# Patient Record
Sex: FEMALE | Race: WHITE | NOT HISPANIC OR LATINO | Employment: OTHER | URBAN - NONMETROPOLITAN AREA
[De-identification: names, ages, dates, MRNs, and addresses within clinical notes are randomized per-mention and may not be internally consistent; named-entity substitution may affect disease eponyms.]

---

## 2017-01-09 DIAGNOSIS — G47.00 PERSISTENT INSOMNIA: Primary | ICD-10-CM

## 2017-01-09 RX ORDER — ZOLPIDEM TARTRATE 12.5 MG/1
TABLET, FILM COATED, EXTENDED RELEASE ORAL
Qty: 30 TABLET | Refills: 0 | Status: SHIPPED | OUTPATIENT
Start: 2017-01-09 | End: 2017-02-06

## 2017-01-09 NOTE — TELEPHONE ENCOUNTER
The patient called for a refill of Ambien. I do not see a record of a request.  Please see phone note below.

## 2017-01-09 NOTE — TELEPHONE ENCOUNTER
Reason for call:  Medication    1. Medication Name? zolpidem (AMBIEN CR) 12.5 MG CR tablet   2. Is this request for a refill? Yes  3. What Pharmacy do you use? [facility]  4. Have you contacted your pharmacy? Yes, twice the pharmacy sent a fax; one last week either Wednesday 01/11/2017 or Thursday 01/12/2017 and the other today 01/09/2017   5. If yes, when?  (Please note that the turn-around-time for prescriptions is 72 business hours; I am sending your request at this time. SEND TO  [facility]  )  Description: Pt called to see if the faxes were received by MARQUISE Lee and to have a nurse call once it was ready.  Pt states she is out of that medication as of yesterday.  Was an appointment offered for this a call? No   Preferred method for responding to this messageTelephone Call: [phone]  If we cannot reach you directly, may we leave a detailed response at the number you provided? Yes  Can this message wait until your PCP/Provider returns if not available today? Not applicable, MARQUISE Lee is in today.

## 2017-01-16 DIAGNOSIS — R52 GENERALIZED PAIN: Primary | ICD-10-CM

## 2017-01-16 RX ORDER — HYDROCODONE BITARTRATE AND ACETAMINOPHEN 7.5; 325 MG/1; MG/1
TABLET ORAL
Qty: 120 TABLET | Refills: 0 | Status: SHIPPED | OUTPATIENT
Start: 2017-01-16 | End: 2017-02-14

## 2017-01-17 NOTE — TELEPHONE ENCOUNTER
Pt notified that the written RX is ready at the [facility]  registration to be picked up.

## 2017-02-06 DIAGNOSIS — G47.00 PERSISTENT INSOMNIA: Primary | ICD-10-CM

## 2017-02-06 RX ORDER — ZOLPIDEM TARTRATE 12.5 MG/1
TABLET, FILM COATED, EXTENDED RELEASE ORAL
Qty: 30 TABLET | Refills: 0 | Status: SHIPPED | OUTPATIENT
Start: 2017-02-06 | End: 2017-03-02

## 2017-02-06 NOTE — TELEPHONE ENCOUNTER
ambien      Last Written Prescription Date: 1/9/17  Last Fill Quantity: 30,  # refills: 0   Last Office Visit with G, P or [facility] prescribing provider: 11/14/16

## 2017-02-14 DIAGNOSIS — R52 GENERALIZED PAIN: ICD-10-CM

## 2017-02-14 RX ORDER — HYDROCODONE BITARTRATE AND ACETAMINOPHEN 7.5; 325 MG/1; MG/1
TABLET ORAL
Qty: 120 TABLET | Refills: 0 | Status: SHIPPED | OUTPATIENT
Start: 2017-02-14 | End: 2017-03-14

## 2017-02-14 NOTE — TELEPHONE ENCOUNTER
Pt notified that the written RX is ready at the [facility]  registration to be picked up.

## 2017-03-02 DIAGNOSIS — G47.00 PERSISTENT INSOMNIA: ICD-10-CM

## 2017-03-02 RX ORDER — ZOLPIDEM TARTRATE 12.5 MG/1
TABLET, FILM COATED, EXTENDED RELEASE ORAL
Qty: 30 TABLET | Refills: 0 | Status: SHIPPED | OUTPATIENT
Start: 2017-03-02 | End: 2017-03-23

## 2017-03-02 NOTE — TELEPHONE ENCOUNTER
ambien CR      Last Written Prescription Date: 2/6/17  Last Fill Quantity: 30,  # refills: 0   Last Office Visit with G, P or [facility] prescribing provider: 11/14/16

## 2017-03-14 DIAGNOSIS — R52 GENERALIZED PAIN: ICD-10-CM

## 2017-03-14 RX ORDER — HYDROCODONE BITARTRATE AND ACETAMINOPHEN 7.5; 325 MG/1; MG/1
TABLET ORAL
Qty: 120 TABLET | Refills: 0 | Status: SHIPPED | OUTPATIENT
Start: 2017-03-14 | End: 2017-04-11

## 2017-03-15 NOTE — TELEPHONE ENCOUNTER
Pt notified that the written RX is ready at the [facility]  registration to be picked up.

## 2017-03-21 ENCOUNTER — OFFICE VISIT (OUTPATIENT)
Dept: INTERNAL MEDICINE | Facility: OTHER | Age: 67
End: 2017-03-21
Attending: NURSE PRACTITIONER
Payer: COMMERCIAL

## 2017-03-21 DIAGNOSIS — G89.29 CHRONIC NONINTRACTABLE HEADACHE, UNSPECIFIED HEADACHE TYPE: ICD-10-CM

## 2017-03-21 DIAGNOSIS — E78.2 MIXED HYPERLIPIDEMIA: ICD-10-CM

## 2017-03-21 DIAGNOSIS — M25.561 CHRONIC PAIN OF BOTH KNEES: ICD-10-CM

## 2017-03-21 DIAGNOSIS — G89.29 CHRONIC PAIN OF BOTH KNEES: ICD-10-CM

## 2017-03-21 DIAGNOSIS — M25.562 CHRONIC PAIN OF BOTH KNEES: ICD-10-CM

## 2017-03-21 DIAGNOSIS — E03.9 HYPOTHYROIDISM, UNSPECIFIED TYPE: Primary | ICD-10-CM

## 2017-03-21 DIAGNOSIS — F41.9 ANXIETY: ICD-10-CM

## 2017-03-21 DIAGNOSIS — G47.00 PERSISTENT INSOMNIA: ICD-10-CM

## 2017-03-21 DIAGNOSIS — R51.9 CHRONIC NONINTRACTABLE HEADACHE, UNSPECIFIED HEADACHE TYPE: ICD-10-CM

## 2017-03-21 PROCEDURE — 99212 OFFICE O/P EST SF 10 MIN: CPT | Mod: 25

## 2017-03-21 PROCEDURE — 94760 N-INVAS EAR/PLS OXIMETRY 1: CPT

## 2017-03-21 PROCEDURE — 20610 DRAIN/INJ JOINT/BURSA W/O US: CPT | Mod: 50 | Performed by: NURSE PRACTITIONER

## 2017-03-21 PROCEDURE — 99213 OFFICE O/P EST LOW 20 MIN: CPT | Mod: 25 | Performed by: NURSE PRACTITIONER

## 2017-03-21 RX ORDER — SIMVASTATIN 40 MG
TABLET ORAL
Qty: 90 TABLET | Refills: 0 | Status: SHIPPED | OUTPATIENT
Start: 2017-03-21 | End: 2019-05-30

## 2017-03-21 ASSESSMENT — ANXIETY QUESTIONNAIRES
5. BEING SO RESTLESS THAT IT IS HARD TO SIT STILL: NOT AT ALL
GAD7 TOTAL SCORE: 0
1. FEELING NERVOUS, ANXIOUS, OR ON EDGE: NOT AT ALL
6. BECOMING EASILY ANNOYED OR IRRITABLE: NOT AT ALL
7. FEELING AFRAID AS IF SOMETHING AWFUL MIGHT HAPPEN: NOT AT ALL
3. WORRYING TOO MUCH ABOUT DIFFERENT THINGS: NOT AT ALL
IF YOU CHECKED OFF ANY PROBLEMS ON THIS QUESTIONNAIRE, HOW DIFFICULT HAVE THESE PROBLEMS MADE IT FOR YOU TO DO YOUR WORK, TAKE CARE OF THINGS AT HOME, OR GET ALONG WITH OTHER PEOPLE: NOT DIFFICULT AT ALL
2. NOT BEING ABLE TO STOP OR CONTROL WORRYING: NOT AT ALL

## 2017-03-21 ASSESSMENT — PAIN SCALES - GENERAL: PAINLEVEL: MODERATE PAIN (5)

## 2017-03-21 ASSESSMENT — PATIENT HEALTH QUESTIONNAIRE - PHQ9: 5. POOR APPETITE OR OVEREATING: NOT AT ALL

## 2017-03-21 NOTE — PROGRESS NOTES
SUBJECTIVE:  Aure Lanier, a 67 year old female scheduled an appointment to discuss the following issues:  Knee pain: Has pain both knees can hardly walk WOuld like steroid injection. THese work well for her.  Does not want surgery yet.  .   Insomnia:  Insurance company refusing to pay for Ambien CR, states 10mg does not last and she awakens at 3 AM and cannot get back to sleep.    Hypothyroidism  On Levothyroxin.    Dysthymia: Patients insurance will not pay for 2 10's  Of Lexapro-will change to 20mg.   Hyperlipidemia: On Simvastatin.   Medical, social, surgical, and family histories reviewed.    Current Outpatient Prescriptions   Medication     simvastatin (ZOCOR) 40 MG tablet     HYDROcodone-acetaminophen (NORCO) 7.5-325 MG per tablet     zolpidem (AMBIEN CR) 12.5 MG CR tablet     cyclobenzaprine (FLEXERIL) 10 MG tablet     gabapentin (NEURONTIN) 300 MG capsule     nystatin (MYCOSTATIN) 221106 UNIT/GM POWD     bupivacaine (MARCAINE) 0.5 % injection     folic acid (FOLVITE) 1 MG tablet     escitalopram (LEXAPRO) 10 MG tablet     levothyroxine (SYNTHROID, LEVOTHROID) 125 MCG tablet     multivitamin (OCUVITE) TABS     Multiple Vitamins-Minerals (MULTIVITAMIN OR)     calcium (CALCIUM 600) 600 MG tablet     cyanocolbalamin (VITAMIN  B-12) 1000 MCG tablet     No current facility-administered medications for this visit.        ROS:  C: NEGATIVE for fever, chills, sore throat, earache  E: NEGATIVE for vision changes   R: NEGATIVE for  cough , SOB  CV: NEGATIVE for chest pain, palpitations Hx hyperlipidemia  GI: NEGATIVE for nausea, abdominal pain, heartburn, or constipation, diarrhea.   : NEGATIVE for frequency, dysuria, or hematuria  M:POSITIVE  for significant arthralgias or myalgia Knee pain. Hx arthroscopy on both  No complaints of fungal rash today.   N: NEGATIVE for weakness,  Paresthesias, dizziness, has chronic  Headache Has insomnia.  Hx hypothyroidism.      OBJECTIVE:  /80 (BP Location: Left arm,  "Patient Position: Chair, Cuff Size: Adult Large)  Pulse 99  Temp 96.6  F (35.9  C) (Tympanic)  Ht 5' 3\" (1.6 m)  Wt 205 lb (93 kg)  BMI 36.31 kg/m2  EXAM:  GENERAL APPEARANCE:  alert and no distress  EYES: EOMI,  PERRL  HENT:EARS: TM's pearly gray, good lite reflex, NOSE: Nares red, moist nonswollen, THROAT: Oropharynx pink  Tongue midline, no fasciculations.   NECK: Supple, no lymphadenopathy, no thyromegaly, no carotid bruits, No JVD  RESP: lungs clear to auscultation anteriorly and posteriorly - no rales, rhonchi or wheezes  CV: regular rates and rhythm, normal S1 S2, no S3 or S4 and no murmur, no click or rub -  MS: No edema  Permit signed.  Betadine to inner patella edge on both knees.  Injected with Bipuvicaine 0.25% 2.5cc and Kenalog 40mg  1/2 cc to both knees. Tolerated procedure well.   NEURO: no focal deficits.           :  ASSESSMENT / PLAN:  (F41.9) Anxiety  Comment:On Lexapro 20mg a day for anxiety and dysthymia.    Plan: Lexapro 20mg     (E78.2) Mixed hyperlipidemia  Comment: Will have patient come in for fasting labs.    Plan: simvastatin (ZOCOR) 40 MG tablet          (E03.9) Hypothyroidism, unspecified type    Comment: On Levothyroxine  125mcg.    Plan: Needs TSH     (R51) Chronic nonintractable headache, unspecified headache type  Comment: On Lortab 7.5/325 4 times a day for headache, neck pain.  On Gabapentin 300mg 3 times a day for this and post herpatic neuralgia.    Plan: Hydrocodone 7.5/325mg     (M25.561,  M25.562,  G89.29) Chronic pain of both knees  Comment: Steroid injection done-see above    Plan: See Above-watch for signs of infection.      (G47.00) Persistent insomnia  Comment: Will change Ambien to 10mg  Take one and may repeat if awakens.    Plan: AMbien 10mg         "

## 2017-03-21 NOTE — MR AVS SNAPSHOT
"              After Visit Summary   3/21/2017    Aure Lanier    MRN: [id number]           Patient Information     Date Of Birth          1950        Visit Information        Provider Department      3/21/2017 2:00 PM [facility]        Today's Diagnoses     Anxiety        Mixed hyperlipidemia          Care Instructions    1. Rest today with knees.          Follow-ups after your visit        Who to contact     If you have questions or need follow up information about today's clinic visit or your schedule please contact [facility] directly at [phone].  Normal or non-critical lab and imaging results will be communicated to you by [employer] in 2014[employer]hart, letter or phone within 4 business days after the clinic has received the results. If you do not hear from us within 7 days, please contact the clinic through [employer] in 2014[employer]hart or phone. If you have a critical or abnormal lab result, we will notify you by phone as soon as possible.  Submit refill requests through [employer] or call your pharmacy and they will forward the refill request to us. Please allow 3 business days for your refill to be completed.          Additional Information About Your Visit        MyChart Information     [employer] lets you send messages to your doctor, view your test results, renew your prescriptions, schedule appointments and more. To sign up, go to [email or web address] . Click on \"Log in\" on the left side of the screen, which will take you to the Welcome page. Then click on \"Sign up Now\" on the right side of the page.     You will be asked to enter the access code listed below, as well as some personal information. Please follow the directions to create your username and password.     Your access code is: [email or web address]  Expires: 2017  2:54 PM     Your access code will  in 90 days. If you need help or a new code, please call your [facility] or [phone].        Care EveryWhere ID     This is " "your Care EveryWhere ID. This could be used by other organizations to access your [place] medical records  [id number]        Your Vitals Were     Pulse Temperature Height BMI (Body Mass Index)          99 96.6  F (35.9  C) (Tympanic) 5' 3\" (1.6 m) 36.31 kg/m2         Blood Pressure from Last 3 Encounters:   03/21/17 140/80   11/14/16 112/64   08/04/16 100/70    Weight from Last 3 Encounters:   03/21/17 205 lb (93 kg)   11/14/16 200 lb (90.7 kg)   08/04/16 197 lb (89.4 kg)              Today, you had the following     No orders found for display         Today's Medication Changes          These changes are accurate as of: 3/21/17  2:54 PM.  If you have any questions, ask your nurse or doctor.               These medicines have changed or have updated prescriptions.        Dose/Directions    simvastatin 40 MG tablet   Commonly known as:  ZOCOR   This may have changed:  See the new instructions.   Used for:  Mixed hyperlipidemia   Changed by:  Jaden Lee NP        TAKE 1 TABLET BY MOUTH EVERY DAY IN THE EVENING . GENERIC FOR ZOCOR.   Quantity:  90 tablet   Refills:  0         Stop taking these medicines if you haven't already. Please contact your care team if you have questions.     omeprazole 40 MG capsule   Commonly known as:  priLOSEC   Stopped by:  Jaden Lee NP                Where to get your medicines      These medications were sent to [id number] - [place] - [address]  [address] [place] [facility] [address]     Phone:  [phone]     simvastatin 40 MG tablet                Primary Care Provider Office Phone # Fax #    Jaden Lee [phone]       [facility] [address] [address]        Thank you!     Thank you for choosing [facility]  for your care. Our goal is always to provide you with excellent care. Hearing back from our patients is one way we can continue to improve our services. Please take a few minutes to " complete the written survey that you may receive in the mail after your visit with us. Thank you!             Your Updated Medication List - Protect others around you: Learn how to safely use, store and throw away your medicines at www.disposemymeds.org.          This list is accurate as of: 3/21/17  2:54 PM.  Always use your most recent med list.                   Brand Name Dispense Instructions for use    bupivacaine 0.5 % injection    MARCAINE    2.5 mL    Used 0.25% Marcaine- 2 1/2 ml to each knee.       calcium carbonate 600 MG tablet   Generic drug:  calcium      With vitamin D; take 2 by oral route every day       cyanocobalamin 1000 MCG tablet    vitamin  B-12     Take 1 tablet by mouth daily.       cyclobenzaprine 10 MG tablet    FLEXERIL    90 tablet    TAKE ONE TABLET THREE TIMES A DAY BY MOUTH AS NEEDED - GENERICFOR FLEXERIL       escitalopram 10 MG tablet    LEXAPRO    60 tablet    TAKE 1 TABLET (10MG) BY MOUTH TWICE A DAY       folic acid 1 MG tablet    FOLVITE    90 tablet    TAKE ONE TABLET DAILY BY MOUTH       gabapentin 300 MG capsule    NEURONTIN    90 capsule    TAKE 1 CAPSULE (300MG) BY MOUTH THREE TIMES DAILY       HYDROcodone-acetaminophen 7.5-325 MG per tablet    NORCO    120 tablet    TAKE 1 TABLET BY MOUTH EVER Y 6 HOURS AS NEEDED FOR JOEY N - GENERIC FOR NORCO       levothyroxine 125 MCG tablet    SYNTHROID/LEVOTHROID    90 tablet    TAKE 1 TABLET DAILY BY MOUTH - GENERIC FOR SYNTHROID       * MULTIVITAMIN PO      Take 1 tablet by mouth daily with food.       * multivitamin Tabs tablet      Take 1 tablet by mouth daily       nystatin 234528 UNIT/GM Powd    MYCOSTATIN    60 g    APPLY TOPICALLY 2 TIMES DAILY AS NEEDED       simvastatin 40 MG tablet    ZOCOR    90 tablet    TAKE 1 TABLET BY MOUTH EVERY DAY IN THE EVENING . GENERIC FOR ZOCOR.       zolpidem 12.5 MG CR tablet    AMBIEN CR    30 tablet    TAKE 1 TABLET BY MOUTH AT B EDTIME AS NEEDED FOR SLEEP       * Notice:  This list has 2  medication(s) that are the same as other medications prescribed for you. Read the directions carefully, and ask your doctor or other care provider to review them with you.

## 2017-03-21 NOTE — NURSING NOTE
"Chief Complaint   Patient presents with     Knee Pain     bilateral knee injections       Initial /80 (BP Location: Left arm, Patient Position: Chair, Cuff Size: Adult Large)  Pulse 99  Temp 96.6  F (35.9  C) (Tympanic)  Ht 5' 3\" (1.6 m)  Wt 205 lb (93 kg)  BMI 36.31 kg/m2 Estimated body mass index is 36.31 kg/(m^2) as calculated from the following:    Height as of this encounter: 5' 3\" (1.6 m).    Weight as of this encounter: 205 lb (93 kg).  Medication Reconciliation: complete   Elly Arredondo      "

## 2017-03-22 ASSESSMENT — ANXIETY QUESTIONNAIRES: GAD7 TOTAL SCORE: 0

## 2017-03-23 VITALS
HEART RATE: 99 BPM | HEIGHT: 63 IN | TEMPERATURE: 96.6 F | WEIGHT: 205 LBS | BODY MASS INDEX: 36.32 KG/M2 | SYSTOLIC BLOOD PRESSURE: 128 MMHG | DIASTOLIC BLOOD PRESSURE: 80 MMHG

## 2017-03-23 DIAGNOSIS — M54.2 CERVICALGIA: ICD-10-CM

## 2017-03-23 DIAGNOSIS — B02.23 POSTHERPETIC POLYNEUROPATHY: ICD-10-CM

## 2017-03-23 RX ORDER — BUPIVACAINE HYDROCHLORIDE 5 MG/ML
INJECTION, SOLUTION PERINEURAL
Qty: 5 ML | Refills: 0 | OUTPATIENT
Start: 2017-03-23 | End: 2017-07-11

## 2017-03-23 RX ORDER — ZOLPIDEM TARTRATE 10 MG/1
10 TABLET ORAL
Qty: 60 TABLET | Refills: 0 | COMMUNITY
Start: 2017-03-23 | End: 2017-04-04

## 2017-03-23 RX ORDER — TRIAMCINOLONE ACETONIDE 40 MG/ML
INJECTION, SUSPENSION INTRA-ARTICULAR; INTRAMUSCULAR
Qty: 1 ML | Refills: 0 | OUTPATIENT
Start: 2017-03-23 | End: 2017-07-11

## 2017-03-27 RX ORDER — GABAPENTIN 300 MG/1
CAPSULE ORAL
Qty: 90 CAPSULE | Refills: 0 | Status: SHIPPED | OUTPATIENT
Start: 2017-03-27 | End: 2017-04-24

## 2017-03-27 RX ORDER — CYCLOBENZAPRINE HCL 10 MG
TABLET ORAL
Qty: 90 TABLET | Refills: 0 | Status: SHIPPED | OUTPATIENT
Start: 2017-03-27 | End: 2017-04-24

## 2017-04-03 DIAGNOSIS — G47.00 PERSISTENT INSOMNIA: ICD-10-CM

## 2017-04-03 RX ORDER — ZOLPIDEM TARTRATE 12.5 MG/1
TABLET, FILM COATED, EXTENDED RELEASE ORAL
Qty: 30 TABLET | Refills: 0 | Status: SHIPPED | OUTPATIENT
Start: 2017-04-03 | End: 2017-04-04 | Stop reason: ALTCHOICE

## 2017-04-03 NOTE — TELEPHONE ENCOUNTER
ambien Cr   Was discontinued- please advise just got ambien 10 mg 3/23/17 #60   Last Written Prescription Date: 3/5/17  Last Fill Quantity: 30,  # refills: 0   Last Office Visit with [facility], [facility] or [facility] prescribing provider: 3/21/17

## 2017-04-04 DIAGNOSIS — G47.00 PERSISTENT INSOMNIA: ICD-10-CM

## 2017-04-04 RX ORDER — ZOLPIDEM TARTRATE 10 MG/1
10 TABLET ORAL
Qty: 60 TABLET | Refills: 0 | Status: SHIPPED | OUTPATIENT
Start: 2017-04-04 | End: 2017-05-02

## 2017-04-04 NOTE — TELEPHONE ENCOUNTER
Reason for call:  Medication    1. Medication Name? Abien 10 mg  2. Is this request for a refill? Yes  3. What Pharmacy do you use[facility]  4. Have you contacted your pharmacy? No    5. If yes, when?  (Please note that the turn-around-time for prescriptions is 72 business hours; I am sending your request at this time. SEND TO  [facility]  )  Description: Pt wanted her Ambien CR changed to regular Ambien (10 mg 60 pills/30days)due to insurance.    [facility] [place]  Was an appointment offered for this a call? No   Preferred method for responding to this messageTelephone Call  If we cannot reach you directly, may we leave a detailed response at the number you provided? Yes  Can this message wait until your PCP/Provider returns if not available today? No

## 2017-04-04 NOTE — TELEPHONE ENCOUNTER
Please see phone call message below.  Patient would like her rx changed to Ambien 10 mg take one tablet every night.  There was a rx for the Ambien on 3/23/2017 but that never went through.  I verified this with the pharmacy.  Please resend the rx for Ambien.  Thank you.

## 2017-04-11 DIAGNOSIS — R52 GENERALIZED PAIN: ICD-10-CM

## 2017-04-11 RX ORDER — HYDROCODONE BITARTRATE AND ACETAMINOPHEN 7.5; 325 MG/1; MG/1
TABLET ORAL
Qty: 120 TABLET | Refills: 0 | Status: SHIPPED | OUTPATIENT
Start: 2017-04-11 | End: 2017-05-09

## 2017-04-11 NOTE — TELEPHONE ENCOUNTER
Pt notified that the written RX is ready at the [facility]  registration to be picked up.

## 2017-04-11 NOTE — TELEPHONE ENCOUNTER
norco      Last Written Prescription Date: 3/14/17  Last Fill Quantity: 120,  # refills: 0   Last Office Visit with G, P or [facility] prescribing provider: 3/21/17

## 2017-05-02 DIAGNOSIS — G47.00 PERSISTENT INSOMNIA: ICD-10-CM

## 2017-05-02 RX ORDER — ZOLPIDEM TARTRATE 10 MG/1
10 TABLET ORAL
Qty: 60 TABLET | Refills: 0 | Status: SHIPPED | OUTPATIENT
Start: 2017-05-02 | End: 2017-06-01

## 2017-05-02 NOTE — TELEPHONE ENCOUNTER
ambien      Last Written Prescription Date: 4/4/17  Last Fill Quantity: 60,  # refills: 0   Last Office Visit with G, P or [facility] prescribing provider: 3/21/17

## 2017-05-08 DIAGNOSIS — F41.9 ANXIETY: Primary | ICD-10-CM

## 2017-05-08 DIAGNOSIS — M54.2 CERVICALGIA: ICD-10-CM

## 2017-05-08 RX ORDER — CYCLOBENZAPRINE HCL 10 MG
TABLET ORAL
Qty: 90 TABLET | Refills: 0 | Status: SHIPPED | OUTPATIENT
Start: 2017-05-08 | End: 2017-06-06

## 2017-05-08 NOTE — TELEPHONE ENCOUNTER
Flexeril- pharmacy did not receive approval from 4/27      Last Written Prescription Date: 4/27/17  Last Fill Quantity: 90,  # refills: 0   Last Office Visit with G, P or [facility] prescribing provider: 3/21/17

## 2017-05-09 DIAGNOSIS — R52 GENERALIZED PAIN: ICD-10-CM

## 2017-05-09 PROBLEM — M62.830 BACK MUSCLE SPASM: Status: ACTIVE | Noted: 2017-05-09

## 2017-05-09 RX ORDER — HYDROCODONE BITARTRATE AND ACETAMINOPHEN 7.5; 325 MG/1; MG/1
TABLET ORAL
Qty: 120 TABLET | Refills: 0 | Status: SHIPPED | OUTPATIENT
Start: 2017-05-09 | End: 2017-06-07

## 2017-05-09 NOTE — TELEPHONE ENCOUNTER
norco      Last Written Prescription Date: 4/11/17  Last Fill Quantity: 120,  # refills: 0   Last Office Visit with G, P or [facility] prescribing provider: 3/21/17

## 2017-05-09 NOTE — TELEPHONE ENCOUNTER
Pt notified that the written RX is ready at the [facility]  registration to be picked up.

## 2017-05-10 NOTE — TELEPHONE ENCOUNTER
lexapro       Last Written Prescription Date: 6/13/16  Last Fill Quantity: 60; # refills: 9  Last Office Visit with [facility], [facility] or  [facility] prescribing provider:  3/21/17        Last PHQ-9 score on record=   PHQ-9 SCORE 11/14/2016   Total Score -   Total Score 3       Lab Results   Component Value Date    AST 16 06/22/2016     Lab Results   Component Value Date    ALT 27 06/22/2016

## 2017-05-11 DIAGNOSIS — E03.9 HYPOTHYROIDISM: ICD-10-CM

## 2017-05-11 RX ORDER — ESCITALOPRAM OXALATE 20 MG/1
TABLET ORAL
Qty: 30 TABLET | Refills: 5 | Status: SHIPPED | OUTPATIENT
Start: 2017-05-11 | End: 2017-10-30

## 2017-05-12 RX ORDER — LEVOTHYROXINE SODIUM 125 UG/1
TABLET ORAL
Qty: 90 TABLET | Refills: 1 | Status: SHIPPED | OUTPATIENT
Start: 2017-05-12 | End: 2018-04-10

## 2017-05-12 NOTE — TELEPHONE ENCOUNTER
Levothyroxine     Last Written Prescription Date: 1/05/2017  Last Quantity: 90, # refills: 0  Last Office Visit with G, P or [facility] prescribing provider: 3/21/2017        TSH   Date Value Ref [facility] Status   06/22/2016 1.49 0.40 - 4.00 mU/L Final

## 2017-05-25 DIAGNOSIS — B02.23 POSTHERPETIC POLYNEUROPATHY: ICD-10-CM

## 2017-05-25 RX ORDER — GABAPENTIN 300 MG/1
CAPSULE ORAL
Qty: 90 CAPSULE | Refills: 0 | Status: SHIPPED | OUTPATIENT
Start: 2017-05-25 | End: 2017-06-21

## 2017-05-25 NOTE — TELEPHONE ENCOUNTER
gabapentin      Last Written Prescription Date: 4/27/17  Last Fill Quantity: 90,  # refills: 0   Last Office Visit with G, P or [facility] prescribing provider: 3/21/17

## 2017-06-01 DIAGNOSIS — G47.00 PERSISTENT INSOMNIA: ICD-10-CM

## 2017-06-01 DIAGNOSIS — E03.9 HYPOTHYROIDISM, UNSPECIFIED TYPE: ICD-10-CM

## 2017-06-01 DIAGNOSIS — E78.2 MIXED HYPERLIPIDEMIA: ICD-10-CM

## 2017-06-01 LAB
ALBUMIN SERPL-MCNC: 4 G/DL (ref 3.4–5)
ALP SERPL-CCNC: 92 U/L (ref 40–150)
ALT SERPL W P-5'-P-CCNC: 29 U/L (ref 0–50)
ANION GAP SERPL CALCULATED.3IONS-SCNC: 6 MMOL/L (ref 3–14)
AST SERPL W P-5'-P-CCNC: 14 U/L (ref 0–45)
BASOPHILS # BLD AUTO: 0.1 10E9/L (ref 0–0.2)
BASOPHILS NFR BLD AUTO: 0.6 %
BILIRUB SERPL-MCNC: 0.3 MG/DL (ref 0.2–1.3)
BUN SERPL-MCNC: 9 MG/DL (ref 7–30)
CALCIUM SERPL-MCNC: 8.5 MG/DL (ref 8.5–10.1)
CHLORIDE SERPL-SCNC: 107 MMOL/L (ref 94–109)
CHOLEST SERPL-MCNC: 159 MG/DL
CO2 SERPL-SCNC: 28 MMOL/L (ref 20–32)
CREAT SERPL-MCNC: 0.59 MG/DL (ref 0.52–1.04)
DIFFERENTIAL METHOD BLD: ABNORMAL
EOSINOPHIL # BLD AUTO: 0.8 10E9/L (ref 0–0.7)
EOSINOPHIL NFR BLD AUTO: 7.7 %
ERYTHROCYTE [DISTWIDTH] IN BLOOD BY AUTOMATED COUNT: 13.4 % (ref 10–15)
GFR SERPL CREATININE-BSD FRML MDRD: ABNORMAL ML/MIN/1.7M2
GLUCOSE SERPL-MCNC: 100 MG/DL (ref 70–99)
HCT VFR BLD AUTO: 41.8 % (ref 35–47)
HDLC SERPL-MCNC: 56 MG/DL
HGB BLD-MCNC: 13.7 G/DL (ref 11.7–15.7)
IMM GRANULOCYTES # BLD: 0 10E9/L (ref 0–0.4)
IMM GRANULOCYTES NFR BLD: 0.3 %
LDLC SERPL CALC-MCNC: 58 MG/DL
LYMPHOCYTES # BLD AUTO: 4.2 10E9/L (ref 0.8–5.3)
LYMPHOCYTES NFR BLD AUTO: 42.2 %
MCH RBC QN AUTO: 29.5 PG (ref 26.5–33)
MCHC RBC AUTO-ENTMCNC: 32.8 G/DL (ref 31.5–36.5)
MCV RBC AUTO: 90 FL (ref 78–100)
MONOCYTES # BLD AUTO: 0.8 10E9/L (ref 0–1.3)
MONOCYTES NFR BLD AUTO: 7.8 %
NEUTROPHILS # BLD AUTO: 4.1 10E9/L (ref 1.6–8.3)
NEUTROPHILS NFR BLD AUTO: 41.4 %
NONHDLC SERPL-MCNC: 103 MG/DL
NRBC # BLD AUTO: 0 10*3/UL
NRBC BLD AUTO-RTO: 0 /100
PLATELET # BLD AUTO: 411 10E9/L (ref 150–450)
POTASSIUM SERPL-SCNC: 3.9 MMOL/L (ref 3.4–5.3)
PROT SERPL-MCNC: 7.4 G/DL (ref 6.8–8.8)
RBC # BLD AUTO: 4.64 10E12/L (ref 3.8–5.2)
SODIUM SERPL-SCNC: 141 MMOL/L (ref 133–144)
TRIGL SERPL-MCNC: 223 MG/DL
TSH SERPL DL<=0.005 MIU/L-ACNC: 1.74 MU/L (ref 0.4–4)
WBC # BLD AUTO: 10 10E9/L (ref 4–11)

## 2017-06-01 PROCEDURE — 84443 ASSAY THYROID STIM HORMONE: CPT | Mod: ZL | Performed by: NURSE PRACTITIONER

## 2017-06-01 PROCEDURE — 80053 COMPREHEN METABOLIC PANEL: CPT | Mod: ZL | Performed by: NURSE PRACTITIONER

## 2017-06-01 PROCEDURE — 36415 COLL VENOUS BLD VENIPUNCTURE: CPT | Mod: ZL | Performed by: NURSE PRACTITIONER

## 2017-06-01 PROCEDURE — 85025 COMPLETE CBC W/AUTO DIFF WBC: CPT | Mod: ZL | Performed by: NURSE PRACTITIONER

## 2017-06-01 PROCEDURE — 80061 LIPID PANEL: CPT | Mod: ZL | Performed by: NURSE PRACTITIONER

## 2017-06-01 RX ORDER — ZOLPIDEM TARTRATE 10 MG/1
10 TABLET ORAL
Qty: 60 TABLET | Refills: 0 | Status: SHIPPED | OUTPATIENT
Start: 2017-06-01 | End: 2017-06-29

## 2017-06-01 NOTE — TELEPHONE ENCOUNTER
ambien      Last Written Prescription Date: 5/2/17  Last Fill Quantity: 60,  # refills: 0   Last Office Visit with G, P or [facility] prescribing provider: 3/21/17

## 2017-06-07 DIAGNOSIS — R52 GENERALIZED PAIN: ICD-10-CM

## 2017-06-08 RX ORDER — HYDROCODONE BITARTRATE AND ACETAMINOPHEN 7.5; 325 MG/1; MG/1
TABLET ORAL
Qty: 120 TABLET | Refills: 0 | Status: SHIPPED | OUTPATIENT
Start: 2017-06-08 | End: 2017-06-28

## 2017-06-20 ENCOUNTER — TELEPHONE (OUTPATIENT)
Dept: INTERNAL MEDICINE | Facility: OTHER | Age: 67
End: 2017-06-20

## 2017-06-20 NOTE — TELEPHONE ENCOUNTER
8:57 AM    Reason for Call: OVERBOOK    Patient is having the following symptoms: possible pink eye for 2 days.    The patient is requesting an appointment for 6-20-17 with Jaden Lee.    Was an appointment offered for this call? Yes    Preferred method for responding to this message: Telephone Call [phone]    If we cannot reach you directly, may we leave a detailed response at the number you provided? Yes    Can this message wait until your PCP/provider returns, if unavailable today? YES    Sherri Vieira

## 2017-06-28 ENCOUNTER — TELEPHONE (OUTPATIENT)
Dept: FAMILY MEDICINE | Facility: OTHER | Age: 67
End: 2017-06-28

## 2017-06-28 DIAGNOSIS — R52 GENERALIZED PAIN: ICD-10-CM

## 2017-06-28 NOTE — TELEPHONE ENCOUNTER
Reason for call:  Medication    1. Medication Name? HYDROcodone-acetaminophen (NORCO) 7.5-325 MG per tablet  2. Is this request for a refill? Yes  3. What [facility] [place]  4. Have you contacted your pharmacy? No    5. If yes, when?  (Please note that the turn-around-time for prescriptions is 72 business hours; I am sending your request at this time. SEND TO  [facility]  )  Description: Pt called and states that she is going to run out of medication and she knows Jaden Lee is out of the office next week she will be out by than she was wondering if there was anyway she could get it filled before this.   Was an appointment offered for this a call? No   Preferred method for responding to this messageTelephone Call  If we cannot reach you directly, may we leave a detailed response at the number you provided? Yes  Can this message wait until your PCP/Provider returns if not available today? n/a

## 2017-06-28 NOTE — TELEPHONE ENCOUNTER
Refill on Norco, if she goes through the refill line she will run out.  Her last office call was in March 2017..  She is due for follow up.

## 2017-06-29 DIAGNOSIS — G47.00 PERSISTENT INSOMNIA: ICD-10-CM

## 2017-06-29 RX ORDER — ZOLPIDEM TARTRATE 10 MG/1
10 TABLET ORAL
Qty: 60 TABLET | Refills: 0 | Status: SHIPPED | OUTPATIENT
Start: 2017-06-29 | End: 2017-07-26

## 2017-06-29 RX ORDER — HYDROCODONE BITARTRATE AND ACETAMINOPHEN 7.5; 325 MG/1; MG/1
TABLET ORAL
Qty: 120 TABLET | Refills: 0 | Status: SHIPPED | OUTPATIENT
Start: 2017-06-29 | End: 2017-08-02

## 2017-06-29 NOTE — TELEPHONE ENCOUNTER
ambien      Last Written Prescription Date: 6/1/17  Last Fill Quantity: 60,  # refills: 0   Last Office Visit with [facility], [facility] or [facility] prescribing provider: 3/21/17                                         Next 5 appointments (look out 90 days)     Jul 11, 2017 10:00 AM CDT   (Arrive by 9:45 AM)   SHORT with Jaden Lee NP   [facility] ([facility] )    [address] [place] [address]   [phone]

## 2017-06-29 NOTE — TELEPHONE ENCOUNTER
Pt notified that the written RX is ready at the [facility]  registration to be picked up.

## 2017-07-08 ENCOUNTER — HEALTH MAINTENANCE LETTER (OUTPATIENT)
Age: 67
End: 2017-07-08

## 2017-07-11 ENCOUNTER — OFFICE VISIT (OUTPATIENT)
Dept: INTERNAL MEDICINE | Facility: OTHER | Age: 67
End: 2017-07-11
Attending: NURSE PRACTITIONER
Payer: COMMERCIAL

## 2017-07-11 VITALS
HEIGHT: 63 IN | HEART RATE: 98 BPM | DIASTOLIC BLOOD PRESSURE: 60 MMHG | OXYGEN SATURATION: 96 % | SYSTOLIC BLOOD PRESSURE: 100 MMHG | TEMPERATURE: 97.8 F | BODY MASS INDEX: 33.66 KG/M2 | RESPIRATION RATE: 18 BRPM | WEIGHT: 190 LBS

## 2017-07-11 DIAGNOSIS — G89.4 CHRONIC PAIN SYNDROME: Primary | ICD-10-CM

## 2017-07-11 DIAGNOSIS — G89.29 BILATERAL CHRONIC KNEE PAIN: ICD-10-CM

## 2017-07-11 DIAGNOSIS — M25.562 BILATERAL CHRONIC KNEE PAIN: ICD-10-CM

## 2017-07-11 DIAGNOSIS — F41.9 ANXIETY: ICD-10-CM

## 2017-07-11 DIAGNOSIS — M25.561 BILATERAL CHRONIC KNEE PAIN: ICD-10-CM

## 2017-07-11 DIAGNOSIS — M54.2 CERVICALGIA: ICD-10-CM

## 2017-07-11 PROCEDURE — 99000 SPECIMEN HANDLING OFFICE-LAB: CPT | Performed by: NURSE PRACTITIONER

## 2017-07-11 PROCEDURE — 99213 OFFICE O/P EST LOW 20 MIN: CPT | Mod: 25 | Performed by: NURSE PRACTITIONER

## 2017-07-11 PROCEDURE — 80307 DRUG TEST PRSMV CHEM ANLYZR: CPT | Mod: ZL | Performed by: NURSE PRACTITIONER

## 2017-07-11 PROCEDURE — 20610 DRAIN/INJ JOINT/BURSA W/O US: CPT | Mod: 50 | Performed by: NURSE PRACTITIONER

## 2017-07-11 PROCEDURE — 99212 OFFICE O/P EST SF 10 MIN: CPT

## 2017-07-11 PROCEDURE — 94760 N-INVAS EAR/PLS OXIMETRY 1: CPT

## 2017-07-11 ASSESSMENT — PAIN SCALES - GENERAL: PAINLEVEL: MODERATE PAIN (4)

## 2017-07-11 NOTE — LETTER
[facility]    07/11/17    Patient: Aure Lanier  YOB: 1950  Medical Record Number: [id number]                                                                  Controlled Substance Agreement  I understand that my care provider has prescribed controlled substances (narcotics, tranquilizers, and/or stimulants) to help manage my condition(s).  I am taking this medicine to help me function or work.  I know that this is strong medicine.  It could have serious side effects and even cause a dependency on the drug.  If I stop these medicines suddenly, I could have severe withdrawal symptoms.    The risks, benefits, and side effects of these medication(s) were explained to me.  I agree that:  1. I will take part in other treatments as advised by my provider.  This may be psychiatry or counseling, physical therapy, behavioral therapy, group treatment, or a referral to a pain clinic.  I will reduce or stop my medicine when my provider tells me to do so.   2. I will take my medicines as prescribed.  I will not change the dose or schedule unless my provider tells me to.  There will be no refills if I  run out early.   I may be contacted at any time without warning and asked to complete a drug test or pill count.   3. I will keep all my appointments at the clinic.  If I miss appointments or fail to follow instructions, my provider may stop my medicine.  4. I will not ask other providers to prescribe controlled substances. And I will not accept controlled substances from other people. If I need another prescribed controlled substance for a new reason, I will notify my provider within one business day.  5. If I enroll in the [place] Medical Marijuana program, I will tell my provider.  I will also sign an agreement to share my medical records with my provider.  6. I will use one pharmacy to fill all of my controlled substance prescriptions.  If my prescription is mailed to my pharmacy, it may take 5 to 7  days for my medicine to be ready.  7. I understand that my provider, clinic care team, and pharmacy can track controlled substance prescriptions from other providers through a central database (prescription monitoring program).  8. I will bring in my list of medications (or my medicine bottles) each time I come to the clinic.  REV-  04/2016                                                                                                                                            Page 1 of 2      [facility]    07/11/17    Patient: Aure Lanier  YOB: 1950  Medical Record Number: [id number]    9. Refills of controlled substances will be made only during office hours.  It is up to me to make sure that I do not run out of my medicines on weekends or holidays.    10. I am responsible for my prescriptions.  If the medicine is lost or stolen, it will not be replaced.   I also agree not to share these medicines with anyone.  11. I agree to not use ANY illegal or recreational drugs.  This includes marijuana, cocaine, bath salts or other drugs.  I agree not to use alcohol unless my provider says I may.  I agree to give urine samples whenever asked.  If I fail to give a urine sample, the provider may stop my medicine.     12. I will tell my nurse or provider right away if I become pregnant or have a new medical problem treated outside of [facility].  13. I understand that this medicine can affect my thinking and judgment.  It may be unsafe for me to drive, use machinery and do dangerous tasks.  I will not do any of these things until I know how the medicine affects me.  If my dose changes, I will wait to see how it affects me.  I will contact my provider if I have concerns about medicine side effects.  I understand that if I do not follow any of the conditions above, my prescriptions or treatment may be stopped.    I agree that my provider, clinic care team, and pharmacy may work with any city,  state or federal law enforcement agency that investigates the misuse, sale, or other diversion of my controlled medicine. I will allow my provider to discuss my care with or share a copy of this agreement with any other treating provider, pharmacy or emergency room where I receive care.  I agree to give up (waive) any right of privacy or confidentiality with respect to these authorizations.   I have read this agreement and have asked questions about anything I did not understand.   ___________________________________    ___________________________  Patient Signature                                                           Date and Time  ___________________________________     ____________________________  Witness                                                                            Date and Time  ___________________________________  Jaden Lee NP  REV-  04/2016                                                                                                                                                                 Page 2 of 2

## 2017-07-11 NOTE — PROGRESS NOTES
"SUBJECTIVE:  Aure Lanier, a 67 year old female scheduled an appointment to discuss the following issues:  Chronic pain syndrome Will have sign contract today, and get urine screen .  On Chronic pain meds for years for Headache and neck pain .   Knee pain :  Wants injections today.Has been putting off knee replacement.  States injections help.    Social life: Has moved into another house away from  though not . States feels better since taking this step.      Medical, social, surgical, and family histories reviewed.    Current Outpatient Prescriptions   Medication     cyclobenzaprine (FLEXERIL) 10 MG tablet     HYDROcodone-acetaminophen (NORCO) 7.5-325 MG per tablet     zolpidem (AMBIEN) 10 MG tablet     gabapentin (NEURONTIN) 300 MG capsule     levothyroxine (SYNTHROID/LEVOTHROID) 125 MCG tablet     escitalopram (LEXAPRO) 20 MG tablet     simvastatin (ZOCOR) 40 MG tablet     nystatin (MYCOSTATIN) 939975 UNIT/GM POWD     folic acid (FOLVITE) 1 MG tablet     multivitamin (OCUVITE) TABS     Multiple Vitamins-Minerals (MULTIVITAMIN OR)     calcium (CALCIUM 600) 600 MG tablet     cyanocolbalamin (VITAMIN  B-12) 1000 MCG tablet     [DISCONTINUED] escitalopram (LEXAPRO) 10 MG tablet     No current facility-administered medications for this visit.        ROS:  C: NEGATIVE for fever, chills, sore throat, earache  E: NEGATIVE for vision changes   R: NEGATIVE for  cough , SOB  CV: NEGATIVE for chest pain, palpitations   GI: NEGATIVE for nausea, abdominal pain,some   heartburn, took Prevacid better now.  , or constipation, diarrhea.   : NEGATIVE for frequency, dysuria, or hematuria  M: POSITIVE   for significant arthralgias or myalgia   neck pain  + knee pain   N: NEGATIVE for weakness,  Paresthesias, dizziness  or headache    OBJECTIVE:  /60 (BP Location: Left arm, Patient Position: Sitting, Cuff Size: Adult Large)  Pulse 98  Temp 97.8  F (36.6  C) (Tympanic)  Resp 18  Ht 5' 3\" (1.6 m)  Wt 190 " lb (86.2 kg)  SpO2 96%  BMI 33.66 kg/m2  EXAM:  GENERAL APPEARANCE:  alert and no distress  EYES: EOMI,  PERRL  NECK: Supple, no lymphadenopathy, no thyromegaly, no carotid bruits, No JVD  RESP: lungs clear to auscultation anteriorly and posteriorly - no rales, rhonchi or wheezes  CV: regular rates and rhythm, normal S1 S2, no S3 or S4 and no murmur, no click or rub -  ABDOMEN:  soft, nontender, no hepatomegaly, no splenomegaly,  or masses,  bowel sounds normal  MS: No edema Has fatty tissue on lower legs. Tender to palpation of knees. Permit signed.    Lateral sides of both knees prepped with betadine.  Injected with 2.5cc Marcaine 0.5% and 1/4 cc Kenalog.    NEURO: No focal deficits      ASSESSMENT / PLAN:  (G89.4) Chronic pain syndrome  (primary encounter diagnosis)  Comment:Discussed drug monitoring program at clinic.  Contract signed.Patient given copy.   Urine drug screen obtained.    Plan: Pain Drug Scr UR W Rptd Meds           (M25.561,  M25.562,  G89.29) Bilateral chronic knee pain  Comment:Bilateral Knee injections done.    Plan: See above.  Please note 5cc Marcaine 0.5% used, and Kenalog 20mg of a 40mg bottle.  .  Total      (M54.2) Cervicalgia  Comment: On Lortab 10/325mg QID pRN>    Plan:Fills scripts appropriately.      (F41.9) Anxiety  Comment: On  Lexapro 20mg a day.    Plan: Has left  to live in one of their other houses-apparently was his idea.

## 2017-07-11 NOTE — LETTER
[facility] [place]  [address]  [place] [address]  [phone]             July 11, 2017    Aure Lanier  [address]  \[facility]\""[place] [address]              Dear Aure,    The results of your recent tests were normal.  Enclosed is a copy of the results.  It was a pleasure to see you at your last appointment.  Results for orders placed or performed in visit on 06/01/17   CBC with platelets and differential   Result Value Ref Range    WBC 10.0 4.0 - 11.0 10e9/L    RBC Count 4.64 3.8 - 5.2 10e12/L    Hemoglobin 13.7 11.7 - 15.7 g/dL    Hematocrit 41.8 35.0 - 47.0 %    MCV 90 78 - 100 fl    MCH 29.5 26.5 - 33.0 pg    MCHC 32.8 31.5 - 36.5 g/dL    RDW 13.4 10.0 - 15.0 %    Platelet Count 411 150 - 450 10e9/L    Diff Method Automated Method     % Neutrophils 41.4 %    % Lymphocytes 42.2 %    % Monocytes 7.8 %    % Eosinophils 7.7 %    % Basophils 0.6 %    % Immature Granulocytes 0.3 %    Nucleated RBCs 0 0 /100    Absolute Neutrophil 4.1 1.6 - 8.3 10e9/L    Absolute Lymphocytes 4.2 0.8 - 5.3 10e9/L    Absolute Monocytes 0.8 0.0 - 1.3 10e9/L    Absolute Eosinophils 0.8 (H) 0.0 - 0.7 10e9/L    Absolute Basophils 0.1 0.0 - 0.2 10e9/L    Abs Immature Granulocytes 0.0 0 - 0.4 10e9/L    Absolute Nucleated RBC 0.0    Comprehensive metabolic panel (BMP + Alb, Alk Phos, ALT, AST, Total. Bili, TP)   Result Value Ref Range    Sodium 141 133 - 144 mmol/L    Potassium 3.9 3.4 - 5.3 mmol/L    Chloride 107 94 - 109 mmol/L    Carbon Dioxide 28 20 - 32 mmol/L    Anion Gap 6 3 - 14 mmol/L    Glucose 100 (H) 70 - 99 mg/dL    Urea Nitrogen 9 7 - 30 mg/dL    Creatinine 0.59 0.52 - 1.04 mg/dL    GFR Estimate >90  Non  GFR Calc   >60 mL/min/1.7m2    GFR Estimate If Black >90   GFR Calc   >60 mL/min/1.7m2    Calcium 8.5 8.5 - 10.1 mg/dL    Bilirubin Total 0.3 0.2 - 1.3 mg/dL    Albumin 4.0 3.4 - 5.0 g/dL    Protein Total 7.4 6.8 - 8.8 g/dL    Alkaline Phosphatase 92 40 - 150 U/L    ALT 29 0 - 50 U/L     AST 14 0 - 45 U/L   Lipid Profile (Chol, Trig, HDL, LDL calc)   Result Value Ref Range    Cholesterol 159 <200 mg/dL    Triglycerides 223 (H) <150 mg/dL    HDL Cholesterol 56 >49 mg/dL    LDL Cholesterol Calculated 58 <100 mg/dL    Non HDL Cholesterol 103 <130 mg/dL   TSH with free T4 reflex   Result Value Ref Range    TSH 1.74 0.40 - 4.00 mU/L       If you have any questions or concerns, please call myself or my nurse at [phone].      Sincerely,      Jaden PARRA

## 2017-07-11 NOTE — MR AVS SNAPSHOT
"              After Visit Summary   2017    Aure Lanier    MRN: [id number]           Patient Information     Date Of Birth          1950        Visit Information        Provider Department      2017 10:00 AM [facility]        Today's Diagnoses     Chronic pain syndrome    -  1    Bilateral chronic knee pain        Cervicalgia        Anxiety           Follow-ups after your visit        Who to contact     If you have questions or need follow up information about today's clinic visit or your schedule please contact [facility] directly at [phone].  Normal or non-critical lab and imaging results will be communicated to you by [employer], letter or phone within 4 business days after the clinic has received the results. If you do not hear from us within 7 days, please contact the clinic through [employer] or phone. If you have a critical or abnormal lab result, we will notify you by phone as soon as possible.  Submit refill requests through [employer] or call your pharmacy and they will forward the refill request to us. Please allow 3 business days for your refill to be completed.          Additional Information About Your Visit        MyChart Information     [employer] lets you send messages to your doctor, view your test results, renew your prescriptions, schedule appointments and more. To sign up, go to [email or web address] . Click on \"Log in\" on the left side of the screen, which will take you to the Welcome page. Then click on \"Sign up Now\" on the right side of the page.     You will be asked to enter the access code listed below, as well as some personal information. Please follow the directions to create your username and password.     Your access code is: [email or web address]  Expires: 10/11/2017  6:51 AM     Your access code will  in 90 days. If you need help or a new code, please call your [facility] or [phone].        Care EveryWhere ID     " "This is your Care EveryWhere ID. This could be used by other organizations to access your [facility] medical records  [id number]        Your Vitals Were     Pulse Temperature Respirations Height Pulse Oximetry BMI (Body Mass Index)    98 97.8  F (36.6  C) (Tympanic) 18 5' 3\" (1.6 m) 96% 33.66 kg/m2       Blood Pressure from Last 3 Encounters:   07/11/17 100/60   03/21/17 128/80   11/14/16 112/64    Weight from Last 3 Encounters:   07/11/17 190 lb (86.2 kg)   03/21/17 205 lb (93 kg)   11/14/16 200 lb (90.7 kg)              We Performed the Following     Large Joint/Bursa injection and/or drainage (Shoulder, Knee)     Pain Drug Scr UR W Rptd Meds          Today's Medication Changes          These changes are accurate as of: 7/11/17 11:59 PM.  If you have any questions, ask your nurse or doctor.               These medicines have changed or have updated prescriptions.        Dose/Directions    escitalopram 20 MG tablet   Commonly known as:  LEXAPRO   This may have changed:  Another medication with the same name was removed. Continue taking this medication, and follow the directions you see here.   Used for:  Anxiety   Changed by:  Jaden Lee NP        TAKE 1 TABLET BY MOUTH EVER Y DAY   Quantity:  30 tablet   Refills:  5       triamcinolone acetonide 40 MG/ML injection   Commonly known as:  KENALOG   This may have changed:    - how much to take  - how to take this  - when to take this  - additional instructions   Used for:  Chronic pain syndrome   Changed by:  Jaden Lee NP        Dose:  40 mg   1 mL (40 mg) by INTRA-ARTICULAR route once for 1 dose   Quantity:  1 mL   Refills:  0         Stop taking these medicines if you haven't already. Please contact your care team if you have questions.     bupivacaine 0.5 % injection   Commonly known as:  MARCAINE   Stopped by:  Jaden Lee NP                Where to get your medicines      Some of these will need a paper prescription and others can be bought over " the counter.  Ask your nurse if you have questions.     You don't need a prescription for these medications     triamcinolone acetonide 40 MG/ML injection                Primary Care Provider Office Phone # Fax #    Jaden JL Lee [phone]       [facility] [address]  [facility] [address]        Equal Access to Services     [facility]: Hadii aad ku hadasho Soomaali, waaxda luqadaha, qaybta kaalmada adeegyada, waxay idiin hayaan adeeg kharash ladaquan . So [facility] [phone].    ATENCIÓN: Si habla español, tiene a gross disposición servicios gratuitos de asistencia lingüística. Lei al [phone].    We comply with applicable federal civil rights laws and [place] laws. We do not discriminate on the basis of race, color, national origin, age, disability sex, sexual orientation or gender identity.            Thank you!     Thank you for choosing [facility]  for your care. Our goal is always to provide you with excellent care. Hearing back from our patients is one way we can continue to improve our services. Please take a few minutes to complete the written survey that you may receive in the mail after your visit with us. Thank you!             Your Updated Medication List - Protect others around you: Learn how to safely use, store and throw away your medicines at www.disposemymeds.org.          This list is accurate as of: 7/11/17 11:59 PM.  Always use your most recent med list.                   Brand Name Dispense Instructions for use Diagnosis    calcium carbonate 600 MG tablet   Generic drug:  calcium      With vitamin D; take 2 by oral route every day        cyanocobalamin 1000 MCG tablet    vitamin  B-12     Take 1 tablet by mouth daily.        cyclobenzaprine 10 MG tablet    FLEXERIL    90 tablet    TAKE 1 TABLET BY MOUTH THREE TIMES DAILY AS NEEDED    Cervicalgia       escitalopram 20 MG tablet    LEXAPRO    30 tablet    TAKE 1 TABLET BY MOUTH EVER Y DAY     Anxiety       folic acid 1 MG tablet    FOLVITE    90 tablet    TAKE ONE TABLET DAILY BY MOUTH    Health care maintenance       gabapentin 300 MG capsule    NEURONTIN    90 capsule    TAKE 1 CAPSULE BY MOUTH THREE TIMES DAILY    Postherpetic polyneuropathy       HYDROcodone-acetaminophen 7.5-325 MG per tablet    NORCO    120 tablet    TAKE 1 TABLET BY MOUTH EVER Y 6 HOURS AS NEEDED FOR JOEY N - GENERIC FOR NORCO    Generalized pain       levothyroxine 125 MCG tablet    SYNTHROID/LEVOTHROID    90 tablet    TAKE 1 TABLET DAILY BY MOUTH - GENERIC FOR SYNTHROID    Hypothyroidism       * MULTIVITAMIN PO      Take 1 tablet by mouth daily with food.        * multivitamin Tabs tablet      Take 1 tablet by mouth daily        nystatin 547245 UNIT/GM Powd    MYCOSTATIN    60 g    APPLY TOPICALLY 2 TIMES DAILY AS NEEDED    Rash       simvastatin 40 MG tablet    ZOCOR    90 tablet    TAKE 1 TABLET BY MOUTH EVERY DAY IN THE EVENING . GENERIC FOR ZOCOR.    Mixed hyperlipidemia       triamcinolone acetonide 40 MG/ML injection    KENALOG    1 mL    1 mL (40 mg) by INTRA-ARTICULAR route once for 1 dose    Chronic pain syndrome       zolpidem 10 MG tablet    AMBIEN    60 tablet    Take 1 tablet (10 mg) by mouth nightly as needed for sleep Take 1 tablet at night for sleep-may repeat X1.    Persistent insomnia       * Notice:  This list has 2 medication(s) that are the same as other medications prescribed for you. Read the directions carefully, and ask your doctor or other care provider to review them with you.

## 2017-07-12 ASSESSMENT — PATIENT HEALTH QUESTIONNAIRE - PHQ9: SUM OF ALL RESPONSES TO PHQ QUESTIONS 1-9: 0

## 2017-07-13 PROBLEM — G89.4 CHRONIC PAIN SYNDROME: Status: ACTIVE | Noted: 2017-07-13

## 2017-07-13 RX ORDER — TRIAMCINOLONE ACETONIDE 40 MG/ML
40 INJECTION, SUSPENSION INTRA-ARTICULAR; INTRAMUSCULAR ONCE
Qty: 1 ML | Refills: 0 | OUTPATIENT
Start: 2017-07-13 | End: 2017-07-13

## 2017-07-19 LAB — PAIN DRUG SCR UR W RPTD MEDS: NORMAL

## 2017-07-26 DIAGNOSIS — G47.00 PERSISTENT INSOMNIA: ICD-10-CM

## 2017-07-26 NOTE — TELEPHONE ENCOUNTER
ambien      Last Written Prescription Date: 6/29/17  Last Fill Quantity: 60,  # refills: 0   Last Office Visit with G, P or [facility] prescribing provider: 7/11/17

## 2017-07-27 RX ORDER — ZOLPIDEM TARTRATE 10 MG/1
10 TABLET ORAL
Qty: 60 TABLET | Refills: 0 | Status: SHIPPED | OUTPATIENT
Start: 2017-07-27 | End: 2017-08-28

## 2017-08-02 DIAGNOSIS — R52 GENERALIZED PAIN: ICD-10-CM

## 2017-08-02 RX ORDER — HYDROCODONE BITARTRATE AND ACETAMINOPHEN 7.5; 325 MG/1; MG/1
TABLET ORAL
Qty: 120 TABLET | Refills: 0 | Status: SHIPPED | OUTPATIENT
Start: 2017-08-02 | End: 2017-08-31

## 2017-08-02 NOTE — TELEPHONE ENCOUNTER
norco      Last Written Prescription Date: 6/29/17  Last Fill Quantity: 120,  # refills: 0   Last Office Visit with G, P or [facility] prescribing provider: 7/11/17

## 2017-08-05 DIAGNOSIS — M54.2 CERVICALGIA: ICD-10-CM

## 2017-08-09 RX ORDER — CYCLOBENZAPRINE HCL 10 MG
TABLET ORAL
Qty: 90 TABLET | Refills: 0 | Status: SHIPPED | OUTPATIENT
Start: 2017-08-09 | End: 2017-09-02

## 2017-08-09 NOTE — TELEPHONE ENCOUNTER
flexeril      Last Written Prescription Date: 7/10/17  Last Fill Quantity: 90,  # refills: 0   Last Office Visit with G, P or [facility] prescribing provider: 7/11/17

## 2017-08-10 ENCOUNTER — TRANSFERRED RECORDS (OUTPATIENT)
Dept: HEALTH INFORMATION MANAGEMENT | Facility: HOSPITAL | Age: 67
End: 2017-08-10

## 2017-08-21 DIAGNOSIS — B02.23 POSTHERPETIC POLYNEUROPATHY: ICD-10-CM

## 2017-08-21 NOTE — TELEPHONE ENCOUNTER
gabapentin (NEURONTIN) 300 MG capsule  Last Written Prescription Date: 7/24/17  Last Quantity: 90, # refills: 0  Last Office Visit with [facility] or [facility] prescribing provider: 7/11/17       Creatinine   Date Value Ref Range Status   06/01/2017 0.59 0.52 - 1.04 mg/dL Final     Lab Results   Component Value Date    AST 14 06/01/2017     Lab Results   Component Value Date    ALT 29 06/01/2017     BP Readings from Last 3 Encounters:   07/11/17 100/60   03/21/17 128/80   11/14/16 112/64

## 2017-08-22 ENCOUNTER — HOSPITAL ENCOUNTER (EMERGENCY)
Facility: HOSPITAL | Age: 67
Discharge: LEFT BEFORE DISCHARGE | End: 2017-08-22
Attending: FAMILY MEDICINE | Admitting: FAMILY MEDICINE
Payer: COMMERCIAL

## 2017-08-22 DIAGNOSIS — T18.108A ESOPHAGEAL FOREIGN BODY, INITIAL ENCOUNTER: ICD-10-CM

## 2017-08-22 PROCEDURE — 40000268 ZZH STATISTIC NO CHARGES

## 2017-08-22 RX ORDER — GABAPENTIN 300 MG/1
CAPSULE ORAL
Qty: 90 CAPSULE | Refills: 1 | Status: SHIPPED | OUTPATIENT
Start: 2017-08-22 | End: 2017-10-16

## 2017-08-22 NOTE — ED NOTES
"Pt states that food \"went down\" just as pt arrived in the ED. Once back in room pt states \"it went down, I don't think I need to be here\". Pt requests to leave. Pt advised to come back for any further difficulties.  "

## 2017-08-22 NOTE — ED PROVIDER NOTES
History   No chief complaint on file.    HPI  Aure Lanier is a 67 year old female who had the food dislodge when she got here and decided she didn't need to be seen.     I have reviewed the Medications, Allergies, Past Medical and Surgical History, and Social History in the [facility] system.    Past Medical History:   Diagnosis Date     Allergic arthritis involving hand 01/01/2011     Anemia, Iron Deficiency 01/01/2011     Anxiety 01/01/2011     Contact dermatitis and other eczema, unspecified c 01/01/2011     Depression 01/01/2011     Edema 01/01/2011     Gastrointestinal mucositis (ulcerative) 01/01/2011     GERD 01/01/2011     Headache(784.0) 01/01/2011     Hypothyroidism 01/01/2011     Insomnia, unspecified 01/01/2011     Migraine 01/01/2011     Nonallopathic lesion of cervical region, not elsewhere classified 10/16/2000     Osteoporosis 01/01/2011     Other and unspecified hyperlipidemia 01/01/2011     Other malaise and fatigue 08/27/2001     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       Past Surgical History:   Procedure Laterality Date     D & C       STOMACH SURGERY       stomach surgery peritinitis         Family History   Problem Relation Age of Onset     CEREBROVASCULAR DISEASE Mother      CVA     Hypertension Mother      Other - See Comments Father 40     mining accident; cause of death     Other - See Comments Brother      muscle dystrophy     CANCER Daughter 34     skin cancer     Melanoma Daughter        Social History   Substance Use Topics     Smoking status: Never Smoker     Smokeless tobacco: Never Used      Comment: no passive exposure     Alcohol use Yes      Comment: rarely, maybe yearly         Review of Systems    Physical Exam      Physical Exam    ED Course     ED Course     Procedures      patient left without being seen     Sofi Stoner MD  08/22/17 [id number]

## 2017-08-28 DIAGNOSIS — G47.00 PERSISTENT INSOMNIA: ICD-10-CM

## 2017-08-28 RX ORDER — ZOLPIDEM TARTRATE 10 MG/1
10 TABLET ORAL
Qty: 60 TABLET | Refills: 0 | Status: SHIPPED | OUTPATIENT
Start: 2017-08-28 | End: 2017-09-29

## 2017-08-28 NOTE — TELEPHONE ENCOUNTER
Controlled Substance Refill Request for Ambien  Problem List Complete:  Yes    Last Written Prescription Date:  7.27.17  Last Fill Quantity: 60,   # refills: 0    Last Office Visit with [facility] primary care provider: 7.11.17    Clinic visit frequency required: Q 3 months     Future Office visit:     Controlled substance agreement on file: Yes:  Date 7.11.17.     Processing:  Fax Rx to [email or web address]     checked in past 6 months?  Yes 7.10.17

## 2017-08-31 DIAGNOSIS — R52 GENERALIZED PAIN: ICD-10-CM

## 2017-08-31 RX ORDER — HYDROCODONE BITARTRATE AND ACETAMINOPHEN 7.5; 325 MG/1; MG/1
TABLET ORAL
Qty: 120 TABLET | Refills: 0 | Status: SHIPPED | OUTPATIENT
Start: 2017-08-31 | End: 2017-09-26

## 2017-08-31 NOTE — TELEPHONE ENCOUNTER
Controlled Substance Refill Request for Norco  Problem List Complete:  Yes    Last Written Prescription Date:  8.2.17  Last Fill Quantity: 120,   # refills: 0    Last Office Visit with [facility] primary care provider: 7.11.17    Clinic visit frequency required: Q 3 months     Future Office visit:     Controlled substance agreement on file: Yes:  Date 7.11.17.     Processing:  Patient will  in clinic     checked in past 6 months?  Yes 7.10.17

## 2017-09-01 NOTE — TELEPHONE ENCOUNTER
Left message that the written RX is ready at the [facility]  registration to be picked up.

## 2017-09-10 ENCOUNTER — HOSPITAL ENCOUNTER (EMERGENCY)
Facility: HOSPITAL | Age: 67
Discharge: HOME | End: 2017-09-10
Attending: INTERNAL MEDICINE | Admitting: INTERNAL MEDICINE
Payer: COMMERCIAL

## 2017-09-10 VITALS
TEMPERATURE: 97.8 F | HEART RATE: 85 BPM | RESPIRATION RATE: 20 BRPM | DIASTOLIC BLOOD PRESSURE: 81 MMHG | HEIGHT: 63 IN | SYSTOLIC BLOOD PRESSURE: 123 MMHG | OXYGEN SATURATION: 98 %

## 2017-09-10 DIAGNOSIS — T18.9XXA SWALLOWED FOREIGN BODY, INITIAL ENCOUNTER: ICD-10-CM

## 2017-09-10 PROCEDURE — 96374 THER/PROPH/DIAG INJ IV PUSH: CPT

## 2017-09-10 PROCEDURE — 99284 EMERGENCY DEPT VISIT MOD MDM: CPT | Performed by: INTERNAL MEDICINE

## 2017-09-10 PROCEDURE — 25000128 H RX IP 250 OP 636

## 2017-09-10 PROCEDURE — 96361 HYDRATE IV INFUSION ADD-ON: CPT

## 2017-09-10 PROCEDURE — 99284 EMERGENCY DEPT VISIT MOD MDM: CPT | Mod: 25

## 2017-09-10 PROCEDURE — 96375 TX/PRO/DX INJ NEW DRUG ADDON: CPT

## 2017-09-10 PROCEDURE — 25000128 H RX IP 250 OP 636: Performed by: INTERNAL MEDICINE

## 2017-09-10 RX ORDER — METOCLOPRAMIDE HYDROCHLORIDE 5 MG/ML
5 INJECTION INTRAMUSCULAR; INTRAVENOUS ONCE
Status: COMPLETED | OUTPATIENT
Start: 2017-09-10 | End: 2017-09-10

## 2017-09-10 RX ADMIN — GLUCAGON HYDROCHLORIDE 1 MG: KIT at 04:14

## 2017-09-10 RX ADMIN — SODIUM CHLORIDE 1000 ML: 9 INJECTION, SOLUTION INTRAVENOUS at 03:50

## 2017-09-10 RX ADMIN — METOCLOPRAMIDE 5 MG: 5 INJECTION, SOLUTION INTRAMUSCULAR; INTRAVENOUS at 03:55

## 2017-09-10 ASSESSMENT — ENCOUNTER SYMPTOMS
NECK PAIN: 0
NAUSEA: 1
TROUBLE SWALLOWING: 1
PALPITATIONS: 0
WOUND: 0
ABDOMINAL DISTENTION: 0
HEADACHES: 0
DIZZINESS: 0
COUGH: 1
CONFUSION: 0
ANAL BLEEDING: 0
DIAPHORESIS: 0
HEMATURIA: 0
FLANK PAIN: 0
DYSURIA: 0
NUMBNESS: 0
LIGHT-HEADEDNESS: 0
WHEEZING: 0
BACK PAIN: 0
BLOOD IN STOOL: 0
COLOR CHANGE: 0
ABDOMINAL PAIN: 0
STRIDOR: 0
VOMITING: 1
CHEST TIGHTNESS: 0
SHORTNESS OF BREATH: 0
FEVER: 0
ARTHRALGIAS: 0
MYALGIAS: 0
CHOKING: 0
CHILLS: 0
NECK STIFFNESS: 0
VOICE CHANGE: 0

## 2017-09-10 NOTE — DISCHARGE INSTRUCTIONS
Swallowed Foreign Body (Adult)  Indigestible objects (foreign bodies) are sometimes swallowed by adults. Whether or not the object moves all the way through the digestive tract depends on many factors. This includes the size and shape of the object, whether the object is sharp and pointy, and what the object is made of.  Based on your evaluation, no treatment is needed at this time. The swallowed object is expected to move through your digestive tract and pass out of the body in the stool with no problems. This may take about 24 to 48 hours. If imaging tests were done, you will be told when the results are ready and if they affect your treatment.  Home care    Follow any instructions you receive from your provider about eating and drinking. In certain cases, you may be told to only eat soft foods and drink liquids for the first 24 to 48 hours.    You will need to check your stool each time you have a bowel movement in the next 24 to 48 hours. This is so you can confirm that the object has passed. If the object does not pass during this time, it may mean that the object is lodged (stuck) somewhere along the digestive tract. In such cases, the object may need to be removed with a procedure.  Follow-up care  Follow up with your healthcare provider as advised. You will be told if further treatment is needed. In certain cases, you may need to return to have imaging tests done.  When to seek medical advice  Call your healthcare provider right away if any of these occur:    Belly pain, cramps, or swelling    Shortness of breath or coughing that won t stop    Trouble swallowing or pain with swallowing    Vomiting that won t stop    Blood in the stool (dark red or black color)    Fever of 100.4 F (38 C) or higher, or as directed by your provider  Call 911  Call 911 or return to the emergency department right away if any of these occur:    Trouble breathing, wheezing    Trouble speaking    Unusually fast heart rate    New  or worsening chest pain    Vomiting blood (red or black)    Swelling of the neck    Inability to pass stool  Date Last Reviewed: 6/22/2015 2000-2017 [employer]. [address] [place] [address], [place] [address]. All rights reserved. This information is not intended as a substitute for professional medical care. Always follow your healthcare professional's instructions.

## 2017-09-10 NOTE — ED NOTES
"No longer making \"noise\" when talking. Thought she was swallowing secretions. Dr. Gotti notified and in. Pt took small sip of water. Unable to swallow and \"noise returned. Will continue to monitor.   "

## 2017-09-10 NOTE — ED NOTES
"Pt sleeping quietly. No drooling noted. No \"noises\" as on admission to ED from the steak in her esophagus. Pt wakened. Able to swallow secretions. Still has sensation of something in her throat but feels she has improved. Dr. Gotti notified. Pt given sip of water. Tolerated. 2nd sip of water. Dr. hernandez in to talk with pt. Pt took several sips of water at the same time. Tolerated. States she still feels \"something\" in her throat. Will continue to observe and try applesauce about 5:50am. Pt resting till then.  "

## 2017-09-10 NOTE — ED NOTES
"Pt given several sips of water. Retained. No coughing. Vanilla pudding given. Taking small bites. Tolerating. States feels much better. Still has sensation of \"something\" being in her throat but is able to swallow secretions and pudding.  "

## 2017-09-10 NOTE — ED NOTES
Presents with hx of swallowing a piece of steak a couple hours ago. Stuck in throat. Has happened several times in the past but has been able to vomit it up.

## 2017-09-10 NOTE — ED AVS SNAPSHOT
[facility]    [address] [place] [address]    [place] [address]    Phone:  [phone]                                       Aure Lanier   MRN: [id number]    Department:  [facility]   Date of Visit:  9/10/2017           Patient Information     Date Of Birth          1950        Your diagnoses for this visit were:     Swallowed foreign body, initial encounter        You were seen by Elder Gotti MD.      Follow-up Information     Follow up with Jaden Lee NP.    Specialty:  Internal Medicine    Contact information:    [facility]  [address]  [place] [address]  [phone]          Discharge Instructions         Swallowed Foreign Body (Adult)  Indigestible objects (foreign bodies) are sometimes swallowed by adults. Whether or not the object moves all the way through the digestive tract depends on many factors. This includes the size and shape of the object, whether the object is sharp and pointy, and what the object is made of.  Based on your evaluation, no treatment is needed at this time. The swallowed object is expected to move through your digestive tract and pass out of the body in the stool with no problems. This may take about 24 to 48 hours. If imaging tests were done, you will be told when the results are ready and if they affect your treatment.  Home care    Follow any instructions you receive from your provider about eating and drinking. In certain cases, you may be told to only eat soft foods and drink liquids for the first 24 to 48 hours.    You will need to check your stool each time you have a bowel movement in the next 24 to 48 hours. This is so you can confirm that the object has passed. If the object does not pass during this time, it may mean that the object is lodged (stuck) somewhere along the digestive tract. In such cases, the object may need to be removed with a procedure.  Follow-up care  Follow up with your healthcare provider as  advised. You will be told if further treatment is needed. In certain cases, you may need to return to have imaging tests done.  When to seek medical advice  Call your healthcare provider right away if any of these occur:    Belly pain, cramps, or swelling    Shortness of breath or coughing that won t stop    Trouble swallowing or pain with swallowing    Vomiting that won t stop    Blood in the stool (dark red or black color)    Fever of 100.4 F (38 C) or higher, or as directed by your provider  Call 911  Call 911 or return to the emergency department right away if any of these occur:    Trouble breathing, wheezing    Trouble speaking    Unusually fast heart rate    New or worsening chest pain    Vomiting blood (red or black)    Swelling of the neck    Inability to pass stool  Date Last Reviewed: 6/22/2015 2000-2017 [employer]. [address] [place] [address]. All rights reserved. This information is not intended as a substitute for professional medical care. Always follow your healthcare professional's instructions.             Review of your medicines      Our records show that you are taking the medicines listed below. If these are incorrect, please call your family doctor or clinic.        Dose / Directions Last dose taken    calcium carbonate 600 MG tablet   Generic drug:  calcium        With vitamin D; take 2 by oral route every day   Refills:  0        cyanocobalamin 1000 MCG tablet   Commonly known as:  vitamin  B-12   Dose:  1 tablet        Take 1 tablet by mouth daily.   Refills:  0        cyclobenzaprine 10 MG tablet   Commonly known as:  FLEXERIL   Quantity:  90 tablet        TAKE 1 TABLET BY MOUTH THREE TIMES DAILY AS NEEDED   Refills:  0        escitalopram 20 MG tablet   Commonly known as:  LEXAPRO   Quantity:  30 tablet        TAKE 1 TABLET BY MOUTH EVER Y DAY   Refills:  5        folic acid 1 MG tablet   Commonly known as:  FOLVITE   Quantity:  90 tablet        TAKE ONE  TABLET DAILY BY MOUTH   Refills:  2        gabapentin 300 MG capsule   Commonly known as:  NEURONTIN   Quantity:  90 capsule        TAKE 1 CAPSULE BY MOUTH THREE TIMES DAILY   Refills:  1        HYDROcodone-acetaminophen 7.5-325 MG per tablet   Commonly known as:  NORCO   Quantity:  120 tablet        TAKE 1 TABLET BY MOUTH EVER Y 6 HOURS AS NEEDED FOR JOEY N - GENERIC FOR NORCO   Refills:  0        levothyroxine 125 MCG tablet   Commonly known as:  SYNTHROID/LEVOTHROID   Quantity:  90 tablet        TAKE 1 TABLET DAILY BY MOUTH - GENERIC FOR SYNTHROID   Refills:  1        * MULTIVITAMIN PO   Dose:  1 tablet        Take 1 tablet by mouth daily with food.   Refills:  0        * multivitamin Tabs tablet   Dose:  1 tablet        Take 1 tablet by mouth daily   Refills:  0        nystatin 970461 UNIT/GM Powd   Commonly known as:  MYCOSTATIN   Quantity:  60 g        APPLY TOPICALLY 2 TIMES DAILY AS NEEDED   Refills:  3        PREVACID PO        Take by mouth every morning (before breakfast)   Refills:  0        simvastatin 40 MG tablet   Commonly known as:  ZOCOR   Quantity:  90 tablet        TAKE 1 TABLET BY MOUTH EVERY DAY IN THE EVENING . GENERIC FOR ZOCOR.   Refills:  0        zolpidem 10 MG tablet   Commonly known as:  AMBIEN   Dose:  10 mg   Quantity:  60 tablet        Take 1 tablet (10 mg) by mouth nightly as needed for sleep Take 1 tablet at night for sleep-may repeat X1.   Refills:  0        * Notice:  This list has 2 medication(s) that are the same as other medications prescribed for you. Read the directions carefully, and ask your doctor or other care provider to review them with you.            Procedures and tests performed during your visit     Cardiac Continuous Monitoring      Orders Needing Specimen Collection     None      Pending Results     No orders found from 9/8/2017 to 9/11/2017.            Pending Culture Results     No orders found from 9/8/2017 to 9/11/2017.            Thank you for choosing  "[facility]       Thank you for choosing [facility] for your care. Our goal is always to provide you with excellent care. Hearing back from our patients is one way we can continue to improve our services. Please take a few minutes to complete the written survey that you may receive in the mail after you visit with us. Thank you!        [employer] Information     [employer] lets you send messages to your doctor, view your test results, renew your prescriptions, schedule appointments and more. To sign up, go to [email or web address] . Click on \"Log in\" on the left side of the screen, which will take you to the Welcome page. Then click on \"Sign up Now\" on the right side of the page.     You will be asked to enter the access code listed below, as well as some personal information. Please follow the directions to create your username and password.     Your access code is: [email or web address]  Expires: 10/11/2017  6:51 AM     Your access code will  in 90 days. If you need help or a new code, please call your [facility] clinic or [phone].        Care EveryWhere ID     This is your Care EveryWhere ID. This could be used by other organizations to access your [facility] medical records  [id number]        Equal Access to Services     ALYSSA ADAMES : Kerry roacho Sojun, waaxda luqadaha, qaybta kaalmada adebiayada, racquel craven. So [facility] [phone].    ATENCIÓN: Si habla español, tiene a gross disposición servicios gratuitos de asistencia lingüística. Llame al [phone].    We comply with applicable federal civil rights laws and [place] laws. We do not discriminate on the basis of race, color, national origin, age, disability sex, sexual orientation or gender identity.            After Visit Summary       This is your record. Keep this with you and show to your community pharmacist(s) and doctor(s) at your next visit.                  "

## 2017-09-10 NOTE — ED PROVIDER NOTES
"  History     Chief Complaint   Patient presents with     Swallowed Foreign Body     2 hours ago swallowed a piece of steak.      Patient is a 67 year old female presenting with foreign body.   Foreign Body   Location:  Swallowed  Suspected object:  Food  Pain quality:  Aching  Duration:  2 hours  Timing:  Constant  Chronicity:  New  Associated symptoms: cough, drooling, nausea, trouble swallowing and vomiting    Associated symptoms: no abdominal pain, no choking and no voice change    I have reviewed the Medications, Allergies, Past Medical and Surgical History, and Social History in the Epic system.      Review of Systems   Constitutional: Negative for chills, diaphoresis and fever.   HENT: Positive for drooling and trouble swallowing. Negative for voice change.    Eyes: Negative for visual disturbance.   Respiratory: Positive for cough. Negative for choking, chest tightness, shortness of breath, wheezing and stridor.    Cardiovascular: Negative for chest pain, palpitations and leg swelling.   Gastrointestinal: Positive for nausea and vomiting. Negative for abdominal distention, abdominal pain, anal bleeding and blood in stool.   Genitourinary: Negative for decreased urine volume, dysuria, flank pain and hematuria.   Musculoskeletal: Negative for arthralgias, back pain, gait problem, myalgias, neck pain and neck stiffness.   Skin: Negative for color change, pallor, rash and wound.   Neurological: Negative for dizziness, syncope, light-headedness, numbness and headaches.   Psychiatric/Behavioral: Negative for confusion and suicidal ideas.       Physical Exam   BP: 168/90  Pulse: 98  Heart Rate: 98  Temp: 98  F (36.7  C)  Resp: 18  Height: 160 cm (5' 3\")  SpO2: 99 %  Physical Exam   Constitutional: She is oriented to person, place, and time. She appears well-developed and well-nourished.   HENT:   Head: Normocephalic and atraumatic.   Mouth/Throat: Uvula is midline and mucous membranes are normal. No uvula swelling. " No oropharyngeal exudate, posterior oropharyngeal edema, posterior oropharyngeal erythema or tonsillar abscesses.   Eyes: Conjunctivae are normal. Pupils are equal, round, and reactive to light.   Neck: Normal range of motion. Neck supple. No JVD present. No tracheal deviation present. No thyromegaly present.   Cardiovascular: Normal rate, regular rhythm, normal heart sounds and intact distal pulses.  Exam reveals no gallop and no friction rub.    No murmur heard.  Pulmonary/Chest: Effort normal and breath sounds normal. No stridor. No respiratory distress. She has no wheezes. She has no rales. She exhibits no tenderness.   Abdominal: Soft. Bowel sounds are normal. She exhibits no distension and no mass. There is no tenderness. There is no rebound and no guarding.   Musculoskeletal: Normal range of motion. She exhibits no edema or tenderness.   Lymphadenopathy:     She has no cervical adenopathy.   Neurological: She is alert and oriented to person, place, and time.   Skin: Skin is warm and dry. No rash noted. No erythema. No pallor.   Psychiatric: Her behavior is normal.   Nursing note and vitals reviewed.      ED Course     ED Course     Procedures                 Labs Ordered and Resulted from Time of ED Arrival Up to the Time of Departure from the ED - No data to display    Assessments & Plan (with Medical Decision Making)   Elaina got stuck in her throat  Spitting up and drooling, no respiratory distress  1 mg glucagun given, pt observed for 1 hr  CT ordered but then canceled as pt reported she feels much better, first drank water without problem and then apple sauce , no spitting up, no nausea , no drooling, no coughing  She feel slight dysphagia that its position is lower than previously felt , I advised her to start eating breakfast in small bites and slowly, if symptoms persisted by today afternoon return to ER for intervention to remove the foreign body.  She voiced understanding and agreed  I have reviewed  the nursing notes.    I have reviewed the findings, diagnosis, plan and need for follow up with the patient.      New Prescriptions    No medications on file       Final diagnoses:   Swallowed foreign body, initial encounter       9/10/2017   [facility]     Elder Gotti MD  09/10/17 0631

## 2017-09-10 NOTE — ED AVS SNAPSHOT
[facility]    [address] [address]    Phone:  [phone]                                       Aure Lanier   MRN: [id number]    Department:  [facility]   Date of Visit:  9/10/2017           After Visit Summary Signature Page     I have received my discharge instructions, and my questions have been answered. I have discussed any challenges I see with this plan with the nurse or doctor.    ..........................................................................................................................................  Patient/Patient Representative Signature      ..........................................................................................................................................  Patient Representative Print Name and Relationship to Patient    ..................................................               ................................................  Date                                            Time    ..........................................................................................................................................  Reviewed by Signature/Title    ...................................................              ..............................................  Date                                                            Time

## 2017-09-11 ENCOUNTER — HOSPITAL ENCOUNTER (EMERGENCY)
Facility: HOSPITAL | Age: 67
Discharge: HOME | End: 2017-09-11
Attending: FAMILY MEDICINE | Admitting: SURGERY
Payer: COMMERCIAL

## 2017-09-11 ENCOUNTER — ANESTHESIA (OUTPATIENT)
Dept: SURGERY | Facility: HOSPITAL | Age: 67
End: 2017-09-11
Payer: COMMERCIAL

## 2017-09-11 ENCOUNTER — ANESTHESIA EVENT (OUTPATIENT)
Dept: SURGERY | Facility: HOSPITAL | Age: 67
End: 2017-09-11
Payer: COMMERCIAL

## 2017-09-11 ENCOUNTER — TELEPHONE (OUTPATIENT)
Dept: INTERNAL MEDICINE | Facility: OTHER | Age: 67
End: 2017-09-11

## 2017-09-11 VITALS
DIASTOLIC BLOOD PRESSURE: 71 MMHG | RESPIRATION RATE: 16 BRPM | OXYGEN SATURATION: 96 % | TEMPERATURE: 98.3 F | WEIGHT: 185 LBS | BODY MASS INDEX: 32.77 KG/M2 | HEART RATE: 103 BPM | SYSTOLIC BLOOD PRESSURE: 151 MMHG

## 2017-09-11 DIAGNOSIS — T18.128A FOOD IMPACTION OF ESOPHAGUS, INITIAL ENCOUNTER: Primary | ICD-10-CM
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
## 2017-09-11 DIAGNOSIS — T18.108D FOREIGN BODY IN ESOPHAGUS, SUBSEQUENT ENCOUNTER: ICD-10-CM

## 2017-09-11 PROCEDURE — 36000052 ZZH SURGERY LEVEL 2 EA 15 ADDTL MIN: Performed by: SURGERY

## 2017-09-11 PROCEDURE — 25000132 ZZH RX MED GY IP 250 OP 250 PS 637: Performed by: FAMILY MEDICINE

## 2017-09-11 PROCEDURE — 43247 EGD REMOVE FOREIGN BODY: CPT | Performed by: SURGERY

## 2017-09-11 PROCEDURE — 96375 TX/PRO/DX INJ NEW DRUG ADDON: CPT

## 2017-09-11 PROCEDURE — 25000125 ZZHC RX 250: Performed by: NURSE ANESTHETIST, CERTIFIED REGISTERED

## 2017-09-11 PROCEDURE — 25000564 ZZH DESFLURANE, EA 15 MIN: Performed by: NURSE ANESTHETIST, CERTIFIED REGISTERED

## 2017-09-11 PROCEDURE — 36000050 ZZH SURGERY LEVEL 2 1ST 30 MIN: Performed by: SURGERY

## 2017-09-11 PROCEDURE — 99284 EMERGENCY DEPT VISIT MOD MDM: CPT | Performed by: FAMILY MEDICINE

## 2017-09-11 PROCEDURE — 96376 TX/PRO/DX INJ SAME DRUG ADON: CPT

## 2017-09-11 PROCEDURE — 99285 EMERGENCY DEPT VISIT HI MDM: CPT | Mod: 25

## 2017-09-11 PROCEDURE — 37000008 ZZH ANESTHESIA TECHNICAL FEE, 1ST 30 MIN: Performed by: SURGERY

## 2017-09-11 PROCEDURE — 25000128 H RX IP 250 OP 636

## 2017-09-11 PROCEDURE — 25000128 H RX IP 250 OP 636: Performed by: NURSE ANESTHETIST, CERTIFIED REGISTERED

## 2017-09-11 PROCEDURE — 37000009 ZZH ANESTHESIA TECHNICAL FEE, EACH ADDTL 15 MIN: Performed by: SURGERY

## 2017-09-11 PROCEDURE — 71000014 ZZH RECOVERY PHASE 1 LEVEL 2 FIRST HR: Performed by: SURGERY

## 2017-09-11 PROCEDURE — 27210794 ZZH OR GENERAL SUPPLY STERILE: Performed by: SURGERY

## 2017-09-11 PROCEDURE — 25000128 H RX IP 250 OP 636: Performed by: FAMILY MEDICINE

## 2017-09-11 PROCEDURE — 43215 ESOPHAGOSCOPY FLEX REMOVE FB: CPT | Performed by: NURSE ANESTHETIST, CERTIFIED REGISTERED

## 2017-09-11 PROCEDURE — 96374 THER/PROPH/DIAG INJ IV PUSH: CPT

## 2017-09-11 PROCEDURE — 99140 ANES COMP EMERGENCY COND: CPT | Performed by: NURSE ANESTHETIST, CERTIFIED REGISTERED

## 2017-09-11 PROCEDURE — 25000125 ZZHC RX 250

## 2017-09-11 RX ORDER — LORAZEPAM 2 MG/ML
0.5 INJECTION INTRAMUSCULAR ONCE
Status: COMPLETED | OUTPATIENT
Start: 2017-09-11 | End: 2017-09-11

## 2017-09-11 RX ORDER — PROPOFOL 10 MG/ML
INJECTION, EMULSION INTRAVENOUS PRN
Status: DISCONTINUED | OUTPATIENT
Start: 2017-09-11 | End: 2017-09-11

## 2017-09-11 RX ORDER — LORAZEPAM 2 MG/ML
INJECTION INTRAMUSCULAR
Status: COMPLETED
Start: 2017-09-11 | End: 2017-09-11

## 2017-09-11 RX ORDER — NALOXONE HYDROCHLORIDE 0.4 MG/ML
.1-.4 INJECTION, SOLUTION INTRAMUSCULAR; INTRAVENOUS; SUBCUTANEOUS
Status: DISCONTINUED | OUTPATIENT
Start: 2017-09-11 | End: 2017-09-11 | Stop reason: HOSPADM

## 2017-09-11 RX ORDER — ONDANSETRON 4 MG/1
4 TABLET, ORALLY DISINTEGRATING ORAL EVERY 30 MIN PRN
Status: DISCONTINUED | OUTPATIENT
Start: 2017-09-11 | End: 2017-09-11 | Stop reason: HOSPADM

## 2017-09-11 RX ORDER — MEPERIDINE HYDROCHLORIDE 25 MG/ML
12.5 INJECTION INTRAMUSCULAR; INTRAVENOUS; SUBCUTANEOUS
Status: DISCONTINUED | OUTPATIENT
Start: 2017-09-11 | End: 2017-09-11 | Stop reason: HOSPADM

## 2017-09-11 RX ORDER — SCOLOPAMINE TRANSDERMAL SYSTEM 1 MG/1
PATCH, EXTENDED RELEASE TRANSDERMAL
Status: COMPLETED
Start: 2017-09-11 | End: 2017-09-11

## 2017-09-11 RX ORDER — LABETALOL HYDROCHLORIDE 5 MG/ML
10 INJECTION, SOLUTION INTRAVENOUS
Status: DISCONTINUED | OUTPATIENT
Start: 2017-09-11 | End: 2017-09-11 | Stop reason: HOSPADM

## 2017-09-11 RX ORDER — FENTANYL CITRATE 50 UG/ML
INJECTION, SOLUTION INTRAMUSCULAR; INTRAVENOUS PRN
Status: DISCONTINUED | OUTPATIENT
Start: 2017-09-11 | End: 2017-09-11

## 2017-09-11 RX ORDER — SCOLOPAMINE TRANSDERMAL SYSTEM 1 MG/1
1 PATCH, EXTENDED RELEASE TRANSDERMAL ONCE
Status: DISCONTINUED | OUTPATIENT
Start: 2017-09-11 | End: 2017-09-11 | Stop reason: HOSPADM

## 2017-09-11 RX ORDER — ONDANSETRON 2 MG/ML
INJECTION INTRAMUSCULAR; INTRAVENOUS PRN
Status: DISCONTINUED | OUTPATIENT
Start: 2017-09-11 | End: 2017-09-11

## 2017-09-11 RX ORDER — ONDANSETRON 2 MG/ML
4 INJECTION INTRAMUSCULAR; INTRAVENOUS EVERY 30 MIN PRN
Status: DISCONTINUED | OUTPATIENT
Start: 2017-09-11 | End: 2017-09-11 | Stop reason: HOSPADM

## 2017-09-11 RX ORDER — LIDOCAINE 40 MG/G
CREAM TOPICAL
Status: DISCONTINUED | OUTPATIENT
Start: 2017-09-11 | End: 2017-09-11 | Stop reason: HOSPADM

## 2017-09-11 RX ORDER — LIDOCAINE HYDROCHLORIDE 20 MG/ML
INJECTION, SOLUTION INFILTRATION; PERINEURAL PRN
Status: DISCONTINUED | OUTPATIENT
Start: 2017-09-11 | End: 2017-09-11

## 2017-09-11 RX ORDER — DEXAMETHASONE SODIUM PHOSPHATE 10 MG/ML
INJECTION, SOLUTION INTRAMUSCULAR; INTRAVENOUS PRN
Status: DISCONTINUED | OUTPATIENT
Start: 2017-09-11 | End: 2017-09-11

## 2017-09-11 RX ORDER — HYDRALAZINE HYDROCHLORIDE 20 MG/ML
2.5-5 INJECTION INTRAMUSCULAR; INTRAVENOUS EVERY 10 MIN PRN
Status: DISCONTINUED | OUTPATIENT
Start: 2017-09-11 | End: 2017-09-11 | Stop reason: HOSPADM

## 2017-09-11 RX ORDER — SODIUM CHLORIDE, SODIUM LACTATE, POTASSIUM CHLORIDE, CALCIUM CHLORIDE 600; 310; 30; 20 MG/100ML; MG/100ML; MG/100ML; MG/100ML
INJECTION, SOLUTION INTRAVENOUS CONTINUOUS
Status: DISCONTINUED | OUTPATIENT
Start: 2017-09-11 | End: 2017-09-11 | Stop reason: HOSPADM

## 2017-09-11 RX ORDER — ALBUTEROL SULFATE 0.83 MG/ML
2.5 SOLUTION RESPIRATORY (INHALATION) EVERY 4 HOURS PRN
Status: DISCONTINUED | OUTPATIENT
Start: 2017-09-11 | End: 2017-09-11 | Stop reason: HOSPADM

## 2017-09-11 RX ORDER — NITROGLYCERIN 0.4 MG/1
0.4 TABLET SUBLINGUAL ONCE
Status: COMPLETED | OUTPATIENT
Start: 2017-09-11 | End: 2017-09-11

## 2017-09-11 RX ORDER — SODIUM CHLORIDE, SODIUM LACTATE, POTASSIUM CHLORIDE, CALCIUM CHLORIDE 600; 310; 30; 20 MG/100ML; MG/100ML; MG/100ML; MG/100ML
INJECTION, SOLUTION INTRAVENOUS CONTINUOUS PRN
Status: DISCONTINUED | OUTPATIENT
Start: 2017-09-11 | End: 2017-09-11

## 2017-09-11 RX ADMIN — DEXAMETHASONE SODIUM PHOSPHATE 8 MG: 10 INJECTION, SOLUTION INTRAMUSCULAR; INTRAVENOUS at 19:54

## 2017-09-11 RX ADMIN — SODIUM CHLORIDE 1000 ML: 9 INJECTION, SOLUTION INTRAVENOUS at 16:07

## 2017-09-11 RX ADMIN — LIDOCAINE HYDROCHLORIDE 40 MG: 20 INJECTION, SOLUTION INFILTRATION; PERINEURAL at 19:54

## 2017-09-11 RX ADMIN — SCOPALAMINE 1 PATCH: 1 PATCH, EXTENDED RELEASE TRANSDERMAL at 17:08

## 2017-09-11 RX ADMIN — MIDAZOLAM HYDROCHLORIDE 1 MG: 1 INJECTION, SOLUTION INTRAMUSCULAR; INTRAVENOUS at 19:42

## 2017-09-11 RX ADMIN — LORAZEPAM 0.5 MG: 2 INJECTION INTRAMUSCULAR; INTRAVENOUS at 14:34

## 2017-09-11 RX ADMIN — Medication 100 MG: at 19:46

## 2017-09-11 RX ADMIN — FENTANYL CITRATE 50 MCG: 50 INJECTION, SOLUTION INTRAMUSCULAR; INTRAVENOUS at 19:51

## 2017-09-11 RX ADMIN — SODIUM CHLORIDE, POTASSIUM CHLORIDE, SODIUM LACTATE AND CALCIUM CHLORIDE: 600; 310; 30; 20 INJECTION, SOLUTION INTRAVENOUS at 19:41

## 2017-09-11 RX ADMIN — PROPOFOL 40 MG: 10 INJECTION, EMULSION INTRAVENOUS at 19:53

## 2017-09-11 RX ADMIN — FENTANYL CITRATE 50 MCG: 50 INJECTION, SOLUTION INTRAMUSCULAR; INTRAVENOUS at 19:42

## 2017-09-11 RX ADMIN — PROPOFOL 160 MG: 10 INJECTION, EMULSION INTRAVENOUS at 19:45

## 2017-09-11 RX ADMIN — ONDANSETRON 4 MG: 2 INJECTION INTRAMUSCULAR; INTRAVENOUS at 19:54

## 2017-09-11 RX ADMIN — GLUCAGON HYDROCHLORIDE 1 MG: KIT at 14:34

## 2017-09-11 RX ADMIN — NITROGLYCERIN 0.4 MG: 0.4 TABLET SUBLINGUAL at 14:33

## 2017-09-11 RX ADMIN — LORAZEPAM 0.5 MG: 2 INJECTION INTRAMUSCULAR at 17:43

## 2017-09-11 RX ADMIN — LORAZEPAM 0.5 MG: 2 INJECTION INTRAMUSCULAR; INTRAVENOUS at 17:43

## 2017-09-11 RX ADMIN — MIDAZOLAM HYDROCHLORIDE 1 MG: 1 INJECTION, SOLUTION INTRAMUSCULAR; INTRAVENOUS at 19:51

## 2017-09-11 RX ADMIN — LIDOCAINE HYDROCHLORIDE 40 MG: 20 INJECTION, SOLUTION INFILTRATION; PERINEURAL at 19:44

## 2017-09-11 ASSESSMENT — ENCOUNTER SYMPTOMS
ACTIVITY CHANGE: 1
NERVOUS/ANXIOUS: 1
MUSCULOSKELETAL NEGATIVE: 1
NEUROLOGICAL NEGATIVE: 1
ABDOMINAL PAIN: 0
DYSURIA: 0
TROUBLE SWALLOWING: 1
SHORTNESS OF BREATH: 0
FATIGUE: 0
DIAPHORESIS: 0
VOICE CHANGE: 1
FEVER: 0

## 2017-09-11 NOTE — PROGRESS NOTES
This is a 67 year old woman who is wanting to have the foreign body she has stuck removed before she leaves the hospital.  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  She is legally  which was changed in her information.  She was provided information for a health care directive.  My card was also provided.  Currently, the physician is working with trying to meet this patients goal of having the foreign body managed.

## 2017-09-11 NOTE — ED PROVIDER NOTES
"  History     Chief Complaint   Patient presents with     Swallowed Foreign Body     was seen Saturday night had piece of meat stuck and reported it never came out     HPI  Aure Lanier is a 67 year old female who states the piece of steak that was reportedly stuck in her esophagus \"never came out\".  She was seen on Friday, and at that time could not eat or drink, but she received glucagon and stated the feeling was improved and was able to drink water and eat applesauce without emesis or drooling.  She returns today with a foreign body sensation, but is able to take fluids in small amounts.      I have reviewed the Medications, Allergies, Past Medical and Surgical History, and Social History in the Epic system.    Allergies:   Allergies   Allergen Reactions     Erythromycin Base [Kdc:Yellow Dye+Erythromycin+Brilliant Blue Fcf]      Penicillins          No current facility-administered medications on file prior to encounter.   Current Outpatient Prescriptions on File Prior to Encounter:  Lansoprazole (PREVACID PO) Take by mouth every morning (before breakfast)   cyclobenzaprine (FLEXERIL) 10 MG tablet TAKE 1 TABLET BY MOUTH THREE TIMES DAILY AS NEEDED   HYDROcodone-acetaminophen (NORCO) 7.5-325 MG per tablet TAKE 1 TABLET BY MOUTH EVER Y 6 HOURS AS NEEDED FOR OJEY N - GENERIC FOR NORCO   zolpidem (AMBIEN) 10 MG tablet Take 1 tablet (10 mg) by mouth nightly as needed for sleep Take 1 tablet at night for sleep-may repeat X1.   gabapentin (NEURONTIN) 300 MG capsule TAKE 1 CAPSULE BY MOUTH THREE TIMES DAILY   levothyroxine (SYNTHROID/LEVOTHROID) 125 MCG tablet TAKE 1 TABLET DAILY BY MOUTH - GENERIC FOR SYNTHROID   escitalopram (LEXAPRO) 20 MG tablet TAKE 1 TABLET BY MOUTH EVER Y DAY   simvastatin (ZOCOR) 40 MG tablet TAKE 1 TABLET BY MOUTH EVERY DAY IN THE EVENING . GENERIC FOR ZOCOR. (Patient taking differently: 20 mg TAKE 1 TABLET BY MOUTH EVERY DAY IN THE EVENING . GENERIC FOR ZOCOR.)   nystatin (MYCOSTATIN) " "564181 UNIT/GM POWD APPLY TOPICALLY 2 TIMES DAILY AS NEEDED   folic acid (FOLVITE) 1 MG tablet TAKE ONE TABLET DAILY BY MOUTH   multivitamin (OCUVITE) TABS Take 1 tablet by mouth daily   Multiple Vitamins-Minerals (MULTIVITAMIN OR) Take 1 tablet by mouth daily with food.   calcium (CALCIUM 600) 600 MG tablet With vitamin D; take 2 by oral route every day   cyanocolbalamin (VITAMIN  B-12) 1000 MCG tablet Take 1 tablet by mouth daily.       Patient Active Problem List   Diagnosis     Allergic arthritis involving hand     Hypothyroidism     Insomnia     Headache     Postherpetic polyneuropathy     Depression     Anxiety state     Hyperlipidemia     Migraine     Osteoporosis     GERD     Low back pain     Advanced care planning/counseling discussion     Hyperlipidemia with target LDL less than 100     Bilateral chronic knee pain     ACP (advance care planning)     Back muscle spasm     Chronic pain syndrome       Past Surgical History:   Procedure Laterality Date     D & C       STOMACH SURGERY       stomach surgery peritinitis         Social History   Substance Use Topics     Smoking status: Never Smoker     Smokeless tobacco: Never Used      Comment: no passive exposure     Alcohol use Yes      Comment: rarely, maybe yearly       Most Recent Immunizations   Administered Date(s) Administered     TDAP Vaccine (Boostrix) 10/05/2015       BMI: Estimated body mass index is 32.77 kg/(m^2) as calculated from the following:    Height as of 9/10/17: 1.6 m (5' 3\").    Weight as of this encounter: 83.9 kg (185 lb).      Review of Systems   Constitutional: Positive for activity change. Negative for diaphoresis, fatigue and fever.   HENT: Positive for trouble swallowing and voice change. Negative for drooling.    Respiratory: Negative for shortness of breath.    Cardiovascular: Negative for chest pain.   Gastrointestinal: Negative for abdominal pain.   Genitourinary: Negative for dysuria.   Musculoskeletal: Negative.    Skin: " Negative.    Neurological: Negative.    Psychiatric/Behavioral: The patient is nervous/anxious.        Physical Exam   BP: 143/77  Heart Rate: 103  Resp: 16  SpO2: 96 %  Physical Exam   Constitutional: She is oriented to person, place, and time. She appears well-developed and well-nourished. She appears distressed.   HENT:   Head: Normocephalic and atraumatic.   Neck: Normal range of motion. Neck supple.   Cardiovascular: Normal rate, regular rhythm and normal heart sounds.    No murmur heard.  Pulmonary/Chest: Effort normal and breath sounds normal.   Abdominal: Soft. Bowel sounds are normal. She exhibits no distension. There is no tenderness.   Musculoskeletal: Normal range of motion.   Neurological: She is alert and oriented to person, place, and time.   Skin: Skin is warm and dry.   Psychiatric: She has a normal mood and affect.   Nursing note and vitals reviewed.      ED Course     ED Course     Procedures         Labs Ordered and Resulted from Time of ED Arrival Up to the Time of Departure from the ED - No data to display    Assessments & Plan (with Medical Decision Making)   Patient continues to complain of foreign body in upper esophagus, did not get any relief from glucagon and nitro.   Dr. Zhu notified, he will take her to surgery for removal of foreign body.  Patient given 1 liter of NS in ED.  Ativan repeated at 1745 due to patient anxiety.    I have reviewed the nursing notes.    I have reviewed the findings, diagnosis, plan and need for follow up with the patient.       New Prescriptions    No medications on file       Final diagnoses:   Foreign body in esophagus, subsequent encounter       9/11/2017   [facility]     Madelaine Freed MD  09/11/17 1610       Madelaine Freed MD  09/11/17 1741

## 2017-09-11 NOTE — IP AVS SNAPSHOT
MRN:[id number]                      After Visit Summary   9/11/2017    Aure Lanier    MRN: [id number]           Thank you!     Thank you for choosing [facility] for your care. Our goal is always to provide you with excellent care. Hearing back from our patients is one way we can continue to improve our services. Please take a few minutes to complete the written survey that you may receive in the mail after you visit with us. Thank you!        Patient Information     Date Of Birth          1950        About your hospital stay     You were admitted on:  N/A You last received care in the:  HI Preop/Phase II    You were discharged on:  September 11, 2017       Who to Call     For medical emergencies, please call 911.  For non-urgent questions about your medical care, please call your primary care provider or clinic, [phone]  For questions related to your surgery, please call your surgery clinic        Attending Provider     Provider Specialty    Madelaine Freed MD Emergency Medicine       Primary Care Provider Office Phone # Fax #    Jaden Lee [phone]      Your next 10 appointments already scheduled     Sep 12, 2017  4:00 PM CDT   (Arrive by 3:45 PM)   SHORT with Jaden Lee NP   [facility] [place] ([facility] [place] )    [address] [place] [address]  [place] [address]   [phone]              Additional Services     GASTROENTEROLOGY ADULT REF CONSULT ONLY       Preferred Location: any local GI specialist       Please be aware that coverage of these services is subject to the terms and limitations of your health insurance plan.  Call member services at your health plan with any benefit or coverage questions.  Any procedures must be performed at a [facility] facility OR coordinated by your clinic's referral office.    Please bring the following with you to your appointment:    (1) Any X-Rays, CTs or MRIs which have been performed.  Contact  the facility where they were done to arrange for  prior to your scheduled appointment.    (2) List of current medications   (3) This referral request   (4) Any documents/labs given to you for this referral                  Further instructions from your care team       Remove the scopolamine patch behind your right ear after 24 hours after application.   After removing the patch, wash your hands and the area behind your ear thoroughly with soap and water.   The patch will still contain some medicine after use.   To avoid accidental contact or ingestion by children or pets, fold the used patch in half with the sticky side together and throw away in the trash out of the reach of children and pets.   Soft foods for one week. Take Prevacid before first meal of the day.      Post-Anesthesia Patient Instructions    IMMEDIATELY FOLLOWING SURGERY:  Do not drive or operate machinery for the first twenty four hours after surgery.  Do not make any important decisions for twenty four hours after surgery or while taking narcotic pain medications or sedatives.  If you develop intractable nausea and vomiting or a severe headache please notify your doctor immediately.    FOLLOW-UP:  Please make an appointment with your surgeon as instructed. You do not need to follow up with anesthesia unless specifically instructed to do so.    WOUND CARE INSTRUCTIONS (if applicable):  Keep a dry clean dressing on the anesthesia/puncture wound site if there is drainage.  Once the wound has quit draining you may leave it open to air.  Generally you should leave the bandage intact for twenty four hours unless there is drainage.  If the epidural site drains for more than 36-48 hours please call the anesthesia department.    QUESTIONS?:  Please feel free to call your physician or the hospital  if you have any questions, and they will be happy to assist you.       UPPER ENDOSCOPY    PURPOSE:  An Upper Endoscopy is a procedure in which  the doctor passes a flexible, lighted tube called a gastroscope through your mouth into your stomach in order to:    See the lining of the esophagus, stomach, and duodenum (first part of the small intestine).    Look for bleeding, inflammation (swelling), abnormal tumors or tissue, or ulcers.    Take biopsy specimens.  (Biopsies do not hurt.)    TELL YOUR DOCTOR IF:     You have a heart condition, heart murmur, or have had heart valve surgery.    You have had angioplasty with stents put in within the last year.    You are taking blood thinners - Aspirin, Anti-inflammatory pain pills (like Motrin, ibuprofen, Naproxen, Feldene, Advil, etc.), Coumadin, Plavix.    You have diabetes - contact your regular doctor for management of your insulin.    YOU NEED TO:    Have someone drive you home.  You are not allowed to drive until the next day.  (If you take a taxi, the person staying with you after surgery must ride with you in the taxi.  The  is not responsible for you).    Have someone stay with you for four (4) hours after you leave the hospital.    Know the time to be at admitting:  A nurse will call you with the time the afternoon before your procedure.  If your procedure is on Monday, you will be called on Friday.  If you have not been contacted with the time, call the Admitting Department at [phone] or [phone], ext. [phone] after 5 pm (Admitting is open 24 hours daily).    Talk with the Surgery Patient Education Nurses.  Please call them @ [phone] or toll free [phone] after 8:00 a.m. Monday through Friday.    TO PREPARE FOR THE PROCEDURE:      ONE WEEK BEFORE:    Stop Aspirin, anti-inflammatory pain pills (Motrin, ibuprofen, Naproxen, Feldene, Advil, etc.).  It is OK to take Tylenol (acetaminophen).    Your doctor will tell you what to do if you are on Coumadin or Plavix.     NIGHT BEFORE:    Do not eat or drink anything after midnight.  Your stomach needs to be empty.     DAY OF YOUR  PROCEDURE:    Take your pills you were told to take.  Do not take diabetic pills.  If you are on Insulin, take the dose your doctor told you to take.    Wear loose, comfortable clothing and shoes.    Remove ALL jewelry including wedding rings.  Leave valuables at home.    Come to the hospital Admitting Department located at the [facility] on the lower level.    In Same Day surgery, you will be asked to change into an exam gown.    You will be asked your name, birth date, and what you are having done by every person who is involved with your care.    Your health history, medications, and allergies will be reviewed and verified.    An IV (intravenous line) will be started in your hand.  DURING THE UPPER ENDOSCOPY:    Your doctor and a registered nurse will be with you throughout the procedure which takes about 30 minutes.    You will either lie on your left side or on your back.    Medications will be given to you to help you relax and reduce the gag reflex when swallowing the scope.    Your doctor may need to insert air into your stomach to see better.  This may cause fullness or a cramping sensation.  The air will be removed at the end of the exam.    AFTER THE PROCEDURE    You will return to Same Day Surgery to rest for about an hour before you go home.    The doctor will talk with you and your family.    A family member/friend may visit with you.    You may burp up any air remaining in your stomach.    You may feel dizzy or light-headed from the medicine.    Your nurse will go over the discharge instructions with you and your caregiver and answer any of your questions.      You will be contacted the next day to check on how you are doing.    If biopsies were taken, you will be contacted with the results usually within 3 days.  BACK AT HOME    Rest for an hour or two after you get home.    When your throat is no longer numb and you have a gag reflex, take a few sips of cool water.  If you can swallow  comfortably, you may start eating again.    You may have a mild sore throat for the rest of the day.  WHAT TO WATCH FOR:  Problems rarely occur after the exam, but it is important to be aware of the early signs of a complication.  Call your doctor immediately if you have:    Difficulty swallowing or breathing    Unusual pain in your stomach or chest    Vomiting blood or dark material that looks like coffee grounds    Black or tarry stools    Temperature over 100.6 degrees    MORE QUESTIONS?  Please ask your doctor or nurse before the exam begins  or call your doctor at the clinic.    IF YOU MUST CANCEL YOUR PROCEDURE THE EVENING/NIGHT BEFORE, PLEASE CALL HOSPITAL ADMITTING -[phone] OR TOLL FREE [phone], EXT. [phone].    Phone Numbers:  Hospital - [phone] [phone]  [facility] - [phone]  Surgery Patient Education - [phone] or toll free [phone]    Post-Anesthesia Patient Instructions    IMMEDIATELY FOLLOWING SURGERY:  Do not drive or operate machinery for the first twenty four hours after surgery.  Do not make any important decisions for twenty four hours after surgery or while taking narcotic pain medications or sedatives.  If you develop intractable nausea and vomiting or a severe headache please notify your doctor immediately.    FOLLOW-UP:  Please make an appointment with your surgeon as instructed. You do not need to follow up with anesthesia unless specifically instructed to do so.    WOUND CARE INSTRUCTIONS (if applicable):  Keep a dry clean dressing on the anesthesia/puncture wound site if there is drainage.  Once the wound has quit draining you may leave it open to air.  Generally you should leave the bandage intact for twenty four hours unless there is drainage.  If the epidural site drains for more than 36-48 hours please call the anesthesia department.    QUESTIONS?:  Please feel free to call your physician or the hospital  if you have any questions, and  "they will be happy to assist you.       Pending Results     No orders found from 2017 to 2017.            Admission Information     Date & Time Department Dept. Phone    2017 HI Preop/Phase [phone]      Your Vitals Were     Blood Pressure Pulse Temperature Respirations Weight Pulse Oximetry    139/74 103 98.7  F (37.1  C) (Oral) 16 83.9 kg (185 lb) 99%    BMI (Body Mass Index)                   32.77 kg/m2           [employer] Information     [employer] lets you send messages to your doctor, view your test results, renew your prescriptions, schedule appointments and more. To sign up, go to [email or web address] . Click on \"Log in\" on the left side of the screen, which will take you to the Welcome page. Then click on \"Sign up Now\" on the right side of the page.     You will be asked to enter the access code listed below, as well as some personal information. Please follow the directions to create your username and password.     Your access code is: [email or web address]  Expires: 10/11/2017  6:51 AM     Your access code will  in 90 days. If you need help or a new code, please call your [facility] clinic or [phone].        Care EveryWhere ID     This is your Care EveryWhere ID. This could be used by other organizations to access your [facility] medical records  [id number]        Equal Access to Services     [facility] : Hadii jefry roacho Sojun, waaxda luqadaha, qaybta kaalmada romina, racquel mendez . So [facility] [phone].    ATENCIÓN: Si habla español, tiene a gross disposición servicios gratuitos de asistencia lingüística. Llame al [phone].    We comply with applicable federal civil rights laws and [place] laws. We do not discriminate on the basis of race, color, national origin, age, disability sex, sexual orientation or gender identity.               Review of your medicines      START taking        Dose / Directions    Menthol 3 MG lozenge   Commonly " known as:  CEPACOL   Used for:  Food impaction of esophagus, initial encounter        Dose:  1 lozenge   Place 1 lozenge inside cheek every 2 hours as needed for moderate pain or sore throat   Quantity:  70 tablet   Refills:  0         CONTINUE these medicines which may have CHANGED, or have new prescriptions. If we are uncertain of the size of tablets/capsules you have at home, strength may be listed as something that might have changed.        Dose / Directions    simvastatin 40 MG tablet   Commonly known as:  ZOCOR   This may have changed:    - how much to take  - additional instructions   Used for:  Mixed hyperlipidemia        TAKE 1 TABLET BY MOUTH EVERY DAY IN THE EVENING . GENERIC FOR ZOCOR.   Quantity:  90 tablet   Refills:  0         CONTINUE these medicines which have NOT CHANGED        Dose / Directions    calcium carbonate 600 MG tablet   Generic drug:  calcium        With vitamin D; take 2 by oral route every day   Refills:  0       cyanocobalamin 1000 MCG tablet   Commonly known as:  vitamin  B-12        Dose:  1 tablet   Take 1 tablet by mouth daily.   Refills:  0       cyclobenzaprine 10 MG tablet   Commonly known as:  FLEXERIL   Used for:  Cervicalgia        TAKE 1 TABLET BY MOUTH THREE TIMES DAILY AS NEEDED   Quantity:  90 tablet   Refills:  0       escitalopram 20 MG tablet   Commonly known as:  LEXAPRO   Used for:  Anxiety        TAKE 1 TABLET BY MOUTH EVER Y DAY   Quantity:  30 tablet   Refills:  5       folic acid 1 MG tablet   Commonly known as:  FOLVITE   Used for:  Health care maintenance        TAKE ONE TABLET DAILY BY MOUTH   Quantity:  90 tablet   Refills:  2       gabapentin 300 MG capsule   Commonly known as:  NEURONTIN   Used for:  Postherpetic polyneuropathy        TAKE 1 CAPSULE BY MOUTH THREE TIMES DAILY   Quantity:  90 capsule   Refills:  1       HYDROcodone-acetaminophen 7.5-325 MG per tablet   Commonly known as:  NORCO   Used for:  Generalized pain        TAKE 1 TABLET BY MOUTH  EVER Y 6 HOURS AS NEEDED FOR JOEY N - GENERIC FOR NORCO   Quantity:  120 tablet   Refills:  0       levothyroxine 125 MCG tablet   Commonly known as:  SYNTHROID/LEVOTHROID   Used for:  Hypothyroidism        TAKE 1 TABLET DAILY BY MOUTH - GENERIC FOR SYNTHROID   Quantity:  90 tablet   Refills:  1       * MULTIVITAMIN PO        Dose:  1 tablet   Take 1 tablet by mouth daily with food.   Refills:  0       * multivitamin Tabs tablet        Dose:  1 tablet   Take 1 tablet by mouth daily   Refills:  0       nystatin 560984 UNIT/GM Powd   Commonly known as:  MYCOSTATIN   Used for:  Rash        APPLY TOPICALLY 2 TIMES DAILY AS NEEDED   Quantity:  60 g   Refills:  3       PREVACID PO        Take by mouth every morning (before breakfast)   Refills:  0       zolpidem 10 MG tablet   Commonly known as:  AMBIEN   Used for:  Persistent insomnia        Dose:  10 mg   Take 1 tablet (10 mg) by mouth nightly as needed for sleep Take 1 tablet at night for sleep-may repeat X1.   Quantity:  60 tablet   Refills:  0       * Notice:  This list has 2 medication(s) that are the same as other medications prescribed for you. Read the directions carefully, and ask your doctor or other care provider to review them with you.         Where to get your medicines      These medications were sent to [facility] [address] [facility], [place] - [address] AT [facility] of [facility] [address] [place] [address]     Phone:  [phone]     Menthol 3 MG lozenge                Protect others around you: Learn how to safely use, store and throw away your medicines at www.disposemymeds.org.             Medication List: This is a list of all your medications and when to take them. Check marks below indicate your daily home schedule. Keep this list as a reference.      Medications           Morning Afternoon Evening Bedtime As Needed    calcium carbonate 600 MG tablet   With vitamin D; take 2 by oral route every day   Generic drug:  calcium                                 cyanocobalamin 1000 MCG tablet   Commonly known as:  vitamin  B-12   Take 1 tablet by mouth daily.                                cyclobenzaprine 10 MG tablet   Commonly known as:  FLEXERIL   TAKE 1 TABLET BY MOUTH THREE TIMES DAILY AS NEEDED                                escitalopram 20 MG tablet   Commonly known as:  LEXAPRO   TAKE 1 TABLET BY MOUTH EVER Y DAY                                folic acid 1 MG tablet   Commonly known as:  FOLVITE   TAKE ONE TABLET DAILY BY MOUTH                                gabapentin 300 MG capsule   Commonly known as:  NEURONTIN   TAKE 1 CAPSULE BY MOUTH THREE TIMES DAILY                                HYDROcodone-acetaminophen 7.5-325 MG per tablet   Commonly known as:  NORCO   TAKE 1 TABLET BY MOUTH EVER Y 6 HOURS AS NEEDED FOR JOEY N - GENERIC FOR NORCO                                levothyroxine 125 MCG tablet   Commonly known as:  SYNTHROID/LEVOTHROID   TAKE 1 TABLET DAILY BY MOUTH - GENERIC FOR SYNTHROID                                Menthol 3 MG lozenge   Commonly known as:  CEPACOL   Place 1 lozenge inside cheek every 2 hours as needed for moderate pain or sore throat                                * MULTIVITAMIN PO   Take 1 tablet by mouth daily with food.                                * multivitamin Tabs tablet   Take 1 tablet by mouth daily                                nystatin 803161 UNIT/GM Powd   Commonly known as:  MYCOSTATIN   APPLY TOPICALLY 2 TIMES DAILY AS NEEDED                                PREVACID PO   Take by mouth every morning (before breakfast)                                simvastatin 40 MG tablet   Commonly known as:  ZOCOR   TAKE 1 TABLET BY MOUTH EVERY DAY IN THE EVENING . GENERIC FOR ZOCOR.                                zolpidem 10 MG tablet   Commonly known as:  AMBIEN   Take 1 tablet (10 mg) by mouth nightly as needed for sleep Take 1 tablet at night for sleep-may repeat X1.                                * Notice:   This list has 2 medication(s) that are the same as other medications prescribed for you. Read the directions carefully, and ask your doctor or other care provider to review them with you.

## 2017-09-11 NOTE — TELEPHONE ENCOUNTER
Called the patient. She said that she needs to be seen today because she can not swallow at all.  Patient was directed to the ER and I did add her to our schedule for ER follow up tomorrow at 4 pm.

## 2017-09-11 NOTE — IP AVS SNAPSHOT
HI Preop/Phase II    [address] [address]    Phone:  [phone]                                       After Visit Summary   9/11/2017    Aure Lanier    MRN: [id number]           After Visit Summary Signature Page     I have received my discharge instructions, and my questions have been answered. I have discussed any challenges I see with this plan with the nurse or doctor.    ..........................................................................................................................................  Patient/Patient Representative Signature      ..........................................................................................................................................  Patient Representative Print Name and Relationship to Patient    ..................................................               ................................................  Date                                            Time    ..........................................................................................................................................  Reviewed by Signature/Title    ...................................................              ..............................................  Date                                                            Time

## 2017-09-11 NOTE — ED NOTES
"Patient given small glass of water at this time. Able to take small sips stating \"it feels a little bit looser.\" MD updated  "

## 2017-09-11 NOTE — CONSULTS
Surgery Consult Clinic Note      RE: Aure Lanier  : 1950  HARSH: 2017      Chief Complaint:  Food impaction    History of Present Illness:  Mrs. Lanier is a very pleasant 67 year old year old female who I am seeing at the request of Dr. Alanis of the emergency department for evaluation of food impaction and for consideration for EGD.  Presented 2 days prior with difficulty swallowing and sore throat after eating a piece of steak.  Was treated with glucagon and has improvement of her symptoms that allowed her to swallow a liquids, thus was discharged home.  Since that time she's has persistent globus sensation, difficulty swallowing, inability to eat.  She presents today with non progression of her improvement.  This has happened to her in the past, but she was always able to regurgitate the obstruction out.  Long standing history of GERD for which she takes Prevacid in the morning, doesn't eat breakfast.  Abdominal surgeries include what sounds like antrectomy for perforated peptic ulcer disease many decades ago.  She specifically denies fever, chills, nausea, vomiting, chest pain, shortness of breath or palpitations.      Medical history:  Past Medical History:   Diagnosis Date     Allergic arthritis involving hand 2011     Anemia, Iron Deficiency 2011     Anxiety 2011     Contact dermatitis and other eczema, unspecified c 2011     Depression 2011     Edema 2011     Gastrointestinal mucositis (ulcerative) 2011     GERD 2011     Headache(784.0) 2011     Hypothyroidism 2011     Insomnia, unspecified 2011     Migraine 2011     Nonallopathic lesion of cervical region, not elsewhere classified 10/16/2000     Osteoporosis 2011     Other and unspecified hyperlipidemia 2011     Other malaise and fatigue 2001     Postherpetic polyneuropathy 2011       Surgical history:  Past Surgical History:   Procedure  Laterality Date     D & C       STOMACH SURGERY       stomach surgery peritinitis         Family history:  Family History   Problem Relation Age of Onset     CEREBROVASCULAR DISEASE Mother      CVA     Hypertension Mother      Other - See Comments Father 40     mining accident; cause of death     Other - See Comments Brother      muscle dystrophy     CANCER Daughter 34     skin cancer     Melanoma Daughter        Medications:  Current Outpatient Prescriptions   Medication Sig Dispense Refill     Lansoprazole (PREVACID PO) Take by mouth every morning (before breakfast)       cyclobenzaprine (FLEXERIL) 10 MG tablet TAKE 1 TABLET BY MOUTH THREE TIMES DAILY AS NEEDED 90 tablet 0     HYDROcodone-acetaminophen (NORCO) 7.5-325 MG per tablet TAKE 1 TABLET BY MOUTH EVER Y 6 HOURS AS NEEDED FOR JOEY N - GENERIC FOR NORCO 120 tablet 0     zolpidem (AMBIEN) 10 MG tablet Take 1 tablet (10 mg) by mouth nightly as needed for sleep Take 1 tablet at night for sleep-may repeat X1. 60 tablet 0     gabapentin (NEURONTIN) 300 MG capsule TAKE 1 CAPSULE BY MOUTH THREE TIMES DAILY 90 capsule 1     levothyroxine (SYNTHROID/LEVOTHROID) 125 MCG tablet TAKE 1 TABLET DAILY BY MOUTH - GENERIC FOR SYNTHROID 90 tablet 1     escitalopram (LEXAPRO) 20 MG tablet TAKE 1 TABLET BY MOUTH EVER Y DAY 30 tablet 5     simvastatin (ZOCOR) 40 MG tablet TAKE 1 TABLET BY MOUTH EVERY DAY IN THE EVENING . GENERIC FOR ZOCOR. (Patient taking differently: 20 mg TAKE 1 TABLET BY MOUTH EVERY DAY IN THE EVENING . GENERIC FOR ZOCOR.) 90 tablet 0     nystatin (MYCOSTATIN) 261594 UNIT/GM POWD APPLY TOPICALLY 2 TIMES DAILY AS NEEDED 60 g 3     folic acid (FOLVITE) 1 MG tablet TAKE ONE TABLET DAILY BY MOUTH 90 tablet 2     multivitamin (OCUVITE) TABS Take 1 tablet by mouth daily       Multiple Vitamins-Minerals (MULTIVITAMIN OR) Take 1 tablet by mouth daily with food.       calcium (CALCIUM 600) 600 MG tablet With vitamin D; take 2 by oral route every day        cyanocolbalamin (VITAMIN  B-12) 1000 MCG tablet Take 1 tablet by mouth daily.       Allergies:  The patientis allergic to erythromycin base [kdc:yellow dye+erythromycin+brilliant blue fcf] and penicillins.  .  Social history:  Social History   Substance Use Topics     Smoking status: Never Smoker     Smokeless tobacco: Never Used      Comment: no passive exposure     Alcohol use Yes      Comment: rarely, maybe yearly     Marital status: legally .    Review of Systems:    Constitutional: Negative for fever, chills and weight loss.   HENT: Negative for ear pain, nosebleeds, congestion, sore throat, tinnitus and ear discharge.    Eyes: Negative for blurred vision, double vision, photophobia and pain.   Respiratory: Negative for cough, hemoptysis, shortness of breath, wheezing and stridor.    Cardiovascular: Negative for chest pain, palpitations and orthopnea.   Gastrointestinal: Negative for heartburn, nausea, vomiting, abdominal pain and blood in stool.   Genitourinary: Negative for urgency, frequency and hematuria.   Musculoskeletal: Negative for myalgias, back pain and joint pain.   Neurological: Negative for tingling, speech change and headaches.   Endo/Heme/Allergies: Does not bruise/bleed easily.   Psychiatric/Behavioral: Negative for depression, suicidal ideas and hallucinations. The patient is not nervous/anxious.    Physical Examination:  /59  Temp 98.1  F (36.7  C) (Oral)  Resp 18  SpO2 97%, Pulse 100  General: AAOx4, NAD, WN/WD, ambulating without assistance  HEENT:NCAT, EOMI, PERRL Sclerae anicteric; Trachea mideline, no JVD  Chest:   Clear to auscultation bilaterally.  Cardiac: S1S2 , regular rate and rhythm without additional sounds  Abdomen: Soft, ND/NT no rebound, no guarding   Extremities: Cursory exam unremarkable.  Skin: Warm, dry, < 2 sec cap refill  Neuro: CN 2-12 grossly intact, no focal deficit, GCS 15  Psych: happy, calm, asks appropriate questions    No results found for this  or any previous visit (from the past 24 hour(s)).    Assessment/Plan:  #1 Esophageal food impaction  #2 Dysphagia  #3 GERD  #4 dehydration  #5 tachycardia    Thank you for the consult.   The indications, risks, benefits and technical aspects of esophagogastroduodenoscopy were reviewed with her questions asked and answered. She is likely dehydrated causing the low tachycardia.  We'll resuscitate her first then bring her back for EGD disimpaction.  We discussed the proper way to take her PPI.        Dr Zhu  [facility] and [address], [address]  [place] [id number]    Referring Provider:  No referring provider defined for this encounter.     Primary Care Provider:  Jaden Lee

## 2017-09-11 NOTE — TELEPHONE ENCOUNTER
9:26 AM    Reason for Call: OVERBOOK    Patient is having the following symptoms: follow up ER // Something stuck in her esophagus  for 4 days.    The patient is requesting an appointment for (09/11/17) with Jaden Lee.    Was an appointment offered for this call? No  If yes : Appointment type              Date    Preferred method for responding to this message: Telephone Call  What is your phone number ?    If we cannot reach you directly, may we leave a detailed response at the number you provided? Yes    Can this message wait until your PCP/provider returns, if unavailable today? Not applicable,     Radha Mina

## 2017-09-11 NOTE — ED NOTES
"68 y/o female presents with c/o having a piece of steak stuck in her esophagus. States she was seen late Saturday night for this same symptom, given an IV and Glucagon \"which loosened my throat enough to swallow my saliva and a small bit of pudding.\" Patient states since Saturday she has not been able to eat due to the steak \"lodged in my throat.\"  Rates \"irritation\" 7/10. Reports \"shortness of breath because it's so tight.\"  "

## 2017-09-11 NOTE — ANESTHESIA PREPROCEDURE EVALUATION
Anesthesia Evaluation     . Pt has had prior anesthetic. Type: General    History of anesthetic complications   - PONV        ROS/MED HX    ENT/Pulmonary:  - neg pulmonary ROS     Neurologic:     (+)migraines, other neuro postherpetic polyneuropathy    Cardiovascular:     (+) Dyslipidemia, ----. : . . . :. .       METS/Exercise Tolerance:     Hematologic:  - neg hematologic  ROS       Musculoskeletal:   (+) arthritis, , , other musculoskeletal- chronic lower back pain, osteoporosis      GI/Hepatic:     (+) GERD Other GI/Hepatic dysphagia      Renal/Genitourinary:  - ROS Renal section negative       Endo:     (+) thyroid problem .      Psychiatric:     (+) psychiatric history anxiety, depression and other (comment) (insomnia)      Infectious Disease:  - neg infectious disease ROS       Malignancy:      - no malignancy   Other:    (+) No chance of pregnancy C-spine cleared: N/A, H/O Chronic Pain,no other significant disability                    Physical Exam  Normal systems: cardiovascular and pulmonary    Airway   Mallampati: II  TM distance: >3 FB  Neck ROM: full    Dental   (+) caps    Cardiovascular   Rhythm and rate: regular and normal      Pulmonary    breath sounds clear to auscultation                    Anesthesia Plan      History & Physical Review  History and physical reviewed and following examination; no interval change.    ASA Status:  3 emergent.        Plan for General, RSI and ETT with Intravenous and Propofol induction. Maintenance will be Balanced.    PONV prophylaxis:  Ondansetron (or other 5HT-3) and Dexamethasone or Solumedrol  Discussed risks and benefits with patient for general anesthesia including sore throat, nausea, vomiting, aspiration, dental damage, loss of airway, CV complications, stroke, MI, death. Pt wishes to proceed.       Postoperative Care  Postoperative pain management:  IV analgesics.      Consents  Anesthetic plan, risks, benefits and alternatives discussed with:   Patient..                          .

## 2017-09-12 NOTE — OP NOTE
DATE OF SERVICE:  09/11/2017       PREOPERATIVE DIAGNOSIS:  Esophageal food impaction.      POSTOPERATIVE DIAGNOSIS:  Esophageal food impaction.      PROCEDURE:  Esophagogastroduodenoscopy.      INDICATION:  Auer Lanier is a 67-year-old female with longstanding history of gastroesophageal reflux disease with 3 day history of inability to swallow or drink thin liquids, had a previous food impaction in the past requiring disimpaction through EGD, here for definitive care.      SURGEON:  Lino Zhu DO      DESCRIPTION OF PROCEDURE:  The patient was brought into the endoscopy suite and placed supine on the operating table.  After general endotracheal anesthesia was administered, the patient was repositioned in the left lateral decubitus position.  The endoscope was advanced through the oropharynx and past the cricopharyngeus and almost immediately the most proximal part of the esophagus was obstructed by a macerated foul-smelling beef-like material.  This was removed piecemeal using a combination of the 3-prong grasper and a cold biting forceps.  Once enough of this had been removed, the remainder of the protein product was able to be pushed down into the stomach.  The esophagus then was irrigated.  There was no injury to the esophageal wall.  The endoscope then was advanced down the esophagus, through the stomach, past the pylorus and into the bulb into the duodenum.  There was no obstruction in the duodenum or in the stomach.  The endoscope then was slowly withdrawn evaluating the entire length of the esophagus, which at the most proximal part appeared to be slightly irritated without evidence of perforation mass or stricture.  The endoscope then was completely withdrawn.  The patient tolerated the procedure well and was taken to postanesthesia care unit.         LINO ZHU DO             D: 09/11/2017 20:28   T: 09/11/2017 20:55   MT: CD      Name:     AURE LANIER   MRN:      [id number]         Account:        [id number]   :      1950           Procedure Date: 2017      Document: [id number]

## 2017-09-12 NOTE — ANESTHESIA CARE TRANSFER NOTE
Patient: Aure Lanier    Procedure(s):  Upper Endoscopy: Removal of Food Impaction - Wound Class: II-Clean Contaminated    Diagnosis: Esophageal Food Impaction  Diagnosis Additional Information: No value filed.    Anesthesia Type:   General, RSI, ETT     Note:  Airway :Nasal Cannula  Patient transferred to:PACU        Vitals: (Last set prior to Anesthesia Care Transfer)    CRNA VITALS  9/11/2017 1958 - 9/11/2017 2032 9/11/2017             Resp Rate (set): 8                Electronically Signed By: ASHLEIGH Gorman CRNA  September 11, 2017  8:32 PM

## 2017-09-12 NOTE — ED NOTES
Face to face report given with opportunity to observe patient.    Report given to VANGIE Burr   9/11/2017  7:13 PM

## 2017-09-12 NOTE — ANESTHESIA POSTPROCEDURE EVALUATION
Patient: Aure Lanier    Procedure(s):  Upper Endoscopy: Removal of Food Impaction - Wound Class: II-Clean Contaminated    Diagnosis:Esophageal Food Impaction  Diagnosis Additional Information: No value filed.    Anesthesia Type:  General, RSI, ETT    Note:  Anesthesia Post Evaluation    Patient location during evaluation: Bedside  Patient participation: Able to fully participate in evaluation  Level of consciousness: awake and alert  Pain management: adequate  Airway patency: patent  Cardiovascular status: acceptable  Respiratory status: acceptable  Hydration status: acceptable  PONV: none     Anesthetic complications: None          Last vitals:  Vitals:    09/11/17 1920 09/11/17 2030 09/11/17 2032   BP: 139/74     Pulse: 103     Resp: 16 16 16   Temp: 98.7  F (37.1  C)     SpO2: 99%           Electronically Signed By: ASHLEIGH Gorman CRNA  September 11, 2017  8:47 PM

## 2017-09-12 NOTE — BRIEF OP NOTE
[facility] - Brief Operative Note    Pre-operative diagnosis: Esophageal food impaction   Post-operative diagnosis same   Procedure: EGD   Surgeon: Lino Zhu DO   Anesthesia: General    Estimated blood loss: 0   Blood transfusion: No transfusion was given during surgery   Drains: 0   Specimens: None   Findings: macerated beef obstructing at 15cm   Complications: None   Condition: Stable   Comments: Details included in dictated operative note.

## 2017-09-12 NOTE — DISCHARGE INSTRUCTIONS
Remove the scopolamine patch behind your right ear after 24 hours after application.   After removing the patch, wash your hands and the area behind your ear thoroughly with soap and water.   The patch will still contain some medicine after use.   To avoid accidental contact or ingestion by children or pets, fold the used patch in half with the sticky side together and throw away in the trash out of the reach of children and pets.   Soft foods for one week. Take Prevacid before first meal of the day.      Post-Anesthesia Patient Instructions    IMMEDIATELY FOLLOWING SURGERY:  Do not drive or operate machinery for the first twenty four hours after surgery.  Do not make any important decisions for twenty four hours after surgery or while taking narcotic pain medications or sedatives.  If you develop intractable nausea and vomiting or a severe headache please notify your doctor immediately.    FOLLOW-UP:  Please make an appointment with your surgeon as instructed. You do not need to follow up with anesthesia unless specifically instructed to do so.    WOUND CARE INSTRUCTIONS (if applicable):  Keep a dry clean dressing on the anesthesia/puncture wound site if there is drainage.  Once the wound has quit draining you may leave it open to air.  Generally you should leave the bandage intact for twenty four hours unless there is drainage.  If the epidural site drains for more than 36-48 hours please call the anesthesia department.    QUESTIONS?:  Please feel free to call your physician or the hospital  if you have any questions, and they will be happy to assist you.       UPPER ENDOSCOPY    PURPOSE:  An Upper Endoscopy is a procedure in which the doctor passes a flexible, lighted tube called a gastroscope through your mouth into your stomach in order to:    See the lining of the esophagus, stomach, and duodenum (first part of the small intestine).    Look for bleeding, inflammation (swelling), abnormal tumors or  tissue, or ulcers.    Take biopsy specimens.  (Biopsies do not hurt.)    TELL YOUR DOCTOR IF:     You have a heart condition, heart murmur, or have had heart valve surgery.    You have had angioplasty with stents put in within the last year.    You are taking blood thinners - Aspirin, Anti-inflammatory pain pills (like Motrin, ibuprofen, Naproxen, Feldene, Advil, etc.), Coumadin, Plavix.    You have diabetes - contact your regular doctor for management of your insulin.    YOU NEED TO:    Have someone drive you home.  You are not allowed to drive until the next day.  (If you take a taxi, the person staying with you after surgery must ride with you in the taxi.  The  is not responsible for you).    Have someone stay with you for four (4) hours after you leave the hospital.    Know the time to be at admitting:  A nurse will call you with the time the afternoon before your procedure.  If your procedure is on Monday, you will be called on Friday.  If you have not been contacted with the time, call the Admitting Department at [phone] or [phone], ext. [phone] after 5 pm (Admitting is open 24 hours daily).    Talk with the Surgery Patient Education Nurses.  Please call them @ [phone] or toll free [phone] after 8:00 a.m. Monday through Friday.    TO PREPARE FOR THE PROCEDURE:      ONE WEEK BEFORE:    Stop Aspirin, anti-inflammatory pain pills (Motrin, ibuprofen, Naproxen, Feldene, Advil, etc.).  It is OK to take Tylenol (acetaminophen).    Your doctor will tell you what to do if you are on Coumadin or Plavix.     NIGHT BEFORE:    Do not eat or drink anything after midnight.  Your stomach needs to be empty.     DAY OF YOUR PROCEDURE:    Take your pills you were told to take.  Do not take diabetic pills.  If you are on Insulin, take the dose your doctor told you to take.    Wear loose, comfortable clothing and shoes.    Remove ALL jewelry including wedding rings.  Leave valuables at home.    Come  to the hospital Admitting Department located at the [place] entrance on the lower level.    In Same Day surgery, you will be asked to change into an exam gown.    You will be asked your name, birth date, and what you are having done by every person who is involved with your care.    Your health history, medications, and allergies will be reviewed and verified.    An IV (intravenous line) will be started in your hand.  DURING THE UPPER ENDOSCOPY:    Your doctor and a registered nurse will be with you throughout the procedure which takes about 30 minutes.    You will either lie on your left side or on your back.    Medications will be given to you to help you relax and reduce the gag reflex when swallowing the scope.    Your doctor may need to insert air into your stomach to see better.  This may cause fullness or a cramping sensation.  The air will be removed at the end of the exam.    AFTER THE PROCEDURE    You will return to Same Day Surgery to rest for about an hour before you go home.    The doctor will talk with you and your family.    A family member/friend may visit with you.    You may burp up any air remaining in your stomach.    You may feel dizzy or light-headed from the medicine.    Your nurse will go over the discharge instructions with you and your caregiver and answer any of your questions.      You will be contacted the next day to check on how you are doing.    If biopsies were taken, you will be contacted with the results usually within 3 days.  BACK AT HOME    Rest for an hour or two after you get home.    When your throat is no longer numb and you have a gag reflex, take a few sips of cool water.  If you can swallow comfortably, you may start eating again.    You may have a mild sore throat for the rest of the day.  WHAT TO WATCH FOR:  Problems rarely occur after the exam, but it is important to be aware of the early signs of a complication.  Call your doctor immediately if you have:    Difficulty  swallowing or breathing    Unusual pain in your stomach or chest    Vomiting blood or dark material that looks like coffee grounds    Black or tarry stools    Temperature over 100.6 degrees    MORE QUESTIONS?  Please ask your doctor or nurse before the exam begins  or call your doctor at the clinic.    IF YOU MUST CANCEL YOUR PROCEDURE THE EVENING/NIGHT BEFORE, PLEASE CALL HOSPITAL ADMITTING -[phone] OR TOLL FREE [phone], [phone]. [phone].    Phone Numbers:  Hospital - [phone] [phone]  [facility] - [phone]  Surgery Patient Education - [phone] or toll free [phone]    Post-Anesthesia Patient Instructions    IMMEDIATELY FOLLOWING SURGERY:  Do not drive or operate machinery for the first twenty four hours after surgery.  Do not make any important decisions for twenty four hours after surgery or while taking narcotic pain medications or sedatives.  If you develop intractable nausea and vomiting or a severe headache please notify your doctor immediately.    FOLLOW-UP:  Please make an appointment with your surgeon as instructed. You do not need to follow up with anesthesia unless specifically instructed to do so.    WOUND CARE INSTRUCTIONS (if applicable):  Keep a dry clean dressing on the anesthesia/puncture wound site if there is drainage.  Once the wound has quit draining you may leave it open to air.  Generally you should leave the bandage intact for twenty four hours unless there is drainage.  If the epidural site drains for more than 36-48 hours please call the anesthesia department.    QUESTIONS?:  Please feel free to call your physician or the hospital  if you have any questions, and they will be happy to assist you.

## 2017-09-12 NOTE — OR NURSING
Drank fluids no nausea or vomiting IV dced.Patient and responsible adult given discharge instructions with no questions regarding instructions. Светлана score 20 Pain level 0/10.  Discharged from unit via wh/ch. Patient discharged to home.

## 2017-09-26 DIAGNOSIS — R52 GENERALIZED PAIN: ICD-10-CM

## 2017-09-26 RX ORDER — HYDROCODONE BITARTRATE AND ACETAMINOPHEN 7.5; 325 MG/1; MG/1
TABLET ORAL
Qty: 120 TABLET | Refills: 0 | Status: SHIPPED | OUTPATIENT
Start: 2017-09-30 | End: 2017-10-24

## 2017-09-29 DIAGNOSIS — G47.00 PERSISTENT INSOMNIA: ICD-10-CM

## 2017-09-29 NOTE — TELEPHONE ENCOUNTER
Reason for call:  Medication    1. Medication Name? Ambien  2. Is this request for a refill? Yes  3. What Pharmacy do you use? [facility]  4. Have you contacted your pharmacy? Yes    5. If yes, when? 9-25-17  (Please note that the turn-around-time for prescriptions is 72 business hours; I am sending your request at this time. SEND TO  [facility]  )  Description: Patient is requesting a refill of medication  Was an appointment offered for this a call? No   Preferred method for responding to this messageTelephone Call  If we cannot reach you directly, may we leave a detailed response at the number you provided? Yes  Can this message wait until your PCP/Provider returns if not available today? Yes

## 2017-09-29 NOTE — TELEPHONE ENCOUNTER
Controlled Substance Refill Request for Ambien  Problem List Complete:  Yes    Last Written Prescription Date:  8.28.17  Last Fill Quantity: 60,   # refills: 0    Last Office Visit with [facility] primary care provider: 7.11.17    Clinic visit frequency required: Q 3 months     Future Office visit:     Controlled substance agreement on file: Yes:  Date 7.20.17.     Processing:  Fax Rx to [facility]     checked in past 6 months?  Yes 7.10.17

## 2017-10-02 ENCOUNTER — TELEPHONE (OUTPATIENT)
Dept: INTERNAL MEDICINE | Facility: OTHER | Age: 67
End: 2017-10-02

## 2017-10-02 RX ORDER — ZOLPIDEM TARTRATE 10 MG/1
10 TABLET ORAL
Qty: 60 TABLET | Refills: 0 | Status: SHIPPED | OUTPATIENT
Start: 2017-10-02 | End: 2017-10-30

## 2017-10-16 DIAGNOSIS — B02.23 POSTHERPETIC POLYNEUROPATHY: ICD-10-CM

## 2017-10-16 NOTE — TELEPHONE ENCOUNTER
Gabapentin      Last Written Prescription Date: 8/22/17  Last Quantity: 90, # refills: 1  Last Office Visit with [facility], [facility] or [facility] prescribing provider: 7/11/17       Creatinine   Date Value Ref [facility] Status   06/01/2017 0.59 0.52 - 1.04 mg/dL Final     Lab Results   Component Value Date    AST 14 06/01/2017     Lab Results   Component Value Date    ALT 29 06/01/2017     BP Readings from Last 3 Encounters:   09/11/17 151/71   09/10/17 123/81   07/11/17 100/60

## 2017-10-18 RX ORDER — GABAPENTIN 300 MG/1
CAPSULE ORAL
Qty: 90 CAPSULE | Refills: 1 | Status: SHIPPED | OUTPATIENT
Start: 2017-10-18 | End: 2017-12-11

## 2017-10-24 DIAGNOSIS — R52 GENERALIZED PAIN: ICD-10-CM

## 2017-10-24 NOTE — TELEPHONE ENCOUNTER
norco      Last Written Prescription Date: 9/30/17  Last Fill Quantity: 120,  # refills: 0   Last Office Visit with [facility], [facility] or [facility] prescribing provider: 7/11/17                                         Next 5 appointments (look out 90 days)     Nov 07, 2017  4:00 PM CST   (Arrive by 3:45 PM)   SHORT with Jaden Lee NP   [facility] [place] ([facility] [place] )    [address] [place] [address]   [phone]

## 2017-10-26 RX ORDER — HYDROCODONE BITARTRATE AND ACETAMINOPHEN 7.5; 325 MG/1; MG/1
TABLET ORAL
Qty: 120 TABLET | Refills: 0 | Status: SHIPPED | OUTPATIENT
Start: 2017-10-26 | End: 2017-11-24

## 2017-10-26 NOTE — TELEPHONE ENCOUNTER
Left message that the written RX is ready at the [facility]  registration to be picked up.

## 2017-10-30 DIAGNOSIS — G47.00 PERSISTENT INSOMNIA: ICD-10-CM

## 2017-10-30 RX ORDER — ZOLPIDEM TARTRATE 10 MG/1
10 TABLET ORAL
Qty: 60 TABLET | Refills: 0 | Status: SHIPPED | OUTPATIENT
Start: 2017-10-30 | End: 2017-12-01

## 2017-10-30 NOTE — TELEPHONE ENCOUNTER
Ambien      Last Written Prescription Date:  10/2/17  Last Fill Quantity: 60,   # refills: 0  Last office visit:  7/11/17.  (9/12/17 the patient was a no-show.)  Future Office visit:    Next 5 appointments (look out 90 days)     Nov 07, 2017  4:00 PM CST   (Arrive by 3:45 PM)   SHORT with Jaden Lee NP   [facility] [place] ([facility] [place] )    [address] [place] [address]   [phone]                   Routing refill request to provider for review/approval because:  Drug not on the [facility], [facility] or [facility] refill protocol or controlled substance

## 2017-11-07 ENCOUNTER — OFFICE VISIT (OUTPATIENT)
Dept: INTERNAL MEDICINE | Facility: OTHER | Age: 67
End: 2017-11-07
Attending: NURSE PRACTITIONER
Payer: COMMERCIAL

## 2017-11-07 VITALS
DIASTOLIC BLOOD PRESSURE: 70 MMHG | HEIGHT: 63 IN | WEIGHT: 193 LBS | SYSTOLIC BLOOD PRESSURE: 110 MMHG | TEMPERATURE: 96.2 F | RESPIRATION RATE: 20 BRPM | OXYGEN SATURATION: 98 % | HEART RATE: 94 BPM | BODY MASS INDEX: 34.2 KG/M2

## 2017-11-07 DIAGNOSIS — G89.29 BILATERAL CHRONIC KNEE PAIN: Primary | ICD-10-CM

## 2017-11-07 DIAGNOSIS — M25.561 BILATERAL CHRONIC KNEE PAIN: Primary | ICD-10-CM

## 2017-11-07 DIAGNOSIS — M25.562 BILATERAL CHRONIC KNEE PAIN: Primary | ICD-10-CM

## 2017-11-07 PROCEDURE — 20610 DRAIN/INJ JOINT/BURSA W/O US: CPT | Performed by: NURSE PRACTITIONER

## 2017-11-07 ASSESSMENT — PAIN SCALES - GENERAL: PAINLEVEL: EXTREME PAIN (8)

## 2017-11-07 NOTE — PROGRESS NOTES
"SUBJECTIVE:  Aure Lanier, a 67 year old female scheduled an appointment to discuss the following issues:  Knee pain: Has bilateral knee pain-Comes in for steroid injections.      Medical, social, surgical, and family histories reviewed.    Current Outpatient Prescriptions   Medication     cyclobenzaprine (FLEXERIL) 10 MG tablet     escitalopram (LEXAPRO) 20 MG tablet     zolpidem (AMBIEN) 10 MG tablet     HYDROcodone-acetaminophen (NORCO) 7.5-325 MG per tablet     gabapentin (NEURONTIN) 300 MG capsule     NYSTOP 637560 UNIT/GM POWD powder     Menthol (CEPACOL) 3 MG lozenge     Lansoprazole (PREVACID PO)     levothyroxine (SYNTHROID/LEVOTHROID) 125 MCG tablet     simvastatin (ZOCOR) 40 MG tablet     folic acid (FOLVITE) 1 MG tablet     multivitamin (OCUVITE) TABS     Multiple Vitamins-Minerals (MULTIVITAMIN OR)     calcium (CALCIUM 600) 600 MG tablet     cyanocolbalamin (VITAMIN  B-12) 1000 MCG tablet     No current facility-administered medications for this visit.        ROS:  C: NEGATIVE for fever, chills, sore throat, earacheHas had dental issues  And dry mouth.   E: NEGATIVE for vision changes   R: NEGATIVE for  cough , SOB  CV: NEGATIVE for chest pain, palpitations   GI: NEGATIVE for nausea, abdominal pain, heartburn, or constipation, diarrhea. No blood  In stool,  No problems with swallowing.    : NEGATIVE for frequency, dysuria, or hematuria  M: POSITIVE  for significant arthralgias or myalgia  N: POSITIVE  for weakness in legs.  , no   Paresthesias,  No  dizziness  or headache    OBJECTIVE:  /70 (BP Location: Left arm, Patient Position: Chair, Cuff Size: Adult Large)  Pulse 94  Temp 96.2  F (35.7  C) (Tympanic)  Resp 20  Ht 5' 3\" (1.6 m)  Wt 193 lb (87.5 kg)  SpO2 98%  BMI 34.19 kg/m2  EXAM:  GENERAL APPEARANCE:  alert and no distress  EYES: EOMI,  PERRL  NECK: Supple, no lymphadenopathy, no thyromegaly, no carotid bruits, No JVD  RESP: lungs clear to auscultation anteriorly and " posteriorly - no rales, rhonchi or wheezes  CV: regular rates and rhythm, normal S1 S2, no S3 or S4 and no murmur, no click or rub -  MS: No edema   Has difficulty getting up from chair and walking-re: pain   Permit signed. Inner patellas washed with betadine. Using sterile technique- both knees injected with Kenalog 10mg and 3 cc Marcaine 0.5% without problem     NEURO:  No focal deficits.         ASSESSMENT / PLAN:  (M25.561,  M25.562,  G89.29) Bilateral chronic knee pain  (primary encounter diagnosis)  Comment: Given steroid injection to both knees.    Plan: Watch for any signs of infection.

## 2017-11-07 NOTE — NURSING NOTE
"Chief Complaint   Patient presents with     Knee Pain     c/o bilateral knee pain, would like steroid injections       Initial /70 (BP Location: Left arm, Patient Position: Chair, Cuff Size: Adult Large)  Pulse 94  Temp 96.2  F (35.7  C) (Tympanic)  Resp 20  Ht 5' 3\" (1.6 m)  Wt 193 lb (87.5 kg)  SpO2 98%  BMI 34.19 kg/m2 Estimated body mass index is 34.19 kg/(m^2) as calculated from the following:    Height as of this encounter: 5' 3\" (1.6 m).    Weight as of this encounter: 193 lb (87.5 kg).  Medication Reconciliation: complete   Elly Arredondo    "

## 2017-11-07 NOTE — MR AVS SNAPSHOT
"              After Visit Summary   11/7/2017    Aure Lanier    MRN: [id number]           Patient Information     Date Of Birth          1950        Visit Information        Provider Department      11/7/2017 4:00 PM [facility]        Care Instructions    1. Watch for signs for infection .    2. Tumeric as per instructions on bottle.           Follow-ups after your visit        Who to contact     If you have questions or need follow up information about today's clinic visit or your schedule please contact [facility] directly at [phone].  Normal or non-critical lab and imaging results will be communicated to you by DataSifthart, letter or phone within 4 business days after the clinic has received the results. If you do not hear from us within 7 days, please contact the clinic through [employer]if[employer] or phone. If you have a critical or abnormal lab result, we will notify you by phone as soon as possible.  Submit refill requests through [employer] or call your pharmacy and they will forward the refill request to us. Please allow 3 business days for your refill to be completed.          Additional Information About Your Visit        MyChart Information     [employer] gives you secure access to your electronic health record. If you see a primary care provider, you can also send messages to your care team and make appointments. If you have questions, please call your primary care clinic.  If you do not have a primary care provider, please call [phone] and they will assist you.        Care EveryWhere ID     This is your Care EveryWhere ID. This could be used by other organizations to access your [place] medical records  [id number]        Your Vitals Were     Pulse Temperature Respirations Height Pulse Oximetry BMI (Body Mass Index)    94 96.2  F (35.7  C) (Tympanic) 20 5' 3\" (1.6 m) 98% 34.19 kg/m2       Blood Pressure from Last 3 Encounters:   11/07/17 110/70   09/11/17 " 151/71   09/10/17 123/81    Weight from Last 3 Encounters:   11/07/17 193 lb (87.5 kg)   09/11/17 185 lb (83.9 kg)   07/11/17 190 lb (86.2 kg)              Today, you had the following     No orders found for display         Today's Medication Changes          These changes are accurate as of: 11/7/17  5:06 PM.  If you have any questions, ask your nurse or doctor.               These medicines have changed or have updated prescriptions.        Dose/Directions    simvastatin 40 MG tablet   Commonly known as:  ZOCOR   This may have changed:    - how much to take  - additional instructions   Used for:  Mixed hyperlipidemia        TAKE 1 TABLET BY MOUTH EVERY DAY IN THE EVENING . GENERIC FOR ZOCOR.   Quantity:  90 tablet   Refills:  0                Primary Care Provider Office Phone # Fax #    Jaden Lee [phone]       [facility] [address] [facility] [address]        Equal Access to Services     ALYSSA ADAMES AH: Hadii jefry ku hadasho Soomaali, waaxda luqadaha, qaybta kaalmada adeegyada, waxay kayodein haykirbyn renetta mendez . So [facility] [phone].    ATENCIÓN: Si habla español, tiene a gross disposición servicios gratuitos de asistencia lingüística. Llame al [phone].    We comply with applicable federal civil rights laws and [place] laws. We do not discriminate on the basis of race, color, national origin, age, disability, sex, sexual orientation, or gender identity.            Thank you!     Thank you for choosing [facility]  for your care. Our goal is always to provide you with excellent care. Hearing back from our patients is one way we can continue to improve our services. Please take a few minutes to complete the written survey that you may receive in the mail after your visit with us. Thank you!             Your Updated Medication List - Protect others around you: Learn how to safely use, store and throw away your medicines at www.disposemymeds.org.           This list is accurate as of: 11/7/17  5:06 PM.  Always use your most recent med list.                   Brand Name Dispense Instructions for use Diagnosis    calcium carbonate 600 MG tablet   Generic drug:  calcium      With vitamin D; take 2 by oral route every day        cyanocobalamin 1000 MCG tablet    vitamin  B-12     Take 1 tablet by mouth daily.        cyclobenzaprine 10 MG tablet    FLEXERIL    90 tablet    TAKE 1 TABLET BY MOUTH THREE TIMES DAILY AS NEEDED    Cervicalgia       escitalopram 20 MG tablet    LEXAPRO    30 tablet    TAKE 1 TABLET BY MOUTH EVERY DAY    Anxiety       folic acid 1 MG tablet    FOLVITE    90 tablet    TAKE ONE TABLET DAILY BY MOUTH    Health care maintenance       gabapentin 300 MG capsule    NEURONTIN    90 capsule    TAKE 1 CAPSULE BY MOUTH 3 TIMES DAILY    Postherpetic polyneuropathy       HYDROcodone-acetaminophen 7.5-325 MG per tablet    NORCO    120 tablet    TAKE 1 TABLET BY MOUTH EVER Y 6 HOURS AS NEEDED FOR JOEY N - GENERIC FOR NORCO    Generalized pain       levothyroxine 125 MCG tablet    SYNTHROID/LEVOTHROID    90 tablet    TAKE 1 TABLET DAILY BY MOUTH - GENERIC FOR SYNTHROID    Hypothyroidism       Menthol 3 MG lozenge    CEPACOL    70 tablet    Place 1 lozenge inside cheek every 2 hours as needed for moderate pain or sore throat    Food impaction of esophagus, initial encounter       * MULTIVITAMIN PO      Take 1 tablet by mouth daily with food.        * multivitamin Tabs tablet      Take 1 tablet by mouth daily        NYSTOP 148676 UNIT/GM Powd   Generic drug:  nystatin     60 g    APPLY TOPICALLY 2 TIMES DAILY AS NEEDED    Rash       PREVACID PO      Take by mouth every morning (before breakfast)        simvastatin 40 MG tablet    ZOCOR    90 tablet    TAKE 1 TABLET BY MOUTH EVERY DAY IN THE EVENING . GENERIC FOR ZOCOR.    Mixed hyperlipidemia       zolpidem 10 MG tablet    AMBIEN    60 tablet    Take 1 tablet (10 mg) by mouth nightly as needed for sleep Take  1 tablet at night for sleep-may repeat X1.    Persistent insomnia       * Notice:  This list has 2 medication(s) that are the same as other medications prescribed for you. Read the directions carefully, and ask your doctor or other care provider to review them with you.

## 2017-11-09 RX ORDER — TRIAMCINOLONE ACETONIDE 40 MG/ML
INJECTION, SUSPENSION INTRA-ARTICULAR; INTRAMUSCULAR
Qty: 1 ML | Refills: 0 | OUTPATIENT
Start: 2017-11-09 | End: 2018-08-27

## 2017-11-24 DIAGNOSIS — R52 GENERALIZED PAIN: ICD-10-CM

## 2017-11-24 RX ORDER — HYDROCODONE BITARTRATE AND ACETAMINOPHEN 7.5; 325 MG/1; MG/1
TABLET ORAL
Qty: 120 TABLET | Refills: 0 | Status: SHIPPED | OUTPATIENT
Start: 2017-11-25 | End: 2017-12-19

## 2017-12-01 DIAGNOSIS — G47.00 PERSISTENT INSOMNIA: ICD-10-CM

## 2017-12-01 RX ORDER — ZOLPIDEM TARTRATE 10 MG/1
10 TABLET ORAL
Qty: 60 TABLET | Refills: 0 | Status: SHIPPED | OUTPATIENT
Start: 2017-12-01 | End: 2017-12-26

## 2017-12-01 NOTE — TELEPHONE ENCOUNTER
Patient's pharmacy called and said that they have been faxing refill requests for the patient's ambien since 11/27/2017. Pharmacy is wondering if you could process this for her today?

## 2017-12-01 NOTE — TELEPHONE ENCOUNTER
ambien      Last Written Prescription Date: 10/30/17  Last Fill Quantity: 60,  # refills: 0   Last Office Visit with [facility], [facility] or [facility] prescribing provider: 11/7./17                                         Next 5 appointments (look out 90 days)     Dec 04, 2017  3:00 PM CST   (Arrive by 2:45 PM)   SHORT with Jaden Lee NP   [facility] [place] ([facility] [place] )    [address] [place] [address]   [phone]

## 2017-12-04 ENCOUNTER — OFFICE VISIT (OUTPATIENT)
Dept: INTERNAL MEDICINE | Facility: OTHER | Age: 67
End: 2017-12-04
Attending: NURSE PRACTITIONER
Payer: COMMERCIAL

## 2017-12-04 VITALS
WEIGHT: 185 LBS | OXYGEN SATURATION: 98 % | HEART RATE: 97 BPM | DIASTOLIC BLOOD PRESSURE: 80 MMHG | RESPIRATION RATE: 18 BRPM | TEMPERATURE: 97 F | SYSTOLIC BLOOD PRESSURE: 120 MMHG | HEIGHT: 63 IN | BODY MASS INDEX: 32.78 KG/M2

## 2017-12-04 DIAGNOSIS — G89.29 CHRONIC PAIN OF LEFT KNEE: Primary | ICD-10-CM

## 2017-12-04 DIAGNOSIS — M25.562 CHRONIC PAIN OF LEFT KNEE: Primary | ICD-10-CM

## 2017-12-04 PROCEDURE — 99212 OFFICE O/P EST SF 10 MIN: CPT

## 2017-12-04 PROCEDURE — 94760 N-INVAS EAR/PLS OXIMETRY 1: CPT

## 2017-12-04 PROCEDURE — 99213 OFFICE O/P EST LOW 20 MIN: CPT | Performed by: NURSE PRACTITIONER

## 2017-12-04 RX ORDER — PREDNISONE 20 MG/1
20 TABLET ORAL DAILY
Qty: 7 TABLET | Refills: 0 | Status: SHIPPED | OUTPATIENT
Start: 2017-12-04 | End: 2017-12-11

## 2017-12-04 ASSESSMENT — PAIN SCALES - GENERAL: PAINLEVEL: EXTREME PAIN (8)

## 2017-12-04 NOTE — PROGRESS NOTES
SUBJECTIVE:  Auresinai Lanier, a 67 year old female scheduled an appointment to discuss the following issues:    Left knee pain: Had steroid injections to both knees on 11/7/17. States had relief of knee pain, then last week-? If twisted knee wrong and has had pain in Left knee ever since Can hardly walk on it, or sit at piano.  . Wants additional injection to that knee.Has narrowing of joint space during previous injection.   Had MRI in 2014 showing severe arthritis. Discussed with patient is probably nearing stage where needs to consider arthroscopy or knee replacement. Too soon to do another injection.  Wants to wait til after new year.  Already on Lortabs for cervical pain.  But states the knee pain is not controlled by the Lortabs       Medical, social, surgical, and family histories reviewed.    Current Outpatient Prescriptions   Medication     zolpidem (AMBIEN) 10 MG tablet     cyclobenzaprine (FLEXERIL) 10 MG tablet     HYDROcodone-acetaminophen (NORCO) 7.5-325 MG per tablet     triamcinolone acetonide (KENALOG) 40 MG/ML injection     escitalopram (LEXAPRO) 20 MG tablet     gabapentin (NEURONTIN) 300 MG capsule     NYSTOP 144654 UNIT/GM POWD powder     Menthol (CEPACOL) 3 MG lozenge     Lansoprazole (PREVACID PO)     levothyroxine (SYNTHROID/LEVOTHROID) 125 MCG tablet     simvastatin (ZOCOR) 40 MG tablet     folic acid (FOLVITE) 1 MG tablet     multivitamin (OCUVITE) TABS     Multiple Vitamins-Minerals (MULTIVITAMIN OR)     calcium (CALCIUM 600) 600 MG tablet     cyanocolbalamin (VITAMIN  B-12) 1000 MCG tablet     No current facility-administered medications for this visit.        ROS:  C: NEGATIVE for fever, chills, sore throat, earache  E: NEGATIVE for vision changes   R: NEGATIVE for  cough , SOB  CV: NEGATIVE for chest pain, palpitations   GI: NEGATIVE for nausea, abdominal pain, heartburn, or constipation, diarrhea.   : NEGATIVE for frequency, dysuria, or hematuria  M POSITIVE   for significant  "arthralgias or myalgia  Left knee pain Right knee pain was relieved with injection.   +Neck pain  N: NEGATIVE for weakness,  Paresthesias, dizziness  , + HA  headache    OBJECTIVE:  /80 (BP Location: Left arm, Patient Position: Sitting, Cuff Size: Adult Regular)  Pulse 97  Temp 97  F (36.1  C) (Tympanic)  Resp 18  Ht 5' 3\" (1.6 m)  Wt 185 lb (83.9 kg)  SpO2 98%  BMI 32.77 kg/m2  EXAM:  GENERAL APPEARANCE:  alert and no distress  EYES: EOMI,  PERRL  NECK: Supple, no lymphadenopathy, no thyromegaly, no carotid bruits, No JVD  RESP: lungs clear to auscultation anteriorly and posteriorly - no rales, rhonchi or wheezes  CV: regular rates and rhythm, normal S1 S2, no S3 or S4 and no murmur, no click or rub -  MS: No edema   -No swelling in either knee.   Left knee ,  Tenderness to medial patella. No redness or warmth.    NEURO:CMS intact to left  Leg.         ASSESSMENT / PLAN:  (M25.562,  G89.29) Chronic pain of left knee  (primary encounter diagnosis)  Comment: Needs MRI and to see ortho specialist. Wants to wait til after new year.  Will try Prednisone 20mg a day for 7 days. Patient cannot tolerate other antiinflammatoried because of severe GERD when takes them.  Can refer her to Ortho Specialist-unknown if they are willing to  Re inject at this time.  Use ice to knee.  Try Salon pas to knee.  Wear Ace bandage day and night for 7 days.    Plan: predniSONE (DELTASONE) 20 MG tablet          "

## 2017-12-04 NOTE — MR AVS SNAPSHOT
After Visit Summary   12/4/2017    Aure Lanier    MRN: [id number]           Patient Information     Date Of Birth          1950        Visit Information        Provider Department      12/4/2017 3:00 PM Jaden Lee NP [facility]        Today's Diagnoses     Chronic pain of left knee    -  1      Care Instructions    1. Keep ace bandage on day and night till  Pain gone  2.  Salon Pas -Lidocaine 1%-2%  To knee.    3.  Prednisone 20mg a day for 7 days   4. Ice is best.    5. Should get MRI            Follow-ups after your visit        Who to contact     If you have questions or need follow up information about today's clinic visit or your schedule please contact [facility] directly at [phone].  Normal or non-critical lab and imaging results will be communicated to you by MyChart, letter or phone within 4 business days after the clinic has received the results. If you do not hear from us within 7 days, please contact the clinic through Mon[employer] or phone. If you have a critical or abnormal lab result, we will notify you by phone as soon as possible.  Submit refill requests through [employer] or call your pharmacy and they will forward the refill request to us. Please allow 3 business days for your refill to be completed.          Additional Information About Your Visit        MyChart Information     [employer] gives you secure access to your electronic health record. If you see a primary care provider, you can also send messages to your care team and make appointments. If you have questions, please call your primary care clinic.  If you do not have a primary care provider, please call [phone] and they will assist you.        Care EveryWhere ID     This is your Care EveryWhere ID. This could be used by other organizations to access your [place] medical records  [id number]        Your Vitals Were     Pulse Temperature Respirations Height Pulse Oximetry BMI (Body  "Mass Index)    97 97  F (36.1  C) (Tympanic) 18 5' 3\" (1.6 m) 98% 32.77 kg/m2       Blood Pressure from Last 3 Encounters:   12/04/17 120/80   11/07/17 110/70   09/11/17 151/71    Weight from Last 3 Encounters:   12/04/17 185 lb (83.9 kg)   11/07/17 193 lb (87.5 kg)   09/11/17 185 lb (83.9 kg)              Today, you had the following     No orders found for display         Today's Medication Changes          These changes are accurate as of: 12/4/17  3:46 PM.  If you have any questions, ask your nurse or doctor.               Start taking these medicines.        Dose/Directions    predniSONE 20 MG tablet   Commonly known as:  DELTASONE   Used for:  Chronic pain of left knee   Started by:  Jaden Lee NP        Dose:  20 mg   Take 1 tablet (20 mg) by mouth daily   Quantity:  7 tablet   Refills:  0         These medicines have changed or have updated prescriptions.        Dose/Directions    simvastatin 40 MG tablet   Commonly known as:  ZOCOR   This may have changed:    - how much to take  - additional instructions   Used for:  Mixed hyperlipidemia        TAKE 1 TABLET BY MOUTH EVERY DAY IN THE EVENING . GENERIC FOR ZOCOR.   Quantity:  90 tablet   Refills:  0            Where to get your medicines      These medications were sent to [id number] - [place] - [address]  [address] [place] [address]     Phone:  [phone]     predniSONE 20 MG tablet                Primary Care Provider Office Phone # Fax #    Jaden Lee [phone]       [facility] [address]  [facility] [place] [address]        Equal Access to Services     [facility] AH: Hadii jefry ku hadasho Soomaali, waaxda luqadaha, qaybta kaalmada adeegyada, racquel craven. So [facility] [phone].    ATENCIÓN: Si habla español, tiene a gross disposición servicios gratuitos de asistencia lingüística. Lei al [phone].    We comply with applicable federal civil rights laws and " [place] laws. We do not discriminate on the basis of race, color, national origin, age, disability, sex, sexual orientation, or gender identity.            Thank you!     Thank you for choosing [facility]  for your care. Our goal is always to provide you with excellent care. Hearing back from our patients is one way we can continue to improve our services. Please take a few minutes to complete the written survey that you may receive in the mail after your visit with us. Thank you!             Your Updated Medication List - Protect others around you: Learn how to safely use, store and throw away your medicines at www.disposemymeds.org.          This list is accurate as of: 12/4/17  3:46 PM.  Always use your most recent med list.                   Brand Name Dispense Instructions for use Diagnosis    calcium carbonate 600 MG tablet   Generic drug:  calcium      With vitamin D; take 2 by oral route every day        cyanocobalamin 1000 MCG tablet    vitamin  B-12     Take 1 tablet by mouth daily.        cyclobenzaprine 10 MG tablet    FLEXERIL    90 tablet    TAKE 1 TABLET BY MOUTH THREE TIMES DAILY AS NEEDED    Cervicalgia       escitalopram 20 MG tablet    LEXAPRO    30 tablet    TAKE 1 TABLET BY MOUTH EVERY DAY    Anxiety       folic acid 1 MG tablet    FOLVITE    90 tablet    TAKE ONE TABLET DAILY BY MOUTH    Health care maintenance       gabapentin 300 MG capsule    NEURONTIN    90 capsule    TAKE 1 CAPSULE BY MOUTH 3 TIMES DAILY    Postherpetic polyneuropathy       HYDROcodone-acetaminophen 7.5-325 MG per tablet    NORCO    120 tablet    TAKE 1 TABLET BY MOUTH EVER Y 6 HOURS AS NEEDED FOR JOEY N - GENERIC FOR NORCO    Generalized pain       levothyroxine 125 MCG tablet    SYNTHROID/LEVOTHROID    90 tablet    TAKE 1 TABLET DAILY BY MOUTH - GENERIC FOR SYNTHROID    Hypothyroidism       Menthol 3 MG lozenge    CEPACOL    70 tablet    Place 1 lozenge inside cheek every 2 hours as needed for moderate  pain or sore throat    Food impaction of esophagus, initial encounter       * MULTIVITAMIN PO      Take 1 tablet by mouth daily with food.        * multivitamin Tabs tablet      Take 1 tablet by mouth daily        NYSTOP [id number] UNIT/GM Powd   Generic drug:  nystatin     60 g    APPLY TOPICALLY 2 TIMES DAILY AS NEEDED    Rash       predniSONE 20 MG tablet    DELTASONE    7 tablet    Take 1 tablet (20 mg) by mouth daily    Chronic pain of left knee       PREVACID PO      Take by mouth every morning (before breakfast)        simvastatin 40 MG tablet    ZOCOR    90 tablet    TAKE 1 TABLET BY MOUTH EVERY DAY IN THE EVENING . GENERIC FOR ZOCOR.    Mixed hyperlipidemia       triamcinolone acetonide 40 MG/ML injection    KENALOG    1 mL    Used 10mg for each knee    Bilateral chronic knee pain       zolpidem 10 MG tablet    AMBIEN    60 tablet    Take 1 tablet (10 mg) by mouth nightly as needed for sleep Take 1 tablet at night for sleep-may repeat X1.    Persistent insomnia       * Notice:  This list has 2 medication(s) that are the same as other medications prescribed for you. Read the directions carefully, and ask your doctor or other care provider to review them with you.

## 2017-12-04 NOTE — NURSING NOTE
"Chief Complaint   Patient presents with     Knee Pain     c/o left knee pain, she had a steroid injection on 11/7/2017. Her knee is swollen and painful       Initial /80 (BP Location: Left arm, Patient Position: Sitting, Cuff Size: Adult Regular)  Pulse 97  Temp 97  F (36.1  C) (Tympanic)  Resp 18  Ht 5' 3\" (1.6 m)  Wt 185 lb (83.9 kg)  SpO2 98%  BMI 32.77 kg/m2 Estimated body mass index is 32.77 kg/(m^2) as calculated from the following:    Height as of this encounter: 5' 3\" (1.6 m).    Weight as of this encounter: 185 lb (83.9 kg).  Medication Reconciliation: complete   Elly Arredondo    "

## 2017-12-04 NOTE — PATIENT INSTRUCTIONS
1. Keep ace bandage on day and night till  Pain gone  2.  Salon Pas -Lidocaine 1%-2%  To knee.    3.  Prednisone 20mg a day for 7 days   4. Ice is best.    5. Should get MRI

## 2017-12-11 DIAGNOSIS — M25.562 CHRONIC PAIN OF LEFT KNEE: ICD-10-CM

## 2017-12-11 DIAGNOSIS — G89.29 CHRONIC PAIN OF LEFT KNEE: ICD-10-CM

## 2017-12-11 RX ORDER — PREDNISONE 20 MG/1
20 TABLET ORAL DAILY
Qty: 7 TABLET | Refills: 0 | Status: SHIPPED | OUTPATIENT
Start: 2017-12-11 | End: 2017-12-12

## 2017-12-11 NOTE — TELEPHONE ENCOUNTER
Please see phone call below.  Last fill: 12/4/17 #7, 0 R.  Last office visit: 12/4/17  Medication pended.  Thank you.

## 2017-12-11 NOTE — TELEPHONE ENCOUNTER
Reason for call:  Medication    1. Medication Name? predniSONE (DELTASONE) 20 MG tablet  2. Is this request for a refill? Yes  3. What Pharmacy do you use? [facility]  4. Have you contacted your pharmacy? No    5. If yes, when?  (Please note that the turn-around-time for prescriptions is 72 business hours; I am sending your request at this time. SEND TO  [facility]  )  Description: Patient was prescribed medication and is inquiring if Jaden Lee would prescribe for another week? If you could call patient back and inform her of the decision.  Was an appointment offered for this a call? No   Preferred method for responding to this messageTelephone Call [phone]  If we cannot reach you directly, may we leave a detailed response at the number you provided? Yes  Can this message wait until your PCP/Provider returns if not available today? Yes

## 2017-12-12 DIAGNOSIS — G89.29 CHRONIC PAIN OF LEFT KNEE: ICD-10-CM

## 2017-12-12 DIAGNOSIS — M25.562 CHRONIC PAIN OF LEFT KNEE: ICD-10-CM

## 2017-12-12 RX ORDER — PREDNISONE 20 MG/1
20 TABLET ORAL DAILY
Qty: 7 TABLET | Refills: 0 | Status: SHIPPED | OUTPATIENT
Start: 2017-12-12 | End: 2017-12-18

## 2017-12-12 NOTE — TELEPHONE ENCOUNTER
Patient called asking for Jaden to extend her prednisone, she thinks she needs more time on it to help her feel better.  It has help but not as it should.  She thinks things will clear up on an extended rx

## 2017-12-18 ENCOUNTER — TELEPHONE (OUTPATIENT)
Dept: INTERNAL MEDICINE | Facility: OTHER | Age: 67
End: 2017-12-18

## 2017-12-18 DIAGNOSIS — M25.561 CHRONIC PAIN OF RIGHT KNEE: Primary | ICD-10-CM

## 2017-12-18 DIAGNOSIS — G89.29 CHRONIC PAIN OF RIGHT KNEE: Primary | ICD-10-CM

## 2017-12-18 RX ORDER — PREDNISONE 10 MG/1
10 TABLET ORAL DAILY
Qty: 15 TABLET | Refills: 0 | Status: SHIPPED | OUTPATIENT
Start: 2017-12-18 | End: 2018-04-09

## 2017-12-18 NOTE — TELEPHONE ENCOUNTER
11:43 AM    Reason for Call: Phone Call    Description: Pt called and was wondering if provider would be willing to extend her prednisone through the first of the year? Please call her back at [phone]    Was an appointment offered for this call? No  If yes : Appointment type              Date    Preferred method for responding to this message: Telephone Call  What is your phone number ?    If we cannot reach you directly, may we leave a detailed response at the number you provided? Yes    Can this message wait until your PCP/provider returns, if available today? Not applicable    Ariana Fraire

## 2017-12-19 DIAGNOSIS — R52 GENERALIZED PAIN: ICD-10-CM

## 2017-12-19 NOTE — TELEPHONE ENCOUNTER
norco      Last Written Prescription Date: 11/25/17  Last Fill Quantity: 120,  # refills: 0   Last Office Visit with G, [facility] or [facility] prescribing provider: 12/4/17

## 2017-12-20 ENCOUNTER — HOSPITAL ENCOUNTER (EMERGENCY)
Facility: HOSPITAL | Age: 67
Discharge: HOME | End: 2017-12-21
Attending: EMERGENCY MEDICINE | Admitting: EMERGENCY MEDICINE
Payer: COMMERCIAL
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
## 2017-12-20 PROCEDURE — 80320 DRUG SCREEN QUANTALCOHOLS: CPT | Performed by: EMERGENCY MEDICINE

## 2017-12-20 PROCEDURE — 99285 EMERGENCY DEPT VISIT HI MDM: CPT | Performed by: EMERGENCY MEDICINE

## 2017-12-20 PROCEDURE — 80048 BASIC METABOLIC PNL TOTAL CA: CPT | Performed by: EMERGENCY MEDICINE

## 2017-12-20 PROCEDURE — 36415 COLL VENOUS BLD VENIPUNCTURE: CPT | Performed by: EMERGENCY MEDICINE

## 2017-12-20 PROCEDURE — 99285 EMERGENCY DEPT VISIT HI MDM: CPT | Mod: 25

## 2017-12-20 PROCEDURE — 85025 COMPLETE CBC W/AUTO DIFF WBC: CPT | Performed by: EMERGENCY MEDICINE

## 2017-12-20 PROCEDURE — 96372 THER/PROPH/DIAG INJ SC/IM: CPT | Mod: 59

## 2017-12-20 PROCEDURE — 25000128 H RX IP 250 OP 636: Performed by: EMERGENCY MEDICINE

## 2017-12-20 RX ORDER — KETOROLAC TROMETHAMINE 30 MG/ML
30 INJECTION, SOLUTION INTRAMUSCULAR; INTRAVENOUS ONCE
Status: COMPLETED | OUTPATIENT
Start: 2017-12-20 | End: 2017-12-20

## 2017-12-20 RX ADMIN — KETOROLAC TROMETHAMINE 30 MG: 30 INJECTION, SOLUTION INTRAMUSCULAR at 23:43

## 2017-12-20 ASSESSMENT — ENCOUNTER SYMPTOMS: NECK PAIN: 1

## 2017-12-20 NOTE — ED AVS SNAPSHOT
[facility]    [address] [address]    Phone:  [phone]                                       Aure Lanier   MRN: [id number]    Department:  [facility]   Date of Visit:  12/20/2017           After Visit Summary Signature Page     I have received my discharge instructions, and my questions have been answered. I have discussed any challenges I see with this plan with the nurse or doctor.    ..........................................................................................................................................  Patient/Patient Representative Signature      ..........................................................................................................................................  Patient Representative Print Name and Relationship to Patient    ..................................................               ................................................  Date                                            Time    ..........................................................................................................................................  Reviewed by Signature/Title    ...................................................              ..............................................  Date                                                            Time
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
## 2017-12-21 ENCOUNTER — APPOINTMENT (OUTPATIENT)
Dept: CT IMAGING | Facility: HOSPITAL | Age: 67
End: 2017-12-21
Attending: EMERGENCY MEDICINE
Payer: COMMERCIAL

## 2017-12-21 ENCOUNTER — APPOINTMENT (OUTPATIENT)
Dept: GENERAL RADIOLOGY | Facility: HOSPITAL | Age: 67
End: 2017-12-21
Attending: EMERGENCY MEDICINE
Payer: COMMERCIAL

## 2017-12-21 VITALS
DIASTOLIC BLOOD PRESSURE: 79 MMHG | RESPIRATION RATE: 16 BRPM | SYSTOLIC BLOOD PRESSURE: 160 MMHG | HEART RATE: 93 BPM | TEMPERATURE: 97.6 F | OXYGEN SATURATION: 100 %

## 2017-12-21 LAB
ANION GAP SERPL CALCULATED.3IONS-SCNC: 10 MMOL/L (ref 3–14)
BASOPHILS # BLD AUTO: 0.1 10E9/L (ref 0–0.2)
BASOPHILS NFR BLD AUTO: 0.4 %
BUN SERPL-MCNC: 15 MG/DL (ref 7–30)
CALCIUM SERPL-MCNC: 9 MG/DL (ref 8.5–10.1)
CHLORIDE SERPL-SCNC: 103 MMOL/L (ref 94–109)
CO2 SERPL-SCNC: 23 MMOL/L (ref 20–32)
CREAT SERPL-MCNC: 0.65 MG/DL (ref 0.52–1.04)
DIFFERENTIAL METHOD BLD: ABNORMAL
EOSINOPHIL # BLD AUTO: 0.2 10E9/L (ref 0–0.7)
EOSINOPHIL NFR BLD AUTO: 1.1 %
ERYTHROCYTE [DISTWIDTH] IN BLOOD BY AUTOMATED COUNT: 13.8 % (ref 10–15)
ETHANOL SERPL-MCNC: <0.01 G/DL
GFR SERPL CREATININE-BSD FRML MDRD: >90 ML/MIN/1.7M2
GLUCOSE SERPL-MCNC: 103 MG/DL (ref 70–99)
HCT VFR BLD AUTO: 42.4 % (ref 35–47)
HGB BLD-MCNC: 14.4 G/DL (ref 11.7–15.7)
IMM GRANULOCYTES # BLD: 0.1 10E9/L (ref 0–0.4)
IMM GRANULOCYTES NFR BLD: 0.7 %
LYMPHOCYTES # BLD AUTO: 3.6 10E9/L (ref 0.8–5.3)
LYMPHOCYTES NFR BLD AUTO: 26.2 %
MCH RBC QN AUTO: 29.8 PG (ref 26.5–33)
MCHC RBC AUTO-ENTMCNC: 34 G/DL (ref 31.5–36.5)
MCV RBC AUTO: 88 FL (ref 78–100)
MONOCYTES # BLD AUTO: 1 10E9/L (ref 0–1.3)
MONOCYTES NFR BLD AUTO: 7.6 %
NEUTROPHILS # BLD AUTO: 8.8 10E9/L (ref 1.6–8.3)
NEUTROPHILS NFR BLD AUTO: 64 %
NRBC # BLD AUTO: 0 10*3/UL
NRBC BLD AUTO-RTO: 0 /100
PLATELET # BLD AUTO: 472 10E9/L (ref 150–450)
POTASSIUM SERPL-SCNC: 3.8 MMOL/L (ref 3.4–5.3)
RBC # BLD AUTO: 4.84 10E12/L (ref 3.8–5.2)
SODIUM SERPL-SCNC: 136 MMOL/L (ref 133–144)
WBC # BLD AUTO: 13.7 10E9/L (ref 4–11)

## 2017-12-21 PROCEDURE — 74177 CT ABD & PELVIS W/CONTRAST: CPT | Mod: TC

## 2017-12-21 PROCEDURE — 25000128 H RX IP 250 OP 636: Performed by: EMERGENCY MEDICINE

## 2017-12-21 PROCEDURE — 72125 CT NECK SPINE W/O DYE: CPT | Mod: TC

## 2017-12-21 PROCEDURE — 25000132 ZZH RX MED GY IP 250 OP 250 PS 637: Performed by: EMERGENCY MEDICINE

## 2017-12-21 PROCEDURE — 71020 XR CHEST 2 VW: CPT | Mod: TC

## 2017-12-21 PROCEDURE — 70450 CT HEAD/BRAIN W/O DYE: CPT | Mod: TC

## 2017-12-21 RX ORDER — HYDROCODONE BITARTRATE AND ACETAMINOPHEN 7.5; 325 MG/1; MG/1
TABLET ORAL
Qty: 120 TABLET | Refills: 0 | Status: SHIPPED | OUTPATIENT
Start: 2017-12-21 | End: 2018-01-25

## 2017-12-21 RX ORDER — IOPAMIDOL 612 MG/ML
100 INJECTION, SOLUTION INTRAVASCULAR ONCE
Status: COMPLETED | OUTPATIENT
Start: 2017-12-21 | End: 2017-12-21

## 2017-12-21 RX ORDER — IBUPROFEN 800 MG/1
800 TABLET, FILM COATED ORAL EVERY 8 HOURS PRN
Qty: 60 TABLET | Refills: 0 | Status: SHIPPED | OUTPATIENT
Start: 2017-12-21 | End: 2017-12-29

## 2017-12-21 RX ORDER — ACETAMINOPHEN AND CODEINE PHOSPHATE 300; 30 MG/1; MG/1
1-2 TABLET ORAL EVERY 6 HOURS PRN
Qty: 20 TABLET | Refills: 0 | Status: SHIPPED | OUTPATIENT
Start: 2017-12-21 | End: 2018-04-09

## 2017-12-21 RX ORDER — OXYCODONE AND ACETAMINOPHEN 5; 325 MG/1; MG/1
1 TABLET ORAL ONCE
Status: COMPLETED | OUTPATIENT
Start: 2017-12-21 | End: 2017-12-21

## 2017-12-21 RX ADMIN — IOPAMIDOL 100 ML: 612 INJECTION, SOLUTION INTRAVENOUS at 01:14

## 2017-12-21 RX ADMIN — OXYCODONE HYDROCHLORIDE AND ACETAMINOPHEN 1 TABLET: 5; 325 TABLET ORAL at 02:57
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
## 2017-12-26 DIAGNOSIS — G47.00 PERSISTENT INSOMNIA: ICD-10-CM

## 2017-12-26 NOTE — TELEPHONE ENCOUNTER
Controlled Substance Refill Request for Ambien  Problem List Complete:  Yes    Last Written Prescription Date:  12.1.17  Last Fill Quantity: 60,   # refills: 0    [place] [facility] [place]: 12.4.17    Clinic visit frequency required: Q 3 months     Future Office visit:     Controlled substance agreement on file: Yes:  Date 8.22.17.     Processing:  Fax Rx to [facility]     checked in past 6 months?  Yes 7.10.17

## 2017-12-28 RX ORDER — ZOLPIDEM TARTRATE 10 MG/1
10 TABLET ORAL
Qty: 60 TABLET | Refills: 0 | Status: SHIPPED | OUTPATIENT
Start: 2017-12-28 | End: 2018-01-25

## 2018-01-25 DIAGNOSIS — R52 GENERALIZED PAIN: ICD-10-CM

## 2018-01-25 DIAGNOSIS — G47.00 PERSISTENT INSOMNIA: ICD-10-CM

## 2018-01-25 RX ORDER — HYDROCODONE BITARTRATE AND ACETAMINOPHEN 7.5; 325 MG/1; MG/1
TABLET ORAL
Qty: 120 TABLET | Refills: 0 | Status: SHIPPED | OUTPATIENT
Start: 2018-01-25 | End: 2018-02-21

## 2018-01-25 RX ORDER — ZOLPIDEM TARTRATE 10 MG/1
10 TABLET ORAL
Qty: 60 TABLET | Refills: 0 | Status: SHIPPED | OUTPATIENT
Start: 2018-01-25 | End: 2018-02-22

## 2018-01-25 NOTE — TELEPHONE ENCOUNTER
Controlled Substance Refill Request for Norco  Problem List Complete:  Yes    Last Written Prescription Date:  12.21.17  Last Fill Quantity: 120,   # refills: 0    Last Office Visit with [facility] primary care provider: 12.4.17    Clinic visit frequency required: Q 3 months     Future Office visit:   Next 5 appointments (look out 90 days)     Jan 26, 2018  4:00 PM CST   (Arrive by 3:45 PM)   SHORT with Jaden Lee NP   [facility] [place] ([facility] )    [address] [place] [address] [place] [address]   [phone]                  Controlled substance agreement on file: Yes:  Date 8.22.17.     Processing:  Patient will  in clinic   checked in past 6 months?  Yes 7.10.17

## 2018-01-26 ENCOUNTER — OFFICE VISIT (OUTPATIENT)
Dept: INTERNAL MEDICINE | Facility: OTHER | Age: 68
End: 2018-01-26
Attending: NURSE PRACTITIONER
Payer: COMMERCIAL

## 2018-01-26 VITALS
WEIGHT: 191 LBS | SYSTOLIC BLOOD PRESSURE: 134 MMHG | BODY MASS INDEX: 33.84 KG/M2 | DIASTOLIC BLOOD PRESSURE: 88 MMHG | OXYGEN SATURATION: 97 % | HEIGHT: 63 IN | HEART RATE: 103 BPM | TEMPERATURE: 98.4 F | RESPIRATION RATE: 16 BRPM

## 2018-01-26 DIAGNOSIS — K21.9 GASTROESOPHAGEAL REFLUX DISEASE WITHOUT ESOPHAGITIS: ICD-10-CM

## 2018-01-26 DIAGNOSIS — M25.561 CHRONIC PAIN OF RIGHT KNEE: ICD-10-CM

## 2018-01-26 DIAGNOSIS — G89.29 CHRONIC PAIN OF LEFT KNEE: Primary | ICD-10-CM

## 2018-01-26 DIAGNOSIS — M25.562 CHRONIC PAIN OF LEFT KNEE: Primary | ICD-10-CM

## 2018-01-26 DIAGNOSIS — G89.29 CHRONIC PAIN OF RIGHT KNEE: ICD-10-CM

## 2018-01-26 PROCEDURE — G0463 HOSPITAL OUTPT CLINIC VISIT: HCPCS

## 2018-01-26 PROCEDURE — 99213 OFFICE O/P EST LOW 20 MIN: CPT | Mod: 25 | Performed by: NURSE PRACTITIONER

## 2018-01-26 PROCEDURE — G0463 HOSPITAL OUTPT CLINIC VISIT: HCPCS | Mod: 25

## 2018-01-26 PROCEDURE — 20610 DRAIN/INJ JOINT/BURSA W/O US: CPT | Performed by: NURSE PRACTITIONER

## 2018-01-26 RX ORDER — PREDNISONE 1 MG/1
2 TABLET ORAL DAILY
Qty: 60 TABLET | Refills: 0 | Status: SHIPPED | OUTPATIENT
Start: 2018-01-26 | End: 2018-02-22

## 2018-01-26 RX ORDER — OMEPRAZOLE 40 MG/1
CAPSULE, DELAYED RELEASE ORAL
Qty: 90 CAPSULE | Refills: 0 | Status: SHIPPED | OUTPATIENT
Start: 2018-01-26 | End: 2018-11-03

## 2018-01-26 ASSESSMENT — PAIN SCALES - GENERAL: PAINLEVEL: MODERATE PAIN (5)

## 2018-01-26 NOTE — PATIENT INSTRUCTIONS
1. Watch for signs of infection.   2. Take it easy for couple days.   3. Set up for MRI of your left knee.

## 2018-01-26 NOTE — NURSING NOTE
"Chief Complaint   Patient presents with     Musculoskeletal Problem     Steroid shots       Initial /88 (BP Location: Left arm, Patient Position: Sitting, Cuff Size: Adult Regular)  Pulse 103  Temp 98.4  F (36.9  C) (Tympanic)  Resp 16  Ht 5' 3\" (1.6 m)  Wt 191 lb (86.6 kg)  SpO2 97%  BMI 33.83 kg/m2 Estimated body mass index is 33.83 kg/(m^2) as calculated from the following:    Height as of this encounter: 5' 3\" (1.6 m).    Weight as of this encounter: 191 lb (86.6 kg).  Medication Reconciliation: complete   Leslye Turner      "

## 2018-01-26 NOTE — PROGRESS NOTES
SUBJECTIVE:  Aure Lanier, a 67 year old female scheduled an appointment to discuss the following issues:  Knee pain  :  Comes in for bilateral knee injections.  Has knee pain in both knees. Had put her on a prednisone course for pain because too early to do steroid injection.  Stated had very good relief.  Discussed if can go on daily prednisone. Is ready to get knees surgically fixed.        Medical, social, surgical, and family histories reviewed.    Current Outpatient Prescriptions   Medication     zolpidem (AMBIEN) 10 MG tablet     HYDROcodone-acetaminophen (NORCO) 7.5-325 MG per tablet     cyclobenzaprine (FLEXERIL) 10 MG tablet     omeprazole (PRILOSEC) 40 MG capsule     gabapentin (NEURONTIN) 300 MG capsule     NYSTOP [id number] UNIT/GM POWD powder     triamcinolone acetonide (KENALOG) 40 MG/ML injection     escitalopram (LEXAPRO) 20 MG tablet     Lansoprazole (PREVACID PO)     levothyroxine (SYNTHROID/LEVOTHROID) 125 MCG tablet     simvastatin (ZOCOR) 40 MG tablet     folic acid (FOLVITE) 1 MG tablet     multivitamin (OCUVITE) TABS     Multiple Vitamins-Minerals (MULTIVITAMIN OR)     calcium (CALCIUM 600) 600 MG tablet     cyanocolbalamin (VITAMIN  B-12) 1000 MCG tablet     acetaminophen-codeine (TYLENOL WITH CODEINE #3) 300-30 MG per tablet     predniSONE (DELTASONE) 10 MG tablet     No current facility-administered medications for this visit.        ROS:  CONSTITUTIONAL:  negative for  fevers, chills, malaise and weight loss  EYES:  negative for  blurred vision and eye discharge  EARS/NOSE/THROAT:  negative for  ear drainage, earaches, nasal congestion , sore throat , difficulty swallowing  SKIN:  negative for rash and skin lesion(s)  RESPIRATORY:  negative for cough, dyspnea and wheezing  CARDIOVASCULAR:  negative for  chest pain, palpitations, edema  GASTROINTESTINAL:  negative for nausea, vomiting, reflux,   abdominal pain   diarrhea, constipation   MUSCULOSKELETAL: POSITIVE  for  myalgias,  "arthralgias, neck pain,  back   pain, bilateral knee pain  Knees catch when does stairs.    URINARY:  negative for frequency, dysuria, nocturia and hesitancy  NEUROLOGICAL: POSITIVE for headaches, no dizziness no weakness in extremities.      OBJECTIVE:  /88 (BP Location: Left arm, Patient Position: Sitting, Cuff Size: Adult Regular)  Pulse 103  Temp 98.4  F (36.9  C) (Tympanic)  Resp 16  Ht 5' 3\" (1.6 m)  Wt 191 lb (86.6 kg)  SpO2 97%  BMI 33.83 kg/m2  EXAM:  GENERAL APPEARANCE:  alert and no distress  EYES: EOMI,  PERRL  MS: No edema Permit signed.   Inner patella washed X3 with betadine , Injected Bupivacaine 1% 3 1/2 cc and 1/4 cc Kenalog 0.5% to each knee.  No problems. Felt good relief post injection    NEURO:No focal deficits, CN 2-12 intact.           ASSESSMENT / PLAN:  (M25.562,  G89.29) Chronic pain of left knee  (primary encounter diagnosis)  Comment:Will get MRI of knee and set up with Dr. Hernandez for possible knee replacement   Will try Prednisone 2mg a day. Discussed side effects of prednisone.  Already on daily pain med for her neck.  Watch for signs of infection.    Plan: predniSONE (DELTASONE) 1 MG tablet, MR Knee         Left w/o Contrast           (K21.9) Gastroesophageal reflux disease without esophagitis  Comment:Renewed Omeprazole 20mg a day    Plan:Omeprazole 20mg        "

## 2018-01-26 NOTE — TELEPHONE ENCOUNTER
Pt notified that the written RX is ready at the [facility]  registration to be picked up.

## 2018-01-26 NOTE — MR AVS SNAPSHOT
After Visit Summary   1/26/2018    Aure Lanier    MRN: [id number]           Patient Information     Date Of Birth          1950        Visit Information        Provider Department      1/26/2018 4:00 PM [facility] NP [facility]        Today's Diagnoses     Chronic pain of left knee    -  1    Abdominal pain, epigastric          Care Instructions    1. Watch for signs of infection.   2. Take it easy for couple days.   3. Set up for MRI of your left knee.             Follow-ups after your visit        Future tests that were ordered for you today     Open Future Orders        Priority Expected Expires Ordered    MR Knee Left w/o Contrast Routine  1/26/2019 1/26/2018            Who to contact     If you have questions or need follow up information about today's clinic visit or your schedule please contact [facility] directly at [phone].  Normal or non-critical lab and imaging results will be communicated to you by ShowNearbyhart, letter or phone within 4 business days after the clinic has received the results. If you do not hear from us within 7 days, please contact the clinic through ShowNearbyhart or phone. If you have a critical or abnormal lab result, we will notify you by phone as soon as possible.  Submit refill requests through [employer] or call your pharmacy and they will forward the refill request to us. Please allow 3 business days for your refill to be completed.          Additional Information About Your Visit        MyChart Information     [employer] gives you secure access to your electronic health record. If you see a primary care provider, you can also send messages to your care team and make appointments. If you have questions, please call your primary care clinic.  If you do not have a primary care provider, please call [phone] and they will assist you.        Care EveryWhere ID     This is your Care EveryWhere ID. This could be used by other organizations  "to access your [facility] medical records  [id number]        Your Vitals Were     Pulse Temperature Respirations Height Pulse Oximetry BMI (Body Mass Index)    103 98.4  F (36.9  C) (Tympanic) 16 5' 3\" (1.6 m) 97% 33.83 kg/m2       Blood Pressure from Last 3 Encounters:   01/26/18 134/88   12/21/17 160/79   12/04/17 120/80    Weight from Last 3 Encounters:   01/26/18 191 lb (86.6 kg)   12/04/17 185 lb (83.9 kg)   11/07/17 193 lb (87.5 kg)                 Today's Medication Changes          These changes are accurate as of 1/26/18  4:50 PM.  If you have any questions, ask your nurse or doctor.               These medicines have changed or have updated prescriptions.        Dose/Directions    omeprazole 40 MG capsule   Commonly known as:  priLOSEC   This may have changed:  See the new instructions.   Used for:  Abdominal pain, epigastric   Changed by:  Jaden Lee NP        TAKE 1 CAPSULE DAILY BY MOUTH   Quantity:  90 capsule   Refills:  0       * predniSONE 10 MG tablet   Commonly known as:  DELTASONE   This may have changed:  Another medication with the same name was added. Make sure you understand how and when to take each.   Used for:  Chronic pain of right knee   Changed by:  Jaden Lee NP        Dose:  10 mg   Take 1 tablet (10 mg) by mouth daily   Quantity:  15 tablet   Refills:  0       * predniSONE 1 MG tablet   Commonly known as:  DELTASONE   This may have changed:  You were already taking a medication with the same name, and this prescription was added. Make sure you understand how and when to take each.   Used for:  Chronic pain of left knee   Changed by:  Jaden Lee NP        Dose:  2 mg   Take 2 tablets (2 mg) by mouth daily   Quantity:  60 tablet   Refills:  0       simvastatin 40 MG tablet   Commonly known as:  ZOCOR   This may have changed:    - how much to take  - additional instructions   Used for:  Mixed hyperlipidemia        TAKE 1 TABLET BY MOUTH EVERY DAY IN THE EVENING . GENERIC " FOR ZOCOR.   Quantity:  90 tablet   Refills:  0       * Notice:  This list has 2 medication(s) that are the same as other medications prescribed for you. Read the directions carefully, and ask your doctor or other care provider to review them with you.         Where to get your medicines      These medications were sent to [id number] - [place], [place] - [address]  [address], [place] [address]     Phone:  [phone]     omeprazole 40 MG capsule    predniSONE 1 MG tablet                Primary Care Provider Office Phone # Fax #    Jaden Lee [phone]       [facility] [place] [address]  [facility] [place] [address]        Equal Access to Services     ALYSSA ADAMES : Hadii aad ku hadasho Soomaali, waaxda luqadaha, qaybta kaalmada adeegyada, racquel headleyin hayjuventino mendez . So [facility] [phone].    ATENCIÓN: Si habla español, tiene a gross disposición servicios gratuitos de asistencia lingüística. Llame al [phone].    We comply with applicable federal civil rights laws and [place] laws. We do not discriminate on the basis of race, color, national origin, age, disability, sex, sexual orientation, or gender identity.            Thank you!     Thank you for choosing [facility]  for your care. Our goal is always to provide you with excellent care. Hearing back from our patients is one way we can continue to improve our services. Please take a few minutes to complete the written survey that you may receive in the mail after your visit with us. Thank you!             Your Updated Medication List - Protect others around you: Learn how to safely use, store and throw away your medicines at www.disposemymeds.org.          This list is accurate as of 1/26/18  4:50 PM.  Always use your most recent med list.                   Brand Name Dispense Instructions for use Diagnosis    acetaminophen-codeine 300-30 MG per tablet    TYLENOL WITH CODEINE #3    20  tablet    Take 1-2 tablets by mouth every 6 hours as needed for pain        calcium carbonate 600 MG tablet   Generic drug:  calcium      With vitamin D; take 2 by oral route every day        cyanocobalamin 1000 MCG tablet    vitamin  B-12     Take 1 tablet by mouth daily.        cyclobenzaprine 10 MG tablet    FLEXERIL    90 tablet    TAKE 1 TABLET BY MOUTH THREE TIMES DAILY AS NEEDED    Cervicalgia       escitalopram 20 MG tablet    LEXAPRO    30 tablet    TAKE 1 TABLET BY MOUTH EVERY DAY    Anxiety       folic acid 1 MG tablet    FOLVITE    90 tablet    TAKE ONE TABLET DAILY BY MOUTH    Health care maintenance       gabapentin 300 MG capsule    NEURONTIN    90 capsule    TAKE ONE CAPSULE 3 TIMES A DAY BY MOUTH    Postherpetic polyneuropathy       HYDROcodone-acetaminophen 7.5-325 MG per tablet    NORCO    120 tablet    TAKE 1 TABLET BY MOUTH EVER Y 6 HOURS AS NEEDED FOR JOEY N - GENERIC FOR NORCO    Generalized pain       levothyroxine 125 MCG tablet    SYNTHROID/LEVOTHROID    90 tablet    TAKE 1 TABLET DAILY BY MOUTH - GENERIC FOR SYNTHROID    Hypothyroidism       * MULTIVITAMIN PO      Take 1 tablet by mouth daily with food.        * multivitamin Tabs tablet      Take 1 tablet by mouth daily        NYSTOP 995050 UNIT/GM Powd   Generic drug:  nystatin     60 g    APPLY TOPICALLY 2 TIMES DAILY AS NEEDED    Rash       omeprazole 40 MG capsule    priLOSEC    90 capsule    TAKE 1 CAPSULE DAILY BY MOUTH    Abdominal pain, epigastric       * predniSONE 10 MG tablet    DELTASONE    15 tablet    Take 1 tablet (10 mg) by mouth daily    Chronic pain of right knee       * predniSONE 1 MG tablet    DELTASONE    60 tablet    Take 2 tablets (2 mg) by mouth daily    Chronic pain of left knee       PREVACID PO      Take by mouth every morning (before breakfast)        simvastatin 40 MG tablet    ZOCOR    90 tablet    TAKE 1 TABLET BY MOUTH EVERY DAY IN THE EVENING . GENERIC FOR ZOCOR.    Mixed hyperlipidemia       triamcinolone  acetonide 40 MG/ML injection    KENALOG    1 mL    Used 10mg for each knee    Bilateral chronic knee pain       zolpidem 10 MG tablet    AMBIEN    60 tablet    Take 1 tablet (10 mg) by mouth nightly as needed for sleep Take 1 tablet at night for sleep-may repeat X1.    Persistent insomnia       * Notice:  This list has 4 medication(s) that are the same as other medications prescribed for you. Read the directions carefully, and ask your doctor or other care provider to review them with you.

## 2018-01-29 RX ORDER — TRIAMCINOLONE ACETONIDE 40 MG/ML
INJECTION, SUSPENSION INTRA-ARTICULAR; INTRAMUSCULAR
Qty: 1 ML | Refills: 0 | OUTPATIENT
Start: 2018-01-29 | End: 2018-04-09

## 2018-01-29 RX ORDER — BUPIVACAINE HYDROCHLORIDE 5 MG/ML
7 INJECTION, SOLUTION PERINEURAL ONCE
Qty: 7 ML | Refills: 0 | OUTPATIENT
Start: 2018-01-29 | End: 2019-01-29

## 2018-02-02 ENCOUNTER — HOSPITAL ENCOUNTER (OUTPATIENT)
Dept: MRI IMAGING | Facility: HOSPITAL | Age: 68
Discharge: HOME | End: 2018-02-02
Attending: NURSE PRACTITIONER | Admitting: NURSE PRACTITIONER
Payer: COMMERCIAL

## 2018-02-02 DIAGNOSIS — G89.29 CHRONIC PAIN OF LEFT KNEE: ICD-10-CM

## 2018-02-02 DIAGNOSIS — M25.562 CHRONIC PAIN OF LEFT KNEE: ICD-10-CM

## 2018-02-02 PROCEDURE — 73721 MRI JNT OF LWR EXTRE W/O DYE: CPT | Mod: TC,LT

## 2018-02-15 ENCOUNTER — TELEPHONE (OUTPATIENT)
Dept: INTERNAL MEDICINE | Facility: OTHER | Age: 68
End: 2018-02-15

## 2018-02-15 DIAGNOSIS — S83.242A ACUTE MEDIAL MENISCUS TEAR OF LEFT KNEE, INITIAL ENCOUNTER: Primary | ICD-10-CM

## 2018-02-15 NOTE — TELEPHONE ENCOUNTER
Reason for call:  RESULTS    1. What is the test that was ordered? MRI of knee  2. Who ordered your test? Jaden Lee  3. When was the test performed?  2-2-18  Description: Patient is requesting the results of the MRI performed  Was an appointment offered for this a call? No  If Yes :  Appointment type                 Date    Preferred method for responding to this message: Telephone Call  What is your phone number ? [phone]    If we cannot reach you directly, may we leave a detailed response at the number you provided? Yes  Can this message wait until your PCP/Provider returns if not available today? YES

## 2018-02-15 NOTE — TELEPHONE ENCOUNTER
She would like a referral to see Dr. Hernandez with [facility]. I started referral. Please sign.

## 2018-02-21 DIAGNOSIS — R52 GENERALIZED PAIN: ICD-10-CM

## 2018-02-21 RX ORDER — HYDROCODONE BITARTRATE AND ACETAMINOPHEN 7.5; 325 MG/1; MG/1
TABLET ORAL
Qty: 120 TABLET | Refills: 0 | Status: SHIPPED | OUTPATIENT
Start: 2018-02-24 | End: 2018-03-20

## 2018-02-21 NOTE — TELEPHONE ENCOUNTER
bhumico      Last Written Prescription Date:  1/25/18  Last Fill Quantity: 120,   # refills: 0  Last Office Visit: 1/26/18  Future Office visit:       Routing refill request to provider for review/approval because:  Drug not on the [facility], P or [facility] refill protocol or controlled substance

## 2018-02-21 NOTE — TELEPHONE ENCOUNTER
Left message that the written RX is ready at the [facility]  registration to be picked up.

## 2018-02-22 DIAGNOSIS — G47.00 PERSISTENT INSOMNIA: ICD-10-CM

## 2018-02-22 NOTE — TELEPHONE ENCOUNTER
Controlled Substance Refill Request for Ambien  Problem List Complete:  Yes    Last Written Prescription Date:  1.25.18  Last Fill Quantity: 60,   # refills: 0    Last Office Visit with [facility] primary care provider: 1.26.18    Clinic visit frequency required: Q 3 months     Future Office visit:     Controlled substance agreement on file: Yes:  Date 7.20.17.     Processing:  Fax Rx to [facility]   checked in past 6 months?  Yes 7.10.17

## 2018-02-23 ENCOUNTER — TELEPHONE (OUTPATIENT)
Dept: INTERNAL MEDICINE | Facility: OTHER | Age: 68
End: 2018-02-23

## 2018-02-23 RX ORDER — ZOLPIDEM TARTRATE 10 MG/1
10 TABLET ORAL
Qty: 60 TABLET | Refills: 0 | Status: SHIPPED | OUTPATIENT
Start: 2018-02-23 | End: 2018-03-21

## 2018-02-23 NOTE — TELEPHONE ENCOUNTER
Called patient called the pharmacy.  She will need a pa and she can pay cash for some Ambien if she is out.   Called [facility] and she can get the Ambien for 11.85.  Requested a Prior Auth.

## 2018-02-23 NOTE — TELEPHONE ENCOUNTER
02/23/2018 - received PA request from Dipesh Islas for zolpidem.  Submitted thru [facility].  Waiting for response.  Evangelina Johnson, [facility] Specialist.

## 2018-02-23 NOTE — TELEPHONE ENCOUNTER
2:13 PM    Reason for Call: Phone Call    Description: Pt called and states that she needs to get a prior authorization on zolpidem (AMBIEN) [facility]    Was an appointment offered for this call? No  If yes : Appointment type              Date    Preferred method for responding to this message: Telephone Call  What is your phone number ?    If we cannot reach you directly, may we leave a detailed response at the number you provided? Yes    Can this message wait until your PCP/provider returns, if available today? Not applicable, PCP is in     Radha Roscoe

## 2018-02-26 NOTE — TELEPHONE ENCOUNTER
APPROVAL - 2/26/2018 - received APPROVAL from [facility] for zolpidem tartrate.  Approval dates:  12/30/2017 thru 12/31/2018.  Pharmacy advised.  Forms scanned to Epic.  Evangelina Johnson, [facility] Specialist.

## 2018-02-28 ENCOUNTER — TRANSFERRED RECORDS (OUTPATIENT)
Dept: HEALTH INFORMATION MANAGEMENT | Facility: CLINIC | Age: 68
End: 2018-02-28

## 2018-03-20 DIAGNOSIS — R52 GENERALIZED PAIN: ICD-10-CM

## 2018-03-20 DIAGNOSIS — M54.2 CERVICALGIA: ICD-10-CM

## 2018-03-20 RX ORDER — HYDROCODONE BITARTRATE AND ACETAMINOPHEN 7.5; 325 MG/1; MG/1
TABLET ORAL
Qty: 120 TABLET | Refills: 0 | Status: ON HOLD | OUTPATIENT
Start: 2018-03-26 | End: 2018-04-20

## 2018-03-20 NOTE — TELEPHONE ENCOUNTER
norco      Last Written Prescription Date:  2/24/18  Last Fill Quantity: 120,   # refills: 0  Last Office Visit: 1/26/18  Future Office visit:    Next 5 appointments (look out 90 days)     Apr 09, 2018 11:00 AM CDT   (Arrive by 10:45 AM)   Pre-Op physical with Jaden Lee NP   [facility] ([facility] )    [address] [place] [address] [place] [address]   [phone]                   Routing refill request to provider for review/approval because:  Drug not on the [facility], [facility] or [facility] refill protocol or controlled substance

## 2018-03-20 NOTE — TELEPHONE ENCOUNTER
Pt notified that the written RX is ready at the [facility]  registration to be picked up.

## 2018-03-21 DIAGNOSIS — G47.00 PERSISTENT INSOMNIA: ICD-10-CM

## 2018-03-21 RX ORDER — CYCLOBENZAPRINE HCL 10 MG
TABLET ORAL
Qty: 90 TABLET | OUTPATIENT
Start: 2018-03-21

## 2018-03-21 NOTE — TELEPHONE ENCOUNTER
ambien      Last Written Prescription Date:  2/23/18  Last Fill Quantity: 60,   # refills: 0  Last Office Visit: 1/26/18  Future Office visit:    Next 5 appointments (look out 90 days)     Apr 09, 2018 11:00 AM CDT   (Arrive by 10:45 AM)   Pre-Op physical with Jaden Lee NP   [facility] [place] [facility]    [address] [place] [address]   [phone]                   Routing refill request to provider for review/approval because:  Drug not on the [facility], [facility] or [facility] refill protocol or controlled substance

## 2018-03-22 RX ORDER — ZOLPIDEM TARTRATE 10 MG/1
10 TABLET ORAL
Qty: 60 TABLET | Refills: 0 | Status: SHIPPED | OUTPATIENT
Start: 2018-03-24 | End: 2018-04-24

## 2018-04-09 ENCOUNTER — OFFICE VISIT (OUTPATIENT)
Dept: INTERNAL MEDICINE | Facility: OTHER | Age: 68
End: 2018-04-09
Attending: NURSE PRACTITIONER
Payer: COMMERCIAL

## 2018-04-09 VITALS
SYSTOLIC BLOOD PRESSURE: 140 MMHG | BODY MASS INDEX: 34.73 KG/M2 | OXYGEN SATURATION: 98 % | HEIGHT: 63 IN | HEART RATE: 104 BPM | WEIGHT: 196 LBS | TEMPERATURE: 97.1 F | RESPIRATION RATE: 18 BRPM | DIASTOLIC BLOOD PRESSURE: 78 MMHG

## 2018-04-09 DIAGNOSIS — F34.1 DYSTHYMIA: ICD-10-CM

## 2018-04-09 DIAGNOSIS — B02.23 POSTHERPETIC POLYNEUROPATHY: ICD-10-CM

## 2018-04-09 DIAGNOSIS — K21.9 GASTROESOPHAGEAL REFLUX DISEASE WITHOUT ESOPHAGITIS: ICD-10-CM

## 2018-04-09 DIAGNOSIS — Z01.810 PRE-OPERATIVE CARDIOVASCULAR EXAMINATION: Primary | ICD-10-CM

## 2018-04-09 DIAGNOSIS — G47.00 INSOMNIA, UNSPECIFIED TYPE: ICD-10-CM

## 2018-04-09 DIAGNOSIS — F41.1 ANXIETY STATE: ICD-10-CM

## 2018-04-09 DIAGNOSIS — E03.9 HYPOTHYROIDISM, UNSPECIFIED TYPE: ICD-10-CM

## 2018-04-09 DIAGNOSIS — G89.4 CHRONIC PAIN SYNDROME: ICD-10-CM

## 2018-04-09 LAB
ALBUMIN SERPL-MCNC: 4.2 G/DL (ref 3.4–5)
ALP SERPL-CCNC: 89 U/L (ref 40–150)
ALT SERPL W P-5'-P-CCNC: 31 U/L (ref 0–50)
ANION GAP SERPL CALCULATED.3IONS-SCNC: 5 MMOL/L (ref 3–14)
AST SERPL W P-5'-P-CCNC: 19 U/L (ref 0–45)
BASOPHILS # BLD AUTO: 0.1 10E9/L (ref 0–0.2)
BASOPHILS NFR BLD AUTO: 0.5 %
BILIRUB SERPL-MCNC: 0.4 MG/DL (ref 0.2–1.3)
BUN SERPL-MCNC: 14 MG/DL (ref 7–30)
CALCIUM SERPL-MCNC: 9.3 MG/DL (ref 8.5–10.1)
CHLORIDE SERPL-SCNC: 104 MMOL/L (ref 94–109)
CO2 SERPL-SCNC: 28 MMOL/L (ref 20–32)
CREAT SERPL-MCNC: 0.62 MG/DL (ref 0.52–1.04)
DIFFERENTIAL METHOD BLD: ABNORMAL
EOSINOPHIL # BLD AUTO: 0.8 10E9/L (ref 0–0.7)
EOSINOPHIL NFR BLD AUTO: 7 %
ERYTHROCYTE [DISTWIDTH] IN BLOOD BY AUTOMATED COUNT: 13.4 % (ref 10–15)
GFR SERPL CREATININE-BSD FRML MDRD: >90 ML/MIN/1.7M2
GLUCOSE SERPL-MCNC: 97 MG/DL (ref 70–99)
HCT VFR BLD AUTO: 40.7 % (ref 35–47)
HGB BLD-MCNC: 13.3 G/DL (ref 11.7–15.7)
IMM GRANULOCYTES # BLD: 0 10E9/L (ref 0–0.4)
IMM GRANULOCYTES NFR BLD: 0.3 %
LYMPHOCYTES # BLD AUTO: 3.1 10E9/L (ref 0.8–5.3)
LYMPHOCYTES NFR BLD AUTO: 27.8 %
MCH RBC QN AUTO: 29.6 PG (ref 26.5–33)
MCHC RBC AUTO-ENTMCNC: 32.7 G/DL (ref 31.5–36.5)
MCV RBC AUTO: 91 FL (ref 78–100)
MONOCYTES # BLD AUTO: 1 10E9/L (ref 0–1.3)
MONOCYTES NFR BLD AUTO: 8.6 %
NEUTROPHILS # BLD AUTO: 6.3 10E9/L (ref 1.6–8.3)
NEUTROPHILS NFR BLD AUTO: 55.8 %
NRBC # BLD AUTO: 0 10*3/UL
NRBC BLD AUTO-RTO: 0 /100
PLATELET # BLD AUTO: 413 10E9/L (ref 150–450)
POTASSIUM SERPL-SCNC: 3.7 MMOL/L (ref 3.4–5.3)
PROT SERPL-MCNC: 7.7 G/DL (ref 6.8–8.8)
RBC # BLD AUTO: 4.49 10E12/L (ref 3.8–5.2)
SODIUM SERPL-SCNC: 137 MMOL/L (ref 133–144)
T4 FREE SERPL-MCNC: 0.82 NG/DL (ref 0.76–1.46)
TSH SERPL DL<=0.005 MIU/L-ACNC: 6.15 MU/L (ref 0.4–4)
WBC # BLD AUTO: 11.2 10E9/L (ref 4–11)

## 2018-04-09 PROCEDURE — 84439 ASSAY OF FREE THYROXINE: CPT | Mod: ZL | Performed by: NURSE PRACTITIONER

## 2018-04-09 PROCEDURE — 84443 ASSAY THYROID STIM HORMONE: CPT | Mod: ZL | Performed by: NURSE PRACTITIONER

## 2018-04-09 PROCEDURE — 93005 ELECTROCARDIOGRAM TRACING: CPT

## 2018-04-09 PROCEDURE — 99214 OFFICE O/P EST MOD 30 MIN: CPT | Mod: 25 | Performed by: NURSE PRACTITIONER

## 2018-04-09 PROCEDURE — 36415 COLL VENOUS BLD VENIPUNCTURE: CPT | Mod: ZL | Performed by: NURSE PRACTITIONER

## 2018-04-09 PROCEDURE — 85025 COMPLETE CBC W/AUTO DIFF WBC: CPT | Mod: ZL | Performed by: NURSE PRACTITIONER

## 2018-04-09 PROCEDURE — 80053 COMPREHEN METABOLIC PANEL: CPT | Mod: ZL | Performed by: NURSE PRACTITIONER

## 2018-04-09 PROCEDURE — G0463 HOSPITAL OUTPT CLINIC VISIT: HCPCS

## 2018-04-09 PROCEDURE — 94760 N-INVAS EAR/PLS OXIMETRY 1: CPT

## 2018-04-09 ASSESSMENT — PATIENT HEALTH QUESTIONNAIRE - PHQ9: 5. POOR APPETITE OR OVEREATING: NOT AT ALL

## 2018-04-09 ASSESSMENT — ANXIETY QUESTIONNAIRES
3. WORRYING TOO MUCH ABOUT DIFFERENT THINGS: NOT AT ALL
IF YOU CHECKED OFF ANY PROBLEMS ON THIS QUESTIONNAIRE, HOW DIFFICULT HAVE THESE PROBLEMS MADE IT FOR YOU TO DO YOUR WORK, TAKE CARE OF THINGS AT HOME, OR GET ALONG WITH OTHER PEOPLE: NOT DIFFICULT AT ALL
GAD7 TOTAL SCORE: 0
7. FEELING AFRAID AS IF SOMETHING AWFUL MIGHT HAPPEN: NOT AT ALL
5. BEING SO RESTLESS THAT IT IS HARD TO SIT STILL: NOT AT ALL
6. BECOMING EASILY ANNOYED OR IRRITABLE: NOT AT ALL
1. FEELING NERVOUS, ANXIOUS, OR ON EDGE: NOT AT ALL
2. NOT BEING ABLE TO STOP OR CONTROL WORRYING: NOT AT ALL

## 2018-04-09 ASSESSMENT — PAIN SCALES - GENERAL: PAINLEVEL: EXTREME PAIN (8)

## 2018-04-09 NOTE — NURSING NOTE
"Chief Complaint   Patient presents with     Pre-Op Exam     The patient is here for cardiac clearance for left knee replacment with Dr. Hernandez at [facility] on 04/17/2018.       Initial /78 (BP Location: Left arm, Patient Position: Chair, Cuff Size: Adult Large)  Pulse 104  Temp 97.1  F (36.2  C) (Tympanic)  Resp 18  Ht 5' 3\" (1.6 m)  Wt 196 lb (88.9 kg)  SpO2 98%  BMI 34.72 kg/m2 Estimated body mass index is 34.72 kg/(m^2) as calculated from the following:    Height as of this encounter: 5' 3\" (1.6 m).    Weight as of this encounter: 196 lb (88.9 kg).  Medication Reconciliation: complete   Elly Arredondo    "

## 2018-04-09 NOTE — MR AVS SNAPSHOT
After Visit Summary   4/9/2018    Aure Lanier    MRN: [id number]           Patient Information     Date Of Birth          1950        Visit Information        Provider Department      4/9/2018 4:00 PM Jaden Lee NP [facility]        Today's Diagnoses     Pre-operative cardiovascular examination    -  1      Care Instructions    1. Take Levothyroxine AM of surgery. Hold all other meds til after surgery.            Follow-ups after your visit        Your next 10 appointments already scheduled     Apr 17, 2018   Procedure with Ty Hernandez MD   [facility] ([facility] )    [address] [place] [id number] [phone]              Who to contact     If you have questions or need follow up information about today's clinic visit or your schedule please contact [facility] directly at [phone].  Normal or non-critical lab and imaging results will be communicated to you by MyChart, letter or phone within 4 business days after the clinic has received the results. If you do not hear from us within 7 days, please contact the clinic through IronPlanethart or phone. If you have a critical or abnormal lab result, we will notify you by phone as soon as possible.  Submit refill requests through [employer] or call your pharmacy and they will forward the refill request to us. Please allow 3 business days for your refill to be completed.          Additional Information About Your Visit        MyChart Information     [employer] gives you secure access to your electronic health record. If you see a primary care provider, you can also send messages to your care team and make appointments. If you have questions, please call your primary care clinic.  If you do not have a primary care provider, please call [phone] and they will assist you.        Care EveryWhere ID     This is your Care EveryWhere ID. This could be used by other organizations to access  "your [facility] medical records  [id number]        Your Vitals Were     Pulse Temperature Respirations Height Pulse Oximetry BMI (Body Mass Index)    104 97.1  F (36.2  C) (Tympanic) 18 5' 3\" (1.6 m) 98% 34.72 kg/m2       Blood Pressure from Last 3 Encounters:   04/09/18 140/78   01/26/18 134/88   12/21/17 160/79    Weight from Last 3 Encounters:   04/09/18 196 lb (88.9 kg)   01/26/18 191 lb (86.6 kg)   12/04/17 185 lb (83.9 kg)              We Performed the Following     CBC with platelets and differential     Comprehensive metabolic panel (BMP + Alb, Alk Phos, ALT, AST, Total. Bili, TP)     EKG 12-lead complete w/read - (Clinic Performed)     TSH with free T4 reflex          Today's Medication Changes          These changes are accurate as of 4/9/18  5:08 PM.  If you have any questions, ask your nurse or doctor.               These medicines have changed or have updated prescriptions.        Dose/Directions    simvastatin 40 MG tablet   Commonly known as:  ZOCOR   This may have changed:    - how much to take  - additional instructions   Used for:  Mixed hyperlipidemia        TAKE 1 TABLET BY MOUTH EVERY DAY IN THE EVENING . GENERIC FOR ZOCOR.   Quantity:  90 tablet   Refills:  0       triamcinolone acetonide 40 MG/ML injection   Commonly known as:  KENALOG   This may have changed:  Another medication with the same name was removed. Continue taking this medication, and follow the directions you see here.   Used for:  Bilateral chronic knee pain   Changed by:  Jaden Lee NP        Used 10mg for each knee   Quantity:  1 mL   Refills:  0         Stop taking these medicines if you haven't already. Please contact your care team if you have questions.     acetaminophen-codeine 300-30 MG per tablet   Commonly known as:  TYLENOL WITH CODEINE #3   Stopped by:  Jaden Lee NP           predniSONE 1 MG tablet   Commonly known as:  DELTASONE   Stopped by:  Jaden Lee NP           predniSONE 10 MG tablet "   Commonly known as:  DELTASONE   Stopped by:  Jaden Lee NP           PREVACID PO   Stopped by:  Jaden Lee NP                    Primary Care Provider Office Phone # Fax #    Jaden Lee [phone]       [facility] [place] [address]  [facility] [address]        Equal Access to Services     [facility]: Hadii aad ku hadasho Soomaali, waaxda luqadaha, qaybta kaalmada adeegyada, waxay idiin hayaan adeeg kharash la'aan . So [facility] [phone].    ATENCIÓN: Si habla español, tiene a gross disposición servicios gratuitos de asistencia lingüística. Llame al [phone].    We comply with applicable federal civil rights laws and [place] laws. We do not discriminate on the basis of race, color, national origin, age, disability, sex, sexual orientation, or gender identity.            Thank you!     Thank you for choosing [facility]  for your care. Our goal is always to provide you with excellent care. Hearing back from our patients is one way we can continue to improve our services. Please take a few minutes to complete the written survey that you may receive in the mail after your visit with us. Thank you!             Your Updated Medication List - Protect others around you: Learn how to safely use, store and throw away your medicines at www.disposemymeds.org.          This list is accurate as of 4/9/18  5:08 PM.  Always use your most recent med list.                   Brand Name Dispense Instructions for use Diagnosis    calcium carbonate 600 MG tablet   Generic drug:  calcium      With vitamin D; take 2 by oral route every day        cyanocobalamin 1000 MCG tablet    vitamin  B-12     Take 1 tablet by mouth daily.        cyclobenzaprine 10 MG tablet    FLEXERIL    90 tablet    TAKE 1 TABLET BY MOUTH THREE TIMES DAILY AS NEEDED    Cervicalgia       escitalopram 20 MG tablet    LEXAPRO    30 tablet    TAKE 1 TABLET BY MOUTH EVERY DAY    Anxiety       folic acid 1 MG  tablet    FOLVITE    90 tablet    TAKE ONE TABLET DAILY BY MOUTH    Health care maintenance       gabapentin 300 MG capsule    NEURONTIN    90 capsule    TAKE ONE CAPSULE 3 TIMES A DAY BY MOUTH    Postherpetic polyneuropathy       HYDROcodone-acetaminophen 7.5-325 MG per tablet    NORCO    120 tablet    TAKE 1 TABLET BY MOUTH EVER Y 6 HOURS AS NEEDED FOR JOEY N - GENERIC FOR NORCO    Generalized pain       levothyroxine 125 MCG tablet    SYNTHROID/LEVOTHROID    90 tablet    TAKE 1 TABLET DAILY BY MOUTH - GENERIC FOR SYNTHROID    Hypothyroidism       * MULTIVITAMIN PO      Take 1 tablet by mouth daily with food.        * multivitamin Tabs tablet      Take 1 tablet by mouth daily        NYSTOP 177886 UNIT/GM Powd   Generic drug:  nystatin     60 g    APPLY TOPICALLY 2 TIMES DAILY AS NEEDED    Rash       omeprazole 40 MG capsule    priLOSEC    90 capsule    TAKE 1 CAPSULE DAILY BY MOUTH        RANITIDINE HCL PO      Take 150 mg by mouth daily        simvastatin 40 MG tablet    ZOCOR    90 tablet    TAKE 1 TABLET BY MOUTH EVERY DAY IN THE EVENING . GENERIC FOR ZOCOR.    Mixed hyperlipidemia       triamcinolone acetonide 40 MG/ML injection    KENALOG    1 mL    Used 10mg for each knee    Bilateral chronic knee pain       zolpidem 10 MG tablet    AMBIEN    60 tablet    Take 1 tablet (10 mg) by mouth nightly as needed for sleep Take 1 tablet at night for sleep-may repeat X1.    Persistent insomnia       * Notice:  This list has 2 medication(s) that are the same as other medications prescribed for you. Read the directions carefully, and ask your doctor or other care provider to review them with you.

## 2018-04-09 NOTE — PATIENT INSTRUCTIONS
1. Take Levothyroxine AM of surgery. Hold all other meds til after surgery.    2. Called patient. Increase Levothyroxine to 150mcg a day. Recheck TSH in 1 month.

## 2018-04-10 PROCEDURE — 93005 ELECTROCARDIOGRAM TRACING: CPT

## 2018-04-10 PROCEDURE — 93010 ELECTROCARDIOGRAM REPORT: CPT | Performed by: INTERNAL MEDICINE

## 2018-04-10 RX ORDER — LEVOTHYROXINE SODIUM 150 UG/1
150 TABLET ORAL DAILY
Qty: 30 TABLET | Refills: 0 | Status: SHIPPED | OUTPATIENT
Start: 2018-04-10 | End: 2018-10-10

## 2018-04-10 ASSESSMENT — PATIENT HEALTH QUESTIONNAIRE - PHQ9: SUM OF ALL RESPONSES TO PHQ QUESTIONS 1-9: 0

## 2018-04-10 ASSESSMENT — ANXIETY QUESTIONNAIRES: GAD7 TOTAL SCORE: 0

## 2018-04-10 NOTE — PROGRESS NOTES
CC:  Pre-operative Evaluation for Right Total Knee Replacement  , with Dr. Hernandez, at [facility], on 4/17/18.    History of Present Illness:Chronic knee pain and debility, and bone on bone.      Past Medical History:   Diagnosis Date     Allergic arthritis involving hand 01/01/2011     Anemia, Iron Deficiency 01/01/2011     Anxiety 01/01/2011     Contact dermatitis and other eczema, unspecified c 01/01/2011     Depression 01/01/2011     Edema 01/01/2011     Gastrointestinal mucositis (ulcerative) 01/01/2011     GERD 01/01/2011     Headache(784.0) 01/01/2011     Hypothyroidism 01/01/2011     Insomnia, unspecified 01/01/2011     Migraine 01/01/2011     Nonallopathic lesion of cervical region, not elsewhere classified 10/16/2000     Osteoporosis 01/01/2011     Other and unspecified hyperlipidemia 01/01/2011     Other malaise and fatigue 08/27/2001     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       Past Surgical History:   Procedure Laterality Date     D & C       ESOPHAGOSCOPY, GASTROSCOPY, DUODENOSCOPY (EGD), COMBINED N/A 9/11/2017    Procedure: COMBINED ESOPHAGOSCOPY, GASTROSCOPY, DUODENOSCOPY (EGD);  Upper Endoscopy: Removal of Food Impaction;  Surgeon: Lino Zhu DO;  Location: [facility]     STOMACH SURGERY       stomach surgery peritinitis         Current Outpatient Prescriptions   Medication     RANITIDINE HCL PO     zolpidem (AMBIEN) 10 MG tablet     HYDROcodone-acetaminophen (NORCO) 7.5-325 MG per tablet     cyclobenzaprine (FLEXERIL) 10 MG tablet     predniSONE (DELTASONE) 1 MG tablet     gabapentin (NEURONTIN) 300 MG capsule     omeprazole (PRILOSEC) 40 MG capsule     NYSTOP 609840 UNIT/GM POWD powder     triamcinolone acetonide (KENALOG) 40 MG/ML injection     escitalopram (LEXAPRO) 20 MG tablet     levothyroxine (SYNTHROID/LEVOTHROID) 125 MCG tablet     simvastatin (ZOCOR) 40 MG tablet     folic acid (FOLVITE) 1 MG tablet     multivitamin (OCUVITE) TABS     Multiple Vitamins-Minerals (MULTIVITAMIN OR)      calcium (CALCIUM 600) 600 MG tablet     cyanocolbalamin (VITAMIN  B-12) 1000 MCG tablet     No current facility-administered medications for this visit.        Allergies   Allergen Reactions     Erythromycin Base [Kdc:Yellow Dye+Erythromycin+Brilliant Blue Fcf] Nausea and Vomiting     Penicillins Rash       Family History   Problem Relation Age of Onset     CEREBROVASCULAR DISEASE Mother      CVA     Hypertension Mother      Other - See Comments Father 40     mining accident; cause of death     Other - See Comments Brother      muscle dystrophy     CANCER Daughter 34     skin cancer     Melanoma Daughter        Social History     Social History     Marital status: Legally      Spouse name: N/A     Number of children: N/A     Years of education: N/A     Occupational History     [employer]      full time     Social History Main Topics     Smoking status: Never Smoker     Smokeless tobacco: Never Used      Comment: no passive exposure     Alcohol use Yes      Comment: rarely, maybe yearly     Drug use: No     Sexual activity: Not on file     Other Topics Concern     Blood Transfusions Yes     Caffeine Concern No     Social History Narrative       Review Of Systems  Skin: negative for,  Itching Has fungal rash under breasts that is intermittent.  .    Eyes: negative for, visual blurring, eye pain  Ears/Nose/Throat: EARS: no pain or discharge, Nose: occas  runny nose , occas  bloody nose when blows nose,  , no postnasal drip. Throat. No sore throat, difficulty chewing or swallowing. Has bad teeth from dry mouth.  Has upper molars all pulled.  Will be getting total extraction.    Respiratory: No shortness of breath, dyspnea on exertion, cough, or hemoptysis  Cardiovascular: negative for,  chest pain   Has  occas. Palpitations. Has Hyperlipidemia.     Gastrointestinal: negative for, nausea,Has occas   heartburn, + GERD, On Zantac and Prilosec. .  abdominal pain,some   constipation and  Some  diarrhea Hx gastric  "resection.    Genitourinary: negative for, dysuria, frequency and urgency  Musculoskeletal:POSITIVE chronic  neck pain, and knee   joint pain Has stopped prednisone given for knee pain.    Neurologic: POSITIVE  For Migraine.   headaches,   No dizziness Hx Post herpetic polyneuropathy.    Psychiatric: POSITIVE for, some   anxiety and depression On Lexapro.  + Situational! With pending  surgery.  Uses Ambien for insomnia.    Hematologic/Lymphatic/Immunologic: negative for, allergies, chills and fever  Endocrine: negative for, cold intolerance, heat intolerance and night sweats Has hypothyroidism.        Exam:  Vitals: /78 (BP Location: Left arm, Patient Position: Chair, Cuff Size: Adult Large)  Pulse 104  Temp 97.1  F (36.2  C) (Tympanic)  Resp 18  Ht 5' 3\" (1.6 m)  Wt 196 lb (88.9 kg)  SpO2 98%  BMI 34.72 kg/m2  BMI= Body mass index is 34.72 kg/(m^2).  Constitutional:  alert and no distress  Head: Normocephalic. No masses, lesions, tenderness or abnormalities. Eyes: PERRLA, EOMI,   CN 2-12 intact.   ENT: EARS: bilateral TM's pearly gray, good lite reflex. NOSE: Nares red, nonswollen, no exudate. Throat: Oropharynx pink, no exudates. Tongue midline. No fasciculations.  no neck nodes or sinus tenderness. Upper molars have been extracted Has very dry mouth.   NECK: supple, no lymphadenopathy, no thyromegaly, no carotid bruits. Trachea midline. No JVD  Cardiovascular: S1S2, no S3, S4  Regular Rhythm  .  Point of Maximum Impulse  normal. No lifts, heaves, or thrills.  1/6 systolic   murmurs, no clicks, gallops, or rub. No Jugular venous distention    Respiratory: Good diaphragmatic excursion. Lungs clear anteriorly and posteriorly.  Gastrointestinal: Abdomen soft, non-tender. BS normal. No masses, no organomegaly  : Deferred  Musculoskeletal: extremities- no gross deformities noted, gait haltingly and antalgic, and normal muscle tone  Skin: no suspicious lesions or rashes at this time under breasts.  "   Neurologic:  Reflexes decreased and symmetric. Sensation grossly WNL.  Psychiatric: mentation appears normal and affect normal/bright  Hematologic/Lymphatic/Immunologic: normal ant/post cervical, supraclavicular and inguinal nodes    Labs:  Results for orders placed or performed in visit on 04/09/18   CBC with platelets and differential   Result Value Ref Range    WBC 11.2 (H) 4.0 - 11.0 10e9/L    RBC Count 4.49 3.8 - 5.2 10e12/L    Hemoglobin 13.3 11.7 - 15.7 g/dL    Hematocrit 40.7 35.0 - 47.0 %    MCV 91 78 - 100 fl    MCH 29.6 26.5 - 33.0 pg    MCHC 32.7 31.5 - 36.5 g/dL    RDW 13.4 10.0 - 15.0 %    Platelet Count 413 150 - 450 10e9/L    Diff Method Automated Method     % Neutrophils 55.8 %    % Lymphocytes 27.8 %    % Monocytes 8.6 %    % Eosinophils 7.0 %    % Basophils 0.5 %    % Immature Granulocytes 0.3 %    Nucleated RBCs 0 0 /100    Absolute Neutrophil 6.3 1.6 - 8.3 10e9/L    Absolute Lymphocytes 3.1 0.8 - 5.3 10e9/L    Absolute Monocytes 1.0 0.0 - 1.3 10e9/L    Absolute Eosinophils 0.8 (H) 0.0 - 0.7 10e9/L    Absolute Basophils 0.1 0.0 - 0.2 10e9/L    Abs Immature Granulocytes 0.0 0 - 0.4 10e9/L    Absolute Nucleated RBC 0.0    Comprehensive metabolic panel (BMP + Alb, Alk Phos, ALT, AST, Total. Bili, TP)   Result Value Ref Range    Sodium 137 133 - 144 mmol/L    Potassium 3.7 3.4 - 5.3 mmol/L    Chloride 104 94 - 109 mmol/L    Carbon Dioxide 28 20 - 32 mmol/L    Anion Gap 5 3 - 14 mmol/L    Glucose 97 70 - 99 mg/dL    Urea Nitrogen 14 7 - 30 mg/dL    Creatinine 0.62 0.52 - 1.04 mg/dL    GFR Estimate >90 >60 mL/min/1.7m2    GFR Estimate If Black >90 >60 mL/min/1.7m2    Calcium 9.3 8.5 - 10.1 mg/dL    Bilirubin Total 0.4 0.2 - 1.3 mg/dL    Albumin 4.2 3.4 - 5.0 g/dL    Protein Total 7.7 6.8 - 8.8 g/dL    Alkaline Phosphatase 89 40 - 150 U/L    ALT 31 0 - 50 U/L    AST 19 0 - 45 U/L   TSH with free T4 reflex   Result Value Ref Range    TSH 6.15 (H) 0.40 - 4.00 mU/L   T4 free   Result Value Ref Range     T4 Free 0.82 0.76 - 1.46 ng/dL            EKG Interpretation:        Symptoms at time of EKG: None   Rhythm: Normal sinus   Rate: Normal  Comparison to prior: Unchanged from 2014 EKG.    Clinical Impression: no acute changes        Assessment:  Pre-operative evaluation    Plan:   Preop History and Physical for Right Total Knee Replacement ,  with Dr. Hernandez , at [facility], on 4/1. Will take Levothyroxine AM of surgery, and hold all other meds AM of surgery til after surgery.  Has had no problems with anesthesia in past. Is cleared cardiac wise, and respiratory wise for anesthesia.     ASSESSMENT / PLAN:  (Z01.810) Pre-operative cardiovascular examination  (primary encounter diagnosis)  Comment:   Hemoglobin   Date Value Ref Range Status   04/09/2018 13.3 11.7 - 15.7 g/dL Final   ]  Potassium   Date Value Ref Range Status   04/09/2018 3.7 3.4 - 5.3 mmol/L Final   ]  Noted eosinophils were slightly elevated.  Will recheck.    Plan: EKG 12-lead complete w/read - (Clinic         Performed), CBC with platelets and         differential, Comprehensive metabolic panel         (BMP + Alb, Alk Phos, ALT, AST, Total. Bili,         TP),           (E03.9) Hypothyroidism, unspecified type  Comment: On Levothyroxine  125mcg  Will increase to 150mcg, and recheck TSH in 1 month.    TSH   Date Value Ref Range Status   04/09/2018 6.15 (H) 0.40 - 4.00 mU/L Final   ]    Plan: TSH with free T4 reflex, T4 free    (K21.9) Gastroesophageal reflux disease without esophagitis  Comment On Zantac 150mg and Omeprazole 40mg    Plan: Hold AM of surgery.      (F41.1) Anxiety state  Comment On Lexapro 20mg    Plan:Hold AM of surgery.      (F34.1) Dysthymia  Comment: On Lexapro 20mg   Plan:Hold AM of surgery.      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    Plan: Hold AM of surgery.      (G89.4) Chronic pain syndrome  Comment: On Lortab 7.5mg 4 times a day for arthritic pain and neck pain, knee   pain , post  herpetic pain.    Plan: Discussed will not fill pain med while getting pain med from  Surgeon.      (G47.00) Insomnia, unspecified type  Comment:On Ambien 10mg    Plan: Jaxson Lee CNP

## 2018-04-12 DIAGNOSIS — Z01.810 PRE-OPERATIVE CARDIOVASCULAR EXAMINATION: ICD-10-CM

## 2018-04-12 DIAGNOSIS — F41.9 ANXIETY: ICD-10-CM

## 2018-04-12 DIAGNOSIS — R82.90 ABNORMAL URINE FINDINGS: Primary | ICD-10-CM

## 2018-04-12 LAB
ALBUMIN UR-MCNC: 30 MG/DL
APPEARANCE UR: CLEAR
BACTERIA #/AREA URNS HPF: ABNORMAL /HPF
BILIRUB UR QL STRIP: NEGATIVE
COLOR UR AUTO: YELLOW
GLUCOSE UR STRIP-MCNC: NEGATIVE MG/DL
HGB UR QL STRIP: NEGATIVE
KETONES UR STRIP-MCNC: 5 MG/DL
LEUKOCYTE ESTERASE UR QL STRIP: ABNORMAL
MUCOUS THREADS #/AREA URNS LPF: PRESENT /LPF
NITRATE UR QL: NEGATIVE
PH UR STRIP: 6 PH (ref 4.7–8)
RBC #/AREA URNS AUTO: 2 /HPF (ref 0–2)
SOURCE: ABNORMAL
SP GR UR STRIP: 1.03 (ref 1–1.03)
SQUAMOUS #/AREA URNS AUTO: 3 /HPF (ref 0–1)
UROBILINOGEN UR STRIP-MCNC: 4 MG/DL (ref 0–2)
WBC #/AREA URNS AUTO: 4 /HPF (ref 0–5)

## 2018-04-12 PROCEDURE — 81001 URINALYSIS AUTO W/SCOPE: CPT | Mod: ZL | Performed by: NURSE PRACTITIONER

## 2018-04-12 PROCEDURE — 87086 URINE CULTURE/COLONY COUNT: CPT | Mod: ZL | Performed by: NURSE PRACTITIONER

## 2018-04-12 RX ORDER — ESCITALOPRAM OXALATE 20 MG/1
TABLET ORAL
Qty: 30 TABLET | Refills: 3 | Status: SHIPPED | OUTPATIENT
Start: 2018-04-12 | End: 2018-08-20

## 2018-04-12 NOTE — TELEPHONE ENCOUNTER
escitalopram (LEXAPRO) 20 MG tablet      Last Written Prescription Date:  11/1/17  Last Fill Quantity: 30,   # refills: 5  Last Office Visit: 4/9/18  Future Office visit:

## 2018-04-12 NOTE — H&P (VIEW-ONLY)
CC:  Pre-operative Evaluation for Right Total Knee Replacement  , with Dr. Hernandez, at [facility], on 4/17/18.    History of Present Illness:Chronic knee pain and debility, and bone on bone.      Past Medical History:   Diagnosis Date     Allergic arthritis involving hand 01/01/2011     Anemia, Iron Deficiency 01/01/2011     Anxiety 01/01/2011     Contact dermatitis and other eczema, unspecified c 01/01/2011     Depression 01/01/2011     Edema 01/01/2011     Gastrointestinal mucositis (ulcerative) 01/01/2011     GERD 01/01/2011     Headache(784.0) 01/01/2011     Hypothyroidism 01/01/2011     Insomnia, unspecified 01/01/2011     Migraine 01/01/2011     Nonallopathic lesion of cervical region, not elsewhere classified 10/16/2000     Osteoporosis 01/01/2011     Other and unspecified hyperlipidemia 01/01/2011     Other malaise and fatigue 08/27/2001     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       Past Surgical History:   Procedure Laterality Date     D & C       ESOPHAGOSCOPY, GASTROSCOPY, DUODENOSCOPY (EGD), COMBINED N/A 9/11/2017    Procedure: COMBINED ESOPHAGOSCOPY, GASTROSCOPY, DUODENOSCOPY (EGD);  Upper Endoscopy: Removal of Food Impaction;  Surgeon: Lino Zhu DO;  Location: [facility]     STOMACH SURGERY       stomach surgery peritinitis         Current Outpatient Prescriptions   Medication     RANITIDINE HCL PO     zolpidem (AMBIEN) 10 MG tablet     HYDROcodone-acetaminophen (NORCO) 7.5-325 MG per tablet     cyclobenzaprine (FLEXERIL) 10 MG tablet     predniSONE (DELTASONE) 1 MG tablet     gabapentin (NEURONTIN) 300 MG capsule     omeprazole (PRILOSEC) 40 MG capsule     NYSTOP 518915 UNIT/GM POWD powder     triamcinolone acetonide (KENALOG) 40 MG/ML injection     escitalopram (LEXAPRO) 20 MG tablet     levothyroxine (SYNTHROID/LEVOTHROID) 125 MCG tablet     simvastatin (ZOCOR) 40 MG tablet     folic acid (FOLVITE) 1 MG tablet     multivitamin (OCUVITE) TABS     Multiple Vitamins-Minerals (MULTIVITAMIN OR)      calcium (CALCIUM 600) 600 MG tablet     cyanocolbalamin (VITAMIN  B-12) 1000 MCG tablet     No current facility-administered medications for this visit.        Allergies   Allergen Reactions     Erythromycin Base [Kdc:Yellow Dye+Erythromycin+Brilliant Blue Fcf] Nausea and Vomiting     Penicillins Rash       Family History   Problem Relation Age of Onset     CEREBROVASCULAR DISEASE Mother      CVA     Hypertension Mother      Other - See Comments Father 40     mining accident; cause of death     Other - See Comments Brother      muscle dystrophy     CANCER Daughter 34     skin cancer     Melanoma Daughter        Social History     Social History     Marital status: Legally      Spouse name: N/A     Number of children: N/A     Years of education: N/A     Occupational History     [employer]      full time     Social History Main Topics     Smoking status: Never Smoker     Smokeless tobacco: Never Used      Comment: no passive exposure     Alcohol use Yes      Comment: rarely, maybe yearly     Drug use: No     Sexual activity: Not on file     Other Topics Concern     Blood Transfusions Yes     Caffeine Concern No     Social History Narrative       Review Of Systems  Skin: negative for,  Itching Has fungal rash under breasts that is intermittent.  .    Eyes: negative for, visual blurring, eye pain  Ears/Nose/Throat: EARS: no pain or discharge, Nose: occas  runny nose , occas  bloody nose when blows nose,  , no postnasal drip. Throat. No sore throat, difficulty chewing or swallowing. Has bad teeth from dry mouth.  Has upper molars all pulled.  Will be getting total extraction.    Respiratory: No shortness of breath, dyspnea on exertion, cough, or hemoptysis  Cardiovascular: negative for,  chest pain   Has  occas. Palpitations. Has Hyperlipidemia.     Gastrointestinal: negative for, nausea,Has occas   heartburn, + GERD, On Zantac and Prilosec. .  abdominal pain,some   constipation and  Some  diarrhea Hx gastric  "resection.    Genitourinary: negative for, dysuria, frequency and urgency  Musculoskeletal:POSITIVE chronic  neck pain, and knee   joint pain Has stopped prednisone given for knee pain.    Neurologic: POSITIVE  For Migraine.   headaches,   No dizziness Hx Post herpetic polyneuropathy.    Psychiatric: POSITIVE for, some   anxiety and depression On Lexapro.  + Situational! With pending  surgery.  Uses Ambien for insomnia.    Hematologic/Lymphatic/Immunologic: negative for, allergies, chills and fever  Endocrine: negative for, cold intolerance, heat intolerance and night sweats Has hypothyroidism.        Exam:  Vitals: /78 (BP Location: Left arm, Patient Position: Chair, Cuff Size: Adult Large)  Pulse 104  Temp 97.1  F (36.2  C) (Tympanic)  Resp 18  Ht 5' 3\" (1.6 m)  Wt 196 lb (88.9 kg)  SpO2 98%  BMI 34.72 kg/m2  BMI= Body mass index is 34.72 kg/(m^2).  Constitutional:  alert and no distress  Head: Normocephalic. No masses, lesions, tenderness or abnormalities. Eyes: PERRLA, EOMI,   CN 2-12 intact.   ENT: EARS: bilateral TM's pearly gray, good lite reflex. NOSE: Nares red, nonswollen, no exudate. Throat: Oropharynx pink, no exudates. Tongue midline. No fasciculations.  no neck nodes or sinus tenderness. Upper molars have been extracted Has very dry mouth.   NECK: supple, no lymphadenopathy, no thyromegaly, no carotid bruits. Trachea midline. No JVD  Cardiovascular: S1S2, no S3, S4  Regular Rhythm  .  Point of Maximum Impulse  normal. No lifts, heaves, or thrills.  1/6 systolic   murmurs, no clicks, gallops, or rub. No Jugular venous distention    Respiratory: Good diaphragmatic excursion. Lungs clear anteriorly and posteriorly.  Gastrointestinal: Abdomen soft, non-tender. BS normal. No masses, no organomegaly  : Deferred  Musculoskeletal: extremities- no gross deformities noted, gait haltingly and antalgic, and normal muscle tone  Skin: no suspicious lesions or rashes at this time under breasts.  "   Neurologic:  Reflexes decreased and symmetric. Sensation grossly WNL.  Psychiatric: mentation appears normal and affect normal/bright  Hematologic/Lymphatic/Immunologic: normal ant/post cervical, supraclavicular and inguinal nodes    Labs:  Results for orders placed or performed in visit on 04/09/18   CBC with platelets and differential   Result Value Ref Range    WBC 11.2 (H) 4.0 - 11.0 10e9/L    RBC Count 4.49 3.8 - 5.2 10e12/L    Hemoglobin 13.3 11.7 - 15.7 g/dL    Hematocrit 40.7 35.0 - 47.0 %    MCV 91 78 - 100 fl    MCH 29.6 26.5 - 33.0 pg    MCHC 32.7 31.5 - 36.5 g/dL    RDW 13.4 10.0 - 15.0 %    Platelet Count 413 150 - 450 10e9/L    Diff Method Automated Method     % Neutrophils 55.8 %    % Lymphocytes 27.8 %    % Monocytes 8.6 %    % Eosinophils 7.0 %    % Basophils 0.5 %    % Immature Granulocytes 0.3 %    Nucleated RBCs 0 0 /100    Absolute Neutrophil 6.3 1.6 - 8.3 10e9/L    Absolute Lymphocytes 3.1 0.8 - 5.3 10e9/L    Absolute Monocytes 1.0 0.0 - 1.3 10e9/L    Absolute Eosinophils 0.8 (H) 0.0 - 0.7 10e9/L    Absolute Basophils 0.1 0.0 - 0.2 10e9/L    Abs Immature Granulocytes 0.0 0 - 0.4 10e9/L    Absolute Nucleated RBC 0.0    Comprehensive metabolic panel (BMP + Alb, Alk Phos, ALT, AST, Total. Bili, TP)   Result Value Ref Range    Sodium 137 133 - 144 mmol/L    Potassium 3.7 3.4 - 5.3 mmol/L    Chloride 104 94 - 109 mmol/L    Carbon Dioxide 28 20 - 32 mmol/L    Anion Gap 5 3 - 14 mmol/L    Glucose 97 70 - 99 mg/dL    Urea Nitrogen 14 7 - 30 mg/dL    Creatinine 0.62 0.52 - 1.04 mg/dL    GFR Estimate >90 >60 mL/min/1.7m2    GFR Estimate If Black >90 >60 mL/min/1.7m2    Calcium 9.3 8.5 - 10.1 mg/dL    Bilirubin Total 0.4 0.2 - 1.3 mg/dL    Albumin 4.2 3.4 - 5.0 g/dL    Protein Total 7.7 6.8 - 8.8 g/dL    Alkaline Phosphatase 89 40 - 150 U/L    ALT 31 0 - 50 U/L    AST 19 0 - 45 U/L   TSH with free T4 reflex   Result Value Ref Range    TSH 6.15 (H) 0.40 - 4.00 mU/L   T4 free   Result Value Ref Range     T4 Free 0.82 0.76 - 1.46 ng/dL            EKG Interpretation:        Symptoms at time of EKG: None   Rhythm: Normal sinus   Rate: Normal  Comparison to prior: Unchanged from 2014 EKG.    Clinical Impression: no acute changes        Assessment:  Pre-operative evaluation    Plan:   Preop History and Physical for Right Total Knee Replacement ,  with Dr. Hernandez , at [facility], on 4/1. Will take Levothyroxine AM of surgery, and hold all other meds AM of surgery til after surgery.  Has had no problems with anesthesia in past. Is cleared cardiac wise, and respiratory wise for anesthesia.     ASSESSMENT / PLAN:  (Z01.810) Pre-operative cardiovascular examination  (primary encounter diagnosis)  Comment:   Hemoglobin   Date Value Ref Range Status   04/09/2018 13.3 11.7 - 15.7 g/dL Final   ]  Potassium   Date Value Ref Range Status   04/09/2018 3.7 3.4 - 5.3 mmol/L Final   ]  Noted eosinophils were slightly elevated.  Will recheck.    Plan: EKG 12-lead complete w/read - (Clinic         Performed), CBC with platelets and         differential, Comprehensive metabolic panel         (BMP + Alb, Alk Phos, ALT, AST, Total. Bili,         TP),           (E03.9) Hypothyroidism, unspecified type  Comment: On Levothyroxine  125mcg  Will increase to 150mcg, and recheck TSH in 1 month.    TSH   Date Value Ref Range Status   04/09/2018 6.15 (H) 0.40 - 4.00 mU/L Final   ]    Plan: TSH with free T4 reflex, T4 free    (K21.9) Gastroesophageal reflux disease without esophagitis  Comment On Zantac 150mg and Omeprazole 40mg    Plan: Hold AM of surgery.      (F41.1) Anxiety state  Comment On Lexapro 20mg    Plan:Hold AM of surgery.      (F34.1) Dysthymia  Comment: On Lexapro 20mg   Plan:Hold AM of surgery.      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    Plan: Hold AM of surgery.      (G89.4) Chronic pain syndrome  Comment: On Lortab 7.5mg 4 times a day for arthritic pain and neck pain, knee   pain , post  herpetic pain.    Plan: Discussed will not fill pain med while getting pain med from  Surgeon.      (G47.00) Insomnia, unspecified type  Comment:On Ambien 10mg    Plan: Jaxson Lee CNP

## 2018-04-12 NOTE — OR NURSING
RE:  Aure Lanire  [birth year]    Left total knee arthroplasty by Dr. Hernandez on 4/17/18.    PCP:  HARSHAD Lee    Goal:  to go home after hospitalization.  Aure has 3 friends that can each stay 2 nights with her after she gets home from the hospital.  Aure called her insurance company and they will cover in-home physical therapy and home health care; said she saw phone # s in [place] area for Home Health, [facility].  Lives in one story home by self.  Has 3 small steps with a handrail on one side to go into the house.  The laundry is in the basement.  Has a bathtub with shower unit; a high rise toilet seat, no grab bars, walker and sock helper.  Aure says it is a small bathroom, with the high rise toilet seat with handrails it barely fits.  She will remove all the throw rugs off the floor.  No pets.      Says she had half of her stomach removed in past and cannot tolerate baby ASA, says she gets ulcer pains with it.    Aure attended the total joint class last month and found very informative, excellent.  Doing the exercises in the knee replacement book.  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  Says stairs up to choir loft are difficult, especially [place] [address].  She fell once a while ago; slipped going up one step, was sore for a day or two, but did not fall on her knees. History of hypothyroid, hyperlipidemia, chronic knee pain.  Called the clinic and she is going in for a urinalysis.    Reminded to call, don t fall.      Thank you!    Rhonda Bhakta RN

## 2018-04-13 DIAGNOSIS — M54.2 CERVICALGIA: ICD-10-CM

## 2018-04-13 DIAGNOSIS — R35.0 URINARY FREQUENCY: Primary | ICD-10-CM

## 2018-04-13 LAB
BACTERIA SPEC CULT: ABNORMAL
SPECIMEN SOURCE: ABNORMAL

## 2018-04-13 RX ORDER — CIPROFLOXACIN 500 MG/1
500 TABLET, FILM COATED ORAL 2 TIMES DAILY
Qty: 6 TABLET | Refills: 0 | Status: ON HOLD | OUTPATIENT
Start: 2018-04-13 | End: 2018-04-17

## 2018-04-13 RX ORDER — CYCLOBENZAPRINE HCL 10 MG
TABLET ORAL
Qty: 90 TABLET | Refills: 0 | Status: SHIPPED | OUTPATIENT
Start: 2018-04-13 | End: 2018-05-09

## 2018-04-13 NOTE — TELEPHONE ENCOUNTER
flexeril      Last Written Prescription Date:  3/16/18  Last Fill Quantity: 90,   # refills: 0  Last Office Visit: 4/9/18  Future Office visit:       Routing refill request to provider for review/approval because:  Drug not on the [facility], [facility] or [facility] refill protocol or controlled substance

## 2018-04-16 ENCOUNTER — ANESTHESIA EVENT (OUTPATIENT)
Dept: SURGERY | Facility: HOSPITAL | Age: 68
DRG: 470 | End: 2018-04-16
Payer: COMMERCIAL

## 2018-04-17 ENCOUNTER — ANESTHESIA (OUTPATIENT)
Dept: SURGERY | Facility: HOSPITAL | Age: 68
DRG: 470 | End: 2018-04-17
Payer: COMMERCIAL

## 2018-04-17 ENCOUNTER — APPOINTMENT (OUTPATIENT)
Dept: GENERAL RADIOLOGY | Facility: HOSPITAL | Age: 68
DRG: 470 | End: 2018-04-17
Attending: ORTHOPAEDIC SURGERY
Payer: COMMERCIAL

## 2018-04-17 ENCOUNTER — HOSPITAL ENCOUNTER (INPATIENT)
Facility: HOSPITAL | Age: 68
LOS: 3 days | Discharge: HOME | DRG: 470 | End: 2018-04-20
Attending: ORTHOPAEDIC SURGERY | Admitting: ORTHOPAEDIC SURGERY
Payer: COMMERCIAL

## 2018-04-17 DIAGNOSIS — R52 GENERALIZED PAIN: ICD-10-CM

## 2018-04-17 DIAGNOSIS — Z96.652 STATUS POST TOTAL LEFT KNEE REPLACEMENT: Primary | ICD-10-CM

## 2018-04-17 PROCEDURE — 25000125 ZZHC RX 250: Performed by: ORTHOPAEDIC SURGERY

## 2018-04-17 PROCEDURE — 73560 X-RAY EXAM OF KNEE 1 OR 2: CPT | Mod: TC,LT

## 2018-04-17 PROCEDURE — 25000566 ZZH SEVOFLURANE, EA 15 MIN: Performed by: ANESTHESIOLOGY

## 2018-04-17 PROCEDURE — 25000125 ZZHC RX 250: Performed by: ANESTHESIOLOGY

## 2018-04-17 PROCEDURE — 25000125 ZZHC RX 250: Performed by: NURSE ANESTHETIST, CERTIFIED REGISTERED

## 2018-04-17 PROCEDURE — 25000131 ZZH RX MED GY IP 250 OP 636 PS 637: Performed by: NURSE ANESTHETIST, CERTIFIED REGISTERED

## 2018-04-17 PROCEDURE — 99231 SBSQ HOSP IP/OBS SF/LOW 25: CPT | Performed by: INTERNAL MEDICINE

## 2018-04-17 PROCEDURE — 27210794 ZZH OR GENERAL SUPPLY STERILE: Performed by: ORTHOPAEDIC SURGERY

## 2018-04-17 PROCEDURE — 40000275 ZZH STATISTIC RCP TIME EA 10 MIN

## 2018-04-17 PROCEDURE — 27447 TOTAL KNEE ARTHROPLASTY: CPT | Performed by: ANESTHESIOLOGY

## 2018-04-17 PROCEDURE — 25000132 ZZH RX MED GY IP 250 OP 250 PS 637: Performed by: ORTHOPAEDIC SURGERY

## 2018-04-17 PROCEDURE — 36000093 ZZH SURGERY LEVEL 4 1ST 30 MIN: Performed by: ORTHOPAEDIC SURGERY

## 2018-04-17 PROCEDURE — 25000128 H RX IP 250 OP 636: Performed by: ANESTHESIOLOGY

## 2018-04-17 PROCEDURE — 25000128 H RX IP 250 OP 636: Performed by: ORTHOPAEDIC SURGERY

## 2018-04-17 PROCEDURE — 40000305 ZZH STATISTIC PRE PROC ASSESS I: Performed by: ORTHOPAEDIC SURGERY

## 2018-04-17 PROCEDURE — 37000008 ZZH ANESTHESIA TECHNICAL FEE, 1ST 30 MIN: Performed by: ORTHOPAEDIC SURGERY

## 2018-04-17 PROCEDURE — 71000014 ZZH RECOVERY PHASE 1 LEVEL 2 FIRST HR: Performed by: ORTHOPAEDIC SURGERY

## 2018-04-17 PROCEDURE — 40000786 ZZHCL STATISTIC ACTIVE MRSA SURVEILLANCE CULTURE: Performed by: ORTHOPAEDIC SURGERY

## 2018-04-17 PROCEDURE — 25000128 H RX IP 250 OP 636: Performed by: NURSE ANESTHETIST, CERTIFIED REGISTERED

## 2018-04-17 PROCEDURE — 27810169 ZZH OR IMPLANT GENERAL: Performed by: ORTHOPAEDIC SURGERY

## 2018-04-17 PROCEDURE — 37000009 ZZH ANESTHESIA TECHNICAL FEE, EACH ADDTL 15 MIN: Performed by: ORTHOPAEDIC SURGERY

## 2018-04-17 PROCEDURE — 88300 SURGICAL PATH GROSS: CPT | Mod: TC | Performed by: ORTHOPAEDIC SURGERY

## 2018-04-17 PROCEDURE — 25000128 H RX IP 250 OP 636: Performed by: INTERNAL MEDICINE

## 2018-04-17 PROCEDURE — 27110028 ZZH OR GENERAL SUPPLY NON-STERILE: Performed by: ORTHOPAEDIC SURGERY

## 2018-04-17 PROCEDURE — 3E0T3BZ INTRODUCTION OF ANESTHETIC AGENT INTO PERIPHERAL NERVES AND PLEXI, PERCUTANEOUS APPROACH: ICD-10-PCS | Performed by: ANESTHESIOLOGY

## 2018-04-17 PROCEDURE — 27447 TOTAL KNEE ARTHROPLASTY: CPT | Performed by: NURSE ANESTHETIST, CERTIFIED REGISTERED

## 2018-04-17 PROCEDURE — 12000000 ZZH R&B MED SURG/OB

## 2018-04-17 PROCEDURE — 0SRD0J9 REPLACEMENT OF LEFT KNEE JOINT WITH SYNTHETIC SUBSTITUTE, CEMENTED, OPEN APPROACH: ICD-10-PCS | Performed by: ORTHOPAEDIC SURGERY

## 2018-04-17 PROCEDURE — 36000063 ZZH SURGERY LEVEL 4 EA 15 ADDTL MIN: Performed by: ORTHOPAEDIC SURGERY

## 2018-04-17 PROCEDURE — C1776 JOINT DEVICE (IMPLANTABLE): HCPCS | Performed by: ORTHOPAEDIC SURGERY

## 2018-04-17 DEVICE — BONE CEMENT-SIMPLEX: Type: IMPLANTABLE DEVICE | Site: KNEE | Status: FUNCTIONAL

## 2018-04-17 DEVICE — FEMORAL COMPONENT-LEFT SZ 4 JOURNEY II OXIN: Type: IMPLANTABLE DEVICE | Site: KNEE | Status: FUNCTIONAL

## 2018-04-17 DEVICE — TIBIA BASE-LEFT SZ 3 JOURNEY: Type: IMPLANTABLE DEVICE | Site: KNEE | Status: FUNCTIONAL

## 2018-04-17 DEVICE — PATELLA-JOURNEY 32MM STD: Type: IMPLANTABLE DEVICE | Site: KNEE | Status: FUNCTIONAL

## 2018-04-17 DEVICE — IMPLANTABLE DEVICE: Type: IMPLANTABLE DEVICE | Site: KNEE | Status: FUNCTIONAL

## 2018-04-17 RX ORDER — LIDOCAINE 40 MG/G
CREAM TOPICAL
Status: DISCONTINUED | OUTPATIENT
Start: 2018-04-17 | End: 2018-04-20 | Stop reason: HOSPADM

## 2018-04-17 RX ORDER — HYDROXYZINE HYDROCHLORIDE 10 MG/1
10 TABLET, FILM COATED ORAL EVERY 6 HOURS PRN
Status: DISCONTINUED | OUTPATIENT
Start: 2018-04-17 | End: 2018-04-20 | Stop reason: HOSPADM

## 2018-04-17 RX ORDER — SCOLOPAMINE TRANSDERMAL SYSTEM 1 MG/1
1 PATCH, EXTENDED RELEASE TRANSDERMAL ONCE
Status: COMPLETED | OUTPATIENT
Start: 2018-04-17 | End: 2018-04-17

## 2018-04-17 RX ORDER — CLINDAMYCIN PHOSPHATE 900 MG/50ML
900 INJECTION, SOLUTION INTRAVENOUS EVERY 8 HOURS
Status: COMPLETED | OUTPATIENT
Start: 2018-04-17 | End: 2018-04-18

## 2018-04-17 RX ORDER — FENTANYL CITRATE 50 UG/ML
25-50 INJECTION, SOLUTION INTRAMUSCULAR; INTRAVENOUS
Status: DISCONTINUED | OUTPATIENT
Start: 2018-04-17 | End: 2018-04-17 | Stop reason: HOSPADM

## 2018-04-17 RX ORDER — HYDROMORPHONE HYDROCHLORIDE 1 MG/ML
.3-.5 INJECTION, SOLUTION INTRAMUSCULAR; INTRAVENOUS; SUBCUTANEOUS
Status: DISCONTINUED | OUTPATIENT
Start: 2018-04-17 | End: 2018-04-17

## 2018-04-17 RX ORDER — ACETAMINOPHEN 325 MG/1
975 TABLET ORAL EVERY 8 HOURS
Status: COMPLETED | OUTPATIENT
Start: 2018-04-17 | End: 2018-04-20

## 2018-04-17 RX ORDER — HYDRALAZINE HYDROCHLORIDE 20 MG/ML
INJECTION INTRAMUSCULAR; INTRAVENOUS PRN
Status: DISCONTINUED | OUTPATIENT
Start: 2018-04-17 | End: 2018-04-17

## 2018-04-17 RX ORDER — ASPIRIN 325 MG
325 TABLET ORAL EVERY 12 HOURS
Status: DISCONTINUED | OUTPATIENT
Start: 2018-04-18 | End: 2018-04-18

## 2018-04-17 RX ORDER — OXYCODONE HYDROCHLORIDE 5 MG/1
5 TABLET ORAL EVERY 4 HOURS PRN
Status: DISCONTINUED | OUTPATIENT
Start: 2018-04-17 | End: 2018-04-17

## 2018-04-17 RX ORDER — DEXAMETHASONE SODIUM PHOSPHATE 10 MG/ML
INJECTION, SOLUTION INTRAMUSCULAR; INTRAVENOUS PRN
Status: DISCONTINUED | OUTPATIENT
Start: 2018-04-17 | End: 2018-04-17

## 2018-04-17 RX ORDER — NALOXONE HYDROCHLORIDE 0.4 MG/ML
.1-.4 INJECTION, SOLUTION INTRAMUSCULAR; INTRAVENOUS; SUBCUTANEOUS
Status: ACTIVE | OUTPATIENT
Start: 2018-04-17 | End: 2018-04-18

## 2018-04-17 RX ORDER — SODIUM CHLORIDE, SODIUM LACTATE, POTASSIUM CHLORIDE, CALCIUM CHLORIDE 600; 310; 30; 20 MG/100ML; MG/100ML; MG/100ML; MG/100ML
INJECTION, SOLUTION INTRAVENOUS CONTINUOUS
Status: DISCONTINUED | OUTPATIENT
Start: 2018-04-17 | End: 2018-04-17 | Stop reason: HOSPADM

## 2018-04-17 RX ORDER — AMOXICILLIN 250 MG
2 CAPSULE ORAL 2 TIMES DAILY
Status: DISCONTINUED | OUTPATIENT
Start: 2018-04-17 | End: 2018-04-20 | Stop reason: HOSPADM

## 2018-04-17 RX ORDER — LEVOTHYROXINE SODIUM 150 UG/1
150 TABLET ORAL DAILY
Status: DISCONTINUED | OUTPATIENT
Start: 2018-04-18 | End: 2018-04-20 | Stop reason: HOSPADM

## 2018-04-17 RX ORDER — PROPOFOL 10 MG/ML
INJECTION, EMULSION INTRAVENOUS PRN
Status: DISCONTINUED | OUTPATIENT
Start: 2018-04-17 | End: 2018-04-17

## 2018-04-17 RX ORDER — ROPIVACAINE HYDROCHLORIDE 5 MG/ML
INJECTION, SOLUTION EPIDURAL; INFILTRATION; PERINEURAL PRN
Status: DISCONTINUED | OUTPATIENT
Start: 2018-04-17 | End: 2018-04-17

## 2018-04-17 RX ORDER — ALBUTEROL SULFATE 0.83 MG/ML
2.5 SOLUTION RESPIRATORY (INHALATION) EVERY 4 HOURS PRN
Status: DISCONTINUED | OUTPATIENT
Start: 2018-04-17 | End: 2018-04-17 | Stop reason: HOSPADM

## 2018-04-17 RX ORDER — DEXAMETHASONE SODIUM PHOSPHATE 4 MG/ML
4 INJECTION, SOLUTION INTRA-ARTICULAR; INTRALESIONAL; INTRAMUSCULAR; INTRAVENOUS; SOFT TISSUE
Status: DISCONTINUED | OUTPATIENT
Start: 2018-04-17 | End: 2018-04-17 | Stop reason: HOSPADM

## 2018-04-17 RX ORDER — METOPROLOL TARTRATE 1 MG/ML
INJECTION, SOLUTION INTRAVENOUS PRN
Status: DISCONTINUED | OUTPATIENT
Start: 2018-04-17 | End: 2018-04-17

## 2018-04-17 RX ORDER — NALOXONE HYDROCHLORIDE 0.4 MG/ML
.1-.4 INJECTION, SOLUTION INTRAMUSCULAR; INTRAVENOUS; SUBCUTANEOUS
Status: DISCONTINUED | OUTPATIENT
Start: 2018-04-17 | End: 2018-04-17

## 2018-04-17 RX ORDER — CYCLOBENZAPRINE HCL 5 MG
5 TABLET ORAL 3 TIMES DAILY PRN
Status: DISCONTINUED | OUTPATIENT
Start: 2018-04-17 | End: 2018-04-20 | Stop reason: HOSPADM

## 2018-04-17 RX ORDER — ZOLPIDEM TARTRATE 5 MG/1
10 TABLET ORAL
Status: DISCONTINUED | OUTPATIENT
Start: 2018-04-17 | End: 2018-04-17

## 2018-04-17 RX ORDER — KETOROLAC TROMETHAMINE 15 MG/ML
15 INJECTION, SOLUTION INTRAMUSCULAR; INTRAVENOUS EVERY 6 HOURS
Status: COMPLETED | OUTPATIENT
Start: 2018-04-17 | End: 2018-04-18

## 2018-04-17 RX ORDER — ESCITALOPRAM OXALATE 10 MG/1
20 TABLET ORAL DAILY
Status: DISCONTINUED | OUTPATIENT
Start: 2018-04-17 | End: 2018-04-20 | Stop reason: HOSPADM

## 2018-04-17 RX ORDER — CLINDAMYCIN PHOSPHATE 900 MG/50ML
900 INJECTION, SOLUTION INTRAVENOUS
Status: COMPLETED | OUTPATIENT
Start: 2018-04-17 | End: 2018-04-17

## 2018-04-17 RX ORDER — ONDANSETRON 2 MG/ML
4 INJECTION INTRAMUSCULAR; INTRAVENOUS EVERY 30 MIN PRN
Status: DISCONTINUED | OUTPATIENT
Start: 2018-04-17 | End: 2018-04-17 | Stop reason: HOSPADM

## 2018-04-17 RX ORDER — OXYCODONE HYDROCHLORIDE 5 MG/1
5-10 TABLET ORAL EVERY 4 HOURS PRN
Status: DISCONTINUED | OUTPATIENT
Start: 2018-04-17 | End: 2018-04-20 | Stop reason: HOSPADM

## 2018-04-17 RX ORDER — FENTANYL CITRATE 50 UG/ML
INJECTION, SOLUTION INTRAMUSCULAR; INTRAVENOUS PRN
Status: DISCONTINUED | OUTPATIENT
Start: 2018-04-17 | End: 2018-04-17

## 2018-04-17 RX ORDER — AMOXICILLIN 250 MG
1 CAPSULE ORAL 2 TIMES DAILY
Status: DISCONTINUED | OUTPATIENT
Start: 2018-04-17 | End: 2018-04-20 | Stop reason: HOSPADM

## 2018-04-17 RX ORDER — ONDANSETRON 4 MG/1
4 TABLET, ORALLY DISINTEGRATING ORAL EVERY 30 MIN PRN
Status: DISCONTINUED | OUTPATIENT
Start: 2018-04-17 | End: 2018-04-17 | Stop reason: HOSPADM

## 2018-04-17 RX ORDER — MEPERIDINE HYDROCHLORIDE 25 MG/ML
12.5 INJECTION INTRAMUSCULAR; INTRAVENOUS; SUBCUTANEOUS EVERY 5 MIN PRN
Status: DISCONTINUED | OUTPATIENT
Start: 2018-04-17 | End: 2018-04-17 | Stop reason: HOSPADM

## 2018-04-17 RX ORDER — LIDOCAINE HYDROCHLORIDE 20 MG/ML
INJECTION, SOLUTION INFILTRATION; PERINEURAL PRN
Status: DISCONTINUED | OUTPATIENT
Start: 2018-04-17 | End: 2018-04-17

## 2018-04-17 RX ORDER — GABAPENTIN 300 MG/1
300 CAPSULE ORAL 3 TIMES DAILY
Status: DISCONTINUED | OUTPATIENT
Start: 2018-04-17 | End: 2018-04-20 | Stop reason: HOSPADM

## 2018-04-17 RX ORDER — SODIUM CHLORIDE 9 MG/ML
INJECTION, SOLUTION INTRAVENOUS CONTINUOUS
Status: DISCONTINUED | OUTPATIENT
Start: 2018-04-17 | End: 2018-04-18

## 2018-04-17 RX ORDER — BUPIVACAINE HYDROCHLORIDE AND EPINEPHRINE 5; 5 MG/ML; UG/ML
INJECTION, SOLUTION PERINEURAL PRN
Status: DISCONTINUED | OUTPATIENT
Start: 2018-04-17 | End: 2018-04-17 | Stop reason: HOSPADM

## 2018-04-17 RX ORDER — HYDROMORPHONE HYDROCHLORIDE 1 MG/ML
.5-1 INJECTION, SOLUTION INTRAMUSCULAR; INTRAVENOUS; SUBCUTANEOUS
Status: DISCONTINUED | OUTPATIENT
Start: 2018-04-17 | End: 2018-04-20 | Stop reason: HOSPADM

## 2018-04-17 RX ORDER — PROCHLORPERAZINE MALEATE 5 MG
5 TABLET ORAL EVERY 6 HOURS PRN
Status: DISCONTINUED | OUTPATIENT
Start: 2018-04-17 | End: 2018-04-20 | Stop reason: HOSPADM

## 2018-04-17 RX ORDER — LANOLIN ALCOHOL/MO/W.PET/CERES
1000 CREAM (GRAM) TOPICAL DAILY
Status: DISCONTINUED | OUTPATIENT
Start: 2018-04-17 | End: 2018-04-20 | Stop reason: HOSPADM

## 2018-04-17 RX ORDER — ACETAMINOPHEN 325 MG/1
650 TABLET ORAL EVERY 4 HOURS PRN
Status: DISCONTINUED | OUTPATIENT
Start: 2018-04-20 | End: 2018-04-20 | Stop reason: HOSPADM

## 2018-04-17 RX ORDER — ONDANSETRON 2 MG/ML
4 INJECTION INTRAMUSCULAR; INTRAVENOUS EVERY 6 HOURS PRN
Status: DISCONTINUED | OUTPATIENT
Start: 2018-04-17 | End: 2018-04-20 | Stop reason: HOSPADM

## 2018-04-17 RX ORDER — LABETALOL HYDROCHLORIDE 5 MG/ML
10 INJECTION, SOLUTION INTRAVENOUS
Status: DISCONTINUED | OUTPATIENT
Start: 2018-04-17 | End: 2018-04-17 | Stop reason: HOSPADM

## 2018-04-17 RX ORDER — KETOROLAC TROMETHAMINE 30 MG/ML
15 INJECTION, SOLUTION INTRAMUSCULAR; INTRAVENOUS
Status: DISCONTINUED | OUTPATIENT
Start: 2018-04-17 | End: 2018-04-17 | Stop reason: HOSPADM

## 2018-04-17 RX ORDER — ONDANSETRON 4 MG/1
4 TABLET, ORALLY DISINTEGRATING ORAL EVERY 6 HOURS PRN
Status: DISCONTINUED | OUTPATIENT
Start: 2018-04-17 | End: 2018-04-20 | Stop reason: HOSPADM

## 2018-04-17 RX ORDER — SIMVASTATIN 20 MG
20 TABLET ORAL AT BEDTIME
Status: DISCONTINUED | OUTPATIENT
Start: 2018-04-17 | End: 2018-04-20 | Stop reason: HOSPADM

## 2018-04-17 RX ORDER — LABETALOL HYDROCHLORIDE 5 MG/ML
INJECTION, SOLUTION INTRAVENOUS PRN
Status: DISCONTINUED | OUTPATIENT
Start: 2018-04-17 | End: 2018-04-17

## 2018-04-17 RX ORDER — ONDANSETRON 2 MG/ML
INJECTION INTRAMUSCULAR; INTRAVENOUS PRN
Status: DISCONTINUED | OUTPATIENT
Start: 2018-04-17 | End: 2018-04-17

## 2018-04-17 RX ORDER — HYDRALAZINE HYDROCHLORIDE 20 MG/ML
2.5-5 INJECTION INTRAMUSCULAR; INTRAVENOUS EVERY 10 MIN PRN
Status: DISCONTINUED | OUTPATIENT
Start: 2018-04-17 | End: 2018-04-17 | Stop reason: HOSPADM

## 2018-04-17 RX ORDER — HYDROMORPHONE HYDROCHLORIDE 1 MG/ML
.3-.5 INJECTION, SOLUTION INTRAMUSCULAR; INTRAVENOUS; SUBCUTANEOUS EVERY 5 MIN PRN
Status: DISCONTINUED | OUTPATIENT
Start: 2018-04-17 | End: 2018-04-17 | Stop reason: HOSPADM

## 2018-04-17 RX ADMIN — SODIUM CHLORIDE, POTASSIUM CHLORIDE, SODIUM LACTATE AND CALCIUM CHLORIDE: 600; 310; 30; 20 INJECTION, SOLUTION INTRAVENOUS at 11:27

## 2018-04-17 RX ADMIN — HYDRALAZINE HYDROCHLORIDE 4 MG: 20 INJECTION INTRAMUSCULAR; INTRAVENOUS at 13:59

## 2018-04-17 RX ADMIN — HYDRALAZINE HYDROCHLORIDE 4 MG: 20 INJECTION INTRAMUSCULAR; INTRAVENOUS at 13:48

## 2018-04-17 RX ADMIN — OXYCODONE HYDROCHLORIDE 10 MG: 5 TABLET ORAL at 20:58

## 2018-04-17 RX ADMIN — ESCITALOPRAM OXALATE 20 MG: 10 TABLET ORAL at 18:42

## 2018-04-17 RX ADMIN — FENTANYL CITRATE 50 MCG: 50 INJECTION, SOLUTION INTRAMUSCULAR; INTRAVENOUS at 13:28

## 2018-04-17 RX ADMIN — GABAPENTIN 300 MG: 300 CAPSULE ORAL at 20:51

## 2018-04-17 RX ADMIN — Medication 2.5 MG: at 22:38

## 2018-04-17 RX ADMIN — ONDANSETRON 4 MG: 2 INJECTION INTRAMUSCULAR; INTRAVENOUS at 14:24

## 2018-04-17 RX ADMIN — HYDROMORPHONE HYDROCHLORIDE 0.5 MG: 1 INJECTION, SOLUTION INTRAMUSCULAR; INTRAVENOUS; SUBCUTANEOUS at 16:26

## 2018-04-17 RX ADMIN — MIDAZOLAM 2 MG: 1 INJECTION INTRAMUSCULAR; INTRAVENOUS at 13:00

## 2018-04-17 RX ADMIN — CLINDAMYCIN PHOSPHATE 900 MG: 18 INJECTION, SOLUTION INTRAVENOUS at 13:12

## 2018-04-17 RX ADMIN — FENTANYL CITRATE 25 MCG: 50 INJECTION, SOLUTION INTRAMUSCULAR; INTRAVENOUS at 14:07

## 2018-04-17 RX ADMIN — HYDROMORPHONE HYDROCHLORIDE 0.5 MG: 1 INJECTION, SOLUTION INTRAMUSCULAR; INTRAVENOUS; SUBCUTANEOUS at 20:57

## 2018-04-17 RX ADMIN — DEXAMETHASONE SODIUM PHOSPHATE 10 MG: 10 INJECTION, SOLUTION INTRAMUSCULAR; INTRAVENOUS at 11:58

## 2018-04-17 RX ADMIN — FENTANYL CITRATE 50 MCG: 50 INJECTION INTRAMUSCULAR; INTRAVENOUS at 15:13

## 2018-04-17 RX ADMIN — TRANEXAMIC ACID 1 G: 100 INJECTION, SOLUTION INTRAVENOUS at 14:13

## 2018-04-17 RX ADMIN — HYDROMORPHONE HYDROCHLORIDE 0.5 MG: 1 INJECTION, SOLUTION INTRAMUSCULAR; INTRAVENOUS; SUBCUTANEOUS at 16:56

## 2018-04-17 RX ADMIN — FENTANYL CITRATE 100 MCG: 50 INJECTION, SOLUTION INTRAMUSCULAR; INTRAVENOUS at 13:05

## 2018-04-17 RX ADMIN — FENTANYL CITRATE 25 MCG: 50 INJECTION, SOLUTION INTRAMUSCULAR; INTRAVENOUS at 14:49

## 2018-04-17 RX ADMIN — SODIUM CHLORIDE: 9 INJECTION, SOLUTION INTRAVENOUS at 16:16

## 2018-04-17 RX ADMIN — DEXAMETHASONE SODIUM PHOSPHATE 6 MG: 10 INJECTION, SOLUTION INTRAMUSCULAR; INTRAVENOUS at 13:18

## 2018-04-17 RX ADMIN — ROPIVACAINE HYDROCHLORIDE 30 ML: 5 INJECTION, SOLUTION EPIDURAL; INFILTRATION; PERINEURAL at 11:58

## 2018-04-17 RX ADMIN — OXYCODONE HYDROCHLORIDE 10 MG: 5 TABLET ORAL at 16:58

## 2018-04-17 RX ADMIN — SIMVASTATIN 20 MG: 20 TABLET, FILM COATED ORAL at 20:51

## 2018-04-17 RX ADMIN — PROPOFOL 150 MG: 10 INJECTION, EMULSION INTRAVENOUS at 13:07

## 2018-04-17 RX ADMIN — FENTANYL CITRATE 50 MCG: 50 INJECTION, SOLUTION INTRAMUSCULAR; INTRAVENOUS at 13:33

## 2018-04-17 RX ADMIN — METOPROLOL TARTRATE 1 MG: 5 INJECTION INTRAVENOUS at 13:38

## 2018-04-17 RX ADMIN — SENNOSIDES AND DOCUSATE SODIUM 1 TABLET: 8.6; 5 TABLET ORAL at 20:51

## 2018-04-17 RX ADMIN — TRANEXAMIC ACID 1 G: 100 INJECTION, SOLUTION INTRAVENOUS at 13:02

## 2018-04-17 RX ADMIN — SCOPALAMINE 1 PATCH: 1 PATCH, EXTENDED RELEASE TRANSDERMAL at 11:26

## 2018-04-17 RX ADMIN — LABETALOL HYDROCHLORIDE 10 MG: 5 INJECTION INTRAVENOUS at 14:04

## 2018-04-17 RX ADMIN — METOPROLOL TARTRATE 2 MG: 5 INJECTION INTRAVENOUS at 13:57

## 2018-04-17 RX ADMIN — RANITIDINE 150 MG: 150 TABLET ORAL at 18:42

## 2018-04-17 RX ADMIN — FENTANYL CITRATE 50 MCG: 50 INJECTION INTRAMUSCULAR; INTRAVENOUS at 15:10

## 2018-04-17 RX ADMIN — FENTANYL CITRATE 50 MCG: 50 INJECTION, SOLUTION INTRAMUSCULAR; INTRAVENOUS at 13:26

## 2018-04-17 RX ADMIN — HYDROMORPHONE HYDROCHLORIDE 0.5 MG: 1 INJECTION, SOLUTION INTRAMUSCULAR; INTRAVENOUS; SUBCUTANEOUS at 18:38

## 2018-04-17 RX ADMIN — FENTANYL CITRATE 25 MCG: 50 INJECTION, SOLUTION INTRAMUSCULAR; INTRAVENOUS at 13:51

## 2018-04-17 RX ADMIN — ACETAMINOPHEN 975 MG: 325 TABLET ORAL at 16:57

## 2018-04-17 RX ADMIN — METOPROLOL TARTRATE 1 MG: 5 INJECTION INTRAVENOUS at 13:35

## 2018-04-17 RX ADMIN — HYDROMORPHONE HYDROCHLORIDE 0.5 MG: 1 INJECTION, SOLUTION INTRAMUSCULAR; INTRAVENOUS; SUBCUTANEOUS at 20:01

## 2018-04-17 RX ADMIN — LIDOCAINE HYDROCHLORIDE 40 MG: 20 INJECTION, SOLUTION INFILTRATION; PERINEURAL at 13:06

## 2018-04-17 RX ADMIN — FENTANYL CITRATE 25 MCG: 50 INJECTION, SOLUTION INTRAMUSCULAR; INTRAVENOUS at 14:52

## 2018-04-17 RX ADMIN — KETOROLAC TROMETHAMINE 15 MG: 15 INJECTION, SOLUTION INTRAMUSCULAR; INTRAVENOUS at 22:36

## 2018-04-17 RX ADMIN — Medication 1000 MCG: at 22:38

## 2018-04-17 RX ADMIN — FENTANYL CITRATE 25 MCG: 50 INJECTION, SOLUTION INTRAMUSCULAR; INTRAVENOUS at 13:58

## 2018-04-17 RX ADMIN — FENTANYL CITRATE 25 MCG: 50 INJECTION, SOLUTION INTRAMUSCULAR; INTRAVENOUS at 14:55

## 2018-04-17 RX ADMIN — CLINDAMYCIN PHOSPHATE 900 MG: 18 INJECTION, SOLUTION INTRAVENOUS at 20:53

## 2018-04-17 RX ADMIN — KETOROLAC TROMETHAMINE 15 MG: 15 INJECTION, SOLUTION INTRAMUSCULAR; INTRAVENOUS at 16:28

## 2018-04-17 RX ADMIN — METOPROLOL TARTRATE 1 MG: 5 INJECTION INTRAVENOUS at 13:31

## 2018-04-17 NOTE — ANESTHESIA PREPROCEDURE EVALUATION
Anesthesia Evaluation     . Pt has had prior anesthetic.     No history of anesthetic complications          ROS/MED HX    ENT/Pulmonary:     (+)TRISTAN risk factors obese, allergic rhinitis, , . .    Neurologic:     (+)neuropathy - Postherpetic polyneuropathy, migraines,     Cardiovascular:  - neg cardiovascular ROS   (+) ----. : . . . :. . Previous cardiac testing date:results:date: results:ECG reviewed date:4/10/2018 results:NSR@84, NS STWA date: results:          METS/Exercise Tolerance:     Hematologic:     (+) Anemia, -      Musculoskeletal:   (+) arthritis, , , other musculoskeletal-  DJD LEFT KNEE, Cervicalgia, Chronic LBP      GI/Hepatic:     (+) GERD       Renal/Genitourinary:         Endo:     (+) thyroid problem hypothyroidism, Obesity, .      Psychiatric:     (+) psychiatric history anxiety and depression      Infectious Disease:  - neg infectious disease ROS       Malignancy:      - no malignancy   Other:    (+) H/O Chronic Pain,H/O chronic opiod use ,                    Physical Exam      Airway   Mallampati: III  TM distance: >3 FB  Neck ROM: full    Dental   (+) chipped and missing    Cardiovascular   Rhythm and rate: regular and normal      Pulmonary    breath sounds clear to auscultation                    Anesthesia Plan      History & Physical Review  History and physical reviewed and following examination; no interval change.    ASA Status:  3 .    NPO Status:  > 8 hours    Plan for Periph. Nerve Block for postop pain, General and LMA with Intravenous and Propofol induction. Maintenance will be Balanced.    PONV prophylaxis:  Ondansetron (or other 5HT-3) and Dexamethasone or Solumedrol       Postoperative Care  Postoperative pain management:  IV analgesics, Oral pain medications, Neuraxial analgesia and Peripheral nerve block (Single Shot).      Consents  Anesthetic plan, risks, benefits and alternatives discussed with:  Patient..                          .

## 2018-04-17 NOTE — PROGRESS NOTES
Incentive Spirometry education completed.  Pt goal 2000 mls.  Pt achieved 2000 mls.  Pt instructed to perform 10/hr while awake with at least one deep breath and cough per hour until able to perform baseline activity.  RT will follow and re-assess as need.      Tamika Riggins on 4/17/2018 at 4:22 PM

## 2018-04-17 NOTE — ANESTHESIA CARE TRANSFER NOTE
Patient: Aure Lanier    Procedure(s):  LEFT TOTAL KNEE ARTHROPLASTY S/N JOURNEY II - Wound Class: I-Clean    Diagnosis: DJD LEFT KNEE  Diagnosis Additional Information: No value filed.    Anesthesia Type:   Periph. Nerve Block for postop pain, General, LMA     Note:  Airway :Nasal Cannula  Patient transferred to:PACU  Handoff Report: Identifed the Patient, Identified the Reponsible Provider, Reviewed the pertinent medical history, Discussed the surgical course, Reviewed Intra-OP anesthesia mangement and issues during anesthesia, Set expectations for post-procedure period and Allowed opportunity for questions and acknowledgement of understanding      Vitals: (Last set prior to Anesthesia Care Transfer)    CRNA VITALS  4/17/2018 1428 - 4/17/2018 1506      4/17/2018             Resp Rate (set): 8                Electronically Signed By: ASHLEIGH Gorman CRNA  April 17, 2018  3:06 PM

## 2018-04-17 NOTE — OR NURSING
Pateint discharged to med/surg.  Светлана score 10/10. Pain level 0/10.  Discharged from unit via bed.  Hand off report given to aubrie roberson rn

## 2018-04-17 NOTE — ANESTHESIA PROCEDURE NOTES
Peripheral nerve/Neuraxial procedure note : Adductor canal  Pre-Procedure  Performed by   Referred by DR GUTIERREZ  Location: pre-op    Procedure Times:4/17/2018 11:54 AM and 4/17/2018 12:00 PM  Pre-Anesthestic Checklist: patient identified, IV checked, site marked, risks and benefits discussed, informed consent, monitors and equipment checked, pre-op evaluation, at physician/surgeon's request and post-op pain management    Timeout  Correct Patient: Yes   Correct Procedure: Yes   Correct Site: Yes   Correct Laterality: Yes   Correct Position: Yes   Site Marked: Yes   .   Procedure Documentation    Diagnosis:LEFT TOTAL KNEE.    Procedure:  left  Adductor canal.     Ultrasound used to identify targeted nerve, plexus, or vascular marker and placed a needle adjacent to it., Ultrasound was used to visualize the spread of the anesthetic in close proximity to the above stated nerve. A permanent image is entered into the patient's record.  Patient Prep;chlorhexidine gluconate and isopropyl alcohol.  .  Needle: insulated, short bevel Needle Gauge: 22.    Needle Length (Inches) 4  Insertion Method: Single Shot.       Assessment/Narrative  Paresthesias: No.  Injection made incrementally with aspirations every 5 mL..  The placement was negative for: blood aspirated, painful injection and site bleeding.  Bolus given via needle..   Secured via.   Complications: none.

## 2018-04-17 NOTE — IP AVS SNAPSHOT
[facility]    [address] [address]    Phone:  [phone]    Fax:  [phone]                                       After Visit Summary   4/17/2018    Aure Lanier    MRN: [id number]           After Visit Summary Signature Page     I have received my discharge instructions, and my questions have been answered. I have discussed any challenges I see with this plan with the nurse or doctor.    ..........................................................................................................................................  Patient/Patient Representative Signature      ..........................................................................................................................................  Patient Representative Print Name and Relationship to Patient    ..................................................               ................................................  Date                                            Time    ..........................................................................................................................................  Reviewed by Signature/Title    ...................................................              ..............................................  Date                                                            Time

## 2018-04-17 NOTE — BRIEF OP NOTE
[facility]  Orthopedics Brief Operative Note    Pre-operative diagnosis: DJD LEFT KNEE   Post-operative diagnosis Same as Above   Procedure: Procedure(s):  LEFT TOTAL KNEE ARTHROPLASTY S/N JOURNEY II - Wound Class: I-Clean   Surgeon: Ty Hernandez MD, MD   Assistants(s): Siva Panchal PAC   Anesthesia: General    Estimated blood loss: Less than 100 ml    Total IV fluids: (See anesthesia record)   Blood transfusion: No transfusion was given during surgery   Total urine output: (See anesthesia record)   Drains: None   Specimens: None   Implants: SN Femi 2   Findings: Tricomp OA; Stable TKA   Complications: None   Condition: Stable   Comments: See dictated operative report for full details      Dictation Number: [id number]

## 2018-04-17 NOTE — IP AVS SNAPSHOT
MRN:[id number]                      After Visit Summary   4/17/2018    Aure Lanier    MRN: [id number]           Thank you!     Thank you for choosing [facility] for your care. Our goal is always to provide you with excellent care. Hearing back from our patients is one way we can continue to improve our services. Please take a few minutes to complete the written survey that you may receive in the mail after you visit with us. Thank you!        Patient Information     Date Of Birth          1950        Designated Caregiver       Most Recent Value    Caregiver    Will someone help with your care after discharge? yes    Name of designated caregiver friends    Phone number of caregiver see index    Caregiver address see index      About your hospital stay     You were admitted on:  April 17, 2018 You last received care in the:  [facility]    You were discharged on:  April 20, 2018        Reason for your hospital stay       Aure Lanier is a 68 year old  woman who was admitted on 4/17/2018 for elective left total knee arthoplasty. Operative procedure unremarkable. Her history is significant for chronic pain related to multiple chronic joint pain including neck and knee, hypothyroidism, gastroesophageal reflux, dysthymia marked by anxiety and depression, insomnia for which she frequently uses Ambien, as well as a remote history of antrectomy possibly because of a perforating peptic ulcer. She recently underwent esophagogastroduodenoscopy for food bolus. Postoperatively she participated well with physical therapy although pain control was an issue.  By the day of discharge however pain markedly improved.  She is discharged to continue with outpatient physical therapy.                  Who to Call     For medical emergencies, please call [phone].  For non-urgent questions about your medical care, please call your primary care provider or clinic, [phone]  For questions related to your  surgery, please call your surgery clinic        Attending Provider     Provider Specialty    Ty Hernandez MD Orthopedics       Primary Care Provider Office Phone # Fax #    Jaden Lee [phone]      After Care Instructions     Activity       Your activity upon discharge: Per physical therapy recommendations; out of bed as much as possible and stay physically active.            Diet       Follow this diet upon discharge: Orders Placed This Encounter      Advance Diet as Tolerated: Regular Diet Adult      Diet            Wound care and dressings       Leave Aquacel dressing intact until f/u in clinic  Notify office if dressing is saturated                  Follow-up Appointments     Follow-up and recommended labs and tests        Follow up with Dr. Hernandez , at (location with clinic name or city) [facility], within 2  to evaluate after surgery. No follow up labs or test are needed.            Follow-up and recommended labs and tests        Follow up with primary care provider, Jaden Lee, in 2-3 weeks for hospital follow-upw                  Your next 10 appointments already scheduled     May 07, 2018  2:00 PM CDT   (Arrive by 1:45 PM)   SHORT with Jaden Lee NP   [facility]    [address] [facility] [address]   [phone]              Additional Services     Physical Therapy Referral       Per TKA protocol/Dr. Hernandez  [facility]                  Further instructions from your care team         You have a follow up appointment scheduled with Dr. Hernandez on May 2 at 1030.  If you are unable to make this appointment call [facility] at [phone].      Wound Care: Leave Aquacel dressing intact until follow up in clinic unless overly saturated.      DVT Prophylaxis:  Xarelto 10 mg daily for 2 weeks      Pain Control: Oxycodone 1-2 tabs PO every 4 hours prn pain     Activity: Weightbearing as tolerated to operative  "extremity; Range of motion as tolerated to operative extremity; Edema/Swelling control    Follow-Up: ~2 weeks in [facility] [place] ([phone])    Questions: Call [phone]             Pending Results     No orders found from 4/15/2018 to 4/18/2018.            Statement of Approval     Ordered          04/20/18 0959  I have reviewed and agree with all the recommendations and orders detailed in this document.  EFFECTIVE NOW     Approved and electronically signed by:  Jignesh Gardiner MD           04/18/18 9217  I have reviewed and agree with all the recommendations and orders detailed in this document.  EFFECTIVE NOW     Approved and electronically signed by:  Ty Hernandez MD             Admission Information     Date & Time Provider Department Dept. Phone    4/17/2018 Ty Hernandez MD [facility] [phone]      Your Vitals Were     Blood Pressure Pulse Temperature Respirations Height Weight    123/74 95 99  F (37.2  C) (Tympanic) 16 1.6 m (5' 3\") 88.9 kg (196 lb)    Pulse Oximetry BMI (Body Mass Index)                97% 34.72 kg/m2          [employer] Information     [employer] gives you secure access to your electronic health record. If you see a primary care provider, you can also send messages to your care team and make appointments. If you have questions, please call your primary care clinic.  If you do not have a primary care provider, please call [phone] and they will assist you.        Care EveryWhere ID     This is your Care EveryWhere ID. This could be used by other organizations to access your [place] medical records  [id number]        Equal Access to Services     [facility]: Hadii jefry roacho Sojun, waaxda luqadaha, qaybta kaalmada adebiayada, racquel craven. So [facility] [phone].    ATENCIÓN: Si habla español, tiene a gross disposición servicios gratuitos de asistencia lingüística. Llame al [phone].    We comply with " applicable federal civil rights laws and [place] laws. We do not discriminate on the basis of race, color, national origin, age, disability, sex, sexual orientation, or gender identity.               Review of your medicines      START taking        Dose / Directions    acetaminophen 325 MG tablet   Commonly known as:  TYLENOL        Dose:  650-975 mg   Take 2-3 tablets (650-975 mg) by mouth every 4 hours as needed for other (mild to moderate pain)   Quantity:  100 tablet   Refills:  0       ketorolac 10 MG tablet   Commonly known as:  TORADOL        Dose:  10 mg   Take 1 tablet (10 mg) by mouth every 6 hours as needed for moderate pain   Quantity:  20 tablet   Refills:  0       oxyCODONE IR 5 MG tablet   Commonly known as:  ROXICODONE        Dose:  5-10 mg   Take 1-2 tablets (5-10 mg) by mouth every 4 hours as needed for other (pain control or improvement in physical function. Hold dose for analgesic side effects.)   Quantity:  30 tablet   Refills:  0       rivaroxaban ANTICOAGULANT 10 MG Tabs tablet   Commonly known as:  XARELTO        Dose:  10 mg   Take 1 tablet (10 mg) by mouth daily (with dinner)   Quantity:  14 tablet   Refills:  0         CONTINUE these medicines which may have CHANGED, or have new prescriptions. If we are uncertain of the size of tablets/capsules you have at home, strength may be listed as something that might have changed.        Dose / Directions    HYDROcodone-acetaminophen 7.5-325 MG per tablet   Commonly known as:  NORCO   This may have changed:  additional instructions   Used for:  Generalized pain        Start taking on:  4/30/2018   Do not resume taking while you are taking oxycodone   Quantity:  120 tablet   Refills:  0       simvastatin 40 MG tablet   Commonly known as:  ZOCOR   This may have changed:    - how much to take  - additional instructions   Used for:  Mixed hyperlipidemia        TAKE 1 TABLET BY MOUTH EVERY DAY IN THE EVENING . GENERIC FOR ZOCOR.   Quantity:  90  tablet   Refills:  0         CONTINUE these medicines which have NOT CHANGED        Dose / Directions    calcium carbonate 600 MG tablet   Generic drug:  calcium        With vitamin D; take 2 by oral route every day   Refills:  0       cyanocobalamin 1000 MCG tablet   Commonly known as:  vitamin  B-12        Dose:  1 tablet   Take 1 tablet by mouth daily.   Refills:  0       cyclobenzaprine 10 MG tablet   Commonly known as:  FLEXERIL   Used for:  Cervicalgia        TAKE 1 TABLET BY MOUTH THREE TIMES DAILY AS NEEDED   Quantity:  90 tablet   Refills:  0       escitalopram 20 MG tablet   Commonly known as:  LEXAPRO   Used for:  Anxiety        TAKE 1 TABLET BY MOUTH EVERY DAY   Quantity:  30 tablet   Refills:  3       folic acid 1 MG tablet   Commonly known as:  FOLVITE   Used for:  Health care maintenance        TAKE ONE TABLET DAILY BY MOUTH   Quantity:  90 tablet   Refills:  2       gabapentin 300 MG capsule   Commonly known as:  NEURONTIN   Used for:  Postherpetic polyneuropathy        TAKE ONE CAPSULE 3 TIMES A DAY BY MOUTH   Quantity:  90 capsule   Refills:  3       levothyroxine 150 MCG tablet   Commonly known as:  SYNTHROID/LEVOTHROID   Used for:  Hypothyroidism, unspecified type        Dose:  150 mcg   Take 1 tablet (150 mcg) by mouth daily   Quantity:  30 tablet   Refills:  0       * MULTIVITAMIN PO        Dose:  1 tablet   Take 1 tablet by mouth daily with food.   Refills:  0       * multivitamin Tabs tablet        Dose:  1 tablet   Take 1 tablet by mouth daily   Refills:  0       NYSTOP 343699 UNIT/GM Powd   Used for:  Rash   Generic drug:  nystatin        APPLY TOPICALLY 2 TIMES DAILY AS NEEDED   Quantity:  60 g   Refills:  3       omeprazole 40 MG capsule   Commonly known as:  priLOSEC        TAKE 1 CAPSULE DAILY BY MOUTH   Quantity:  90 capsule   Refills:  0       RANITIDINE HCL PO        Dose:  150 mg   Take 150 mg by mouth daily   Refills:  0       triamcinolone acetonide 40 MG/ML injection   Commonly  known as:  KENALOG   Used for:  Bilateral chronic knee pain        Used 10mg for each knee   Quantity:  1 mL   Refills:  0       zolpidem 10 MG tablet   Commonly known as:  AMBIEN   Used for:  Persistent insomnia        Dose:  10 mg   Take 1 tablet (10 mg) by mouth nightly as needed for sleep Take 1 tablet at night for sleep-may repeat X1.   Quantity:  60 tablet   Refills:  0       * Notice:  This list has 2 medication(s) that are the same as other medications prescribed for you. Read the directions carefully, and ask your doctor or other care provider to review them with you.         Where to get your medicines      These medications were sent to [id number] - [place], [place] - [address]  [address] [place] [address]     Phone:  [phone]     ketorolac 10 MG tablet         Some of these will need a paper prescription and others can be bought over the counter. Ask your nurse if you have questions.     Bring a paper prescription for each of these medications     oxyCODONE IR 5 MG tablet    rivaroxaban ANTICOAGULANT 10 MG Tabs tablet       You don't need a prescription for these medications     acetaminophen 325 MG tablet                Protect others around you: Learn how to safely use, store and throw away your medicines at www.disposemymeds.org.        Information about OPIOIDS     PRESCRIPTION OPIOIDS: WHAT YOU NEED TO KNOW   You have a prescription for an opioid (narcotic) pain medicine. Opioids can cause addiction. If you have a history of chemical dependency of any type, you are at a higher risk of becoming addicted to opioids. Only take this medicine after all other options have been tried. Take it for as short a time and as few doses as possible.     Do not:    Drive. If you drive while taking these medicines, you could be arrested for driving under the influence (DUI).    Operate heavy machinery    Do any other dangerous activities while taking these medicines.     Drink any alcohol  while taking these medicines.      Take with any other medicines that contain acetaminophen. Read all labels carefully. Look for the word  acetaminophen  or  Tylenol.  Ask your pharmacist if you have questions or are unsure.    Store your pills in a secure place, locked if possible. We will not replace any lost or stolen medicine. If you don t finish your medicine, please throw away (dispose) as directed by your pharmacist. The [place] Pollution Control Agency has more information about safe disposal: https://www.pca.[facility].[place].us/living-green/managing-unwanted-medications    All opioids tend to cause constipation. Drink plenty of water and eat foods that have a lot of fiber, such as fruits, vegetables, prune juice, apple juice and high-fiber cereal. Take a laxative (Miralax, milk of magnesia, Colace, Senna) if you don t move your bowels at least every other day.              Medication List: This is a list of all your medications and when to take them. Check marks below indicate your daily home schedule. Keep this list as a reference.      Medications           Morning Afternoon Evening Bedtime As Needed    acetaminophen 325 MG tablet   Commonly known as:  TYLENOL   Take 2-3 tablets (650-975 mg) by mouth every 4 hours as needed for other (mild to moderate pain)   Last time this was given:  975 mg on 4/20/2018  8:47 AM                                calcium carbonate 600 MG tablet   With vitamin D; take 2 by oral route every day   Generic drug:  calcium                                cyanocobalamin 1000 MCG tablet   Commonly known as:  vitamin  B-12   Take 1 tablet by mouth daily.   Last time this was given:  1,000 mcg on 4/20/2018  8:47 AM                                cyclobenzaprine 10 MG tablet   Commonly known as:  FLEXERIL   TAKE 1 TABLET BY MOUTH THREE TIMES DAILY AS NEEDED   Last time this was given:  5 mg on 4/20/2018  8:47 AM                                escitalopram 20 MG tablet   Commonly known as:   LEXAPRO   TAKE 1 TABLET BY MOUTH EVERY DAY   Last time this was given:  20 mg on 4/20/2018  8:47 AM                                folic acid 1 MG tablet   Commonly known as:  FOLVITE   TAKE ONE TABLET DAILY BY MOUTH                                gabapentin 300 MG capsule   Commonly known as:  NEURONTIN   TAKE ONE CAPSULE 3 TIMES A DAY BY MOUTH   Last time this was given:  300 mg on 4/20/2018  8:47 AM                                HYDROcodone-acetaminophen 7.5-325 MG per tablet   Commonly known as:  NORCO   Do not resume taking while you are taking oxycodone   Start taking on:  4/30/2018                                ketorolac 10 MG tablet   Commonly known as:  TORADOL   Take 1 tablet (10 mg) by mouth every 6 hours as needed for moderate pain                                levothyroxine 150 MCG tablet   Commonly known as:  SYNTHROID/LEVOTHROID   Take 1 tablet (150 mcg) by mouth daily   Last time this was given:  150 mcg on 4/20/2018  6:03 AM                                * MULTIVITAMIN PO   Take 1 tablet by mouth daily with food.                                * multivitamin Tabs tablet   Take 1 tablet by mouth daily                                NYSTOP [id number] UNIT/GM Powd   APPLY TOPICALLY 2 TIMES DAILY AS NEEDED   Generic drug:  nystatin                                omeprazole 40 MG capsule   Commonly known as:  priLOSEC   TAKE 1 CAPSULE DAILY BY MOUTH   Last time this was given:  40 mg on 4/20/2018  6:03 AM                                oxyCODONE IR 5 MG tablet   Commonly known as:  ROXICODONE   Take 1-2 tablets (5-10 mg) by mouth every 4 hours as needed for other (pain control or improvement in physical function. Hold dose for analgesic side effects.)   Last time this was given:  10 mg on 4/20/2018  8:46 AM                                RANITIDINE HCL PO   Take 150 mg by mouth daily   Last time this was given:  150 mg on 4/20/2018  8:47 AM                                rivaroxaban ANTICOAGULANT 10 MG  Tabs tablet   Commonly known as:  XARELTO   Take 1 tablet (10 mg) by mouth daily (with dinner)   Last time this was given:  10 mg on 4/19/2018  4:23 PM                                simvastatin 40 MG tablet   Commonly known as:  ZOCOR   TAKE 1 TABLET BY MOUTH EVERY DAY IN THE EVENING . GENERIC FOR ZOCOR.   Last time this was given:  20 mg on 4/19/2018  8:28 PM                                triamcinolone acetonide 40 MG/ML injection   Commonly known as:  KENALOG   Used 10mg for each knee                                zolpidem 10 MG tablet   Commonly known as:  AMBIEN   Take 1 tablet (10 mg) by mouth nightly as needed for sleep Take 1 tablet at night for sleep-may repeat X1.   Last time this was given:  5 mg on 4/20/2018 12:27 AM                                * Notice:  This list has 2 medication(s) that are the same as other medications prescribed for you. Read the directions carefully, and ask your doctor or other care provider to review them with you.              More Information        How Your Knee Works  A healthy knee bends easily and rotates slightly. The joint absorbs stress and moves smoothly. This allows you to walk, squat, and turn without pain.    A healthy knee  The knee is a hinge joint, formed where the thighbone (femur) and the shinbone (tibia) meet. It is the largest joint in the body. The joint is covered with smooth tissue and powered by large muscles. When all the parts listed below are healthy, a knee should move easily:    Cartilage is a layer of smooth tissue. It covers the ends of the thighbone and shinbone. It also lines the back side of the kneecap. Healthy cartilage absorbs stress and allows the knee to bend easily.    Muscles power the knee and leg for movement.    Tendons attach the muscles to the bones.    Ligaments are bands of tissue that connect bones and brace the joint.    Bones that make up your knee joint include your thighbone (femur), shinbone (tibia), and kneecap  (patella).    Menisci are 2 wedge-shaped pieces of cartilage that absorb shock between the thighbone and shinbone.  Date Last Reviewed: 1/1/2018 2000-2017 [employer]. [address] [place] [address], [place] [address]. All rights reserved. This information is not intended as a substitute for professional medical care. Always follow your healthcare professional's instructions.                Total Knee Replacement  During total knee replacement surgery, your damaged knee joint is replaced with an artificial joint, called a prosthesis. This surgery almost always reduces joint pain and improves your quality of life.     The parts of the prosthesis are secured to the bones of the knee. Together they form the new joint.   Before your surgery  You will most likely arrive at the hospital on the morning of the surgery. Be sure to follow all of your doctor s instructions on preparing for surgery:    Follow any directions you are given for taking medicines or for not eating or drinking before surgery.    At the hospital, your temperature, pulse, breathing, and blood pressure will be checked.    An IV (intravenous) line will be started to provide fluids and medicines needed during surgery.  The surgical procedure  When the surgical team is ready, you ll be taken to the operating room. There you ll be given anesthesia to help you sleep through surgery, or to make you numb from the waist down. Then an incision is made on the front or side of your knee. Any damaged bone is cleaned away, and the new joint components are put into place. The incision is closed with surgical staples or stitches.  After your surgery  After surgery, you ll be sent to the recovery room. When you are fully awake, you ll be moved to your room. The nurses will give you medicines to ease your pain. You may have a catheter (small tube) in your bladder. A continuous passive motion machine may be used on your knee to keep it from getting stiff. A  sequential compression machine may be used to prevent blood clots by gently squeezing then releasing your leg. You may be given medicine to prevent blood clots. Soon, healthcare providers will help you get up and moving.  When to call your healthcare provider  Once at home, call your healthcare provider if you have any of the symptoms below:    An increase in knee pain    Pain or swelling in the calf or leg    Unusual redness, heat, or drainage at the incision site    Fever of 100.4 F (38 C) or higher, or as directed by your healthcare provider    Shaking chills    Trouble breathing or chest pain (call 911)   Date Last Reviewed: 9/20/2015 2000-2017 [employer]. [address] [place] [address], [place] [address]. All rights reserved. This information is not intended as a substitute for professional medical care. Always follow your healthcare professional's instructions.                Understanding Knee Replacement  The knee is a hingelike joint. It is formed where the thighbone (femur), shinbone (tibia), and kneecap (patella) meet. It is supported by muscles, tendons, and ligaments. It is also lined with cushioning cartilage. Over time, cartilage can wear away. As it does, the knee becomes stiff and painful. A knee prosthesis (artificial joint) can replace the painful joint and restore movement.    A healthy knee  A healthy knee joint bends easily. Cartilage is a smooth tissue. It covers the ends of the thighbone and shinbone and the underside of the kneecap. Healthy cartilage absorbs stress and allows the bones to glide freely over each other. Joint fluid lubricates the cartilage surfaces, making movement even easier.       A problem knee  A problem knee is often stiff and painful. Cartilage cracks or wears away due to usage, inflammation, or injury. Worn, roughened cartilage no longer lets the joint glide freely. So it feels stiff and painful. As more cartilage wears away, exposed bones rub together when  the knee bends, causing pain. With time, bone surfaces also become rough, making pain worse.       A knee prosthesis  A knee prosthesis lets your knee bend easily again. The roughened ends of the thighbone and shinbone and the underside of the kneecap are replaced with metal and strong plastic parts. With new smooth surfaces, the bones can once again glide freely without pain. A knee prosthesis does have limits. But it can let you walk and move with greater comfort.  Date Last Reviewed: 1/1/2018 2000-2017 [employer]. [address] [place] [address]. All rights reserved. This information is not intended as a substitute for professional medical care. Always follow your healthcare professional's instructions.                After Knee Replacement: Keeping Your Knee Healthy  You can keep your knee healthy by knowing the right moves and not doing the wrong ones. Some activities could harm your artificial knee and may be permanently restricted. Your healthcare provider and physical therapist will give you specific instructions and advice.     Take small steps to move your body.   Do s:    Place your knee comfortably as you go about daily activities.    Exercise and walk every day as directed by your healthcare provider.    Use an ice pack if your knee starts to swell or feel tender.       Don ts:    Don't twist your knee. Turn your whole body instead.    Don't jump or do any impact activity. It could loosen your new knee joint.    Don't force movements, such as bending your knee too far.    Don't use the stairs right away after surgery. Ask your healthcare provider when your knee will be strong and mobile enough to climb stairs.    Don't put a pillow behind your knee when you are resting. This may keep you from straightening it fully.     Follow-up care  Your orthopedic surgeon will schedule follow-up exams to make sure that your knee is healing the right way. Use this time to ask any questions  you have about your recovery or activities.  Date Last Reviewed: 1/1/2018 2000-2017 [employer]. [address] [facility], [place] [address]. All rights reserved. This information is not intended as a substitute for professional medical care. Always follow your healthcare professional's instructions.                Discharge Instructions for Total Knee Replacement  You have undergone knee replacement surgery. The knee joint forms where the thighbone, shinbone, and kneecap meet. The knee joint is supported by muscles and ligaments, and is lined with a cushioning called cartilage. Over time, cartilage wears away. This can make the knee feel stiff and painful. Your doctor replaced your painful joint with an artificial joint to relieve pain and restore movement. Here are some instructions to follow once at home.  Home care    When your doctor says it's OK to shower, carefully wash your incision with soap and water. Rinse the incision well. Then gently pat it dry. Don t rub the incision, or apply creams or lotions. Sit on a shower stool or chair when you shower to keep from falling.    Take pain medicine as directed by your doctor.  Sitting and sleeping    Sit in chairs with arms. The arms make it easier for you to stand up or sit down.    Don t sit for more than 30 to 45 minutes at one time.    Nap if you are tired, but don t stay in bed all day.    Sleep with a pillow under your ankle, not your knee. Be sure to change the position of your leg during the night.  Moving safely    The key to successful recovery is movement with walking and exercising your knee as directed by your doctor. You should be able to start moving your leg shortly after surgery as directed by your doctor.      Walk up and down stairs with support. Try one step at a time. Use the railing if possible.    Don t drive until your doctor says it s OK. Most people can start driving about 6 weeks after surgery. Don t drive while you are  taking opioid pain medicine.  Other precautions    Don't soak your knee in water until your doctor says it s OK. This means no hot tubs, bathtubs, or swimming pools.    Wear the support stockings you were given in the hospital, as instructed by your doctor. You may wear these stockings for 4 to 6 weeks after surgery. If needed, you can place a bandage over the incision to prevent irritation from clothing or support stockings.    Arrange your household to keep the items you need handy. Keep everything else out of the way. Remove items that may cause you to fall, such as throw rugs and electrical cords.    Use nonslip bath mats, grab bars, an elevated toilet seat, and a shower chair in your bathroom.    Until your balance, flexibility, and strength improve, use a cane, crutches, a walker, handrails, or someone to help you.    Keep your hands free by using a backpack, renetta pack, apron, or pockets to carry things.    Prevent infection. Ask your doctor for instructions if you haven t already received them. Any infection will need to be treated immediately. Call your doctor right away if you think you might have an infection.    Tell your dentist that you have an artificial joint and take antibiotics as prescribed before any dental work.    Tell all your healthcare providers about your artificial joint before any medical procedure.    Maintain a healthy weight. Get help to lose any extra pounds. Added body weight puts stress on the knee.    Take any medicine you may have been given after surgery. This may include blood-thinning medicine to prevent blood clots or antibiotics to prevent infection.  Follow-up care  Follow up with your healthcare provider, or as advised. You will need to have your staples removed 2 to 3 weeks after surgery.  Call 911  Call 911 right away if you have:    Chest pain    Shortness of breath    Any pain or tenderness in your calf  When to call your healthcare provider  Call your healthcare  provider right away if you have:    Fever of 100.4 F (38 C) or higher, or as directed by your healthcare provider    Shaking chills    Stiffness, or inability to move the knee    Increased swelling in your leg    Increased redness, tenderness, or swelling in or around the knee incision    Drainage from the knee incision    Increased knee pain   Date Last Reviewed: 7/1/2016 2000-2017 [employer]. [address] [place] [address]. All rights reserved. This information is not intended as a substitute for professional medical care. Always follow your healthcare professional's instructions.                Preventing Deep Vein Thrombosis  Healthcare providers use the term venous thromboembolism (VTE) to describe two conditions: deep vein thrombosis (DVT) and pulmonary embolism (PE). They use the term VTE because the two conditions are very closely related. And because their prevention and treatment are closely related.   DVT is a blood clot or thrombus in a deep vein. Most of these clots develop in the leg or thigh. But they may form in a vein in the arm, or other part of the body.   Part of the blood clot may separate from the vein. This is called an embolus. It may travel to the lungs and form a pulmonary embolus. This can cut off the flow of blood to a portion of or to the entire lung. A blood clot in the lungs is a medical emergency and may cause death.  Over time, blood clots can also damage veins. They must be treated right away to prevent problems.   Risk factors  Anyone can develop a blood clot. But the following things make a blood clot more likely to happen:    Being inactive for a long period, such as when you re in the hospital, or traveling by plane or car    Injury to a vein from an accident, a broken bone, or surgery    Having blood clots in the past or a family history of blood clots    Blood clotting disorder    Recent surgery    Cancer and certain cancer  treatments    Smoking  Other things can also put you at higher risk for a blood clot. They include:    Age over 60 years    Pregnancy    Taking birth control pills or hormone replacement    Having other vein problems, such as varicose veins     Being overweight    Having a pacemaker or a central venous catheter. They increase the chance of a blood clot forming in an arm.    Injection drug use. This also increases the chance of a blood clot forming in an arm.  How to prevent DVT  Preventing a blood clot means improving blood flow back to your heart. To help prevent a blood clot:    Talk with your healthcare provider about a program of regular exercise.    If your legs feel swollen or heavy, take a break and sit comfortably or lie down with your feet up.    Keep a healthy weight.    Quit smoking, if you smoke.    Don't sit, stand, or lie down for long periods without moving your legs and feet:  ? When traveling by car, stop often to get out and move around.  ? On long airplane, train, or bus rides, get up and move around when possible.  ? If you can t get up, wiggle your toes and tighten your calves to keep your blood moving, as pictured below.  If you need to have surgery, talk with your healthcare provider about a plan to prevent blood clots.   If you are in the hospital, your risk for blood clots increases. Your healthcare provider may prescribe blood-thinner medicine (anticoagulant) to help prevent blood clots. Or your healthcare provider may prescribe a sequential compression device (SCD) or intermittent pneumatic compression (IPC). The device has sleeves that fit around your legs. It puts gentle pressure on your legs to help with blood flow and prevent blood clots. Remove the sleeves so that you do not trip or fall when you are walking, like when you use the bathroom or shower. If you need help removing the sleeves, ask the nurse or aid. You may also want to try the following:    Call 911  If you have symptoms  of a blood clot in your lungs, call 911. The symptoms are:    Chest pain    Trouble breathing    Fast heartbeat    Coughing (may cough up blood)    Sweating    Fainting  When to call your healthcare provider  If you have symptoms of a blood clot, call your healthcare provider. The symptoms are:    Pain    Swelling    Redness or discoloration in a leg, arm, or other area   Date Last Reviewed: 5/1/2016 2000-2017 [employer]. [address] [place] [address]. All rights reserved. This information is not intended as a substitute for professional medical care. Always follow your healthcare professional's instructions.                Oxycodone tablets or capsules  Brand Names: Dazidox, Endocodone, Oxaydo, OxyIR, Percolone, Roxicodone, ROXYBOND  What is this medicine?  OXYCODONE (ox i KOE done) is a pain reliever. It is used to treat moderate to severe pain.  How should I use this medicine?  Take this medicine by mouth with a glass of water. Follow the directions on the prescription label. You can take it with or without food. If it upsets your stomach, take it with food. Take your medicine at regular intervals. Do not take it more often than directed. Do not stop taking except on your doctor's advice.  Some brands of this medicine, like Oxecta, have special instructions. Ask your doctor or pharmacist if these directions are for you: Do not cut, crush or chew this medicine. Swallow only one tablet at a time. Do not wet, soak, or lick the tablet before you take it.  A special MedGuide will be given to you by the pharmacist with each prescription and refill. Be sure to read this information carefully each time.  Talk to your pediatrician regarding the use of this medicine in children. Special care may be needed.  What side effects may I notice from receiving this medicine?  Side effects that you should report to your doctor or health care professional as soon as possible:    allergic reactions like  skin rash, itching or hives, swelling of the face, lips, or tongue    breathing problems    confusion    signs and symptoms of low blood pressure like dizziness; feeling faint or lightheaded, falls; unusually weak or tired    trouble passing urine or change in the amount of urine    trouble swallowing  Side effects that usually do not require medical attention (report to your doctor or health care professional if they continue or are bothersome):    constipation    dry mouth    nausea, vomiting    tiredness  What may interact with this medicine?  This medicine may interact with the following medications:    alcohol    antihistamines for allergy, cough and cold    antiviral medicines for HIV or AIDS    atropine    certain antibiotics like clarithromycin, erythromycin, linezolid, rifampin    certain medicines for anxiety or sleep    certain medicines for bladder problems like oxybutynin, tolterodine    certain medicines for depression like amitriptyline, fluoxetine, sertraline    certain medicines for fungal infections like ketoconazole, itraconazole, voriconazole    certain medicines for migraine headache like almotriptan, eletriptan, frovatriptan, naratriptan, rizatriptan, sumatriptan, zolmitriptan    certain medicines for nausea or vomiting like dolasetron, ondansetron, palonosetron    certain medicines for Parkinson's disease like benztropine, trihexyphenidyl    certain medicines for seizures like phenobarbital, phenytoin, primidone    certain medicines for stomach problems like dicyclomine, hyoscyamine    certain medicines for travel sickness like scopolamine    diuretics    general anesthetics like halothane, isoflurane, methoxyflurane, propofol    ipratropium    local anesthetics like lidocaine, pramoxine, tetracaine    MAOIs like Carbex, Eldepryl, Marplan, Nardil, and Parnate    medicines that relax muscles for surgery    methylene blue    nilotinib    other narcotic medicines for pain or  cough    phenothiazines like chlorpromazine, mesoridazine, prochlorperazine, thioridazine  What if I miss a dose?  If you miss a dose, take it as soon as you can. If it is almost time for your next dose, take only that dose. Do not take double or extra doses.  Where should I keep my medicine?  Keep out of the reach of children. This medicine can be abused. Keep your medicine in a safe place to protect it from theft. Do not share this medicine with anyone. Selling or giving away this medicine is dangerous and against the law.  Store at room temperature between 15 and 30 degrees C (59 and 86 degrees F). Protect from light. Keep container tightly closed.  This medicine may cause accidental overdose and death if it is taken by other adults, children, or pets. Flush any unused medicine down the toilet to reduce the chance of harm. Do not use the medicine after the expiration date.  What should I tell my health care provider before I take this medicine?  They need to know if you have any of these conditions:    Dutchess's disease    brain tumor    head injury    heart disease    history of drug or alcohol abuse problem    if you often drink alcohol    kidney disease    liver disease    lung or breathing disease, like asthma    mental illness    pancreatic disease    seizures    thyroid disease    an unusual or allergic reaction to oxycodone, codeine, hydrocodone, morphine, other medicines, foods, dyes, or preservatives    pregnant or trying to get pregnant    breast-feeding  What should I watch for while using this medicine?  Tell your doctor or health care professional if your pain does not go away, if it gets worse, or if you have new or a different type of pain. You may develop tolerance to the medicine. Tolerance means that you will need a higher dose of the medicine for pain relief. Tolerance is normal and is expected if you take this medicine for a long time.  Do not suddenly stop taking your medicine because you may  develop a severe reaction. Your body becomes used to the medicine. This does NOT mean you are addicted. Addiction is a behavior related to getting and using a drug for a non-medical reason. If you have pain, you have a medical reason to take pain medicine. Your doctor will tell you how much medicine to take. If your doctor wants you to stop the medicine, the dose will be slowly lowered over time to avoid any side effects.  There are different types of narcotic medicines (opiates). If you take more than one type at the same time or if you are taking another medicine that also causes drowsiness, you may have more side effects. Give your health care provider a list of all medicines you use. Your doctor will tell you how much medicine to take. Do not take more medicine than directed. Call emergency for help if you have problems breathing or unusual sleepiness.  You may get drowsy or dizzy. Do not drive, use machinery, or do anything that needs mental alertness until you know how the medicine affects you. Do not stand or sit up quickly, especially if you are an older patient. This reduces the risk of dizzy or fainting spells. Alcohol may interfere with the effect of this medicine. Avoid alcoholic drinks.  This medicine will cause constipation. Try to have a bowel movement at least every 2 to 3 days. If you do not have a bowel movement for 3 days, call your doctor or health care professional.  Your mouth may get dry. Chewing sugarless gum or sucking hard candy, and drinking plenty of water may help. Contact your doctor if the problem does not go away or is severe.  NOTE:This sheet is a summary. It may not cover all possible information. If you have questions about this medicine, talk to your doctor, pharmacist, or health care provider. Copyright  2018 [employer]                Rivaroxaban Oral tablet  What is this medicine?  RIVAROXABAN (ri va NOVA a ban) is an anticoagulant (blood thinner). It is used to treat blood clots  in the lungs or in the veins. It is also used after knee or hip surgeries to prevent blood clots. It is also used to lower the chance of stroke in people with a medical condition called atrial fibrillation.  This medicine may be used for other purposes; ask your health care provider or pharmacist if you have questions.  What should I tell my health care provider before I take this medicine?  They need to know if you have any of these conditions:    bleeding disorders    bleeding in the brain    blood in your stools (black or tarry stools) or if you have blood in your vomit    history of stomach bleeding    kidney disease    liver disease    low blood counts, like low white cell, platelet, or red cell counts    recent or planned spinal or epidural procedure    take medicines that treat or prevent blood clots    an unusual or allergic reaction to rivaroxaban, other medicines, foods, dyes, or preservatives    pregnant or trying to get pregnant    breast-feeding  How should I use this medicine?  Take this medicine by mouth with a glass of water. Follow the directions on the prescription label. Take your medicine at regular intervals. Do not take it more often than directed. Do not stop taking except on your doctor's advice. Stopping this medicine may increase your risk of a blot clot. Be sure to refill your prescription before you run out of medicine.  If you are taking this medicine after hip or knee replacement surgery, take it with or without food. If you are taking this medicine for atrial fibrillation, take it with your evening meal. If you are taking this medicine to treat blood clots, take it with food at the same time each day. If you are unable to swallow your tablet, you may crush the tablet and mix it in applesauce. Then, immediately eat the applesauce. You should eat more food right after you eat the applesauce containing the crushed tablet.  Talk to your pediatrician regarding the use of this medicine in  children. Special care may be needed.  Overdosage: If you think you have taken too much of this medicine contact a poison control center or emergency room at once.  NOTE: This medicine is only for you. Do not share this medicine with others.  What if I miss a dose?  If you take your medicine once a day and miss a dose, take the missed dose as soon as you remember. If you take your medicine twice a day and miss a dose, take the missed dose immediately. In this instance, 2 tablets may be taken at the same time. The next day you should take 1 tablet twice a day as directed.  What may interact with this medicine?    aspirin and aspirin-like medicines    certain antibiotics like erythromycin, azithromycin, and clarithromycin    certain medicines for fungal infections like ketoconazole and itraconazole    certain medicines for irregular heart beat like amiodarone, quinidine, dronedarone    certain medicines for seizures like carbamazepine, phenytoin    certain medicines that treat or prevent blood clots like warfarin, enoxaparin, and dalteparin    conivaptan    diltiazem    felodipine    indinavir    lopinavir; ritonavir    NSAIDS, medicines for pain and inflammation, like ibuprofen or naproxen    ranolazine    rifampin    ritonavir    [place]'s wort    verapamil  This list may not describe all possible interactions. Give your health care provider a list of all the medicines, herbs, non-prescription drugs, or dietary supplements you use. Also tell them if you smoke, drink alcohol, or use illegal drugs. Some items may interact with your medicine.  What should I watch for while using this medicine?  Visit your doctor or health care professional for regular checks on your progress. Your condition will be monitored carefully while you are receiving this medicine.  If you are going to have surgery, tell your doctor or health care professional that you are taking this medicine.  Avoid sports and activities that might cause  injury while you are using this medicine. Severe falls or injuries can cause unseen bleeding. Be careful when using sharp tools or knives. Consider using an electric razor. Take special care brushing or flossing your teeth. Report any injuries, bruising, or red spots on the skin to your doctor or health care professional.  What side effects may I notice from receiving this medicine?  Side effects that you should report to your doctor or health care professional as soon as possible:    allergic reactions like skin rash, itching or hives, swelling of the face, lips, or tongue    back pain    confusion, trouble speaking or understanding    redness, blistering, peeling or loosening of the skin, including inside the mouth    signs and symptoms of bleeding such as bloody or black, tarry stools; red or dark-brown urine; spitting up blood or brown material that looks like coffee grounds; red spots on the skin; unusual bruising or bleeding from the eye, gums, or nose    signs and symptoms of a blood clot such as breathing problems; changes in vision; chest pain; severe, sudden headache; pain, swelling, warmth in the leg; trouble speaking; sudden numbness or weakness of the face, arm, or leg    trouble walking, dizziness, loss of balance or coordination  Side effects that usually do not require medical attention (Report these to your doctor or health care professional if they continue or are bothersome.):    dizziness    muscle pain  This list may not describe all possible side effects. Call your doctor for medical advice about side effects. You may report side effects to FDA at 7-085-PNE-3166.  Where should I keep my medicine?  Keep out of the reach of children.  Store at room temperature between 15 and 30 degrees C (59 and 86 degrees F). Throw away any unused medicine after the expiration date.  NOTE: This sheet is a summary. It may not cover all possible information. If you have questions about this medicine, talk to your  doctor, pharmacist, or health care provider.  NOTE:This sheet is a summary. It may not cover all possible information. If you have questions about this medicine, talk to your doctor, pharmacist, or health care provider. Copyright  2014 [employer]                Ketorolac tablets  Brand Name: Toradol  What is this medicine?  KETOROLAC (viktor toe ROLE ak) is a non-steroidal anti-inflammatory drug (NSAID). It is used for a short while to treat moderate to severe pain, including pain after surgery. It should not be used for more than 5 days.  How should I use this medicine?  Take this medicine by mouth with a full glass of water. Follow the directions on the prescription label. Take your medicine at regular intervals. Do not take your medicine more often than directed. Do not take more than the recommended dose.  A special MedGuide will be given to you by the pharmacist with each prescription and refill. Be sure to read this information carefully each time.  Talk to your pediatrician regarding the use of this medicine in children. While this drug may be prescribed for children as young as 16 years of age for selected conditions, precautions do apply.  Patients over 65 years old may have a stronger reaction and need a smaller dose.  What side effects may I notice from receiving this medicine?  Side effects that you should report to your doctor or health care professional as soon as possible:    allergic reactions like skin rash, itching or hives, swelling of the face, lips, or tongue    breathing problems    high blood pressure    nausea, vomiting    redness, blistering, peeling or loosening of the skin, including inside the mouth    severe stomach pain    signs and symptoms of bleeding such as bloody or black, tarry stools; red or dark-brown urine; spitting up blood or brown material that looks like coffee grounds; red spots on the skin; unusual bruising or bleeding from the eye, gums, or nose    signs and symptoms of  a stroke like changes in vision; confusion; trouble speaking or understanding; severe headaches; sudden numbness or weakness of the face, arm or leg; trouble walking; dizziness; loss of balance or coordination    trouble passing urine or change in the amount of urine    unexplained weight gain or swelling    unusually weak or tired    yellowing of eyes or skin  Side effects that usually do not require medical attention (report to your doctor or health care professional if they continue or are bothersome):    diarrhea    dizziness    headache    heartburn  What may interact with this medicine?  Do not take this medicine with any of the following medications:    aspirin and aspirin-like medicines    cidofovir    methotrexate    NSAIDs, medicines for pain and inflammation, like ibuprofen or naproxen    pemetrexed    probenecid  This medicine may also interact with the following medications:    alcohol    alendronate    alprazolam    carbamazepine    cyclosporine    diuretics    flavocoxid    fluoxetine    ginkgo    lithium    medicines for high blood pressure like enalapril    medicines that affect platelets like pentoxifylline    medicines that treat or prevent blood clots like heparin, warfarin    muscle relaxants    phenytoin    steroid medicines like prednisone or cortisone    thiothixene  What if I miss a dose?  If you miss a dose, take it as soon as you can. If it is almost time for your next dose, take only that dose. Do not take double or extra doses.  Where should I keep my medicine?  Keep out of the reach of children.  Store at room temperature between 20 and 25 degrees C (68 and 77 degrees F). Throw away any unused medicine after the expiration date.  What should I tell my health care provider before I take this medicine?  They need to know if you have any of these conditions:    asthma    bleeding problems like hemophilia    cigarette smoker    drink more than 3 alcohol containing drinks a day    heart  disease or circulation problems such as heart failure or leg edema (fluid retention)    high blood pressure    kidney disease    liver disease    stomach bleeding or ulcers    an unusual or allergic reaction to ketorolac, aspirin, other NSAIDs, other medicines, foods, dyes, or preservatives    pregnant or trying to get pregnant    breast-feeding  What should I watch for while using this medicine?  Tell your doctor or health care professional if your pain does not get better. Talk to your doctor before taking another medicine for pain. Do not treat yourself.  This medicine does not prevent heart attack or stroke. In fact, this medicine may increase the chance of a heart attack or stroke. The chance may increase with longer use of this medicine and in people who have heart disease. If you take aspirin to prevent heart attack or stroke, talk with your doctor or health care professional.  Do not take medicines such as ibuprofen and naproxen with this medicine. Side effects such as stomach upset, nausea, or ulcers may be more likely to occur. Many medicines available without a prescription should not be taken with this medicine.  This medicine can cause ulcers and bleeding in the stomach and intestines at any time during treatment. Do not smoke cigarettes or drink alcohol. These increase irritation to your stomach and can make it more susceptible to damage from this medicine. Ulcers and bleeding can happen without warning symptoms and can cause death.  You may get drowsy or dizzy. Do not drive, use machinery, or do anything that needs mental alertness until you know how this medicine affects you. Do not stand or sit up quickly, especially if you are an older patient. This reduces the risk of dizzy or fainting spells.  This medicine can cause you to bleed more easily. Try to avoid damage to your teeth and gums when you brush or floss your teeth.  NOTE:This sheet is a summary. It may not cover all possible information. If  you have questions about this medicine, talk to your doctor, pharmacist, or health care provider. Copyright  2018 [employer]vier                Patient Education    Acetaminophen Chewable tablet    Acetaminophen Elixir    Acetaminophen Oral capsule    Acetaminophen Oral disintegrating tablet    Acetaminophen Oral drops, solution    Acetaminophen Oral drops, suspension    Acetaminophen Oral solution    Acetaminophen Oral suspension    Acetaminophen Oral tablet    Acetaminophen Oral tablet, biphasic release    Acetaminophen Oral tablet, extended-release    Acetaminophen Rectal suppository    Acetaminophen Solution for injection  Acetaminophen Oral tablet  What is this medicine?  ACETAMINOPHEN (a set a JOSE ELIAS trell fen) is a pain reliever. It is used to treat mild pain and fever.  This medicine may be used for other purposes; ask your health care provider or pharmacist if you have questions.  What should I tell my health care provider before I take this medicine?  They need to know if you have any of these conditions:    if you often drink alcohol    liver disease    an unusual or allergic reaction to acetaminophen, other medicines, foods, dyes, or preservatives    pregnant or trying to get pregnant    breast-feeding  How should I use this medicine?  Take this medicine by mouth with a glass of water. Follow the directions on the package or prescription label. Take your medicine at regular intervals. Do not take your medicine more often than directed.  Talk to your pediatrician regarding the use of this medicine in children. While this drug may be prescribed for children as young as 6 years of age for selected conditions, precautions do apply.  Overdosage: If you think you have taken too much of this medicine contact a poison control center or emergency room at once.  NOTE: This medicine is only for you. Do not share this medicine with others.  What if I miss a dose?  If you miss a dose, take it as soon as you can. If it is almost  time for your next dose, take only that dose. Do not take double or extra doses.  What may interact with this medicine?    alcohol    imatinib    isoniazid    other medicines with acetaminophen  This list may not describe all possible interactions. Give your health care provider a list of all the medicines, herbs, non-prescription drugs, or dietary supplements you use. Also tell them if you smoke, drink alcohol, or use illegal drugs. Some items may interact with your medicine.  What should I watch for while using this medicine?  Tell your doctor or health care professional if the pain lasts more than 10 days (5 days for children), if it gets worse, or if there is a new or different kind of pain. Also, check with your doctor if a fever lasts for more than 3 days.  Do not take other medicines that contain acetaminophen with this medicine. Always read labels carefully. If you have questions, ask your doctor or pharmacist.  If you take too much acetaminophen get medical help right away. Too much acetaminophen can be very dangerous and cause liver damage. Even if you do not have symptoms, it is important to get help right away.  What side effects may I notice from receiving this medicine?  Side effects that you should report to your doctor or health care professional as soon as possible:    allergic reactions like skin rash, itching or hives, swelling of the face, lips, or tongue    breathing problems    fever or sore throat    redness, blistering, peeling or loosening of the skin, including inside the mouth    trouble passing urine or change in the amount of urine    unusual bleeding or bruising    unusually weak or tired    yellowing of the eyes or skin  Side effects that usually do not require medical attention (report to your doctor or health care professional if they continue or are bothersome):    headache    nausea, stomach upset  This list may not describe all possible side effects. Call your doctor for medical  advice about side effects. You may report side effects to FDA at 4-145-JEU-7801.  Where should I keep my medicine?  Keep out of reach of children.  Store at room temperature between 20 and 25 degrees C (68 and 77 degrees F). Protect from moisture and heat. Throw away any unused medicine after the expiration date.  NOTE:This sheet is a summary. It may not cover all possible information. If you have questions about this medicine, talk to your doctor, pharmacist, or health care provider. Copyright  2016 [employer]

## 2018-04-18 ENCOUNTER — APPOINTMENT (OUTPATIENT)
Dept: PHYSICAL THERAPY | Facility: HOSPITAL | Age: 68
DRG: 470 | End: 2018-04-18
Attending: ORTHOPAEDIC SURGERY
Payer: COMMERCIAL

## 2018-04-18 ENCOUNTER — APPOINTMENT (OUTPATIENT)
Dept: OCCUPATIONAL THERAPY | Facility: HOSPITAL | Age: 68
DRG: 470 | End: 2018-04-18
Attending: ORTHOPAEDIC SURGERY
Payer: COMMERCIAL

## 2018-04-18 LAB
GLUCOSE SERPL-MCNC: 128 MG/DL (ref 70–99)
HGB BLD-MCNC: 10.2 G/DL (ref 11.7–15.7)

## 2018-04-18 PROCEDURE — 25000128 H RX IP 250 OP 636: Performed by: INTERNAL MEDICINE

## 2018-04-18 PROCEDURE — 97110 THERAPEUTIC EXERCISES: CPT | Mod: GP | Performed by: PHYSICAL THERAPIST

## 2018-04-18 PROCEDURE — 25000128 H RX IP 250 OP 636: Performed by: ORTHOPAEDIC SURGERY

## 2018-04-18 PROCEDURE — 40000193 ZZH STATISTIC PT WARD VISIT: Performed by: PHYSICAL THERAPIST

## 2018-04-18 PROCEDURE — 12000000 ZZH R&B MED SURG/OB

## 2018-04-18 PROCEDURE — 97161 PT EVAL LOW COMPLEX 20 MIN: CPT | Mod: GP | Performed by: PHYSICAL THERAPIST

## 2018-04-18 PROCEDURE — 40000193 ZZH STATISTIC PT WARD VISIT

## 2018-04-18 PROCEDURE — 40000133 ZZH STATISTIC OT WARD VISIT

## 2018-04-18 PROCEDURE — 25000125 ZZHC RX 250: Performed by: ORTHOPAEDIC SURGERY

## 2018-04-18 PROCEDURE — 82947 ASSAY GLUCOSE BLOOD QUANT: CPT | Performed by: ORTHOPAEDIC SURGERY

## 2018-04-18 PROCEDURE — 97166 OT EVAL MOD COMPLEX 45 MIN: CPT | Mod: GO

## 2018-04-18 PROCEDURE — 36415 COLL VENOUS BLD VENIPUNCTURE: CPT | Performed by: ORTHOPAEDIC SURGERY

## 2018-04-18 PROCEDURE — 97530 THERAPEUTIC ACTIVITIES: CPT | Mod: GP

## 2018-04-18 PROCEDURE — 97110 THERAPEUTIC EXERCISES: CPT | Mod: GP

## 2018-04-18 PROCEDURE — 40000275 ZZH STATISTIC RCP TIME EA 10 MIN

## 2018-04-18 PROCEDURE — 25000132 ZZH RX MED GY IP 250 OP 250 PS 637: Performed by: ORTHOPAEDIC SURGERY

## 2018-04-18 PROCEDURE — 85018 HEMOGLOBIN: CPT | Performed by: ORTHOPAEDIC SURGERY

## 2018-04-18 RX ORDER — OXYCODONE HYDROCHLORIDE 5 MG/1
5-10 TABLET ORAL EVERY 4 HOURS PRN
Qty: 90 TABLET | Refills: 0 | Status: SHIPPED | OUTPATIENT
Start: 2018-04-18 | End: 2018-04-20

## 2018-04-18 RX ADMIN — SENNOSIDES AND DOCUSATE SODIUM 1 TABLET: 8.6; 5 TABLET ORAL at 09:19

## 2018-04-18 RX ADMIN — OXYCODONE HYDROCHLORIDE 10 MG: 5 TABLET ORAL at 18:39

## 2018-04-18 RX ADMIN — HYDROMORPHONE HYDROCHLORIDE 0.5 MG: 1 INJECTION, SOLUTION INTRAMUSCULAR; INTRAVENOUS; SUBCUTANEOUS at 20:58

## 2018-04-18 RX ADMIN — HYDROMORPHONE HYDROCHLORIDE 0.5 MG: 1 INJECTION, SOLUTION INTRAMUSCULAR; INTRAVENOUS; SUBCUTANEOUS at 05:20

## 2018-04-18 RX ADMIN — OXYCODONE HYDROCHLORIDE 10 MG: 5 TABLET ORAL at 22:30

## 2018-04-18 RX ADMIN — SIMVASTATIN 20 MG: 20 TABLET, FILM COATED ORAL at 22:02

## 2018-04-18 RX ADMIN — HYDROMORPHONE HYDROCHLORIDE 0.5 MG: 1 INJECTION, SOLUTION INTRAMUSCULAR; INTRAVENOUS; SUBCUTANEOUS at 09:19

## 2018-04-18 RX ADMIN — RIVAROXABAN 10 MG: 10 TABLET, FILM COATED ORAL at 16:59

## 2018-04-18 RX ADMIN — SENNOSIDES AND DOCUSATE SODIUM 2 TABLET: 8.6; 5 TABLET ORAL at 22:02

## 2018-04-18 RX ADMIN — HYDROXYZINE HYDROCHLORIDE 10 MG: 10 TABLET ORAL at 22:02

## 2018-04-18 RX ADMIN — GABAPENTIN 300 MG: 300 CAPSULE ORAL at 14:18

## 2018-04-18 RX ADMIN — ACETAMINOPHEN 975 MG: 325 TABLET ORAL at 00:33

## 2018-04-18 RX ADMIN — OXYCODONE HYDROCHLORIDE 10 MG: 5 TABLET ORAL at 01:43

## 2018-04-18 RX ADMIN — OMEPRAZOLE 40 MG: 20 CAPSULE, DELAYED RELEASE ORAL at 06:09

## 2018-04-18 RX ADMIN — ESCITALOPRAM OXALATE 20 MG: 10 TABLET ORAL at 09:19

## 2018-04-18 RX ADMIN — GABAPENTIN 300 MG: 300 CAPSULE ORAL at 22:02

## 2018-04-18 RX ADMIN — ACETAMINOPHEN 975 MG: 325 TABLET ORAL at 16:58

## 2018-04-18 RX ADMIN — OXYCODONE HYDROCHLORIDE 10 MG: 5 TABLET ORAL at 10:13

## 2018-04-18 RX ADMIN — CYCLOBENZAPRINE HYDROCHLORIDE 5 MG: 5 TABLET, FILM COATED ORAL at 12:23

## 2018-04-18 RX ADMIN — HYDROMORPHONE HYDROCHLORIDE 0.5 MG: 1 INJECTION, SOLUTION INTRAMUSCULAR; INTRAVENOUS; SUBCUTANEOUS at 13:12

## 2018-04-18 RX ADMIN — HYDROMORPHONE HYDROCHLORIDE 0.5 MG: 1 INJECTION, SOLUTION INTRAMUSCULAR; INTRAVENOUS; SUBCUTANEOUS at 23:46

## 2018-04-18 RX ADMIN — ACETAMINOPHEN 975 MG: 325 TABLET ORAL at 08:05

## 2018-04-18 RX ADMIN — KETOROLAC TROMETHAMINE 15 MG: 15 INJECTION, SOLUTION INTRAMUSCULAR; INTRAVENOUS at 04:03

## 2018-04-18 RX ADMIN — HYDROXYZINE HYDROCHLORIDE 10 MG: 10 TABLET ORAL at 08:06

## 2018-04-18 RX ADMIN — OXYCODONE HYDROCHLORIDE 10 MG: 5 TABLET ORAL at 06:15

## 2018-04-18 RX ADMIN — CLINDAMYCIN PHOSPHATE 900 MG: 18 INJECTION, SOLUTION INTRAVENOUS at 05:10

## 2018-04-18 RX ADMIN — Medication 1000 MCG: at 09:19

## 2018-04-18 RX ADMIN — RANITIDINE 150 MG: 150 TABLET ORAL at 09:19

## 2018-04-18 RX ADMIN — LEVOTHYROXINE SODIUM 150 MCG: 150 TABLET ORAL at 06:08

## 2018-04-18 RX ADMIN — OXYCODONE HYDROCHLORIDE 10 MG: 5 TABLET ORAL at 14:18

## 2018-04-18 RX ADMIN — KETOROLAC TROMETHAMINE 15 MG: 15 INJECTION, SOLUTION INTRAMUSCULAR; INTRAVENOUS at 10:14

## 2018-04-18 RX ADMIN — Medication 1 TABLET: at 09:19

## 2018-04-18 RX ADMIN — HYDROMORPHONE HYDROCHLORIDE 0.5 MG: 1 INJECTION, SOLUTION INTRAMUSCULAR; INTRAVENOUS; SUBCUTANEOUS at 16:49

## 2018-04-18 RX ADMIN — GABAPENTIN 300 MG: 300 CAPSULE ORAL at 08:07

## 2018-04-18 ASSESSMENT — PAIN DESCRIPTION - DESCRIPTORS
DESCRIPTORS: CONSTANT

## 2018-04-18 ASSESSMENT — ACTIVITIES OF DAILY LIVING (ADL): PREVIOUS_RESPONSIBILITIES: MEAL PREP;HOUSEKEEPING;LAUNDRY;SHOPPING;MEDICATION MANAGEMENT;FINANCES;DRIVING;YARDWORK

## 2018-04-18 NOTE — PROGRESS NOTES
[facility]    Hospitalist Progress Note    Date of Service (when I saw the patient): 04/18/2018    Assessment & Plan   Aure Lanier is a 68 year old  woman who was admitted on 4/17/2018 for elective left total knee arthoplasty. Operative procedure unremarkable. Her history is significant for chronic pain related to multiple chronic joint pain including neck and knee, hypothyroidism, gastroesophageal reflux, dysthymia marked by anxiety and depression, insomnia for which she frequently uses Ambien, as well as a remote history of antrectomy possibly because of a perforating peptic ulcer was also resulted in peritonitis.  She recently underwent esophagogastroduodenoscopy for food bolus.       1.  Elective left total knee arthroplasty  For severe degenerative arthritis.  Operative course itself was unremarkable.  She is continued to have some difficulty with pain control.  Response to several spinal injections per anesthesia staff.  Overall pain control continues to show some interim improvement with a combination of oral and less frequent IV opiates as well as scheduled acetaminophen..  In terms of anticoagulation she has some concerns with the use of aspirin because of her antrectomy although in fact the risk of this following her partial gastrectomy likely to be less than previously in any event rivarobaxan begun as substitute.  Other issues per orthopedic team directed by Dr. Hernandez.  2.  Chronic pain  Likely this is impart complicating her postoperative pain management with plan as detailed above.  Her pain has included chronic neck pain for which she intermittently has used prednisone as well as somewhat quiescent history of migraines.   3.  Gastroesophageal reflux  Also has multiple other GI issues included as noted history of an antrectomy and recent food bolus.  Continue cyto-protection   With omeprazole and ranitidine as she is used as an outpatient  4.  Hypothyroidism  Recently increase in  thyroxine 150 mcg daily.  Continue this during predicted short hospital course.  Active Problems:    Status post total left knee replacement      # Pain Assessment:  Current Pain Score 4/18/2018   Patient currently in pain? yes   Pain score (0-10) 6   Pain location Knee   Pain descriptors Constant   - Aure is experiencing pain due to operative procedure. Pain management was discussed and the plan was created in a collaborative fashion.  Aure's response to the current recommendations: engaged  - Please see the plan for pain management as documented above         DVT Prophylaxis: Rivarobaxan   Code Status:  Full resuscitation  Disposition: Expected discharge in 2-3 days depending on her course    Jignesh VEGA Inderjit    Interval History   Awake alert and interactive.  Comfortable although notes pain and poor sleep.  No dyspnea.    -Data reviewed today: I reviewed all new labs and imaging results over the last 24 hours.    Peripheral IV 04/17/18 Left Hand (Active)   Site Assessment WDL except;Ecchymotic 4/18/2018  9:00 AM   Line Status Saline locked 4/18/2018  9:00 AM   Phlebitis Scale 0-->no symptoms 4/18/2018  9:00 AM   Infiltration Scale 0 4/18/2018  9:00 AM   Infiltration Site Treatment Method  None 4/18/2018  9:00 AM   Extravasation? No 4/17/2018  4:04 PM   Dressing Intervention New dressing  4/17/2018 10:40 AM   Number of days:1       Incision/Surgical Site 04/17/18 Left Knee (Active)   Incision Assessment UTV 4/18/2018  9:00 AM   Closure Approximated;Staples 4/17/2018  2:36 PM   Incision Drainage Amount UTV 4/18/2018  9:00 AM   Dressing Intervention Clean, dry, intact 4/18/2018  9:00 AM   Number of days:1     Line/device assessment(s) completed for medical necessity April 18    Physical Exam   Temp: 97.6  F (36.4  C) Temp src: Temporal BP: 113/57 Pulse: 86 Heart Rate: 81 Resp: 18 SpO2: 100 % O2 Device: None (Room air) Oxygen Delivery: 2 LPM  Vitals:    04/17/18 1035   Weight: 88.9 kg (196 lb)     Vital Signs  with Ranges  Temp:  [97.6  F (36.4  C)-99.6  F (37.6  C)] 97.6  F (36.4  C)  Pulse:  [86] 86  Heart Rate:  [81-95] 81  Resp:  [13-26] 18  BP: ()/() 113/57  SpO2:  [93 %-100 %] 100 %  I/O last 3 completed shifts:  In: 3814 [P.O.:1240; I.V.:2574]  Out: 900 [Urine:850; Blood:50]    Awake, alert, slightly uncomfortable woman lying in bed on medical wards.  Speech is clear.  Oriented ×3.  HEENT: Pupils equal, conjugate. No icterus or nystagmus. Oral mucosa moist. No facial asymmetry.   Neck: Supple, jugular veins not elevated. Trachea midline   Chest: No chest wall movement asymmetry. Aeration preserved to bases. Accessory muscles not in use. Expiratory time not increased. No tidal wheezes. No rhonchi. No discrete crackles.   Cardiac: PMI not displaced. S1, S2 unremarkable. No S3, S4. P2 not accentuated. No murmurs.  Abdomen: Soft. No palpation or percussion tenderness. No distention. Normoactive bowel sounds. Liver and spleen not increased in size. No bruits, masses, or pulsations.   Extremities: Left leg with compression dressing and knee immobilizer.  Ice to me directly.  Extremities warm.  Brisk capillary refill.  Easily palpable peripheral pulses.  No lower extremity edema. No eccymoses, clubbing.   Neurologic: Mental state above. Motor 5/5 and bilaterally equal. Tone preserved. No fasiculations or tremors. Sensation intact to light touch.     Medications     sodium chloride 75 mL/hr at 04/17/18 1616       acetaminophen  975 mg Oral Q8H     calcium-vitamin D  1 tablet Oral Daily     cyanocobalamin  1,000 mcg Oral Daily     escitalopram  20 mg Oral Daily     gabapentin  300 mg Oral TID     levothyroxine  150 mcg Oral Daily     omeprazole  40 mg Oral QAM     ranitidine  150 mg Oral Daily     rivaroxaban ANTICOAGULANT  10 mg Oral Daily with supper     scopolamine   Transdermal Q8H     scopolamine   Transdermal Once     senna-docusate  1 tablet Oral BID    Or     senna-docusate  2 tablet Oral BID      simvastatin  20 mg Oral At Bedtime     sodium chloride (PF)  3 mL Intracatheter Q8H           Data     Recent Labs  Lab 04/18/18  0509   HGB 10.2*   *            Recent Results (from the past 24 hour(s))   XR Knee Port Left 1/2 Views    Narrative    EXAM:XR KNEE PORT LT 1/2 VW     CLINICAL HISTORY: Patient Age  68 years Additional clinical info:  Post-Op Total Knee;      COMPARISON: None      TECHNIQUE: 2 views      Impression    IMPRESSION: Left TKA appears well seated. Soft tissue gas, soft tissue  swelling, and surgical staples in the left knee.    MACK PEREYRA MD

## 2018-04-18 NOTE — DISCHARGE INSTRUCTIONS
You have a follow up appointment scheduled with Dr. Hernandez on May 2 at 1030.  If you are unable to make this appointment call [facility] at [phone].      Wound Care: Leave Aquacel dressing intact until follow up in clinic unless overly saturated.      DVT Prophylaxis:  Xarelto 10 mg daily for 2 weeks      Pain Control: Oxycodone 1-2 tabs PO every 4 hours prn pain     Activity: Weightbearing as tolerated to operative extremity; Range of motion as tolerated to operative extremity; Edema/Swelling control    Follow-Up: ~2 weeks in [facility] ([phone])    Questions: Call [phone]

## 2018-04-18 NOTE — PLAN OF CARE
Problem: Patient Care Overview  Goal: Plan of Care/Patient Progress Review  Outcome: No Change  OT evaluation completed.  Discharge Planner OT   Patient plan for discharge: home  Current status: SBA  Barriers to return to prior living situation: pain  Recommendations for discharge: home with assist as needed for IADLs  Rationale for recommendations: pt is managing ADLs will follow up tomorrow.       Entered by: Tiffanie Gunter 04/18/2018 12:36 PM

## 2018-04-18 NOTE — PROGRESS NOTES
Met with Aure.  Assessment completed see flowsheet.      LOC: alert    Others present: Patient    Dx: LTGILBERT      Lives with: Lives With: alone    Living at: Living Arrangements: house    Equipment used: none used; has walker, raised toilet, sock aide, grabber    Support System: Description of Support System: Involved, Supportive        Primary PCP: Jaden Lee    POA/Guardian: No      Health Care Directive: NO      Pharmacy: [facility]    :  n/a    Homecare/[facility]:   n/a      Adequate Resources for needs (housing, utilities, food/med): YES    Meds and appointments management: YES    Work: NO    Transportation: YES; drives self typically; will have family and friends available to provide transportation during recovery       ADLs: independent    Falls: No    Able to Return to Prior Living Arrangements: YES- will have family and friends available to stay with her for at minimum the first week of recovery.      : NO      Goals: return home     Barriers: no barriers identified    Needs: Demonstrates Competency    KAREN: low     Discharge Plan: return home with either outpatient PT or home care.  Aure identifies that she would have someone available to provide transportation home and to and from appointments.  She denies questions or concerns about returning home at this time.  CTS will remain available for support and resources.

## 2018-04-18 NOTE — PROGRESS NOTES
04/18/18 1024   Quick Adds   Type of Visit Initial Occupational Therapy Evaluation   Living Environment   Lives With alone   Living Arrangements house   Home Accessibility stairs to enter home;stairs within home;tub/shower is not walk in   Number of Stairs to Enter Home 3   Number of Stairs Within Home 12   Stair Railings at Home none   Transportation Available car   Living Environment Comment Pt's laundry is in the basement, but reports sister will be able to do laundry   Self-Care   Dominant Hand right   Usual Activity Tolerance good   Current Activity Tolerance fair   Regular Exercise no   Equipment Currently Used at Home none   Activity/Exercise/Self-Care Comment Pt reported she has done some of the [employer] class exercises, but she has had pain   Functional Level Prior   Ambulation 0-->independent   Transferring 0-->independent   Toileting 0-->independent   Bathing 0-->independent   Dressing 0-->independent   Eating 0-->independent   Communication 0-->understands/communicates without difficulty   Swallowing 0-->swallows foods/liquids without difficulty   Cognition 0 - no cognition issues reported   Fall history within last six months yes   Number of times patient has fallen within last six months 1   Which of the above functional risks had a recent onset or change? none       Present no   General Information   Onset of Illness/Injury or Date of Surgery - Date 04/17/18   Referring Physician Ty Hernandez MD   Patient/Family Goals Statement get back home   Additional Occupational Profile Info/Pertinent History of Current Problem Pt is s/p L TKA   Precautions/Limitations fall precautions   Weight-Bearing Status - LUE full weight-bearing   Weight-Bearing Status - RUE full weight-bearing   Weight-Bearing Status - LLE weight-bearing as tolerated   Weight-Bearing Status - RLE full weight-bearing   Cognitive Status Examination   Orientation orientation to person, place and time   Level of Consciousness  alert   Able to Follow Commands WNL/WFL   Personal Safety (Cognitive) WNL/WFL   Memory intact   Attention No deficits were identified   Organization/Problem Solving No deficits were identified   Executive Function No deficits were identified   Visual Perception   Visual Perception Wears glasses   Sensory Examination   Sensory Quick Adds No deficits were identified   Pain Assessment   Patient Currently in Pain Yes, see Vital Sign flowsheet  (4/10)   Integumentary/Edema   Integumentary/Edema no deficits were identifed   Range of Motion (ROM)   ROM Quick Adds No deficits were identified   ROM Comment B UE WNL   Strength   Manual Muscle Testing Quick Adds No deficits were identified   Strength Comments B UE grossly 4/5   Muscle Tone Assessment   Muscle Tone Quick Adds No deficits were identified   Coordination   Upper Extremity Coordination No deficits were identified   Transfer Skill: Sit to Stand   Level of [place]: Sit/Stand stand-by assist   Physical Assist/Nonphysical Assist: Sit/Stand verbal cues;supervision   Assistive Device for Transfer: Sit/Stand standard walker   Transfer Skill: Toilet Transfer   Level of [place]: Toilet stand-by assist   Physical Assist/Nonphysical Assist: Toilet supervision;verbal cues   Weight-Bearing Restrictions: Toilet weight-bearing as tolerated   Assistive Device grab bars;standard walker   Upper Body Dressing   Level of [place]: Dress Upper Body independent   Lower Body Dressing   Level of [place]: Dress Lower Body stand-by assist   Grooming   Level of [place]: Grooming independent   Instrumental Activities of Daily Living (IADL)   Previous Responsibilities meal prep;housekeeping;laundry;shopping;medication management;finances;driving;yardwork   IADL Comments Pt does play organ for different churches   Activities of Daily Living Analysis   Impairments Contributing to Impaired Activities of Daily Living pain;post surgical precautions   General Therapy  "Interventions   Planned Therapy Interventions progressive activity/exercise;ADL retraining   Clinical Impression   Criteria for Skilled Therapeutic Interventions Met yes, treatment indicated   OT Diagnosis decreased ADL function   Influenced by the following impairments pain, post-surgical precautions   Assessment of Occupational Performance 1-3 Performance Deficits   Identified Performance Deficits pain, mobility   Clinical Decision Making (Complexity) Low complexity   Therapy Frequency 5 times/wk   Predicted Duration of Therapy Intervention (days/wks) 2 days   Anticipated Discharge Disposition Home with Assist   Risks and Benefits of Treatment have been explained. Yes   Patient, Family & other staff in agreement with plan of care Yes   Clinical Impression Comments Pt is managing ADLs with SBA. She may need assist at home for IADLs will follow up tomorrow.   [facility] [employer] TM \"6 Clicks\"   2016, Trustees of [facility], under license to [employer].  All rights reserved.   6 Clicks Short Forms Daily Activity Inpatient Short Form   [facility] [employer]  \"6 Clicks\" Daily Activity Inpatient Short Form   1. Putting on and taking off regular lower body clothing? 3 - A Little   2. Bathing (including washing, rinsing, drying)? 3 - A Little   3. Toileting, which includes using toilet, bedpan or urinal? 4 - None   4. Putting on and taking off regular upper body clothing? 4 - None   5. Taking care of personal grooming such as brushing teeth? 4 - None   6. Eating meals? 4 - None   Daily Activity Raw Score (Score out of 24.Lower scores equate to lower levels of function) 22   Total Evaluation Time   Total Evaluation Time (Minutes) 24     "

## 2018-04-18 NOTE — PLAN OF CARE
Problem: Patient Care Overview  Goal: Plan of Care/Patient Progress Review  Outcome: No Change  A&O. Anxious, needs reassurance. POD 1 L TKA by Dr. Hernandez. Highest temp 99.4. Sats % RA, on continuous pulse oximeter. SCD's on. CMS intact. Dressing remains in place to LLE, C/D/I. Cryocuff to left knee, ice changed out twice this shift and checked each time in room. Ax2 with gait belt and walker to commode, voided twice this shift. IVF running at 25 ml/hr TKO. Patient receiving scheduled Tylenol and Toradol for pain. Patient also received PRN Roxicodone 10 mg PO twice this shift and PRN Dilaudid 0.5 mg IV once this shift. Patient declined offer of PRN Flexeril. Patient slept very little during the night. Patient rated her pain 7/10 at beginning of shift, was able to decrease pain to 4-5/10 with PRN's. Patient drinking fluids and had 2 jello during the night and some crackers. No nausea. Using IS independently, up to 2000. Lungs clear, bowel sounds active. Patient was able to do straight leg raises during the night. IV Cleocin given. Alarms on, call light within reach, makes needs known.     Face to face report given with opportunity to observe patient.    Report given to Rita Li   4/18/2018  7:45 AM

## 2018-04-18 NOTE — PLAN OF CARE
[facility] Inpatient Admission Note:    Patient admitted to 3100/3100-1 at approximately 1605 via bed accompanied by nurse from surgery . Report received from Silvina in SBAR format at 1605 via face to face in room. Patient was already in bed to bed via self.. Patient is alert and oriented X 3, denies pain; rates at 0 on 0-10 scale.  Patient oriented to room, unit, hourly rounding, and plan of care. Explained admission packet and patient handbook with patient bill of rights brochure. Will continue to monitor and document as needed.     Inpatient Nursing criteria listed below was met:      Health care directives status obtained and documented: Yes      Care Everywhere authorization obtained No      MRSA swab completed for patient 65 years and older: Yes      Patient identifies a surrogate decision maker: No If yes, who:see  index Contact Information:see index      Core Measure diagnosis present:Yes. If yes, state diagnosisSCIP       Is initial lactic acid >2.0? NA.       Vaccination assessment and education completed: Yes   Influenza(seasonal)  NO   Vaccination(s) ordered: patient declines      Clergy visit ordered if patient requests: N/A      Skin issues/needs documented: N/A       Isolation Patient: no Education given, correct sign in place and documentation row added to PCS:  No      Fall Prevention Yes: Care plan updated, education given and documented, sticker and magnet in place: Yes      Care Plan initiated: Yes      Education Documented (including assessment): Yes      Patient has discharge needs : No If yes, please explain:

## 2018-04-18 NOTE — PROGRESS NOTES
Subjective: The patient is POD #1 s/p Left Total Knee Arthroplasty.  The patients pain is controlled fairly well.  They deny shortness of breath, chest pain, nausea or vomiting.  There have been no acute perioperative complications    Objective:   B/P: 118/59, Temp: 99.1, Pulse: 86, Resp: 16    Alert and Orientated x3; No acute distress; Appears comfortable    Knee Exam: dressing/wound is clean, dry, intact without significant drainage.  No erythema or signs of infection.     No evidence of DVT    Distal motor/sensory exam is intact in the superficial peroneal, deep peroneal and tibial nerve distributions.      Normal capillary refill with a palpable dorsalis pedis pulse.    Hemoglobin   Date Value Ref Range Status   04/18/2018 10.2 (L) 11.7 - 15.7 g/dL Final       Assessment/Plan:     1) POD # 1 S/P Left Total Knee Arthroplasty  -Acute blood loss anemia--stable  -PT/OT--ROM and Gait training  -DVT prophylaxis--Xarelto  -Dispo--To Home when cleared medically and by PT in about 1-2 days  -Hospitalist co-management--thanks

## 2018-04-18 NOTE — PLAN OF CARE
Problem: Knee Arthroplasty (Total, Partial) (Adult)  Goal: Signs and Symptoms of Listed Potential Problems Will be Absent, Minimized or Managed (Knee Arthroplasty)  Signs and symptoms of listed potential problems will be absent, minimized or managed by discharge/transition of care (reference Knee Arthroplasty (Total, Partial) (Adult) CPG).   Outcome: No Change  RFeturned to unit from PACU at 1600. Pain controlled with IV dilaudid, po Sandra, IV Toradol. CMS Q2H adequate. Up to BSC with assist 1-2. Unable to void. Straight cath @ 2030-400cc urine. IV to TKO-Taking fluids adequately, Has 1 more IV abtx due at 0500, then can go to SL. Cont pulse ox in place. Anxious. Needs much reassurance.     Face to face report given with opportunity to observe patient.    Report given to Vi Coats   4/17/2018  11:34 PM

## 2018-04-18 NOTE — PROGRESS NOTES
Pt was in bed but was wide awake and ready to participate in PT session this afternoon. Pt performed supine exercises 10 reps heel slides, quad sets,SLR, Ambulated 100 feet with FWW CGA1 no LOB.

## 2018-04-18 NOTE — PLAN OF CARE
"Problem: Patient Care Overview  Goal: Plan of Care/Patient Progress Review  Outcome: Improving   04/18/18 1153   OTHER   Plan Of Care Reviewed With patient   Plan of Care Review   Progress improving       Reason for hospital stay: Left total Knee 4/17    Most recent vitals: /57  Pulse 86  Temp 97.6  F (36.4  C) (Temporal)  Resp 18  Ht 1.6 m (5' 3\")  Wt 88.9 kg (196 lb)  SpO2 100%  BMI 34.72 kg/m2    Pain Management: patient reports pain.  Medicated prior to start of my shift with diludid at 0520 and oxy 10mg at 06:15.  Rated discomfort as 4/10 upon initial assessment.  Reported \"itching\" all over medicated with atarax at this time along with scheduled tylenol.  Medicated with Dilaudid as premedication for PT at 0930.  Patient mobility is actually very good, able to get self to edge of bed with minimal to no assistance.  Bearing weight on left knee and taking steps with minimal guarding or complaints of pain.  After PT patient reported discomfort was around 7/10- medicated with scheduled Toradol and prn oxy at this time. Patient reported good pain control at this time. Will continue to monitor and treat for pain.   Orientation:  Alert and oriented.   Respiratory:  Clear, denies dyspnea, on continuous pulse ox, sats 100% room air.  Taken off to allow for increased mobility/activity--no changes in pain medication regimen.   : Voiding. Up to the bathroom.     Skin Issues:  Patient has nystatin powder that she uses under breast folds no redness or open areas noted, states if she doesn't use she does develop problems.  Uses Zinc Ointment to groin- some dryness noted but no open areas or redness.      IVF:  Saline locked    ABX:  na    Mobility:  Assist of 1 with walker and transfer belt.     Comments:  Patient was concerned about taking ASA - Dr. Hernandez rounded and updated- new orders rec'd to dc the ASA and do Xarelto instead.      4/18/2018  11:54 AM  Rita Nicole    Face to face report given with " opportunity to observe patient.    Report given to Snag SIMMS RN.     Rita Nicole   4/18/2018  3:30 PM

## 2018-04-18 NOTE — OP NOTE
Procedure Date: 04/17/2018      PREOPERATIVE DIAGNOSIS:  End-stage osteoarthritis, left knee.      POSTOPERATIVE DIAGNOSIS:  End-stage osteoarthritis, left knee.      PROCEDURE:  Left total knee arthroplasty.      SURGEON:  Ty Hernandez MD      ASSISTANT:  Sang Panchal PA-C.  A surgical assistant was used to position and assist with exposing the joint and assisting in closure postoperatively.      FINDINGS:   1.  Severe tricompartmental osteoarthritis.   2.  Satisfactory total knee arthroplasty with stable range of motion from 0-125 flexion and central patellar tracking.   3.  Adequate hemostasis.      IMPLANTS:   1.  Holt and Nephew Journey II size 4 femoral component.   2.  Size 3 tibial baseplate.   3.  A 32 mm patella.   4.  A 9 mm polyethylene insert.      TOURNIQUET TIME:  14 minutes.      SPECIMENS:  None.      DRAINS:  None.      COMPLICATIONS:  None.      ESTIMATED BLOOD LOSS:  100 mL      INDICATIONS:  Aure Lanier is a 68-year-old female who has had longstanding left knee pain and osteoarthritis.  She has failed nonoperative management.  She elected to proceed with a left total knee arthroplasty.  I discussed the risks and benefits of the procedure with her.  She gave informed consent.      DESCRIPTION OF PROCEDURE:  The patient was seen in the preoperative area.  Her surgical site was marked.  Questions were answered.  She was taken to the operating room and placed under general anesthesia.  Prior to this, an adductor canal femoral nerve block was administered.  Her left lower extremity was prepped initially with Hibiclens wipe and then twice with ChloraPrep.  It was draped in sterile fashion.  A timeout was called, identifying the correct patient and correct surgical site.  Limb was initially exsanguinated and tourniquet inflated to 300 mmHg.  A midline incision was completed.  Medial skin flaps were developed.  Her blood pressure was elevated and we got a venous tourniquet.  I deflated the  tourniquet and left it down until the cementing portion of the procedure.  I then made a medial parapatellar arthrotomy.  I did a relatively significant medial release due to her varus deformity and tightness, and also resected some of the fat pad.  I everted the patella, measured to 26 mm in thickness.  I resected 9 mm of bone, leaving approximately 16-17 mm of bone remaining.  I then sized it to 32 mm, drilled the lug holes and placed the patellar protector plate.  The knee was flexed, intramedullary canal was reamed, and the 5 degree left distal femoral cutting guide was placed.  I made a standard distal femoral cut.  The knee was then flexed.  The posterior referencing distal femoral sizing guide was placed and I sized it to size 4.  I placed a size 4 cutting block and made my anterior, posterior and chamfer cuts.  All bone fragments were removed.  I then resected the medial and lateral meniscus and the ACL, PCL.  I subluxed the tibia anteriorly and placed the extramedullary tibial cutting block.  I made an additional 2 mm of bone resection secondary to tightness.  The flexion and extension gaps were then assessed.  I was still tight medially.  I did a further medial release of the superficial MCL.  I also released some of the posterior capsule.  I then placed my femoral trial, made the box cut for the bi-cruciate stabilizing component and placed the trial implants. The knee came out to full extension and flexed nicely to 120, with central patellar tracking.  I then exsanguinated the limb and inflated the tourniquet.  Bone ends were cleaned and final components were cemented in place.  Once the cement was hard, the tourniquet was deflated.  Hemostasis was achieved.  I settled on a 9 mm polyethylene insert.  The patella tracked midline with range of motion testing.  She had stable varus and valgus stress test.  I then closed the retinaculum with #1 Vicryl sutures oversewn with 0 Vicryl suture, 2-0 Vicryl and  staples in the skin.  An Aquacel dressing was applied.  She was awakened, extubated and transferred to PACU in stable condition.      POSTOPERATIVE PLAN:  She will be on routine total knee arthroplasty protocol, aspirin for DVT prophylaxis.  She will discharge to home with outpatient physical therapy.  Follow up in 2 weeks in the [facility] for wound check, staple removal and no x-rays needed unless she is having significant problems.         CINDY GUTIERREZ MD             D: 2018   T: 2018   MT: ESTUARDO      Name:     LEON VASQUEZ   MRN:      [id number]        Account:        [id number]   :      1950           Procedure Date: 2018      Document: [id number]

## 2018-04-18 NOTE — PLAN OF CARE
Problem: Patient Care Overview  Goal: Plan of Care/Patient Progress Review  Outcome: Therapy, progress toward functional goals as expected  Discharge Planner PT Going home with help. Going to have out pt PT  Patient plan for discharge: going home with out pt PT  Current status: FWW CGA1  Barriers to return to prior living situation: N/A  Recommendations for discharge: N/A  Rationale for recommendations: N/A       Entered by: Earnestine Bhatt 04/18/2018 2:30 PM

## 2018-04-18 NOTE — ANESTHESIA POSTPROCEDURE EVALUATION
Patient: Aure Lanier    Procedure(s):  LEFT TOTAL KNEE ARTHROPLASTY S/N JOURNEY II - Wound Class: I-Clean    Diagnosis:DJD LEFT KNEE  Diagnosis Additional Information: No value filed.    Anesthesia Type:  Periph. Nerve Block for postop pain, General, LMA    Note:  Anesthesia Post Evaluation    Patient location during evaluation: PACU, Floor and Bedside  Patient participation: Able to fully participate in evaluation  Level of consciousness: awake and alert  Pain management: adequate  Airway patency: patent  Cardiovascular status: acceptable  Respiratory status: acceptable  Hydration status: stable  PONV: none     Anesthetic complications: None          Last vitals:  Vitals:    04/18/18 0204 04/18/18 0230 04/18/18 0410   BP:   118/59   Pulse:      Resp:   16   Temp:   99.1  F (37.3  C)   SpO2: 94% 93% 99%         Electronically Signed By: Kendall Torres MD  April 18, 2018  4:34 AM

## 2018-04-18 NOTE — PROGRESS NOTES
04/18/18 0900   Quick Adds   Type of Visit Initial PT Evaluation   Living Environment   Lives With alone   Living Arrangements house   Home Accessibility stairs to enter home;stairs within home   Number of Stairs to Enter Home 3   Number of Stairs Within Home 12   Stair Railings at Home outside, present on left side   Transportation Available car   Living Environment Comment Pt's laundry located in the basement but states she will be able to get assistance with this    Self-Care   Usual Activity Tolerance good   Current Activity Tolerance fair   Regular Exercise no   Equipment Currently Used at Home none   Activity/Exercise/Self-Care Comment Pt has SEC, FWW, toilet riser at home   Functional Level Prior   Ambulation 0-->independent   Transferring 0-->independent   Toileting 0-->independent   Bathing 0-->independent   Dressing 0-->independent   Eating 0-->independent   Communication 0-->understands/communicates without difficulty   Swallowing 0-->swallows foods/liquids without difficulty   Cognition 0 - no cognition issues reported   Fall history within last six months yes   Number of times patient has fallen within last six months 1   Which of the above functional risks had a recent onset or change? ambulation;transferring;toileting;dressing;bathing   Prior Functional Level Comment Completely indepedent with mobility and ADLs   General Information   Onset of Illness/Injury or Date of Surgery - Date 04/17/18   Referring Physician Dr. Hernandez   Patient/Family Goals Statement was told by Dr. Hernandez she should discharge with outpatient PT at [facility]   Pertinent History of Current Problem (include personal factors and/or comorbidities that impact the POC) Pt s/p L TKA.  Pt has h/o chronic pain, GERD, and hypothyroidism   Weight-Bearing Status - LLE weight-bearing as tolerated   Weight-Bearing Status - RLE full weight-bearing   General Observations Pt just getting to chair with nursing upon PT arrival, was attempting  to reach towards chair without being in safe position to do so.   Cognitive Status Examination   Orientation orientation to person, place and time   Level of Consciousness alert   Follows Commands and Answers Questions 100% of the time   Personal Safety and Judgment intact   Memory intact   Pain Assessment   Patient Currently in Pain Yes, see Vital Sign flowsheet  (6/10 at rest)   Integumentary/Edema   Integumentary/Edema Comments L LE wrapped in ace bandage   Posture    Posture Forward head position;Protracted shoulders   Range of Motion (ROM)   ROM Comment R LE AROM WFL, L LE ROM grossly 4-85 actively   Strength   Strength Comments R LE strength WNL throughout, L LE strength grossly 4-/5 throughout, able to complete SLR   Bed Mobility   Bed Mobility Comments NT, up in chair upon PT arrival   Transfer Skills   Transfer Comments sit<>stand min A with cues for safe hand placement   Gait   Gait Comments Ambulated 190' with FWW CGA, step-through gait   Balance   Balance Comments fair with support from walker   Sensory Examination   Sensory Perception no deficits were identified   General Therapy Interventions   Planned Therapy Interventions balance training;bed mobility training;gait training;ROM;transfer training;strengthening;risk factor education;home program guidelines;progressive activity/exercise   Clinical Impression   Criteria for Skilled Therapeutic Intervention yes, treatment indicated   PT Diagnosis gait disturbance s/p L TKA   Influenced by the following impairments pain, decreased ROM, decreased strength, decreased balance   Functional limitations due to impairments decreased safety with transfers, gait, and stairs   Clinical Presentation Stable/Uncomplicated   Clinical Presentation Rationale stable post op course   Clinical Decision Making (Complexity) Low complexity   Therapy Frequency` 2 times/day   Predicted Duration of Therapy Intervention (days/wks) 5 days   Anticipated Discharge Disposition Home  "with Outpatient Therapy   Risk & Benefits of therapy have been explained Yes   Patient, Family & other staff in agreement with plan of care Yes   Clinical Impression Comments Pt doing well s/p TKA, was having pain control issues however tolerated PT fairly well.    [facility]-PAC TM \"6 Clicks\"   2016, Trustees of [facility], under license to [employer].  All rights reserved.   6 Clicks Short Forms Basic Mobility Inpatient Short Form   [facility] AM-PAC  \"6 Clicks\" V.2 Basic Mobility Inpatient Short Form   1. Turning from your back to your side while in a flat bed without using bedrails? 3 - A Little   2. Moving from lying on your back to sitting on the side of a flat bed without using bedrails? 3 - A Little   3. Moving to and from a bed to a chair (including a wheelchair)? 3 - A Little   4. Standing up from a chair using your arms (e.g., wheelchair, or bedside chair)? 3 - A Little   5. To walk in hospital room? 3 - A Little   6. Climbing 3-5 steps with a railing? 3 - A Little   Basic Mobility Raw Score (Score out of 24.Lower scores equate to lower levels of function) 18   Total Evaluation Time   Total Evaluation Time (Minutes) 17     "

## 2018-04-18 NOTE — PLAN OF CARE
Problem: Patient Care Overview  Goal: Plan of Care/Patient Progress Review  Outcome: No Change  Discharge Planner PT   Patient plan for discharge: home with outpatient PT per Dr. Hernandez  Current status: sit<>stand CGA, ambulated 190' with FWW CGA without immobilizer, able to complete SLR  Barriers to return to prior living situation: lives alone, stairs to enter home, fall risk  Recommendations for discharge: home with outpatient vs home PT  Rationale for recommendations: pt tolerated mobility fairly well today however does live alone and may benefit from home PT prior to outpatient but will see how she progresses over the next couple of days.       Entered by: Selena Koenig, PT 04/18/2018 1:15 PM

## 2018-04-19 ENCOUNTER — APPOINTMENT (OUTPATIENT)
Dept: OCCUPATIONAL THERAPY | Facility: HOSPITAL | Age: 68
DRG: 470 | End: 2018-04-19
Attending: ORTHOPAEDIC SURGERY
Payer: COMMERCIAL

## 2018-04-19 ENCOUNTER — APPOINTMENT (OUTPATIENT)
Dept: PHYSICAL THERAPY | Facility: HOSPITAL | Age: 68
DRG: 470 | End: 2018-04-19
Attending: ORTHOPAEDIC SURGERY
Payer: COMMERCIAL

## 2018-04-19 LAB
BACTERIA SPEC CULT: NORMAL
COPATH REPORT: NORMAL
GLUCOSE SERPL-MCNC: 108 MG/DL (ref 70–99)
HGB BLD-MCNC: 10 G/DL (ref 11.7–15.7)
LACTATE SERPL-SCNC: 1.3 MMOL/L (ref 0.4–2)
SPECIMEN SOURCE: NORMAL

## 2018-04-19 PROCEDURE — 40000275 ZZH STATISTIC RCP TIME EA 10 MIN

## 2018-04-19 PROCEDURE — 99231 SBSQ HOSP IP/OBS SF/LOW 25: CPT | Performed by: INTERNAL MEDICINE

## 2018-04-19 PROCEDURE — 85018 HEMOGLOBIN: CPT | Performed by: ORTHOPAEDIC SURGERY

## 2018-04-19 PROCEDURE — 25000128 H RX IP 250 OP 636: Performed by: INTERNAL MEDICINE

## 2018-04-19 PROCEDURE — 97116 GAIT TRAINING THERAPY: CPT | Mod: GP

## 2018-04-19 PROCEDURE — 12000000 ZZH R&B MED SURG/OB

## 2018-04-19 PROCEDURE — 40000193 ZZH STATISTIC PT WARD VISIT

## 2018-04-19 PROCEDURE — 97110 THERAPEUTIC EXERCISES: CPT | Mod: GO

## 2018-04-19 PROCEDURE — 40000133 ZZH STATISTIC OT WARD VISIT

## 2018-04-19 PROCEDURE — 97110 THERAPEUTIC EXERCISES: CPT | Mod: GP

## 2018-04-19 PROCEDURE — 25000132 ZZH RX MED GY IP 250 OP 250 PS 637: Performed by: ORTHOPAEDIC SURGERY

## 2018-04-19 PROCEDURE — 82947 ASSAY GLUCOSE BLOOD QUANT: CPT | Performed by: ORTHOPAEDIC SURGERY

## 2018-04-19 PROCEDURE — 83605 ASSAY OF LACTIC ACID: CPT | Performed by: ORTHOPAEDIC SURGERY

## 2018-04-19 PROCEDURE — 36415 COLL VENOUS BLD VENIPUNCTURE: CPT | Performed by: ORTHOPAEDIC SURGERY

## 2018-04-19 PROCEDURE — 25000132 ZZH RX MED GY IP 250 OP 250 PS 637: Performed by: INTERNAL MEDICINE

## 2018-04-19 RX ORDER — IBUPROFEN 200 MG
200-600 TABLET ORAL EVERY 6 HOURS PRN
Status: DISCONTINUED | OUTPATIENT
Start: 2018-04-19 | End: 2018-04-20 | Stop reason: HOSPADM

## 2018-04-19 RX ORDER — ZOLPIDEM TARTRATE 5 MG/1
5 TABLET ORAL
Status: DISCONTINUED | OUTPATIENT
Start: 2018-04-19 | End: 2018-04-20 | Stop reason: HOSPADM

## 2018-04-19 RX ORDER — KETOROLAC TROMETHAMINE 15 MG/ML
15 INJECTION, SOLUTION INTRAMUSCULAR; INTRAVENOUS EVERY 6 HOURS PRN
Status: DISCONTINUED | OUTPATIENT
Start: 2018-04-19 | End: 2018-04-20 | Stop reason: HOSPADM

## 2018-04-19 RX ADMIN — GABAPENTIN 300 MG: 300 CAPSULE ORAL at 20:25

## 2018-04-19 RX ADMIN — CYCLOBENZAPRINE HYDROCHLORIDE 5 MG: 5 TABLET, FILM COATED ORAL at 00:44

## 2018-04-19 RX ADMIN — SIMVASTATIN 20 MG: 20 TABLET, FILM COATED ORAL at 20:28

## 2018-04-19 RX ADMIN — HYDROMORPHONE HYDROCHLORIDE 0.5 MG: 1 INJECTION, SOLUTION INTRAMUSCULAR; INTRAVENOUS; SUBCUTANEOUS at 05:21

## 2018-04-19 RX ADMIN — Medication 1000 MCG: at 08:22

## 2018-04-19 RX ADMIN — CYCLOBENZAPRINE HYDROCHLORIDE 5 MG: 5 TABLET, FILM COATED ORAL at 08:34

## 2018-04-19 RX ADMIN — GABAPENTIN 300 MG: 300 CAPSULE ORAL at 14:55

## 2018-04-19 RX ADMIN — LEVOTHYROXINE SODIUM 150 MCG: 150 TABLET ORAL at 06:59

## 2018-04-19 RX ADMIN — KETOROLAC TROMETHAMINE 15 MG: 15 INJECTION, SOLUTION INTRAMUSCULAR; INTRAVENOUS at 12:50

## 2018-04-19 RX ADMIN — RIVAROXABAN 10 MG: 10 TABLET, FILM COATED ORAL at 16:23

## 2018-04-19 RX ADMIN — ESCITALOPRAM OXALATE 20 MG: 10 TABLET ORAL at 08:23

## 2018-04-19 RX ADMIN — CYCLOBENZAPRINE HYDROCHLORIDE 5 MG: 5 TABLET, FILM COATED ORAL at 20:59

## 2018-04-19 RX ADMIN — RANITIDINE 150 MG: 150 TABLET ORAL at 08:23

## 2018-04-19 RX ADMIN — OXYCODONE HYDROCHLORIDE 10 MG: 5 TABLET ORAL at 14:55

## 2018-04-19 RX ADMIN — OMEPRAZOLE 40 MG: 20 CAPSULE, DELAYED RELEASE ORAL at 06:58

## 2018-04-19 RX ADMIN — Medication 1 TABLET: at 08:23

## 2018-04-19 RX ADMIN — GABAPENTIN 300 MG: 300 CAPSULE ORAL at 08:23

## 2018-04-19 RX ADMIN — KETOROLAC TROMETHAMINE 15 MG: 15 INJECTION, SOLUTION INTRAMUSCULAR; INTRAVENOUS at 19:12

## 2018-04-19 RX ADMIN — SENNOSIDES AND DOCUSATE SODIUM 2 TABLET: 8.6; 5 TABLET ORAL at 20:26

## 2018-04-19 RX ADMIN — SENNOSIDES AND DOCUSATE SODIUM 1 TABLET: 8.6; 5 TABLET ORAL at 08:22

## 2018-04-19 RX ADMIN — HYDROMORPHONE HYDROCHLORIDE 0.5 MG: 1 INJECTION, SOLUTION INTRAMUSCULAR; INTRAVENOUS; SUBCUTANEOUS at 02:07

## 2018-04-19 RX ADMIN — OXYCODONE HYDROCHLORIDE 10 MG: 5 TABLET ORAL at 10:56

## 2018-04-19 RX ADMIN — OXYCODONE HYDROCHLORIDE 10 MG: 5 TABLET ORAL at 02:39

## 2018-04-19 RX ADMIN — ACETAMINOPHEN 975 MG: 325 TABLET ORAL at 16:22

## 2018-04-19 RX ADMIN — OXYCODONE HYDROCHLORIDE 10 MG: 5 TABLET ORAL at 06:59

## 2018-04-19 RX ADMIN — ACETAMINOPHEN 975 MG: 325 TABLET ORAL at 08:23

## 2018-04-19 RX ADMIN — OXYCODONE HYDROCHLORIDE 10 MG: 5 TABLET ORAL at 20:26

## 2018-04-19 RX ADMIN — ACETAMINOPHEN 975 MG: 325 TABLET ORAL at 00:46

## 2018-04-19 RX ADMIN — Medication 2.5 MG: at 02:33

## 2018-04-19 RX ADMIN — Medication 2.5 MG: at 00:44

## 2018-04-19 ASSESSMENT — PAIN DESCRIPTION - DESCRIPTORS
DESCRIPTORS: SHARP

## 2018-04-19 NOTE — PLAN OF CARE
Problem: Patient Care Overview  Goal: Plan of Care/Patient Progress Review  Outcome: Therapy, progress toward functional goals as expected  Discharge Planner PT Home with PT  Patient plan for discharge: Home  Current status: FWW CGA1   Barriers to return to prior living situation: N/A  Recommendations for discharge: N/A  Rationale for recommendations: N/A       Entered by: Earnestine Bhatt 04/19/2018 2:23 PM

## 2018-04-19 NOTE — PROGRESS NOTES
Met with Aure.  She expressed concern about adequate pain control.  Identifies that at this point her pain has not been well controlled.  This was brought to the attention of Evette VENCES.  Aure believes that she will be able to manage at home.  Advised that all options can remain open, the goal is to ensure her recovery is successful.  Appeared fearful of going against the recommendations of the orthopedist.

## 2018-04-19 NOTE — PLAN OF CARE
Problem: Patient Care Overview  Goal: Plan of Care/Patient Progress Review  Discharge Planner PT   Patient plan for discharge: Home with OP PT, DIscussed that she needs to stay on top of this knee ROM and walking and if she feels she cannot to please let us or SS know and rehab in a SNF can be discussed. SHe states she can handle it at home and OP.  Current status: SBA with mobility. IN a LOT OF PAIN TODAY.  Barriers to return to prior living situation: pain control  Recommendations for discharge: Dr. Hernandez and she have discussed OP and I brought up rehab SNF if she feels she cannot do which I would recommend if she cannot get her pain under control so she can actually exercise knee.  Rationale for recommendations: SHe will need to independently do her exercises alone as she lives alone with friends coming in to help       Entered by: Cierra Bashir 04/19/2018 11:14 AM

## 2018-04-19 NOTE — PLAN OF CARE
Patient in room awake, TV on, as soon as RN spoke to her she began to cry and stated she was having 10/10 pain. Patient very tearful, attempted to reposition leg, patient still able to do straight leg raises, checked CMS, intact. IV Dilaudid given per MAR. Patient also tearful and upset about her ambien only being 2.5mg, patient takes 10 mg at home. Patient very talkative, therapeutic communication utilized with interactions. Re-educated on pain regimen, what to expect, continuing to give pain medications around the clock, patient will call for additional pain medications before the medications have had a chance to kick in despite educating and re-educating.

## 2018-04-19 NOTE — PROGRESS NOTES
Pt was in bed but was willing to participate in exercises and walking. 100 feet with FWW CGA1 supine exercises 10 reps, SLR, quad sets, ankle pumps heel slides. Pt had a lot of pain some increased warmth to her left leg nursing was going to check her skin.

## 2018-04-19 NOTE — PLAN OF CARE
Problem: Knee Arthroplasty (Total, Partial) (Adult)  Goal: Signs and Symptoms of Listed Potential Problems Will be Absent, Minimized or Managed (Knee Arthroplasty)  Signs and symptoms of listed potential problems will be absent, minimized or managed by discharge/transition of care (reference Knee Arthroplasty (Total, Partial) (Adult) CPG).   Outcome: No Change   04/18/18 1700   Knee Arthroplasty (Total, Partial)   Problems Assessed (Knee Arthroplasty) all   Problems Present (Knee Arthroplasty) pain       Problem: Patient Care Overview  Goal: Plan of Care/Patient Progress Review  Outcome: No Change  Reason for hospital stay:  Left Total Knee Arthroplasty    Pain Management:  Patient complaints of left knee pain consistently rating 6-8 out of 10. Gave 10mg PO Oxy x2 and IV Dilaudid x3 this shift with pain decreasing to 5. Patient also with cryo cuff on at all times except while ambulating. Attempted repositioning with little pain relief.  Kept LLE elevated with pillow under calf as much as possible. Patient able to perform straight leg raises and ambulates with only standby assist, gait belt, and walker.      Orientation:  A+O x4 - anxious regarding pain control but pleasant and talkative.     Respiratory:  Lungs clear with sats remaining 100% on RA.    GI:  BS present x4. Good appetite - ate all of supper.     :  Voiding in bathroom without difficulty.     Skin Issues:  Dressing and ace wrap intact to LLE. No observable drainage or shadowing noted on ace wrap.     IVF:  Saline Lock    Mobility:  Ambulated in room to bathroom and in tolliver with standby assist, gait belt, and walker. Able to get in and out of bed independently.      Face to face report given with opportunity to observe patient.    Report given to Vi Wagner   4/19/2018  1:09 AM           04/19/18 0109   OTHER   Plan Of Care Reviewed With patient   Plan of Care Review   Progress no change

## 2018-04-19 NOTE — PROGRESS NOTES
[facility]    Hospitalist Progress Note    Date of Service (when I saw the patient): 04/19/2018    Assessment & Plan   Aure Lanier is a 68 year old  woman who was admitted on 4/17/2018 for elective left total knee arthoplasty. Operative procedure unremarkable. Her history is significant for chronic pain related to multiple chronic joint pain including neck and knee, hypothyroidism, gastroesophageal reflux, dysthymia marked by anxiety and depression, insomnia for which she frequently uses Ambien, as well as a remote history of antrectomy possibly because of a perforating peptic ulcer was also resulted in peritonitis.  She recently underwent esophagogastroduodenoscopy for food bolus.       1.  Elective left total knee arthroplasty  For severe degenerative arthritis.  Operative course itself was unremarkable.  She is continued to have some difficulty with pain control, somewhat out of proportion to her excellent ability to participate with activity and physical therapy.  No evidence of any hyperadrenergic activity to help correlate pain.  Added ketorolac this afternoon.  Reluctant to increase hydromorphone beyond the already substantial doses she is receiving.  Continue combination of oral and  IV opiates as well as scheduled acetaminophen.  Currently plan is for her to return home with outpatient PT.  Both I and physical therapists have discussed with her the absolute necessity of her continuing regular physical activity and vigorous physical therapy after returning home.  She acknowledges understanding of this.  We will continue discussion with her.  If it is not certain that home PT will result in continued level of activity and therapy, a short period of inpatient PT might be considered..  In terms of anticoagulation she has some concerns with the use of aspirin because of her antrectomy although in fact the risk of this following her partial gastrectomy likely to be less than previously in any  event rivarobaxan begun as substitute.  Other issues per orthopedic team directed by Dr. Hernandez.  2.  Chronic pain  Likely this is impart complicating her postoperative pain management with plan as detailed above.  Her pain has included chronic neck pain for which she intermittently has used prednisone as well as somewhat quiescent history of migraines.   3.  Gastroesophageal reflux  Also has multiple other GI issues included as noted history of an antrectomy and recent food bolus.  Continue cyto-protection   With omeprazole and ranitidine as she is used as an outpatient  4.  Hypothyroidism  Recently increase in thyroxine 150 mcg daily.  Continue this during predicted short hospital course.  Active Problems:    Status post total left knee replacement      # Pain Assessment:  Current Pain Score 4/19/2018   Patient currently in pain? yes   Pain score (0-10) 7   Pain location Knee   Pain descriptors Sharp   - Aure is experiencing pain due to operative procedure. Pain management was discussed and the plan was created in a collaborative fashion.  Aure's response to the current recommendations: engaged  - Please see the plan for pain management as documented above         DVT Prophylaxis: Rivarobaxan   Code Status:  Full resuscitation  Disposition: Expected discharge in 2-3 days depending on her course    Jignesh Jansen    Interval History   Easily awakened.  Pleasant and interactive.  Notes extremely poor sleep in continued difficulties with pain control.  Working well with physical therapy.    -Data reviewed today: I reviewed all new labs and imaging results over the last 24 hours.    Peripheral IV 04/18/18 Left Upper forearm (Active)   Site Assessment WDL 4/19/2018  8:37 AM   Line Status Saline locked 4/19/2018  8:37 AM   Phlebitis Scale 0-->no symptoms 4/19/2018  8:37 AM   Infiltration Scale 0 4/19/2018  8:37 AM   Number of days:1       Incision/Surgical Site 04/17/18 Left Knee (Active)   Incision Assessment  UTV 4/19/2018  8:30 AM   Iris-Incision Assessment Warm 4/19/2018  8:30 AM   Closure Staples 4/19/2018  8:30 AM   Incision Drainage Amount Scant 4/19/2018  8:30 AM   Dressing Intervention Dressing removed 4/19/2018  8:30 AM   Number of days:2     Line/device assessment(s) completed for medical necessity April 19    Physical Exam   Temp: 99.2  F (37.3  C) Temp src: Tympanic BP: 127/64 Pulse: 95 Heart Rate: 97 Resp: 18 SpO2: 98 % O2 Device: None (Room air)    Vitals:    04/17/18 1035   Weight: 88.9 kg (196 lb)     Vital Signs with Ranges  Temp:  [97.7  F (36.5  C)-99.2  F (37.3  C)] 99.2  F (37.3  C)  Pulse:  [95] 95  Heart Rate:  [85-97] 97  Resp:  [16-20] 18  BP: (127-155)/(49-70) 127/64  SpO2:  [96 %-100 %] 98 %  I/O last 3 completed shifts:  In: 1560 [P.O.:1560]  Out: 1000 [Urine:1000]    Awake, alert, slightly uncomfortable woman lying in bed on medical wards.  Speech is clear.  Oriented ×3.  HEENT: Pupils equal, conjugate. No icterus or nystagmus. Oral mucosa moist. No facial asymmetry.   Neck: Supple, jugular veins not elevated. Trachea midline   Chest: No chest wall movement asymmetry. Aeration preserved to bases. Accessory muscles not in use. Expiratory time not increased. No tidal wheezes. No rhonchi. No discrete crackles.   Cardiac: PMI not displaced. S1, S2 unremarkable. No S3, S4. P2 not accentuated. No murmurs.  Abdomen: Soft. No palpation or percussion tenderness. No distention. Normoactive bowel sounds. Liver and spleen not increased in size. No bruits, masses, or pulsations.   Extremities: Left leg with compression dressing and knee immobilizer.  Iced knee.  Extremities warm.  Brisk capillary refill.  Easily palpable peripheral pulses.  No lower extremity edema. No eccymoses, clubbing.   Neurologic: Mental state above. Motor 5/5 and bilaterally equal. Tone preserved. No fasiculations or tremors. Sensation intact to light touch.     Medications       acetaminophen  975 mg Oral Q8H     calcium-vitamin D   1 tablet Oral Daily     cyanocobalamin  1,000 mcg Oral Daily     escitalopram  20 mg Oral Daily     gabapentin  300 mg Oral TID     levothyroxine  150 mcg Oral Daily     omeprazole  40 mg Oral QAM     ranitidine  150 mg Oral Daily     rivaroxaban ANTICOAGULANT  10 mg Oral Daily with supper     scopolamine   Transdermal Once     senna-docusate  1 tablet Oral BID    Or     senna-docusate  2 tablet Oral BID     simvastatin  20 mg Oral At Bedtime     sodium chloride (PF)  3 mL Intracatheter Q8H           Data     Recent Labs  Lab 04/19/18  0512 04/18/18  0509   HGB 10.0* 10.2*   * 128*            No results found for this or any previous visit (from the past 24 hour(s)).

## 2018-04-19 NOTE — PROVIDER NOTIFICATION
Dr. Brooke updated about patient having difficulty sleeping. Patient takes Ambien 10 mg at home for sleep, patient receiving 2.5 mg while in hospital on top of receiving PRN Roxicodone and PRN Dilaudid. Patient requesting additional Ambien for sleep. One time order received to give an additional 2.5 mg Ambien PO at this time.

## 2018-04-19 NOTE — PLAN OF CARE
Problem: Patient Care Overview  Goal: Plan of Care/Patient Progress Review  Outcome: Therapy, progress toward functional goals is gradual  Discharge Planner OT   Patient plan for discharge: home  Current status: SBA  Barriers to return to prior living situation: pain  Recommendations for discharge: home with assist if pain is managed  Rationale for recommendations: pt's pain is limiting her engagement, however she is able to participate in activities when asked. Pt did perform better yesterday than today.       Entered by: Tiffanie Gunter 04/19/2018 3:15 PM

## 2018-04-19 NOTE — PROGRESS NOTES
[facility]    Spiritual Health Progress Note    Date of Service (when I saw the patient): 04/19/2018     Assessment & Plan   Aure Lanier is a 68 year old female who was admitted on 4/17/2018.  Introduced the patient as an initial visit to Spiritual Health Services and to see if I could connect her to her identified james community.  Aure was with her sister.  We had a nice visit.  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  She explained that she was struggling with pain in her legs and feet and hadn't had much sleep.  She desired prayer so we prayed together before I left.    Rev. Angel Byers  Volunteer

## 2018-04-19 NOTE — PLAN OF CARE
Problem: Patient Care Overview  Goal: Plan of Care/Patient Progress Review  Outcome: No Change  Patient had L TKA by Dr. Hernandez, today is POD 2. Patient A&O, anxious and tearful at times. Patient continues to request frequent pain medications, rating pain 7/10 at start of shift, then up to 10/10, able to bring pain down to 7/10 again upon last assessment. Patient received Scheduled Tylenol once, PRN Flexeril once, PRN Roxicodone 10 mg twice this shift, PRN Dilaudid 0.5 mg IV three times this shift. Cryocuff to left knee, found temp turned down, turned back up to correct setting, ice changed twice this shift. Dressing and ace wrap remain C/D/I to left leg, CMS intact. SCD's on. Patient up to commode with Ax1 with gait belt and walker. Patient c/o not being able to sleep and requested additional ambien, received a one time additional dose of 2.5 mg Ambien PO and was finally able to sleep until lab came in this morning. Lungs clear, bowel sounds active, passing flatus, no BM yet. Patient ate a jello during the night and drinking water. RN in room numerous times throughout the night to give meds/re-assess. Alarms on, call light within reach, makes needs known.     Face to face report given with opportunity to observe patient.    Report given to Evette Li   4/19/2018  7:25 AM

## 2018-04-19 NOTE — PLAN OF CARE
Problem: Knee Arthroplasty (Total, Partial) (Adult)  Goal: Signs and Symptoms of Listed Potential Problems Will be Absent, Minimized or Managed (Knee Arthroplasty)  Signs and symptoms of listed potential problems will be absent, minimized or managed by discharge/transition of care (reference Knee Arthroplasty (Total, Partial) (Adult) CPG).   Outcome: No Change   04/19/18 0830   Knee Arthroplasty (Total, Partial)   Problems Assessed (Knee Arthroplasty) all   Problems Present (Knee Arthroplasty) pain     Goal: Anesthesia/Sedation Recovery  Outcome: No Change   04/19/18 0830   OTHER   Anesthesia/Sedation Recovery recovered to baseline       Problem: Patient Care Overview  Goal: Plan of Care/Patient Progress Review  Outcome: No Change  Alert and oriented. VSS. Spo2 98% on room air. C/o 7-8/10 pain continuously throughout shift. States IV dilaudid did not help on night shift, no relief today from tylenol, oxycodone 10mg x 2 or flexeril this morning. PRN order for toradol with decrease in pain after 1 hour. PT this afternoon states pt's leg looks more red and feels hot. Upon assessment, leg warm and slightly warmer around aquacel, no increase in drainage or markings. Leg is stained from betadine. Temp is 99.2, heart rate only slightly elevated from this morning. CMS in left leg WDL, pulses strong- no numbness or tingling. IV- saline locked. Tolerating diet, transfers and ambulation well with walker and gaitbelt. Call light within reach and using appropriately.

## 2018-04-19 NOTE — PLAN OF CARE
Face to face report given with opportunity to observe patient.    Report given to VANGIE Domínguez   4/19/2018  3:13 PM

## 2018-04-20 ENCOUNTER — APPOINTMENT (OUTPATIENT)
Dept: PHYSICAL THERAPY | Facility: HOSPITAL | Age: 68
DRG: 470 | End: 2018-04-20
Attending: ORTHOPAEDIC SURGERY
Payer: COMMERCIAL

## 2018-04-20 VITALS
RESPIRATION RATE: 16 BRPM | HEART RATE: 95 BPM | BODY MASS INDEX: 34.73 KG/M2 | HEIGHT: 63 IN | WEIGHT: 196 LBS | TEMPERATURE: 99 F | DIASTOLIC BLOOD PRESSURE: 74 MMHG | OXYGEN SATURATION: 97 % | SYSTOLIC BLOOD PRESSURE: 123 MMHG

## 2018-04-20 PROCEDURE — 25000128 H RX IP 250 OP 636: Performed by: INTERNAL MEDICINE

## 2018-04-20 PROCEDURE — 99239 HOSP IP/OBS DSCHRG MGMT >30: CPT | Performed by: INTERNAL MEDICINE

## 2018-04-20 PROCEDURE — 40000193 ZZH STATISTIC PT WARD VISIT: Performed by: PHYSICAL THERAPIST

## 2018-04-20 PROCEDURE — 25000132 ZZH RX MED GY IP 250 OP 250 PS 637: Performed by: INTERNAL MEDICINE

## 2018-04-20 PROCEDURE — 97116 GAIT TRAINING THERAPY: CPT | Mod: GP | Performed by: PHYSICAL THERAPIST

## 2018-04-20 PROCEDURE — 97110 THERAPEUTIC EXERCISES: CPT | Mod: GP | Performed by: PHYSICAL THERAPIST

## 2018-04-20 PROCEDURE — 97530 THERAPEUTIC ACTIVITIES: CPT | Mod: GP | Performed by: PHYSICAL THERAPIST

## 2018-04-20 PROCEDURE — 25000132 ZZH RX MED GY IP 250 OP 250 PS 637: Performed by: ORTHOPAEDIC SURGERY

## 2018-04-20 RX ORDER — HYDROCODONE BITARTRATE AND ACETAMINOPHEN 7.5; 325 MG/1; MG/1
TABLET ORAL
Qty: 120 TABLET | Refills: 0 | COMMUNITY
Start: 2018-04-30 | End: 2018-06-01

## 2018-04-20 RX ORDER — OXYCODONE HYDROCHLORIDE 5 MG/1
5-10 TABLET ORAL EVERY 4 HOURS PRN
Qty: 30 TABLET | Refills: 0 | Status: SHIPPED | OUTPATIENT
Start: 2018-04-20 | End: 2018-05-07

## 2018-04-20 RX ORDER — KETOROLAC TROMETHAMINE 10 MG/1
10 TABLET, FILM COATED ORAL EVERY 6 HOURS PRN
Qty: 20 TABLET | Refills: 0 | Status: SHIPPED | OUTPATIENT
Start: 2018-04-20 | End: 2018-04-23

## 2018-04-20 RX ORDER — ACETAMINOPHEN 325 MG/1
650-975 TABLET ORAL EVERY 4 HOURS PRN
Qty: 100 TABLET | COMMUNITY
Start: 2018-04-20 | End: 2019-06-14

## 2018-04-20 RX ADMIN — OXYCODONE HYDROCHLORIDE 10 MG: 5 TABLET ORAL at 00:27

## 2018-04-20 RX ADMIN — OMEPRAZOLE 40 MG: 20 CAPSULE, DELAYED RELEASE ORAL at 06:03

## 2018-04-20 RX ADMIN — SENNOSIDES AND DOCUSATE SODIUM 2 TABLET: 8.6; 5 TABLET ORAL at 08:47

## 2018-04-20 RX ADMIN — ACETAMINOPHEN 975 MG: 325 TABLET ORAL at 08:47

## 2018-04-20 RX ADMIN — LEVOTHYROXINE SODIUM 150 MCG: 150 TABLET ORAL at 06:03

## 2018-04-20 RX ADMIN — OXYCODONE HYDROCHLORIDE 10 MG: 5 TABLET ORAL at 08:46

## 2018-04-20 RX ADMIN — OXYCODONE HYDROCHLORIDE 10 MG: 5 TABLET ORAL at 04:32

## 2018-04-20 RX ADMIN — KETOROLAC TROMETHAMINE 15 MG: 15 INJECTION, SOLUTION INTRAMUSCULAR; INTRAVENOUS at 01:37

## 2018-04-20 RX ADMIN — ZOLPIDEM TARTRATE 5 MG: 5 TABLET, FILM COATED ORAL at 00:27

## 2018-04-20 RX ADMIN — ESCITALOPRAM OXALATE 20 MG: 10 TABLET ORAL at 08:47

## 2018-04-20 RX ADMIN — ACETAMINOPHEN 975 MG: 325 TABLET ORAL at 00:27

## 2018-04-20 RX ADMIN — Medication 1 TABLET: at 08:47

## 2018-04-20 RX ADMIN — CYCLOBENZAPRINE HYDROCHLORIDE 5 MG: 5 TABLET, FILM COATED ORAL at 08:47

## 2018-04-20 RX ADMIN — Medication 1000 MCG: at 08:47

## 2018-04-20 RX ADMIN — RANITIDINE 150 MG: 150 TABLET ORAL at 08:47

## 2018-04-20 RX ADMIN — GABAPENTIN 300 MG: 300 CAPSULE ORAL at 08:47

## 2018-04-20 ASSESSMENT — PAIN DESCRIPTION - DESCRIPTORS
DESCRIPTORS: SHARP;CRAMPING;CONSTANT
DESCRIPTORS: CONSTANT;SHARP
DESCRIPTORS: CONSTANT;SHARP

## 2018-04-20 NOTE — PLAN OF CARE
Patient discharged at 12:00 PM via wheel chair accompanied by daughter and staff. Prescriptions sent to patients preferred pharmacy. All belongings sent with patient.     Discharge instructions reviewed with pt. Listed belongings gathered and returned to patient. yes    Patient discharged to home.   Report called to n/a    Core Measures and Vaccines  Core Measures applicable during stay: No. If yes, state diagnosis: n/a  Pneumonia and Influenza given prior to discharge, if indicated: N/A    Surgical Patient   Surgical Procedures during stay: yes  Did patient receive discharge instruction on wound care and recognition of infection symptoms? Yes    MISC  Follow up appointment made:  Yes  Home and hospital aquired medications returned to patient: N/A  Patient reports pain was well managed at discharge: Yes

## 2018-04-20 NOTE — PLAN OF CARE
"Problem: Patient Care Overview  Goal: Plan of Care/Patient Progress Review  Outcome: No Change  Patient slept well on shift. Required tactile stimulation to rouse for medications. Received PRN oxy x 2, PRN torodol x1 and scheduled tylenol for pain control. Cryocuff remains in place with an ice pack under patients operative knee per patient request. CMS remains intact. Pedal pulses palpable. Gait steady. Dressing intact with scant shadowing noted. Dressing unchanged on shift. Ambulating via stand by assist and walker. Vitals remain stable. /55  Pulse 95  Temp 99.5  F (37.5  C) (Temporal)  Resp 16  Ht 1.6 m (5' 3\")  Wt 88.9 kg (196 lb)  SpO2 96%  BMI 34.72 kg/m2. IV remains saline locked.       Face to face report given with opportunity to observe patient.    Report given to VANGIE Sanabria   4/20/2018  7:16 AM      "

## 2018-04-20 NOTE — PLAN OF CARE
"Problem: Knee Arthroplasty (Total, Partial) (Adult)  Goal: Signs and Symptoms of Listed Potential Problems Will be Absent, Minimized or Managed (Knee Arthroplasty)  Signs and symptoms of listed potential problems will be absent, minimized or managed by discharge/transition of care (reference Knee Arthroplasty (Total, Partial) (Adult) CPG).   Outcome: Improving   04/19/18 1620   Knee Arthroplasty (Total, Partial)   Problems Assessed (Knee Arthroplasty) all   Problems Present (Knee Arthroplasty) pain       Problem: Patient Care Overview  Goal: Plan of Care/Patient Progress Review  Outcome: Improving  Reason for hospital stay:  Left Total Knee Arthroplasty    Most recent vitals: /62  Pulse 95  Temp 100.8  F (38.2  C) (Temporal)  Resp 16  Ht 1.6 m (5' 3\")  Wt 88.9 kg (196 lb)  SpO2 98%  BMI 34.72 kg/m2     Temp was 101.1 at start of shift - received scheduled Tylenol at 1622 - updated Dr. Gardiner at 1700 - no orders received. Temp decreased to 100.8 at 1824.    Pain Management:  Complaints of LLE knee pain rating 4-6 out of 10. Gave scheduled Tylenol x1 at 1622, PRN IV Toradol at 1912, PRN 10mg Oxy at 2026, and PRN Flexeril at 2059. Patient rates pain 4/10 at this time, watching TV and appears comfortable. Also using cryo cuff entire shift except when ambulating. LLE elevated with pillow under calf most of shift.     Orientation:  A+O x4     Respiratory:  Lungs clear - sats 98% on RA. Encouraged use of IS, coughing and deep breathing.     GI:  BS x4 - passing flatus. Good appetite - ate all of supper. Denies any nausea. No BM yet since admit. Gave 2 senna-s per orders.     :  Denies any difficulty voiding    Skin Issues:  Aquacel dressing intact to left knee incision. Scant amount drainage on Aquacel. Left knee warm to touch and slight edema to left knee and LLE.  Continued use of cryo cuff around knee and ice pack below knee. CMS remains intact to LLE.     IVF:  Saline Lock. IV lock to left lower " forearm infiltrated. New IV placed in right lower forearm.    Mobility:  Up with standby assist with gait belt and walker. Ambulated in room to bathroom and in tolliver approx 170 feet.  Up in recliner for supper and after walk. Able to perform straight leg raises.    Comments: Sepsis BPA fired at 1630 - Lactic results 1.3    4/19/2018  10:15 PM  Sang Wagner         04/19/18 2214   OTHER   Plan Of Care Reviewed With patient   Plan of Care Review   Progress improving

## 2018-04-20 NOTE — PLAN OF CARE
Problem: Patient Care Overview  Goal: Plan of Care/Patient Progress Review  Outcome: Adequate for Discharge Date Met: 04/20/18  Occupational Therapy Discharge Summary    Reason for therapy discharge:    Discharged to home.    Progress towards therapy goal(s). See goals on Care Plan in [facility] electronic health record for goal details.  Goals met    Therapy recommendation(s):    Continue home exercise program. Met with pt briefly to identify any concerns, pt reported no concerns about return home today.

## 2018-04-20 NOTE — PLAN OF CARE
"Problem: Patient Care Overview  Goal: Plan of Care/Patient Progress Review  Outcome: Adequate for Discharge Date Met: 04/20/18  Reason for hospital stay:  L total knee replacement        Most recent vitals: /74  Pulse 95  Temp 99  F (37.2  C) (Tympanic)  Resp 16  Ht 1.6 m (5' 3\")  Wt 88.9 kg (196 lb)  SpO2 97%  BMI 34.72 kg/m2    Pain Management:  Tylenol, Roxicodone, et flexeril given with am medications per request for pain rated 5/10 with good relief    Orientation:  A/O    Cardiac:  HR tachy 104    Respiratory:  Lung sounds clear bilat    GI:  Bowel sounds audible et active x4    :  WDL    Skin Issues:  Aquacel bandage to L knee intact, scant amt of drainage noted to dressing    IVF:  Saline locked    ABX:  n/a    Mobility:  SBA 1 for transfers      Safety:  A/O steady on feet et calls for assist appropriately         Comments:        4/20/2018  1:33 PM  Elly Arriola          "

## 2018-04-20 NOTE — PLAN OF CARE
Problem: Patient Care Overview  Goal: Plan of Care/Patient Progress Review  Outcome: Adequate for Discharge Date Met: 04/20/18  Physical Therapy Discharge Summary    Reason for therapy discharge:    Discharged to home with outpatient therapy.    Progress towards therapy goal(s). See goals on Care Plan in [facility] electronic health record for goal details.  Goals partially met.  Barriers to achieving goals:   limited tolerance for therapy and discharge from facility.    Therapy recommendation(s):    Continued therapy is recommended.  Rationale/Recommendations:  continued PT for pain control, strength, ROM, and progression of functional mobility.  Home PT most appropriate based on current level of function.

## 2018-04-20 NOTE — PROGRESS NOTES
Checked in with Aure.  She denies questions or concerns about discharge plan of returning home with outpatient PT.  States that her sister, Kay will pick her up.  Roughly at noon.  Updated Elly VENCES.      Name: Aure Lanier    MRN#: [id number]    Reason for Hospitalization: Status post total left knee replacement [Z96.652]    KAREN: low    Discharge Date: 4/20/2018    Patient / Family response to discharge plan: agreeable    Follow-Up Appt: No future appointments.    Other Providers (Care Coordinator, County Services, PCA services etc): No    Discharge Disposition: home    Shantel Thomas

## 2018-04-20 NOTE — DISCHARGE SUMMARY
"[facility]    Discharge Summary  Hospitalist    Date of Admission:  4/17/2018  Date of Discharge:  4/20/2018  Discharging Provider: Jignesh Jansen  Date of Service (when I saw the patient): 04/20/18    Discharge Diagnoses   Elective left total knee arthroplasty for severe osteoarthritis  Acute postoperative pain, improved  Chronic pain  Established migraine headache  Established gastroesophageal reflux  Hypothyroidism with recent increase in thyroxine replacement  Established insomnia    History of Present Illness   Aure Lanier is a 68 year old  woman who was admitted on 4/17/2018 for elective left total knee arthoplasty. Operative procedure unremarkable. Her history is significant for chronic pain related to multiple chronic joint pain including neck and knee, hypothyroidism, gastroesophageal reflux, dysthymia marked by anxiety and depression, insomnia for which she frequently uses Ambien, as well as a remote history of antrectomy possibly because of a perforating peptic ulcer which apparently resulted in peritonitis.  She recently underwent esophagogastroduodenoscopy for food bolus.    Hospital Course   Aure Lanier was admitted on 4/17/2018.  The following problems were addressed during her hospitalization:     1.  Elective left total knee arthroplasty  Acute postoperative pain  For severe degenerative arthritis.  Operative course itself was unremarkable.    Pain control while improved by the time of discharge was a difficult issue through her hospitalization.  She showed marked, response to nonsteroidals.  At the time of discharge she believes her pain is under reasonable control.  Continuing as needed acetaminophen (scheduled dose earlier in her course) as well as ketorolac.  She noted concerns with GI upset after other nonsteroidals but found ketorolac to be quite effective for her.  In addition as needed oral oxycodone.  She required several \"one shot\" spinal injections for pain " "control both on leaving PACU and her first postoperative day.  In spite of her difficulties with pain she generally was able to cooperate well with physical therapy. In terms of anticoagulation she has some concerns with the use of aspirin because of her antrectomy although in fact the risk of this following her partial gastrectomy likely to be less than previously in any event rivarobaxan begun as substitute.   I discussed with her on multiple occasions and does have physical therapist the importance of continuing both planned physical therapy sessions as well as regular activity continuing range of motion exercises..  2.  Chronic pain  Likely this is in part complicating her postoperative pain management with plan as detailed above.  Her pain has included chronic neck pain for which she intermittently has used prednisone as well as somewhat quiescent history of migraines.  In addition to acute pain management as above she continues to use gabapentin which she relates needed because of post herpetic neuralgia.  Likely also effective for other neuropathic pain.  3.  Gastroesophageal reflux  Also has multiple other GI issues included as noted history of an antrectomy and recent food bolus.    Have continued her chronic cyto-protection with omeprazole and ranitidine as she is used as an outpatient  4.  Hypothyroidism  Recently increase in thyroxine 150 mcg daily.  Continue this during predicted short hospital course.  Active Problems:    Status post total left knee replacement    Jignesh Jansen    Significant Results and Procedures   Left total knee arthroplasty with Dr. Hernandez      Code Status   Full Code       Primary Care Physician   Jaden Lee    Vital signs:  Temp: 99  F (37.2  C) Temp src: Tympanic BP: 123/74   Heart Rate: 104 Resp: 16 SpO2: 97 % O2 Device: None (Room air) Oxygen Delivery: 2 LPM Height: 160 cm (5' 3\") Weight: 88.9 kg (196 lb)  Estimated body mass index is 34.72 kg/(m^2) as calculated from " "the following:    Height as of this encounter: 1.6 m (5' 3\").    Weight as of this encounter: 88.9 kg (196 lb).        Awake, alert, pleasant interactive woman lying in bed on medical warts.  Speech is clear.    HEENT: Pupils equal, conjugate. No icterus or nystagmus. Oral mucosa moist. No facial asymmetry.   Neck: Supple, jugular veins not elevated. Trachea midline   Chest: No chest wall movement asymmetry. Aeration preserved to bases. Accessory muscles not in use. Expiratory time not increased. No tidal wheezes. No rhonchi. No discrete crackles.   Cardiac: PMI not displaced. S1, S2 unremarkable. No S3, S4. P2 not accentuated. No murmurs  Abdomen: Soft. No palpation or percussion tenderness. No distention. Normoactive bowel sounds. Liver and spleen not increased in size. No bruits, masses, or pulsations.   Extremities: No lower extremity edema.  No significant drainage on left arthroplasty dressing.  Extremities warm distally.  Easily palpable peripheral pulses.  No eccymoses, clubbing.   Neurologic: Mental state above. Motor 5/5 and bilaterally equal. Tone preserved. No fasiculations or tremors. Sensation intact to light touch. DTR 2/4 and bilaterally equal.   # Discharge Pain Plan:   - During her hospitalization, Aure experienced pain due to operative procedure.  The pain plan for discharge was discussed with Aure and the plan was created in a collaborative fashion.    See details above      Discharge Disposition   Discharged to home with arrangements made for outpatient physical therapy  Condition at discharge: Stable    Consultations This Hospital Stay   OCCUPATIONAL THERAPY ADULT IP CONSULT  PHYSICAL THERAPY ADULT IP CONSULT  HOSPITALIST IP CONSULT    Time Spent on this Encounter   I, Jignesh Jansen, personally saw the patient today and spent greater than 30 minutes discharging this patient.    Discharge Orders     Physical Therapy Referral     Follow-up and recommended labs and tests    Follow up with " Dr. Hernandez , at (location with clinic name or city) [place], within 2  to evaluate after surgery. No follow up labs or test are needed.     Wound care and dressings   Leave Aquacel dressing intact until f/u in clinic  Notify office if dressing is saturated     Reason for your hospital stay   Aure Lanier is a 68 year old  woman who was admitted on 4/17/2018 for elective left total knee arthoplasty. Operative procedure unremarkable. Her history is significant for chronic pain related to multiple chronic joint pain including neck and knee, hypothyroidism, gastroesophageal reflux, dysthymia marked by anxiety and depression, insomnia for which she frequently uses Ambien, as well as a remote history of antrectomy possibly because of a perforating peptic ulcer. She recently underwent esophagogastroduodenoscopy for food bolus. Postoperatively she participated well with physical therapy although pain control was an issue.  By the day of discharge however pain markedly improved.  She is discharged to continue with outpatient physical therapy.     Activity   Your activity upon discharge: Per physical therapy recommendations; out of bed as much as possible and stay physically active.     Follow-up and recommended labs and tests    Follow up with primary care provider, Jaden Lee, in 2-3 weeks for hospital follow-upw     Full Code     Diet   Follow this diet upon discharge: Orders Placed This Encounter     Advance Diet as Tolerated: Regular Diet Adult     Diet       Discharge Medications   Current Discharge Medication List      START taking these medications    Details   acetaminophen (TYLENOL) 325 MG tablet Take 2-3 tablets (650-975 mg) by mouth every 4 hours as needed for other (mild to moderate pain)  Qty: 100 tablet    Associated Diagnoses: Status post total left knee replacement      ketorolac (TORADOL) 10 MG tablet Take 1 tablet (10 mg) by mouth every 6 hours as needed for moderate pain  Qty: 20 tablet, Refills:  0    Associated Diagnoses: Status post total left knee replacement      oxyCODONE IR (ROXICODONE) 5 MG tablet Take 1-2 tablets (5-10 mg) by mouth every 4 hours as needed for other (pain control or improvement in physical function. Hold dose for analgesic side effects.)  Qty: 30 tablet, Refills: 0    Associated Diagnoses: Status post total left knee replacement      rivaroxaban ANTICOAGULANT (XARELTO) 10 MG TABS tablet Take 1 tablet (10 mg) by mouth daily (with dinner)  Qty: 14 tablet, Refills: 0    Associated Diagnoses: Status post total left knee replacement         CONTINUE these medications which have CHANGED    Details   HYDROcodone-acetaminophen (NORCO) 7.5-325 MG per tablet Do not resume taking while you are taking oxycodone  Qty: 120 tablet, Refills: 0    Associated Diagnoses: Generalized pain         CONTINUE these medications which have NOT CHANGED    Details   calcium (CALCIUM 600) 600 MG tablet With vitamin D; take 2 by oral route every day      cyanocolbalamin (VITAMIN  B-12) 1000 MCG tablet Take 1 tablet by mouth daily.      cyclobenzaprine (FLEXERIL) 10 MG tablet TAKE 1 TABLET BY MOUTH THREE TIMES DAILY AS NEEDED  Qty: 90 tablet, Refills: 0    Associated Diagnoses: Cervicalgia      escitalopram (LEXAPRO) 20 MG tablet TAKE 1 TABLET BY MOUTH EVERY DAY  Qty: 30 tablet, Refills: 3    Associated Diagnoses: Anxiety      folic acid (FOLVITE) 1 MG tablet TAKE ONE TABLET DAILY BY MOUTH  Qty: 90 tablet, Refills: 2    Associated Diagnoses: Health care maintenance      gabapentin (NEURONTIN) 300 MG capsule TAKE ONE CAPSULE 3 TIMES A DAY BY MOUTH  Qty: 90 capsule, Refills: 3    Associated Diagnoses: Postherpetic polyneuropathy      levothyroxine (SYNTHROID/LEVOTHROID) 150 MCG tablet Take 1 tablet (150 mcg) by mouth daily  Qty: 30 tablet, Refills: 0    Associated Diagnoses: Hypothyroidism, unspecified type      Multiple Vitamins-Minerals (MULTIVITAMIN OR) Take 1 tablet by mouth daily with food.      multivitamin  (OCUVITE) TABS Take 1 tablet by mouth daily      NYSTOP 571594 UNIT/GM POWD powder APPLY TOPICALLY 2 TIMES DAILY AS NEEDED  Qty: 60 g, Refills: 3    Associated Diagnoses: Rash      omeprazole (PRILOSEC) 40 MG capsule TAKE 1 CAPSULE DAILY BY MOUTH  Qty: 90 capsule, Refills: 0      RANITIDINE HCL PO Take 150 mg by mouth daily      simvastatin (ZOCOR) 40 MG tablet TAKE 1 TABLET BY MOUTH EVERY DAY IN THE EVENING . GENERIC FOR ZOCOR.  Qty: 90 tablet, Refills: 0    Associated Diagnoses: Mixed hyperlipidemia      triamcinolone acetonide (KENALOG) 40 MG/ML injection Used 10mg for each knee  Qty: 1 mL, Refills: 0    Associated Diagnoses: Bilateral chronic knee pain      zolpidem (AMBIEN) 10 MG tablet Take 1 tablet (10 mg) by mouth nightly as needed for sleep Take 1 tablet at night for sleep-may repeat X1.  Qty: 60 tablet, Refills: 0    Comments: MAY FILL ON MARCH 24, 2018  Associated Diagnoses: Persistent insomnia           Allergies   Allergies   Allergen Reactions     Erythromycin Base [Kdc:Yellow Dye+Erythromycin+Brilliant Blue Fcf] Nausea and Vomiting     Penicillins Rash     Data   Most Recent 3 CBC's:  Recent Labs   Lab Test  04/19/18   0512 04/18/18   0509  04/09/18 1716 12/20/17   0015  06/01/17   0810   WBC   --    --   11.2*  13.7*  10.0   HGB  10.0*  10.2*  13.3  14.4  13.7   MCV   --    --   91  88  90   PLT   --    --   413  472*  411      Most Recent 3 BMP's:  Recent Labs   Lab Test  04/19/18   0512 04/18/18   0509  04/09/18 1716 12/20/17   0015  06/01/17   0810   NA   --    --   137  136  141   POTASSIUM   --    --   3.7  3.8  3.9   CHLORIDE   --    --   104  103  107   CO2   --    --   28  23  28   BUN   --    --   14  15  9   CR   --    --   0.62  0.65  0.59   ANIONGAP   --    --   5  10  6   SHAYY   --    --   9.3  9.0  8.5   GLC  108*  128*  97  103*  100*     Most Recent 2 LFT's:  Recent Labs   Lab Test  04/09/18 1716 06/01/17   0810   AST  19  14   ALT  31  29   ALKPHOS  89  92   BILITOTAL  0.4   0.3     Most Recent INR's and Anticoagulation Dosing History:  Anticoagulation Dose History     There is no flowsheet data to display.        Most Recent 3 Troponin's:No lab results found.  Most Recent Cholesterol Panel:  Recent Labs   Lab Test  06/01/17   0810   CHOL  159   LDL  58   HDL  56   TRIG  223*     Most Recent 6 Bacteria Isolates From Any Culture (See EPIC Reports for Culture Details):  Recent Labs   Lab Test  04/17/18   1840  04/12/18   1403  09/25/14   1026   CULT  No MRSA isolated  <10,000 colonies/mL  Mixed bacterial refugio  No further identification or sensitivity done  *  >100,000 colonies/mL Mixed bacterial refugio No further identification or   sensitivity done  *     Most Recent TSH, T4 and A1c Labs:  Recent Labs   Lab Test  04/09/18   1716   TSH  6.15*   T4  0.82     Results for orders placed or performed during the hospital encounter of 04/17/18   XR Knee Port Left 1/2 Views    Narrative    EXAM:XR KNEE PORT LT 1/2 VW     CLINICAL HISTORY: Patient Age  68 years Additional clinical info:  Post-Op Total Knee;      COMPARISON: None      TECHNIQUE: 2 views      Impression    IMPRESSION: Left TKA appears well seated. Soft tissue gas, soft tissue  swelling, and surgical staples in the left knee.    MACK PEREYRA MD

## 2018-04-24 ENCOUNTER — TELEPHONE (OUTPATIENT)
Dept: CASE MANAGEMENT | Facility: HOSPITAL | Age: 68
End: 2018-04-24

## 2018-04-24 DIAGNOSIS — G47.00 PERSISTENT INSOMNIA: ICD-10-CM

## 2018-04-24 DIAGNOSIS — Z96.652 STATUS POST TOTAL LEFT KNEE REPLACEMENT: ICD-10-CM

## 2018-04-24 RX ORDER — OXYCODONE HYDROCHLORIDE 5 MG/1
5-10 TABLET ORAL EVERY 4 HOURS PRN
Qty: 30 TABLET | Refills: 0 | OUTPATIENT
Start: 2018-04-24

## 2018-04-24 RX ORDER — ZOLPIDEM TARTRATE 10 MG/1
10 TABLET ORAL
Qty: 60 TABLET | Refills: 0 | Status: SHIPPED | OUTPATIENT
Start: 2018-04-24 | End: 2018-05-23

## 2018-04-24 NOTE — TELEPHONE ENCOUNTER
oxycodone      Last Written Prescription Date:  4/20/18  Last Fill Quantity: 30,   # refills: 0  Last Office Visit: 4/9/18  Future Office visit:    Next 5 appointments (look out 90 days)     May 07, 2018  2:00 PM CDT   (Arrive by 1:45 PM)   SHORT with Jaden Lee NP   [facility] ([facility]    [address]  [place] [address]   [phone]                   Routing refill request to provider for review/approval because:  Drug not on the [facility], [facility] or [facility] refill protocol or controlled substance

## 2018-04-24 NOTE — TELEPHONE ENCOUNTER
ambien      Last Written Prescription Date:  3/24/18  Last Fill Quantity: 60,   # refills: 0  Last Office Visit: 4/9/18  Future Office visit:    Next 5 appointments (look out 90 days)     May 07, 2018  2:00 PM CDT   (Arrive by 1:45 PM)   SHORT with Jaden Lee NP   [facility] ([facility]    [address]  [place] [address]   [phone]                   Routing refill request to provider for review/approval because:  Drug not on the [facility], [facility] or [facility] refill protocol or controlled substance

## 2018-04-25 ENCOUNTER — TELEPHONE (OUTPATIENT)
Dept: CASE MANAGEMENT | Facility: HOSPITAL | Age: 68
End: 2018-04-25

## 2018-04-26 NOTE — PLAN OF CARE
Chart accessed for phone number and address to mail electronic's cord left behind at discharge from hospital.  Karen Knight  April 26, 2018  10:19 AM

## 2018-04-30 NOTE — TELEPHONE ENCOUNTER
Patient notified hard copy RX is ready to be picked up at [facility] registration desk.   
Prescription picked up by patient  ID Verified    
RX dated September 30, 2017.    Cindy Birmingham, RN-BSN  Chronic Pain Care Coordinator  [phone] opt. #[phone]        
hydrocodone      Last Written Prescription Date: 8/31/17  Last Fill Quantity: 120,  # refills: 0   Last Office Visit with G, P or [facility] prescribing provider: 7/11/17                                               
Normal rate, regular rhythm.  Heart sounds S1, S2.  No murmurs, rubs or gallops.

## 2018-05-01 ENCOUNTER — TRANSFERRED RECORDS (OUTPATIENT)
Dept: HEALTH INFORMATION MANAGEMENT | Facility: CLINIC | Age: 68
End: 2018-05-01

## 2018-05-07 ENCOUNTER — OFFICE VISIT (OUTPATIENT)
Dept: INTERNAL MEDICINE | Facility: OTHER | Age: 68
End: 2018-05-07
Attending: NURSE PRACTITIONER
Payer: COMMERCIAL

## 2018-05-07 VITALS
OXYGEN SATURATION: 99 % | HEART RATE: 123 BPM | BODY MASS INDEX: 34.73 KG/M2 | SYSTOLIC BLOOD PRESSURE: 128 MMHG | TEMPERATURE: 97.5 F | WEIGHT: 196 LBS | RESPIRATION RATE: 20 BRPM | DIASTOLIC BLOOD PRESSURE: 70 MMHG | HEIGHT: 63 IN

## 2018-05-07 DIAGNOSIS — Z87.19 HISTORY OF GI BLEED: ICD-10-CM

## 2018-05-07 DIAGNOSIS — M25.561 ACUTE PAIN OF RIGHT KNEE: Primary | ICD-10-CM

## 2018-05-07 PROCEDURE — G0463 HOSPITAL OUTPT CLINIC VISIT: HCPCS

## 2018-05-07 PROCEDURE — 94760 N-INVAS EAR/PLS OXIMETRY 1: CPT

## 2018-05-07 PROCEDURE — G0463 HOSPITAL OUTPT CLINIC VISIT: HCPCS | Mod: 25

## 2018-05-07 PROCEDURE — 99214 OFFICE O/P EST MOD 30 MIN: CPT | Performed by: NURSE PRACTITIONER

## 2018-05-07 PROCEDURE — 96372 THER/PROPH/DIAG INJ SC/IM: CPT | Performed by: NURSE PRACTITIONER

## 2018-05-07 RX ORDER — KETOROLAC TROMETHAMINE 30 MG/ML
60 INJECTION, SOLUTION INTRAMUSCULAR; INTRAVENOUS ONCE
Qty: 2 ML | Refills: 0 | OUTPATIENT
Start: 2018-05-07 | End: 2019-01-29

## 2018-05-07 RX ORDER — KETOROLAC TROMETHAMINE 10 MG/1
10 TABLET, FILM COATED ORAL EVERY 6 HOURS PRN
Qty: 20 TABLET | Refills: 0 | Status: SHIPPED | OUTPATIENT
Start: 2018-05-07 | End: 2018-05-11

## 2018-05-07 RX ORDER — SUCRALFATE 1 G/1
1 TABLET ORAL 4 TIMES DAILY
Qty: 40 TABLET | Refills: 1 | Status: SHIPPED | OUTPATIENT
Start: 2018-05-07 | End: 2018-08-27

## 2018-05-07 RX ORDER — HYDROMORPHONE HYDROCHLORIDE 2 MG/1
2 TABLET ORAL EVERY 6 HOURS PRN
Qty: 20 TABLET | Refills: 0 | Status: SHIPPED | OUTPATIENT
Start: 2018-05-07 | End: 2018-05-11

## 2018-05-07 ASSESSMENT — PAIN SCALES - GENERAL: PAINLEVEL: SEVERE PAIN (6)

## 2018-05-07 NOTE — PROGRESS NOTES
SUBJECTIVE:   Aure Lanier is a 68 year old female who presents to clinic today for the following health issues:      Musculoskeletal problem/pain      Duration: post op total left knee replacement 4/17/2018 ( 3weeks ago) with Dr. Hernandez.      Description  Location: left knee    Intensity:  moderate, 6/10-10/10     Accompanying signs and symptoms: pain    History  Previous similar problem: yes but is post surgery.    Previous evaluation:  orthopedic evaluation    Precipitating or alleviating factors:  Trauma or overuse:  Patient had a total knee replacement.    Aggravating factors include: hurts all the time    Therapies tried and outcome: rest/inactivity, ice and   Oxycodone and the celebrex did not help.GIven celebrex for pain,   Celebrex gave her nausea.     Insomnia: Complains did not sleep in hospital. They were unable to help her with pain, and they would not give her Ambien 10mg-gave her 2.5mg.  And was up all night.      Problem list and histories reviewed & adjusted, as indicated.  Additional history: as documented    Patient Active Problem List   Diagnosis     Allergic arthritis involving hand     Hypothyroidism     Insomnia     Headache     Postherpetic polyneuropathy     Dysthymia     Anxiety state     Hyperlipidemia     Migraine     Osteoporosis     GERD     Low back pain     Advanced care planning/counseling discussion     Hyperlipidemia with target LDL less than 100     Bilateral chronic knee pain     ACP (advance care planning)     Back muscle spasm     Chronic pain syndrome     Status post total left knee replacement     Past Surgical History:   Procedure Laterality Date     ARTHROPLASTY KNEE Left 4/17/2018    Procedure: ARTHROPLASTY KNEE;  LEFT TOTAL KNEE ARTHROPLASTY S/N JOURNEY II;  Surgeon: Ty Hernandez MD;  Location: [facility] OR     D & C       ESOPHAGOSCOPY, GASTROSCOPY, DUODENOSCOPY (EGD), COMBINED N/A 9/11/2017    Procedure: COMBINED ESOPHAGOSCOPY, GASTROSCOPY, DUODENOSCOPY (EGD);  Upper  Endoscopy: Removal of Food Impaction;  Surgeon: Lino Zhu DO;  Location: [facility] OR     STOMACH SURGERY       stomach surgery peritinitis         Social History   Substance Use Topics     Smoking status: Never Smoker     Smokeless tobacco: Never Used      Comment: no passive exposure     Alcohol use Yes      Comment: rarely, maybe yearly     Family History   Problem Relation Age of Onset     CEREBROVASCULAR DISEASE Mother      CVA     Hypertension Mother      Other - See Comments Father 40     mining accident; cause of death     Other - See Comments Brother      muscle dystrophy     CANCER Daughter 34     skin cancer     Melanoma Daughter          Current Outpatient Prescriptions   Medication Sig Dispense Refill     acetaminophen (TYLENOL) 325 MG tablet Take 2-3 tablets (650-975 mg) by mouth every 4 hours as needed for other (mild to moderate pain) 100 tablet      calcium (CALCIUM 600) 600 MG tablet With vitamin D; take 2 by oral route every day       cyanocolbalamin (VITAMIN  B-12) 1000 MCG tablet Take 1 tablet by mouth daily.       cyclobenzaprine (FLEXERIL) 10 MG tablet TAKE 1 TABLET BY MOUTH THREE TIMES DAILY AS NEEDED 90 tablet 0     escitalopram (LEXAPRO) 20 MG tablet TAKE 1 TABLET BY MOUTH EVERY DAY 30 tablet 3     folic acid (FOLVITE) 1 MG tablet TAKE ONE TABLET DAILY BY MOUTH 90 tablet 2     gabapentin (NEURONTIN) 300 MG capsule TAKE ONE CAPSULE 3 TIMES A DAY BY MOUTH 90 capsule 3     levothyroxine (SYNTHROID/LEVOTHROID) 150 MCG tablet Take 1 tablet (150 mcg) by mouth daily 30 tablet 0     Multiple Vitamins-Minerals (MULTIVITAMIN OR) Take 1 tablet by mouth daily with food.       multivitamin (OCUVITE) TABS Take 1 tablet by mouth daily       NYSTOP [id number] UNIT/GM POWD powder APPLY TOPICALLY 2 TIMES DAILY AS NEEDED 60 g 3     omeprazole (PRILOSEC) 40 MG capsule TAKE 1 CAPSULE DAILY BY MOUTH 90 capsule 0     oxyCODONE IR (ROXICODONE) 5 MG tablet Take 1-2 tablets (5-10 mg) by mouth every 4 hours as needed  for other (pain control or improvement in physical function. Hold dose for analgesic side effects.) 30 tablet 0     RANITIDINE HCL PO Take 150 mg by mouth daily       rivaroxaban ANTICOAGULANT (XARELTO) 10 MG TABS tablet Take 1 tablet (10 mg) by mouth daily (with dinner) 14 tablet 0     simvastatin (ZOCOR) 40 MG tablet TAKE 1 TABLET BY MOUTH EVERY DAY IN THE EVENING . GENERIC FOR ZOCOR. (Patient taking differently: 20 mg TAKE 1 TABLET BY MOUTH EVERY DAY IN THE EVENING . GENERIC FOR ZOCOR.) 90 tablet 0     triamcinolone acetonide (KENALOG) 40 MG/ML injection Used 10mg for each knee 1 mL 0     zolpidem (AMBIEN) 10 MG tablet Take 1 tablet (10 mg) by mouth nightly as needed for sleep Take 1 tablet at night for sleep-may repeat X1. 60 tablet 0     HYDROcodone-acetaminophen (NORCO) 7.5-325 MG per tablet Do not resume taking while you are taking oxycodone 120 tablet 0     Allergies   Allergen Reactions     Erythromycin Base [Kdc:Yellow Dye+Erythromycin+Brilliant Blue Fcf] Nausea and Vomiting     Penicillins Rash       Reviewed and updated as needed this visit by clinical staff       Reviewed and updated as needed this visit by Provider         ROS:  Review of Systems   Constitutional: Positive for activity change, appetite change and fatigue. Negative for fever.        Patient crying.     HENT: Negative.    Respiratory: Negative for cough, shortness of breath and wheezing.    Cardiovascular: Negative for chest pain.   Gastrointestinal: Positive for constipation and nausea. Negative for abdominal pain, diarrhea and vomiting.   Genitourinary: Negative.    Musculoskeletal: Positive for arthralgias and gait problem.        Unrelenting pain in left knee.     Skin:        Incision to left knee post knee replacement.     Neurological: Positive for weakness and headaches. Negative for dizziness and numbness.   Hematological: Negative.    Psychiatric/Behavioral: Positive for dysphoric mood. Negative for suicidal ideas.  "    .ros  OBJECTIVE:     /70  Pulse 123  Temp 97.5  F (36.4  C)  Resp 20  Ht 5' 3\" (1.6 m)  Wt 196 lb (88.9 kg)  SpO2 99%  BMI 34.72 kg/m2  Body mass index is 34.72 kg/(m^2).   Physical Exam   Constitutional: She is oriented to person, place, and time. She appears distressed.   Looks hagardly   HENT:   Right Ear: External ear normal.   Left Ear: External ear normal.   Nose: Nose normal.   Mouth/Throat: Oropharynx is clear and moist.   Eyes: Conjunctivae and EOM are normal. Pupils are equal, round, and reactive to light.   Neck: Normal range of motion. Neck supple. No thyromegaly present.   Cardiovascular: Normal rate, regular rhythm, normal heart sounds and intact distal pulses.    No murmur heard.  Pulmonary/Chest: Effort normal and breath sounds normal.   Musculoskeletal: She exhibits tenderness. She exhibits no edema or deformity.        Left knee: She exhibits no swelling and no erythema. Tenderness found.        Legs:  Incision nonred, nonswollen.  Has steristrips.     Lymphadenopathy:     She has no cervical adenopathy.   Neurological: She is alert and oriented to person, place, and time.   Skin: Skin is warm and dry.   Incision intact.     Psychiatric:   Patient crying. States pain is unbearable.           ASSESSMENT/PLAN:     Problem List Items Addressed This Visit     None         ASSESSMENT / PLAN:  (Z55.839) Acute pain of right knee  (primary encounter diagnosis)  Comment:States Oxycontin did not work-but Ketorlac did.  Will change to Dilaudid for 5 days, see if can get pain under control so can do PT.  2mg up to 4 times a day. Given Ketorlac 10mg -take with food.  Up to 4 times a day for 5 days. . Given TOradol 60mg in clinic IM.  Return Friday.  Dysthymia related to pain-continue Lexapro 20mg a day for now.  With eye to can enhance bleeding also.    Plan: HYDROmorphone (DILAUDID) 2 MG tablet, ketorolac        (TORADOL) 10 MG tablet, ketorolac (TORADOL) 60         MG/2ML SOLN injection, " KETOROLAC (TORADOL) 15         MG, INJECTION INTRAMUSCULAR OR SUB-Q           (Z87.19) History of GI bleed  Comment: Does have hx of GI bleed -unable to take celebrex given by Dr. Hernandez as upset her stomach.  Will take Ketorlac with food. Given Carafate 1gm  4 times a day for 10 days.    Plan: sucralfate (CARAFATE) 1 GM tablet               Patient Instructions   1. Dilaudid 2mg  4 times a day-for knee  pain  Take before Carafate.    2. Toradol(Ketorlac)   10mg 4 times a day with food for 5 days   3. Carafate 1 gm  4 times a day to coat stomach  4. Continue lexapro 20mg          Jaden Lee NP  [facility]

## 2018-05-07 NOTE — PATIENT INSTRUCTIONS
1. Dilaudid 2mg  4 times a day-for knee  pain  Take before Carafate.    2. Toradol(Ketorlac)   10mg 4 times a day with food for 5 days   3. Carafate 1 gm  4 times a day to coat stomach  4. Continue lexapro 20mg

## 2018-05-07 NOTE — MR AVS SNAPSHOT
After Visit Summary   5/7/2018    Aure Lanier    MRN: [id number]           Patient Information     Date Of Birth          1950        Visit Information        Provider Department      5/7/2018 2:00 PM [facility] Lee [facility]        Today's Diagnoses     Acute pain of right knee    -  1    History of GI bleed          Care Instructions    1. Dilaudid 2mg  4 times a day-for knee  pain  Take before Carafate.    2. Toradol(Ketorlac)   10mg 4 times a day with food for 5 days   3. Carafate 1 gm  4 times a day to coat stomach  4. Continue lexapro 20mg              Follow-ups after your visit        Who to contact     If you have questions or need follow up information about today's clinic visit or your schedule please contact [facility] directly at [phone].  Normal or non-critical lab and imaging results will be communicated to you by Dercetohart, letter or phone within 4 business days after the clinic has received the results. If you do not hear from us within 7 days, please contact the clinic through Dercetohart or phone. If you have a critical or abnormal lab result, we will notify you by phone as soon as possible.  Submit refill requests through txtr or call your pharmacy and they will forward the refill request to us. Please allow 3 business days for your refill to be completed.          Additional Information About Your Visit        MyChart Information     txtr gives you secure access to your electronic health record. If you see a primary care provider, you can also send messages to your care team and make appointments. If you have questions, please call your primary care clinic.  If you do not have a primary care provider, please call [phone] and they will assist you.        Care EveryWhere ID     This is your Care EveryWhere ID. This could be used by other organizations to access your [place] medical records  [id number]        Your Vitals Were      "Pulse Temperature Respirations Height Pulse Oximetry BMI (Body Mass Index)    123 97.5  F (36.4  C) 20 5' 3\" (1.6 m) 99% 34.72 kg/m2       Blood Pressure from Last 3 Encounters:   05/07/18 154/70   04/20/18 123/74   04/09/18 140/78    Weight from Last 3 Encounters:   05/07/18 196 lb (88.9 kg)   04/17/18 196 lb (88.9 kg)   04/09/18 196 lb (88.9 kg)              Today, you had the following     No orders found for display         Today's Medication Changes          These changes are accurate as of 5/7/18  3:13 PM.  If you have any questions, ask your nurse or doctor.               Start taking these medicines.        Dose/Directions    HYDROmorphone 2 MG tablet   Commonly known as:  DILAUDID   Used for:  Acute pain of right knee   Started by:  Jaden Lee NP        Dose:  2 mg   Take 1 tablet (2 mg) by mouth every 6 hours as needed for severe pain   Quantity:  20 tablet   Refills:  0       sucralfate 1 GM tablet   Commonly known as:  CARAFATE   Used for:  History of GI bleed   Started by:  Jaden Lee NP        Dose:  1 g   Take 1 tablet (1 g) by mouth 4 times daily   Quantity:  40 tablet   Refills:  1         These medicines have changed or have updated prescriptions.        Dose/Directions    ketorolac 10 MG tablet   Commonly known as:  TORADOL   This may have changed:  See the new instructions.   Used for:  Acute pain of right knee   Changed by:  Jaden Lee NP        Dose:  10 mg   Take 1 tablet (10 mg) by mouth every 6 hours as needed for moderate pain   Quantity:  20 tablet   Refills:  0       simvastatin 40 MG tablet   Commonly known as:  ZOCOR   This may have changed:    - how much to take  - additional instructions   Used for:  Mixed hyperlipidemia        TAKE 1 TABLET BY MOUTH EVERY DAY IN THE EVENING . GENERIC FOR ZOCOR.   Quantity:  90 tablet   Refills:  0         Stop taking these medicines if you haven't already. Please contact your care team if you have questions.     oxyCODONE IR 5 MG " tablet   Commonly known as:  ROXICODONE   Stopped by:  Jaden Lee NP                Where to get your medicines      These medications were sent to [id number] - [place] - [address]  [address] [place] [address]     Phone:  [phone]     ketorolac 10 MG tablet    sucralfate 1 GM tablet         Some of these will need a paper prescription and others can be bought over the counter.  Ask your nurse if you have questions.     Bring a paper prescription for each of these medications     HYDROmorphone 2 MG tablet               Information about OPIOIDS     PRESCRIPTION OPIOIDS: WHAT YOU NEED TO KNOW   You have a prescription for an opioid (narcotic) pain medicine. Opioids can cause addiction. If you have a history of chemical dependency of any type, you are at a higher risk of becoming addicted to opioids. Only take this medicine after all other options have been tried. Take it for as short a time and as few doses as possible.     Do not:    Drive. If you drive while taking these medicines, you could be arrested for driving under the influence (DUI).    Operate heavy machinery    Do any other dangerous activities while taking these medicines.     Drink any alcohol while taking these medicines.      Take with any other medicines that contain acetaminophen. Read all labels carefully. Look for the word  acetaminophen  or  Tylenol.  Ask your pharmacist if you have questions or are unsure.    Store your pills in a secure place, locked if possible. We will not replace any lost or stolen medicine. If you don t finish your medicine, please throw away (dispose) as directed by your pharmacist. The [place] Pollution Control Agency has more information about safe disposal: https://www.pca.[facility].[place]/living-green/managing-unwanted-medications    All opioids tend to cause constipation. Drink plenty of water and eat foods that have a lot of fiber, such as fruits, vegetables, prune juice, apple  juice and high-fiber cereal. Take a laxative (Miralax, milk of magnesia, Colace, Senna) if you don t move your bowels at least every other day.          Primary Care Provider Office Phone # Fax #    Jaden Lee [phone] [address] [facility] [address]        Equal Access to Services     [facility]: Hadii aad ku hadasho Soomaali, waaxda luqadaha, qaybta kaalmada adeegyada, waxay idiin hayaan adeeg kharash ladaquan . So [facility] [phone].    ATENCIÓN: Si habla español, tiene a gross disposición servicios gratuitos de asistencia lingüística. Llame al [phone].    We comply with applicable federal civil rights laws and [place] laws. We do not discriminate on the basis of race, color, national origin, age, disability, sex, sexual orientation, or gender identity.            Thank you!     Thank you for choosing [facility]  for your care. Our goal is always to provide you with excellent care. Hearing back from our patients is one way we can continue to improve our services. Please take a few minutes to complete the written survey that you may receive in the mail after your visit with us. Thank you!             Your Updated Medication List - Protect others around you: Learn how to safely use, store and throw away your medicines at www.disposemymeds.org.          This list is accurate as of 5/7/18  3:13 PM.  Always use your most recent med list.                   Brand Name Dispense Instructions for use Diagnosis    acetaminophen 325 MG tablet    TYLENOL    100 tablet    Take 2-3 tablets (650-975 mg) by mouth every 4 hours as needed for other (mild to moderate pain)    Status post total left knee replacement       calcium carbonate 600 MG tablet   Generic drug:  calcium      With vitamin D; take 2 by oral route every day        cyanocobalamin 1000 MCG tablet    vitamin  B-12     Take 1 tablet by mouth daily.        cyclobenzaprine 10 MG tablet    FLEXERIL    90 tablet     TAKE 1 TABLET BY MOUTH THREE TIMES DAILY AS NEEDED    Cervicalgia       escitalopram 20 MG tablet    LEXAPRO    30 tablet    TAKE 1 TABLET BY MOUTH EVERY DAY    Anxiety       folic acid 1 MG tablet    FOLVITE    90 tablet    TAKE ONE TABLET DAILY BY MOUTH    Health care maintenance       gabapentin 300 MG capsule    NEURONTIN    90 capsule    TAKE ONE CAPSULE 3 TIMES A DAY BY MOUTH    Postherpetic polyneuropathy       HYDROcodone-acetaminophen 7.5-325 MG per tablet    NORCO    120 tablet    Do not resume taking while you are taking oxycodone    Generalized pain       HYDROmorphone 2 MG tablet    DILAUDID    20 tablet    Take 1 tablet (2 mg) by mouth every 6 hours as needed for severe pain    Acute pain of right knee       ketorolac 10 MG tablet    TORADOL    20 tablet    Take 1 tablet (10 mg) by mouth every 6 hours as needed for moderate pain    Acute pain of right knee       levothyroxine 150 MCG tablet    SYNTHROID/LEVOTHROID    30 tablet    Take 1 tablet (150 mcg) by mouth daily    Hypothyroidism, unspecified type       * MULTIVITAMIN PO      Take 1 tablet by mouth daily with food.        * multivitamin Tabs tablet      Take 1 tablet by mouth daily        NYSTOP 005357 UNIT/GM Powd   Generic drug:  nystatin     60 g    APPLY TOPICALLY 2 TIMES DAILY AS NEEDED    Rash       omeprazole 40 MG capsule    priLOSEC    90 capsule    TAKE 1 CAPSULE DAILY BY MOUTH        RANITIDINE HCL PO      Take 150 mg by mouth daily        rivaroxaban ANTICOAGULANT 10 MG Tabs tablet    XARELTO    14 tablet    Take 1 tablet (10 mg) by mouth daily (with dinner)    Status post total left knee replacement       simvastatin 40 MG tablet    ZOCOR    90 tablet    TAKE 1 TABLET BY MOUTH EVERY DAY IN THE EVENING . GENERIC FOR ZOCOR.    Mixed hyperlipidemia       sucralfate 1 GM tablet    CARAFATE    40 tablet    Take 1 tablet (1 g) by mouth 4 times daily    History of GI bleed       triamcinolone acetonide 40 MG/ML injection    KENALOG    1  mL    Used 10mg for each knee    Bilateral chronic knee pain       zolpidem 10 MG tablet    AMBIEN    60 tablet    Take 1 tablet (10 mg) by mouth nightly as needed for sleep Take 1 tablet at night for sleep-may repeat X1.    Persistent insomnia       * Notice:  This list has 2 medication(s) that are the same as other medications prescribed for you. Read the directions carefully, and ask your doctor or other care provider to review them with you.

## 2018-05-07 NOTE — LETTER
My Depression Action Plan  Name: Aure Lanier   Date of Birth 1950  Date: 5/7/2018    My doctor: Jaden Lee   My clinic: [facility] [place]  [place] [address]  [phone]          GREEN    ZONE   Good Control    What it looks like:     Things are going generally well. You have normal up s and down s. You may even feel depressed from time to time, but bad moods usually last less than a day.   What you need to do:  1. Continue to care for yourself (see self care plan)  2. Check your depression survival kit and update it as needed  3. Follow your physician s recommendations including any medication.  4. Do not stop taking medication unless you consult with your physician first.           YELLOW         ZONE Getting Worse    What it looks like:     Depression is starting to interfere with your life.     It may be hard to get out of bed; you may be starting to isolate yourself from others.    Symptoms of depression are starting to last most all day and this has happened for several days.     You may have suicidal thoughts but they are not constant.   What you need to do:     1. Call your care team, your response to treatment will improve if you keep your care team informed of your progress. Yellow periods are signs an adjustment may need to be made.     2. Continue your self-care, even if you have to fake it!    3. Talk to someone in your support network    4. Open up your depression survival kit           RED    ZONE Medical Alert - Get Help    What it looks like:     Depression is seriously interfering with your life.     You may experience these or other symptoms: You can t get out of bed most days, can t work or engage in other necessary activities, you have trouble taking care of basic hygiene, or basic responsibilities, thoughts of suicide or death that will not go away, self-injurious behavior.     What you need to do:  1. Call your care team and request a same-day  appointment. If they are not available (weekends or after hours) call your local crisis line, emergency room or 911.            Depression Self Care Plan / Survival Kit    Self-Care for Depression  Here s the deal. Your body and mind are really not as separate as most people think.  What you do and think affects how you feel and how you feel influences what you do and think. This means if you do things that people who feel good do, it will help you feel better.  Sometimes this is all it takes.  There is also a place for medication and therapy depending on how severe your depression is, so be sure to consult with your medical provider and/ or Behavioral Health Consultant if your symptoms are worsening or not improving.     In order to better manage my stress, I will:    Exercise  Get some form of exercise, every day. This will help reduce pain and release endorphins, the  feel good  chemicals in your brain. This is almost as good as taking antidepressants!  This is not the same as joining a gym and then never going! (they count on that by the way ) It can be as simple as just going for a walk or doing some gardening, anything that will get you moving.      Hygiene   Maintain good hygiene (Get out of bed in the morning, Make your bed, Brush your teeth, Take a shower, and Get dressed like you were going to work, even if you are unemployed).  If your clothes don't fit try to get ones that do.    Diet  I will strive to eat foods that are good for me, drink plenty of water, and avoid excessive sugar, caffeine, alcohol, and other mood-altering substances.  Some foods that are helpful in depression are: complex carbohydrates, B vitamins, flaxseed, fish or fish oil, fresh fruits and vegetables.    Psychotherapy  I agree to participate in Individual Therapy (if recommended).    Medication  If prescribed medications, I agree to take them.  Missing doses can result in serious side effects.  I understand that drinking alcohol,  or other illicit drug use, may cause potential side effects.  I will not stop my medication abruptly without first discussing it with my provider.    Staying Connected With Others  I will stay in touch with my friends, family members, and my primary care provider/team.    Use your imagination  Be creative.  We all have a creative side; it doesn t matter if it s oil painting, sand castles, or mud pies! This will also kick up the endorphins.    Witness Beauty  (AKA stop and smell the roses) Take a look outside, even in mid-winter. Notice colors, textures. Watch the squirrels and birds.     Service to others  Be of service to others.  There is always someone else in need.  By helping others we can  get out of ourselves  and remember the really important things.  This also provides opportunities for practicing all the other parts of the program.    Humor  Laugh and be silly!  Adjust your TV habits for less news and crime-drama and more comedy.    Control your stress  Try breathing deep, massage therapy, biofeedback, and meditation. Find time to relax each day.     My support system    Clinic Contact:  Phone number:    Contact 1:  Phone number:    Contact 2:  Phone number:    Confucianism/:  Phone number:    Therapist:  Phone number:    Local crisis center:    Phone number:    Other community support:  Phone number:

## 2018-05-07 NOTE — NURSING NOTE
"Chief Complaint   Patient presents with     Surgical Followup     follow up from total left knee replacement done on 4/17/2018       Initial /70  Pulse 123  Temp 97.5  F (36.4  C)  Resp 20  Ht 5' 3\" (1.6 m)  Wt 196 lb (88.9 kg)  SpO2 99%  BMI 34.72 kg/m2 Estimated body mass index is 34.72 kg/(m^2) as calculated from the following:    Height as of this encounter: 5' 3\" (1.6 m).    Weight as of this encounter: 196 lb (88.9 kg).  Medication Reconciliation: complete   Elly Arredondo LPN  Patient declines doing the PHq9 and AURELIA  "

## 2018-05-08 ASSESSMENT — ENCOUNTER SYMPTOMS
HEADACHES: 1
DIZZINESS: 0
ABDOMINAL PAIN: 0
COUGH: 0
CONSTIPATION: 1
FEVER: 0
DIARRHEA: 0
DYSPHORIC MOOD: 1
NAUSEA: 1
SHORTNESS OF BREATH: 0
WHEEZING: 0
APPETITE CHANGE: 1
ACTIVITY CHANGE: 1
ARTHRALGIAS: 1
NUMBNESS: 0
FATIGUE: 1
HEMATOLOGIC/LYMPHATIC NEGATIVE: 1
VOMITING: 0
WEAKNESS: 1

## 2018-05-09 NOTE — TELEPHONE ENCOUNTER
Called patient and she did not request this medication, the request  For this pain medicaiton is an error

## 2018-05-11 ENCOUNTER — OFFICE VISIT (OUTPATIENT)
Dept: INTERNAL MEDICINE | Facility: OTHER | Age: 68
End: 2018-05-11
Attending: NURSE PRACTITIONER
Payer: COMMERCIAL

## 2018-05-11 VITALS
DIASTOLIC BLOOD PRESSURE: 70 MMHG | OXYGEN SATURATION: 92 % | TEMPERATURE: 96.3 F | SYSTOLIC BLOOD PRESSURE: 130 MMHG | HEART RATE: 94 BPM | HEIGHT: 63 IN | BODY MASS INDEX: 34.73 KG/M2 | WEIGHT: 196 LBS | RESPIRATION RATE: 18 BRPM

## 2018-05-11 DIAGNOSIS — M25.561 ACUTE PAIN OF RIGHT KNEE: ICD-10-CM

## 2018-05-11 PROCEDURE — G0463 HOSPITAL OUTPT CLINIC VISIT: HCPCS

## 2018-05-11 PROCEDURE — 99213 OFFICE O/P EST LOW 20 MIN: CPT | Performed by: NURSE PRACTITIONER

## 2018-05-11 RX ORDER — KETOROLAC TROMETHAMINE 10 MG/1
10 TABLET, FILM COATED ORAL EVERY 6 HOURS PRN
Qty: 20 TABLET | Refills: 0 | Status: SHIPPED | OUTPATIENT
Start: 2018-05-11 | End: 2018-06-01

## 2018-05-11 RX ORDER — HYDROMORPHONE HYDROCHLORIDE 2 MG/1
2 TABLET ORAL EVERY 6 HOURS PRN
Qty: 20 TABLET | Refills: 0 | Status: SHIPPED | OUTPATIENT
Start: 2018-05-11 | End: 2018-06-01

## 2018-05-11 ASSESSMENT — PAIN SCALES - GENERAL: PAINLEVEL: MODERATE PAIN (5)

## 2018-05-11 NOTE — PROGRESS NOTES
SUBJECTIVE:  Aure Lanier, a 68 year old female scheduled an appointment to discuss the following issues:    Pain management: Has been on Dilaudid  2mg  4 times a day.   , and Toradol 10mg 4 times a day Has had better pain control , able  To go to PT.and do exercises.      Medical, social, surgical, and family histories reviewed.    Current Outpatient Prescriptions   Medication     acetaminophen (TYLENOL) 325 MG tablet     calcium (CALCIUM 600) 600 MG tablet     cyanocolbalamin (VITAMIN  B-12) 1000 MCG tablet     cyclobenzaprine (FLEXERIL) 10 MG tablet     escitalopram (LEXAPRO) 20 MG tablet     folic acid (FOLVITE) 1 MG tablet     gabapentin (NEURONTIN) 300 MG capsule     HYDROcodone-acetaminophen (NORCO) 7.5-325 MG per tablet     HYDROmorphone (DILAUDID) 2 MG tablet     ketorolac (TORADOL) 10 MG tablet     levothyroxine (SYNTHROID/LEVOTHROID) 150 MCG tablet     Multiple Vitamins-Minerals (MULTIVITAMIN OR)     multivitamin (OCUVITE) TABS     NYSTOP 095492 UNIT/GM POWD powder     omeprazole (PRILOSEC) 40 MG capsule     RANITIDINE HCL PO     simvastatin (ZOCOR) 40 MG tablet     sucralfate (CARAFATE) 1 GM tablet     triamcinolone acetonide (KENALOG) 40 MG/ML injection     zolpidem (AMBIEN) 10 MG tablet     No current facility-administered medications for this visit.        ROS:  CONSTITUTIONAL:  negative for  fevers, chills, malaise and weight loss  EYES:  negative for  blurred vision and eye discharge  EARS/NOSE/THROAT:  negative for  ear drainage, earaches, nasal congestion , sore throat , difficulty swallowing  SKIN:  negative for rash and skin lesion(s)  RESPIRATORY:  negative for cough, dyspnea and wheezing  CARDIOVASCULAR:  negative for  chest pain, palpitations, edema  GASTROINTESTINAL:  negative for nausea, vomiting, reflux,   abdominal pain   diarrhea, constipation   MUSCULOSKELETAL:  negative for  myalgias, arthralgias, neck pain,  back   pain, joint swelling and stiff joints  URINARY:  negative for  "frequency, dysuria, nocturia and hesitancy  NEUROLOGICAL:  negative for headaches, dizziness, memory problems, weakness and numbness    OBJECTIVE:  /70  Pulse 94  Temp 96.3  F (35.7  C) (Tympanic)  Resp 18  Ht 5' 3\" (1.6 m)  Wt 196 lb (88.9 kg)  SpO2 92%  BMI 34.72 kg/m2  EXAM:  GENERAL APPEARANCE:  alert and no distress  EYES: EOMI,  PERRL  HENT:EARS: TM's pearly gray, good lite reflex, NOSE: Nares red, moist nonswollen, THROAT: Oropharynx pink  Tongue midline, no fasciculations.   NECK: Supple, no lymphadenopathy, no thyromegaly, no carotid bruits, No JVD  RESP: lungs clear to auscultation anteriorly and posteriorly - no rales, rhonchi or wheezes  CV: regular rates and rhythm, normal S1 S2, no S3 or S4 and no murmur, no click or rub -  ABDOMEN:  soft, nontender, no hepatomegaly, no splenomegaly,  or masses,  bowel sounds normal  MS: No edema  NEURO:No focal deficits, CN 2-12 intact.           ASSESSMENT / PLAN:  (M25.561) Acute pain of right knee  Comment: will do 1 more week of Dilaudid 2mg 4 times a day, and Ketoralac 10mg, and then switch to Lortab 10/325mg for 2 weeks, then go to her lortab 7.5/325mg  4 times a day as needed.    Plan: HYDROmorphone (DILAUDID) 2 MG tablet, ketorolac   Encouraged to move .          (TORADOL) 10 MG tablet           "

## 2018-05-11 NOTE — NURSING NOTE
"Chief Complaint   Patient presents with     RECHECK     left knee       Initial /70  Pulse 94  Temp 96.3  F (35.7  C) (Tympanic)  Resp 18  Ht 5' 3\" (1.6 m)  Wt 196 lb (88.9 kg)  SpO2 92%  BMI 34.72 kg/m2 Estimated body mass index is 34.72 kg/(m^2) as calculated from the following:    Height as of this encounter: 5' 3\" (1.6 m).    Weight as of this encounter: 196 lb (88.9 kg).  Medication Reconciliation: complete    Elly Arredondo LPN  The patient did not do the PHQ( and  Leonel it was just done  "

## 2018-05-11 NOTE — MR AVS SNAPSHOT
After Visit Summary   5/11/2018    Aure Lanier    MRN: [id number]           Patient Information     Date Of Birth          1950        Visit Information        Provider Department      5/11/2018 4:00 PM Jaden Lee NP [facility]        Today's Diagnoses     Acute pain of right knee          Care Instructions    1.  Continue Dilaudid 2mg  4 times a day  For 5 more days  2. Ketoralac 10mg  4 times a day with food,    3. Call me next week and  Will go to 10/325mg Lortab   4 times day for 2 weeks, then will return to 7.5/325mg tabs.             Follow-ups after your visit        Who to contact     If you have questions or need follow up information about today's clinic visit or your schedule please contact [facility] directly at [phone].  Normal or non-critical lab and imaging results will be communicated to you by Stylehivehart, letter or phone within 4 business days after the clinic has received the results. If you do not hear from us within 7 days, please contact the clinic through Stylehivehart or phone. If you have a critical or abnormal lab result, we will notify you by phone as soon as possible.  Submit refill requests through Critical Media or call your pharmacy and they will forward the refill request to us. Please allow 3 business days for your refill to be completed.          Additional Information About Your Visit        MyChart Information     [employer] gives you secure access to your electronic health record. If you see a primary care provider, you can also send messages to your care team and make appointments. If you have questions, please call your primary care clinic.  If you do not have a primary care provider, please call [phone] and they will assist you.        Care EveryWhere ID     This is your Care EveryWhere ID. This could be used by other organizations to access your [facility] medical records  [id number]        Your Vitals Were     Pulse Temperature  "Respirations Height Pulse Oximetry BMI (Body Mass Index)    94 96.3  F (35.7  C) (Tympanic) 18 5' 3\" (1.6 m) 92% 34.72 kg/m2       Blood Pressure from Last 3 Encounters:   05/11/18 130/70   05/07/18 128/70   04/20/18 123/74    Weight from Last 3 Encounters:   05/11/18 196 lb (88.9 kg)   05/07/18 196 lb (88.9 kg)   04/17/18 196 lb (88.9 kg)              Today, you had the following     No orders found for display         Today's Medication Changes          These changes are accurate as of 5/11/18  4:45 PM.  If you have any questions, ask your nurse or doctor.               These medicines have changed or have updated prescriptions.        Dose/Directions    simvastatin 40 MG tablet   Commonly known as:  ZOCOR   This may have changed:    - how much to take  - additional instructions   Used for:  Mixed hyperlipidemia        TAKE 1 TABLET BY MOUTH EVERY DAY IN THE EVENING . GENERIC FOR ZOCOR.   Quantity:  90 tablet   Refills:  0            Where to get your medicines      These medications were sent to [id number] - [place], [place] - [address]  [address] [place] [address]     Phone:  [phone]     ketorolac 10 MG tablet         Some of these will need a paper prescription and others can be bought over the counter.  Ask your nurse if you have questions.     Bring a paper prescription for each of these medications     HYDROmorphone 2 MG tablet               Information about OPIOIDS     PRESCRIPTION OPIOIDS: WHAT YOU NEED TO KNOW   You have a prescription for an opioid (narcotic) pain medicine. Opioids can cause addiction. If you have a history of chemical dependency of any type, you are at a higher risk of becoming addicted to opioids. Only take this medicine after all other options have been tried. Take it for as short a time and as few doses as possible.     Do not:    Drive. If you drive while taking these medicines, you could be arrested for driving under the influence (DUI).    Operate " heavy machinery    Do any other dangerous activities while taking these medicines.     Drink any alcohol while taking these medicines.      Take with any other medicines that contain acetaminophen. Read all labels carefully. Look for the word  acetaminophen  or  Tylenol.  Ask your pharmacist if you have questions or are unsure.    Store your pills in a secure place, locked if possible. We will not replace any lost or stolen medicine. If you don t finish your medicine, please throw away (dispose) as directed by your pharmacist. The [place] Pollution Control Agency has more information about safe disposal: [email or web address]://[email or web address]-medications    All opioids tend to cause constipation. Drink plenty of water and eat foods that have a lot of fiber, such as fruits, vegetables, prune juice, apple juice and high-fiber cereal. Take a laxative (Miralax, milk of magnesia, Colace, Senna) if you don t move your bowels at least every other day.          Primary Care Provider Office Phone # Fax #    Jaden Lee [phone]       [address] [place] [address]        Equal Access to Services     [facility] : Hadii jefry brito hadasho Sojun, waaxda luqadaha, qaybta kaalmaroxanna vanegas, racquel craven. So [facility] [phone].    ATENCIÓN: Si habla español, tiene a gross disposición servicios gratuitos de asistencia lingüística. Lei al [phone].    We comply with applicable federal civil rights laws and [place] laws. We do not discriminate on the basis of race, color, national origin, age, disability, sex, sexual orientation, or gender identity.            Thank you!     Thank you for choosing [facility]  for your care. Our goal is always to provide you with excellent care. Hearing back from our patients is one way we can continue to improve our services. Please take a few minutes to complete the written survey that you may  receive in the mail after your visit with us. Thank you!             Your Updated Medication List - Protect others around you: Learn how to safely use, store and throw away your medicines at www.disposemymeds.org.          This list is accurate as of 5/11/18  4:45 PM.  Always use your most recent med list.                   Brand Name Dispense Instructions for use Diagnosis    acetaminophen 325 MG tablet    TYLENOL    100 tablet    Take 2-3 tablets (650-975 mg) by mouth every 4 hours as needed for other (mild to moderate pain)    Status post total left knee replacement       calcium carbonate 600 MG tablet   Generic drug:  calcium      With vitamin D; take 2 by oral route every day        cyanocobalamin 1000 MCG tablet    vitamin  B-12     Take 1 tablet by mouth daily.        cyclobenzaprine 10 MG tablet    FLEXERIL    90 tablet    TAKE ONE TABLET THREE TIMES A DAY BY MOUTH AS NEEDED - GENERIC FOR FLEXERIL    Cervicalgia       escitalopram 20 MG tablet    LEXAPRO    30 tablet    TAKE 1 TABLET BY MOUTH EVERY DAY    Anxiety       folic acid 1 MG tablet    FOLVITE    90 tablet    TAKE ONE TABLET DAILY BY MOUTH    Health care maintenance       gabapentin 300 MG capsule    NEURONTIN    90 capsule    TAKE ONE CAPSULE 3 TIMES A DAY BY MOUTH    Postherpetic polyneuropathy       HYDROcodone-acetaminophen 7.5-325 MG per tablet    NORCO    120 tablet    Do not resume taking while you are taking oxycodone    Generalized pain       HYDROmorphone 2 MG tablet    DILAUDID    20 tablet    Take 1 tablet (2 mg) by mouth every 6 hours as needed for severe pain    Acute pain of right knee       ketorolac 10 MG tablet    TORADOL    20 tablet    Take 1 tablet (10 mg) by mouth every 6 hours as needed for moderate pain    Acute pain of right knee       levothyroxine 150 MCG tablet    SYNTHROID/LEVOTHROID    30 tablet    Take 1 tablet (150 mcg) by mouth daily    Hypothyroidism, unspecified type       * MULTIVITAMIN PO      Take 1 tablet by  mouth daily with food.        * multivitamin Tabs tablet      Take 1 tablet by mouth daily        NYSTOP 496288 UNIT/GM Powd   Generic drug:  nystatin     60 g    APPLY TOPICALLY 2 TIMES DAILY AS NEEDED    Rash       omeprazole 40 MG capsule    priLOSEC    90 capsule    TAKE 1 CAPSULE DAILY BY MOUTH        RANITIDINE HCL PO      Take 150 mg by mouth daily        simvastatin 40 MG tablet    ZOCOR    90 tablet    TAKE 1 TABLET BY MOUTH EVERY DAY IN THE EVENING . GENERIC FOR ZOCOR.    Mixed hyperlipidemia       sucralfate 1 GM tablet    CARAFATE    40 tablet    Take 1 tablet (1 g) by mouth 4 times daily    History of GI bleed       triamcinolone acetonide 40 MG/ML injection    KENALOG    1 mL    Used 10mg for each knee    Bilateral chronic knee pain       zolpidem 10 MG tablet    AMBIEN    60 tablet    Take 1 tablet (10 mg) by mouth nightly as needed for sleep Take 1 tablet at night for sleep-may repeat X1.    Persistent insomnia       * Notice:  This list has 2 medication(s) that are the same as other medications prescribed for you. Read the directions carefully, and ask your doctor or other care provider to review them with you.

## 2018-05-11 NOTE — PATIENT INSTRUCTIONS
1.  Continue Dilaudid 2mg  4 times a day  For 5 more days  2. Ketoralac 10mg  4 times a day with food,    3. Call me next week and  Will go to 10/325mg Lortab   4 times day for 2 weeks, then will return to 7.5/325mg tabs.

## 2018-05-15 ENCOUNTER — TELEPHONE (OUTPATIENT)
Dept: INTERNAL MEDICINE | Facility: OTHER | Age: 68
End: 2018-05-15

## 2018-05-15 DIAGNOSIS — M25.561 ACUTE PAIN OF RIGHT KNEE: Primary | ICD-10-CM

## 2018-05-15 RX ORDER — HYDROCODONE BITARTRATE AND ACETAMINOPHEN 10; 325 MG/1; MG/1
1 TABLET ORAL 4 TIMES DAILY PRN
Qty: 56 TABLET | Refills: 0 | Status: SHIPPED | OUTPATIENT
Start: 2018-05-15 | End: 2018-06-01

## 2018-05-15 NOTE — TELEPHONE ENCOUNTER
11:29 AM    Reason for Call: Phone Call    Description: Pt called and states that she was supposed to call and let Jaden Lee know that she was supposed to get some lower tabs today [facility] because she is recovering from a knee replacement     Was an appointment offered for this call? No  If yes : Appointment type              Date    Preferred method for responding to this message: Telephone Call  What is your phone number ?[phone]    If we cannot reach you directly, may we leave a detailed response at the number you provided? Yes    Can this message wait until your PCP/provider returns, if available today? Not applicable, PCP is in     Radha Bascom

## 2018-05-16 NOTE — TELEPHONE ENCOUNTER
Pt notified that the written RX is ready at the [facility]  registration to be picked up.

## 2018-05-22 ENCOUNTER — TRANSFERRED RECORDS (OUTPATIENT)
Dept: HEALTH INFORMATION MANAGEMENT | Facility: CLINIC | Age: 68
End: 2018-05-22

## 2018-05-23 DIAGNOSIS — G47.00 PERSISTENT INSOMNIA: ICD-10-CM

## 2018-05-23 NOTE — TELEPHONE ENCOUNTER
ambien      Last Written Prescription Date:  4/24/18  Last Fill Quantity: 60,   # refills: 0  Last Office Visit: 5/11/18  Future Office visit:       Routing refill request to provider for review/approval because:  Drug not on the [facility], [facility] or [facility] refill protocol or controlled substance

## 2018-05-24 RX ORDER — ZOLPIDEM TARTRATE 10 MG/1
10 TABLET ORAL
Qty: 60 TABLET | Refills: 0 | Status: SHIPPED | OUTPATIENT
Start: 2018-05-24 | End: 2018-06-21

## 2018-06-01 ENCOUNTER — TELEPHONE (OUTPATIENT)
Dept: INTERNAL MEDICINE | Facility: OTHER | Age: 68
End: 2018-06-01

## 2018-06-01 DIAGNOSIS — R52 GENERALIZED PAIN: ICD-10-CM

## 2018-06-01 DIAGNOSIS — M25.561 ACUTE PAIN OF RIGHT KNEE: ICD-10-CM

## 2018-06-01 RX ORDER — HYDROCODONE BITARTRATE AND ACETAMINOPHEN 7.5; 325 MG/1; MG/1
1 TABLET ORAL EVERY 6 HOURS PRN
Qty: 120 TABLET | Refills: 0 | Status: SHIPPED | OUTPATIENT
Start: 2018-06-01 | End: 2018-06-27

## 2018-06-01 RX ORDER — HYDROCODONE BITARTRATE AND ACETAMINOPHEN 10; 325 MG/1; MG/1
1 TABLET ORAL 4 TIMES DAILY PRN
Qty: 56 TABLET | Refills: 0 | Status: CANCELLED | OUTPATIENT
Start: 2018-06-01

## 2018-06-01 NOTE — TELEPHONE ENCOUNTER
Reason for call:  Medication    1. Medication Name? HYDROcodone-acetaminophen (LORTAB)  MG per tablet  2. Is this request for a refill? Yes  3. What Pharmacy do you use? [facility]  4. Have you contacted your pharmacy? Yes    5. If yes, when?  (Please note that the turn-around-time for prescriptions is 72 business hours; I am sending your request at this time. SEND TO  [facility]  )  Description: Pt called and states that she contacted her pharmacy on Monday and got her other medications and not this one and she will be out of this and would like to try to get a refill before the end of the day.  Was an appointment offered for this a call? No   Preferred method for responding to this messageTelephone Call  If we cannot reach you directly, may we leave a detailed response at the number you provided? Yes  Can this message wait until your PCP/Provider returns if not available today? N/a

## 2018-06-01 NOTE — TELEPHONE ENCOUNTER
Controlled Substance Refill Request for Lortab  Problem List Complete:  Yes    Last Written Prescription Date:  5/15/18  Last Fill Quantity: 56,   # refills: 0    Last Office Visit with [facility] primary care provider: 5/11/18      Controlled substance agreement on file: Yes:  Date 7/20/17.     Processing:  Patient will  in clinic  PCP HARSHAD Lee  Thank you.

## 2018-06-01 NOTE — TELEPHONE ENCOUNTER
Please call patient in regards to note below.  Patient has been calling clinic seeking medication.  Thank you.

## 2018-06-04 NOTE — TELEPHONE ENCOUNTER
Asked Nurse if patient received this medication.  Waiting on response from Nurse when she speaks with PCP Jesus.  Thank you.

## 2018-06-21 DIAGNOSIS — G47.00 PERSISTENT INSOMNIA: ICD-10-CM

## 2018-06-21 RX ORDER — ZOLPIDEM TARTRATE 10 MG/1
10 TABLET ORAL
Qty: 60 TABLET | Refills: 0 | Status: SHIPPED | OUTPATIENT
Start: 2018-06-21 | End: 2018-07-18

## 2018-06-21 NOTE — TELEPHONE ENCOUNTER
Controlled Substance Refill Request for Ambien  Problem List Complete:  Yes    Last Written Prescription Date:  5.24.18  Last Fill Quantity: 60,   # refills: 0    Last Office Visit with [facility] primary care provider: 5.11.18    Clinic visit frequency required: Q 3 months     Future Office visit:     Controlled substance agreement on file: Yes:  Date 7.20.17.     Processing:  Fax Rx to   pharmacy   checked in past 6 months?  Yes 7.10.17

## 2018-06-27 DIAGNOSIS — R52 GENERALIZED PAIN: ICD-10-CM

## 2018-06-27 NOTE — TELEPHONE ENCOUNTER
Controlled Substance Refill Request for Norco  Problem List Complete:  Yes    Last Written Prescription Date:  6.1.18  Last Fill Quantity: 120,   # refills: 0    Last Office Visit with [facility] primary care provider: 5.11.18    Clinic visit frequency required: Q 3 months     Future Office visit:     Controlled substance agreement on file: Yes:  Date 7.20.17.     Processing:  Patient will  in clinic   checked in past 3 months?  Yes 7.10.17

## 2018-06-28 RX ORDER — HYDROCODONE BITARTRATE AND ACETAMINOPHEN 7.5; 325 MG/1; MG/1
1 TABLET ORAL EVERY 6 HOURS PRN
Qty: 120 TABLET | Refills: 0 | Status: SHIPPED | OUTPATIENT
Start: 2018-06-28 | End: 2018-07-24

## 2018-06-28 NOTE — TELEPHONE ENCOUNTER
Pt notified that the written RX is ready at the [facility]  registration to be picked up.

## 2018-07-03 ENCOUNTER — TRANSFERRED RECORDS (OUTPATIENT)
Dept: HEALTH INFORMATION MANAGEMENT | Facility: CLINIC | Age: 68
End: 2018-07-03

## 2018-07-15 ENCOUNTER — HEALTH MAINTENANCE LETTER (OUTPATIENT)
Age: 68
End: 2018-07-15

## 2018-07-18 DIAGNOSIS — G47.00 PERSISTENT INSOMNIA: ICD-10-CM

## 2018-07-18 NOTE — TELEPHONE ENCOUNTER
zolpidem (AMBIEN) 10 MG tablet   Last Written Prescription Date:  06/21/2018  Last Fill Quantity: 60,   # refills: 0  Last Office Visit: 05/11/2018  Future Office visit:       Routing refill request to provider for review/approval because:

## 2018-07-19 RX ORDER — ZOLPIDEM TARTRATE 10 MG/1
10 TABLET ORAL
Qty: 60 TABLET | Refills: 0 | Status: SHIPPED | OUTPATIENT
Start: 2018-07-19 | End: 2018-08-16

## 2018-07-19 NOTE — TELEPHONE ENCOUNTER
Please see note below for Ambien.  Pharmacy:  [facility] [place] [facility]  Contract: 7/20/17  PCP HARSHAD Lee.  Thank you.

## 2018-07-23 DIAGNOSIS — B02.23 POSTHERPETIC POLYNEUROPATHY: ICD-10-CM

## 2018-07-23 NOTE — TELEPHONE ENCOUNTER
GABAPENTIN 300MG CAPSULE    Last Written Prescription Date:  6/26/18  Last Fill Quantity: 90,   # refills: 0  Last Office Visit: 5/11/18  Future Office visit:

## 2018-07-24 DIAGNOSIS — R52 GENERALIZED PAIN: ICD-10-CM

## 2018-07-24 RX ORDER — HYDROCODONE BITARTRATE AND ACETAMINOPHEN 7.5; 325 MG/1; MG/1
1 TABLET ORAL EVERY 6 HOURS PRN
Qty: 120 TABLET | Refills: 0 | Status: SHIPPED | OUTPATIENT
Start: 2018-07-27 | End: 2018-08-21

## 2018-07-24 RX ORDER — GABAPENTIN 300 MG/1
CAPSULE ORAL
Qty: 90 CAPSULE | Refills: 0 | Status: SHIPPED | OUTPATIENT
Start: 2018-07-24 | End: 2018-08-20

## 2018-07-24 NOTE — TELEPHONE ENCOUNTER
Controlled Substance Refill Request for Norco  Problem List Complete:  Yes    Last Written Prescription Date:  06/28/18  Last Fill Quantity: 120,   # refills: 0    Last Office Visit with [facility] primary care provider: 05/11/18      Future Office visit:     Controlled substance agreement on file: Yes:  Date 07/25/17.     Processing:  Staff will hand deliver Rx to on-site pharmacy

## 2018-08-16 DIAGNOSIS — G47.00 PERSISTENT INSOMNIA: ICD-10-CM

## 2018-08-16 NOTE — TELEPHONE ENCOUNTER
Controlled Substance Refill Request for Ambien  Problem List Complete:  Yes    Last Written Prescription Date:  7.19.18  Last Fill Quantity: 60,   # refills: 0    Last Office Visit with [facility] primary care provider: 5.11.18    Clinic visit frequency required: Q 3 months     Future Office visit:     Controlled substance agreement on file: Yes:  Date 7.20.17.     Processing:  Fax Rx to   pharmacy   checked in past 3 months?  Yes 7.10.17

## 2018-08-17 RX ORDER — ZOLPIDEM TARTRATE 10 MG/1
10 TABLET ORAL
Qty: 60 TABLET | Refills: 0 | Status: SHIPPED | OUTPATIENT
Start: 2018-08-17 | End: 2018-09-13

## 2018-08-21 DIAGNOSIS — R52 GENERALIZED PAIN: ICD-10-CM

## 2018-08-21 NOTE — TELEPHONE ENCOUNTER
Controlled Substance Refill Request for Norco  Problem List Complete:  Yes    Last Written Prescription Date:  7/27/18  Last Fill Quantity: 120,   # refills: 0    Last Office Visit with [facility] primary care provider: 5/11/18  Chronic pain syndrome         Problem Detail      Noted:  7/13/2017      Priority:  Medium      Overview Addendum 8/22/2017  4:46 PM by Cindy Birmingham RN     Patient is followed by Jaden Lee NP for ongoing prescription of pain medication.  All refills should only be approved by this provider, or covering partner.     Medication(s): Norco 7.5/325mg.   Maximum quantity per month: #120  Clinic visit frequency required: Q 3 months      Controlled substance agreement:  Encounter-Level CSA - 07/11/2017:            Controlled Substance Agreement - Scan on 7/20/2017 12:19 PM : CONTROLLED SUBSTANCE AGREEMENT (below)            Pain Clinic evaluation in the past: No     DIRE Total Score(s):  No flowsheet data found.     Last [facility] website verification:  done on 7.10.17   [email or web address]       Future Office visit:   Next 5 appointments (look out 90 days)     Aug 27, 2018  4:00 PM CDT   (Arrive by 3:45 PM)   Pre-Op physical with Jaden Lee NP   [facility]    [address] [place] [address]   [phone]                   Processing:  Patient will  in clinic

## 2018-08-23 RX ORDER — HYDROCODONE BITARTRATE AND ACETAMINOPHEN 7.5; 325 MG/1; MG/1
1 TABLET ORAL EVERY 6 HOURS PRN
Qty: 120 TABLET | Refills: 0 | Status: ON HOLD | OUTPATIENT
Start: 2018-08-23 | End: 2018-09-06

## 2018-08-23 NOTE — TELEPHONE ENCOUNTER
Notified patient that rx is ready.  Hardcopy Rx for Norco 7.5/325mg walked up front of [place] clinic for patient .

## 2018-08-27 ENCOUNTER — OFFICE VISIT (OUTPATIENT)
Dept: INTERNAL MEDICINE | Facility: OTHER | Age: 68
End: 2018-08-27
Attending: NURSE PRACTITIONER
Payer: COMMERCIAL

## 2018-08-27 VITALS
SYSTOLIC BLOOD PRESSURE: 120 MMHG | WEIGHT: 188 LBS | RESPIRATION RATE: 20 BRPM | DIASTOLIC BLOOD PRESSURE: 60 MMHG | OXYGEN SATURATION: 96 % | TEMPERATURE: 98.3 F | HEIGHT: 63 IN | HEART RATE: 111 BPM | BODY MASS INDEX: 33.31 KG/M2

## 2018-08-27 DIAGNOSIS — G89.4 CHRONIC PAIN SYNDROME: ICD-10-CM

## 2018-08-27 DIAGNOSIS — E61.1 IRON DEFICIENCY: ICD-10-CM

## 2018-08-27 DIAGNOSIS — G47.00 PERSISTENT INSOMNIA: ICD-10-CM

## 2018-08-27 DIAGNOSIS — B02.23 POSTHERPETIC POLYNEUROPATHY: ICD-10-CM

## 2018-08-27 DIAGNOSIS — G89.29 CHRONIC NONINTRACTABLE HEADACHE, UNSPECIFIED HEADACHE TYPE: ICD-10-CM

## 2018-08-27 DIAGNOSIS — R51.9 CHRONIC NONINTRACTABLE HEADACHE, UNSPECIFIED HEADACHE TYPE: ICD-10-CM

## 2018-08-27 DIAGNOSIS — Z01.818 PREOP GENERAL PHYSICAL EXAM: Primary | ICD-10-CM

## 2018-08-27 DIAGNOSIS — E03.9 HYPOTHYROIDISM, UNSPECIFIED TYPE: ICD-10-CM

## 2018-08-27 LAB
ALBUMIN SERPL-MCNC: 3 G/DL (ref 3.4–5)
ALP SERPL-CCNC: 70 U/L (ref 40–150)
ALT SERPL W P-5'-P-CCNC: 15 U/L (ref 0–50)
ANION GAP SERPL CALCULATED.3IONS-SCNC: 5 MMOL/L (ref 3–14)
AST SERPL W P-5'-P-CCNC: 10 U/L (ref 0–45)
BASOPHILS # BLD AUTO: 0.1 10E9/L (ref 0–0.2)
BASOPHILS NFR BLD AUTO: 0.6 %
BILIRUB SERPL-MCNC: 0.2 MG/DL (ref 0.2–1.3)
BUN SERPL-MCNC: 21 MG/DL (ref 7–30)
CALCIUM SERPL-MCNC: 7.8 MG/DL (ref 8.5–10.1)
CHLORIDE SERPL-SCNC: 106 MMOL/L (ref 94–109)
CO2 SERPL-SCNC: 29 MMOL/L (ref 20–32)
CREAT SERPL-MCNC: 0.76 MG/DL (ref 0.52–1.04)
DIFFERENTIAL METHOD BLD: ABNORMAL
EOSINOPHIL # BLD AUTO: 3.8 10E9/L (ref 0–0.7)
EOSINOPHIL NFR BLD AUTO: 25.8 %
ERYTHROCYTE [DISTWIDTH] IN BLOOD BY AUTOMATED COUNT: 17.4 % (ref 10–15)
GFR SERPL CREATININE-BSD FRML MDRD: 76 ML/MIN/1.7M2
GLUCOSE SERPL-MCNC: 104 MG/DL (ref 70–99)
HCT VFR BLD AUTO: 35.8 % (ref 35–47)
HGB BLD-MCNC: 11.4 G/DL (ref 11.7–15.7)
IMM GRANULOCYTES # BLD: 0.1 10E9/L (ref 0–0.4)
IMM GRANULOCYTES NFR BLD: 0.4 %
IRON SATN MFR SERPL: 9 % (ref 15–46)
IRON SERPL-MCNC: 25 UG/DL (ref 35–180)
LYMPHOCYTES # BLD AUTO: 2.9 10E9/L (ref 0.8–5.3)
LYMPHOCYTES NFR BLD AUTO: 20.2 %
MCH RBC QN AUTO: 27 PG (ref 26.5–33)
MCHC RBC AUTO-ENTMCNC: 31.8 G/DL (ref 31.5–36.5)
MCV RBC AUTO: 85 FL (ref 78–100)
MONOCYTES # BLD AUTO: 1.2 10E9/L (ref 0–1.3)
MONOCYTES NFR BLD AUTO: 8.5 %
NEUTROPHILS # BLD AUTO: 6.5 10E9/L (ref 1.6–8.3)
NEUTROPHILS NFR BLD AUTO: 44.5 %
NRBC # BLD AUTO: 0 10*3/UL
NRBC BLD AUTO-RTO: 0 /100
PLATELET # BLD AUTO: 469 10E9/L (ref 150–450)
POTASSIUM SERPL-SCNC: 3.9 MMOL/L (ref 3.4–5.3)
PROT SERPL-MCNC: 6 G/DL (ref 6.8–8.8)
RBC # BLD AUTO: 4.23 10E12/L (ref 3.8–5.2)
SODIUM SERPL-SCNC: 140 MMOL/L (ref 133–144)
TIBC SERPL-MCNC: 271 UG/DL (ref 240–430)
WBC # BLD AUTO: 14.5 10E9/L (ref 4–11)

## 2018-08-27 PROCEDURE — 83550 IRON BINDING TEST: CPT | Mod: ZL | Performed by: NURSE PRACTITIONER

## 2018-08-27 PROCEDURE — 99214 OFFICE O/P EST MOD 30 MIN: CPT | Performed by: NURSE PRACTITIONER

## 2018-08-27 PROCEDURE — 80053 COMPREHEN METABOLIC PANEL: CPT | Mod: ZL | Performed by: NURSE PRACTITIONER

## 2018-08-27 PROCEDURE — 85025 COMPLETE CBC W/AUTO DIFF WBC: CPT | Mod: ZL | Performed by: NURSE PRACTITIONER

## 2018-08-27 PROCEDURE — G0463 HOSPITAL OUTPT CLINIC VISIT: HCPCS | Performed by: NURSE PRACTITIONER

## 2018-08-27 PROCEDURE — 36415 COLL VENOUS BLD VENIPUNCTURE: CPT | Mod: ZL | Performed by: NURSE PRACTITIONER

## 2018-08-27 PROCEDURE — 83540 ASSAY OF IRON: CPT | Mod: ZL | Performed by: NURSE PRACTITIONER

## 2018-08-27 RX ORDER — SUCRALFATE 1 G/1
1 TABLET ORAL 4 TIMES DAILY
Qty: 120 TABLET | Refills: 0 | Status: ON HOLD | OUTPATIENT
Start: 2018-08-27 | End: 2018-09-05

## 2018-08-27 RX ORDER — KETOROLAC TROMETHAMINE 10 MG/1
10 TABLET, FILM COATED ORAL EVERY 6 HOURS PRN
Qty: 20 TABLET | Refills: 0 | Status: ON HOLD | OUTPATIENT
Start: 2018-08-27 | End: 2018-09-05

## 2018-08-27 ASSESSMENT — PAIN SCALES - GENERAL: PAINLEVEL: MODERATE PAIN (4)

## 2018-08-27 ASSESSMENT — ANXIETY QUESTIONNAIRES
2. NOT BEING ABLE TO STOP OR CONTROL WORRYING: SEVERAL DAYS
GAD7 TOTAL SCORE: 2
IF YOU CHECKED OFF ANY PROBLEMS ON THIS QUESTIONNAIRE, HOW DIFFICULT HAVE THESE PROBLEMS MADE IT FOR YOU TO DO YOUR WORK, TAKE CARE OF THINGS AT HOME, OR GET ALONG WITH OTHER PEOPLE: NOT DIFFICULT AT ALL
5. BEING SO RESTLESS THAT IT IS HARD TO SIT STILL: NOT AT ALL
3. WORRYING TOO MUCH ABOUT DIFFERENT THINGS: NOT AT ALL
1. FEELING NERVOUS, ANXIOUS, OR ON EDGE: NOT AT ALL
6. BECOMING EASILY ANNOYED OR IRRITABLE: NOT AT ALL
7. FEELING AFRAID AS IF SOMETHING AWFUL MIGHT HAPPEN: SEVERAL DAYS

## 2018-08-27 ASSESSMENT — ENCOUNTER SYMPTOMS
FATIGUE: 0
SLEEP DISTURBANCE: 1
WEAKNESS: 0
TROUBLE SWALLOWING: 0
HEADACHES: 1
NUMBNESS: 0
MYALGIAS: 1
PALPITATIONS: 0
SINUS PAIN: 0
NAUSEA: 0
FEVER: 0
VOMITING: 0
DYSPHORIC MOOD: 1
EYES NEGATIVE: 1
BACK PAIN: 1
NECK PAIN: 1
DIFFICULTY URINATING: 0
SORE THROAT: 0
SINUS PRESSURE: 0
DIZZINESS: 0
COUGH: 0
ARTHRALGIAS: 1
CONSTIPATION: 0
RHINORRHEA: 0
ABDOMINAL PAIN: 0
SHORTNESS OF BREATH: 0
DIARRHEA: 0

## 2018-08-27 ASSESSMENT — PATIENT HEALTH QUESTIONNAIRE - PHQ9: 5. POOR APPETITE OR OVEREATING: NOT AT ALL

## 2018-08-27 NOTE — MR AVS SNAPSHOT
After Visit Summary   8/27/2018    Aure Lanier    MRN: [id number]           Patient Information     Date Of Birth          1950        Visit Information        Provider Department      8/27/2018 4:00 PM Jaden Lee NP [facility]        Today's Diagnoses     Preop general physical exam    -  1      Care Instructions    1. Take Levothyroxine AM of surgery    2. Carafate and Toradol(when surgeon says OK) after  Postop.    3. You had Dilaudid 2mg 4 times a day last time.    Before Your Surgery      Call your surgeon if there is any change in your health. This includes signs of a cold or flu (such as a sore throat, runny nose, cough, rash or fever).    Do not smoke, drink alcohol or take over the counter medicine (unless your surgeon or primary care doctor tells you to) for the 24 hours before and after surgery.    If you take prescribed drugs: Follow your doctor s orders about which medicines to take and which to stop until after surgery.    Eating and drinking prior to surgery: follow the instructions from your surgeon    Take a shower or bath the night before surgery. Use the soap your surgeon gave you to gently clean your skin. If you do not have soap from your surgeon, use your regular soap. Do not shave or scrub the surgery site.  Wear clean pajamas and have clean sheets on your bed.           Follow-ups after your visit        Your next 10 appointments already scheduled     Sep 04, 2018   Procedure with Ty Hernandez MD   [facility] ([address] [address] [phone]              Who to contact     If you have questions or need follow up information about today's clinic visit or your schedule please contact [facility] directly at [phone].  Normal or non-critical lab and imaging results will be communicated to you by MyChart, letter or phone within 4 business days after the clinic has received  "the results. If you do not hear from us within 7 days, please contact the clinic through [email or web address] or phone. If you have a critical or abnormal lab result, we will notify you by phone as soon as possible.  Submit refill requests through [email or web address] or call your pharmacy and they will forward the refill request to us. Please allow 3 business days for your refill to be completed.          Additional Information About Your Visit        [email or web address] Information     [email or web address] gives you secure access to your electronic health record. If you see a primary care provider, you can also send messages to your care team and make appointments. If you have questions, please call your primary care clinic.  If you do not have a primary care provider, please call [phone] and they will assist you.        Care EveryWhere ID     This is your Care EveryWhere ID. This could be used by other organizations to access your [facility] medical records  [id number]        Your Vitals Were     Pulse Temperature Respirations Height Pulse Oximetry Breastfeeding?    111 98.3  F (36.8  C) (Tympanic) 20 5' 3\" (1.6 m) 96% No    BMI (Body Mass Index)                   33.3 kg/m2            Blood Pressure from Last 3 Encounters:   08/27/18 120/60   05/11/18 130/70   05/07/18 128/70    Weight from Last 3 Encounters:   08/27/18 188 lb (85.3 kg)   05/11/18 196 lb (88.9 kg)   05/07/18 196 lb (88.9 kg)              Today, you had the following     No orders found for display         Today's Medication Changes          These changes are accurate as of 8/27/18  5:24 PM.  If you have any questions, ask your nurse or doctor.               Start taking these medicines.        Dose/Directions    ketorolac 10 MG tablet   Commonly known as:  TORADOL   Used for:  Preop general physical exam   Started by:  Jaden Lee NP        Dose:  10 mg   Take 1 tablet (10 mg) by mouth every 6 hours as needed for moderate pain   Quantity:  20 tablet   Refills:  0         These medicines " have changed or have updated prescriptions.        Dose/Directions    simvastatin 40 MG tablet   Commonly known as:  ZOCOR   This may have changed:    - how much to take  - additional instructions   Used for:  Mixed hyperlipidemia        TAKE 1 TABLET BY MOUTH EVERY DAY IN THE EVENING . GENERIC FOR ZOCOR.   Quantity:  90 tablet   Refills:  0         Stop taking these medicines if you haven't already. Please contact your care team if you have questions.     triamcinolone acetonide 40 MG/ML injection   Commonly known as:  KENALOG   Stopped by:  Jaden Lee NP                Where to get your medicines      These medications were sent to [id number] - [place], [place] - [address]  [address] [facility] [address]     Phone:  [phone]     ketorolac 10 MG tablet    sucralfate 1 GM tablet                Primary Care Provider Office Phone # Fax #    Jaden Lee [phone]       [address] [facility] [address]        Equal Access to Services     NEREIDA [facility] Center[facility]ASHA : Hadii aad ku hadasho Soomaali, waaxda luqadaha, qaybta kaalmada adeegyada, waxay idiin hayaan renetta mendez . So [facility] [phone].    ATENCIÓN: Si habla español, tiene a gross disposición servicios gratuitos de asistencia lingüística. Llame al [phone].    We comply with applicable federal civil rights laws and [place] laws. We do not discriminate on the basis of race, color, national origin, age, disability, sex, sexual orientation, or gender identity.            Thank you!     Thank you for choosing [facility]  for your care. Our goal is always to provide you with excellent care. Hearing back from our patients is one way we can continue to improve our services. Please take a few minutes to complete the written survey that you may receive in the mail after your visit with us. Thank you!             Your Updated Medication List - Protect others around you: Learn how to safely use, store  and throw away your medicines at www.disposemymeds.org.          This list is accurate as of 8/27/18  5:24 PM.  Always use your most recent med list.                   Brand Name Dispense Instructions for use Diagnosis    acetaminophen 325 MG tablet    TYLENOL    100 tablet    Take 2-3 tablets (650-975 mg) by mouth every 4 hours as needed for other (mild to moderate pain)    Status post total left knee replacement       calcium carbonate 600 MG tablet   Generic drug:  calcium      With vitamin D; take 2 by oral route every day        cyanocobalamin 1000 MCG tablet    vitamin  B-12     Take 1 tablet by mouth daily.        cyclobenzaprine 10 MG tablet    FLEXERIL    90 tablet    TAKE 1 TABLET BY MOUTH THREE TIMES DAILY AS NEEDED    Cervicalgia       escitalopram 20 MG tablet    LEXAPRO    30 tablet    TAKE 1 TABLET BY MOUTH EVERY DAY    Anxiety       folic acid 1 MG tablet    FOLVITE    90 tablet    TAKE ONE TABLET DAILY BY MOUTH    Health care maintenance       gabapentin 300 MG capsule    NEURONTIN    90 capsule    TAKE ONE CAPSULE 3 TIMES A DAY BY MOUTH    Postherpetic polyneuropathy       HYDROcodone-acetaminophen 7.5-325 MG per tablet    NORCO    120 tablet    Take 1 tablet by mouth every 6 hours as needed for severe pain    Generalized pain       ketorolac 10 MG tablet    TORADOL    20 tablet    Take 1 tablet (10 mg) by mouth every 6 hours as needed for moderate pain    Preop general physical exam       levothyroxine 150 MCG tablet    SYNTHROID/LEVOTHROID    30 tablet    Take 1 tablet (150 mcg) by mouth daily    Hypothyroidism, unspecified type       * MULTIVITAMIN PO      Take 1 tablet by mouth daily with food.        * multivitamin Tabs tablet      Take 1 tablet by mouth daily        NYSTOP 226928 UNIT/GM Powd   Generic drug:  nystatin     60 g    APPLY TOPICALLY 2 TIMES DAILY AS NEEDED    Rash       omeprazole 40 MG capsule    priLOSEC    90 capsule    TAKE 1 CAPSULE DAILY BY MOUTH        RANITIDINE HCL PO       Take 150 mg by mouth daily        simvastatin 40 MG tablet    ZOCOR    90 tablet    TAKE 1 TABLET BY MOUTH EVERY DAY IN THE EVENING . GENERIC FOR ZOCOR.    Mixed hyperlipidemia       sucralfate 1 GM tablet    CARAFATE    120 tablet    Take 1 tablet (1 g) by mouth 4 times daily    Preop general physical exam       zolpidem 10 MG tablet    AMBIEN    60 tablet    Take 1 tablet (10 mg) by mouth nightly as needed for sleep Take 1 tablet at night for sleep-may repeat X1.    Persistent insomnia       * Notice:  This list has 2 medication(s) that are the same as other medications prescribed for you. Read the directions carefully, and ask your doctor or other care provider to review them with you.

## 2018-08-27 NOTE — PROGRESS NOTES
[facility] [place]  [address]  [place] [address]  [phone]  Dept: [phone]    PRE-OP EVALUATION:    Today's date: 2018    Aure Lanier (: 1950) presents for pre-operative evaluation assessment as requested by Dr. GUTIERREZ.  She requires evaluation and anesthesia risk assessment prior to undergoing surgery/procedure for treatment of right knee pain .    Proposed Surgery/ Procedure: Total Right Knee replacement  Date of Surgery/ Procedure: 2018  Time of Surgery/ Procedure: to be determined  Hospital/Surgical Facility: [facility]  Primary Physician: Jaden Lee  Type of Anesthesia Anticipated: to be determined    Patient has a Health Care Directive or Living Will:  NO    1. NO - Do you have a history of heart attack, stroke, stent, bypass or surgery on an artery in the head, neck, heart or legs?  2. NO - Do you ever have any pain or discomfort in your chest?  3. NO - Do you have a history of  Heart Failure?  4. NO - Are you troubled by shortness of breath when: walking on the level, up a slight hill or at night?  5. NO - Do you currently have a cold, bronchitis or other respiratory infection?  6. NO - Do you have a cough, shortness of breath or wheezing?  7. YES - DO YOU SOMETIMES GET PAINS IN THE CALVES OF YOUR LEGS WHEN YOU WALK? Pain in calvesin leg with no surgery.    8. YES - DO YOU OR ANYONE IN YOUR FAMILY HAVE PREVIOUS HISTORY OF BLOOD CLOTS? Unsure maybe mother?   9. NO - Do you or does anyone in your family have a serious bleeding problem such as prolonged bleeding following surgeries or cuts?  10. NO - Have you ever had problems with anemia or been told to take iron pills?  11. NO - Have you had any abnormal blood loss such as black, tarry or bloody stools, or abnormal vaginal bleeding?  12. NO - Have you ever had a blood transfusion?  13. YES - HAVE YOU OR ANY OF YOUR RELATIVES EVER HAD PROBLEMS WITH ANESTHESIA? She personally has had trouble-has had  nausea, Needs pre-anti nausea treatment.    14. NO - Do you have sleep apnea, excessive snoring or daytime drowsiness?  15. NO - Do you have any prosthetic heart valves?  16. YES - DO YOU HAVE PROSTHETIC JOINTS? Left total knee replacement.  17. NO - Is there any chance that you may be pregnant?      HPI:     HPI related to upcoming procedure  :Bone on bone, and meniscal tears, ulcerated tissue to right knee.            MEDICAL HISTORY:     Patient Active Problem List    Diagnosis Date Noted     Status post total left knee replacement 04/17/2018     Priority: Medium     Chronic pain syndrome 07/13/2017     Priority: Medium     Patient is followed by Jadne Lee NP for ongoing prescription of pain medication.  All refills should only be approved by this provider, or covering partner.    Medication(s): Norco 7.5/325mg.   Maximum quantity per month: #120  Clinic visit frequency required: Q 3 months     Controlled substance agreement:  Encounter-Level CSA - 07/11/2017:          Controlled Substance Agreement - Scan on 7/20/2017 12:19 PM : CONTROLLED SUBSTANCE AGREEMENT (below)              Pain Clinic evaluation in the past: No    DIRE Total Score(s):  No flowsheet data found.    Last [facility] website verification:  Done on 8.22.18   [email or web address]         Back muscle spasm 05/09/2017     Priority: Medium     ACP (advance care planning) 08/04/2016     Priority: Medium     Advance Care Planning 8/4/2016: ACP Review of Chart / Resources Provided:  Reviewed chart for advance care plan.  Aure Lanier has been provided information and resources to begin or update their advance care plan.  Added by Elly Arredondo             Bilateral chronic knee pain 04/14/2016     Priority: Medium     Both knees.         Hyperlipidemia with target LDL less than 100 07/03/2014     Priority: Medium     Diagnosis updated by automated process. Provider to review and confirm.       Low back pain 07/09/2013     Priority:  Medium     Diagnosis updated by automated process. Provider to review and confirm.       Advanced care planning/counseling discussion 03/11/2013     Priority: Medium     Allergic arthritis involving hand 01/01/2011     Priority: Medium     Hypothyroidism 01/01/2011     Priority: Medium     Problem list name updated by automated process. Provider to review       Insomnia 01/01/2011     Priority: Medium     Problem list name updated by automated process. Provider to review       Headache 01/01/2011     Priority: Medium     Problem list name updated by automated process. 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 Provider to review       Hyperlipidemia 01/01/2011     Priority: Medium     Problem list name updated by automated process. Provider to review       Migraine 01/01/2011     Priority: Medium     Problem list name updated by automated process. Provider to review       Osteoporosis 01/01/2011     Priority: Medium     Problem list name updated by automated process. Provider to review       GERD 01/01/2011     Priority: Medium      Past Medical History:   Diagnosis Date     Allergic arthritis involving hand 01/01/2011     Anemia, Iron Deficiency 01/01/2011     Anxiety 01/01/2011     Contact dermatitis and other eczema, unspecified c 01/01/2011     Depression 01/01/2011     Edema 01/01/2011     Gastrointestinal mucositis (ulcerative) 01/01/2011     GERD 01/01/2011     Headache(784.0) 01/01/2011     Hypothyroidism 01/01/2011     Insomnia, unspecified 01/01/2011     Migraine 01/01/2011     Nonallopathic lesion of cervical region, not elsewhere classified 10/16/2000     Osteoporosis 01/01/2011     Other and unspecified hyperlipidemia 01/01/2011     Other malaise and fatigue 08/27/2001     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      Past Surgical History:   Procedure Laterality Date     ARTHROPLASTY KNEE Left 4/17/2018    Procedure: ARTHROPLASTY KNEE;  LEFT TOTAL KNEE ARTHROPLASTY S/N JOURNEY II;  Surgeon: Ty Hernandez MD;  Location: [facility] OR     D & C       ESOPHAGOSCOPY, GASTROSCOPY, DUODENOSCOPY (EGD), COMBINED N/A 9/11/2017    Procedure: COMBINED ESOPHAGOSCOPY, GASTROSCOPY, DUODENOSCOPY (EGD);  Upper Endoscopy: Removal of Food Impaction;  Surgeon: Lino Zhu DO;  Location: [facility] OR     STOMACH SURGERY       stomach surgery peritinitis       Current Outpatient Prescriptions   Medication Sig Dispense Refill     acetaminophen (TYLENOL) 325 MG tablet Take 2-3 tablets (650-975 mg) by mouth every 4 hours as needed for other (mild to moderate pain) 100 tablet      calcium (CALCIUM 600) 600 MG tablet With vitamin D; take 2 by oral route every day       cyanocolbalamin (VITAMIN  B-12) 1000 MCG tablet Take 1 tablet by mouth daily.       cyclobenzaprine (FLEXERIL) 10 MG tablet TAKE 1 TABLET BY MOUTH THREE TIMES DAILY AS NEEDED 90 tablet 1     escitalopram (LEXAPRO) 20 MG tablet TAKE 1 TABLET BY MOUTH EVERY DAY 30 tablet 5     folic acid (FOLVITE) 1 MG tablet TAKE ONE TABLET DAILY BY MOUTH 90 tablet 2     gabapentin (NEURONTIN) 300 MG capsule TAKE ONE CAPSULE 3 TIMES A DAY BY MOUTH 90 capsule 0     HYDROcodone-acetaminophen (NORCO) 7.5-325 MG per tablet Take 1 tablet by mouth every 6 hours as needed for severe pain 120 tablet 0     levothyroxine (SYNTHROID/LEVOTHROID) 150 MCG tablet Take 1 tablet (150 mcg) by mouth daily 30 tablet 0     Multiple Vitamins-Minerals (MULTIVITAMIN OR) Take 1 tablet by mouth daily with food.       multivitamin (OCUVITE) TABS Take 1 tablet by mouth daily       NYSTOP 466243 UNIT/GM POWD powder APPLY TOPICALLY 2 TIMES DAILY AS NEEDED 60 g 3     omeprazole (PRILOSEC) 40 MG capsule TAKE 1 CAPSULE DAILY BY MOUTH 90 capsule 0     RANITIDINE HCL PO Take 150 mg by mouth daily       simvastatin (ZOCOR) 40 MG tablet TAKE 1  TABLET BY MOUTH EVERY DAY IN THE EVENING . GENERIC FOR ZOCOR. (Patient taking differently: 20 mg TAKE 1 TABLET BY MOUTH EVERY DAY IN THE EVENING . GENERIC FOR ZOCOR.) 90 tablet 0     sucralfate (CARAFATE) 1 GM tablet Take 1 tablet (1 g) by mouth 4 times daily 40 tablet 1     zolpidem (AMBIEN) 10 MG tablet Take 1 tablet (10 mg) by mouth nightly as needed for sleep Take 1 tablet at night for sleep-may repeat X1. 60 tablet 0     OTC products: None, except as noted above    Allergies   Allergen Reactions     Erythromycin Base [Kdc:Yellow Dye+Erythromycin+Brilliant Blue Fcf] Nausea and Vomiting     Penicillins Rash      Latex Allergy: NO    Social History   Substance Use Topics     Smoking status: Never Smoker     Smokeless tobacco: Never Used      Comment: no passive exposure     Alcohol use Yes      Comment: rarely, maybe yearly     History   Drug Use No       REVIEW OF SYSTEMS:   Review of Systems   Constitutional: Negative for fatigue and fever.   HENT: Negative for congestion, dental problem, ear pain, postnasal drip, rhinorrhea, sinus pain, sinus pressure, sore throat and trouble swallowing.    Eyes: Negative.    Respiratory: Negative for cough and shortness of breath.    Cardiovascular: Positive for leg swelling. Negative for chest pain and palpitations.        Occas. Leg swelling.    Gastrointestinal: Negative for abdominal pain, constipation, diarrhea, nausea and vomiting.        Remote Hx GI bleed. Had take Carafate with Toradol last surgery for pain, when unable to get pain controlled.     Genitourinary: Negative for difficulty urinating.   Musculoskeletal: Positive for arthralgias, back pain, myalgias and neck pain.        Has chronic pain. On Lortab for years. Had difficulty with pain control last knee surgery, interfered with her rehab progress til able to control pain.      Skin: Negative.    Neurological: Positive for headaches. Negative for dizziness, weakness and numbness.        Has headaches from neck  ".     Psychiatric/Behavioral: Positive for dysphoric mood and sleep disturbance.        Has problems with sleeping.  Takes AMBIEN   For sleep Otherwise cannot sleep.        EXAM:   /60  Pulse 111  Temp 98.3  F (36.8  C) (Tympanic)  Resp 20  Ht 5' 3\" (1.6 m)  Wt 188 lb (85.3 kg)  SpO2 96%  Breastfeeding? No  BMI 33.3 kg/m2  Physical Exam   Constitutional: She is oriented to person, place, and time. She appears well-developed and well-nourished.   HENT:   Right Ear: External ear normal.   Left Ear: External ear normal.   Nose: Nose normal.   Mouth/Throat: Oropharynx is clear and moist.   Eyes: Conjunctivae and EOM are normal. Pupils are equal, round, and reactive to light.   Neck: Normal range of motion. Neck supple. No thyromegaly present.   Cardiovascular: Normal rate, regular rhythm, normal heart sounds and intact distal pulses.    No murmur heard.  Pulmonary/Chest: Effort normal and breath sounds normal.   Abdominal: Soft. Bowel sounds are normal. She exhibits no mass. There is no tenderness.   Musculoskeletal: Normal range of motion. She exhibits no edema.   Lymphadenopathy:     She has no cervical adenopathy.   Neurological: She is alert and oriented to person, place, and time.   Skin: Skin is warm and dry.   Psychiatric: She has a normal mood and affect.       DIAGNOSTICS:     Results for orders placed or performed in visit on 08/27/18   CBC with platelets and differential   Result Value Ref Range    WBC 14.5 (H) 4.0 - 11.0 10e9/L    RBC Count 4.23 3.8 - 5.2 10e12/L    Hemoglobin 11.4 (L) 11.7 - 15.7 g/dL    Hematocrit 35.8 35.0 - 47.0 %    MCV 85 78 - 100 fl    MCH 27.0 26.5 - 33.0 pg    MCHC 31.8 31.5 - 36.5 g/dL    RDW 17.4 (H) 10.0 - 15.0 %    Platelet Count 469 (H) 150 - 450 10e9/L    Diff Method Automated Method     % Neutrophils 44.5 %    % Lymphocytes 20.2 %    % Monocytes 8.5 %    % Eosinophils 25.8 %    % Basophils 0.6 %    % Immature Granulocytes 0.4 %    Nucleated RBCs 0 0 /100    " Absolute Neutrophil 6.5 1.6 - 8.3 10e9/L    Absolute Lymphocytes 2.9 0.8 - 5.3 10e9/L    Absolute Monocytes 1.2 0.0 - 1.3 10e9/L    Absolute Eosinophils 3.8 (H) 0.0 - 0.7 10e9/L    Absolute Basophils 0.1 0.0 - 0.2 10e9/L    Abs Immature Granulocytes 0.1 0 - 0.4 10e9/L    Absolute Nucleated RBC 0.0    Iron and iron binding capacity   Result Value Ref Range    Iron 25 (L) 35 - 180 ug/dL    Iron Binding Cap 271 240 - 430 ug/dL    Iron Saturation Index 9 (L) 15 - 46 %   Comprehensive metabolic panel (BMP + Alb, Alk Phos, ALT, AST, Total. Bili, TP)   Result Value Ref Range    Sodium 140 133 - 144 mmol/L    Potassium 3.9 3.4 - 5.3 mmol/L    Chloride 106 94 - 109 mmol/L    Carbon Dioxide 29 20 - 32 mmol/L    Anion Gap 5 3 - 14 mmol/L    Glucose 104 (H) 70 - 99 mg/dL    Urea Nitrogen 21 7 - 30 mg/dL    Creatinine 0.76 0.52 - 1.04 mg/dL    GFR Estimate 76 >60 mL/min/1.7m2    GFR Estimate If Black >90 >60 mL/min/1.7m2    Calcium 7.8 (L) 8.5 - 10.1 mg/dL    Bilirubin Total 0.2 0.2 - 1.3 mg/dL    Albumin 3.0 (L) 3.4 - 5.0 g/dL    Protein Total 6.0 (L) 6.8 - 8.8 g/dL    Alkaline Phosphatase 70 40 - 150 U/L    ALT 15 0 - 50 U/L    AST 10 0 - 45 U/L       Recent Labs   Lab Test  04/19/18   0512  04/18/18   0509  04/09/18   1716  12/20/17   0015   HGB  10.0*  10.2*  13.3  14.4   PLT   --    --   413  472*   NA   --    --   137  136   POTASSIUM   --    --   3.7  3.8   CR   --    --   0.62  0.65        IMPRESSION:     Surgery:  Total Right Knee Replacement with Dr. Hernandez,  At [facility] on 9/4/18.      The proposed surgical procedure is considered INTERMEDIATE risk.    REVISED CARDIAC RISK INDEX  The patient has the following serious cardiovascular risks for perioperative complications such as (MI, PE, VFib and 3  AV Block):  No serious cardiac risks  INTERPRETATION: 0 risks: Class I (very low risk - 0.4% complication rate)    The patient has the following additional risks for perioperative complications:    Long term Narcotic use.       Had Unusual pain response  last knee surgery, did better with pain relief and rehab  on Dilaudid 2mg  4 times a day, not the Oxycontin.        ICD-10-CM    1. Preop general physical exam Z01.818      ASSESSMENT / PLAN:  (Z01.818) Preop general physical exam  (primary encounter diagnosis)  Comment:   Will give Carafate,1gm QID  and Toradol 10mg TID for post op to take with other pain med. Had hard time with pain control last surgery and felt that interfered with her rehab. Assures me she has discussed with Dr. Hernandez.  EKG 4/10/18=NSR.  With no changes.    Hemoglobin   Date Value Ref Range Status   08/27/2018 11.4 (L) 11.7 - 15.7 g/dL Final   ]  Potassium   Date Value Ref Range Status   08/27/2018 3.9 3.4 - 5.3 mmol/L Final     UA is negative.    WBC   Date Value Ref Range Status   08/27/2018 14.5 (H) 4.0 - 11.0 10e9/L Final    WBC  Was elevated but abs. Neutrophils were normal. Eosinophils were elevated. This would not affect this surgery by need followup      Plan: sucralfate (CARAFATE) 1 GM tablet, ketorolac         (TORADOL) 10 MG tablet, CBC with platelets and         differential, Iron and iron binding capacity,         Comprehensive metabolic panel (BMP + Alb, Alk         Phos, ALT, AST, Total. Bili, TP)    (E61.1) Iron deficiency  Comment:  Iron=25. Will add ferrous sulfate 325mg 3 times a day for 2 months.  Discussed stool softener.    Plan:  Ferrous sulfate.            (E03.9) Hypothyroidism, unspecified type  Comment:On Levothyroxine  150mcg.   Noted patient did not come back for TSH recheck Levothyroxine had been increased with  4/9/18 finding to 150mcg.    TSH   Date Value Ref Range Status   04/09/2018 6.15 (H) 0.40 - 4.00 mU/L Final     Plan: Will recheck TSH with next lab draw.  Will take Levothyroxine AM of surgery.       (G89.4) Chronic pain syndrome  Comment: On Lortab 7.5mg  4 times a day for many years.  Had trouble with pain control on last surgery-may be because of long term narcotic use.         (R51) Chronic nonintractable headache, unspecified headache type  Comment: Secondary to neck pain  Uses lortab.        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    PlanHold AM of surgery.     (G47.00) Insomnia  Comment: On Ambien 10mg a night. Was unable to sleep in hospital last surgery, Benadryl did not work for her.  Plan: Ambien 10mg        RECOMMENDATIONS:     Take Levothyroxine  AM of surgery.    Given Carafate 1gm  4 times a day and Toradol 10mg to take with what the surgeon gives for pain med post op.  .  Patient knows to wait with Toradol til OK with Surgeon.    APPROVAL GIVEN to proceed with proposed procedure, without further diagnostic evaluation       Signed Electronically by: Jaden Lee NP    Copy of this evaluation report is provided to requesting physician.    [facility] Preop Guidelines    Revised Cardiac Risk Index

## 2018-08-27 NOTE — NURSING NOTE
"Chief Complaint   Patient presents with     Pre-Op Exam     right knee replacement on 9/4/2018/Dr. Hernandez, [place] [facility]        Initial /60  Pulse 111  Temp 98.3  F (36.8  C) (Tympanic)  Resp 20  Ht 5' 3\" (1.6 m)  Wt 188 lb (85.3 kg)  SpO2 96%  Breastfeeding? No  BMI 33.3 kg/m2 Estimated body mass index is 33.3 kg/(m^2) as calculated from the following:    Height as of this encounter: 5' 3\" (1.6 m).    Weight as of this encounter: 188 lb (85.3 kg).  Medication Reconciliation: complete    Elly Arredondo LPN  "

## 2018-08-27 NOTE — PATIENT INSTRUCTIONS
1. Take Levothyroxine AM of surgery    2. Carafate and Toradol(when surgeon says OK) after  Postop.    3. You had Dilaudid 2mg 4 times a day last time.    Before Your Surgery      Call your surgeon if there is any change in your health. This includes signs of a cold or flu (such as a sore throat, runny nose, cough, rash or fever).    Do not smoke, drink alcohol or take over the counter medicine (unless your surgeon or primary care doctor tells you to) for the 24 hours before and after surgery.    If you take prescribed drugs: Follow your doctor s orders about which medicines to take and which to stop until after surgery.    Eating and drinking prior to surgery: follow the instructions from your surgeon    Take a shower or bath the night before surgery. Use the soap your surgeon gave you to gently clean your skin. If you do not have soap from your surgeon, use your regular soap. Do not shave or scrub the surgery site.  Wear clean pajamas and have clean sheets on your bed.

## 2018-08-28 ASSESSMENT — PATIENT HEALTH QUESTIONNAIRE - PHQ9: SUM OF ALL RESPONSES TO PHQ QUESTIONS 1-9: 2

## 2018-08-28 ASSESSMENT — ANXIETY QUESTIONNAIRES: GAD7 TOTAL SCORE: 2

## 2018-08-28 NOTE — OR NURSING
RE:  Aure Lanier  [birth year]  Right total knee arthroplasty by Dr. Hernandez on 9/4/18.  PCP HARSHAD Lee     Attended total joint class on 3/14/18.  Had a left TKA on 4/17/18 by Dr. Hernandez.    Goal: to go home after hospitalization.  Attend Choice Therapy  Lives in one story house with 3 steps to enter home with handrail; laundry in the basement; have bathtub with shower; walker, I have left the high rise toilet seat in place since my left TKA was done, have my sister from [facility] coming to stay several days with me at home.  Also have several sisters close by that will provide support and help for me.    History of half stomach removed ; do not want ASA; hyperlipidemia, hypothyroid, anxiety, migraine, chronic pain, on Lortab for years.    States had difficulty with good pain control after last surgery; cried at rehab.  PCP HARSHAD Lee ordering carafate 1 gm 4 times daily and Toradol 10 mg to take with what the surgeon gives for pain med post op.  Patient knows to wait with toradol til OK with surgeon.  She said she has an Iceman cooler from last time and doesn t want to bring another one home.  Also will talk with anesthesia about a spinal, refused it last time.     Reviewed the following topics with her;  Call don t Fall , CAUTI education, Capnography monitoring and Signs & Symptoms of Infection to watch/report and prevent.  Aure verbalized good understanding of all topics discussed.

## 2018-08-28 NOTE — H&P (VIEW-ONLY)
[facility] [place]  [address]  [place] [address]  [phone]  Dept: [phone]    PRE-OP EVALUATION:    Today's date: 2018    Aure Lanier (: 1950) presents for pre-operative evaluation assessment as requested by Dr. GUTIERREZ.  She requires evaluation and anesthesia risk assessment prior to undergoing surgery/procedure for treatment of right knee pain .    Proposed Surgery/ Procedure: Total Right Knee replacement  Date of Surgery/ Procedure: 2018  Time of Surgery/ Procedure: to be determined  Hospital/Surgical Facility: [facility]  Primary Physician: Jaden Lee  Type of Anesthesia Anticipated: to be determined    Patient has a Health Care Directive or Living Will:  NO    1. NO - Do you have a history of heart attack, stroke, stent, bypass or surgery on an artery in the head, neck, heart or legs?  2. NO - Do you ever have any pain or discomfort in your chest?  3. NO - Do you have a history of  Heart Failure?  4. NO - Are you troubled by shortness of breath when: walking on the level, up a slight hill or at night?  5. NO - Do you currently have a cold, bronchitis or other respiratory infection?  6. NO - Do you have a cough, shortness of breath or wheezing?  7. YES - DO YOU SOMETIMES GET PAINS IN THE CALVES OF YOUR LEGS WHEN YOU WALK? Pain in calvesin leg with no surgery.    8. YES - DO YOU OR ANYONE IN YOUR FAMILY HAVE PREVIOUS HISTORY OF BLOOD CLOTS? Unsure maybe mother?   9. NO - Do you or does anyone in your family have a serious bleeding problem such as prolonged bleeding following surgeries or cuts?  10. NO - Have you ever had problems with anemia or been told to take iron pills?  11. NO - Have you had any abnormal blood loss such as black, tarry or bloody stools, or abnormal vaginal bleeding?  12. NO - Have you ever had a blood transfusion?  13. YES - HAVE YOU OR ANY OF YOUR RELATIVES EVER HAD PROBLEMS WITH ANESTHESIA? She personally has had trouble-has had  nausea, Needs pre-anti nausea treatment.    14. NO - Do you have sleep apnea, excessive snoring or daytime drowsiness?  15. NO - Do you have any prosthetic heart valves?  16. YES - DO YOU HAVE PROSTHETIC JOINTS? Left total knee replacement.  17. NO - Is there any chance that you may be pregnant?      HPI:     HPI related to upcoming procedure  :Bone on bone, and meniscal tears, ulcerated tissue to right knee.            MEDICAL HISTORY:     Patient Active Problem List    Diagnosis Date Noted     Status post total left knee replacement 04/17/2018     Priority: Medium     Chronic pain syndrome 07/13/2017     Priority: Medium     Patient is followed by Jaden Lee NP for ongoing prescription of pain medication.  All refills should only be approved by this provider, or covering partner.    Medication(s): Norco 7.5/325mg.   Maximum quantity per month: #120  Clinic visit frequency required: Q 3 months     Controlled substance agreement:  Encounter-Level CSA - 07/11/2017:          Controlled Substance Agreement - Scan on 7/20/2017 12:19 PM : CONTROLLED SUBSTANCE AGREEMENT (below)              Pain Clinic evaluation in the past: No    DIRE Total Score(s):  No flowsheet data found.    Last [facility] website verification:  Done on 8.22.18   [email or web address]         Back muscle spasm 05/09/2017     Priority: Medium     ACP (advance care planning) 08/04/2016     Priority: Medium     Advance Care Planning 8/4/2016: ACP Review of Chart / Resources Provided:  Reviewed chart for advance care plan.  Aure Lanier has been provided information and resources to begin or update their advance care plan.  Added by Elly Arredondo             Bilateral chronic knee pain 04/14/2016     Priority: Medium     Both knees.         Hyperlipidemia with target LDL less than 100 07/03/2014     Priority: Medium     Diagnosis updated by automated process. Provider to review and confirm.       Low back pain 07/09/2013     Priority:  Medium     Diagnosis updated by automated process. Provider to review and confirm.       Advanced care planning/counseling discussion 03/11/2013     Priority: Medium     Allergic arthritis involving hand 01/01/2011     Priority: Medium     Hypothyroidism 01/01/2011     Priority: Medium     Problem list name updated by automated process. Provider to review       Insomnia 01/01/2011     Priority: Medium     Problem list name updated by automated process. Provider to review       Headache 01/01/2011     Priority: Medium     Problem list name updated by automated process. 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 Provider to review       Hyperlipidemia 01/01/2011     Priority: Medium     Problem list name updated by automated process. Provider to review       Migraine 01/01/2011     Priority: Medium     Problem list name updated by automated process. Provider to review       Osteoporosis 01/01/2011     Priority: Medium     Problem list name updated by automated process. Provider to review       GERD 01/01/2011     Priority: Medium      Past Medical History:   Diagnosis Date     Allergic arthritis involving hand 01/01/2011     Anemia, Iron Deficiency 01/01/2011     Anxiety 01/01/2011     Contact dermatitis and other eczema, unspecified c 01/01/2011     Depression 01/01/2011     Edema 01/01/2011     Gastrointestinal mucositis (ulcerative) 01/01/2011     GERD 01/01/2011     Headache(784.0) 01/01/2011     Hypothyroidism 01/01/2011     Insomnia, unspecified 01/01/2011     Migraine 01/01/2011     Nonallopathic lesion of cervical region, not elsewhere classified 10/16/2000     Osteoporosis 01/01/2011     Other and unspecified hyperlipidemia 01/01/2011     Other malaise and fatigue 08/27/2001     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      Past Surgical History:   Procedure Laterality Date     ARTHROPLASTY KNEE Left 4/17/2018    Procedure: ARTHROPLASTY KNEE;  LEFT TOTAL KNEE ARTHROPLASTY S/N JOURNEY II;  Surgeon: Ty Hernandez MD;  Location: [facility] OR     D & C       ESOPHAGOSCOPY, GASTROSCOPY, DUODENOSCOPY (EGD), COMBINED N/A 9/11/2017    Procedure: COMBINED ESOPHAGOSCOPY, GASTROSCOPY, DUODENOSCOPY (EGD);  Upper Endoscopy: Removal of Food Impaction;  Surgeon: Lino Zhu DO;  Location: [facility] OR     STOMACH SURGERY       stomach surgery peritinitis       Current Outpatient Prescriptions   Medication Sig Dispense Refill     acetaminophen (TYLENOL) 325 MG tablet Take 2-3 tablets (650-975 mg) by mouth every 4 hours as needed for other (mild to moderate pain) 100 tablet      calcium (CALCIUM 600) 600 MG tablet With vitamin D; take 2 by oral route every day       cyanocolbalamin (VITAMIN  B-12) 1000 MCG tablet Take 1 tablet by mouth daily.       cyclobenzaprine (FLEXERIL) 10 MG tablet TAKE 1 TABLET BY MOUTH THREE TIMES DAILY AS NEEDED 90 tablet 1     escitalopram (LEXAPRO) 20 MG tablet TAKE 1 TABLET BY MOUTH EVERY DAY 30 tablet 5     folic acid (FOLVITE) 1 MG tablet TAKE ONE TABLET DAILY BY MOUTH 90 tablet 2     gabapentin (NEURONTIN) 300 MG capsule TAKE ONE CAPSULE 3 TIMES A DAY BY MOUTH 90 capsule 0     HYDROcodone-acetaminophen (NORCO) 7.5-325 MG per tablet Take 1 tablet by mouth every 6 hours as needed for severe pain 120 tablet 0     levothyroxine (SYNTHROID/LEVOTHROID) 150 MCG tablet Take 1 tablet (150 mcg) by mouth daily 30 tablet 0     Multiple Vitamins-Minerals (MULTIVITAMIN OR) Take 1 tablet by mouth daily with food.       multivitamin (OCUVITE) TABS Take 1 tablet by mouth daily       NYSTOP 186754 UNIT/GM POWD powder APPLY TOPICALLY 2 TIMES DAILY AS NEEDED 60 g 3     omeprazole (PRILOSEC) 40 MG capsule TAKE 1 CAPSULE DAILY BY MOUTH 90 capsule 0     RANITIDINE HCL PO Take 150 mg by mouth daily       simvastatin (ZOCOR) 40 MG tablet TAKE 1  TABLET BY MOUTH EVERY DAY IN THE EVENING . GENERIC FOR ZOCOR. (Patient taking differently: 20 mg TAKE 1 TABLET BY MOUTH EVERY DAY IN THE EVENING . GENERIC FOR ZOCOR.) 90 tablet 0     sucralfate (CARAFATE) 1 GM tablet Take 1 tablet (1 g) by mouth 4 times daily 40 tablet 1     zolpidem (AMBIEN) 10 MG tablet Take 1 tablet (10 mg) by mouth nightly as needed for sleep Take 1 tablet at night for sleep-may repeat X1. 60 tablet 0     OTC products: None, except as noted above    Allergies   Allergen Reactions     Erythromycin Base [Kdc:Yellow Dye+Erythromycin+Brilliant Blue Fcf] Nausea and Vomiting     Penicillins Rash      Latex Allergy: NO    Social History   Substance Use Topics     Smoking status: Never Smoker     Smokeless tobacco: Never Used      Comment: no passive exposure     Alcohol use Yes      Comment: rarely, maybe yearly     History   Drug Use No       REVIEW OF SYSTEMS:   Review of Systems   Constitutional: Negative for fatigue and fever.   HENT: Negative for congestion, dental problem, ear pain, postnasal drip, rhinorrhea, sinus pain, sinus pressure, sore throat and trouble swallowing.    Eyes: Negative.    Respiratory: Negative for cough and shortness of breath.    Cardiovascular: Positive for leg swelling. Negative for chest pain and palpitations.        Occas. Leg swelling.    Gastrointestinal: Negative for abdominal pain, constipation, diarrhea, nausea and vomiting.        Remote Hx GI bleed. Had take Carafate with Toradol last surgery for pain, when unable to get pain controlled.     Genitourinary: Negative for difficulty urinating.   Musculoskeletal: Positive for arthralgias, back pain, myalgias and neck pain.        Has chronic pain. On Lortab for years. Had difficulty with pain control last knee surgery, interfered with her rehab progress til able to control pain.      Skin: Negative.    Neurological: Positive for headaches. Negative for dizziness, weakness and numbness.        Has headaches from neck  ".     Psychiatric/Behavioral: Positive for dysphoric mood and sleep disturbance.        Has problems with sleeping.  Takes AMBIEN   For sleep Otherwise cannot sleep.        EXAM:   /60  Pulse 111  Temp 98.3  F (36.8  C) (Tympanic)  Resp 20  Ht 5' 3\" (1.6 m)  Wt 188 lb (85.3 kg)  SpO2 96%  Breastfeeding? No  BMI 33.3 kg/m2  Physical Exam   Constitutional: She is oriented to person, place, and time. She appears well-developed and well-nourished.   HENT:   Right Ear: External ear normal.   Left Ear: External ear normal.   Nose: Nose normal.   Mouth/Throat: Oropharynx is clear and moist.   Eyes: Conjunctivae and EOM are normal. Pupils are equal, round, and reactive to light.   Neck: Normal range of motion. Neck supple. No thyromegaly present.   Lymphadenopathy:     She has no cervical adenopathy.   Neurological: She is alert and oriented to person, place, and time.   Skin: Skin is warm and dry.   Psychiatric: She has a normal mood and affect.       DIAGNOSTICS:     Results for orders placed or performed in visit on 08/27/18   CBC with platelets and differential   Result Value Ref Range    WBC 14.5 (H) 4.0 - 11.0 10e9/L    RBC Count 4.23 3.8 - 5.2 10e12/L    Hemoglobin 11.4 (L) 11.7 - 15.7 g/dL    Hematocrit 35.8 35.0 - 47.0 %    MCV 85 78 - 100 fl    MCH 27.0 26.5 - 33.0 pg    MCHC 31.8 31.5 - 36.5 g/dL    RDW 17.4 (H) 10.0 - 15.0 %    Platelet Count 469 (H) 150 - 450 10e9/L    Diff Method Automated Method     % Neutrophils 44.5 %    % Lymphocytes 20.2 %    % Monocytes 8.5 %    % Eosinophils 25.8 %    % Basophils 0.6 %    % Immature Granulocytes 0.4 %    Nucleated RBCs 0 0 /100    Absolute Neutrophil 6.5 1.6 - 8.3 10e9/L    Absolute Lymphocytes 2.9 0.8 - 5.3 10e9/L    Absolute Monocytes 1.2 0.0 - 1.3 10e9/L    Absolute Eosinophils 3.8 (H) 0.0 - 0.7 10e9/L    Absolute Basophils 0.1 0.0 - 0.2 10e9/L    Abs Immature Granulocytes 0.1 0 - 0.4 10e9/L    Absolute Nucleated RBC 0.0    Iron and iron binding " capacity   Result Value Ref Range    Iron 25 (L) 35 - 180 ug/dL    Iron Binding Cap 271 240 - 430 ug/dL    Iron Saturation Index 9 (L) 15 - 46 %   Comprehensive metabolic panel (BMP + Alb, Alk Phos, ALT, AST, Total. Bili, TP)   Result Value Ref Range    Sodium 140 133 - 144 mmol/L    Potassium 3.9 3.4 - 5.3 mmol/L    Chloride 106 94 - 109 mmol/L    Carbon Dioxide 29 20 - 32 mmol/L    Anion Gap 5 3 - 14 mmol/L    Glucose 104 (H) 70 - 99 mg/dL    Urea Nitrogen 21 7 - 30 mg/dL    Creatinine 0.76 0.52 - 1.04 mg/dL    GFR Estimate 76 >60 mL/min/1.7m2    GFR Estimate If Black >90 >60 mL/min/1.7m2    Calcium 7.8 (L) 8.5 - 10.1 mg/dL    Bilirubin Total 0.2 0.2 - 1.3 mg/dL    Albumin 3.0 (L) 3.4 - 5.0 g/dL    Protein Total 6.0 (L) 6.8 - 8.8 g/dL    Alkaline Phosphatase 70 40 - 150 U/L    ALT 15 0 - 50 U/L    AST 10 0 - 45 U/L       Recent Labs   Lab Test  04/19/18   0512  04/18/18   0509  04/09/18   1716  12/20/17   0015   HGB  10.0*  10.2*  13.3  14.4   PLT   --    --   413  472*   NA   --    --   137  136   POTASSIUM   --    --   3.7  3.8   CR   --    --   0.62  0.65        IMPRESSION:     Surgery:  Total Right Knee Replacement with Dr. Hernandez,  At [facility] on 9/4/18.      The proposed surgical procedure is considered INTERMEDIATE risk.    REVISED CARDIAC RISK INDEX  The patient has the following serious cardiovascular risks for perioperative complications such as (MI, PE, VFib and 3  AV Block):  No serious cardiac risks  INTERPRETATION: 0 risks: Class I (very low risk - 0.4% complication rate)    The patient has the following additional risks for perioperative complications:    Long term Narcotic use.      Had Unusual pain response  last knee surgery, did better with pain relief and rehab  on Dilaudid 2mg  4 times a day, not the Oxycontin.        ICD-10-CM    1. Preop general physical exam Z01.818      ASSESSMENT / PLAN:  (Z01.818) Preop general physical exam  (primary encounter diagnosis)  Comment:   Will give Carafate,1gm  QID  and Toradol 10mg TID for post op to take with other pain med. Had hard time with pain control last surgery and felt that interfered with her rehab. Assures me she has discussed with Dr. Hernandez.  EKG 4/10/18=NSR.  With no changes.    Hemoglobin   Date Value Ref Range Status   08/27/2018 11.4 (L) 11.7 - 15.7 g/dL Final   ]  Potassium   Date Value Ref Range Status   08/27/2018 3.9 3.4 - 5.3 mmol/L Final       Plan: sucralfate (CARAFATE) 1 GM tablet, ketorolac         (TORADOL) 10 MG tablet, CBC with platelets and         differential, Iron and iron binding capacity,         Comprehensive metabolic panel (BMP + Alb, Alk         Phos, ALT, AST, Total. Bili, TP)    (E61.1) Iron deficiency  Comment:  Iron=25. Will add ferrous sulfate 325mg 3 times a day for 2 months.  Discussed stool softener.    Plan:  Ferrous sulfate.            (E03.9) Hypothyroidism, unspecified type  Comment:On Levothyroxine  150mcg.   Noted patient did not come back for TSH recheck Levothyroxine had been increased with  4/9/18 finding to 150mcg.    TSH   Date Value Ref Range Status   04/09/2018 6.15 (H) 0.40 - 4.00 mU/L Final     Plan: Will recheck TSH with next lab draw.  Will take Levothyroxine AM of surgery.       (G89.4) Chronic pain syndrome  Comment: On Lortab 7.5mg  4 times a day for many years.  Had trouble with pain control on last surgery-may be because of long term narcotic use.        (R51) Chronic nonintractable headache, unspecified headache type  Comment: Secondary to neck pain  Uses lortab.        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    PlanHold AM of surgery.     (G47.00) Insomnia  Comment: On Ambien 10mg a night. Was unable to sleep in hospital last surgery, Benadryl did not work for her.  Plan: Ambien 10mg        RECOMMENDATIONS:     Take Levothyroxine  AM of surgery.    Given Carafate 1gm  4 times a day and Toradol 10mg to take with what the surgeon gives for pain med post op.  .   Patient knows to wait with Toradol til OK with Surgeon.    APPROVAL GIVEN to proceed with proposed procedure, without further diagnostic evaluation       Signed Electronically by: Jaden Lee NP    Copy of this evaluation report is provided to requesting physician.    [facility] Preop Guidelines    Revised Cardiac Risk Index

## 2018-08-30 DIAGNOSIS — Z01.818 PREOP GENERAL PHYSICAL EXAM: ICD-10-CM

## 2018-08-30 LAB
ALBUMIN UR-MCNC: NEGATIVE MG/DL
APPEARANCE UR: CLEAR
BACTERIA #/AREA URNS HPF: ABNORMAL /HPF
BILIRUB UR QL STRIP: NEGATIVE
COLOR UR AUTO: ABNORMAL
GLUCOSE UR STRIP-MCNC: NEGATIVE MG/DL
HGB UR QL STRIP: NEGATIVE
KETONES UR STRIP-MCNC: NEGATIVE MG/DL
LEUKOCYTE ESTERASE UR QL STRIP: NEGATIVE
NITRATE UR QL: NEGATIVE
PH UR STRIP: 5.5 PH (ref 4.7–8)
RBC #/AREA URNS AUTO: <1 /HPF (ref 0–2)
SOURCE: ABNORMAL
SP GR UR STRIP: 1.01 (ref 1–1.03)
UROBILINOGEN UR STRIP-MCNC: NORMAL MG/DL (ref 0–2)
WBC #/AREA URNS AUTO: <1 /HPF (ref 0–5)

## 2018-08-30 PROCEDURE — 81001 URINALYSIS AUTO W/SCOPE: CPT | Mod: ZL | Performed by: NURSE PRACTITIONER

## 2018-08-30 NOTE — OR NURSING
Jaden Lee, Rhonda Gunter RN                     I ordered the UA and patient will come in. WBC was high but absolute nuetrophils were normal, Eosinophils Were high though. So may be something other than infection Jaden

## 2018-08-31 ENCOUNTER — ANESTHESIA EVENT (OUTPATIENT)
Dept: SURGERY | Facility: HOSPITAL | Age: 68
DRG: 470 | End: 2018-08-31
Payer: COMMERCIAL

## 2018-08-31 RX ORDER — FERROUS SULFATE 325(65) MG
325 TABLET ORAL
Qty: 90 TABLET | Refills: 2 | Status: SHIPPED | OUTPATIENT
Start: 2018-08-31 | End: 2018-11-03

## 2018-08-31 NOTE — ANESTHESIA PREPROCEDURE EVALUATION
Anesthesia Evaluation     . Pt has had prior anesthetic. Type: General, MAC and Regional    History of anesthetic complications   - PONV        ROS/MED HX    ENT/Pulmonary:     (+)TRISTAN risk factors (BMI: 35.08) obese, allergic rhinitis, , . .    Neurologic:     (+)neuropathy - postherpetic polyneuropathy, migraines,     Cardiovascular:     (+) Dyslipidemia, ----. : . . . :. . Previous cardiac testing date:results:date: results:ECG reviewed date:4/10/18 results:Nsr, nonspecific ST abnormality date: results:          METS/Exercise Tolerance:     Hematologic:     (+) Anemia, -      Musculoskeletal:   (+) , , other musculoskeletal- osteoporosis, Chronic LBP      GI/Hepatic:     (+) GERD       Renal/Genitourinary:  - ROS Renal section negative       Endo:     (+) thyroid problem hypothyroidism, Obesity, .      Psychiatric:     (+) psychiatric history anxiety and depression      Infectious Disease:  - neg infectious disease ROS       Malignancy:      - no malignancy   Other: Comment: Chronic pain agreement   (+) H/O Chronic Pain,H/O chronic opiod use ,                    Physical Exam      Airway   Mallampati: III  TM distance: >3 FB  Neck ROM: full    Dental   (+) chipped and missing    Cardiovascular   Rhythm and rate: regular and normal      Pulmonary    breath sounds clear to auscultation                    Anesthesia Plan      History & Physical Review  History and physical reviewed and following examination; no interval change.    ASA Status:  3 .    NPO Status:  > 8 hours    Plan for Spinal and Periph. Nerve Block for postop pain with Other induction. Maintenance will be TIVA.    PONV prophylaxis:  Ondansetron (or other 5HT-3) and Dexamethasone or Solumedrol  H/p 8/27/18 [facility]      Postoperative Care  Postoperative pain management:  Neuraxial analgesia, IV analgesics, Peripheral nerve block (Single Shot) and Oral pain medications.      Consents  Anesthetic plan, risks, benefits and alternatives discussed with:   Patient..                          .

## 2018-09-04 ENCOUNTER — APPOINTMENT (OUTPATIENT)
Dept: GENERAL RADIOLOGY | Facility: HOSPITAL | Age: 68
DRG: 470 | End: 2018-09-04
Attending: ORTHOPAEDIC SURGERY
Payer: COMMERCIAL

## 2018-09-04 ENCOUNTER — HOSPITAL ENCOUNTER (INPATIENT)
Facility: HOSPITAL | Age: 68
LOS: 2 days | Discharge: HOME | DRG: 470 | End: 2018-09-06
Attending: ORTHOPAEDIC SURGERY | Admitting: ORTHOPAEDIC SURGERY
Payer: COMMERCIAL

## 2018-09-04 ENCOUNTER — ANESTHESIA (OUTPATIENT)
Dept: SURGERY | Facility: HOSPITAL | Age: 68
DRG: 470 | End: 2018-09-04
Payer: COMMERCIAL

## 2018-09-04 DIAGNOSIS — Z96.651 S/P TOTAL KNEE ARTHROPLASTY, RIGHT: Primary | ICD-10-CM

## 2018-09-04 DIAGNOSIS — Z01.818 PREOP GENERAL PHYSICAL EXAM: ICD-10-CM

## 2018-09-04 PROCEDURE — 37000008 ZZH ANESTHESIA TECHNICAL FEE, 1ST 30 MIN: Performed by: ORTHOPAEDIC SURGERY

## 2018-09-04 PROCEDURE — C9290 INJ, BUPIVACAINE LIPOSOME: HCPCS | Performed by: ORTHOPAEDIC SURGERY

## 2018-09-04 PROCEDURE — 27447 TOTAL KNEE ARTHROPLASTY: CPT | Performed by: ANESTHESIOLOGY

## 2018-09-04 PROCEDURE — 73560 X-RAY EXAM OF KNEE 1 OR 2: CPT | Mod: TC,RT

## 2018-09-04 PROCEDURE — 27110028 ZZH OR GENERAL SUPPLY NON-STERILE: Performed by: ORTHOPAEDIC SURGERY

## 2018-09-04 PROCEDURE — 25000132 ZZH RX MED GY IP 250 OP 250 PS 637: Performed by: ANESTHESIOLOGY

## 2018-09-04 PROCEDURE — 40000275 ZZH STATISTIC RCP TIME EA 10 MIN

## 2018-09-04 PROCEDURE — 3E0T3BZ INTRODUCTION OF ANESTHETIC AGENT INTO PERIPHERAL NERVES AND PLEXI, PERCUTANEOUS APPROACH: ICD-10-PCS | Performed by: NURSE ANESTHETIST, CERTIFIED REGISTERED

## 2018-09-04 PROCEDURE — 27210794 ZZH OR GENERAL SUPPLY STERILE: Performed by: ORTHOPAEDIC SURGERY

## 2018-09-04 PROCEDURE — 25000125 ZZHC RX 250: Performed by: NURSE ANESTHETIST, CERTIFIED REGISTERED

## 2018-09-04 PROCEDURE — 36000063 ZZH SURGERY LEVEL 4 EA 15 ADDTL MIN: Performed by: ORTHOPAEDIC SURGERY

## 2018-09-04 PROCEDURE — 36000093 ZZH SURGERY LEVEL 4 1ST 30 MIN: Performed by: ORTHOPAEDIC SURGERY

## 2018-09-04 PROCEDURE — 40000305 ZZH STATISTIC PRE PROC ASSESS I: Performed by: ORTHOPAEDIC SURGERY

## 2018-09-04 PROCEDURE — C1776 JOINT DEVICE (IMPLANTABLE): HCPCS | Performed by: ORTHOPAEDIC SURGERY

## 2018-09-04 PROCEDURE — 25000132 ZZH RX MED GY IP 250 OP 250 PS 637: Performed by: ORTHOPAEDIC SURGERY

## 2018-09-04 PROCEDURE — 71000014 ZZH RECOVERY PHASE 1 LEVEL 2 FIRST HR: Performed by: ORTHOPAEDIC SURGERY

## 2018-09-04 PROCEDURE — 27810169 ZZH OR IMPLANT GENERAL: Performed by: ORTHOPAEDIC SURGERY

## 2018-09-04 PROCEDURE — 25000128 H RX IP 250 OP 636: Performed by: NURSE ANESTHETIST, CERTIFIED REGISTERED

## 2018-09-04 PROCEDURE — 25000128 H RX IP 250 OP 636: Performed by: ANESTHESIOLOGY

## 2018-09-04 PROCEDURE — 27447 TOTAL KNEE ARTHROPLASTY: CPT | Performed by: NURSE ANESTHETIST, CERTIFIED REGISTERED

## 2018-09-04 PROCEDURE — 37000011 ZZH ANESTHESIA WARD SERVICE: Performed by: NURSE ANESTHETIST, CERTIFIED REGISTERED

## 2018-09-04 PROCEDURE — 12000000 ZZH R&B MED SURG/OB

## 2018-09-04 PROCEDURE — 25000132 ZZH RX MED GY IP 250 OP 250 PS 637: Performed by: NURSE PRACTITIONER

## 2018-09-04 PROCEDURE — 99221 1ST HOSP IP/OBS SF/LOW 40: CPT | Performed by: NURSE PRACTITIONER

## 2018-09-04 PROCEDURE — 25000125 ZZHC RX 250: Performed by: ORTHOPAEDIC SURGERY

## 2018-09-04 PROCEDURE — 37000009 ZZH ANESTHESIA TECHNICAL FEE, EACH ADDTL 15 MIN: Performed by: ORTHOPAEDIC SURGERY

## 2018-09-04 PROCEDURE — 88300 SURGICAL PATH GROSS: CPT | Mod: TC | Performed by: ORTHOPAEDIC SURGERY

## 2018-09-04 PROCEDURE — 25000128 H RX IP 250 OP 636: Performed by: ORTHOPAEDIC SURGERY

## 2018-09-04 PROCEDURE — 0SRC0J9 REPLACEMENT OF RIGHT KNEE JOINT WITH SYNTHETIC SUBSTITUTE, CEMENTED, OPEN APPROACH: ICD-10-PCS | Performed by: ORTHOPAEDIC SURGERY

## 2018-09-04 DEVICE — IMPLANTABLE DEVICE: Type: IMPLANTABLE DEVICE | Site: KNEE | Status: FUNCTIONAL

## 2018-09-04 DEVICE — FEMORAL COMPONENT-RIGHT SZ 4 JOURNEY II OXIN: Type: IMPLANTABLE DEVICE | Site: KNEE | Status: FUNCTIONAL

## 2018-09-04 DEVICE — BONE CEMENT-SIMPLEX: Type: IMPLANTABLE DEVICE | Site: KNEE | Status: FUNCTIONAL

## 2018-09-04 DEVICE — TIBIA BASE-RIGHT SZ 3 JOURNEY: Type: IMPLANTABLE DEVICE | Site: KNEE | Status: FUNCTIONAL

## 2018-09-04 DEVICE — PATELLA-JOURNEY 32MM STD: Type: IMPLANTABLE DEVICE | Site: KNEE | Status: FUNCTIONAL

## 2018-09-04 RX ORDER — FENTANYL CITRATE 50 UG/ML
25-50 INJECTION, SOLUTION INTRAMUSCULAR; INTRAVENOUS
Status: DISCONTINUED | OUTPATIENT
Start: 2018-09-04 | End: 2018-09-04 | Stop reason: HOSPADM

## 2018-09-04 RX ORDER — SODIUM CHLORIDE, SODIUM LACTATE, POTASSIUM CHLORIDE, CALCIUM CHLORIDE 600; 310; 30; 20 MG/100ML; MG/100ML; MG/100ML; MG/100ML
INJECTION, SOLUTION INTRAVENOUS CONTINUOUS
Status: DISCONTINUED | OUTPATIENT
Start: 2018-09-04 | End: 2018-09-04 | Stop reason: HOSPADM

## 2018-09-04 RX ORDER — ZOLPIDEM TARTRATE 5 MG/1
10 TABLET ORAL
Status: DISCONTINUED | OUTPATIENT
Start: 2018-09-04 | End: 2018-09-04

## 2018-09-04 RX ORDER — ZOLPIDEM TARTRATE 5 MG/1
5 TABLET ORAL
Status: DISCONTINUED | OUTPATIENT
Start: 2018-09-04 | End: 2018-09-05

## 2018-09-04 RX ORDER — SODIUM CHLORIDE 9 MG/ML
INJECTION, SOLUTION INTRAVENOUS CONTINUOUS
Status: DISCONTINUED | OUTPATIENT
Start: 2018-09-04 | End: 2018-09-05

## 2018-09-04 RX ORDER — KETOROLAC TROMETHAMINE 30 MG/ML
INJECTION, SOLUTION INTRAMUSCULAR; INTRAVENOUS PRN
Status: DISCONTINUED | OUTPATIENT
Start: 2018-09-04 | End: 2018-09-04

## 2018-09-04 RX ORDER — NALOXONE HYDROCHLORIDE 0.4 MG/ML
.1-.4 INJECTION, SOLUTION INTRAMUSCULAR; INTRAVENOUS; SUBCUTANEOUS
Status: ACTIVE | OUTPATIENT
Start: 2018-09-04 | End: 2018-09-05

## 2018-09-04 RX ORDER — LABETALOL HYDROCHLORIDE 5 MG/ML
10 INJECTION, SOLUTION INTRAVENOUS
Status: DISCONTINUED | OUTPATIENT
Start: 2018-09-04 | End: 2018-09-04 | Stop reason: HOSPADM

## 2018-09-04 RX ORDER — MEPERIDINE HYDROCHLORIDE 50 MG/ML
12.5 INJECTION INTRAMUSCULAR; INTRAVENOUS; SUBCUTANEOUS EVERY 5 MIN PRN
Status: DISCONTINUED | OUTPATIENT
Start: 2018-09-04 | End: 2018-09-04 | Stop reason: HOSPADM

## 2018-09-04 RX ORDER — DEXAMETHASONE SODIUM PHOSPHATE 4 MG/ML
4 INJECTION, SOLUTION INTRA-ARTICULAR; INTRALESIONAL; INTRAMUSCULAR; INTRAVENOUS; SOFT TISSUE
Status: DISCONTINUED | OUTPATIENT
Start: 2018-09-04 | End: 2018-09-04 | Stop reason: HOSPADM

## 2018-09-04 RX ORDER — METOCLOPRAMIDE HYDROCHLORIDE 5 MG/ML
5 INJECTION INTRAMUSCULAR; INTRAVENOUS EVERY 6 HOURS PRN
Status: DISCONTINUED | OUTPATIENT
Start: 2018-09-04 | End: 2018-09-06 | Stop reason: HOSPADM

## 2018-09-04 RX ORDER — NALOXONE HYDROCHLORIDE 0.4 MG/ML
.1-.4 INJECTION, SOLUTION INTRAMUSCULAR; INTRAVENOUS; SUBCUTANEOUS
Status: DISCONTINUED | OUTPATIENT
Start: 2018-09-04 | End: 2018-09-04

## 2018-09-04 RX ORDER — ONDANSETRON 2 MG/ML
4 INJECTION INTRAMUSCULAR; INTRAVENOUS EVERY 30 MIN PRN
Status: DISCONTINUED | OUTPATIENT
Start: 2018-09-04 | End: 2018-09-04 | Stop reason: HOSPADM

## 2018-09-04 RX ORDER — HYDRALAZINE HYDROCHLORIDE 20 MG/ML
2.5-5 INJECTION INTRAMUSCULAR; INTRAVENOUS EVERY 10 MIN PRN
Status: DISCONTINUED | OUTPATIENT
Start: 2018-09-04 | End: 2018-09-04 | Stop reason: HOSPADM

## 2018-09-04 RX ORDER — SUFENTANIL CITRATE 50 UG/ML
INJECTION EPIDURAL; INTRAVENOUS PRN
Status: DISCONTINUED | OUTPATIENT
Start: 2018-09-04 | End: 2018-09-04

## 2018-09-04 RX ORDER — ONDANSETRON 4 MG/1
4 TABLET, ORALLY DISINTEGRATING ORAL EVERY 30 MIN PRN
Status: DISCONTINUED | OUTPATIENT
Start: 2018-09-04 | End: 2018-09-04 | Stop reason: HOSPADM

## 2018-09-04 RX ORDER — CLINDAMYCIN PHOSPHATE 900 MG/50ML
900 INJECTION, SOLUTION INTRAVENOUS
Status: COMPLETED | OUTPATIENT
Start: 2018-09-04 | End: 2018-09-04

## 2018-09-04 RX ORDER — LEVOTHYROXINE SODIUM 75 UG/1
150 TABLET ORAL DAILY
Status: DISCONTINUED | OUTPATIENT
Start: 2018-09-05 | End: 2018-09-06 | Stop reason: HOSPADM

## 2018-09-04 RX ORDER — LANOLIN ALCOHOL/MO/W.PET/CERES
1000 CREAM (GRAM) TOPICAL DAILY
Status: DISCONTINUED | OUTPATIENT
Start: 2018-09-05 | End: 2018-09-06 | Stop reason: HOSPADM

## 2018-09-04 RX ORDER — LIDOCAINE 40 MG/G
CREAM TOPICAL
Status: DISCONTINUED | OUTPATIENT
Start: 2018-09-04 | End: 2018-09-06 | Stop reason: HOSPADM

## 2018-09-04 RX ORDER — OXYCODONE HYDROCHLORIDE 5 MG/1
5 TABLET ORAL EVERY 4 HOURS PRN
Status: DISCONTINUED | OUTPATIENT
Start: 2018-09-04 | End: 2018-09-05

## 2018-09-04 RX ORDER — DEXAMETHASONE SODIUM PHOSPHATE 4 MG/ML
INJECTION, SOLUTION INTRA-ARTICULAR; INTRALESIONAL; INTRAMUSCULAR; INTRAVENOUS; SOFT TISSUE PRN
Status: DISCONTINUED | OUTPATIENT
Start: 2018-09-04 | End: 2018-09-04

## 2018-09-04 RX ORDER — OXYCODONE HYDROCHLORIDE 5 MG/1
5-10 TABLET ORAL EVERY 4 HOURS PRN
Status: CANCELLED | OUTPATIENT
Start: 2018-09-04

## 2018-09-04 RX ORDER — ACETAMINOPHEN 325 MG/1
975 TABLET ORAL EVERY 8 HOURS
Status: DISCONTINUED | OUTPATIENT
Start: 2018-09-04 | End: 2018-09-06 | Stop reason: HOSPADM

## 2018-09-04 RX ORDER — PROPOFOL 10 MG/ML
INJECTION, EMULSION INTRAVENOUS PRN
Status: DISCONTINUED | OUTPATIENT
Start: 2018-09-04 | End: 2018-09-04

## 2018-09-04 RX ORDER — AMOXICILLIN 250 MG
2 CAPSULE ORAL 2 TIMES DAILY
Status: DISCONTINUED | OUTPATIENT
Start: 2018-09-04 | End: 2018-09-06 | Stop reason: HOSPADM

## 2018-09-04 RX ORDER — SUCRALFATE 1 G/1
1 TABLET ORAL 4 TIMES DAILY
Status: DISCONTINUED | OUTPATIENT
Start: 2018-09-04 | End: 2018-09-06 | Stop reason: HOSPADM

## 2018-09-04 RX ORDER — HYDROXYZINE HYDROCHLORIDE 10 MG/1
10 TABLET, FILM COATED ORAL EVERY 6 HOURS PRN
Status: DISCONTINUED | OUTPATIENT
Start: 2018-09-04 | End: 2018-09-06 | Stop reason: HOSPADM

## 2018-09-04 RX ORDER — ONDANSETRON 4 MG/1
4 TABLET, ORALLY DISINTEGRATING ORAL EVERY 6 HOURS PRN
Status: DISCONTINUED | OUTPATIENT
Start: 2018-09-04 | End: 2018-09-06 | Stop reason: HOSPADM

## 2018-09-04 RX ORDER — GABAPENTIN 300 MG/1
300 CAPSULE ORAL 3 TIMES DAILY
Status: DISCONTINUED | OUTPATIENT
Start: 2018-09-04 | End: 2018-09-06 | Stop reason: HOSPADM

## 2018-09-04 RX ORDER — CYCLOBENZAPRINE HCL 10 MG
10 TABLET ORAL 3 TIMES DAILY PRN
Status: DISCONTINUED | OUTPATIENT
Start: 2018-09-04 | End: 2018-09-06 | Stop reason: HOSPADM

## 2018-09-04 RX ORDER — AMOXICILLIN 250 MG
1 CAPSULE ORAL 2 TIMES DAILY
Status: DISCONTINUED | OUTPATIENT
Start: 2018-09-04 | End: 2018-09-06 | Stop reason: HOSPADM

## 2018-09-04 RX ORDER — KETOROLAC TROMETHAMINE 30 MG/ML
15 INJECTION, SOLUTION INTRAMUSCULAR; INTRAVENOUS
Status: DISCONTINUED | OUTPATIENT
Start: 2018-09-04 | End: 2018-09-04 | Stop reason: HOSPADM

## 2018-09-04 RX ORDER — BUPIVACAINE HYDROCHLORIDE 7.5 MG/ML
INJECTION, SOLUTION INTRASPINAL PRN
Status: DISCONTINUED | OUTPATIENT
Start: 2018-09-04 | End: 2018-09-04

## 2018-09-04 RX ORDER — ACETAMINOPHEN 325 MG/1
650 TABLET ORAL EVERY 4 HOURS PRN
Status: DISCONTINUED | OUTPATIENT
Start: 2018-09-07 | End: 2018-09-06 | Stop reason: HOSPADM

## 2018-09-04 RX ORDER — ONDANSETRON 2 MG/ML
4 INJECTION INTRAMUSCULAR; INTRAVENOUS EVERY 6 HOURS PRN
Status: DISCONTINUED | OUTPATIENT
Start: 2018-09-04 | End: 2018-09-06 | Stop reason: HOSPADM

## 2018-09-04 RX ORDER — KETOROLAC TROMETHAMINE 15 MG/ML
15 INJECTION, SOLUTION INTRAMUSCULAR; INTRAVENOUS EVERY 6 HOURS PRN
Status: DISCONTINUED | OUTPATIENT
Start: 2018-09-04 | End: 2018-09-05

## 2018-09-04 RX ORDER — ROPIVACAINE HYDROCHLORIDE 5 MG/ML
INJECTION, SOLUTION EPIDURAL; INFILTRATION; PERINEURAL PRN
Status: DISCONTINUED | OUTPATIENT
Start: 2018-09-04 | End: 2018-09-04

## 2018-09-04 RX ORDER — HYDROMORPHONE HYDROCHLORIDE 1 MG/ML
.3-.5 INJECTION, SOLUTION INTRAMUSCULAR; INTRAVENOUS; SUBCUTANEOUS
Status: DISCONTINUED | OUTPATIENT
Start: 2018-09-04 | End: 2018-09-05

## 2018-09-04 RX ORDER — METOCLOPRAMIDE 5 MG/1
5 TABLET ORAL EVERY 6 HOURS PRN
Status: DISCONTINUED | OUTPATIENT
Start: 2018-09-04 | End: 2018-09-06 | Stop reason: HOSPADM

## 2018-09-04 RX ORDER — PROPOFOL 10 MG/ML
INJECTION, EMULSION INTRAVENOUS CONTINUOUS PRN
Status: DISCONTINUED | OUTPATIENT
Start: 2018-09-04 | End: 2018-09-04

## 2018-09-04 RX ORDER — CLINDAMYCIN PHOSPHATE 900 MG/50ML
900 INJECTION, SOLUTION INTRAVENOUS EVERY 8 HOURS
Status: COMPLETED | OUTPATIENT
Start: 2018-09-04 | End: 2018-09-04

## 2018-09-04 RX ORDER — BUPIVACAINE HYDROCHLORIDE AND EPINEPHRINE 2.5; 5 MG/ML; UG/ML
INJECTION, SOLUTION INFILTRATION; PERINEURAL PRN
Status: DISCONTINUED | OUTPATIENT
Start: 2018-09-04 | End: 2018-09-04 | Stop reason: HOSPADM

## 2018-09-04 RX ORDER — PROCHLORPERAZINE MALEATE 5 MG
5 TABLET ORAL EVERY 6 HOURS PRN
Status: DISCONTINUED | OUTPATIENT
Start: 2018-09-04 | End: 2018-09-06 | Stop reason: HOSPADM

## 2018-09-04 RX ORDER — FERROUS SULFATE 325(65) MG
325 TABLET ORAL
Status: DISCONTINUED | OUTPATIENT
Start: 2018-09-04 | End: 2018-09-06 | Stop reason: HOSPADM

## 2018-09-04 RX ORDER — ESCITALOPRAM OXALATE 10 MG/1
20 TABLET ORAL DAILY
Status: DISCONTINUED | OUTPATIENT
Start: 2018-09-05 | End: 2018-09-06 | Stop reason: HOSPADM

## 2018-09-04 RX ORDER — HYDROCODONE BITARTRATE AND ACETAMINOPHEN 7.5; 325 MG/1; MG/1
1-2 TABLET ORAL EVERY 6 HOURS PRN
Status: DISCONTINUED | OUTPATIENT
Start: 2018-09-04 | End: 2018-09-06 | Stop reason: HOSPADM

## 2018-09-04 RX ORDER — ALBUTEROL SULFATE 0.83 MG/ML
2.5 SOLUTION RESPIRATORY (INHALATION) EVERY 4 HOURS PRN
Status: DISCONTINUED | OUTPATIENT
Start: 2018-09-04 | End: 2018-09-04 | Stop reason: HOSPADM

## 2018-09-04 RX ORDER — SIMVASTATIN 40 MG
40 TABLET ORAL AT BEDTIME
Status: DISCONTINUED | OUTPATIENT
Start: 2018-09-04 | End: 2018-09-05 | Stop reason: DRUGHIGH

## 2018-09-04 RX ORDER — HYDROMORPHONE HYDROCHLORIDE 2 MG/1
4 TABLET ORAL
Status: DISCONTINUED | OUTPATIENT
Start: 2018-09-04 | End: 2018-09-06 | Stop reason: HOSPADM

## 2018-09-04 RX ADMIN — SENNOSIDES AND DOCUSATE SODIUM 1 TABLET: 8.6; 5 TABLET ORAL at 20:26

## 2018-09-04 RX ADMIN — PROPOFOL 40 MG: 10 INJECTION, EMULSION INTRAVENOUS at 11:35

## 2018-09-04 RX ADMIN — SUCRALFATE 1 G: 1 TABLET ORAL at 20:26

## 2018-09-04 RX ADMIN — KETOROLAC TROMETHAMINE 30 MG: 30 INJECTION, SOLUTION INTRAMUSCULAR at 12:38

## 2018-09-04 RX ADMIN — ACETAMINOPHEN 975 MG: 325 TABLET, FILM COATED ORAL at 15:37

## 2018-09-04 RX ADMIN — ROPIVACAINE HYDROCHLORIDE 20 ML: 5 INJECTION, SOLUTION EPIDURAL; INFILTRATION; PERINEURAL at 10:05

## 2018-09-04 RX ADMIN — KETOROLAC TROMETHAMINE 15 MG: 15 INJECTION, SOLUTION INTRAMUSCULAR; INTRAVENOUS at 17:50

## 2018-09-04 RX ADMIN — HYDROMORPHONE HYDROCHLORIDE 4 MG: 2 TABLET ORAL at 22:39

## 2018-09-04 RX ADMIN — TRANEXAMIC ACID 1 G: 100 INJECTION, SOLUTION INTRAVENOUS at 12:28

## 2018-09-04 RX ADMIN — HYDROMORPHONE HYDROCHLORIDE 4 MG: 2 TABLET ORAL at 19:31

## 2018-09-04 RX ADMIN — OXYCODONE HYDROCHLORIDE 5 MG: 5 TABLET ORAL at 15:37

## 2018-09-04 RX ADMIN — SODIUM CHLORIDE, POTASSIUM CHLORIDE, SODIUM LACTATE AND CALCIUM CHLORIDE: 600; 310; 30; 20 INJECTION, SOLUTION INTRAVENOUS at 11:45

## 2018-09-04 RX ADMIN — SODIUM CHLORIDE, POTASSIUM CHLORIDE, SODIUM LACTATE AND CALCIUM CHLORIDE: 600; 310; 30; 20 INJECTION, SOLUTION INTRAVENOUS at 09:30

## 2018-09-04 RX ADMIN — OXYCODONE HYDROCHLORIDE 5 MG: 5 TABLET ORAL at 20:26

## 2018-09-04 RX ADMIN — PROPOFOL 70 MCG/KG/MIN: 10 INJECTION, EMULSION INTRAVENOUS at 11:37

## 2018-09-04 RX ADMIN — PHENYLEPHRINE HYDROCHLORIDE 50 MCG: 10 INJECTION, SOLUTION INTRAMUSCULAR; INTRAVENOUS; SUBCUTANEOUS at 12:12

## 2018-09-04 RX ADMIN — FERROUS SULFATE TAB 325 MG (65 MG ELEMENTAL FE) 325 MG: 325 (65 FE) TAB at 16:33

## 2018-09-04 RX ADMIN — GABAPENTIN 300 MG: 300 CAPSULE ORAL at 15:37

## 2018-09-04 RX ADMIN — CLINDAMYCIN PHOSPHATE 900 MG: 18 INJECTION, SOLUTION INTRAVENOUS at 11:19

## 2018-09-04 RX ADMIN — CLINDAMYCIN IN 5 PERCENT DEXTROSE 900 MG: 18 INJECTION, SOLUTION INTRAVENOUS at 22:37

## 2018-09-04 RX ADMIN — DEXAMETHASONE SODIUM PHOSPHATE 10 MG: 4 INJECTION, SOLUTION INTRA-ARTICULAR; INTRALESIONAL; INTRAMUSCULAR; INTRAVENOUS; SOFT TISSUE at 10:05

## 2018-09-04 RX ADMIN — SUCRALFATE 1 G: 1 TABLET ORAL at 15:37

## 2018-09-04 RX ADMIN — TRANEXAMIC ACID 1 G: 100 INJECTION, SOLUTION INTRAVENOUS at 11:50

## 2018-09-04 RX ADMIN — PHENYLEPHRINE HYDROCHLORIDE 50 MCG: 10 INJECTION, SOLUTION INTRAMUSCULAR; INTRAVENOUS; SUBCUTANEOUS at 12:07

## 2018-09-04 RX ADMIN — ACETAMINOPHEN 975 MG: 325 TABLET, FILM COATED ORAL at 23:53

## 2018-09-04 RX ADMIN — MIDAZOLAM 2 MG: 1 INJECTION INTRAMUSCULAR; INTRAVENOUS at 11:19

## 2018-09-04 RX ADMIN — ZOLPIDEM TARTRATE 5 MG: 5 TABLET, FILM COATED ORAL at 23:53

## 2018-09-04 RX ADMIN — PHENYLEPHRINE HYDROCHLORIDE 50 MCG: 10 INJECTION, SOLUTION INTRAMUSCULAR; INTRAVENOUS; SUBCUTANEOUS at 12:32

## 2018-09-04 RX ADMIN — KETOROLAC TROMETHAMINE 15 MG: 15 INJECTION, SOLUTION INTRAMUSCULAR; INTRAVENOUS at 23:53

## 2018-09-04 RX ADMIN — PROPOFOL 20 MG: 10 INJECTION, EMULSION INTRAVENOUS at 11:37

## 2018-09-04 RX ADMIN — GABAPENTIN 300 MG: 300 CAPSULE ORAL at 20:26

## 2018-09-04 RX ADMIN — BUPIVACAINE HYDROCHLORIDE IN DEXTROSE 2 ML: 7.5 INJECTION, SOLUTION SUBARACHNOID at 11:33

## 2018-09-04 RX ADMIN — CLINDAMYCIN IN 5 PERCENT DEXTROSE 900 MG: 18 INJECTION, SOLUTION INTRAVENOUS at 15:36

## 2018-09-04 RX ADMIN — HYDROMORPHONE HYDROCHLORIDE 4 MG: 2 TABLET ORAL at 16:32

## 2018-09-04 RX ADMIN — SUFENTANIL CITRATE 5 MCG: 50 INJECTION EPIDURAL; INTRAVENOUS at 11:33

## 2018-09-04 RX ADMIN — SODIUM CHLORIDE, PRESERVATIVE FREE: 5 INJECTION INTRAVENOUS at 15:37

## 2018-09-04 ASSESSMENT — ACTIVITIES OF DAILY LIVING (ADL)
RETIRED_COMMUNICATION: 0-->UNDERSTANDS/COMMUNICATES WITHOUT DIFFICULTY
TRANSFERRING: 0-->INDEPENDENT
FALL_HISTORY_WITHIN_LAST_SIX_MONTHS: NO
BATHING: 0-->INDEPENDENT
DRESS: 0-->INDEPENDENT
ADLS_ACUITY_SCORE: 14
COGNITION: 0 - NO COGNITION ISSUES REPORTED
TOILETING: 0-->INDEPENDENT
AMBULATION: 0-->INDEPENDENT
ADLS_ACUITY_SCORE: 12
RETIRED_EATING: 0-->INDEPENDENT
SWALLOWING: 0-->SWALLOWS FOODS/LIQUIDS WITHOUT DIFFICULTY

## 2018-09-04 ASSESSMENT — PAIN DESCRIPTION - DESCRIPTORS
DESCRIPTORS: TIGHTNESS
DESCRIPTORS: ACHING
DESCRIPTORS: ACHING

## 2018-09-04 NOTE — OP NOTE
Procedure Date: 09/04/2018      PREOPERATIVE DIAGNOSIS:  End-stage osteoarthritis, right knee.      POSTOPERATIVE DIAGNOSIS:  End-stage osteoarthritis, right knee.      PROCEDURE:  Right total knee arthroplasty.      SURGEON:  Ty Hernandez MD      ASSISTANT:  Siva Panchal PA-C      ANESTHESIA:     1.  Spinal with sedation.   2.  Adductor canal femoral nerve block.      FINDINGS:   1.  Severe tricompartmental osteoarthritis.   2.  Satisfactory total knee arthroplasty with stable range of motion of 0-125 degrees knee flexion with central patellar tracking.   3.  Adequate hemostasis.      IMPLANTS:   1.  [employer] Journey II size 4 Bi-Cruciate stabilizing femoral component.   2.  Size 3 tibial baseplate.   3.  A 32 mm patella.      TOURNIQUET TIME:  35 minutes.      SPECIMENS:  None.      DRAINS:  None.      COMPLICATIONS:  None.      ESTIMATED BLOOD LOSS:  20 mL.      INDICATIONS:  The patient is a 68-year-old female who is approximately four months out from a left total knee arthroplasty.  She did well.  She wanted to proceed with a right total knee arthroplasty.  She understood the risks and benefits of the procedure and gave informed consent.  She failed nonoperative management.      DETAILS OF PROCEDURE:  The patient was seen in the preoperative area, surgical site was marked.  Questions were answered.  She was administered an adductor canal femoral nerve block, taken to the operating room and placed under spinal anesthetic.  Her right lower extremity was then prepped and draped in sterile fashion with ChloraPrep wipe and then ChloraPrep.  A timeout was called, identified correct patient and surgical site.  Limb was exsanguinated and tourniquet inflated to 300 mmHg.  I made a midline incision and medial parapatellar arthrotomy.  Did standard medial and lateral releases, I everted the patella, resected some of the fat pad and that did center bone resection.  I sized to 32 mm, placed and drilled the lug  holes, placed the patellar protector clamp.  The knee was then flexed.  Intramedullary canal was reamed.  I resected 2 additional mm of bone given her 5-degree flexion contracture and her problem with stiffness on her other side, gaining extension.  The 5-in-1 cutting block was then placed to a size 4.  I checked my anterior, posterior and chamfer cuts and made the cuts.  I then resected the ACL, PCL, medial and lateral meniscus.  I used the extramedullary tibial guide and made a 11 mm to bone resection off the less involved lateral compartment.        I then checked my flexion, extension gaps, they were fairly symmetric.  The box cut for the bi-cruciate stabilizing component was then made.  Sized the tibia to size 3, placed trial implants, set the tibial rotation off the femur and then the knee came out to full extension with a 9, flexed nicely to 130 with central patellar tracking.  The final components were cemented in place.  Tourniquet was deflated.  Hemostasis was achieved.  I decided on a 9 mm polyethylene insert.  Patellar tracked midline.  The retinaculum was closed with a #1 Vicryl suture, 2-0 Vicryl and staples.  An Aquacel dressing was applied.  She was awakened, transferred to PACU in stable condition.      POSTOPERATIVE PLAN:  The patient will be weightbearing as tolerated, routine total knee arthroplasty protocol, aspirin for DVT prophylaxis b.i.d.  She will follow up in 2 weeks for wound check, staple removal.  No x-rays needed unless she is having problems.  She wants to discharge home and do outpatient therapy.         CINDY GUTIERREZ MD             D: 2018   T: 2018   MT: VERO      Name:     LEON VASQUEZ   MRN:      [id number]        Account:        [id number]   :      1950           Procedure Date: 2018      Document: [id number]

## 2018-09-04 NOTE — OR NURSING
Patient transferred to room 3228 via bed, Handoff report given to Hilda VENCES at bedside. Patient comfortable, [id number]. Pain 0/10. On RA.

## 2018-09-04 NOTE — ANESTHESIA PROCEDURE NOTES
Peripheral nerve/Neuraxial procedure note : Adductor canal  Pre-Procedure    Location: pre-op    Procedure Times:9/4/2018 10:00 AM and 9/4/2018 10:10 AM  Pre-Anesthestic Checklist: patient identified, IV checked, site marked, risks and benefits discussed, informed consent, monitors and equipment checked, pre-op evaluation, at physician/surgeon's request and post-op pain management    Timeout  Correct Patient: Yes   Correct Procedure: Yes   Correct Site: Yes   Correct Laterality: Yes   Correct Position: Yes   Site Marked: Yes   .   Procedure Documentation    .    Procedure:  right  Adductor canal.     Ultrasound used to identify targeted nerve, plexus, or vascular marker and placed a needle adjacent to it., Ultrasound was used to visualize the spread of the anesthetic in close proximity to the above stated nerve. A permanent image is entered into the patient's record.  Patient Prep;chlorhexidine gluconate and isopropyl alcohol.  .  Needle: insulated, short bevel Needle Gauge: 20.    Needle Length (Inches) 4  Insertion Method: Single Shot.       Assessment/Narrative  Paresthesias: No.  Injection made incrementally with aspirations every 5 mL..  The placement was negative for: blood aspirated, painful injection and site bleeding.  Bolus given via needle. No blood aspirated via catheter.   Secured via.   Complications: none. Comments:  Assisted by Paz PACK

## 2018-09-04 NOTE — IP AVS SNAPSHOT
MRN:[id number]                      After Visit Summary   9/4/2018    Aure Lanier    MRN: [id number]           Thank you!     Thank you for choosing [facility] for your care. Our goal is always to provide you with excellent care. Hearing back from our patients is one way we can continue to improve our services. Please take a few minutes to complete the written survey that you may receive in the mail after you visit with us. Thank you!        Patient Information     Date Of Birth          1950        Designated Caregiver       Most Recent Value    Caregiver    Will someone help with your care after discharge? yes    Name of designated caregiver daughterCristiane    Phone number of caregiver [phone]      About your hospital stay     You were admitted on:  September 4, 2018 You last received care in the:  [facility]    You were discharged on:  September 6, 2018        Reason for your hospital stay       Knee replacement                  Who to Call     For medical emergencies, please call 911.  For non-urgent questions about your medical care, please call your primary care provider or clinic, [phone]  For questions related to your surgery, please call your surgery clinic        Attending Provider     Provider Specialty    Ty Hernandez MD Orthopedics       Primary Care Provider Office Phone # Fax #    Jaden Lee [phone]      After Care Instructions     Activity       Your activity upon discharge: activity as tolerated            Diet       As at home            Discharge Instructions       Wound Care: Leave Aquacel dressing intact until follow up in clinic unless overly saturated.      DVT Prophylaxis:  Aspirin 325 mg PO BID for 2 weeks      Pain Control: Oxycodone 1-2 tabs PO every 4 hours prn pain     Activity: Weightbearing as tolerated to operative extremity; Range of motion as tolerated to operative extremity; Edema/Swelling  "control    Follow-Up: ~2 weeks in [facility] ([phone])    Questions: Call [phone]            Wound care and dressings       Leave Aquacel dressing intact until follow up in clinic  Ok to shower over Aquacel dressing  Do not soak wound   If Aquacel becomes saturated contact the [facility] Office ([phone])                  Follow-up Appointments     Follow-up and recommended labs and tests       2 weeks [facility]                  Additional Services     Physical Therapy Referral       *This therapy referral will be filtered to a centralized scheduling office at [facility] and the patient will receive a call to schedule an appointment at a [place] location most convenient for them. *     [facility] provides Physical Therapy evaluation and treatment and many specialty services across the [place] system.  If requesting a specialty program, please choose from the list below.    If you have not heard from the scheduling office within 2 business days, please call [phone] for all locations, with the exception of [place], please call [phone] and [facility], please call [phone]  Treatment: Evaluation & Treatment  Special Instructions/Modalities: none  Special Programs: None    Please be aware that coverage of these services is subject to the terms and limitations of your health insurance plan.  Call member services at your health plan with any benefit or coverage questions.      **Note to Provider:  If you are referring outside of [place] for the therapy appointment, please list the name of the location in the \"special instructions\" above, print the referral and give to the patient to schedule the appointment.                  Further instructions from your care team       Wound Care: Leave Aquacel dressing intact until follow up in clinic unless overly saturated.      DVT Prophylaxis:  Aspirin 325 mg by mouth twice a day " "for 2 weeks.      Pain Control: Dilaudid mg by mouth every 3 hours as needed for pain.     Activity: Weightbearing as tolerated to operative extremity; Range of motion as tolerated to operative extremity; Edema/Swelling control    Follow-Up: ~2 weeks in [facility] ([phone])    Questions: Call [phone]     [facility] with [facility] on Monday September 10th @ 1:30 PM, they will schedule with you for further appointments that day.    You have a follow up appointment scheduled with OA on Wednesday, September 19th at 9am.      Pending Results     No orders found from 9/2/2018 to 9/5/2018.            Statement of Approval     Ordered          09/06/18 1257  I have reviewed and agree with all the recommendations and orders detailed in this document.  EFFECTIVE NOW     Approved and electronically signed by:  Tayler Abad MD           09/05/18 7464  I have reviewed and agree with all the recommendations and orders detailed in this document.  EFFECTIVE NOW     Approved and electronically signed by:  Ty Hernandez MD             Admission Information     Date & Time Provider Department Dept. Phone    9/4/2018 Ty Hernandez MD [facility] [phone]      Your Vitals Were     Blood Pressure Temperature Respirations Height Weight Pulse Oximetry    125/62 98.1  F (36.7  C) (Tympanic) 18 1.575 m (5' 2\") 86.8 kg (191 lb 6.4 oz) 94%    BMI (Body Mass Index)                   35.01 kg/m2           MyChart Information     [facility] gives you secure access to your electronic health record. If you see a primary care provider, you can also send messages to your care team and make appointments. If you have questions, please call your primary care clinic.  If you do not have a primary care provider, please call [phone] and they will assist you.        Care EveryWhere ID     This is your Care EveryWhere ID. This could be used by other organizations to access your [facility]" records  [id number]        Equal Access to Services     [facility] : Hadii jefry brito marleyjaved Sojun, waaxda luqadaha, qaybta kacheryleroxanna jacksondahianaroxanna, racquel gonzalesowenmonica craven. So [facility] [phone].    ATENCIÓN: Si habla español, tiene a gross disposición servicios gratuitos de asistencia lingüística. Llame al [phone].    We comply with applicable federal civil rights laws and [place] laws. We do not discriminate on the basis of race, color, national origin, age, disability, sex, sexual orientation, or gender identity.               Review of your medicines      START taking        Dose / Directions    aspirin 325 MG EC tablet   Indication:  VTE Prophylaxis   Used for:  S/P total knee arthroplasty, right        Dose:  325 mg   Take 1 tablet (325 mg) by mouth 2 times daily for 14 days   Quantity:  28 tablet   Refills:  0       docusate sodium 50 MG capsule   Commonly known as:  COLACE   Used for:  S/P total knee arthroplasty, right        Dose:  50 mg   Take 1 capsule (50 mg) by mouth 2 times daily as needed for constipation   Quantity:  60 capsule   Refills:  0       HYDROmorphone 4 MG tablet   Commonly known as:  DILAUDID   Used for:  S/P total knee arthroplasty, right        Dose:  4 mg   Take 1 tablet (4 mg) by mouth every 3 hours as needed for moderate to severe pain (Pain)   Quantity:  60 tablet   Refills:  0         CONTINUE these medicines which may have CHANGED, or have new prescriptions. If we are uncertain of the size of tablets/capsules you have at home, strength may be listed as something that might have changed.        Dose / Directions    escitalopram 20 MG tablet   Commonly known as:  LEXAPRO   This may have changed:  See the new instructions.   Used for:  Anxiety        TAKE 1 TABLET BY MOUTH EVERY DAY   Quantity:  30 tablet   Refills:  5       simvastatin 40 MG tablet   Commonly known as:  ZOCOR   This may have changed:    - how much to take  - additional instructions   Used for:   Mixed hyperlipidemia        TAKE 1 TABLET BY MOUTH EVERY DAY IN THE EVENING . GENERIC FOR ZOCOR.   Quantity:  90 tablet   Refills:  0         CONTINUE these medicines which have NOT CHANGED        Dose / Directions    acetaminophen 325 MG tablet   Commonly known as:  TYLENOL   Used for:  Status post total left knee replacement        Dose:  650-975 mg   Take 2-3 tablets (650-975 mg) by mouth every 4 hours as needed for other (mild to moderate pain)   Quantity:  100 tablet   Refills:  0       CALCIUM + D PO        Dose:  2 tablet   Take 2 tablets by mouth daily as needed   Refills:  0       cyanocobalamin 1000 MCG tablet   Commonly known as:  vitamin  B-12        Dose:  1 tablet   Take 1 tablet by mouth daily as needed   Refills:  0       cyclobenzaprine 10 MG tablet   Commonly known as:  FLEXERIL   Used for:  Cervicalgia        TAKE 1 TABLET BY MOUTH THREE TIMES DAILY AS NEEDED   Quantity:  90 tablet   Refills:  1       ferrous sulfate 325 (65 Fe) MG tablet   Commonly known as:  IRON   Used for:  Iron deficiency        Dose:  325 mg   Take 1 tablet (325 mg) by mouth 3 times daily (with meals)   Quantity:  90 tablet   Refills:  2       folic acid 1 MG tablet   Commonly known as:  FOLVITE   Used for:  Health care maintenance        TAKE ONE TABLET DAILY BY MOUTH   Quantity:  90 tablet   Refills:  2       gabapentin 300 MG capsule   Commonly known as:  NEURONTIN   Used for:  Postherpetic polyneuropathy        TAKE ONE CAPSULE 3 TIMES A DAY BY MOUTH   Quantity:  90 capsule   Refills:  0       ketorolac 10 MG tablet   Commonly known as:  TORADOL   Used for:  Preop general physical exam        Dose:  10 mg   Take 1 tablet (10 mg) by mouth every 6 hours as needed for moderate pain   Quantity:  40 tablet   Refills:  0       levothyroxine 150 MCG tablet   Commonly known as:  SYNTHROID/LEVOTHROID   Used for:  Hypothyroidism, unspecified type        Dose:  150 mcg   Take 1 tablet (150 mcg) by mouth daily   Quantity:  30 tablet    Refills:  0       * MULTIVITAMIN PO        Dose:  1 tablet   Take 1 tablet by mouth daily as needed   Refills:  0       * multivitamin Tabs tablet        Dose:  1 tablet   Take 1 tablet by mouth daily as needed   Refills:  0       NYSTOP 622081 UNIT/GM Powd   Used for:  Rash   Generic drug:  nystatin        APPLY TOPICALLY 2 TIMES DAILY AS NEEDED   Quantity:  60 g   Refills:  3       omeprazole 40 MG capsule   Commonly known as:  priLOSEC        TAKE 1 CAPSULE DAILY BY MOUTH   Quantity:  90 capsule   Refills:  0       RANITIDINE HCL PO        Dose:  150 mg   Take 150 mg by mouth daily as needed   Refills:  0       sucralfate 1 GM tablet   Commonly known as:  CARAFATE   Used for:  Preop general physical exam        Dose:  1 g   Take 1 tablet (1 g) by mouth 4 times daily   Quantity:  60 tablet   Refills:  0       zolpidem 10 MG tablet   Commonly known as:  AMBIEN   Used for:  Persistent insomnia        Dose:  10 mg   Take 1 tablet (10 mg) by mouth nightly as needed for sleep Take 1 tablet at night for sleep-may repeat X1.   Quantity:  60 tablet   Refills:  0       * Notice:  This list has 2 medication(s) that are the same as other medications prescribed for you. Read the directions carefully, and ask your doctor or other care provider to review them with you.      STOP taking     HYDROcodone-acetaminophen 7.5-325 MG per tablet   Commonly known as:  NORCO                Where to get your medicines      Some of these will need a paper prescription and others can be bought over the counter. Ask your nurse if you have questions.     Bring a paper prescription for each of these medications     aspirin 325 MG EC tablet    docusate sodium 50 MG capsule    HYDROmorphone 4 MG tablet    ketorolac 10 MG tablet    sucralfate 1 GM tablet                Protect others around you: Learn how to safely use, store and throw away your medicines at www.disposemymeds.org.        Information about your nerve block     Today you received  "a block to numb the nerves near your surgery site.    This is a block using local anesthetic or \"numbing\" medication injected around the nerves to anesthetize or \"numb\" the area supplied by those nerves. This block is injected into the muscle layer near your surgical site. The type of anesthesia (Exparel) your anesthesia team used to numb your abdomen may give you relief for up to 72 hours.     Diet: There are no diet restrictions, but you should drink plenty of fluids, unless you are on a fluid-restricted diet.     Activity: If your surgical site is an arm or leg you should be careful with your affected limb, since it is possible to injure your limb without being aware of it due to the numbing. Until full feeling returns, you should guard against bumping or hitting your limb, and avoid extreme hot or cold temperatures on the skin.    Pain Medication: As the block wears off, the feeling will return as a tingling or prickly sensation near your surgical site. You will experience more discomfort from your incisions as the feeling returns. You may want to take a pain pill (a narcotic or Tylenol if this was prescribed by your surgeon) when you start to experience mild pain, before the pain becomes more severe. If your pain medications do not control your pain, you should notify your surgeon. If you are taking narcotics for pain management, do not drink alcohol, drive a car, or perform hazardous activities.  If you have questions or concerns you may call your surgeon at the number provided with your discharge instructions.     Call your surgeon if you experience blurry vision, ringing in the ears or metallic taste in your mouth.         Information about OPIOIDS     PRESCRIPTION OPIOIDS: WHAT YOU NEED TO KNOW   We gave you an opioid (narcotic) pain medicine. It is important to manage your pain, but opioids are not always the best choice. You should first try all the other options your care team gave you. Take this " medicine for as short a time (and as few doses) as possible.    Some activities can increase your pain, such as bandage changes or therapy sessions. It may help to take your pain medicine 30 to 60 minutes before these activities. Reduce your stress by getting enough sleep, working on hobbies you enjoy and practicing relaxation or meditation. Talk to your care team about ways to manage your pain beyond prescription opioids.    These medicines have risks:    DO NOT drive when on new or higher doses of pain medicine. These medicines can affect your alertness and reaction times, and you could be arrested for driving under the influence (DUI). If you need to use opioids long-term, talk to your care team about driving.    DO NOT operate heavy machinery    DO NOT do any other dangerous activities while taking these medicines.    DO NOT drink any alcohol while taking these medicines.     If the opioid prescribed includes acetaminophen, DO NOT take with any other medicines that contain acetaminophen. Read all labels carefully. Look for the word  acetaminophen  or  Tylenol.  Ask your pharmacist if you have questions or are unsure.    You can get addicted to pain medicines, especially if you have a history of addiction (chemical, alcohol or substance dependence). Talk to your care team about ways to reduce this risk.    All opioids tend to cause constipation. Drink plenty of water and eat foods that have a lot of fiber, such as fruits, vegetables, prune juice, apple juice and high-fiber cereal. Take a laxative (Miralax, milk of magnesia, Colace, Senna) if you don t move your bowels at least every other day. Other side effects include upset stomach, sleepiness, dizziness, throwing up, tolerance (needing more of the medicine to have the same effect), physical dependence and slowed breathing.    Store your pills in a secure place, locked if possible. We will not replace any lost or stolen medicine. If you don t finish your  medicine, please throw away (dispose) as directed by your pharmacist. The [place] Pollution [employer] has more information about safe disposal: https://www.pca.state.[place]/living-green/managing-unwanted-medications             Medication List: This is a list of all your medications and when to take them. Check marks below indicate your daily home schedule. Keep this list as a reference.      Medications           Morning Afternoon Evening Bedtime As Needed    acetaminophen 325 MG tablet   Commonly known as:  TYLENOL   Take 2-3 tablets (650-975 mg) by mouth every 4 hours as needed for other (mild to moderate pain)   Last time this was given:  975 mg on 9/6/2018  8:26 AM                                aspirin 325 MG EC tablet   Take 1 tablet (325 mg) by mouth 2 times daily for 14 days   Last time this was given:  325 mg on 9/6/2018  8:26 AM                                CALCIUM + D PO   Take 2 tablets by mouth daily as needed                                cyanocobalamin 1000 MCG tablet   Commonly known as:  vitamin  B-12   Take 1 tablet by mouth daily as needed   Last time this was given:  1,000 mcg on 9/6/2018  8:26 AM                                cyclobenzaprine 10 MG tablet   Commonly known as:  FLEXERIL   TAKE 1 TABLET BY MOUTH THREE TIMES DAILY AS NEEDED                                docusate sodium 50 MG capsule   Commonly known as:  COLACE   Take 1 capsule (50 mg) by mouth 2 times daily as needed for constipation                                escitalopram 20 MG tablet   Commonly known as:  LEXAPRO   TAKE 1 TABLET BY MOUTH EVERY DAY   Last time this was given:  20 mg on 9/6/2018  8:25 AM                                ferrous sulfate 325 (65 Fe) MG tablet   Commonly known as:  IRON   Take 1 tablet (325 mg) by mouth 3 times daily (with meals)   Last time this was given:  325 mg on 9/6/2018 12:56 PM                                folic acid 1 MG tablet   Commonly known as:  FOLVITE   TAKE ONE TABLET  DAILY BY MOUTH                                gabapentin 300 MG capsule   Commonly known as:  NEURONTIN   TAKE ONE CAPSULE 3 TIMES A DAY BY MOUTH   Last time this was given:  300 mg on 9/6/2018 12:56 PM                                HYDROmorphone 4 MG tablet   Commonly known as:  DILAUDID   Take 1 tablet (4 mg) by mouth every 3 hours as needed for moderate to severe pain (Pain)   Last time this was given:  4 mg on 9/6/2018 12:55 PM                                ketorolac 10 MG tablet   Commonly known as:  TORADOL   Take 1 tablet (10 mg) by mouth every 6 hours as needed for moderate pain                                levothyroxine 150 MCG tablet   Commonly known as:  SYNTHROID/LEVOTHROID   Take 1 tablet (150 mcg) by mouth daily   Last time this was given:  150 mcg on 9/6/2018  6:09 AM                                * MULTIVITAMIN PO   Take 1 tablet by mouth daily as needed                                * multivitamin Tabs tablet   Take 1 tablet by mouth daily as needed                                NYSTOP 312533 UNIT/GM Powd   APPLY TOPICALLY 2 TIMES DAILY AS NEEDED   Generic drug:  nystatin                                omeprazole 40 MG capsule   Commonly known as:  priLOSEC   TAKE 1 CAPSULE DAILY BY MOUTH   Last time this was given:  40 mg on 9/6/2018  8:26 AM                                RANITIDINE HCL PO   Take 150 mg by mouth daily as needed   Last time this was given:  150 mg on 9/6/2018  8:26 AM                                simvastatin 40 MG tablet   Commonly known as:  ZOCOR   TAKE 1 TABLET BY MOUTH EVERY DAY IN THE EVENING . GENERIC FOR ZOCOR.   Last time this was given:  20 mg on 9/6/2018 12:01 AM                                sucralfate 1 GM tablet   Commonly known as:  CARAFATE   Take 1 tablet (1 g) by mouth 4 times daily   Last time this was given:  1 g on 9/6/2018 12:56 PM                                zolpidem 10 MG tablet   Commonly known as:  AMBIEN   Take 1 tablet (10 mg) by mouth  nightly as needed for sleep Take 1 tablet at night for sleep-may repeat X1.   Last time this was given:  10 mg on 9/6/2018 12:01 AM                                * Notice:  This list has 2 medication(s) that are the same as other medications prescribed for you. Read the directions carefully, and ask your doctor or other care provider to review them with you.              More Information        Total Knee Replacement  During total knee replacement surgery, your damaged knee joint is replaced with an artificial joint, called a prosthesis. This surgery almost always reduces joint pain and improves your quality of life.     The parts of the prosthesis are secured to the bones of the knee. Together they form the new joint.   Before your surgery  You will most likely arrive at the hospital on the morning of the surgery. Be sure to follow all of your doctor s instructions on preparing for surgery:    Follow any directions you are given for taking medicines or for not eating or drinking before surgery.    At the hospital, your temperature, pulse, breathing, and blood pressure will be checked.    An IV (intravenous) line will be started to provide fluids and medicines needed during surgery.  The surgical procedure  When the surgical team is ready, you ll be taken to the operating room. There you ll be given anesthesia to help you sleep through surgery, or to make you numb from the waist down. Then an incision is made on the front or side of your knee. Any damaged bone is cleaned away, and the new joint components are put into place. The incision is closed with surgical staples or stitches.  After your surgery  After surgery, you ll be sent to the recovery room. When you are fully awake, you ll be moved to your room. The nurses will give you medicines to ease your pain. You may have a catheter (small tube) in your bladder. A continuous passive motion machine may be used on your knee to keep it from getting stiff. A  sequential compression machine may be used to prevent blood clots by gently squeezing then releasing your leg. You may be given medicine to prevent blood clots. Soon, healthcare providers will help you get up and moving.  When to call your healthcare provider  Once at home, call your healthcare provider if you have any of the symptoms below:    An increase in knee pain    Pain or swelling in the calf or leg    Unusual redness, heat, or drainage at the incision site    Fever of 100.4 F (38 C) or higher, or as directed by your healthcare provider    Shaking chills    Trouble breathing or chest pain (call 911)   Date Last Reviewed: 9/20/2015 2000-2017 [employer]. [address] [place] [address], [place] [address]. All rights reserved. This information is not intended as a substitute for professional medical care. Always follow your healthcare professional's instructions.                Discharge Instructions for Total Knee Replacement  You have undergone knee replacement surgery. The knee joint forms where the thighbone, shinbone, and kneecap meet. The knee joint is supported by muscles and ligaments, and is lined with a cushioning called cartilage. Over time, cartilage wears away. This can make the knee feel stiff and painful. Your doctor replaced your painful joint with an artificial joint to relieve pain and restore movement. Here are some instructions to follow once at home.  Home care    When your doctor says it's OK to shower, carefully wash your incision with soap and water. Rinse the incision well. Then gently pat it dry. Don t rub the incision, or apply creams or lotions. Sit on a shower stool or chair when you shower to keep from falling.    Take pain medicine as directed by your doctor.  Sitting and sleeping    Sit in chairs with arms. The arms make it easier for you to stand up or sit down.    Don t sit for more than 30 to 45 minutes at one time.    Nap if you are tired, but don t stay in bed all  day.    Sleep with a pillow under your ankle, not your knee. Be sure to change the position of your leg during the night.  Moving safely    The key to successful recovery is movement with walking and exercising your knee as directed by your doctor. You should be able to start moving your leg shortly after surgery as directed by your doctor.      Walk up and down stairs with support. Try one step at a time. Use the railing if possible.    Don t drive until your doctor says it s OK. Most people can start driving about 6 weeks after surgery. Don t drive while you are taking opioid pain medicine.  Other precautions    Don't soak your knee in water until your doctor says it s OK. This means no hot tubs, bathtubs, or swimming pools.    Wear the support stockings you were given in the hospital, as instructed by your doctor. You may wear these stockings for 4 to 6 weeks after surgery. If needed, you can place a bandage over the incision to prevent irritation from clothing or support stockings.    Arrange your household to keep the items you need handy. Keep everything else out of the way. Remove items that may cause you to fall, such as throw rugs and electrical cords.    Use nonslip bath mats, grab bars, an elevated toilet seat, and a shower chair in your bathroom.    Until your balance, flexibility, and strength improve, use a cane, crutches, a walker, handrails, or someone to help you.    Keep your hands free by using a backpack, renetta pack, apron, or pockets to carry things.    Prevent infection. Ask your doctor for instructions if you haven t already received them. Any infection will need to be treated immediately. Call your doctor right away if you think you might have an infection.    Tell your dentist that you have an artificial joint and take antibiotics as prescribed before any dental work.    Tell all your healthcare providers about your artificial joint before any medical procedure.    Maintain a healthy weight.  Get help to lose any extra pounds. Added body weight puts stress on the knee.    Take any medicine you may have been given after surgery. This may include blood-thinning medicine to prevent blood clots or antibiotics to prevent infection.  Follow-up care  Follow up with your healthcare provider, or as advised. You will need to have your staples removed 2 to 3 weeks after surgery.  Call 911  Call 911 right away if you have:    Chest pain    Shortness of breath    Any pain or tenderness in your calf  When to call your healthcare provider  Call your healthcare provider right away if you have:    Fever of 100.4 F (38 C) or higher, or as directed by your healthcare provider    Shaking chills    Stiffness, or inability to move the knee    Increased swelling in your leg    Increased redness, tenderness, or swelling in or around the knee incision    Drainage from the knee incision    Increased knee pain   Date Last Reviewed: 7/1/2016 2000-2017 [employer]. [address] [place] [address]. All rights reserved. This information is not intended as a substitute for professional medical care. Always follow your healthcare professional's instructions.                After Knee Replacement: Managing Pain at Home    You may be prescribed pain medicine to use at home. With pain under control, you ll get back to an active life sooner. Use pain medicine only as directed. You may also be instructed to take over-the-counter pain medicines (these do not require a prescription) to use in addition to, or instead of, your prescription medicines. Take each dose on schedule, before pain becomes severe. Wait about 30 to 60 minutes after taking pain medicine before starting an activity, such as exercise. This will give it time to start working. Tell your doctor if the medicine doesn t control your pain enough or if you suddenly feel worse. Icing and elevating your leg can also help relieve pain. Ask your doctor about  possible side effects such as drowsiness, constipation or dependency.  Your surgeon may also recommend one or more of various non-medication treatments for your pain.  These may include relaxation techniques, massage therapy, acupuncture, transcutaneous electrical nerve stimulation (a TENS unit), continuous passive motion, and others.  Often a combination of medicine and non-medicine therapy works best.  Date Last Reviewed: 11/15/2015    0932-2142 [employer] [address] [place] [address], [place] [address]. All rights reserved. This information is not intended as a substitute for professional medical care. Always follow your healthcare professional's instructions.                Hydromorphone tablets  Brand Name: Dilaudid  What is this medicine?  HYDROMORPHONE (iman droe MOR fone) is a pain reliever. It is used to treat moderate to severe pain.  How should I use this medicine?  Take this medicine by mouth with a glass of water. Follow the directions on the prescription label. You can take this medicine with or without food. If it upsets your stomach, take it with food. Take your medicine at regular intervals. Do not take it more often than directed. Do not stop taking except on your doctor's advice.  A special MedGuide will be given to you by the pharmacist with each prescription and refill. Be sure to read this information carefully each time.  Talk to your pediatrician regarding the use of this medicine in children. Special care may be needed.  What side effects may I notice from receiving this medicine?  Side effects that you should report to your doctor or health care professional as soon as possible:    allergic reactions like skin rash, itching or hives, swelling of the face, lips, or tongue    breathing problems    confusion    seizures    signs and symptoms of low blood pressure like dizziness; feeling faint or lightheaded, falls; unusually weak or tired    trouble passing urine or change in the  amount of urine  Side effects that usually do not require medical attention (report to your doctor or health care professional if they continue or are bothersome):    constipation    dry mouth    nausea, vomiting    tiredness  What may interact with this medicine?  This medicine may interact with the following medications:    alcohol    antihistamines for allergy, cough and cold    certain medicines for anxiety or sleep    certain medicines for depression like amitriptyline, fluoxetine, sertraline    certain medicines for seizures like phenobarbital, primidone    general anesthetics like halothane, isoflurane, methoxyflurane, propofol    local anesthetics like lidocaine, pramoxine, tetracaine    MAOIs like Carbex, Eldepryl, Marplan, Nardil, and Parnate    medicines that relax muscles for surgery    other narcotic medicines for pain or cough    phenothiazines like chlorpromazine, mesoridazine, prochlorperazine, thioridazine  What if I miss a dose?  If you miss a dose, take it as soon as you can. If it is almost time for your next dose, take only that dose. Do not take double or extra doses.  Where should I keep my medicine?  Keep out of the reach of children. This medicine can be abused. Keep your medicine in a safe place to protect it from theft. Do not share this medicine with anyone. Selling or giving away this medicine is dangerous and against the law.  Store at room temperature between 15 and 30 degrees C (59 and 86 degrees F). Keep container tightly closed. Protect from light.  This medicine may cause accidental overdose and death if it is taken by other adults, children, or pets. Flush any unused medicine down the toilet to reduce the chance of harm. Do not use the medicine after the expiration date.  What should I tell my health care provider before I take this medicine?  They need to know if you have any of these conditions:    brain tumor    drug abuse or addiction    head injury    heart disease    if you  often drink alcohol    kidney disease    liver disease    lung or breathing disease, like asthma    problems urinating    seizures    stomach or intestine problems    an unusual or allergic reaction to hydromorphone, other medicines, foods, dyes, or preservatives    pregnant or trying to get pregnant    breast-feeding  What should I watch for while using this medicine?  Tell your doctor or health care professional if your pain does not go away, if it gets worse, or if you have new or a different type of pain. You may develop tolerance to the medicine. Tolerance means that you will need a higher dose of the medicine for pain relief. Tolerance is normal and is expected if you take this medicine for a long time.  Do not suddenly stop taking your medicine because you may develop a severe reaction. Your body becomes used to the medicine. This does NOT mean you are addicted. Addiction is a behavior related to getting and using a drug for a non-medical reason. If you have pain, you have a medical reason to take pain medicine. Your doctor will tell you how much medicine to take. If your doctor wants you to stop the medicine, the dose will be slowly lowered over time to avoid any side effects.  There are different types of narcotic medicines (opiates). If you take more than one type at the same time or if you are taking another medicine that also causes drowsiness, you may have more side effects. Give your health care provider a list of all medicines you use. Your doctor will tell you how much medicine to take. Do not take more medicine than directed. Call emergency for help if you have problems breathing or unusual sleepiness.  You may get drowsy or dizzy. Do not drive, use machinery, or do anything that needs mental alertness until you know how this medicine affects you. Do not stand or sit up quickly, especially if you are an older patient. This reduces the risk of dizzy or fainting spells. Alcohol may interfere with the  effect of this medicine. Avoid alcoholic drinks.  This medicine will cause constipation. Try to have a bowel movement at least every 2 to 3 days. If you do not have a bowel movement for 3 days, call your doctor or health care professional.  Your mouth may get dry. Chewing sugarless gum or sucking hard candy, and drinking plenty of water may help. Contact your doctor if the problem does not go away or is severe.  NOTE:This sheet is a summary. It may not cover all possible information. If you have questions about this medicine, talk to your doctor, pharmacist, or health care provider. Copyright  2018 [employer]                Patient Education    Docusate Calcium Oral capsule    Docusate Calcium Oral capsule, liquid filled    Docusate Sodium Oral capsule    Docusate Sodium Oral capsule, liquid filled    Docusate Sodium Oral solution    Docusate Sodium Oral suspension    Docusate Sodium Oral syrup    Docusate Sodium Oral tablet    Docusate Sodium Rectal enema, suspension  Docusate Sodium Oral capsule  What is this medicine?  DOCUSATE (doc CUE sayt) is stool softener. It helps prevent constipation and straining or discomfort associated with hard or dry stools.  This medicine may be used for other purposes; ask your health care provider or pharmacist if you have questions.  What should I tell my health care provider before I take this medicine?  They need to know if you have any of these conditions:    nausea or vomiting    severe constipation    stomach pain    sudden change in bowel habit lasting more than 2 weeks    an unusual or allergic reaction to docusate, other medicines, foods, dyes, or preservatives    pregnant or trying to get pregnant    breast-feeding  How should I use this medicine?  Take this medicine by mouth with a glass of water. Follow the directions on the label. Take your doses at regular intervals. Do not take your medicine more often than directed.  Talk to your pediatrician regarding the use of this  medicine in children. While this medicine may be prescribed for children as young as 2 years for selected conditions, precautions do apply.  Overdosage: If you think you have taken too much of this medicine contact a poison control center or emergency room at once.  NOTE: This medicine is only for you. Do not share this medicine with others.  What if I miss a dose?  If you miss a dose, take it as soon as you can. If it is almost time for your next dose, take only that dose. Do not take double or extra doses.  What may interact with this medicine?    mineral oil  This list may not describe all possible interactions. Give your health care provider a list of all the medicines, herbs, non-prescription drugs, or dietary supplements you use. Also tell them if you smoke, drink alcohol, or use illegal drugs. Some items may interact with your medicine.  What should I watch for while using this medicine?  Do not use for more than one week without advice from your doctor or health care professional. If your constipation returns, check with your doctor or health care professional.  Drink plenty of water while taking this medicine. Drinking water helps decrease constipation.  Stop using this medicine and contact your doctor or health care professional if you experience any rectal bleeding or do not have a bowel movement after use. These could be signs of a more serious condition.  What side effects may I notice from receiving this medicine?  Side effects that you should report to your doctor or health care professional as soon as possible:    allergic reactions like skin rash, itching or hives, swelling of the face, lips, or tongue  Side effects that usually do not require medical attention (report to your doctor or health care professional if they continue or are bothersome):    diarrhea    stomach cramps    throat irritation  This list may not describe all possible side effects. Call your doctor for medical advice about side  effects. You may report side effects to FDA at 1-709-FDA-8293.  Where should I keep my medicine?  Keep out of the reach of children.  Store at room temperature between 15 and 30 degrees C (59 and 86 degrees F). Throw away any unused medicine after the expiration date.  NOTE:This sheet is a summary. It may not cover all possible information. If you have questions about this medicine, talk to your doctor, pharmacist, or health care provider. Copyright  2016 [employer]                Patient Education    Aspirin Chewable tablet    Aspirin Chewing-gum, medicated    Aspirin Gastro-resistant tablet    Aspirin Oral tablet    Aspirin Oral tablet, extended-release    Aspirin Rectal suppository  Aspirin Oral tablet  What is this medicine?  ASPIRIN (AS pir in) is a pain reliever. It is used to treat mild pain and fever. This medicine is also used as directed by a doctor to prevent and to treat heart attacks, to prevent strokes, and to treat arthritis or inflammation.  This medicine may be used for other purposes; ask your health care provider or pharmacist if you have questions.  What should I tell my health care provider before I take this medicine?  They need to know if you have any of these conditions:    anemia    asthma    bleeding problems    child with chickenpox, the flu, or other viral infection    diabetes    gout    if you frequently drink alcohol containing drinks    kidney disease    liver disease    low level of vitamin K    lupus    smoke tobacco    stomach ulcers or other problems    an unusual or allergic reaction to aspirin, tartrazine dye, other medicines, dyes, or preservatives    pregnant or trying to get pregnant    breast-feeding  How should I use this medicine?  Take this medicine by mouth with a glass of water. Follow the directions on the package or prescription label. You can take this medicine with or without food. If it upsets your stomach, take it with food. Do not take your medicine more often  than directed.  Talk to your pediatrician regarding the use of this medicine in children. While this drug may be prescribed for children as young as 12 years of age for selected conditions, precautions do apply. Children and teenagers should not use this medicine to treat chicken pox or flu symptoms unless directed by a doctor.  Patients over 65 years old may have a stronger reaction and need a smaller dose.  Overdosage: If you think you have taken too much of this medicine contact a poison control center or emergency room at once.  NOTE: This medicine is only for you. Do not share this medicine with others.  What if I miss a dose?  If you are taking this medicine on a regular schedule and miss a dose, take it as soon as you can. If it is almost time for your next dose, take only that dose. Do not take double or extra doses.  What may interact with this medicine?  Do not take this medicine with any of the following medications:    cidofovir    ketorolac    probenecid  This medicine may also interact with the following medications:    alcohol    alendronate    bismuth subsalicylate    flavocoxid    herbal supplements like feverfew, garlic, fab, ginkgo biloba, horse chestnut    medicines for diabetes or glaucoma like acetazolamide, methazolamide    medicines for gout    medicines that treat or prevent blood clots like enoxaparin, heparin, ticlopidine, warfarin    other aspirin and aspirin-like medicines    NSAIDs, medicines for pain and inflammation, like ibuprofen or naproxen    pemetrexed    sulfinpyrazone    varicella live vaccine  This list may not describe all possible interactions. Give your health care provider a list of all the medicines, herbs, non-prescription drugs, or dietary supplements you use. Also tell them if you smoke, drink alcohol, or use illegal drugs. Some items may interact with your medicine.  What should I watch for while using this medicine?  If you are treating yourself for pain, tell  your doctor or health care professional if the pain lasts more than 10 days, if it gets worse, or if there is a new or different kind of pain. Tell your doctor if you see redness or swelling. Also, check with your doctor if you have a fever that lasts for more than 3 days. Only take this medicine to prevent heart attacks or blood clotting if prescribed by your doctor or health care professional.  Do not take aspirin or aspirin-like medicines with this medicine. Too much aspirin can be dangerous. Always read the labels carefully.  This medicine can irritate your stomach or cause bleeding problems. Do not smoke cigarettes or drink alcohol while taking this medicine. Do not lie down for 30 minutes after taking this medicine to prevent irritation to your throat.  If you are scheduled for any medical or dental procedure, tell your healthcare provider that you are taking this medicine. You may need to stop taking this medicine before the procedure.  This medicine may be used to treat migraines. If you take migraine medicines for 10 or more days a month, your migraines may get worse. Keep a diary of headache days and medicine use. Contact your healthcare professional if your migraine attacks occur more frequently.  What side effects may I notice from receiving this medicine?  Side effects that you should report to your doctor or health care professional as soon as possible:    allergic reactions like skin rash, itching or hives, swelling of the face, lips, or tongue    breathing problems    changes in hearing, ringing in the ears    confusion    general ill feeling or flu-like symptoms    pain on swallowing    redness, blistering, peeling or loosening of the skin, including inside the mouth or nose    signs and symptoms of bleeding such as bloody or black, tarry stools; red or dark-brown urine; spitting up blood or brown material that looks like coffee grounds; red spots on the skin; unusual bruising or bleeding from the  eye, gums, or nose    trouble passing urine or change in the amount of urine    unusually weak or tired    yellowing of the eyes or skin  Side effects that usually do not require medical attention (report to your doctor or health care professional if they continue or are bothersome):    diarrhea or constipation    headache    nausea, vomiting    stomach gas, heartburn  This list may not describe all possible side effects. Call your doctor for medical advice about side effects. You may report side effects to FDA at 4-668-NDP-1009.  Where should I keep my medicine?  Keep out of the reach of children.  Store at room temperature between 15 and 30 degrees C (59 and 86 degrees F). Protect from heat and moisture. Do not use this medicine if it has a strong vinegar smell. Throw away any unused medicine after the expiration date.  NOTE:This sheet is a summary. It may not cover all possible information. If you have questions about this medicine, talk to your doctor, pharmacist, or health care provider. Copyright  2016 [employer]                Patient Education    Escitalopram Oral solution    Escitalopram Oral tablet  Escitalopram Oral tablet  What is this medicine?  ESCITALOPRAM (es sye SIL oh pram) is used to treat depression and certain types of anxiety.  This medicine may be used for other purposes; ask your health care provider or pharmacist if you have questions.  What should I tell my health care provider before I take this medicine?  They need to know if you have any of these conditions:    bipolar disorder or a family history of bipolar disorder    diabetes    glaucoma    heart disease    kidney or liver disease    receiving electroconvulsive therapy    seizures (convulsions)    suicidal thoughts, plans, or attempt by you or a family member    an unusual or allergic reaction to escitalopram, the related drug citalopram, other medicines, foods, dyes, or preservatives    pregnant or trying to become  pregnant    breast-feeding  How should I use this medicine?  Take this medicine by mouth with a glass of water. Follow the directions on the prescription label. You can take it with or without food. If it upsets your stomach, take it with food. Take your medicine at regular intervals. Do not take it more often than directed. Do not stop taking this medicine suddenly except upon the advice of your doctor. Stopping this medicine too quickly may cause serious side effects or your condition may worsen.  A special MedGuide will be given to you by the pharmacist with each prescription and refill. Be sure to read this information carefully each time.  Talk to your pediatrician regarding the use of this medicine in children. Special care may be needed.  Overdosage: If you think you have taken too much of this medicine contact a poison control center or emergency room at once.  NOTE: This medicine is only for you. Do not share this medicine with others.  What if I miss a dose?  If you miss a dose, take it as soon as you can. If it is almost time for your next dose, take only that dose. Do not take double or extra doses.  What may interact with this medicine?  Do not take this medicine with any of the following medications:    certain medicines for fungal infections like fluconazole, itraconazole, ketoconazole, posaconazole, voriconazole    cisapride    citalopram    dofetilide    dronedarone    linezolid    MAOIs like Carbex, Eldepryl, Marplan, Nardil, and Parnate    methylene blue (injected into a vein)    pimozide    thioridazine    ziprasidone  This medicine may also interact with the following medications:    alcohol    aspirin and aspirin-like medicines    carbamazepine    certain medicines for depression, anxiety, or psychotic disturbances    certain medicines for migraine headache like almotriptan, eletriptan, frovatriptan, naratriptan, rizatriptan, sumatriptan, zolmitriptan    certain medicines for sleep    certain  medicines that treat or prevent blood clots like warfarin, enoxaparin, dalteparin    cimetidine    diuretics    fentanyl    furazolidone    isoniazid    lithium    metoprolol    NSAIDs, medicines for pain and inflammation, like ibuprofen or naproxen    other medicines that prolong the QT interval (cause an abnormal heart rhythm)    procarbazine    rasagiline    supplements like [place] wort, kava kava, valerian    tramadol    tryptophan  This list may not describe all possible interactions. Give your health care provider a list of all the medicines, herbs, non-prescription drugs, or dietary supplements you use. Also tell them if you smoke, drink alcohol, or use illegal drugs. Some items may interact with your medicine.  What should I watch for while using this medicine?  Tell your doctor if your symptoms do not get better or if they get worse. Visit your doctor or health care professional for regular checks on your progress. Because it may take several weeks to see the full effects of this medicine, it is important to continue your treatment as prescribed by your doctor.  Patients and their families should watch out for new or worsening thoughts of suicide or depression. Also watch out for sudden changes in feelings such as feeling anxious, agitated, panicky, irritable, hostile, aggressive, impulsive, severely restless, overly excited and hyperactive, or not being able to sleep. If this happens, especially at the beginning of treatment or after a change in dose, call your health care professional.  You may get drowsy or dizzy. Do not drive, use machinery, or do anything that needs mental alertness until you know how this medicine affects you. Do not stand or sit up quickly, especially if you are an older patient. This reduces the risk of dizzy or fainting spells. Alcohol may interfere with the effect of this medicine. Avoid alcoholic drinks.  Your mouth may get dry. Chewing sugarless gum or sucking hard candy,  and drinking plenty of water may help. Contact your doctor if the problem does not go away or is severe.  What side effects may I notice from receiving this medicine?  Side effects that you should report to your doctor or health care professional as soon as possible:    allergic reactions like skin rash, itching or hives, swelling of the face, lips, or tongue    confusion    feeling faint or lightheaded, falls    fast talking and excited feelings or actions that are out of control    hallucination, loss of contact with reality    seizures    suicidal thoughts or other mood changes    unusual bleeding or bruising  Side effects that usually do not require medical attention (report to your doctor or health care professional if they continue or are bothersome):    blurred vision    changes in appetite    change in sex drive or performance    headache    increased sweating    nausea  This list may not describe all possible side effects. Call your doctor for medical advice about side effects. You may report side effects to FDA at 5-772-FDA-6422.  Where should I keep my medicine?  Keep out of reach of children.  Store at room temperature between 15 and 30 degrees C (59 and 86 degrees F). Throw away any unused medicine after the expiration date.  NOTE:This sheet is a summary. It may not cover all possible information. If you have questions about this medicine, talk to your doctor, pharmacist, or health care provider. Copyright  2016 Gold Standard                Simvastatin tablets  Brand Name: Zocor  What is this medicine?  SIMVASTATIN ([facility] stat in) is known as a HMG-CoA reductase inhibitor or 'statin'. It lowers the level of cholesterol and triglycerides in the blood. This drug may also reduce the risk of heart attack, stroke, or other health problems in patients with risk factors for heart or blood vessel disease. Diet and lifestyle changes are often used with this drug.  How should I use this medicine?  Take this  medicine by mouth with a glass of water. Follow the directions on the prescription label. You can take this medicine with or without food. Take your doses at regular intervals. Do not take your medicine more often than directed.  Talk to your pediatrician regarding the use of this medicine in children. Special care may be needed.  What side effects may I notice from receiving this medicine?  Side effects that you should report to your doctor or health care professional as soon as possible:    allergic reactions like skin rash, itching or hives, swelling of the face, lips, or tongue    confusion    dark urine    fever    joint pain    loss of memory    muscle cramps, pain    redness, blistering, peeling or loosening of the skin, including inside the mouth    trouble passing urine or change in the amount of urine    unusually weak or tired    yellowing of the eyes or skin  Side effects that usually do not require medical attention (report to your doctor or health care professional if they continue or are bothersome):    constipation    heartburn    stomach gas, pain, upset  What may interact with this medicine?  Do not take this medicine with any of the following medications:    boceprevir    cyclosporine    danazol    gemfibrozil    medicines for fungal infections like itraconazole, ketoconazole, posaconazole, voriconazole    medicines for HIV infection    mifepristone, RU-486    nefazodone    red yeast rice    some antibiotics like clarithromycin, erythromycin, telithromycin    telaprevir  This medicine may also interact with the following medications:    alcohol    amiodarone    amlodipine    colchicine    digoxin    diltiazem    dronedarone    fluconazole    grapefruit juice    niacin    ranolazine    verapamil    warfarin  What if I miss a dose?  If you miss a dose, take it as soon as you can. If it is almost time for your next dose, take only that dose. Do not take double or extra doses.  Where should I keep my  medicine?  Keep out of the reach of children.  Store at room temperature below 30 degrees C (86 degrees F). Keep container tightly closed. Throw away any unused medicine after the expiration date.  What should I tell my health care provider before I take this medicine?  They need to know if you have any of these conditions:    frequently drink alcoholic beverages    kidney disease    liver disease    muscle aches or weakness    other medical condition    an unusual or allergic reaction to simvastatin, other medicines, foods, dyes, or preservatives    pregnant or trying to get pregnant    breast-feeding  What should I watch for while using this medicine?  Visit your doctor or health care professional for regular check-ups. You may need regular tests to make sure your liver is working properly.  Tell your doctor or health care professional right away if you get any unexplained muscle pain, tenderness, or weakness, especially if you also have a fever and tiredness. Your doctor or health care professional may tell you to stop taking this medicine if you develop muscle problems. If your muscle problems do not go away after stopping this medicine, contact your health care professional.  This medicine may affect blood sugar levels. If you have diabetes, check with your doctor or health care professional before you change your diet or the dose of your diabetic medicine.  This drug is only part of a total heart-health program. Your doctor or a dietician can suggest a low-cholesterol and low-fat diet to help. Avoid alcohol and smoking, and keep a proper exercise schedule.  Do not use this drug if you are pregnant or breast-feeding. Serious side effects to an unborn child or to an infant are possible. Talk to your doctor or pharmacist for more information.  If you are going to have surgery tell your health care professional that you are taking this drug.  Some drugs may increase the risk of side effects from simvastatin. If  you are given certain antibiotics or antifungals, your doctor or health care professional may stop simvastatin for a short time. Check with your doctor or pharmacist for advice.  NOTE:This sheet is a summary. It may not cover all possible information. If you have questions about this medicine, talk to your doctor, pharmacist, or health care provider. Copyright  2018 [employer]

## 2018-09-04 NOTE — CONSULTS
[facility]    Hospitalist Progress Note    Date of Service (when I saw the patient): 09/04/2018     Consulted by Dr. Hernandez for medical management s/p right TKA.     Assessment & Plan   Aure Lanier is a 68 year old female who was admitted on 9/4/2018.       Status post total right knee replacement: No operative or immediate post-operative complications.       Hypothyroidism: Continue home dose synthroid.      Insomnia: Will continue Ambien, but start with 5 mg and add 2nd 5 mg tablet if she cannot fall asleep as she will be taking narcotics. I did discuss this with her and she agreed. She does take 10 mg every night at home and tolerates well.       Dysthymia: Continue home dose Lexapro.      Hyperlipidemia: Continue zocor.       GERD: Continue daily PPI. She is also on ranitidine at home so will continue that as well, though likely not needing to be on both h2 blocker and PPI.      Chronic pain syndrome      # Pain Assessment:  Current Pain Score 9/4/2018   Patient currently in pain? denies   Pain score (0-10) -   Pain location -   Pain descriptors -   - Aure is experiencing pain due to surgery. Pain management was discussed and the plan was created in a collaborative fashion.  Aure's response to the current recommendations: engaged  - Opioid regimen: Dilaudid, norco  - Response to opioid medications: Reduction of symptoms   - Bowel regimen: docusate  - Pharmacologic adjuvants: NSAIDs and Acetaminophen  - Non-pharmacologic adjuvants: Ice and Physical Therapy    DVT Prophylaxis: Defer to primary service- ASA BID  Code Status: Full Code    Disposition: Expected discharge in 2-3 days once cleared by orthopedics.    Radha Wright, CNP    Subjective:  Denies nausea, chest pain, shortness of breath. Very concerned about being able to sleep tonight. Pain currently well controlled.       -Data reviewed today: I reviewed all new labs and imaging results over the last 24 hours.     Physical Exam    Temp: 98.8  F (37.1  C) Temp src: Temporal BP: 120/54   Heart Rate: 95 Resp: 13 SpO2: 96 % O2 Device: None (Room air) Oxygen Delivery: 2 LPM  Vitals:    09/04/18 0900   Weight: 86.8 kg (191 lb 6.4 oz)     Vital Signs with Ranges  Temp:  [97.2  F (36.2  C)-98.8  F (37.1  C)] 98.8  F (37.1  C)  Heart Rate:  [] 95  Resp:  [11-29] 13  BP: ()/(54-78) 120/54  SpO2:  [92 %-100 %] 96 %  I/O last 3 completed shifts:  In: 1400 [I.V.:1400]  Out: 50 [Blood:50]    Peripheral IV 04/19/18 Right Lower forearm (Active)   Number of days:138       Peripheral IV 09/04/18 Left Hand (Active)   Site Assessment WDL 9/4/2018  2:05 PM   Line Status Infusing 9/4/2018  2:05 PM   Phlebitis Scale 0-->no symptoms 9/4/2018  2:05 PM   Infiltration Scale 0 9/4/2018  2:05 PM   Dressing Intervention New dressing  9/4/2018  9:36 AM   Number of days:0       Incision/Surgical Site 04/17/18 Left Knee (Active)   Number of days:140       Incision/Surgical Site 09/04/18 Right Knee (Active)   Incision Assessment UTV 9/4/2018  2:00 PM   Closure Staples 9/4/2018 11:56 AM   Dressing Intervention Clean, dry, intact 9/4/2018  2:00 PM   Number of days:0     Line/device assessment(s) completed for medical necessity    Constitutional: Awake,alert, no acute distress.   Respiratory: Clear bilaterally, no wheezes,rhonchi or crackles.  Cardiovascular: HRR, no murmur, rubs,thrills.   GI: Soft,nontender, bowel sounds very active  Skin/Integumen: No rashes, bruising or open areas.  Other:  Right lower leg Ace wrapped, CMS intact to toes.     Medications     No Medication Sleep Aids for this Patient       sodium chloride         acetaminophen  975 mg Oral Q8H     [START ON 9/5/2018] aspirin  325 mg Oral BID     calcium  1 tablet Oral Daily     clindamycin  900 mg Intravenous Q8H     [START ON 9/5/2018] cyanocobalamin  1,000 mcg Oral Daily     [START ON 9/5/2018] escitalopram  20 mg Oral Daily     ferrous sulfate  325 mg Oral TID w/meals     gabapentin  300 mg  Oral TID     [START ON 9/5/2018] levothyroxine  150 mcg Oral Daily     [START ON 9/5/2018] omeprazole  40 mg Oral QAM AC     [START ON 9/5/2018] ranitidine  150 mg Oral Daily     senna-docusate  1 tablet Oral BID    Or     senna-docusate  2 tablet Oral BID     simvastatin  40 mg Oral At Bedtime     sodium chloride (PF)  3 mL Intracatheter Q8H     sucralfate  1 g Oral 4x Daily       Data   No lab results found in last 7 days.    Recent Results (from the past 24 hour(s))   XR Knee Port Right 1/2 Views    Narrative    PROCEDURE: XR KNEE PORT RT 1/2 VW 9/4/2018 1:43 PM    HISTORY: Post-Op Total Knee;     COMPARISONS: None.    TECHNIQUE: 2 views.    FINDINGS: There is a right knee arthroplasty with components in  satisfactory position. Skin staples are seen anteriorly.         Impression    IMPRESSION: TKA without acute abnormality.    VENANCIO YAÑEZ MD

## 2018-09-04 NOTE — ANESTHESIA PROCEDURE NOTES
Peripheral nerve/Neuraxial procedure note : intrathecal  Pre-Procedure    Location: OR    Procedure Times:9/4/2018 11:27 AM and 9/4/2018 11:35 AM  Pre-Anesthestic Checklist: patient identified, IV checked, site marked, risks and benefits discussed, informed consent, monitors and equipment checked, pre-op evaluation, at physician/surgeon's request and post-op pain management    Timeout  Correct Patient: Yes   Correct Procedure: Yes   Correct Site: Yes   Correct Laterality: Yes   Correct Position: Yes   Site Marked: Yes   .   Procedure Documentation    .    Procedure:    Intrathecal.  Insertion Site:L4-5  (midline approach)      Patient Prep;mask, sterile gloves, chlorhexidine gluconate and isopropyl alcohol, patient draped.  .  Needle: Ivone tip Spinal Needle (gauge): 22  Spinal/LP Needle Length (inches): 3.5 # of attempts: 1 and  # of redirects:  2 No introducer used .       Assessment/Narrative  Paresthesias: Resolved.  .  .  clear CSF fluid removed . Time Injected: 11:33

## 2018-09-04 NOTE — IP AVS SNAPSHOT
[facility]    [address] [place] [address]    Phone:  [phone]    Fax:  [phone]                                       After Visit Summary   9/4/2018    Aure Lanier    MRN: [id number]           After Visit Summary Signature Page     I have received my discharge instructions, and my questions have been answered. I have discussed any challenges I see with this plan with the nurse or doctor.    ..........................................................................................................................................  Patient/Patient Representative Signature      ..........................................................................................................................................  Patient Representative Print Name and Relationship to Patient    ..................................................               ................................................  Date                                            Time    ..........................................................................................................................................  Reviewed by Signature/Title    ...................................................              ..............................................  Date                                                            Time          22EPIC Rev 08/18

## 2018-09-04 NOTE — ANESTHESIA CARE TRANSFER NOTE
Patient: Aure Lanier    Procedure(s):  RIGHT TOTAL KNEE ARTHROPLASTY  - Wound Class: I-Clean    Diagnosis: DJD RIGHT KNEE  Diagnosis Additional Information: No value filed.    Anesthesia Type:   Spinal, Periph. Nerve Block for postop pain     Note:  Airway :Nasal Cannula  Patient transferred to:PACU  Handoff Report: Identifed the Patient, Identified the Reponsible Provider, Reviewed the pertinent medical history, Discussed the surgical course, Reviewed Intra-OP anesthesia mangement and issues during anesthesia, Set expectations for post-procedure period and Allowed opportunity for questions and acknowledgement of understanding      Vitals: (Last set prior to Anesthesia Care Transfer)    CRNA VITALS  9/4/2018 1236 - 9/4/2018 1317      9/4/2018             Resp Rate (set): 8                Electronically Signed By: ASHLEIGH Maurice CRNA  September 4, 2018  1:17 PM

## 2018-09-04 NOTE — BRIEF OP NOTE
[facility]  Orthopedics Brief Operative Note    Pre-operative diagnosis: DJD RIGHT KNEE   Post-operative diagnosis Same as Above   Procedure: Procedure(s):  RIGHT TOTAL KNEE ARTHROPLASTY S/N JOURNEY II - Wound Class: I-Clean   Surgeon: Ty Hernandez MD, MD   Assistants(s): Siva Panchal PAC   Anesthesia: Spinal    Estimated blood loss: Less than 50 ml    Total IV fluids: (See anesthesia record)  50ml   Blood transfusion: No transfusion was given during surgery   Total urine output: (See anesthesia record)   Drains: None   Specimens: None   Implants: SN J2   Findings: Tricomp OA; Stable TKA   Complications: None   Condition: Stable   Comments: See dictated operative report for full details      Dictation Number: [id number]

## 2018-09-05 ENCOUNTER — APPOINTMENT (OUTPATIENT)
Dept: PHYSICAL THERAPY | Facility: HOSPITAL | Age: 68
DRG: 470 | End: 2018-09-05
Attending: ORTHOPAEDIC SURGERY
Payer: COMMERCIAL

## 2018-09-05 ENCOUNTER — APPOINTMENT (OUTPATIENT)
Dept: OCCUPATIONAL THERAPY | Facility: HOSPITAL | Age: 68
DRG: 470 | End: 2018-09-05
Attending: ORTHOPAEDIC SURGERY
Payer: COMMERCIAL

## 2018-09-05 LAB
COPATH REPORT: NORMAL
HGB BLD-MCNC: 9.3 G/DL (ref 11.7–15.7)

## 2018-09-05 PROCEDURE — 97110 THERAPEUTIC EXERCISES: CPT | Mod: GP

## 2018-09-05 PROCEDURE — 12000000 ZZH R&B MED SURG/OB

## 2018-09-05 PROCEDURE — 25000132 ZZH RX MED GY IP 250 OP 250 PS 637: Performed by: ORTHOPAEDIC SURGERY

## 2018-09-05 PROCEDURE — 97535 SELF CARE MNGMENT TRAINING: CPT | Mod: GO

## 2018-09-05 PROCEDURE — 97165 OT EVAL LOW COMPLEX 30 MIN: CPT | Mod: GO

## 2018-09-05 PROCEDURE — 25000132 ZZH RX MED GY IP 250 OP 250 PS 637: Performed by: NURSE PRACTITIONER

## 2018-09-05 PROCEDURE — 25000132 ZZH RX MED GY IP 250 OP 250 PS 637: Performed by: ANESTHESIOLOGY

## 2018-09-05 PROCEDURE — 40000786 ZZHCL STATISTIC ACTIVE MRSA SURVEILLANCE CULTURE: Performed by: ORTHOPAEDIC SURGERY

## 2018-09-05 PROCEDURE — 97161 PT EVAL LOW COMPLEX 20 MIN: CPT | Mod: GP

## 2018-09-05 PROCEDURE — 40000275 ZZH STATISTIC RCP TIME EA 10 MIN

## 2018-09-05 PROCEDURE — 25000128 H RX IP 250 OP 636: Performed by: ORTHOPAEDIC SURGERY

## 2018-09-05 PROCEDURE — 99231 SBSQ HOSP IP/OBS SF/LOW 25: CPT | Performed by: NURSE PRACTITIONER

## 2018-09-05 PROCEDURE — 25000128 H RX IP 250 OP 636: Performed by: NURSE PRACTITIONER

## 2018-09-05 PROCEDURE — 40000193 ZZH STATISTIC PT WARD VISIT

## 2018-09-05 PROCEDURE — 40000133 ZZH STATISTIC OT WARD VISIT

## 2018-09-05 PROCEDURE — 85018 HEMOGLOBIN: CPT | Performed by: ORTHOPAEDIC SURGERY

## 2018-09-05 PROCEDURE — 97530 THERAPEUTIC ACTIVITIES: CPT | Mod: GP

## 2018-09-05 PROCEDURE — 36415 COLL VENOUS BLD VENIPUNCTURE: CPT | Performed by: ORTHOPAEDIC SURGERY

## 2018-09-05 RX ORDER — KETOROLAC TROMETHAMINE 10 MG/1
10 TABLET, FILM COATED ORAL EVERY 6 HOURS PRN
Qty: 40 TABLET | Refills: 0 | Status: SHIPPED | OUTPATIENT
Start: 2018-09-05 | End: 2018-09-21

## 2018-09-05 RX ORDER — ZOLPIDEM TARTRATE 5 MG/1
10 TABLET ORAL
Status: DISCONTINUED | OUTPATIENT
Start: 2018-09-05 | End: 2018-09-06 | Stop reason: HOSPADM

## 2018-09-05 RX ORDER — HYDROMORPHONE HYDROCHLORIDE 1 MG/ML
.3-.5 INJECTION, SOLUTION INTRAMUSCULAR; INTRAVENOUS; SUBCUTANEOUS
Status: DISCONTINUED | OUTPATIENT
Start: 2018-09-05 | End: 2018-09-06 | Stop reason: HOSPADM

## 2018-09-05 RX ORDER — SIMVASTATIN 20 MG
20 TABLET ORAL AT BEDTIME
Status: DISCONTINUED | OUTPATIENT
Start: 2018-09-05 | End: 2018-09-06 | Stop reason: HOSPADM

## 2018-09-05 RX ORDER — ASPIRIN 325 MG
325 TABLET, DELAYED RELEASE (ENTERIC COATED) ORAL
Qty: 28 TABLET | Refills: 0 | Status: SHIPPED | OUTPATIENT
Start: 2018-09-05 | End: 2019-01-29

## 2018-09-05 RX ORDER — HYDROMORPHONE HYDROCHLORIDE 4 MG/1
4 TABLET ORAL
Qty: 60 TABLET | Refills: 0 | Status: SHIPPED | OUTPATIENT
Start: 2018-09-05 | End: 2018-09-21

## 2018-09-05 RX ORDER — KETOROLAC TROMETHAMINE 15 MG/ML
15 INJECTION, SOLUTION INTRAMUSCULAR; INTRAVENOUS EVERY 6 HOURS PRN
Status: DISCONTINUED | OUTPATIENT
Start: 2018-09-05 | End: 2018-09-06 | Stop reason: HOSPADM

## 2018-09-05 RX ORDER — SUCRALFATE 1 G/1
1 TABLET ORAL 4 TIMES DAILY
Qty: 60 TABLET | Refills: 0 | Status: SHIPPED | OUTPATIENT
Start: 2018-09-05 | End: 2018-09-25

## 2018-09-05 RX ADMIN — OMEPRAZOLE 40 MG: 20 CAPSULE, DELAYED RELEASE ORAL at 07:03

## 2018-09-05 RX ADMIN — OXYCODONE HYDROCHLORIDE 5 MG: 5 TABLET ORAL at 00:29

## 2018-09-05 RX ADMIN — ACETAMINOPHEN 975 MG: 325 TABLET, FILM COATED ORAL at 15:49

## 2018-09-05 RX ADMIN — KETOROLAC TROMETHAMINE 15 MG: 15 INJECTION, SOLUTION INTRAMUSCULAR; INTRAVENOUS at 05:45

## 2018-09-05 RX ADMIN — Medication 0.5 MG: at 03:51

## 2018-09-05 RX ADMIN — ASPIRIN 325 MG: 325 TABLET, DELAYED RELEASE ORAL at 08:19

## 2018-09-05 RX ADMIN — HYDROMORPHONE HYDROCHLORIDE 4 MG: 2 TABLET ORAL at 04:49

## 2018-09-05 RX ADMIN — HYDROMORPHONE HYDROCHLORIDE 4 MG: 2 TABLET ORAL at 15:49

## 2018-09-05 RX ADMIN — GABAPENTIN 300 MG: 300 CAPSULE ORAL at 08:19

## 2018-09-05 RX ADMIN — LEVOTHYROXINE SODIUM 150 MCG: 75 TABLET ORAL at 05:44

## 2018-09-05 RX ADMIN — SENNOSIDES AND DOCUSATE SODIUM 1 TABLET: 8.6; 5 TABLET ORAL at 21:55

## 2018-09-05 RX ADMIN — GABAPENTIN 300 MG: 300 CAPSULE ORAL at 12:35

## 2018-09-05 RX ADMIN — KETOROLAC TROMETHAMINE 15 MG: 15 INJECTION, SOLUTION INTRAMUSCULAR; INTRAVENOUS at 19:24

## 2018-09-05 RX ADMIN — HYDROMORPHONE HYDROCHLORIDE 4 MG: 2 TABLET ORAL at 12:29

## 2018-09-05 RX ADMIN — FERROUS SULFATE TAB 325 MG (65 MG ELEMENTAL FE) 325 MG: 325 (65 FE) TAB at 12:34

## 2018-09-05 RX ADMIN — GABAPENTIN 300 MG: 300 CAPSULE ORAL at 21:55

## 2018-09-05 RX ADMIN — ESCITALOPRAM OXALATE 20 MG: 10 TABLET ORAL at 08:19

## 2018-09-05 RX ADMIN — ASPIRIN 325 MG: 325 TABLET, DELAYED RELEASE ORAL at 15:49

## 2018-09-05 RX ADMIN — Medication 0.5 MG: at 14:45

## 2018-09-05 RX ADMIN — SUCRALFATE 1 G: 1 TABLET ORAL at 12:34

## 2018-09-05 RX ADMIN — SUCRALFATE 1 G: 1 TABLET ORAL at 08:19

## 2018-09-05 RX ADMIN — SODIUM CHLORIDE, PRESERVATIVE FREE: 5 INJECTION INTRAVENOUS at 08:21

## 2018-09-05 RX ADMIN — KETOROLAC TROMETHAMINE 15 MG: 15 INJECTION, SOLUTION INTRAMUSCULAR; INTRAVENOUS at 12:42

## 2018-09-05 RX ADMIN — HYDROMORPHONE HYDROCHLORIDE 4 MG: 2 TABLET ORAL at 01:47

## 2018-09-05 RX ADMIN — ONDANSETRON 4 MG: 2 INJECTION INTRAMUSCULAR; INTRAVENOUS at 09:13

## 2018-09-05 RX ADMIN — Medication 1000 MCG: at 08:19

## 2018-09-05 RX ADMIN — HYDROMORPHONE HYDROCHLORIDE 4 MG: 2 TABLET ORAL at 18:45

## 2018-09-05 RX ADMIN — ACETAMINOPHEN 975 MG: 325 TABLET, FILM COATED ORAL at 07:03

## 2018-09-05 RX ADMIN — SENNOSIDES AND DOCUSATE SODIUM 1 TABLET: 8.6; 5 TABLET ORAL at 08:19

## 2018-09-05 RX ADMIN — RANITIDINE 150 MG: 150 TABLET ORAL at 08:19

## 2018-09-05 RX ADMIN — FERROUS SULFATE TAB 325 MG (65 MG ELEMENTAL FE) 325 MG: 325 (65 FE) TAB at 18:00

## 2018-09-05 RX ADMIN — SUCRALFATE 1 G: 1 TABLET ORAL at 21:55

## 2018-09-05 RX ADMIN — HYDROMORPHONE HYDROCHLORIDE 4 MG: 2 TABLET ORAL at 08:19

## 2018-09-05 RX ADMIN — HYDROMORPHONE HYDROCHLORIDE 4 MG: 2 TABLET ORAL at 21:55

## 2018-09-05 RX ADMIN — ZOLPIDEM TARTRATE 5 MG: 5 TABLET, FILM COATED ORAL at 00:28

## 2018-09-05 RX ADMIN — FERROUS SULFATE TAB 325 MG (65 MG ELEMENTAL FE) 325 MG: 325 (65 FE) TAB at 08:19

## 2018-09-05 RX ADMIN — SUCRALFATE 1 G: 1 TABLET ORAL at 18:00

## 2018-09-05 ASSESSMENT — PAIN DESCRIPTION - DESCRIPTORS
DESCRIPTORS: ACHING

## 2018-09-05 ASSESSMENT — ACTIVITIES OF DAILY LIVING (ADL)
ADLS_ACUITY_SCORE: 16
ADLS_ACUITY_SCORE: 16
ADLS_ACUITY_SCORE: 15
ADLS_ACUITY_SCORE: 14
PREVIOUS_RESPONSIBILITIES: MEAL PREP;HOUSEKEEPING;LAUNDRY;SHOPPING;MEDICATION MANAGEMENT;FINANCES;DRIVING
ADLS_ACUITY_SCORE: 16
ADLS_ACUITY_SCORE: 16

## 2018-09-05 NOTE — PLAN OF CARE
Problem: Patient Care Overview  Goal: Plan of Care/Patient Progress Review  Outcome: No Change  OT evaluation completed  Discharge Planner OT   Patient plan for discharge: home  Current status:  SBA  Barriers to return to prior living situation: pain  Recommendations for discharge: home with assist with IADLs   Rationale for recommendations: Pt is able to manage ADLs  But may need assist for IADLs       Entered by: Tiffanie Gunter 09/05/2018 1:41 PM

## 2018-09-05 NOTE — PROGRESS NOTES
Face to face report given with opportunity to observe patient.    Report given to Kylah Arriola   9/5/2018  3:32 PM

## 2018-09-05 NOTE — PLAN OF CARE
Problem: Patient Care Overview  Goal: Plan of Care/Patient Progress Review  Outcome: No Change  [facility] Admission Note:    Patient admitted to 3228/3228-1 on day shift. Unknown how patient arrived to room, how she was transferred and who day shift got report from. Patient is alert and oriented X 3, reports pain upon beginning of shift assessment and requesting pain medication per day shift nurse.; rates at 4 on 0-10 scale.  Patient oriented to room, unit, hourly rounding, and plan of care. Explained admission packet and patient handbook with patient bill of rights brochure. Will continue to monitor and document as needed.     Inpatient Nursing criteria listed below was met:    Health care directives status obtained and documented: patient has no advanced directives but would like a healthcare directive due to having been  from her  for over a year and not being .    Care Everywhere authorization obtained No    MRSA swab completed for patient 65 years and older: deferring to night shift.    Patient identifies a surrogate decision maker: Yes If yes, who: her daughter, Cristiane. Contact Information: see communication board or epic for contact number.    Core Measure diagnosis present:Yes. If yes, state diagnosis: SCIP knee     If initial lactic acid >2.0, repeat lactic acid drawn within one hour of arrival to unit: NA.    Vaccination assessment and education completed: N/A, not asked by epic at this time.     Clergy visit ordered if patient requests: Yes    Skin issues/needs documented: Yes    Isolation Patient: no    Fall Prevention Yes: Care plan updated, education given and documented, sticker and magnet in place: Yes    Care Plan initiated: Yes    Education Documented (including assessment): Yes    Patient has discharge needs : Yes, patient wants healthcare directive done so that her daughter, Cristiane, can be her decision maker.    Patient verbalized that she had a terrible  experience with her first knee surgery in April, she reported that her pain was not well managed. This is experience is causing her to have quite a bit of anxiety with this knee surgery. Patient has needed much reassurance, counseling and instruction with advocating for herself throughout the entire shift. She has verbalized that the pain she is experiencing now is not what it was like with her first knee surgery, she's rating it 4-5/10, when she is up she rates it 7/10. Patient has needed frequent reminding of that which helps reassure her. I've had thorough conversations with her about the plan for her pain management, she verbalized that Toradol and Dilaudid worked best for her last time after I'd already started her pain medication. She c/o right knee pain, she was initially started with scheduled Tylenol and Roxicodone, due to the fact she is getting Tylenol 975 mg PO every 8 hours. Almost an hour later she did not feel relief so Dilaudid PO was given. Patient continued to be anxious about the pain she'd had the last time, with no antianxiety medication ordered, Toradol IV was given because patient did report it is what helped her last time. She also has prescription for Toradol PO from her primary care provider for after she is discharged from here. Roxicodone PO was given again and then Dilaudid PO at the end of the shift because patient continues to request something for pain. Patient has been awake and alert the entire shift, she has not napped or dozed off once. She has been frequently encouraged to try to relax and try to nap when she feels tired. Patient has requested that we continue to give her Toradol IV and Dilaudid PO through the night, wake her up every 3-4 hours for these meds. VSS with the exception of her HR continuing to be tachycardic. Right knee dressing had to be changed at the beginning of the shift because patient was incontinent of urine getting up to the bedside commode due to the  spinal. Aquacell dressing intact, scant shadowing noted at the bottom of Aquacell. Rewrapped with new dressing. CMS + RLE. Patient denies numbness or tingling. She has declined to elevate RLE on pillow due to increased pressure with not having pillow under knee, she has been comfortable with foot of bed elevated. Patient has been getting up to bedside commode with assist of 2 with walker and gait belt. She is doing well but needs frequent reminding that her right knee is the operative knee, not her left. Oral intake adequate, she had Vincent[place] toast for supper. Urine output adequate, she has not been incontinent since the beginning of the shift but does have brief on just in case. No BM. PT did not see her this shift. Alarms in place. Call light within reach. IVF decreased to 15 ml/hour due to the amount of urine output she is having. Patient did request IVF be turned down if possible as well, she was encouraged to increase oral fluid intake.     Face to face report given with opportunity to observe patient.    Report given to Earnestine Quigley   9/5/2018  12:03 AM

## 2018-09-05 NOTE — PROGRESS NOTES
09/05/18 1100   Quick Adds   Type of Visit Initial Occupational Therapy Evaluation   Living Environment   Lives With alone   Living Arrangements house   Home Accessibility stairs to enter home;stairs within home;tub/shower is not walk in   Number of Stairs to Enter Home 3   Number of Stairs Within Home 12   Stair Railings at Home none   Transportation Available car   Living Environment Comment Pt has laundry in basement   Self-Care   Dominant Hand right   Usual Activity Tolerance good   Current Activity Tolerance fair   Regular Exercise no   Functional Level Prior   Ambulation 0-->independent   Transferring 0-->independent   Toileting 0-->independent   Bathing 0-->independent   Dressing 0-->independent   Eating 0-->independent   Communication 0-->understands/communicates without difficulty   Swallowing 0-->swallows foods/liquids without difficulty   Cognition 0 - no cognition issues reported   Fall history within last six months no       Present no   General Information   Onset of Illness/Injury or Date of Surgery - Date 09/04/18   Referring Physician Ty Hernandez MD   Patient/Family Goals Statement get back home   Additional Occupational Profile Info/Pertinent History of Current Problem Pt is s/p R TKA   Precautions/Limitations fall precautions   Weight-Bearing Status - LUE full weight-bearing   Weight-Bearing Status - RUE full weight-bearing   Weight-Bearing Status - LLE full weight-bearing   Weight-Bearing Status - RLE weight-bearing as tolerated   Cognitive Status Examination   Orientation orientation to person, place and time   Level of Consciousness alert   Able to Follow Commands WNL/WFL   Personal Safety (Cognitive) WNL/WFL   Memory intact   Attention No deficits were identified   Organization/Problem Solving No deficits were identified   Executive Function No deficits were identified   Visual Perception   Visual Perception Wears glasses   Sensory Examination   Sensory Quick Adds No  deficits were identified   Pain Assessment   Patient Currently in Pain Yes, see Vital Sign flowsheet  (4/10)   Range of Motion (ROM)   ROM Comment B UE WNL   Strength   Strength Comments B UE grossly 4/5   Muscle Tone Assessment   Muscle Tone Quick Adds No deficits were identified   Coordination   Upper Extremity Coordination No deficits were identified   Mobility   Bed Mobility Bed mobility skill: Sit to supine   Bed Mobility Skill: Sit to Supine   Level of [place]: Sit/Supine stand-by assist   Physical Assist/Nonphysical Assist: Sit/Supine supervision   Toilet Transfer   Toilet Transfer Toilet Transfer Skill   Transfer Skill: Toilet Transfer   Level of [place]: Toilet stand-by assist   Physical Assist/Nonphysical Assist: Toilet supervision   Weight-Bearing Restrictions: Toilet weight-bearing as tolerated   Assistive Device standard walker;grab bars   Upper Body Dressing   Level of [place]: Dress Upper Body independent   Lower Body Dressing   Level of [place]: Dress Lower Body stand-by assist   Toileting   Level of [place]: Toilet stand-by assist   Grooming   Level of [place]: Grooming stand-by assist   Instrumental Activities of Daily Living (IADL)   Previous Responsibilities meal prep;housekeeping;laundry;shopping;medication management;finances;driving   Activities of Daily Living Analysis   Impairments Contributing to Impaired Activities of Daily Living pain   Clinical Impression   Criteria for Skilled Therapeutic Interventions Met evaluation only   Assessment of Occupational Performance 1-3 Performance Deficits   Identified Performance Deficits pain, mobility   Clinical Decision Making (Complexity) Low complexity   Anticipated Discharge Disposition Home with Assist   Risks and Benefits of Treatment have been explained. Yes   Patient, Family & other staff in agreement with plan of care Yes   Clinical Impression Comments Pt is able to manage ADLs with set-up. Pt may need assist at  "home with IADLs   [facility] TM \"6 Clicks\"   2016, Trustees of [facility], under license to [employer].  All rights reserved.   6 Clicks Short Forms Basic Mobility Inpatient Short Form;Daily Activity Inpatient Short Form   [facility]  \"6 Clicks\" V.2 Basic Mobility Inpatient Short Form   1. Turning from your back to your side while in a flat bed without using bedrails? 4 - None   2. Moving from lying on your back to sitting on the side of a flat bed without using bedrails? 4 - None   3. Moving to and from a bed to a chair (including a wheelchair)? 3 - A Little   4. Standing up from a chair using your arms (e.g., wheelchair, or bedside chair)? 3 - A Little   5. To walk in hospital room? 3 - A Little   6. Climbing 3-5 steps with a railing? 2 - A Lot   Basic Mobility Raw Score (Score out of 24.Lower scores equate to lower levels of function) 19   [facility]  \"6 Clicks\" Daily Activity Inpatient Short Form   1. Putting on and taking off regular lower body clothing? 3 - A Little   2. Bathing (including washing, rinsing, drying)? 3 - A Little   3. Toileting, which includes using toilet, bedpan or urinal? 4 - None   4. Putting on and taking off regular upper body clothing? 4 - None   5. Taking care of personal grooming such as brushing teeth? 4 - None   6. Eating meals? 4 - None   Daily Activity Raw Score (Score out of 24.Lower scores equate to lower levels of function) 22   Total Evaluation Time   Total Evaluation Time (Minutes) 23     "

## 2018-09-05 NOTE — PROGRESS NOTES
Subjective: The patient is POD #1 s/p R Total Knee Arthroplasty.  The patients pain is controlled fairly well.  They deny shortness of breath, chest pain, nausea or vomiting.  There have been no acute perioperative complications    Objective:   B/P: 120/62, Temp: 98.3, Pulse: Data Unavailable, Resp: 16    Alert and Orientated x3; No acute distress; Appears comfortable    Knee Exam: dressing/wound is clean, dry, intact without significant drainage.  No erythema or signs of infection.     No evidence of DVT    Distal motor/sensory exam is intact in the superficial peroneal, deep peroneal and tibial nerve distributions.      Normal capillary refill with a palpable dorsalis pedis pulse.    Hemoglobin   Date Value Ref Range Status   09/05/2018 9.3 (L) 11.7 - 15.7 g/dL Final       Assessment/Plan:     1) POD # 1 S/P R Total Knee Arthroplasty  -Acute blood loss anemia--stable/no symptoms  -PT/OT--ROM and Gait training  -DVT prophylaxis--ASA  -Dispo--To Home when cleared medically and by PT today/tomorrow  -Hospitalist co-management--Thanks

## 2018-09-05 NOTE — PROGRESS NOTES
Inpatient Physical Therapy Evaluation    Name: Aure Lanier MRN# [id number]   Age: 68 year old YOB: 1950     Date of Consultation: 2018  Consultation is requested by:  Dr. Hernandez  Specific Consultation Request:  S/p R TKA  Primary care provider: Jaden Lee    General Information:   Onset Date: 18    History of Current Problem/Admission: Status post total right knee replacement [Z96.651]    Prior Level of Function:   Ambulation: 0 - Independent   Transferrin - Independent   Toiletin - Independent    Bathin - Independent   Dressin - Independent   Eatin - Not assessed  Communication: 0 - Understands/communicates without difficulty  Swallowin - swallows foods/liquids without difficulty  Cognition: 0 - no cognitive issues reported    Fall history within the last 6 months: No    Current Living Situation: Patient has 3 stairs to enter the home. Patient plans to have her sister stay with her for a while. She has a walker. Plans to do outpatient PT     Current Equipment Used at Home: None but has walker    Patient & Family Goals: Return home    Past Medical History:   Past Medical History:   Diagnosis Date     Allergic arthritis involving hand 2011     Anemia, Iron Deficiency 2011     Anxiety 2011     Contact dermatitis and other eczema, unspecified c 2011     Depression 2011     Edema 2011     Gastrointestinal mucositis (ulcerative) 2011     GERD 2011     Headache(784.0) 2011     Hypothyroidism 2011     Insomnia, unspecified 2011     Migraine 2011     Nonallopathic lesion of cervical region, not elsewhere classified 10/16/2000     Osteoporosis 2011     Other and unspecified hyperlipidemia 2011     Other malaise and fatigue 2001     Postherpetic polyneuropathy 2011       Past Surgical History:  Past Surgical History:   Procedure Laterality Date     ARTHROPLASTY KNEE  Left 4/17/2018    Procedure: ARTHROPLASTY KNEE;  LEFT TOTAL KNEE ARTHROPLASTY S/N JOURNEY II;  Surgeon: Ty Hernandez MD;  Location: [facility]     ARTHROPLASTY KNEE Right 9/4/2018    Procedure: ARTHROPLASTY KNEE;  RIGHT TOTAL KNEE ARTHROPLASTY ;  Surgeon: Ty Hernandez MD;  Location: [facility] OR     D & C       ESOPHAGOSCOPY, GASTROSCOPY, DUODENOSCOPY (EGD), COMBINED N/A 9/11/2017    Procedure: COMBINED ESOPHAGOSCOPY, GASTROSCOPY, DUODENOSCOPY (EGD);  Upper Endoscopy: Removal of Food Impaction;  Surgeon: Lino Zhu DO;  Location: [facility] OR     STOMACH SURGERY       stomach surgery peritinitis         Medications:   Current Facility-Administered Medications   Medication     [START ON 9/7/2018] acetaminophen (TYLENOL) tablet 650 mg     acetaminophen (TYLENOL) tablet 975 mg     aspirin EC tablet 325 mg     benzocaine-menthol (CEPACOL) 15-3.6 MG lozenge 1-2 lozenge     calcium carbonate 600 mg-vitamin D 400 units (CALTRATE) per tablet 1 tablet     cyanocobalamin (vitamin  B-12) tablet 1,000 mcg     cyclobenzaprine (FLEXERIL) tablet 10 mg     docusate sodium (COLACE) capsule 50 mg     escitalopram (LEXAPRO) tablet 20 mg     ferrous sulfate (IRON) tablet 325 mg     gabapentin (NEURONTIN) capsule 300 mg     HYDROcodone-acetaminophen (NORCO) 7.5-325 MG per tablet 1-2 tablet     HYDROmorphone (DILAUDID) tablet 4 mg     hydrOXYzine (ATARAX) tablet 10 mg     ketorolac (TORADOL) injection 15 mg     levothyroxine (SYNTHROID/LEVOTHROID) tablet 150 mcg     lidocaine (LMX4) kit     lidocaine 1 % 1 mL     metoclopramide (REGLAN) tablet 5 mg    Or     metoclopramide (REGLAN) injection 5 mg     naloxone (NARCAN) injection 0.1-0.4 mg     omeprazole (priLOSEC) CR capsule 40 mg     ondansetron (ZOFRAN-ODT) ODT tab 4 mg    Or     ondansetron (ZOFRAN) injection 4 mg     prochlorperazine (COMPAZINE) injection 5 mg    Or     prochlorperazine (COMPAZINE) tablet 5 mg     ranitidine (ZANTAC) tablet 150 mg     senna-docusate (SENOKOT-S;PERICOLACE)  8.6-50 MG per tablet 1 tablet    Or     senna-docusate (SENOKOT-S;PERICOLACE) 8.6-50 MG per tablet 2 tablet     simvastatin (ZOCOR) tablet 20 mg     sodium chloride (PF) 0.9% PF flush 3 mL     sodium chloride (PF) 0.9% PF flush 3 mL     sucralfate (CARAFATE) tablet 1 g     zolpidem (AMBIEN) tablet 10 mg       Weight Bearing Status: WBAT     Cognitive Status Examination:  Orientation: awake and alert  Level of Consciousness: alert  Follows Commands and Answers Questions: 100% of the time  Personal Safety and Judgement: Intact   Memory: Immediate recall intact  Comments:     Pain:   Currently in pain? Yes  Pain Location? right knee  Pain Rating: c/o mild pain, pain well controlled    Physical Therapy Evaluation:   Integumentary/Edema: Surgical knee wrapped  ROM: R knee AROM 5-80 degrees  Strength: Fair quad strength; SLR independent  Bed Mobility: Independent  Transfers: Mod I c walker  Gait: Ambulated 150ft c FWW and CGA limited by fatigue, pain. Step-through gait  Stairs: Not able to tolerate today  Balance: Normal  Sensory: No issues  Coordination: Normal     Physical Therapy Interventions: Bed Mobility, Gait Training , ROM, Strengthening, Stretching , Transfer Training and Home Programming Guidelines    Clinical Impressions:  Criteria for Skilled Therapeutic Intervention Met: Yes, treatment indicated  PT Diagnosis: Weakness, pain, impaired gait s/p R TKA  Influenced by the following impairments: Weakness, pain, impaired gait  Functional limitations due to impairment: Decreased tolerance for functional mobility  Clinical presentation: Stable/Uncomplicated  Clinical presentation rationale: Clinical judgement  Clinical Decision making (complexity): Low Complexity  Frequency: 2 times/day   Predicted Duration of Therapy Intervention (days/wks): 5 days    Anticipated Discharge Disposition: Home with outpatient PT  Anticipated Equipment Needs at Discharge: None  Risks and Benefits of therapy have been explained:  Yes  Patient, Family & other staff in agreement with plan of care: Yes    Total Eval Time: 15

## 2018-09-05 NOTE — PROGRESS NOTES
Pt was in bed but was willing to get out of bed with SBA1 from supine to sit, sit stand CGA1 FWW CGA1 200 feet  No LOB 6/10 right knee with movement. Pt performed supine TKA 10 reps.

## 2018-09-05 NOTE — PROGRESS NOTES
[facility]    Hospitalist Progress Note    Date of Service (when I saw the patient): 09/05/2018     Consulted by Dr. Hernandez for medical management s/p right TKA.     Assessment & Plan   Aure Lanier is a 68 year old female who was admitted on 9/4/2018.       Status post total right knee replacement: No operative or immediate post-operative complications. Participating with therapy, however unable to do stairs yet, no ready for discharge.       Acute blood loss anemia, expected post-operative: Hemoglobin from 11.4 to 9.3-likely operative blood loss plus dilution. NO signs of acute post-operative hemorrhage, she is asymptomatic.      Hypothyroidism: Continue home dose synthroid.      Insomnia: Will continue Ambien at bedtime as at home, she tolerated well last night without respiratory compromise, somnolence.      Dysthymia: Continue home dose Lexapro.      Hyperlipidemia: Continue zocor.       GERD: Continue daily PPI. She is also on ranitidine at home so will continue that as well, though likely not needing to be on both h2 blocker and PPI she is resistant to any changes of her regular medications at this time.      Chronic pain syndrome      # Pain Assessment:  Current Pain Score 9/5/2018   Patient currently in pain? -   Pain score (0-10) 7   Pain location -   Pain descriptors -   - Aure is experiencing pain due to surgery. Pain management was discussed and the plan was created in a collaborative fashion.  Aure's response to the current recommendations: engaged  - Opioid regimen: Dilaudid, norco  - Response to opioid medications: Reduction of symptoms   - Bowel regimen: docusate  - Pharmacologic adjuvants: NSAIDs and Acetaminophen  - Non-pharmacologic adjuvants: Ice and Physical Therapy    DVT Prophylaxis: Defer to primary service- ASA BID  Code Status: Prior    Disposition: Expected discharge in 1-2 days once cleared by therapy.    Radha Wright, CNP    Subjective:  Denies nausea, chest  pain, shortness of breath. Very concerned about being able to sleep tonight. Pain currently well controlled.       -Data reviewed today: I reviewed all new labs and imaging results over the last 24 hours.     Physical Exam   Temp: 98.3  F (36.8  C) Temp src: Tympanic BP: 120/62   Heart Rate: 102 Resp: 16 SpO2: 100 % O2 Device: None (Room air) Oxygen Delivery: 2 LPM  Vitals:    09/04/18 0900   Weight: 86.8 kg (191 lb 6.4 oz)     Vital Signs with Ranges  Temp:  [97.2  F (36.2  C)-99.6  F (37.6  C)] 98.3  F (36.8  C)  Heart Rate:  [] 102  Resp:  [11-29] 16  BP: ()/(54-92) 120/62  SpO2:  [92 %-100 %] 100 %  I/O last 3 completed shifts:  In: 2331 [P.O.:480; I.V.:1851]  Out: 2225 [Urine:2175; Blood:50]    Peripheral IV 04/19/18 Right Lower forearm (Active)   Number of days:139       Peripheral IV 09/04/18 Left Hand (Active)   Site Assessment WDL 9/5/2018  8:15 AM   Line Status Infusing 9/5/2018  8:15 AM   Phlebitis Scale 0-->no symptoms 9/5/2018  8:15 AM   Infiltration Scale 0 9/5/2018  8:15 AM   Dressing Intervention New dressing  9/4/2018  9:36 AM   Number of days:1       Incision/Surgical Site 04/17/18 Left Knee (Active)   Number of days:141       Incision/Surgical Site 09/04/18 Right Knee (Active)   Incision Assessment UTV 9/5/2018 12:38 AM   Closure Staples 9/4/2018 11:56 AM   Incision Drainage Amount Scant 9/4/2018  4:00 PM   Drainage Description Sanguinous 9/4/2018  4:00 PM   Dressing Intervention Clean, dry, intact 9/5/2018 12:38 AM   Number of days:1     Line/device assessment(s) completed for medical necessity    Constitutional: Awake,alert, no acute distress.   Respiratory: Clear bilaterally, no wheezes,rhonchi or crackles.  Cardiovascular: HRR, no murmur, rubs,thrills.   GI: Soft,nontender, bowel sounds very active  Skin/Integumen: No rashes, bruising or open areas.  Other:  Right lower leg Ace wrapped, CMS intact to toes.     Medications       acetaminophen  975 mg Oral Q8H     aspirin  325 mg  Oral BID     calcium carbonate 600 mg-vitamin D 400 units  1 tablet Oral Daily     cyanocobalamin  1,000 mcg Oral Daily     escitalopram  20 mg Oral Daily     ferrous sulfate  325 mg Oral TID w/meals     gabapentin  300 mg Oral TID     levothyroxine  150 mcg Oral Daily     omeprazole  40 mg Oral QAM AC     ranitidine  150 mg Oral Daily     senna-docusate  1 tablet Oral BID    Or     senna-docusate  2 tablet Oral BID     simvastatin  20 mg Oral At Bedtime     sodium chloride (PF)  3 mL Intracatheter Q8H     sucralfate  1 g Oral 4x Daily       Data     Recent Labs  Lab 09/05/18  0532   HGB 9.3*       Recent Results (from the past 24 hour(s))   XR Knee Port Right 1/2 Views    Narrative    PROCEDURE: XR KNEE PORT RT 1/2 VW 9/4/2018 1:43 PM    HISTORY: Post-Op Total Knee;     COMPARISONS: None.    TECHNIQUE: 2 views.    FINDINGS: There is a right knee arthroplasty with components in  satisfactory position. Skin staples are seen anteriorly.         Impression    IMPRESSION: TKA without acute abnormality.    VENANCIO YAÑEZ MD

## 2018-09-05 NOTE — PROGRESS NOTES
Assessment completed see flow sheet.      LOC: alert    Others present: Patient    Dx: ABBY    Lives with: Lives With: alone    Living at: Living Arrangements: house    Equipment used:  Walker, cane, raised toilet seat, grab bars    Support System: Description of Support System: Supportive, Involved    Primary Care Provider: Jaden Lee    POA/Guardian: No     Health Care Directive: NO, information provided    Pharmacy: [facility]    :  no    Homecare/[facility] Services:   no    Adequate Resources for needs (housing, utilities, food/med): YES    Meds and appointments management: YES    Work: YES, plays organ at three different [employer]    Transportation: YES     ADLs: independent    Falls: No    Able to Return to Prior Living Arrangements: YES    [place]: NO    Goals: pain control, PT and HC Directive    Barriers: none    Needs: Demonstrates Competency    KAREN: none    Discharge Plan: Home with outpatient PT through [facility]    Aure sees Jaden Lee, her dentist is Dr Gracia, she goes to [facility] for eye care. Her  and sister will be helping her when she gets home. She has trouble sleeping at night and takes Ambien, states it helps. Her support system is her children, the spouse that she is  from and her sisters. She enjoys family time, driving her grandkids around and just being in her own home. She wants outpatient PT at [facility]. This writer contacted [facility] to inquire if an appointment had been made and one had not been scheduled. Fermín Yost did not have any appointments available so scheduled with Charisse for first appointment on Monday September 10 @ 1330 with a plan that she could schedule with Fermín when available. CM will remain available.

## 2018-09-05 NOTE — PLAN OF CARE
"Problem: Knee Arthroplasty (Total, Partial) (Adult)  Goal: Signs and Symptoms of Listed Potential Problems Will be Absent, Minimized or Managed (Knee Arthroplasty)  Signs and symptoms of listed potential problems will be absent, minimized or managed by discharge/transition of care (reference Knee Arthroplasty (Total, Partial) (Adult) CPG).   Patient extremely excitable and anxious on shift. Continuously expresses worry that pain will not be controlled. States she has some tolerance to the prescribed narcotics and doesn't want to fall behind as she did with her last knee replacement. Receiving PRN dilaudid, oxy, torodol, and scheduled tylenol. One IV dose of dilaudid given on shift for breakthrough pain. Vitals remain stable. /69  Temp 99.6  F (37.6  C) (Tympanic)  Resp 16  Ht 1.575 m (5' 2\")  Wt 86.8 kg (191 lb 6.4 oz)  SpO2 98%  BMI 35.01 kg/m2. Dressing remains clean dry and intact to the right extremity. [id number] in place with ice refreshed on shift. CMS intact. Denies any numbness or tingling. Pulses strong. Minimal swelling noted to operative extremity. 1+ edema noted to left foot. Patient states this is baseline for her. Requires an assist of 2 for transfers. Extremely anxious and hesitant with any movements. IV locked on shift.       Face to face report given with opportunity to observe patient.    Report given to VANGIE Sanabria   9/5/2018  7:14 AM      "

## 2018-09-05 NOTE — ANESTHESIA POSTPROCEDURE EVALUATION
Patient: Aure Lanier    Procedure(s):  RIGHT TOTAL KNEE ARTHROPLASTY  - Wound Class: I-Clean    Diagnosis:DJD RIGHT KNEE  Diagnosis Additional Information: No value filed.    Anesthesia Type:  Spinal, Periph. Nerve Block for postop pain    Note:  Anesthesia Post Evaluation    Patient location during evaluation: Floor  Patient participation: Able to fully participate in evaluation  Level of consciousness: awake and alert  Pain management: adequate  Airway patency: patent  Cardiovascular status: acceptable  Respiratory status: acceptable  Hydration status: acceptable  PONV: none     Anesthetic complications: None          Last vitals:  Vitals:    09/04/18 2023 09/04/18 2300 09/05/18 0816   BP: 151/87 150/69 120/62   Resp: 18 16 16   Temp: 98.9  F (37.2  C) 99.6  F (37.6  C) 98.3  F (36.8  C)   SpO2: 99% 98% 100%         Electronically Signed By: ASHLEIGH Mcneil CRNA  September 5, 2018  2:51 PM

## 2018-09-05 NOTE — PLAN OF CARE
Problem: Patient Care Overview  Goal: Plan of Care/Patient Progress Review  Discharge Planner PT   Patient plan for discharge: Home with outpatient PT  Current status: Pt transferred c CGA, ambulated 150ft c FWW and CGA limited by pain and fatigue. Not able to tolerate stairs today. Completed TKA exercises  Barriers to return to prior living situation: Needs to do stairs, still needs CGA for safety  Recommendations for discharge: Home c outpatient PT  Rationale for recommendations: See eval        Entered by: Linn Corcoran 09/05/2018 12:35 PM

## 2018-09-05 NOTE — PLAN OF CARE
Problem: Patient Care Overview  Goal: Plan of Care/Patient Progress Review  Outcome: Therapy, progress toward functional goals as expected  Discharge Planner PT Home with sister outpt PT  Patient plan for discharge: Home with out pt PT  Current status:  feet with CGA1  Barriers to return to prior living situation: N/A  Recommendations for discharge: N/A  Rationale for recommendations: N/A       Entered by: Earnestine Bhatt 09/05/2018 2:19 PM

## 2018-09-05 NOTE — PLAN OF CARE
"Problem: Patient Care Overview  Goal: Plan of Care/Patient Progress Review  Outcome: Therapy, progress toward functional goals as expected  Reason for hospital stay:  R TKA        Most recent vitals: /62  Temp 98.3  F (36.8  C) (Tympanic)  Resp 16  Ht 1.575 m (5' 2\")  Wt 86.8 kg (191 lb 6.4 oz)  SpO2 100%  BMI 35.01 kg/m2    Pain Management:  Dilaudid 4mg x2, Toradol 15mg x1, scheduled Tylenol, et ICE    Orientation:  A/O    Cardiac:  HR Tachycardic 102    Respiratory:  Lungs clear bilat sats 100% on r/a    GI:  Bowel sounds audible et active x4, received Zofran for c/o nausea with good relief    :  WDL    Skin Issues:  Aquacel dressing intact to RLE, small amount of drainage, marked with sharpie     IVF:  Saline locked    ABX:  n/a    Mobility:  A1 with gait belt et walker    Safety:  Alarms audible et active        Comments:        9/5/2018  12:48 PM  Elly Arriola          "

## 2018-09-06 ENCOUNTER — APPOINTMENT (OUTPATIENT)
Dept: PHYSICAL THERAPY | Facility: HOSPITAL | Age: 68
DRG: 470 | End: 2018-09-06
Attending: ORTHOPAEDIC SURGERY
Payer: COMMERCIAL

## 2018-09-06 VITALS
TEMPERATURE: 97.8 F | RESPIRATION RATE: 18 BRPM | HEIGHT: 62 IN | SYSTOLIC BLOOD PRESSURE: 147 MMHG | DIASTOLIC BLOOD PRESSURE: 65 MMHG | BODY MASS INDEX: 35.22 KG/M2 | WEIGHT: 191.4 LBS | OXYGEN SATURATION: 95 %

## 2018-09-06 LAB
BACTERIA SPEC CULT: NORMAL
HGB BLD-MCNC: 9.3 G/DL (ref 11.7–15.7)
SPECIMEN SOURCE: NORMAL

## 2018-09-06 PROCEDURE — 97110 THERAPEUTIC EXERCISES: CPT | Mod: GP | Performed by: PHYSICAL THERAPIST

## 2018-09-06 PROCEDURE — 36415 COLL VENOUS BLD VENIPUNCTURE: CPT | Performed by: ORTHOPAEDIC SURGERY

## 2018-09-06 PROCEDURE — 25000132 ZZH RX MED GY IP 250 OP 250 PS 637: Performed by: NURSE PRACTITIONER

## 2018-09-06 PROCEDURE — 99238 HOSP IP/OBS DSCHRG MGMT 30/<: CPT | Performed by: INTERNAL MEDICINE

## 2018-09-06 PROCEDURE — 40000193 ZZH STATISTIC PT WARD VISIT: Performed by: PHYSICAL THERAPIST

## 2018-09-06 PROCEDURE — 97116 GAIT TRAINING THERAPY: CPT | Mod: GP | Performed by: PHYSICAL THERAPIST

## 2018-09-06 PROCEDURE — 25000132 ZZH RX MED GY IP 250 OP 250 PS 637: Performed by: ORTHOPAEDIC SURGERY

## 2018-09-06 PROCEDURE — 97530 THERAPEUTIC ACTIVITIES: CPT | Mod: GP | Performed by: PHYSICAL THERAPIST

## 2018-09-06 PROCEDURE — 85018 HEMOGLOBIN: CPT | Performed by: ORTHOPAEDIC SURGERY

## 2018-09-06 PROCEDURE — 25000128 H RX IP 250 OP 636: Performed by: NURSE PRACTITIONER

## 2018-09-06 RX ADMIN — HYDROMORPHONE HYDROCHLORIDE 4 MG: 2 TABLET ORAL at 08:55

## 2018-09-06 RX ADMIN — HYDROMORPHONE HYDROCHLORIDE 4 MG: 2 TABLET ORAL at 06:09

## 2018-09-06 RX ADMIN — ACETAMINOPHEN 975 MG: 325 TABLET, FILM COATED ORAL at 00:01

## 2018-09-06 RX ADMIN — HYDROMORPHONE HYDROCHLORIDE 4 MG: 2 TABLET ORAL at 16:31

## 2018-09-06 RX ADMIN — Medication 1000 MCG: at 08:26

## 2018-09-06 RX ADMIN — OMEPRAZOLE 40 MG: 20 CAPSULE, DELAYED RELEASE ORAL at 08:26

## 2018-09-06 RX ADMIN — SUCRALFATE 1 G: 1 TABLET ORAL at 08:26

## 2018-09-06 RX ADMIN — ZOLPIDEM TARTRATE 10 MG: 5 TABLET, FILM COATED ORAL at 00:01

## 2018-09-06 RX ADMIN — ESCITALOPRAM OXALATE 20 MG: 10 TABLET ORAL at 08:25

## 2018-09-06 RX ADMIN — HYDROMORPHONE HYDROCHLORIDE 4 MG: 2 TABLET ORAL at 12:55

## 2018-09-06 RX ADMIN — SUCRALFATE 1 G: 1 TABLET ORAL at 12:56

## 2018-09-06 RX ADMIN — GABAPENTIN 300 MG: 300 CAPSULE ORAL at 12:56

## 2018-09-06 RX ADMIN — FERROUS SULFATE TAB 325 MG (65 MG ELEMENTAL FE) 325 MG: 325 (65 FE) TAB at 12:56

## 2018-09-06 RX ADMIN — RANITIDINE 150 MG: 150 TABLET ORAL at 08:26

## 2018-09-06 RX ADMIN — ACETAMINOPHEN 975 MG: 325 TABLET, FILM COATED ORAL at 08:26

## 2018-09-06 RX ADMIN — ASPIRIN 325 MG: 325 TABLET, DELAYED RELEASE ORAL at 08:26

## 2018-09-06 RX ADMIN — SENNOSIDES AND DOCUSATE SODIUM 1 TABLET: 8.6; 5 TABLET ORAL at 08:26

## 2018-09-06 RX ADMIN — LEVOTHYROXINE SODIUM 150 MCG: 75 TABLET ORAL at 06:09

## 2018-09-06 RX ADMIN — HYDROMORPHONE HYDROCHLORIDE 4 MG: 2 TABLET ORAL at 00:56

## 2018-09-06 RX ADMIN — Medication 1 TABLET: at 08:26

## 2018-09-06 RX ADMIN — KETOROLAC TROMETHAMINE 15 MG: 15 INJECTION, SOLUTION INTRAMUSCULAR; INTRAVENOUS at 01:22

## 2018-09-06 RX ADMIN — FERROUS SULFATE TAB 325 MG (65 MG ELEMENTAL FE) 325 MG: 325 (65 FE) TAB at 08:26

## 2018-09-06 RX ADMIN — GABAPENTIN 300 MG: 300 CAPSULE ORAL at 08:26

## 2018-09-06 RX ADMIN — KETOROLAC TROMETHAMINE 15 MG: 15 INJECTION, SOLUTION INTRAMUSCULAR; INTRAVENOUS at 08:24

## 2018-09-06 RX ADMIN — SIMVASTATIN 20 MG: 20 TABLET, FILM COATED ORAL at 00:01

## 2018-09-06 ASSESSMENT — ACTIVITIES OF DAILY LIVING (ADL)
ADLS_ACUITY_SCORE: 16
ADLS_ACUITY_SCORE: 16
ADLS_ACUITY_SCORE: 15
ADLS_ACUITY_SCORE: 16
ADLS_ACUITY_SCORE: 16

## 2018-09-06 ASSESSMENT — PAIN DESCRIPTION - DESCRIPTORS
DESCRIPTORS: ACHING

## 2018-09-06 NOTE — PROGRESS NOTES
Met with Aure, she was accepting of a different PT at [facility] since Fermín Yost is booked out. Appointment with Linette is scheduled for Monday 10 Sept. @ 130 PM.

## 2018-09-06 NOTE — DISCHARGE INSTRUCTIONS
Wound Care: Leave Aquacel dressing intact until follow up in clinic unless overly saturated.      DVT Prophylaxis:  Aspirin 325 mg by mouth twice a day for 2 weeks.      Pain Control: Dilaudid mg by mouth every 3 hours as needed for pain.     Activity: Weightbearing as tolerated to operative extremity; Range of motion as tolerated to operative extremity; Edema/Swelling control    Follow-Up: ~2 weeks in [facility] ([phone])    Questions: Call [phone]     [facility] with Linette on Monday September 10th @ 1:30 PM, they will schedule with you for further appointments that day.    You have a follow up appointment scheduled with [facility] on Wednesday, September 19th at 9am.

## 2018-09-06 NOTE — PLAN OF CARE
Patient discharged at 1650 via wheel chair accompanied by friend and staff. Prescriptions sent with patient to fill . All belongings sent with patient.     Discharge instructions reviewed with patient. Listed belongings gathered and returned to patient.     Patient discharged to home.     Core Measures and Vaccines  Core Measures applicable during stay: Yes. If yes, state diagnosis: SCIP knee  Pneumonia and Influenza given prior to discharge, if indicated: N/A    Surgical Patient   Surgical Procedures during stay: right total knee arthroplasty.  Did patient receive discharge instruction on wound care and recognition of infection symptoms? Yes    MISC  Follow up appointment made:  Yes  Home and hospital aquired medications returned to patient: N/A  Patient reports pain was well managed at discharge: patient requested pain medication before getting dressed to leave, she initially wanted Toradol but she was told that she would be able to take Dilaudid sooner than Toradol when she got home.

## 2018-09-06 NOTE — PLAN OF CARE
Problem: Patient Care Overview  Goal: Plan of Care/Patient Progress Review  Outcome: Adequate for Discharge Date Met: 09/06/18  Physical Therapy Discharge Summary    Reason for therapy discharge:    Discharged to home with outpatient therapy.    Progress towards therapy goal(s). See goals on [facility] Plan in [facility] record for goal details.  Goals met    Therapy recommendation(s):    Continued therapy is recommended.  Rationale/Recommendations:  for continued strength, ROM, and functional mobility progression.

## 2018-09-06 NOTE — PROGRESS NOTES
Name: Aure Lanier    MRN#: [id number]    Reason for Hospitalization: Status post total right knee replacement [Z96.651]    Discharge Date: 9/6/2018    Patient / Family response to discharge plan: in agreement    Follow-Up Appt: No future appointments.    Other Providers (Care Coordinator, [facility], PCA services etc): Yes: with [facility] appointment scheduled for Monday Sept. 10 @ 130.    Discharge Disposition: home    Evangelina Pineda

## 2018-09-06 NOTE — PLAN OF CARE
"Problem: Patient Care Overview  Goal: Plan of Care/Patient Progress Review  Outcome: Adequate for Discharge Date Met: 09/06/18  Reason for hospital stay:  L TKA        Most recent vitals: /62  Temp 98.1  F (36.7  C) (Tympanic)  Resp 18  Ht 1.575 m (5' 2\")  Wt 86.8 kg (191 lb 6.4 oz)  SpO2 94%  BMI 35.01 kg/m2    Pain Management:  Dilaudid x2, Toradol, Tylenol, et ICE    Orientation:  A/O    Cardiac:  WDL    Respiratory:  Lung sounds clear bilat sats 95% on r/a    GI:  Bowel sounds audible et active x4, passing flatus    :  WDL    Skin Issues: Aquacel dressing to knee intact, some shadowing noted, was marked    IVF:  Saline locked    ABX:  n/a    Mobility:  SBA 1 with gait belt et walker    Safety:  A/O calls for assist appropriately        Comments:        9/6/2018  2:46 PM  Elly Arriola        Face to face report given with opportunity to observe patient.    Report given to Kylah Arriola   9/6/2018  3:10 PM      "

## 2018-09-06 NOTE — PLAN OF CARE
"Problem: Knee Arthroplasty (Total, Partial) (Adult)  Goal: Signs and Symptoms of Listed Potential Problems Will be Absent, Minimized or Managed (Knee Arthroplasty)  Signs and symptoms of listed potential problems will be absent, minimized or managed by discharge/transition of care (reference Knee Arthroplasty (Total, Partial) (Adult) CPG).   Outcome: No Change  Patient increasingly anxious during initial assessment. Fixated on pain medications despite patient stating that \"I feel great the pain is more than tolerable.\" Patient given PRN Dilaudid x1 as well as scheduled Toradol and tylenol. Patient was due for dilaudid at 0400 but patient was repeatedly observed sleeping comfortable and was not awoken. The time is now 0520 and patient continues to sleep. Pain medication available at patients request. Vitals stable. /60  Temp 98  F (36.7  C) (Temporal)  Resp 18  Ht 1.575 m (5' 2\")  Wt 86.8 kg (191 lb 6.4 oz)  SpO2 100%  BMI 35.01 kg/m2. CMS remains appropriate to right operative leg. Minimal swelling noted. Ace wrap remains in place. Stand by assist on shift with the walker. Patient ambulates well without difficulty. Cryocuff in place. IV remains saline locked.       Face to face report given with opportunity to observe patient.    Report given to VANGIE Sanabria   9/6/2018  7:13 AM      "

## 2018-09-06 NOTE — DISCHARGE SUMMARY
[facility]    Discharge Summary  Hospitalist    Date of Admission:  9/4/2018  Date of Discharge:  9/6/2018  4:50 PM  Discharging Provider: Tayler Abad  Date of Service (when I saw the patient): 9/7/18    Discharge Diagnoses   Status post total right knee arthroplasty  Acute blood loss anemia  Chronic medical problems  Hypothyroidism  Insomnia  Dysthymia  Hyperlipidemia  Gastroesophageal reflux disease  Chronic pain syndrome    History of Present Illness   Aure Lanier is an 68 year old female who was admitted on 9/4/18 for a right total knee arthroplasty.  Hospital Course   Aure Lanier was admitted on 9/4/2018.  The following problems were addressed during her hospitalization:    Status post total right knee replacement:   No operative or immediate post-operative complications. Participating with therapy. Initially was having some issues and difficulties with stairs, but upon working further with PT, she is now felt to be ready for discharge to her home.  She has a sister who plans to be there to assist with her needs.  When she had a previous left total knee arthroplasty, she did have a lot of issues with postoperative pain.  As a result, she was started on higher dose opioid analgesic similar to what worked for her finally in the past.  She has been doing well with taking oral Dilaudid.  She also takes Toradol at home.  Her pain management was discussed and she feels that she can manage adequately at home with oral Dilaudid that she has been receiving.  She will be on twice daily aspirin for DVT prophylaxis per recommendations from orthopedics.     Acute blood loss anemia, expected post-operative:   Hemoglobin from 11.4 to 9.3-likely operative blood loss plus dilution. NO signs of acute post-operative hemorrhage, she has been asymptomatic.     Hypothyroidism:   Continued home dose synthroid.     Insomnia:   Continued Ambien at bedtime as at home, she tolerated well       Dysthymia:  Continued home dose Lexapro.     Hyperlipidemia:   Continued zocor.      GERD:   Continued daily PPI. She is also on ranitidine at home so will continue that as well, though likely not needing to be on both h2 blocker and PPI she was resistant to any changes of her regular medications at this time.     Chronic pain syndrome       Pending Results   Unresulted Labs Ordered in the Past 30 Days of this Admission     No orders found from 7/6/2018 to 9/5/2018.          Code Status   Full Code       Primary Care Physician   Jaden Lee    Physical Exam   Temp: 98.1  F (36.7  C) Temp src: Tympanic BP: 125/62   Heart Rate: 86 Resp: 18 SpO2: 94 % O2 Device: None (Room air)    Vitals:    09/04/18 0900   Weight: 86.8 kg (191 lb 6.4 oz)     Vital Signs with Ranges  Temp:  [98  F (36.7  C)-98.9  F (37.2  C)] 98.1  F (36.7  C)  Heart Rate:  [86-95] 86  Resp:  [16-18] 18  BP: (116-131)/(60-78) 125/62  SpO2:  [94 %-100 %] 94 %  I/O last 3 completed shifts:  In: 1320 [P.O.:1320]  Out: -     Constitutional: Alert and oriented X 3. No distress   Respiratory: CTA bilaterally. No wheezes or ronchi.    Cardiovascular: RRR. No murmurs, rubs, gallops. Normal S1/S2   GI: Soft, NTND, no organomegaly. Bowel sounds present   Integument: Warm, dry   Extremities:  No edema, right knee with dressing clean, dry and intact     # Discharge Pain Plan:   - During her hospitalization, Aure experienced pain due to right TKA.  The pain plan for discharge was discussed with Aure and the plan was created in a collaborative fashion.    - Opioids prescribed on discharge: oral dilaudid  - Duration of opioids after discharge: Per CDC opioid prescribing guidelines, a 3 day prescription of opioids was provided.  - Bowel regimen: docusate      Discharge Disposition   Discharged to home  Condition at discharge: Stable    Consultations This Hospital Stay   HOSPITALIST IP CONSULT  OCCUPATIONAL THERAPY ADULT IP CONSULT  PHYSICAL THERAPY ADULT IP  CONSULT  SPIRITUAL HEALTH SERVICES IP CONSULT    Time Spent on this Encounter   I, Tayler Abad, personally saw the patient today and spent greater than 30 minutes discharging this patient.    Discharge Orders     Physical Therapy Referral     Reason for your hospital stay   Knee replacement     Follow-up and recommended labs and tests   2 weeks [facility]     Wound care and dressings   Leave Aquacel dressing intact until follow up in clinic  Ok to shower over Aquacel dressing  Do not soak wound   If Aquacel becomes saturated contact the [facility] Office ([phone])     Discharge Instructions   Wound Care: Leave Aquacel dressing intact until follow up in clinic unless overly saturated.      DVT Prophylaxis:  Aspirin 325 mg PO BID for 2 weeks      Pain Control: Oxycodone 1-2 tabs PO every 4 hours prn pain     Activity: Weightbearing as tolerated to operative extremity; Range of motion as tolerated to operative extremity; Edema/Swelling control    Follow-Up: ~2 weeks in [facility] ([phone])    Questions: Call [phone]     Activity   Your activity upon discharge: activity as tolerated     Full Code     Full Code     Diet   As at home       Discharge Medications   Current Discharge Medication List      START taking these medications    Details   aspirin 325 MG EC tablet Take 1 tablet (325 mg) by mouth 2 times daily for 14 days  Qty: 28 tablet, Refills: 0    Associated Diagnoses: S/P total knee arthroplasty, right      docusate sodium (COLACE) 50 MG capsule Take 1 capsule (50 mg) by mouth 2 times daily as needed for constipation  Qty: 60 capsule, Refills: 0    Associated Diagnoses: S/P total knee arthroplasty, right      HYDROmorphone (DILAUDID) 4 MG tablet Take 1 tablet (4 mg) by mouth every 3 hours as needed for moderate to severe pain (Pain)  Qty: 60 tablet, Refills: 0    Associated Diagnoses: S/P total knee arthroplasty, right         CONTINUE these medications which have  CHANGED    Details   ketorolac (TORADOL) 10 MG tablet Take 1 tablet (10 mg) by mouth every 6 hours as needed for moderate pain  Qty: 40 tablet, Refills: 0    Associated Diagnoses: Preop general physical exam      sucralfate (CARAFATE) 1 GM tablet Take 1 tablet (1 g) by mouth 4 times daily  Qty: 60 tablet, Refills: 0    Associated Diagnoses: Preop general physical exam         CONTINUE these medications which have NOT CHANGED    Details   acetaminophen (TYLENOL) 325 MG tablet Take 2-3 tablets (650-975 mg) by mouth every 4 hours as needed for other (mild to moderate pain)  Qty: 100 tablet    Associated Diagnoses: Status post total left knee replacement      Calcium Citrate-Vitamin D (CALCIUM + D PO) Take 2 tablets by mouth daily as needed      cyanocolbalamin (VITAMIN  B-12) 1000 MCG tablet Take 1 tablet by mouth daily as needed       cyclobenzaprine (FLEXERIL) 10 MG tablet TAKE 1 TABLET BY MOUTH THREE TIMES DAILY AS NEEDED  Qty: 90 tablet, Refills: 1    Comments: FILL AUGUST 7TH  Associated Diagnoses: Cervicalgia      escitalopram (LEXAPRO) 20 MG tablet TAKE 1 TABLET BY MOUTH EVERY DAY  Qty: 30 tablet, Refills: 5    Associated Diagnoses: Anxiety      ferrous sulfate (IRON) 325 (65 Fe) MG tablet Take 1 tablet (325 mg) by mouth 3 times daily (with meals)  Qty: 90 tablet, Refills: 2    Associated Diagnoses: Iron deficiency      folic acid (FOLVITE) 1 MG tablet TAKE ONE TABLET DAILY BY MOUTH  Qty: 90 tablet, Refills: 2    Associated Diagnoses: Health care maintenance      gabapentin (NEURONTIN) 300 MG capsule TAKE ONE CAPSULE 3 TIMES A DAY BY MOUTH  Qty: 90 capsule, Refills: 0    Associated Diagnoses: Postherpetic polyneuropathy      levothyroxine (SYNTHROID/LEVOTHROID) 150 MCG tablet Take 1 tablet (150 mcg) by mouth daily  Qty: 30 tablet, Refills: 0    Associated Diagnoses: Hypothyroidism, unspecified type      Multiple Vitamins-Minerals (MULTIVITAMIN OR) Take 1 tablet by mouth daily as needed       multivitamin  (OCUVITE) TABS Take 1 tablet by mouth daily as needed       NYSTOP 302485 UNIT/GM POWD powder APPLY TOPICALLY 2 TIMES DAILY AS NEEDED  Qty: 60 g, Refills: 3    Associated Diagnoses: Rash      omeprazole (PRILOSEC) 40 MG capsule TAKE 1 CAPSULE DAILY BY MOUTH  Qty: 90 capsule, Refills: 0      RANITIDINE HCL PO Take 150 mg by mouth daily as needed       simvastatin (ZOCOR) 40 MG tablet TAKE 1 TABLET BY MOUTH EVERY DAY IN THE EVENING . GENERIC FOR ZOCOR.  Qty: 90 tablet, Refills: 0    Associated Diagnoses: Mixed hyperlipidemia      zolpidem (AMBIEN) 10 MG tablet Take 1 tablet (10 mg) by mouth nightly as needed for sleep Take 1 tablet at night for sleep-may repeat X1.  Qty: 60 tablet, Refills: 0    Associated Diagnoses: Persistent insomnia         STOP taking these medications       calcium (CALCIUM 600) 600 MG tablet Comments:   Reason for Stopping:         HYDROcodone-acetaminophen (NORCO) 7.5-325 MG per tablet Comments:   Reason for Stopping:             Allergies   Allergies   Allergen Reactions     Erythromycin Base [Kdc:Yellow Dye+Erythromycin+Brilliant Blue Fcf] Nausea and Vomiting     Penicillins Rash     Data   Most Recent 3 CBC's:  Recent Labs   Lab Test  09/06/18   0556  09/05/18   0532  08/27/18   1731   04/09/18 1716 12/20/17   0015   WBC   --    --   14.5*   --   11.2*  13.7*   HGB  9.3*  9.3*  11.4*   < >  13.3  14.4   MCV   --    --   85   --   91  88   PLT   --    --   469*   --   413  472*    < > = values in this interval not displayed.      Most Recent 3 BMP's:  Recent Labs   Lab Test  08/27/18   1731  04/19/18   0512  04/18/18   0509  04/09/18   1716  12/20/17   0015   NA  140   --    --   137  136   POTASSIUM  3.9   --    --   3.7  3.8   CHLORIDE  106   --    --   104  103   CO2  29   --    --   28  23   BUN  21   --    --   14  15   CR  0.76   --    --   0.62  0.65   ANIONGAP  5   --    --   5  10   SHAYY  7.8*   --    --   9.3  9.0   GLC  104*  108*  128*  97  103*     Most Recent 2 LFT's:  Recent  Labs   Lab Test  08/27/18   1731  04/09/18   1716   AST  10  19   ALT  15  31   ALKPHOS  70  89   BILITOTAL  0.2  0.4     Most Recent INR's and Anticoagulation Dosing History:  Anticoagulation Dose History     There is no flowsheet data to display.        Most Recent 3 Troponin's:No lab results found.  Most Recent Cholesterol Panel:  Recent Labs   Lab Test  06/01/17   0810   CHOL  159   LDL  58   HDL  56   TRIG  223*     Most Recent 6 Bacteria Isolates From Any Culture (See EPIC Reports for Culture Details):  Recent Labs   Lab Test  09/05/18   0451  04/17/18   1840  04/12/18   1403  09/25/14   1026   CULT  No MRSA isolated  No MRSA isolated  <10,000 colonies/mL  Mixed bacterial refugio  No further identification or sensitivity done  *  >100,000 colonies/mL Mixed bacterial refugio No further identification or   sensitivity done  *     Most Recent TSH, T4 and A1c Labs:  Recent Labs   Lab Test  04/09/18   1716   TSH  6.15*   T4  0.82     Results for orders placed or performed during the hospital encounter of 09/04/18   XR Knee Port Right 1/2 Views    Narrative    PROCEDURE: XR KNEE PORT RT 1/2 VW 9/4/2018 1:43 PM    HISTORY: Post-Op Total Knee;     COMPARISONS: None.    TECHNIQUE: 2 views.    FINDINGS: There is a right knee arthroplasty with components in  satisfactory position. Skin staples are seen anteriorly.         Impression    IMPRESSION: TKA without acute abnormality.    VENANCIO YAÑEZ MD

## 2018-09-10 ENCOUNTER — TELEPHONE (OUTPATIENT)
Dept: CASE MANAGEMENT | Facility: HOSPITAL | Age: 68
End: 2018-09-10

## 2018-09-10 NOTE — TELEPHONE ENCOUNTER
Aure Lanier, was discharged to home on 9/6/18   from [facility]. I spoke today with her regarding her discharge.   She indicates she has receive(d) sufficient information upon discharge. Medications were reviewed in full on discharge, including: Medications to be started; medications to be stopped; medications to be continued from preadmission and any side effects. Prescriptions were received at discharge and were able to be filled. Medications are being taken as prescribed.   She indicates she has  an appointment scheduled for Sept 10, Sept 19, Sept 21  and has  transportation available. She was  able to confirm her  appointment times  and have them  written down.  She did not have any questions regarding discharge instructions or condition.  Per their request, the following employee (s) can be recognized for their outstanding services delivered:  Care was excellent.  Suggestions to improve service: Nothing indicated.  She was informed she  may receive a survey in the mail from the hospital. Asked if she  would kindly complete the survey in order for [facility] to know if services fully met patient needs.

## 2018-09-13 DIAGNOSIS — G47.00 PERSISTENT INSOMNIA: ICD-10-CM

## 2018-09-13 NOTE — TELEPHONE ENCOUNTER
ambien      Last Written Prescription Date:  8/17/18  Last Fill Quantity: 60,   # refills: 0  Last Office Visit: 8/27/18  Future Office visit:    Next 5 appointments (look out 90 days)     Sep 21, 2018  4:00 PM CDT   (Arrive by 3:45 PM)   Office Visit with Jaden Lee NP   [facility] [place] ([facility] [place] )    [address] [place] [address] [place]  [place] [address]   [phone]                   Routing refill request to provider for review/approval because:  Drug not on the [facility], [facility] or [facility] refill protocol or controlled substance

## 2018-09-14 RX ORDER — ZOLPIDEM TARTRATE 10 MG/1
10 TABLET ORAL
Qty: 60 TABLET | Refills: 0 | Status: SHIPPED | OUTPATIENT
Start: 2018-09-15 | End: 2018-10-12

## 2018-09-19 ENCOUNTER — TRANSFERRED RECORDS (OUTPATIENT)
Dept: HEALTH INFORMATION MANAGEMENT | Facility: CLINIC | Age: 68
End: 2018-09-19

## 2018-09-19 ENCOUNTER — HOSPITAL ENCOUNTER (EMERGENCY)
Facility: HOSPITAL | Age: 68
Discharge: HOME | End: 2018-09-19
Attending: EMERGENCY MEDICINE | Admitting: EMERGENCY MEDICINE
Payer: COMMERCIAL

## 2018-09-19 ENCOUNTER — TELEPHONE (OUTPATIENT)
Dept: INTERNAL MEDICINE | Facility: OTHER | Age: 68
End: 2018-09-19

## 2018-09-19 VITALS
SYSTOLIC BLOOD PRESSURE: 173 MMHG | RESPIRATION RATE: 18 BRPM | TEMPERATURE: 98.6 F | OXYGEN SATURATION: 97 % | DIASTOLIC BLOOD PRESSURE: 97 MMHG

## 2018-09-19 DIAGNOSIS — R10.13 DRUG-INDUCED DYSPEPSIA: ICD-10-CM

## 2018-09-19 DIAGNOSIS — T50.905A DRUG-INDUCED DYSPEPSIA: ICD-10-CM

## 2018-09-19 DIAGNOSIS — E86.0 DEHYDRATION: ICD-10-CM

## 2018-09-19 LAB
ABO + RH BLD: NORMAL
ABO + RH BLD: NORMAL
ALBUMIN SERPL-MCNC: 2 G/DL (ref 3.4–5)
ALBUMIN UR-MCNC: 10 MG/DL
ALP SERPL-CCNC: 103 U/L (ref 40–150)
ALT SERPL W P-5'-P-CCNC: 11 U/L (ref 0–50)
AMYLASE SERPL-CCNC: 16 U/L (ref 30–110)
ANION GAP SERPL CALCULATED.3IONS-SCNC: 11 MMOL/L (ref 3–14)
APPEARANCE UR: CLEAR
APTT PPP: 31 SEC (ref 24–37)
AST SERPL W P-5'-P-CCNC: 14 U/L (ref 0–45)
BACTERIA #/AREA URNS HPF: ABNORMAL /HPF
BASOPHILS # BLD AUTO: 0.1 10E9/L (ref 0–0.2)
BASOPHILS NFR BLD AUTO: 0.4 %
BILIRUB SERPL-MCNC: 0.3 MG/DL (ref 0.2–1.3)
BILIRUB UR QL STRIP: NEGATIVE
BLD GP AB SCN SERPL QL: NORMAL
BLOOD BANK CMNT PATIENT-IMP: NORMAL
BUN SERPL-MCNC: 9 MG/DL (ref 7–30)
CALCIUM SERPL-MCNC: 8.6 MG/DL (ref 8.5–10.1)
CHLORIDE SERPL-SCNC: 103 MMOL/L (ref 94–109)
CO2 SERPL-SCNC: 24 MMOL/L (ref 20–32)
COLOR UR AUTO: YELLOW
CREAT SERPL-MCNC: 0.54 MG/DL (ref 0.52–1.04)
DIFFERENTIAL METHOD BLD: ABNORMAL
EOSINOPHIL # BLD AUTO: 1.2 10E9/L (ref 0–0.7)
EOSINOPHIL NFR BLD AUTO: 6.9 %
ERYTHROCYTE [DISTWIDTH] IN BLOOD BY AUTOMATED COUNT: 16.5 % (ref 10–15)
GFR SERPL CREATININE-BSD FRML MDRD: >90 ML/MIN/1.7M2
GLUCOSE SERPL-MCNC: 82 MG/DL (ref 70–99)
GLUCOSE UR STRIP-MCNC: NEGATIVE MG/DL
HCT VFR BLD AUTO: 27.9 % (ref 35–47)
HEMOCCULT STL QL IA: POSITIVE
HGB BLD-MCNC: 9.1 G/DL (ref 11.7–15.7)
HGB UR QL STRIP: NEGATIVE
IMM GRANULOCYTES # BLD: 0.2 10E9/L (ref 0–0.4)
IMM GRANULOCYTES NFR BLD: 0.9 %
INR PPP: 1.25 (ref 0.8–1.2)
KETONES UR STRIP-MCNC: 80 MG/DL
LACTATE SERPL-SCNC: 1 MMOL/L (ref 0.4–2)
LEUKOCYTE ESTERASE UR QL STRIP: ABNORMAL
LIPASE SERPL-CCNC: 47 U/L (ref 73–393)
LYMPHOCYTES # BLD AUTO: 1.4 10E9/L (ref 0.8–5.3)
LYMPHOCYTES NFR BLD AUTO: 8.2 %
MAGNESIUM SERPL-MCNC: 2 MG/DL (ref 1.6–2.3)
MCH RBC QN AUTO: 27.1 PG (ref 26.5–33)
MCHC RBC AUTO-ENTMCNC: 32.6 G/DL (ref 31.5–36.5)
MCV RBC AUTO: 83 FL (ref 78–100)
MONOCYTES # BLD AUTO: 1.7 10E9/L (ref 0–1.3)
MONOCYTES NFR BLD AUTO: 9.5 %
MUCOUS THREADS #/AREA URNS LPF: PRESENT /LPF
NEUTROPHILS # BLD AUTO: 13 10E9/L (ref 1.6–8.3)
NEUTROPHILS NFR BLD AUTO: 74.1 %
NITRATE UR QL: NEGATIVE
NRBC # BLD AUTO: 0 10*3/UL
NRBC BLD AUTO-RTO: 0 /100
PH UR STRIP: 6 PH (ref 4.7–8)
PLATELET # BLD AUTO: 751 10E9/L (ref 150–450)
POTASSIUM SERPL-SCNC: 3.4 MMOL/L (ref 3.4–5.3)
PROT SERPL-MCNC: 6.4 G/DL (ref 6.8–8.8)
RBC # BLD AUTO: 3.36 10E12/L (ref 3.8–5.2)
RBC #/AREA URNS AUTO: 2 /HPF (ref 0–2)
SODIUM SERPL-SCNC: 138 MMOL/L (ref 133–144)
SOURCE: ABNORMAL
SP GR UR STRIP: 1.01 (ref 1–1.03)
SPECIMEN EXP DATE BLD: NORMAL
SQUAMOUS #/AREA URNS AUTO: 1 /HPF (ref 0–1)
TROPONIN I SERPL-MCNC: <0.015 UG/L (ref 0–0.04)
UROBILINOGEN UR STRIP-MCNC: NORMAL MG/DL (ref 0–2)
WBC # BLD AUTO: 17.5 10E9/L (ref 4–11)
WBC #/AREA URNS AUTO: 3 /HPF (ref 0–5)

## 2018-09-19 PROCEDURE — 86900 BLOOD TYPING SEROLOGIC ABO: CPT | Performed by: EMERGENCY MEDICINE

## 2018-09-19 PROCEDURE — 96361 HYDRATE IV INFUSION ADD-ON: CPT

## 2018-09-19 PROCEDURE — 85610 PROTHROMBIN TIME: CPT | Performed by: EMERGENCY MEDICINE

## 2018-09-19 PROCEDURE — 36415 COLL VENOUS BLD VENIPUNCTURE: CPT | Performed by: EMERGENCY MEDICINE

## 2018-09-19 PROCEDURE — 82274 ASSAY TEST FOR BLOOD FECAL: CPT | Performed by: EMERGENCY MEDICINE

## 2018-09-19 PROCEDURE — 86901 BLOOD TYPING SEROLOGIC RH(D): CPT | Performed by: EMERGENCY MEDICINE

## 2018-09-19 PROCEDURE — 25000128 H RX IP 250 OP 636: Performed by: EMERGENCY MEDICINE

## 2018-09-19 PROCEDURE — 96368 THER/DIAG CONCURRENT INF: CPT

## 2018-09-19 PROCEDURE — 99284 EMERGENCY DEPT VISIT MOD MDM: CPT | Mod: 25

## 2018-09-19 PROCEDURE — 83735 ASSAY OF MAGNESIUM: CPT | Performed by: EMERGENCY MEDICINE

## 2018-09-19 PROCEDURE — 85730 THROMBOPLASTIN TIME PARTIAL: CPT | Performed by: EMERGENCY MEDICINE

## 2018-09-19 PROCEDURE — 96374 THER/PROPH/DIAG INJ IV PUSH: CPT

## 2018-09-19 PROCEDURE — 80053 COMPREHEN METABOLIC PANEL: CPT | Performed by: EMERGENCY MEDICINE

## 2018-09-19 PROCEDURE — 85025 COMPLETE CBC W/AUTO DIFF WBC: CPT | Performed by: EMERGENCY MEDICINE

## 2018-09-19 PROCEDURE — C9113 INJ PANTOPRAZOLE SODIUM, VIA: HCPCS | Performed by: EMERGENCY MEDICINE

## 2018-09-19 PROCEDURE — 83690 ASSAY OF LIPASE: CPT | Performed by: EMERGENCY MEDICINE

## 2018-09-19 PROCEDURE — 82150 ASSAY OF AMYLASE: CPT | Performed by: EMERGENCY MEDICINE

## 2018-09-19 PROCEDURE — 83605 ASSAY OF LACTIC ACID: CPT | Performed by: EMERGENCY MEDICINE

## 2018-09-19 PROCEDURE — 81001 URINALYSIS AUTO W/SCOPE: CPT | Performed by: EMERGENCY MEDICINE

## 2018-09-19 PROCEDURE — 86850 RBC ANTIBODY SCREEN: CPT | Performed by: EMERGENCY MEDICINE

## 2018-09-19 PROCEDURE — 84484 ASSAY OF TROPONIN QUANT: CPT | Performed by: EMERGENCY MEDICINE

## 2018-09-19 PROCEDURE — 96375 TX/PRO/DX INJ NEW DRUG ADDON: CPT

## 2018-09-19 PROCEDURE — 25000125 ZZHC RX 250: Performed by: EMERGENCY MEDICINE

## 2018-09-19 PROCEDURE — 25000132 ZZH RX MED GY IP 250 OP 250 PS 637: Performed by: EMERGENCY MEDICINE

## 2018-09-19 PROCEDURE — 99284 EMERGENCY DEPT VISIT MOD MDM: CPT | Performed by: EMERGENCY MEDICINE

## 2018-09-19 RX ORDER — KETOROLAC TROMETHAMINE 15 MG/ML
15 INJECTION, SOLUTION INTRAMUSCULAR; INTRAVENOUS ONCE
Status: COMPLETED | OUTPATIENT
Start: 2018-09-19 | End: 2018-09-19

## 2018-09-19 RX ORDER — SODIUM CHLORIDE 9 MG/ML
1000 INJECTION, SOLUTION INTRAVENOUS CONTINUOUS
Status: DISCONTINUED | OUTPATIENT
Start: 2018-09-19 | End: 2018-09-19 | Stop reason: HOSPADM

## 2018-09-19 RX ORDER — PHENOBARBITAL, HYOSCYAMINE SULFATE, ATROPINE SULFATE, SCOPOLAMINE HYDROBROMIDE .0194; .1037; 16.2; .0065 MG/5ML; MG/5ML; MG/5ML; MG/5ML
10 ELIXIR ORAL EVERY 6 HOURS
Status: DISCONTINUED | OUTPATIENT
Start: 2018-09-19 | End: 2018-09-19 | Stop reason: HOSPADM

## 2018-09-19 RX ORDER — DIPHENHYDRAMINE HYDROCHLORIDE 50 MG/ML
25 INJECTION INTRAMUSCULAR; INTRAVENOUS ONCE
Status: COMPLETED | OUTPATIENT
Start: 2018-09-19 | End: 2018-09-19

## 2018-09-19 RX ORDER — LIDOCAINE 40 MG/G
CREAM TOPICAL
Status: DISCONTINUED | OUTPATIENT
Start: 2018-09-19 | End: 2018-09-19 | Stop reason: HOSPADM

## 2018-09-19 RX ORDER — HYDROMORPHONE HYDROCHLORIDE 1 MG/ML
0.5 INJECTION, SOLUTION INTRAMUSCULAR; INTRAVENOUS; SUBCUTANEOUS
Status: DISCONTINUED | OUTPATIENT
Start: 2018-09-19 | End: 2018-09-19 | Stop reason: HOSPADM

## 2018-09-19 RX ORDER — METOCLOPRAMIDE HYDROCHLORIDE 5 MG/ML
5 INJECTION INTRAMUSCULAR; INTRAVENOUS ONCE
Status: COMPLETED | OUTPATIENT
Start: 2018-09-19 | End: 2018-09-19

## 2018-09-19 RX ORDER — SUCRALFATE 1 G/1
1 TABLET ORAL ONCE
Status: COMPLETED | OUTPATIENT
Start: 2018-09-19 | End: 2018-09-19

## 2018-09-19 RX ADMIN — FAMOTIDINE 20 MG: 10 INJECTION, SOLUTION INTRAVENOUS at 11:00

## 2018-09-19 RX ADMIN — PANTOPRAZOLE SODIUM 40 MG: 40 INJECTION, POWDER, FOR SOLUTION INTRAVENOUS at 10:58

## 2018-09-19 RX ADMIN — PHENOBARBITAL, HYOSCYAMINE SULFATE, ATROPINE SULFATE, SCOPOLAMINE HYDROBROMIDE 32.4 MG: 16.2; .1037; .0194; .0065 ELIXIR ORAL at 12:58

## 2018-09-19 RX ADMIN — SODIUM CHLORIDE 1000 ML: 9 INJECTION, SOLUTION INTRAVENOUS at 12:11

## 2018-09-19 RX ADMIN — KETOROLAC TROMETHAMINE 15 MG: 15 INJECTION, SOLUTION INTRAMUSCULAR; INTRAVENOUS at 10:59

## 2018-09-19 RX ADMIN — LIDOCAINE HYDROCHLORIDE 30 ML: 20 SOLUTION ORAL; TOPICAL at 12:58

## 2018-09-19 RX ADMIN — SODIUM CHLORIDE 1000 ML: 9 INJECTION, SOLUTION INTRAVENOUS at 10:57

## 2018-09-19 RX ADMIN — DIPHENHYDRAMINE HYDROCHLORIDE 25 MG: 50 INJECTION, SOLUTION INTRAMUSCULAR; INTRAVENOUS at 11:03

## 2018-09-19 RX ADMIN — METOCLOPRAMIDE 5 MG: 5 INJECTION, SOLUTION INTRAMUSCULAR; INTRAVENOUS at 11:02

## 2018-09-19 RX ADMIN — SUCRALFATE 1 G: 1 TABLET ORAL at 12:11

## 2018-09-19 ASSESSMENT — ENCOUNTER SYMPTOMS
WOUND: 1
LIGHT-HEADEDNESS: 1
FATIGUE: 1
ABDOMINAL PAIN: 1
ACTIVITY CHANGE: 1
WEAKNESS: 1
VOMITING: 1
APPETITE CHANGE: 1
NAUSEA: 1

## 2018-09-19 NOTE — ED PROVIDER NOTES
History     Chief Complaint   Patient presents with     Generalized Weakness     HPI  Aure Lanier is a 68 year old female who is 2 weeks s/p right TKA by Dr. Hernandez who saw her for suture removal in the OA clinic and then referred her to the ED for eval because she was weak, liteheaded, having epigastric pain, nausea, vomiting, and no BMs for 3 days.  Has been taking EC ASA for clot prophy, Fe SO4 for chronic anemia, Toradol and dilaudid.     Problem List:    Patient Active Problem List    Diagnosis Date Noted     S/P total knee arthroplasty, right 09/04/2018     Priority: Medium     Status post total right knee replacement 09/04/2018     Priority: Medium     Status post total left knee replacement 04/17/2018     Priority: Medium     Chronic pain syndrome 07/13/2017     Priority: Medium     Patient is followed by Jaden Lee NP for ongoing prescription of pain medication.  All refills should only be approved by this provider, or covering partner.    Medication(s): Norco 7.5/325mg.   Maximum quantity per month: #120  Clinic visit frequency required: Q 3 months     Controlled substance agreement:  Encounter-Level CSA - 07/11/2017:          Controlled Substance Agreement - Scan on 7/20/2017 12:19 PM : CONTROLLED SUBSTANCE AGREEMENT (below)              Pain Clinic evaluation in the past: No    DIRE Total Score(s):  No flowsheet data found.    Last [facility] website verification:  Done on 8.22.18   [email or web address]         Back muscle spasm 05/09/2017     Priority: Medium     Bilateral chronic knee pain 04/14/2016     Priority: Medium     Both knees.         Hyperlipidemia with target LDL less than 100 07/03/2014     Priority: Medium     Diagnosis updated by automated process. Provider to review and confirm.       Low back pain 07/09/2013     Priority: Medium     Diagnosis updated by automated process. Provider to review and confirm.       Allergic arthritis involving hand 01/01/2011     Priority:  Medium     Hypothyroidism 01/01/2011     Priority: Medium     Problem list name updated by automated process. Provider to review       Insomnia 01/01/2011     Priority: Medium     Problem list name updated by automated process. Provider to review       Headache 01/01/2011     Priority: Medium     Problem list name updated by automated process. 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 Provider to review       Hyperlipidemia 01/01/2011     Priority: Medium     Problem list name updated by automated process. Provider to review       Migraine 01/01/2011     Priority: Medium     Problem list name updated by automated process. Provider to review       Osteoporosis 01/01/2011     Priority: Medium     Problem list name updated by automated process. Provider to review       GERD 01/01/2011     Priority: Medium        Past Medical History:    Past Medical History:   Diagnosis Date     Allergic arthritis involving hand 01/01/2011     Anemia, Iron Deficiency 01/01/2011     Anxiety 01/01/2011     Contact dermatitis and other eczema, unspecified c 01/01/2011     Depression 01/01/2011     Edema 01/01/2011     Gastrointestinal mucositis (ulcerative) 01/01/2011     GERD 01/01/2011     Headache(784.0) 01/01/2011     Hypothyroidism 01/01/2011     Insomnia, unspecified 01/01/2011     Migraine 01/01/2011     Nonallopathic lesion of cervical region, not elsewhere classified 10/16/2000     Osteoporosis 01/01/2011     Other and unspecified hyperlipidemia 01/01/2011     Other malaise and fatigue 08/27/2001     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       Past Surgical History:    Past Surgical History:   Procedure Laterality Date     ARTHROPLASTY KNEE Left 4/17/2018    Procedure: ARTHROPLASTY KNEE;  LEFT TOTAL KNEE ARTHROPLASTY S/N JOURNEY II;   Surgeon: Ty Hernandez MD;  Location: [facility] OR     ARTHROPLASTY KNEE Right 9/4/2018    Procedure: ARTHROPLASTY KNEE;  RIGHT TOTAL KNEE ARTHROPLASTY ;  Surgeon: Ty Hernandez MD;  Location: [facility] OR     D & C       ESOPHAGOSCOPY, GASTROSCOPY, DUODENOSCOPY (EGD), COMBINED N/A 9/11/2017    Procedure: COMBINED ESOPHAGOSCOPY, GASTROSCOPY, DUODENOSCOPY (EGD);  Upper Endoscopy: Removal of Food Impaction;  Surgeon: Lino Zhu DO;  Location: [facility] OR     STOMACH SURGERY       stomach surgery peritinitis         Family History:    Family History   Problem Relation Age of Onset     Cerebrovascular Disease Mother      CVA     Hypertension Mother      Other - See Comments Father 40     mining accident; cause of death     Other - See Comments Brother      muscle dystrophy     Cancer Daughter 34     skin cancer     Melanoma Daughter        Social History:  Marital Status:  Legally  [3]  Social History   Substance Use Topics     Smoking status: Never Smoker     Smokeless tobacco: Never Used      Comment: no passive exposure     Alcohol use Yes      Comment: rarely, maybe yearly        Medications:      acetaminophen (TYLENOL) 325 MG tablet   aspirin 325 MG EC tablet   Calcium Citrate-Vitamin D (CALCIUM + D PO)   cyanocolbalamin (VITAMIN  B-12) 1000 MCG tablet   cyclobenzaprine (FLEXERIL) 10 MG tablet   docusate sodium (COLACE) 50 MG capsule   escitalopram (LEXAPRO) 20 MG tablet   ferrous sulfate (IRON) 325 (65 Fe) MG tablet   folic acid (FOLVITE) 1 MG tablet   gabapentin (NEURONTIN) 300 MG capsule   HYDROmorphone (DILAUDID) 4 MG tablet   ketorolac (TORADOL) 10 MG tablet   levothyroxine (SYNTHROID/LEVOTHROID) 150 MCG tablet   Multiple Vitamins-Minerals (MULTIVITAMIN OR)   multivitamin (OCUVITE) TABS   NYSTOP 933748 UNIT/GM POWD powder   omeprazole (PRILOSEC) 40 MG capsule   RANITIDINE HCL PO   simvastatin (ZOCOR) 40 MG tablet   sucralfate (CARAFATE) 1 GM tablet   zolpidem (AMBIEN) 10 MG tablet         Review of Systems    Constitutional: Positive for activity change, appetite change and fatigue.   Gastrointestinal: Positive for abdominal pain, nausea and vomiting.   Genitourinary: Positive for decreased urine volume.   Skin: Positive for pallor and wound.   Neurological: Positive for weakness and light-headedness.   All other systems reviewed and are negative.    Physical Exam   BP: 129/75  Heart Rate: 117  Temp: 98.6  F (37  C)  Resp: 20  SpO2: 99 %  Lying Orthostatic BP: 140/76  Sitting Orthostatic BP: 141/84  Standing Orthostatic BP: 128/84    Physical Exam   Constitutional: She appears well-developed and well-nourished. She appears distressed.   Pale uncomfortable dehydrated appearing woman barely able to stick out her tongue.    HENT:   Head: Normocephalic and atraumatic.   Dry mucous membranes   Eyes: Conjunctivae and EOM are normal. Pupils are equal, round, and reactive to light.   Neck: Normal range of motion. Neck supple.   Cardiovascular:   tachycardia   Pulmonary/Chest: Effort normal and breath sounds normal. No respiratory distress. She has no wheezes. She has no rales.   Abdominal: Soft. Bowel sounds are normal. She exhibits no distension. There is tenderness. There is no rebound.   Epigastric tenderness to deep palpation without guarding or rebound   Musculoskeletal: Normal range of motion.   Recent right knee vertical scar   Neurological: She is alert.   Skin: Skin is warm. There is pallor.     ED Course     ED Course     Procedures         Critical Care time:  none               Results for orders placed or performed during the hospital encounter of 09/19/18 (from the past 24 hour(s))   CBC with platelets differential   Result Value Ref Range    WBC 17.5 (H) 4.0 - 11.0 10e9/L    RBC Count 3.36 (L) 3.8 - 5.2 10e12/L    Hemoglobin 9.1 (L) 11.7 - 15.7 g/dL    Hematocrit 27.9 (L) 35.0 - 47.0 %    MCV 83 78 - 100 fl    MCH 27.1 26.5 - 33.0 pg    MCHC 32.6 31.5 - 36.5 g/dL    RDW 16.5 (H) 10.0 - 15.0 %    Platelet Count  751 (H) 150 - 450 10e9/L    Diff Method Automated Method     % Neutrophils 74.1 %    % Lymphocytes 8.2 %    % Monocytes 9.5 %    % Eosinophils 6.9 %    % Basophils 0.4 %    % Immature Granulocytes 0.9 %    Nucleated RBCs 0 0 /100    Absolute Neutrophil 13.0 (H) 1.6 - 8.3 10e9/L    Absolute Lymphocytes 1.4 0.8 - 5.3 10e9/L    Absolute Monocytes 1.7 (H) 0.0 - 1.3 10e9/L    Absolute Eosinophils 1.2 (H) 0.0 - 0.7 10e9/L    Absolute Basophils 0.1 0.0 - 0.2 10e9/L    Abs Immature Granulocytes 0.2 0 - 0.4 10e9/L    Absolute Nucleated RBC 0.0    ABO/Rh type and screen   Result Value Ref Range    ABO A     RH(D) Pos     Antibody Screen Neg     Test Valid Only At [facility]        Specimen Expires 09/22/2018    INR   Result Value Ref Range    INR 1.25 (H) 0.80 - 1.20   Partial thromboplastin time   Result Value Ref Range    PTT 31 24.00 - 37.00 sec   Comprehensive metabolic panel   Result Value Ref Range    Sodium 138 133 - 144 mmol/L    Potassium 3.4 3.4 - 5.3 mmol/L    Chloride 103 94 - 109 mmol/L    Carbon Dioxide 24 20 - 32 mmol/L    Anion Gap 11 3 - 14 mmol/L    Glucose 82 70 - 99 mg/dL    Urea Nitrogen 9 7 - 30 mg/dL    Creatinine 0.54 0.52 - 1.04 mg/dL    GFR Estimate >90 >60 mL/min/1.7m2    GFR Estimate If Black >90 >60 mL/min/1.7m2    Calcium 8.6 8.5 - 10.1 mg/dL    Bilirubin Total 0.3 0.2 - 1.3 mg/dL    Albumin 2.0 (L) 3.4 - 5.0 g/dL    Protein Total 6.4 (L) 6.8 - 8.8 g/dL    Alkaline Phosphatase 103 40 - 150 U/L    ALT 11 0 - 50 U/L    AST 14 0 - 45 U/L   Magnesium   Result Value Ref Range    Magnesium 2.0 1.6 - 2.3 mg/dL   Lipase   Result Value Ref Range    Lipase 47 (L) 73 - 393 U/L   Amylase   Result Value Ref Range    Amylase 16 (L) 30 - 110 U/L   Lactic acid   Result Value Ref Range    Lactic Acid 1.0 0.4 - 2.0 mmol/L   Troponin I   Result Value Ref Range    Troponin I ES <0.015 0.000 - 0.045 ug/L   Occult blood fecal HGB immuno   Result Value Ref Range    Occult Blood HGB FIT Positive (A)  NEG^Negative   UA with Microscopic reflex to Culture   Result Value Ref Range    Color Urine Yellow     Appearance Urine Clear     Glucose Urine Negative NEG^Negative mg/dL    Bilirubin Urine Negative NEG^Negative    Ketones Urine 80 (A) NEG^Negative mg/dL    Specific Gravity Urine 1.009 1.003 - 1.035    Blood Urine Negative NEG^Negative    pH Urine 6.0 4.7 - 8.0 pH    Protein Albumin Urine 10 (A) NEG^Negative mg/dL    Urobilinogen mg/dL Normal 0.0 - 2.0 mg/dL    Nitrite Urine Negative NEG^Negative    Leukocyte Esterase Urine Small (A) NEG^Negative    Source Midstream Urine     WBC Urine 3 0 - 5 /HPF    RBC Urine 2 0 - 2 /HPF    Bacteria Urine Few (A) NEG^Negative /HPF    Squamous Epithelial /HPF Urine 1 0 - 1 /HPF    Mucous Urine Present (A) NEG^Negative /LPF       Medications   lidocaine 1 % 1 mL (not administered)   lidocaine (LMX4) kit (not administered)   sodium chloride (PF) 0.9% PF flush 3 mL (not administered)   sodium chloride (PF) 0.9% PF flush 3 mL (3 mLs Intracatheter Not Given 9/19/18 1104)   0.9% sodium chloride BOLUS (0 mLs Intravenous Stopped 9/19/18 1211)     Followed by   sodium chloride 0.9% infusion (not administered)   HYDROmorphone (PF) (DILAUDID) injection 0.5 mg (not administered)   atropine-PHENobarbital-scopolamine-hyoscyamine (DONNATAL) 16.2 MG/5ML solution 32.4 mg (32.4 mg Oral Given 9/19/18 1258)   ketorolac (TORADOL) injection 15 mg (15 mg Intravenous Given 9/19/18 1059)   metoclopramide (REGLAN) injection 5 mg (5 mg Intravenous Given 9/19/18 1102)   diphenhydrAMINE (BENADRYL) injection 25 mg (25 mg Intravenous Given 9/19/18 1103)   famotidine (PEPCID) injection 20 mg (20 mg Intravenous Given 9/19/18 1100)   pantoprazole (PROTONIX) 40 mg IV push injection (40 mg Intravenous Given 9/19/18 1058)   0.9% sodium chloride BOLUS (0 mLs Intravenous Stopped 9/19/18 1318)   sucralfate (CARAFATE) tablet 1 g (1 g Oral Given 9/19/18 1211)   lidocaine (viscous) (XYLOCAINE) 2 % 15 mL, alum & mag  hydroxide-simethicone (MYLANTA ES/MAALOX  ES) 15 mL GI Cocktail (30 mLs Oral Given 9/19/18 1258)     Assessments & Plan (with Medical Decision Making)   Aure was brought in by her sister with obvious dehydration and epigastric distress either from toxic brew of anitinflammatories, opiates, iron, and others.  IV placed for ultimately almost 2L of NS before she finally voided.  pepcid and protonix given for acid protection.  Reglan/benadryl for nausea/vomiting. carafate and gi cocktail for dyspepsia seemed to be effective at least in the short term.  Hgb holding at 9.1 but with the hemoccult (+) this may yet need to be addressed with Tx especially with INR at 1.25.  She became talkative and wanted to get home for some activity.  Advised followup with Jesus in 5-7 days or return to the ED for a replenishment if necessary.   I have reviewed the nursing notes.    I have reviewed the findings, diagnosis, plan and need for follow up with the patient.    New Prescriptions    No medications on file       Final diagnoses:   Dehydration   Drug-induced dyspepsia       9/19/2018   [facility] EM[facility]     Agus Valles MD  09/19/18 [id number]

## 2018-09-19 NOTE — TELEPHONE ENCOUNTER
Ivone from [facility] calling wondering if pt can be seen tomorrow (09/20/2018) instead of the scheduled appt Friday (09/21/2018).  Adv Jaden Lee out today.  Please call Ivone back today.  Thank you.

## 2018-09-19 NOTE — ED AVS SNAPSHOT
[facility]    [address] [address]    Phone:  [phone]                                       Aure Lanier   MRN: [id number]    Department:  [facility]   Date of Visit:  9/19/2018           Patient Information     Date Of Birth          1950        Your diagnoses for this visit were:     Dehydration     Drug-induced dyspepsia        You were seen by Agus Valles MD.      Follow-up Information     Follow up with Jaden Lee NP.    Specialty:  Internal Medicine    Why:  As needed    Contact information:    [address] [facility] [address]  [phone]          Discharge Instructions         Dehydration    The human body is comprised largely of water. If you lose more fluids than you take in, you can become dehydrated. This means there are not enough fluids in your body for it to function right. Mild dehydration can cause weakness, confusion, or muscle cramps. In extreme cases, it can lead to brain damage and even death. That's why prompt treatment is crucial.  Risk factors  Anyone can become dehydrated. But infants, children, and older adults are at greatest risk. You are most likely to lose fluids with severe vomiting, diarrhea, or a fever. Exercising or working hard--especially in hot weather--can also cause excess fluid loss.  What to do  Drinking liquids is the best way to prevent dehydration. Water is best, but juice or frozen pops can also help. For adults, don't use liquids that contain caffeine or alcohol to rehydrate. Your doctor may suggest electrolyte solutions for sick infants and young children.  When to go to the emergency room (ER)  Go to an ER right away for these symptoms:  Adults    Very dark urine and little urine output    Dizziness, weakness, confusion, fainting  Children    Sunken eyes    Little or no urine output (for infants, no wet diaper in 8 hours)    Very dark urine    Skin that doesn't bounce back quickly when  pinched    Crying without tears    Lethargy, decreased activity, or increased sleepiness  What to expect in the emergency room  Your blood pressure, temperature, and heart rate will be checked. You may have blood or urine tests. The main treatment for dehydration is fluids. You may be given these to drink. Or, you may receive them through a vein in your arm. You also may be treated for diarrhea, vomiting, or a high fever.   Date Last Reviewed: 7/1/2017 2000-2017 [employer]. [address] [place] [address]. All rights reserved. This information is not intended as a substitute for professional medical care. Always follow your healthcare professional's instructions.          Dehydration (Adult)  Dehydration occurs when your body loses too much fluid. This may be the result of prolonged vomiting or diarrhea, excessive sweating, or a high fever. It may also happen if you don t drink enough fluid when you re sick or out in the heat. Misuse of diuretics (water pills) can also be a cause.  Symptoms include thirst and decreased urine output. You may also feel dizzy, weak, fatigued, or very drowsy. The diet described below is usually enough to treat dehydration. In some cases, you may need medicine.  Home care    Drink at least 12 8-ounce glasses of fluid every day to resolve the dehydration. Fluid may include water; orange juice; lemonade; apple, grape, or cranberry juice; clear fruit drinks; electrolyte replacement and sports drinks; and teas and coffee without caffeine. Don't drink alcohol. If you have been diagnosed with a kidney disease, ask your doctor how much and what types of fluids you should drink to prevent dehydration. If you have kidney disease, fluid can build up in the body. This can be dangerous to your health.    If you have a fever, muscle aches, or a headache as a result of a cold or flu, you may take acetaminophen or ibuprofen, unless another medicine was prescribed. If you  have chronic liver or kidney disease, or have ever had a stomach ulcer or gastrointestinal bleeding, talk with your healthcare provider before using these medicines. Don't take aspirin if you are younger than 18 and have a fever. Aspirin raises the chance for severe liver injury.  Follow-up care  Follow up with your healthcare provider, or as advised.  When to seek medical advice  Call your healthcare provider right away if any of these occur:    Continued vomiting    Frequent diarrhea (more than 5 times a day); blood (red or black color) or mucus in diarrhea    Blood in vomit or stool    Swollen abdomen or increasing abdominal pain    Weakness, dizziness, or fainting    Unusual drowsiness or confusion    Reduced urine output or extreme thirst    Fever of 100.4 F (38 C) or higher  Date Last Reviewed: 5/1/2017 2000-2017 [employer]. [address] [place] [address]. All rights reserved. This information is not intended as a substitute for professional medical care. Always follow your healthcare professional's instructions.      Aure,  You responded nicely to fluids and some of the meds we gave you to settle your stomach.   Make sure you take some form of 2 aspirins each day, stopping the toradol and iron and doing your best with the other meds.  If this does not work and your start to dry out again, then get back in on Friday for another rehydration session.  Good luck with all of this.  Before long you will be roller skating down [address].  Good luck!    Your next 10 appointments already scheduled     Sep 21, 2018  4:00 PM CDT   (Arrive by 3:45 PM)   Office Visit with Jaden Lee NP   [facility] ([facility] )    [address] [place] [address]   [phone]           Bring a current list of meds and any records pertaining to this visit.  For Physicals, please bring immunization records and any forms needing to be filled out.  Please  arrive 15 minutes early to complete paperwork and register.                 Review of your medicines      Our records show that you are taking the medicines listed below. If these are incorrect, please call your family doctor or clinic.        Dose / Directions Last dose taken    acetaminophen 325 MG tablet   Commonly known as:  TYLENOL   Dose:  650-975 mg   Quantity:  100 tablet        Take 2-3 tablets (650-975 mg) by mouth every 4 hours as needed for other (mild to moderate pain)   Refills:  0        aspirin 325 MG EC tablet   Dose:  325 mg   Indication:  VTE Prophylaxis   Quantity:  28 tablet        Take 1 tablet (325 mg) by mouth 2 times daily for 14 days   Refills:  0        CALCIUM + D PO   Dose:  2 tablet        Take 2 tablets by mouth daily as needed   Refills:  0        cyanocobalamin 1000 MCG tablet   Commonly known as:  vitamin  B-12   Dose:  1 tablet        Take 1 tablet by mouth daily as needed   Refills:  0        cyclobenzaprine 10 MG tablet   Commonly known as:  FLEXERIL   Quantity:  90 tablet        TAKE 1 TABLET BY MOUTH THREE TIMES DAILY AS NEEDED   Refills:  1        docusate sodium 50 MG capsule   Commonly known as:  COLACE   Dose:  50 mg   Quantity:  60 capsule        Take 1 capsule (50 mg) by mouth 2 times daily as needed for constipation   Refills:  0        escitalopram 20 MG tablet   Commonly known as:  LEXAPRO   Quantity:  30 tablet        TAKE 1 TABLET BY MOUTH EVERY DAY   Refills:  5        ferrous sulfate 325 (65 Fe) MG tablet   Commonly known as:  IRON   Dose:  325 mg   Quantity:  90 tablet        Take 1 tablet (325 mg) by mouth 3 times daily (with meals)   Refills:  2        folic acid 1 MG tablet   Commonly known as:  FOLVITE   Quantity:  90 tablet        TAKE ONE TABLET DAILY BY MOUTH   Refills:  2        gabapentin 300 MG capsule   Commonly known as:  NEURONTIN   Quantity:  90 capsule        TAKE ONE CAPSULE 3 TIMES A DAY BY MOUTH   Refills:  0        HYDROmorphone 4 MG tablet    Commonly known as:  DILAUDID   Dose:  4 mg   Quantity:  60 tablet        Take 1 tablet (4 mg) by mouth every 3 hours as needed for moderate to severe pain (Pain)   Refills:  0        ketorolac 10 MG tablet   Commonly known as:  TORADOL   Dose:  10 mg   Quantity:  40 tablet        Take 1 tablet (10 mg) by mouth every 6 hours as needed for moderate pain   Refills:  0        levothyroxine 150 MCG tablet   Commonly known as:  SYNTHROID/LEVOTHROID   Dose:  150 mcg   Quantity:  30 tablet        Take 1 tablet (150 mcg) by mouth daily   Refills:  0        * MULTIVITAMIN PO   Dose:  1 tablet        Take 1 tablet by mouth daily as needed   Refills:  0        * multivitamin Tabs tablet   Dose:  1 tablet        Take 1 tablet by mouth daily as needed   Refills:  0        NYSTOP 213826 UNIT/GM Powd   Quantity:  60 g   Generic drug:  nystatin        APPLY TOPICALLY 2 TIMES DAILY AS NEEDED   Refills:  3        omeprazole 40 MG capsule   Commonly known as:  priLOSEC   Quantity:  90 capsule        TAKE 1 CAPSULE DAILY BY MOUTH   Refills:  0        RANITIDINE HCL PO   Dose:  150 mg        Take 150 mg by mouth daily as needed   Refills:  0        simvastatin 40 MG tablet   Commonly known as:  ZOCOR   Quantity:  90 tablet        TAKE 1 TABLET BY MOUTH EVERY DAY IN THE EVENING . GENERIC FOR ZOCOR.   Refills:  0        sucralfate 1 GM tablet   Commonly known as:  CARAFATE   Dose:  1 g   Quantity:  60 tablet        Take 1 tablet (1 g) by mouth 4 times daily   Refills:  0        zolpidem 10 MG tablet   Commonly known as:  AMBIEN   Dose:  10 mg   Quantity:  60 tablet        Take 1 tablet (10 mg) by mouth nightly as needed for sleep Take 1 tablet at night for sleep-may repeat X1.   Refills:  0        * Notice:  This list has 2 medication(s) that are the same as other medications prescribed for you. Read the directions carefully, and ask your doctor or other care provider to review them with you.            Procedures and tests performed  during your visit     ABO/Rh type and screen    Amylase    CBC with platelets differential    Comprehensive metabolic panel    INR    Lactic acid    Lipase    Magnesium    Occult blood fecal HGB immuno    Orthostatic blood pressure and pulse    Partial thromboplastin time    Peripheral IV catheter    Troponin I    UA with Microscopic reflex to Culture      Orders Needing Specimen Collection     None      Pending Results     No orders found from 9/17/2018 to 9/20/2018.            Pending Culture Results     No orders found from 9/17/2018 to 9/20/2018.            Thank you for choosing [facility]       Thank you for choosing [facility] for your care. Our goal is always to provide you with excellent care. Hearing back from our patients is one way we can continue to improve our services. Please take a few minutes to complete the written survey that you may receive in the mail after you visit with us. Thank you!        [email or web address] Information     "[employer] gives you secure access to your electronic health record. If you see a primary care provider, you can also send messages to your care team and make appointments. If you have questions, please call your primary care clinic.  If you do not have a primary care provider, please call [phone] and they will assist you.        Care EveryWhere ID     This is your Care EveryWhere ID. This could be used by other organizations to access your [facility] medical records  [id number]        Equal Access to Services     ALYSSA ADAMES : Kerry Anderson, halley culver, justina kagenaro vanegas, racquel craven. So [facility] [phone].    ATENCIÓN: Si habla español, tiene a gross disposición servicios gratuitos de asistencia lingüística. Lei al [phone].    We comply with applicable federal civil rights laws and [place] laws. We do not discriminate on the basis of race, color, national origin, age, disability, sex, sexual orientation, or gender  identity.            After Visit Summary       This is your record. Keep this with you and show to your community pharmacist(s) and doctor(s) at your next visit.

## 2018-09-19 NOTE — ED AVS SNAPSHOT
[facility]    [address] [address]    Phone:  [phone]                                       Aure Lanier   MRN: [id number]    Department[facility]  [facility]   Date of Visit:  9/19/2018           After Visit Summary Signature Page     I have received my discharge instructions, and my questions have been answered. I have discussed any challenges I see with this plan with the nurse or doctor.    ..........................................................................................................................................  Patient/Patient Representative Signature      ..........................................................................................................................................  Patient Representative Print Name and Relationship to Patient    ..................................................               ................................................  Date                                   Time    ..........................................................................................................................................  Reviewed by Signature/Title    ...................................................              ..............................................  Date                                               Time          22EPIC Rev 08/18

## 2018-09-19 NOTE — DISCHARGE INSTRUCTIONS
Dehydration    The human body is comprised largely of water. If you lose more fluids than you take in, you can become dehydrated. This means there are not enough fluids in your body for it to function right. Mild dehydration can cause weakness, confusion, or muscle cramps. In extreme cases, it can lead to brain damage and even death. That's why prompt treatment is crucial.  Risk factors  Anyone can become dehydrated. But infants, children, and older adults are at greatest risk. You are most likely to lose fluids with severe vomiting, diarrhea, or a fever. Exercising or working hard--especially in hot weather--can also cause excess fluid loss.  What to do  Drinking liquids is the best way to prevent dehydration. Water is best, but juice or frozen pops can also help. For adults, don't use liquids that contain caffeine or alcohol to rehydrate. Your doctor may suggest electrolyte solutions for sick infants and young children.  When to go to the emergency room (ER)  Go to an ER right away for these symptoms:  Adults    Very dark urine and little urine output    Dizziness, weakness, confusion, fainting  Children    Sunken eyes    Little or no urine output (for infants, no wet diaper in 8 hours)    Very dark urine    Skin that doesn't bounce back quickly when pinched    Crying without tears    Lethargy, decreased activity, or increased sleepiness  What to expect in the emergency room  Your blood pressure, temperature, and heart rate will be checked. You may have blood or urine tests. The main treatment for dehydration is fluids. You may be given these to drink. Or, you may receive them through a vein in your arm. You also may be treated for diarrhea, vomiting, or a high fever.   Date Last Reviewed: 7/1/2017 2000-2017 [employer]. [address] [place] [address]. All rights reserved. This information is not intended as a substitute for professional medical care. Always follow your healthcare  professional's instructions.          Dehydration (Adult)  Dehydration occurs when your body loses too much fluid. This may be the result of prolonged vomiting or diarrhea, excessive sweating, or a high fever. It may also happen if you don t drink enough fluid when you re sick or out in the heat. Misuse of diuretics (water pills) can also be a cause.  Symptoms include thirst and decreased urine output. You may also feel dizzy, weak, fatigued, or very drowsy. The diet described below is usually enough to treat dehydration. In some cases, you may need medicine.  Home care    Drink at least 12 8-ounce glasses of fluid every day to resolve the dehydration. Fluid may include water; orange juice; lemonade; apple, grape, or cranberry juice; clear fruit drinks; electrolyte replacement and sports drinks; and teas and coffee without caffeine. Don't drink alcohol. If you have been diagnosed with a kidney disease, ask your doctor how much and what types of fluids you should drink to prevent dehydration. If you have kidney disease, fluid can build up in the body. This can be dangerous to your health.    If you have a fever, muscle aches, or a headache as a result of a cold or flu, you may take acetaminophen or ibuprofen, unless another medicine was prescribed. If you have chronic liver or kidney disease, or have ever had a stomach ulcer or gastrointestinal bleeding, talk with your healthcare provider before using these medicines. Don't take aspirin if you are younger than 18 and have a fever. Aspirin raises the chance for severe liver injury.  Follow-up care  Follow up with your healthcare provider, or as advised.  When to seek medical advice  Call your healthcare provider right away if any of these occur:    Continued vomiting    Frequent diarrhea (more than 5 times a day); blood (red or black color) or mucus in diarrhea    Blood in vomit or stool    Swollen abdomen or increasing abdominal pain    Weakness, dizziness, or  fainting    Unusual drowsiness or confusion    Reduced urine output or extreme thirst    Fever of 100.4 F (38 C) or higher  Date Last Reviewed: 5/1/2017 2000-2017 [employer]. [address] [place] [address], [place] [address]. All rights reserved. This information is not intended as a substitute for professional medical care. Always follow your healthcare professional's instructions.      Aure,  You responded nicely to fluids and some of the meds we gave you to settle your stomach.   Make sure you take some form of 2 aspirins each day, stopping the toradol and iron and doing your best with the other meds.  If this does not work and your start to dry out again, then get back in on Friday for another rehydration session.  Good luck with all of this.  Before long you will be roller skating down [address].  Good luck!

## 2018-09-19 NOTE — ED NOTES
Discharge instructions completed with patient and sister with no questions or concerns. Vital signs stable. Patient to follow up with PCP.

## 2018-09-19 NOTE — ED TRIAGE NOTES
"Patient presents with concerns about an upset stomach x 1 1/2 days.  States she is unable to keep anything down.  Patient has a history of \"low iron\" and feels like it make be low again.  Also has not taken anything for pain x 2 days because she can't keep it down.  States she was using dilaudid and toradol for pain control.  Patient is pale and very \"tired\" in triage.  "

## 2018-09-21 ENCOUNTER — OFFICE VISIT (OUTPATIENT)
Dept: INTERNAL MEDICINE | Facility: OTHER | Age: 68
End: 2018-09-21
Attending: NURSE PRACTITIONER
Payer: COMMERCIAL

## 2018-09-21 VITALS
RESPIRATION RATE: 20 BRPM | HEART RATE: 102 BPM | BODY MASS INDEX: 35.15 KG/M2 | DIASTOLIC BLOOD PRESSURE: 72 MMHG | HEIGHT: 62 IN | OXYGEN SATURATION: 96 % | WEIGHT: 191 LBS | TEMPERATURE: 99.7 F | SYSTOLIC BLOOD PRESSURE: 140 MMHG

## 2018-09-21 DIAGNOSIS — B02.23 POSTHERPETIC POLYNEUROPATHY: ICD-10-CM

## 2018-09-21 DIAGNOSIS — R11.0 NAUSEA: Primary | ICD-10-CM

## 2018-09-21 DIAGNOSIS — D72.829 LEUKOCYTOSIS, UNSPECIFIED TYPE: ICD-10-CM

## 2018-09-21 DIAGNOSIS — G89.18 POSTOPERATIVE PAIN: ICD-10-CM

## 2018-09-21 PROCEDURE — G0463 HOSPITAL OUTPT CLINIC VISIT: HCPCS | Mod: 25

## 2018-09-21 PROCEDURE — G0463 HOSPITAL OUTPT CLINIC VISIT: HCPCS

## 2018-09-21 PROCEDURE — 96372 THER/PROPH/DIAG INJ SC/IM: CPT | Performed by: NURSE PRACTITIONER

## 2018-09-21 PROCEDURE — 99214 OFFICE O/P EST MOD 30 MIN: CPT | Performed by: NURSE PRACTITIONER

## 2018-09-21 RX ORDER — GABAPENTIN 300 MG/1
CAPSULE ORAL
Qty: 90 CAPSULE | Refills: 0 | Status: SHIPPED | OUTPATIENT
Start: 2018-09-21 | End: 2018-10-18

## 2018-09-21 RX ORDER — ONDANSETRON 4 MG/1
4 TABLET, FILM COATED ORAL EVERY 8 HOURS PRN
Qty: 18 TABLET | Refills: 1 | Status: SHIPPED | OUTPATIENT
Start: 2018-09-21 | End: 2018-10-12

## 2018-09-21 RX ORDER — HYDROCODONE BITARTRATE AND ACETAMINOPHEN 7.5; 325 MG/1; MG/1
TABLET ORAL
COMMUNITY
Start: 2018-08-23 | End: 2018-09-25

## 2018-09-21 ASSESSMENT — ANXIETY QUESTIONNAIRES
2. NOT BEING ABLE TO STOP OR CONTROL WORRYING: NOT AT ALL
1. FEELING NERVOUS, ANXIOUS, OR ON EDGE: SEVERAL DAYS
6. BECOMING EASILY ANNOYED OR IRRITABLE: NOT AT ALL
3. WORRYING TOO MUCH ABOUT DIFFERENT THINGS: NOT AT ALL
5. BEING SO RESTLESS THAT IT IS HARD TO SIT STILL: NOT AT ALL
7. FEELING AFRAID AS IF SOMETHING AWFUL MIGHT HAPPEN: NOT AT ALL
IF YOU CHECKED OFF ANY PROBLEMS ON THIS QUESTIONNAIRE, HOW DIFFICULT HAVE THESE PROBLEMS MADE IT FOR YOU TO DO YOUR WORK, TAKE CARE OF THINGS AT HOME, OR GET ALONG WITH OTHER PEOPLE: NOT DIFFICULT AT ALL
GAD7 TOTAL SCORE: 1

## 2018-09-21 ASSESSMENT — PAIN SCALES - GENERAL: PAINLEVEL: MODERATE PAIN (4)

## 2018-09-21 ASSESSMENT — PATIENT HEALTH QUESTIONNAIRE - PHQ9: 5. POOR APPETITE OR OVEREATING: NOT AT ALL

## 2018-09-21 NOTE — PROGRESS NOTES
SUBJECTIVE:   Aure Lanier is a 68 year old female who presents to clinic today for the following health issues:      ER visit:  Was seen in ER for abdominal  Pain , Unfortunately was seen by an  uninformed physician who knows  nothing about the patient. Stating she was on a toxic mixture of meds causing her abdominal   Pain.  He also called her a pickle face.   Any intelligent investigation into the  Patients chart shows why she was on the meds.  She was NOT sent home on anti nausea. meds  That being said,  Patient states was taking the Toradol  and her pain meds  without food.    Became nauseated and vomited 10 times. .   Has not been taking her Carafate.   Was NOT instructed to take this in the ER.        Hospital Follow-up Visit:    Hospital/Nursing Home/IP Rehab Facility: [facility]  Date of Admission: 9/4/2018  Date of Discharge: 9/6/2018  Reason(s) for Admission: total right knee replacement            Problems taking medications regularly:  None       Medication changes since discharge: None       Problems adhering to non-medication therapy:  None    Summary of hospitalization:  [facility] discharge summary reviewed  Diagnostic Tests/Treatments reviewed.  Follow up needed:   Other Healthcare Providers Involved in Patient s Care:         Physical Therapy  Update since discharge: improved. However but she is having too much nausea    Post Discharge Medication Reconciliation: discharge medications reconciled, continue medications without change.  Plan of care communicated with patient     Coding guidelines for this visit:  Type of Medical   Decision Making Face-to-Face Visit       within 7 Days of discharge Face-to-Face Visit        within 14 days of discharge   Moderate Complexity [id number]   High Complexity 08902 [id number]            ED/UC Followup:    Facility: [facility]  Date of visit: 09/19/2018  Reason for visit: dehydration  Current Status: c/o feeling very nauseated                Problem list and histories reviewed & adjusted, as indicated.  Additional history: as documented    Patient Active Problem List   Diagnosis     Allergic arthritis involving hand     Hypothyroidism     Insomnia     Headache     Postherpetic polyneuropathy     Dysthymia     Anxiety state     Hyperlipidemia     Migraine     Osteoporosis     GERD     Low back pain     Hyperlipidemia with target LDL less than 100     Bilateral chronic knee pain     Back muscle spasm     Chronic pain syndrome     Status post total left knee replacement     S/P total knee arthroplasty, right     Status post total right knee replacement     Past Surgical History:   Procedure Laterality Date     ARTHROPLASTY KNEE Left 4/17/2018    Procedure: ARTHROPLASTY KNEE;  LEFT TOTAL KNEE ARTHROPLASTY S/N JOURNEY II;  Surgeon: Ty Hernandez MD;  Location: [facility] OR     ARTHROPLASTY KNEE Right 9/4/2018    Procedure: ARTHROPLASTY KNEE;  RIGHT TOTAL KNEE ARTHROPLASTY ;  Surgeon: Ty Hernandez MD;  Location: [facility] OR     D & C       ESOPHAGOSCOPY, GASTROSCOPY, DUODENOSCOPY (EGD), COMBINED N/A 9/11/2017    Procedure: COMBINED ESOPHAGOSCOPY, GASTROSCOPY, DUODENOSCOPY (EGD);  Upper Endoscopy: Removal of Food Impaction;  Surgeon: Lino Zhu DO;  Location: [facility] OR     STOMACH SURGERY       stomach surgery peritinitis         Social History   Substance Use Topics     Smoking status: Never Smoker     Smokeless tobacco: Never Used      Comment: no passive exposure     Alcohol use Yes      Comment: rarely, maybe yearly     Family History   Problem Relation Age of Onset     Cerebrovascular Disease Mother      CVA     Hypertension Mother      Other - See Comments Father 40     mining accident; cause of death     Other - See Comments Brother      muscle dystrophy     Cancer Daughter 34     skin cancer     Melanoma Daughter          Current Outpatient Prescriptions   Medication Sig Dispense Refill     acetaminophen (TYLENOL) 325 MG tablet Take 2-3 tablets  (650-975 mg) by mouth every 4 hours as needed for other (mild to moderate pain) 100 tablet      Calcium Citrate-Vitamin D (CALCIUM + D PO) Take 2 tablets by mouth daily as needed       cyanocolbalamin (VITAMIN  B-12) 1000 MCG tablet Take 1 tablet by mouth daily as needed        cyclobenzaprine (FLEXERIL) 10 MG tablet TAKE 1 TABLET BY MOUTH THREE TIMES DAILY AS NEEDED 90 tablet 1     docusate sodium (COLACE) 50 MG capsule Take 1 capsule (50 mg) by mouth 2 times daily as needed for constipation 60 capsule 0     escitalopram (LEXAPRO) 20 MG tablet TAKE 1 TABLET BY MOUTH EVERY DAY (Patient taking differently: TAKE 1 TABLET BY MOUTH EVERY DAY AT BEDTIME) 30 tablet 5     ferrous sulfate (IRON) 325 (65 Fe) MG tablet Take 1 tablet (325 mg) by mouth 3 times daily (with meals) 90 tablet 2     folic acid (FOLVITE) 1 MG tablet TAKE ONE TABLET DAILY BY MOUTH 90 tablet 2     gabapentin (NEURONTIN) 300 MG capsule TAKE ONE CAPSULE 3 TIMES A DAY BY MOUTH 90 capsule 0     HYDROcodone-acetaminophen (NORCO) 7.5-325 MG per tablet        levothyroxine (SYNTHROID/LEVOTHROID) 150 MCG tablet Take 1 tablet (150 mcg) by mouth daily 30 tablet 0     Multiple Vitamins-Minerals (MULTIVITAMIN OR) Take 1 tablet by mouth daily as needed        multivitamin (OCUVITE) TABS Take 1 tablet by mouth daily as needed        NYSTOP 160659 UNIT/GM POWD powder APPLY TOPICALLY 2 TIMES DAILY AS NEEDED 60 g 3     omeprazole (PRILOSEC) 40 MG capsule TAKE 1 CAPSULE DAILY BY MOUTH 90 capsule 0     ondansetron (ZOFRAN) 4 MG tablet Take 1 tablet (4 mg) by mouth every 8 hours as needed for nausea 18 tablet 1     RANITIDINE HCL PO Take 150 mg by mouth daily as needed        simvastatin (ZOCOR) 40 MG tablet TAKE 1 TABLET BY MOUTH EVERY DAY IN THE EVENING . GENERIC FOR ZOCOR. (Patient taking differently: 40 mg TAKE 1/2 TABLET BY MOUTH EVERY DAY IN THE EVENING . GENERIC FOR ZOCOR.) 90 tablet 0     sucralfate (CARAFATE) 1 GM tablet Take 1 tablet (1 g) by mouth 4 times daily  "60 tablet 0     zolpidem (AMBIEN) 10 MG tablet Take 1 tablet (10 mg) by mouth nightly as needed for sleep Take 1 tablet at night for sleep-may repeat X1. 60 tablet 0     [DISCONTINUED] gabapentin (NEURONTIN) 300 MG capsule TAKE ONE CAPSULE 3 TIMES A DAY BY MOUTH 90 capsule 0     Allergies   Allergen Reactions     Erythromycin Base [Kdc:Yellow Dye+Erythromycin+Brilliant Blue Fcf] Nausea and Vomiting     Penicillins Rash       Reviewed and updated as needed this visit by clinical staff       Reviewed and updated as needed this visit by Provider       Review of Systems   Constitutional: Positive for fatigue. Negative for fever.   HENT: Negative.    Respiratory: Negative for cough and shortness of breath.    Cardiovascular: Negative for chest pain, palpitations and leg swelling.   Gastrointestinal: Positive for nausea and vomiting. Negative for blood in stool, constipation and diarrhea.   Musculoskeletal: Positive for arthralgias, myalgias and neck pain.        Right surgical knee pain .  Is counting pain meds in the clinic visit.     Skin:        Patient is pale.     Neurological: Positive for weakness. Negative for dizziness, tremors and headaches.        Patient has long hx of neck and headache pain  Has been on pain meds for years.     Psychiatric/Behavioral: Positive for dysphoric mood and sleep disturbance. The patient is nervous/anxious.      /72  Pulse 102  Temp 99.7  F (37.6  C) (Tympanic)  Resp 20  Ht 5' 2\" (1.575 m)  Wt 191 lb (86.6 kg)  SpO2 96%  BMI 34.93 kg/m2  Physical Exam   Constitutional: She is oriented to person, place, and time.   Looks very tired.    HENT:   Patients mouth is very dry.     Eyes: Conjunctivae and EOM are normal. Pupils are equal, round, and reactive to light.   Neck: Normal range of motion. Neck supple. No thyromegaly present.   Cardiovascular: Normal rate, regular rhythm, normal heart sounds and intact distal pulses.    No murmur heard.  Pulmonary/Chest: Effort normal " and breath sounds normal.   Abdominal: Soft. Bowel sounds are normal. There is no tenderness.   Musculoskeletal:   Right total knee.  Steristrips intact.     Lymphadenopathy:     She has no cervical adenopathy.   Neurological: She is oriented to person, place, and time.   Awake     Skin: Skin is warm and dry.   ER labs.    Results for orders placed or performed during the hospital encounter of 09/19/18   CBC with platelets differential   Result Value Ref Range    WBC 17.5 (H) 4.0 - 11.0 10e9/L    RBC Count 3.36 (L) 3.8 - 5.2 10e12/L    Hemoglobin 9.1 (L) 11.7 - 15.7 g/dL    Hematocrit 27.9 (L) 35.0 - 47.0 %    MCV 83 78 - 100 fl    MCH 27.1 26.5 - 33.0 pg    MCHC 32.6 31.5 - 36.5 g/dL    RDW 16.5 (H) 10.0 - 15.0 %    Platelet Count 751 (H) 150 - 450 10e9/L    Diff Method Automated Method     % Neutrophils 74.1 %    % Lymphocytes 8.2 %    % Monocytes 9.5 %    % Eosinophils 6.9 %    % Basophils 0.4 %    % Immature Granulocytes 0.9 %    Nucleated RBCs 0 0 /100    Absolute Neutrophil 13.0 (H) 1.6 - 8.3 10e9/L    Absolute Lymphocytes 1.4 0.8 - 5.3 10e9/L    Absolute Monocytes 1.7 (H) 0.0 - 1.3 10e9/L    Absolute Eosinophils 1.2 (H) 0.0 - 0.7 10e9/L    Absolute Basophils 0.1 0.0 - 0.2 10e9/L    Abs Immature Granulocytes 0.2 0 - 0.4 10e9/L    Absolute Nucleated RBC 0.0    INR   Result Value Ref Range    INR 1.25 (H) 0.80 - 1.20   Partial thromboplastin time   Result Value Ref Range    PTT 31 24.00 - 37.00 sec   Comprehensive metabolic panel   Result Value Ref Range    Sodium 138 133 - 144 mmol/L    Potassium 3.4 3.4 - 5.3 mmol/L    Chloride 103 94 - 109 mmol/L    Carbon Dioxide 24 20 - 32 mmol/L    Anion Gap 11 3 - 14 mmol/L    Glucose 82 70 - 99 mg/dL    Urea Nitrogen 9 7 - 30 mg/dL    Creatinine 0.54 0.52 - 1.04 mg/dL    GFR Estimate >90 >60 mL/min/1.7m2    GFR Estimate If Black >90 >60 mL/min/1.7m2    Calcium 8.6 8.5 - 10.1 mg/dL    Bilirubin Total 0.3 0.2 - 1.3 mg/dL    Albumin 2.0 (L) 3.4 - 5.0 g/dL    Protein Total  6.4 (L) 6.8 - 8.8 g/dL    Alkaline Phosphatase 103 40 - 150 U/L    ALT 11 0 - 50 U/L    AST 14 0 - 45 U/L   Magnesium   Result Value Ref Range    Magnesium 2.0 1.6 - 2.3 mg/dL   Lipase   Result Value Ref Range    Lipase 47 (L) 73 - 393 U/L   Amylase   Result Value Ref Range    Amylase 16 (L) 30 - 110 U/L   Lactic acid   Result Value Ref Range    Lactic Acid 1.0 0.4 - 2.0 mmol/L   Troponin I   Result Value Ref Range    Troponin I ES <0.015 0.000 - 0.045 ug/L   UA with Microscopic reflex to Culture   Result Value Ref Range    Color Urine Yellow     Appearance Urine Clear     Glucose Urine Negative NEG^Negative mg/dL    Bilirubin Urine Negative NEG^Negative    Ketones Urine 80 (A) NEG^Negative mg/dL    Specific Gravity Urine 1.009 1.003 - 1.035    Blood Urine Negative NEG^Negative    pH Urine 6.0 4.7 - 8.0 pH    Protein Albumin Urine 10 (A) NEG^Negative mg/dL    Urobilinogen mg/dL Normal 0.0 - 2.0 mg/dL    Nitrite Urine Negative NEG^Negative    Leukocyte Esterase Urine Small (A) NEG^Negative    Source Midstream Urine     WBC Urine 3 0 - 5 /HPF    RBC Urine 2 0 - 2 /HPF    Bacteria Urine Few (A) NEG^Negative /HPF    Squamous Epithelial /HPF Urine 1 0 - 1 /HPF    Mucous Urine Present (A) NEG^Negative /LPF   Occult blood fecal HGB immuno   Result Value Ref Range    Occult Blood HGB FIT Positive (A) NEG^Negative   ABO/Rh type and screen   Result Value Ref Range    ABO A     RH(D) Pos     Antibody Screen Neg     Test Valid Only At [facility]        Specimen Expires 09/22/2018          ASSESSMENT / PLAN:  (R11.0) Nausea  (primary encounter diagnosis)  Comment: WBC=17,000  With elevated abs.  Neutrophils.  no signs of infection except the nausea. BUT Will try Rocephin 1gm -Given IM in clinic. (Not Zofran)  Since WBC was 17,000.   Will give  Zofran 4mg 2 times a day til I see her next Tuesday at 11:30 Needs to eat 3 times a day and drink 6-8 glasses of water.      Plan: ondansetron (ZOFRAN) 4 MG  tablet      (B02.23) Postherpetic polyneuropathy  Comment:Ordered Gabapentin 300mg    Plan: gabapentin (NEURONTIN) 300 MG capsule           (G89.18) Postoperative pain  Comment: Patient stated she would not have gotten through the last 2 weeks without the Diluadid, Toradol combo.  Discussed when she takes these meds she needs to eat and drink, and call if she is nauseated.   She has 4 days of Lortab 5/325mg left-to take 7.5mg  4 times a day.  Will return 1130 on Tuesday, bring left over Dilaudid to have discarded. Then will give her her regular scripts.    Plan: Had long discussion of how she is counting her pain meds in the office , yet did not call when she was vomiting 10 times at home.  Discussion how her pain meds are too central to her life and we need to look at decreasing her pain med use.

## 2018-09-21 NOTE — NURSING NOTE
"Chief Complaint   Patient presents with     Surgical Followup     total right knee 9/4/2018     ER F/U     dehydration, hgb, positive ifob       Initial /72  Pulse 102  Temp 99.7  F (37.6  C) (Tympanic)  Resp 20  Ht 5' 2\" (1.575 m)  Wt 191 lb (86.6 kg)  SpO2 96%  BMI 34.93 kg/m2 Estimated body mass index is 34.93 kg/(m^2) as calculated from the following:    Height as of this encounter: 5' 2\" (1.575 m).    Weight as of this encounter: 191 lb (86.6 kg).  Medication Reconciliation: complete    Elly Arredondo LPN   The following medication was given:     MEDICATION: Rocephin 1gram and Lidocaine 2.1ml  ROUTE: IM  SITE: LUQ - Gluteus  DOSE: 1 gram  LOT #: [id number]  :  [employer]  EXPIRATION DATE:  11/30/2020  NDC#: 1041-8101-64  Elly Arredondo  ( the patient left 10 minutes after injection) no reaction was noted.   Elly Arredondo       "

## 2018-09-21 NOTE — MR AVS SNAPSHOT
After Visit Summary   9/21/2018    Aure Lanier    MRN: [id number]           Patient Information     Date Of Birth          1950        Visit Information        Provider Department      9/21/2018 4:00 PM Jaden Lee NP [facility]        Today's Diagnoses     Nausea    -  1    Postherpetic polyneuropathy          Care Instructions    1. Bring your extra dilaudid to throw on Tuesday.    2. Take 1/1/2 of Lortab 5/325mg  4 times a day    3. Take Carafate 4 times a day  4. Take Prilosec  20mg 2 times a day   5. Take   Zofran  4mg    2 times a day   6. Need to eat 3 times a day, and drink 6-8 glasses of fluids a day.                 Follow-ups after your visit        Who to contact     If you have questions or need follow up information about today's clinic visit or your schedule please contact [facility] directly at [phone].  Normal or non-critical lab and imaging results will be communicated to you by [employer], letter or phone within 4 business days after the clinic has received the results. If you do not hear from us within 7 days, please contact the clinic through [employer] or phone. If you have a critical or abnormal lab result, we will notify you by phone as soon as possible.  Submit refill requests through [employer] or call your pharmacy and they will forward the refill request to us. Please allow 3 business days for your refill to be completed.          Additional Information About Your Visit        [employer] Information     [employer] gives you secure access to your electronic health record. If you see a primary care provider, you can also send messages to your care team and make appointments. If you have questions, please call your primary care clinic.  If you do not have a primary care provider, please call [phone] and they will assist you.        Care EveryWhere ID     This is your Care EveryWhere ID. This could be used by other  "organizations to access your [facility] medical records  [id number]        Your Vitals Were     Pulse Temperature Respirations Height Pulse Oximetry BMI (Body Mass Index)    102 99.7  F (37.6  C) (Tympanic) 20 5' 2\" (1.575 m) 96% 34.93 kg/m2       Blood Pressure from Last 3 Encounters:   09/21/18 140/72   09/19/18 173/97   09/06/18 147/65    Weight from Last 3 Encounters:   09/21/18 191 lb (86.6 kg)   09/04/18 191 lb 6.4 oz (86.8 kg)   08/27/18 188 lb (85.3 kg)              Today, you had the following     No orders found for display         Today's Medication Changes          These changes are accurate as of 9/21/18  5:18 PM.  If you have any questions, ask your nurse or doctor.               Start taking these medicines.        Dose/Directions    ondansetron 4 MG tablet   Commonly known as:  ZOFRAN   Used for:  Nausea   Started by:  Jaden Lee NP        Dose:  4 mg   Take 1 tablet (4 mg) by mouth every 8 hours as needed for nausea   Quantity:  18 tablet   Refills:  1         These medicines have changed or have updated prescriptions.        Dose/Directions    escitalopram 20 MG tablet   Commonly known as:  LEXAPRO   This may have changed:  See the new instructions.   Used for:  Anxiety        TAKE 1 TABLET BY MOUTH EVERY DAY   Quantity:  30 tablet   Refills:  5       simvastatin 40 MG tablet   Commonly known as:  ZOCOR   This may have changed:    - how much to take  - additional instructions   Used for:  Mixed hyperlipidemia        TAKE 1 TABLET BY MOUTH EVERY DAY IN THE EVENING . GENERIC FOR ZOCOR.   Quantity:  90 tablet   Refills:  0         Stop taking these medicines if you haven't already. Please contact your care team if you have questions.     HYDROmorphone 4 MG tablet   Commonly known as:  DILAUDID   Stopped by:  Jaden Lee NP           ketorolac 10 MG tablet   Commonly known as:  TORADOL   Stopped by:  Jaden Lee NP                Where to get your medicines      These medications were sent " to [id number] - [place] - [address]  [address] [place] [address]     Phone:  [phone]     gabapentin 300 MG capsule    ondansetron 4 MG tablet               Information about OPIOIDS     PRESCRIPTION OPIOIDS: WHAT YOU NEED TO KNOW   We gave you an opioid (narcotic) pain medicine. It is important to manage your pain, but opioids are not always the best choice. You should first try all the other options your care team gave you. Take this medicine for as short a time (and as few doses) as possible.    Some activities can increase your pain, such as bandage changes or therapy sessions. It may help to take your pain medicine 30 to 60 minutes before these activities. Reduce your stress by getting enough sleep, working on hobbies you enjoy and practicing relaxation or meditation. Talk to your care team about ways to manage your pain beyond prescription opioids.    These medicines have risks:    DO NOT drive when on new or higher doses of pain medicine. These medicines can affect your alertness and reaction times, and you could be arrested for driving under the influence (DUI). If you need to use opioids long-term, talk to your care team about driving.    DO NOT operate heavy machinery    DO NOT do any other dangerous activities while taking these medicines.    DO NOT drink any alcohol while taking these medicines.     If the opioid prescribed includes acetaminophen, DO NOT take with any other medicines that contain acetaminophen. Read all labels carefully. Look for the word  acetaminophen  or  Tylenol.  Ask your pharmacist if you have questions or are unsure.    You can get addicted to pain medicines, especially if you have a history of addiction (chemical, alcohol or substance dependence). Talk to your care team about ways to reduce this risk.    All opioids tend to cause constipation. Drink plenty of water and eat foods that have a lot of fiber, such as fruits, vegetables, prune  juice, apple juice and high-fiber cereal. Take a laxative (Miralax, milk of magnesia, Colace, Senna) if you don t move your bowels at least every other day. Other side effects include upset stomach, sleepiness, dizziness, throwing up, tolerance (needing more of the medicine to have the same effect), physical dependence and slowed breathing.    Store your pills in a secure place, locked if possible. We will not replace any lost or stolen medicine. If you don t finish your medicine, please throw away (dispose) as directed by your pharmacist. The [place] [employer]ution [employer] has more information about safe disposal: [email or web address]         Primary Care Provider Office Phone # Fax #    Jaden Lee [phone]       [facility] [address] [facility] [address]        Equal Access to Services     ALYSSA ADAMES : Hadii jefry roacho Sojun, waaxda luqadaha, qaybta kaalmada renettayaroxanna, racquel mendez . So [facility] [phone].    ATENCIÓN: Si habla español, tiene a gross disposición servicios gratuitos de asistencia lingüística. Lei al [phone].    We comply with applicable federal civil rights laws and [place] laws. We do not discriminate on the basis of race, color, national origin, age, disability, sex, sexual orientation, or gender identity.            Thank you!     Thank you for choosing [facility]  for your care. Our goal is always to provide you with excellent care. Hearing back from our patients is one way we can continue to improve our services. Please take a few minutes to complete the written survey that you may receive in the mail after your visit with us. Thank you!             Your Updated Medication List - Protect others around you: Learn how to safely use, store and throw away your medicines at www.disposemymeds.org.          This list is accurate as of 9/21/18  5:18 PM.  Always  use your most recent med list.                   Brand Name Dispense Instructions for use Diagnosis    acetaminophen 325 MG tablet    TYLENOL    100 tablet    Take 2-3 tablets (650-975 mg) by mouth every 4 hours as needed for other (mild to moderate pain)    Status post total left knee replacement       CALCIUM + D PO      Take 2 tablets by mouth daily as needed        cyanocobalamin 1000 MCG tablet    vitamin  B-12     Take 1 tablet by mouth daily as needed        cyclobenzaprine 10 MG tablet    FLEXERIL    90 tablet    TAKE 1 TABLET BY MOUTH THREE TIMES DAILY AS NEEDED    Cervicalgia       docusate sodium 50 MG capsule    COLACE    60 capsule    Take 1 capsule (50 mg) by mouth 2 times daily as needed for constipation    S/P total knee arthroplasty, right       escitalopram 20 MG tablet    LEXAPRO    30 tablet    TAKE 1 TABLET BY MOUTH EVERY DAY    Anxiety       ferrous sulfate 325 (65 Fe) MG tablet    IRON    90 tablet    Take 1 tablet (325 mg) by mouth 3 times daily (with meals)    Iron deficiency       folic acid 1 MG tablet    FOLVITE    90 tablet    TAKE ONE TABLET DAILY BY MOUTH    Health care maintenance       gabapentin 300 MG capsule    NEURONTIN    90 capsule    TAKE ONE CAPSULE 3 TIMES A DAY BY MOUTH    Postherpetic polyneuropathy       HYDROcodone-acetaminophen 7.5-325 MG per tablet    NORCO          levothyroxine 150 MCG tablet    SYNTHROID/LEVOTHROID    30 tablet    Take 1 tablet (150 mcg) by mouth daily    Hypothyroidism, unspecified type       * MULTIVITAMIN PO      Take 1 tablet by mouth daily as needed        * multivitamin Tabs tablet      Take 1 tablet by mouth daily as needed        NYSTOP [id number] UNIT/GM Powd   Generic drug:  nystatin     60 g    APPLY TOPICALLY 2 TIMES DAILY AS NEEDED    Rash       omeprazole 40 MG capsule    priLOSEC    90 capsule    TAKE 1 CAPSULE DAILY BY MOUTH        ondansetron 4 MG tablet    ZOFRAN    18 tablet    Take 1 tablet (4 mg) by mouth every 8 hours as needed for  nausea    Nausea       RANITIDINE HCL PO      Take 150 mg by mouth daily as needed        simvastatin 40 MG tablet    ZOCOR    90 tablet    TAKE 1 TABLET BY MOUTH EVERY DAY IN THE EVENING . GENERIC FOR ZOCOR.    Mixed hyperlipidemia       sucralfate 1 GM tablet    CARAFATE    60 tablet    Take 1 tablet (1 g) by mouth 4 times daily    Preop general physical exam       zolpidem 10 MG tablet    AMBIEN    60 tablet    Take 1 tablet (10 mg) by mouth nightly as needed for sleep Take 1 tablet at night for sleep-may repeat X1.    Persistent insomnia       * Notice:  This list has 2 medication(s) that are the same as other medications prescribed for you. Read the directions carefully, and ask your doctor or other care provider to review them with you.

## 2018-09-21 NOTE — PATIENT INSTRUCTIONS
1. Bring your extra dilaudid to throw on Tuesday.    2. Take 1/1/2 of Lortab 5/325mg  4 times a day    3. Take Carafate 4 times a day  4. Take Prilosec  20mg 2 times a day   5. Take   Zofran  4mg    2 times a day   6. Need to eat 3 times a day, and drink 6-8 glasses of fluids a day.

## 2018-09-22 ASSESSMENT — PATIENT HEALTH QUESTIONNAIRE - PHQ9: SUM OF ALL RESPONSES TO PHQ QUESTIONS 1-9: 3

## 2018-09-22 ASSESSMENT — ANXIETY QUESTIONNAIRES: GAD7 TOTAL SCORE: 1

## 2018-09-24 RX ORDER — CEFTRIAXONE 1 G/1
1000 INJECTION, POWDER, FOR SOLUTION INTRAMUSCULAR; INTRAVENOUS ONCE
Qty: 10 ML | Refills: 0 | OUTPATIENT
Start: 2018-09-24 | End: 2019-01-29

## 2018-09-24 ASSESSMENT — ENCOUNTER SYMPTOMS
MYALGIAS: 1
PALPITATIONS: 0
FEVER: 0
BLOOD IN STOOL: 0
COUGH: 0
HEADACHES: 0
SLEEP DISTURBANCE: 1
VOMITING: 1
DIZZINESS: 0
NAUSEA: 1
CONSTIPATION: 0
ARTHRALGIAS: 1
FATIGUE: 1
SHORTNESS OF BREATH: 0
WEAKNESS: 1
NERVOUS/ANXIOUS: 1
DYSPHORIC MOOD: 1
TREMORS: 0
NECK PAIN: 1
DIARRHEA: 0

## 2018-09-25 ENCOUNTER — OFFICE VISIT (OUTPATIENT)
Dept: INTERNAL MEDICINE | Facility: OTHER | Age: 68
End: 2018-09-25
Attending: NURSE PRACTITIONER
Payer: COMMERCIAL

## 2018-09-25 VITALS
HEIGHT: 62 IN | TEMPERATURE: 98.2 F | WEIGHT: 191 LBS | BODY MASS INDEX: 35.15 KG/M2 | HEART RATE: 98 BPM | SYSTOLIC BLOOD PRESSURE: 120 MMHG | RESPIRATION RATE: 20 BRPM | DIASTOLIC BLOOD PRESSURE: 60 MMHG

## 2018-09-25 DIAGNOSIS — R11.0 NAUSEA: ICD-10-CM

## 2018-09-25 DIAGNOSIS — M25.561 ACUTE PAIN OF RIGHT KNEE: ICD-10-CM

## 2018-09-25 DIAGNOSIS — D72.829 LEUKOCYTOSIS, UNSPECIFIED TYPE: ICD-10-CM

## 2018-09-25 DIAGNOSIS — K29.70 GASTRITIS WITHOUT BLEEDING, UNSPECIFIED CHRONICITY, UNSPECIFIED GASTRITIS TYPE: Primary | ICD-10-CM

## 2018-09-25 PROCEDURE — G0463 HOSPITAL OUTPT CLINIC VISIT: HCPCS

## 2018-09-25 PROCEDURE — 99214 OFFICE O/P EST MOD 30 MIN: CPT | Performed by: NURSE PRACTITIONER

## 2018-09-25 RX ORDER — HYDROMORPHONE HYDROCHLORIDE 4 MG/1
TABLET ORAL
COMMUNITY
Start: 2018-09-06 | End: 2018-09-25

## 2018-09-25 RX ORDER — HYDROCODONE BITARTRATE AND ACETAMINOPHEN 5; 325 MG/1; MG/1
TABLET ORAL
COMMUNITY
Start: 2018-09-19 | End: 2018-09-25

## 2018-09-25 RX ORDER — HYDROCODONE BITARTRATE AND ACETAMINOPHEN 7.5; 325 MG/1; MG/1
1 TABLET ORAL EVERY 6 HOURS PRN
Qty: 120 TABLET | Refills: 0 | Status: SHIPPED | OUTPATIENT
Start: 2018-09-25 | End: 2018-10-22

## 2018-09-25 ASSESSMENT — ENCOUNTER SYMPTOMS
FATIGUE: 1
VOMITING: 0
COUGH: 0
HEADACHES: 1
BLOOD IN STOOL: 0
DIZZINESS: 0
WEAKNESS: 1
NAUSEA: 1
FEVER: 0
NUMBNESS: 0
PALPITATIONS: 0
DIARRHEA: 0
MYALGIAS: 1
SHORTNESS OF BREATH: 0
ARTHRALGIAS: 1
CONSTIPATION: 0

## 2018-09-25 ASSESSMENT — PAIN SCALES - GENERAL: PAINLEVEL: MODERATE PAIN (4)

## 2018-09-25 NOTE — PATIENT INSTRUCTIONS
1. Stop Carafate.  2. Zantac  150mg 2 times a day  3. Continue  Prilosec 2 times a day.    4. Eat and drink fluids.    5.  Take Zofran 4mg 3 times a day    6. Wear ETHEL hose during day-off at night.

## 2018-09-25 NOTE — PROGRESS NOTES
SUBJECTIVE:   Aure Lanier is a 68 year old female who presents to clinic today for the following health issues:      Nausea:  Has been taking Zofran 4mg   2 times a day, and the carafate-but claims the carafate makes her stomach hurt when she takes it.  She states she has been forcing herself to eat. And drink fluids.  She got Rocephin 1gm Im on Friday, since WBC was 23310 in the ER, and absolute neutrophils were elevated.      Bilateral edema, dehydration, and pain      Duration: post op 3 weeks ( surgery was 9/4/2018)    Description (location/character/radiation): bialteral edema mostly started after surgery and the nausea is a little better  Taking Lortab 7.5mg  4 times a day this weekend. States is doing well in Physical therapy.      Intensity:  mild    Accompanying signs and symptoms: nausea and edema    History (similar episodes/previous evaluation): None    Precipitating or alleviating factors: zofran    Therapies tried and outcome: zofan did help a little        Problem list and histories reviewed & adjusted, as indicated.  Additional history: as documented    Patient Active Problem List   Diagnosis     Allergic arthritis involving hand     Hypothyroidism     Insomnia     Headache     Postherpetic polyneuropathy     Dysthymia     Anxiety state     Hyperlipidemia     Migraine     Osteoporosis     GERD     Low back pain     Hyperlipidemia with target LDL less than 100     Bilateral chronic knee pain     Back muscle spasm     Chronic pain syndrome     Status post total left knee replacement     S/P total knee arthroplasty, right     Status post total right knee replacement     Past Surgical History:   Procedure Laterality Date     ARTHROPLASTY KNEE Left 4/17/2018    Procedure: ARTHROPLASTY KNEE;  LEFT TOTAL KNEE ARTHROPLASTY S/N JOURNEY II;  Surgeon: Ty Hernandez MD;  Location: [facility]     ARTHROPLASTY KNEE Right 9/4/2018    Procedure: ARTHROPLASTY KNEE;  RIGHT TOTAL KNEE ARTHROPLASTY ;  Surgeon: Mary  MD Ty;  Location: [facility]     D & C       ESOPHAGOSCOPY, GASTROSCOPY, DUODENOSCOPY (EGD), COMBINED N/A 9/11/2017    Procedure: COMBINED ESOPHAGOSCOPY, GASTROSCOPY, DUODENOSCOPY (EGD);  Upper Endoscopy: Removal of Food Impaction;  Surgeon: Lino Zhu DO;  Location: [facility]     STOMACH SURGERY       stomach surgery peritinitis         Social History   Substance Use Topics     Smoking status: Never Smoker     Smokeless tobacco: Never Used      Comment: no passive exposure     Alcohol use Yes      Comment: rarely, maybe yearly     Family History   Problem Relation Age of Onset     Cerebrovascular Disease Mother      CVA     Hypertension Mother      Other - See Comments Father 40     mining accident; cause of death     Other - See Comments Brother      muscle dystrophy     Cancer Daughter 34     skin cancer     Melanoma Daughter          Current Outpatient Prescriptions   Medication Sig Dispense Refill     acetaminophen (TYLENOL) 325 MG tablet Take 2-3 tablets (650-975 mg) by mouth every 4 hours as needed for other (mild to moderate pain) 100 tablet      Calcium Citrate-Vitamin D (CALCIUM + D PO) Take 2 tablets by mouth daily as needed       cyanocolbalamin (VITAMIN  B-12) 1000 MCG tablet Take 1 tablet by mouth daily as needed        docusate sodium (COLACE) 50 MG capsule Take 1 capsule (50 mg) by mouth 2 times daily as needed for constipation 60 capsule 0     escitalopram (LEXAPRO) 20 MG tablet TAKE 1 TABLET BY MOUTH EVERY DAY (Patient taking differently: TAKE 1 TABLET BY MOUTH EVERY DAY AT BEDTIME) 30 tablet 5     ferrous sulfate (IRON) 325 (65 Fe) MG tablet Take 1 tablet (325 mg) by mouth 3 times daily (with meals) 90 tablet 2     folic acid (FOLVITE) 1 MG tablet TAKE ONE TABLET DAILY BY MOUTH 90 tablet 2     gabapentin (NEURONTIN) 300 MG capsule TAKE ONE CAPSULE 3 TIMES A DAY BY MOUTH 90 capsule 0     HYDROcodone-acetaminophen (NORCO) 5-325 MG per tablet        HYDROcodone-acetaminophen (NORCO) 7.5-325 MG  per tablet        HYDROmorphone (DILAUDID) 4 MG tablet        levothyroxine (SYNTHROID/LEVOTHROID) 150 MCG tablet Take 1 tablet (150 mcg) by mouth daily 30 tablet 0     Multiple Vitamins-Minerals (MULTIVITAMIN OR) Take 1 tablet by mouth daily as needed        multivitamin (OCUVITE) TABS Take 1 tablet by mouth daily as needed        NYSTOP 823432 UNIT/GM POWD powder APPLY TOPICALLY 2 TIMES DAILY AS NEEDED 60 g 3     omeprazole (PRILOSEC) 40 MG capsule TAKE 1 CAPSULE DAILY BY MOUTH 90 capsule 0     ondansetron (ZOFRAN) 4 MG tablet Take 1 tablet (4 mg) by mouth every 8 hours as needed for nausea 18 tablet 1     RANITIDINE HCL PO Take 150 mg by mouth daily as needed        simvastatin (ZOCOR) 40 MG tablet TAKE 1 TABLET BY MOUTH EVERY DAY IN THE EVENING . GENERIC FOR ZOCOR. (Patient taking differently: 40 mg TAKE 1/2 TABLET BY MOUTH EVERY DAY IN THE EVENING . GENERIC FOR ZOCOR.) 90 tablet 0     sucralfate (CARAFATE) 1 GM tablet Take 1 tablet (1 g) by mouth 4 times daily 60 tablet 0     zolpidem (AMBIEN) 10 MG tablet Take 1 tablet (10 mg) by mouth nightly as needed for sleep Take 1 tablet at night for sleep-may repeat X1. 60 tablet 0     cyclobenzaprine (FLEXERIL) 10 MG tablet TAKE 1 TABLET BY MOUTH THREE TIMES DAILY AS NEEDED 90 tablet 0     gabapentin (NEURONTIN) 300 MG capsule TAKE 1 CAPSULE BY MOUTH THREE TIMES DAILY 90 capsule 0     sucralfate (CARAFATE) 1 GM tablet TAKE 1 TABLET (1 G) BY MOUTH 4 TIMES DAILY 120 tablet 0     Allergies   Allergen Reactions     Erythromycin Base [Kdc:Yellow Dye+Erythromycin+Brilliant Blue Fcf] Nausea and Vomiting     Penicillins Rash       Reviewed and updated as needed this visit by clinical staff  Tobacco  Allergies  Meds  Med Hx  Surg Hx  Fam Hx  Soc Hx      Reviewed and updated as needed this visit by Provider      Review of Systems   Constitutional: Positive for fatigue. Negative for fever.        Feels much better today.     HENT: Negative.    Respiratory: Negative for  "cough and shortness of breath.    Cardiovascular: Positive for leg swelling. Negative for chest pain and palpitations.        Has bilateral swelling lower legs.     Gastrointestinal: Positive for nausea. Negative for blood in stool, constipation, diarrhea and vomiting.        Pain in epigastric area  is improved but still some nausea.     Musculoskeletal: Positive for arthralgias and myalgias.   Neurological: Positive for weakness and headaches. Negative for dizziness and numbness.   Vitals: /60  Pulse 128  Temp 98.2  F (36.8  C) (Tympanic)  Resp 20  Ht 5' 2\" (1.575 m)  Wt 191 lb (86.6 kg)  BMI 34.93 kg/m2  BMI= Body mass index is 34.93 kg/(m^2).  Recheck pulse=98.   Physical Exam   Constitutional: She is oriented to person, place, and time. No distress.   Patient looks alert, color much better.     HENT:   Mouth is dry.     Eyes: Conjunctivae and EOM are normal. Pupils are equal, round, and reactive to light.   Neck: Normal range of motion. Neck supple. No thyromegaly present.   Cardiovascular: Normal rate, regular rhythm and normal heart sounds.    No murmur heard.  Pulmonary/Chest: Effort normal and breath sounds normal.   Musculoskeletal: She exhibits edema.   +2 edema to feet and lower legs!   Lymphadenopathy:     She has no cervical adenopathy.   Neurological: She is alert and oriented to person, place, and time.   Skin: Skin is warm and dry.   Psychiatric: She has a normal mood and affect.   ASSESSMENT / PLAN:  (K29.70) Gastritis without bleeding, unspecified chronicity, unspecified gastritis type  (primary encounter diagnosis)  Comment: Will continue Prilosec 40mg  2 times a day and add Zantac 150mg 2 times a day. Stop Carafate.    Plan: ranitidine (ZANTAC) 150 MG tablet          (M25.561) Acute pain of right knee  Comment: Leftover  Dilaudid given to [facility]  pharmacy to destroy + Toradol. Patient states again that she would never  Eat and drink enough fluids.    Plan: HYDROcodone-acetaminophen " (NORCO) 7.5-325 MG         per tablet         (R11.0) Nausea  Comment: Take Zofran 2mg 3 times  A day.    Plan: Return in 2 weeks.

## 2018-09-25 NOTE — NURSING NOTE
"Chief Complaint   Patient presents with     Dehydration     follow up from last week     Pain     f/u on post op pain, rates pain 4/10 with out pain med.  Patient states that she did not take any pain meds this am     Edema     bilateral edema that has become worse       Initial /60  Pulse 128  Temp 98.2  F (36.8  C) (Tympanic)  Resp 20  Ht 5' 2\" (1.575 m)  Wt 191 lb (86.6 kg)  BMI 34.93 kg/m2 Estimated body mass index is 34.93 kg/(m^2) as calculated from the following:    Height as of this encounter: 5' 2\" (1.575 m).    Weight as of this encounter: 191 lb (86.6 kg).  Medication Reconciliation: complete    Elly Arredondo LPN  "

## 2018-09-25 NOTE — MR AVS SNAPSHOT
After Visit Summary   9/25/2018    Aure Lanier    MRN: [id number]           Patient Information     Date Of Birth          1950        Visit Information        Provider Department      9/25/2018 11:00 AM Jaden eLe NP [facility]        Today's Diagnoses     Gastritis without bleeding, unspecified chronicity, unspecified gastritis type    -  1    Acute pain of right knee        Nausea          Care Instructions    1. Stop Carafate.  2. Zantac  150mg 2 times a day  3. Continue  Prilosec 2 times a day.    4. Eat and drink fluids.    5.  Take Zofran 4mg 3 times a day    6. Wear ETHEL hose during day-off at night.            Follow-ups after your visit        Who to contact     If you have questions or need follow up information about today's clinic visit or your schedule please contact [facility] directly at [phone].  Normal or non-critical lab and imaging results will be communicated to you by MyChart, letter or phone within 4 business days after the clinic has received the results. If you do not hear from us within 7 days, please contact the clinic through [email or web address] or phone. If you have a critical or abnormal lab result, we will notify you by phone as soon as possible.  Submit refill requests through [employer] or call your pharmacy and they will forward the refill request to us. Please allow 3 business days for your refill to be completed.          Additional Information About Your Visit        MyChart Information     [employer] gives you secure access to your electronic health record. If you see a primary care provider, you can also send messages to your care team and make appointments. If you have questions, please call your primary care clinic.  If you do not have a primary care provider, please call [phone] and they will assist you.        Care EveryWhere ID     This is your Care EveryWhere ID. This could be used by other organizations to  "access your [facility] medical records  [id number]        Your Vitals Were     Pulse Temperature Respirations Height BMI (Body Mass Index)       128 98.2  F (36.8  C) (Tympanic) 20 5' 2\" (1.575 m) 34.93 kg/m2        Blood Pressure from Last 3 Encounters:   09/25/18 120/60   09/21/18 140/72   09/19/18 173/97    Weight from Last 3 Encounters:   09/25/18 191 lb (86.6 kg)   09/21/18 191 lb (86.6 kg)   09/04/18 191 lb 6.4 oz (86.8 kg)              Today, you had the following     No orders found for display         Today's Medication Changes          These changes are accurate as of 9/25/18 12:25 PM.  If you have any questions, ask your nurse or doctor.               These medicines have changed or have updated prescriptions.        Dose/Directions    escitalopram 20 MG tablet   Commonly known as:  LEXAPRO   This may have changed:  See the new instructions.   Used for:  Anxiety        TAKE 1 TABLET BY MOUTH EVERY DAY   Quantity:  30 tablet   Refills:  5       gabapentin 300 MG capsule   Commonly known as:  NEURONTIN   This may have changed:  Another medication with the same name was removed. Continue taking this medication, and follow the directions you see here.   Used for:  Postherpetic polyneuropathy   Changed by:  Jaden Lee NP        TAKE ONE CAPSULE 3 TIMES A DAY BY MOUTH   Quantity:  90 capsule   Refills:  0       HYDROcodone-acetaminophen 7.5-325 MG per tablet   Commonly known as:  NORCO   This may have changed:    - how much to take  - how to take this  - when to take this  - reasons to take this  - Another medication with the same name was removed. Continue taking this medication, and follow the directions you see here.   Used for:  Acute pain of right knee   Changed by:  Jaden Lee NP        Dose:  1 tablet   Take 1 tablet by mouth every 6 hours as needed for severe pain   Quantity:  120 tablet   Refills:  0       * RANITIDINE HCL PO   This may have changed:  Another medication with the same name was " added. Make sure you understand how and when to take each.   Changed by:  Jaden Lee NP        Dose:  150 mg   Take 150 mg by mouth daily as needed   Refills:  0       * ranitidine 150 MG tablet   Commonly known as:  ZANTAC   This may have changed:  You were already taking a medication with the same name, and this prescription was added. Make sure you understand how and when to take each.   Used for:  Gastritis without bleeding, unspecified chronicity, unspecified gastritis type   Changed by:  Jaden Lee NP        Dose:  150 mg   Take 1 tablet (150 mg) by mouth 2 times daily   Quantity:  60 tablet   Refills:  1       simvastatin 40 MG tablet   Commonly known as:  ZOCOR   This may have changed:    - how much to take  - additional instructions   Used for:  Mixed hyperlipidemia        TAKE 1 TABLET BY MOUTH EVERY DAY IN THE EVENING . GENERIC FOR ZOCOR.   Quantity:  90 tablet   Refills:  0       * Notice:  This list has 2 medication(s) that are the same as other medications prescribed for you. Read the directions carefully, and ask your doctor or other care provider to review them with you.      Stop taking these medicines if you haven't already. Please contact your care team if you have questions.     HYDROmorphone 4 MG tablet   Commonly known as:  DILAUDID   Stopped by:  Jaden Lee NP           sucralfate 1 GM tablet   Commonly known as:  CARAFATE   Stopped by:  Jaden Lee NP                Where to get your medicines      These medications were sent to [id number] - [place] - [address]  [address] [place] [address]     Phone:  [phone]     ranitidine 150 MG tablet         Some of these will need a paper prescription and others can be bought over the counter.  Ask your nurse if you have questions.     Bring a paper prescription for each of these medications     HYDROcodone-acetaminophen 7.5-325 MG per tablet               Information about OPIOIDS     PRESCRIPTION  OPIOIDS: WHAT YOU NEED TO KNOW   We gave you an opioid (narcotic) pain medicine. It is important to manage your pain, but opioids are not always the best choice. You should first try all the other options your care team gave you. Take this medicine for as short a time (and as few doses) as possible.    Some activities can increase your pain, such as bandage changes or therapy sessions. It may help to take your pain medicine 30 to 60 minutes before these activities. Reduce your stress by getting enough sleep, working on hobbies you enjoy and practicing relaxation or meditation. Talk to your care team about ways to manage your pain beyond prescription opioids.    These medicines have risks:    DO NOT drive when on new or higher doses of pain medicine. These medicines can affect your alertness and reaction times, and you could be arrested for driving under the influence (DUI). If you need to use opioids long-term, talk to your care team about driving.    DO NOT operate heavy machinery    DO NOT do any other dangerous activities while taking these medicines.    DO NOT drink any alcohol while taking these medicines.     If the opioid prescribed includes acetaminophen, DO NOT take with any other medicines that contain acetaminophen. Read all labels carefully. Look for the word  acetaminophen  or  Tylenol.  Ask your pharmacist if you have questions or are unsure.    You can get addicted to pain medicines, especially if you have a history of addiction (chemical, alcohol or substance dependence). Talk to your care team about ways to reduce this risk.    All opioids tend to cause constipation. Drink plenty of water and eat foods that have a lot of fiber, such as fruits, vegetables, prune juice, apple juice and high-fiber cereal. Take a laxative (Miralax, milk of magnesia, Colace, Senna) if you don t move your bowels at least every other day. Other side effects include upset stomach, sleepiness, dizziness, throwing up,  tolerance (needing more of the medicine to have the same effect), physical dependence and slowed breathing.    Store your pills in a secure place, locked if possible. We will not replace any lost or stolen medicine. If you don t finish your medicine, please throw away (dispose) as directed by your pharmacist. The [place] [employer] has more information about safe disposal: [email or web address]-medications         Primary Care Provider Office Phone # Fax #    Jaden Lee [phone] [address] [address]        Equal Access to Services     NEREIDA ADAMES : Hadii aad ku hadasho Sojun, waaxda luqadaha, qaybta kaalmada adebiayada, racquel mendez . So [facility] [phone].    ATENCIÓN: Si habla español, tiene a gross disposición servicios gratuitos de asistencia lingüística. Glennyame al [phone].    We comply with applicable federal civil rights laws and [place] laws. We do not discriminate on the basis of race, color, national origin, age, disability, sex, sexual orientation, or gender identity.            Thank you!     Thank you for choosing [facility]  for your care. Our goal is always to provide you with excellent care. Hearing back from our patients is one way we can continue to improve our services. Please take a few minutes to complete the written survey that you may receive in the mail after your visit with us. Thank you!             Your Updated Medication List - Protect others around you: Learn how to safely use, store and throw away your medicines at www.disposemymeds.org.          This list is accurate as of 9/25/18 12:25 PM.  Always use your most recent med list.                   Brand Name Dispense Instructions for use Diagnosis    acetaminophen 325 MG tablet    TYLENOL    100 tablet    Take 2-3 tablets (650-975 mg) by mouth every 4 hours as needed for other (mild  to moderate pain)    Status post total left knee replacement       CALCIUM + D PO      Take 2 tablets by mouth daily as needed        cyanocobalamin 1000 MCG tablet    vitamin  B-12     Take 1 tablet by mouth daily as needed        cyclobenzaprine 10 MG tablet    FLEXERIL    90 tablet    TAKE 1 TABLET BY MOUTH THREE TIMES DAILY AS NEEDED    Cervicalgia       docusate sodium 50 MG capsule    COLACE    60 capsule    Take 1 capsule (50 mg) by mouth 2 times daily as needed for constipation    S/P total knee arthroplasty, right       escitalopram 20 MG tablet    LEXAPRO    30 tablet    TAKE 1 TABLET BY MOUTH EVERY DAY    Anxiety       ferrous sulfate 325 (65 Fe) MG tablet    IRON    90 tablet    Take 1 tablet (325 mg) by mouth 3 times daily (with meals)    Iron deficiency       folic acid 1 MG tablet    FOLVITE    90 tablet    TAKE ONE TABLET DAILY BY MOUTH    Health care maintenance       gabapentin 300 MG capsule    NEURONTIN    90 capsule    TAKE ONE CAPSULE 3 TIMES A DAY BY MOUTH    Postherpetic polyneuropathy       HYDROcodone-acetaminophen 7.5-325 MG per tablet    NORCO    120 tablet    Take 1 tablet by mouth every 6 hours as needed for severe pain    Acute pain of right knee       levothyroxine 150 MCG tablet    SYNTHROID/LEVOTHROID    30 tablet    Take 1 tablet (150 mcg) by mouth daily    Hypothyroidism, unspecified type       * MULTIVITAMIN PO      Take 1 tablet by mouth daily as needed        * multivitamin Tabs tablet      Take 1 tablet by mouth daily as needed        NYSTOP [id number] UNIT/GM Powd   Generic drug:  nystatin     60 g    APPLY TOPICALLY 2 TIMES DAILY AS NEEDED    Rash       omeprazole 40 MG capsule    priLOSEC    90 capsule    TAKE 1 CAPSULE DAILY BY MOUTH        ondansetron 4 MG tablet    ZOFRAN    18 tablet    Take 1 tablet (4 mg) by mouth every 8 hours as needed for nausea    Nausea       * RANITIDINE HCL PO      Take 150 mg by mouth daily as needed        * ranitidine 150 MG tablet    ZANTAC     60 tablet    Take 1 tablet (150 mg) by mouth 2 times daily    Gastritis without bleeding, unspecified chronicity, unspecified gastritis type       simvastatin 40 MG tablet    ZOCOR    90 tablet    TAKE 1 TABLET BY MOUTH EVERY DAY IN THE EVENING . GENERIC FOR ZOCOR.    Mixed hyperlipidemia       zolpidem 10 MG tablet    AMBIEN    60 tablet    Take 1 tablet (10 mg) by mouth nightly as needed for sleep Take 1 tablet at night for sleep-may repeat X1.    Persistent insomnia       * Notice:  This list has 4 medication(s) that are the same as other medications prescribed for you. Read the directions carefully, and ask your doctor or other care provider to review them with you.

## 2018-09-27 RX ORDER — CEFTRIAXONE 1 G/1
1000 INJECTION, POWDER, FOR SOLUTION INTRAMUSCULAR; INTRAVENOUS ONCE
Qty: 10 ML | Refills: 0 | OUTPATIENT
Start: 2018-09-27 | End: 2018-09-27

## 2018-10-12 ENCOUNTER — OFFICE VISIT (OUTPATIENT)
Dept: INTERNAL MEDICINE | Facility: OTHER | Age: 68
End: 2018-10-12
Attending: NURSE PRACTITIONER
Payer: COMMERCIAL

## 2018-10-12 VITALS
HEIGHT: 62 IN | WEIGHT: 182 LBS | RESPIRATION RATE: 20 BRPM | TEMPERATURE: 96.5 F | BODY MASS INDEX: 33.49 KG/M2 | HEART RATE: 104 BPM | SYSTOLIC BLOOD PRESSURE: 128 MMHG | OXYGEN SATURATION: 98 % | DIASTOLIC BLOOD PRESSURE: 70 MMHG

## 2018-10-12 DIAGNOSIS — G89.18 POSTOPERATIVE PAIN: ICD-10-CM

## 2018-10-12 DIAGNOSIS — D64.9 LOW HEMOGLOBIN: ICD-10-CM

## 2018-10-12 DIAGNOSIS — E61.1 LOW SERUM IRON: Primary | ICD-10-CM

## 2018-10-12 DIAGNOSIS — K21.00 GASTROESOPHAGEAL REFLUX DISEASE WITH ESOPHAGITIS: ICD-10-CM

## 2018-10-12 DIAGNOSIS — G47.00 PERSISTENT INSOMNIA: ICD-10-CM

## 2018-10-12 DIAGNOSIS — R11.0 NAUSEA: ICD-10-CM

## 2018-10-12 LAB
BASOPHILS # BLD AUTO: 0.1 10E9/L (ref 0–0.2)
BASOPHILS NFR BLD AUTO: 0.5 %
DIFFERENTIAL METHOD BLD: ABNORMAL
EOSINOPHIL # BLD AUTO: 1.8 10E9/L (ref 0–0.7)
EOSINOPHIL NFR BLD AUTO: 13.3 %
ERYTHROCYTE [DISTWIDTH] IN BLOOD BY AUTOMATED COUNT: 16.5 % (ref 10–15)
HCT VFR BLD AUTO: 29.8 % (ref 35–47)
HGB BLD-MCNC: 9.1 G/DL (ref 11.7–15.7)
IMM GRANULOCYTES # BLD: 0.1 10E9/L (ref 0–0.4)
IMM GRANULOCYTES NFR BLD: 0.7 %
IRON SATN MFR SERPL: 6 % (ref 15–46)
IRON SERPL-MCNC: 17 UG/DL (ref 35–180)
LYMPHOCYTES # BLD AUTO: 3.6 10E9/L (ref 0.8–5.3)
LYMPHOCYTES NFR BLD AUTO: 26.6 %
MCH RBC QN AUTO: 24.8 PG (ref 26.5–33)
MCHC RBC AUTO-ENTMCNC: 30.5 G/DL (ref 31.5–36.5)
MCV RBC AUTO: 81 FL (ref 78–100)
MONOCYTES # BLD AUTO: 1.3 10E9/L (ref 0–1.3)
MONOCYTES NFR BLD AUTO: 9.8 %
NEUTROPHILS # BLD AUTO: 6.6 10E9/L (ref 1.6–8.3)
NEUTROPHILS NFR BLD AUTO: 49.1 %
NRBC # BLD AUTO: 0 10*3/UL
NRBC BLD AUTO-RTO: 0 /100
PLATELET # BLD AUTO: 510 10E9/L (ref 150–450)
RBC # BLD AUTO: 3.67 10E12/L (ref 3.8–5.2)
TIBC SERPL-MCNC: 278 UG/DL (ref 240–430)
WBC # BLD AUTO: 13.5 10E9/L (ref 4–11)

## 2018-10-12 PROCEDURE — G0463 HOSPITAL OUTPT CLINIC VISIT: HCPCS

## 2018-10-12 PROCEDURE — 99214 OFFICE O/P EST MOD 30 MIN: CPT | Performed by: NURSE PRACTITIONER

## 2018-10-12 PROCEDURE — 83540 ASSAY OF IRON: CPT | Mod: ZL | Performed by: NURSE PRACTITIONER

## 2018-10-12 PROCEDURE — 83550 IRON BINDING TEST: CPT | Mod: ZL | Performed by: NURSE PRACTITIONER

## 2018-10-12 PROCEDURE — 85025 COMPLETE CBC W/AUTO DIFF WBC: CPT | Mod: ZL | Performed by: NURSE PRACTITIONER

## 2018-10-12 PROCEDURE — 36415 COLL VENOUS BLD VENIPUNCTURE: CPT | Mod: ZL | Performed by: NURSE PRACTITIONER

## 2018-10-12 RX ORDER — PANTOPRAZOLE SODIUM 40 MG/1
40 TABLET, DELAYED RELEASE ORAL DAILY
Qty: 30 TABLET | Refills: 1 | Status: SHIPPED | OUTPATIENT
Start: 2018-10-12 | End: 2018-12-09

## 2018-10-12 RX ORDER — ZOLPIDEM TARTRATE 10 MG/1
10 TABLET ORAL
Qty: 60 TABLET | Refills: 0 | Status: SHIPPED | OUTPATIENT
Start: 2018-10-15 | End: 2018-11-12

## 2018-10-12 RX ORDER — ONDANSETRON 4 MG/1
4 TABLET, FILM COATED ORAL 3 TIMES DAILY
Qty: 48 TABLET | Refills: 0 | Status: SHIPPED | OUTPATIENT
Start: 2018-10-12 | End: 2018-10-26

## 2018-10-12 ASSESSMENT — ENCOUNTER SYMPTOMS
JOINT SWELLING: 1
SHORTNESS OF BREATH: 0
DYSPHORIC MOOD: 1
FEVER: 0
VOMITING: 1
ARTHRALGIAS: 1
HEADACHES: 1
BACK PAIN: 1
PALPITATIONS: 0
NAUSEA: 1
DIARRHEA: 0
DIZZINESS: 0
NECK PAIN: 1
COUGH: 0
FATIGUE: 0
CONSTIPATION: 1
MYALGIAS: 1
BLOOD IN STOOL: 0

## 2018-10-12 ASSESSMENT — PAIN SCALES - GENERAL: PAINLEVEL: MODERATE PAIN (5)

## 2018-10-12 NOTE — NURSING NOTE
"Chief Complaint   Patient presents with     Gastrointestinal Problem     follow up       Initial /70  Pulse 104  Temp 96.5  F (35.8  C) (Tympanic)  Resp 20  Ht 5' 2\" (1.575 m)  Wt 182 lb (82.6 kg)  SpO2 98%  BMI 33.29 kg/m2 Estimated body mass index is 33.29 kg/(m^2) as calculated from the following:    Height as of this encounter: 5' 2\" (1.575 m).    Weight as of this encounter: 182 lb (82.6 kg).  Medication Reconciliation: complete    Elly Arredondo LPN  "

## 2018-10-12 NOTE — PROGRESS NOTES
SUBJECTIVE:   Aure Lanier is a 68 year old female who presents to clinic today for the following health issues:      Follow up on stomach issues/Heart burn-feels has   ulcer  Has been taking  Zantac and  Prilosec. Has not been taking   carafate down.   Trying to eat.  Taking Zofran  Once a day. Still vomiting-has delicate vomit reflex and some foods and pills set her off.         Right Knee: Fell on Saturday.  Onto Right surgical knee.   Now swollen and painful.      Problem list and histories reviewed & adjusted, as indicated.  Additional history: as documented    Patient Active Problem List   Diagnosis     Allergic arthritis involving hand     Hypothyroidism     Insomnia     Headache     Postherpetic polyneuropathy     Dysthymia     Anxiety state     Hyperlipidemia     Migraine     Osteoporosis     GERD     Low back pain     Hyperlipidemia with target LDL less than 100     Bilateral chronic knee pain     Back muscle spasm     Chronic pain syndrome     Status post total left knee replacement     S/P total knee arthroplasty, right     Status post total right knee replacement     Family history of other musculoskeletal diseases(V17.89)     Past Surgical History:   Procedure Laterality Date     ARTHROPLASTY KNEE Left 4/17/2018    Procedure: ARTHROPLASTY KNEE;  LEFT TOTAL KNEE ARTHROPLASTY S/N JOURNEY II;  Surgeon: Ty Hernandez MD;  Location: [facility]     ARTHROPLASTY KNEE Right 9/4/2018    Procedure: ARTHROPLASTY KNEE;  RIGHT TOTAL KNEE ARTHROPLASTY ;  Surgeon: Ty Hernandez MD;  Location: [facility]     D & C       ESOPHAGOSCOPY, GASTROSCOPY, DUODENOSCOPY (EGD), COMBINED N/A 9/11/2017    Procedure: COMBINED ESOPHAGOSCOPY, GASTROSCOPY, DUODENOSCOPY (EGD);  Upper Endoscopy: Removal of Food Impaction;  Surgeon: Lino Zhu DO;  Location: [facility]     STOMACH SURGERY       stomach surgery peritinitis         Social History   Substance Use Topics     Smoking status: Never Smoker     Smokeless tobacco: Never Used       Comment: no passive exposure     Alcohol use Yes      Comment: rarely, maybe yearly     Family History   Problem Relation Age of Onset     Cerebrovascular Disease Mother      CVA     Hypertension Mother      Other - See Comments Father 40     mining accident; cause of death     Other - See Comments Brother      muscle dystrophy     Cancer Daughter 34     skin cancer     Melanoma Daughter          Current Outpatient Prescriptions   Medication Sig Dispense Refill     acetaminophen (TYLENOL) 325 MG tablet Take 2-3 tablets (650-975 mg) by mouth every 4 hours as needed for other (mild to moderate pain) 100 tablet      Calcium Citrate-Vitamin D (CALCIUM + D PO) Take 2 tablets by mouth daily as needed       cyanocolbalamin (VITAMIN  B-12) 1000 MCG tablet Take 1 tablet by mouth daily as needed        cyclobenzaprine (FLEXERIL) 10 MG tablet TAKE 1 TABLET BY MOUTH THREE TIMES DAILY AS NEEDED 90 tablet 0     docusate sodium (COLACE) 50 MG capsule Take 1 capsule (50 mg) by mouth 2 times daily as needed for constipation 60 capsule 0     escitalopram (LEXAPRO) 20 MG tablet TAKE 1 TABLET BY MOUTH EVERY DAY (Patient taking differently: TAKE 1 TABLET BY MOUTH EVERY DAY AT BEDTIME) 30 tablet 5     ferrous sulfate (IRON) 325 (65 Fe) MG tablet Take 1 tablet (325 mg) by mouth 3 times daily (with meals) 90 tablet 2     folic acid (FOLVITE) 1 MG tablet TAKE ONE TABLET DAILY BY MOUTH 90 tablet 1     gabapentin (NEURONTIN) 300 MG capsule TAKE ONE CAPSULE 3 TIMES A DAY BY MOUTH 90 capsule 0     HYDROcodone-acetaminophen (NORCO) 7.5-325 MG per tablet Take 1 tablet by mouth every 6 hours as needed for severe pain 120 tablet 0     levothyroxine (SYNTHROID/LEVOTHROID) 150 MCG tablet TAKE 1 TABLET (150 MCG) BY MOUTH DAILY 90 tablet 1     Multiple Vitamins-Minerals (MULTIVITAMIN OR) Take 1 tablet by mouth daily as needed        multivitamin (OCUVITE) TABS Take 1 tablet by mouth daily as needed        NYSTOP 148957 UNIT/GM POWD powder APPLY  "TOPICALLY 2 TIMES DAILY AS NEEDED 60 g 3     omeprazole (PRILOSEC) 40 MG capsule TAKE 1 CAPSULE DAILY BY MOUTH 90 capsule 0     ondansetron (ZOFRAN) 4 MG tablet Take 1 tablet (4 mg) by mouth every 8 hours as needed for nausea 18 tablet 1     ranitidine (ZANTAC) 150 MG tablet Take 1 tablet (150 mg) by mouth 2 times daily 60 tablet 1     RANITIDINE HCL PO Take 150 mg by mouth daily as needed        simvastatin (ZOCOR) 40 MG tablet TAKE 1 TABLET BY MOUTH EVERY DAY IN THE EVENING . GENERIC FOR ZOCOR. (Patient taking differently: 40 mg TAKE 1/2 TABLET BY MOUTH EVERY DAY IN THE EVENING . GENERIC FOR ZOCOR.) 90 tablet 0     [START ON 10/15/2018] zolpidem (AMBIEN) 10 MG tablet Take 1 tablet (10 mg) by mouth nightly as needed for sleep Take 1 tablet at night for sleep-may repeat X1. 60 tablet 0     Allergies   Allergen Reactions     Erythromycin Base [Kdc:Yellow Dye+Erythromycin+Brilliant Blue Fcf] Nausea and Vomiting     Penicillins Rash       Reviewed and updated as needed this visit by clinical staff  Tobacco  Allergies  Meds  Med Hx  Surg Hx  Fam Hx  Soc Hx      Reviewed and updated as needed this visit by Provider       Review of Systems   Constitutional: Negative for fatigue and fever.   Respiratory: Negative for cough and shortness of breath.    Cardiovascular: Negative for chest pain, palpitations and leg swelling.   Gastrointestinal: Positive for constipation, nausea and vomiting. Negative for blood in stool and diarrhea.        + epigastric pain  Concern has ulcers.     Musculoskeletal: Positive for arthralgias, back pain, joint swelling, myalgias and neck pain.        Fell hitting recent right surgical replacement  knee. Pain and swelling.     Skin: Negative for rash.   Neurological: Positive for headaches. Negative for dizziness.   Psychiatric/Behavioral: Positive for dysphoric mood.   /70  Pulse 104  Temp 96.5  F (35.8  C) (Tympanic)  Resp 20  Ht 5' 2\" (1.575 m)  Wt 182 lb (82.6 kg)  SpO2 98%  " BMI 33.29 kg/m2  Physical Exam   Constitutional: She is oriented to person, place, and time. She appears well-developed and well-nourished.   Eyes: Conjunctivae and EOM are normal. Pupils are equal, round, and reactive to light.   Neck: Normal range of motion. Neck supple. No JVD present. No thyromegaly present.   Cardiovascular: Normal rate, regular rhythm, normal heart sounds and intact distal pulses.    No murmur heard.  Pulmonary/Chest: Effort normal and breath sounds normal.   Musculoskeletal:   Right knee very swollen. Painful to touch.  Ace bandage applied.  Surgical site well healed.     Lymphadenopathy:     She has no cervical adenopathy.   Neurological: She is alert and oriented to person, place, and time.   Skin: Skin is warm and dry.   Psychiatric: She has a normal mood and affect.     Results for orders placed or performed in visit on 10/12/18   Iron and iron binding capacity   Result Value Ref Range    Iron 17 (L) 35 - 180 ug/dL    Iron Binding Cap 278 240 - 430 ug/dL    Iron Saturation Index 6 (L) 15 - 46 %   CBC with platelets and differential   Result Value Ref Range    WBC 13.5 (H) 4.0 - 11.0 10e9/L    RBC Count 3.67 (L) 3.8 - 5.2 10e12/L    Hemoglobin 9.1 (L) 11.7 - 15.7 g/dL    Hematocrit 29.8 (L) 35.0 - 47.0 %    MCV 81 78 - 100 fl    MCH 24.8 (L) 26.5 - 33.0 pg    MCHC 30.5 (L) 31.5 - 36.5 g/dL    RDW 16.5 (H) 10.0 - 15.0 %    Platelet Count 510 (H) 150 - 450 10e9/L    Diff Method Automated Method     % Neutrophils 49.1 %    % Lymphocytes 26.6 %    % Monocytes 9.8 %    % Eosinophils 13.3 %    % Basophils 0.5 %    % Immature Granulocytes 0.7 %    Nucleated RBCs 0 0 /100    Absolute Neutrophil 6.6 1.6 - 8.3 10e9/L    Absolute Lymphocytes 3.6 0.8 - 5.3 10e9/L    Absolute Monocytes 1.3 0.0 - 1.3 10e9/L    Absolute Eosinophils 1.8 (H) 0.0 - 0.7 10e9/L    Absolute Basophils 0.1 0.0 - 0.2 10e9/L    Abs Immature Granulocytes 0.1 0 - 0.4 10e9/L    Absolute Nucleated RBC 0.0        ASSESSMENT /  "PLAN:  (E61.1) Low serum iron  (primary encounter diagnosis)  Comment:has not been able to take iron =17 now. Will set up for iron injections.    Plan: Iron and iron binding capacity, CBC with         platelets and differential           (K21.0) Gastroesophageal reflux disease with esophagitis  Comment:States could not tolerate Pepto Bismol, or probiotics. Does not like yogurt.  Will change Prilosec to protonix 40mg BID,  Continue Zantac, explained not many drugs to treat ulcer. Needs to get carafate down 4 times a day to help coat  Stomach. For some reason can take pain meds OK.    Plan: pantoprazole (PROTONIX) 40 MG EC tablet,         ranitidine (ZANTAC) 150 MG tablet      (D64.9) Low hemoglobin  Comment: Hgb staying 9.1   Hemoglobin   Date Value Ref Range Status   10/12/2018 9.1 (L) 11.7 - 15.7 g/dL Final   ]  WBC   Date Value Ref Range Status   10/12/2018 13.5 (H) 4.0 - 11.0 10e9/L Final         Plan: CBC with platelets and differential        (R11.0) Nausea  Comment: Will have take Zofran 4mg  3 times a day.  Automatically.    Plan: ondansetron (ZOFRAN) 4 MG tablet    (G89.18)  Postop pain.   Comment: Right knee wrapped with 6\"   ace bandage.  Will be seeing Dr. Hernandez on Wednesday for followup.      "

## 2018-10-12 NOTE — PATIENT INSTRUCTIONS
1. Zofran 4mg  3 times a day  2.  Protonix 40mg   2 times a  day  Instead of Prilosec   3. Continue zantac  2 times a day   Over the counter one.  Ranitidine.    4.  Carafate 1gm  4 times a day.    5.   Must eat.

## 2018-10-12 NOTE — TELEPHONE ENCOUNTER
ambien      Last Written Prescription Date:  9/15/18  Last Fill Quantity: 60,   # refills: 0  Last Office Visit: today  Future Office visit:    Next 5 appointments (look out 90 days)     Oct 12, 2018  3:00 PM CDT   (Arrive by 2:45 PM)   [facility]    [address] [place] [place] [address]   [phone]                   Routing refill request to provider for review/approval because:  Drug not on the [facility], [facility] or [facility] refill protocol or controlled substance

## 2018-10-12 NOTE — MR AVS SNAPSHOT
After Visit Summary   10/12/2018    Aure Lanier    MRN: [id number]           Patient Information     Date Of Birth          1950        Visit Information        Provider Department      10/12/2018 3:00 PM Jaden Lee NP [facility]        Today's Diagnoses     Low serum iron    -  1    Gastroesophageal reflux disease with esophagitis        Low hemoglobin        Nausea          Care Instructions    1. Zofran 4mg  3 times a day  2.  Protonix 40mg   2 times a  day  Instead of Prilosec   3. Continue zantac  2 times a day   Over the counter one.  Ranitidine.    4.  Carafate 1gm  4 times a day.    5.   Must eat.            Follow-ups after your visit        Who to contact     If you have questions or need follow up information about today's clinic visit or your schedule please contact [facility] [place] directly at [phone].  Normal or non-critical lab and imaging results will be communicated to you by MyChart, letter or phone within 4 business days after the clinic has received the results. If you do not hear from us within 7 days, please contact the clinic through [employer] or phone. If you have a critical or abnormal lab result, we will notify you by phone as soon as possible.  Submit refill requests through [employer] or call your pharmacy and they will forward the refill request to us. Please allow 3 business days for your refill to be completed.          Additional Information About Your Visit        MyChart Information     [employer] gives you secure access to your electronic health record. If you see a primary care provider, you can also send messages to your care team and make appointments. If you have questions, please call your primary care clinic.  If you do not have a primary care provider, please call [phone] and they will assist you.        Care EveryWhere ID     This is your Care EveryWhere ID. This could be used by other organizations to  "access your [place] medical records  [id number]        Your Vitals Were     Pulse Temperature Respirations Height Pulse Oximetry BMI (Body Mass Index)    104 96.5  F (35.8  C) (Tympanic) 20 5' 2\" (1.575 m) 98% 33.29 kg/m2       Blood Pressure from Last 3 Encounters:   10/12/18 128/70   09/25/18 120/60   09/21/18 140/72    Weight from Last 3 Encounters:   10/12/18 182 lb (82.6 kg)   09/25/18 191 lb (86.6 kg)   09/21/18 191 lb (86.6 kg)              We Performed the Following     CBC with platelets and differential     Iron and iron binding capacity          Today's Medication Changes          These changes are accurate as of 10/12/18  3:54 PM.  If you have any questions, ask your nurse or doctor.               Start taking these medicines.        Dose/Directions    pantoprazole 40 MG EC tablet   Commonly known as:  PROTONIX   Used for:  Gastroesophageal reflux disease with esophagitis   Started by:  Jaden Lee NP        Dose:  40 mg   Take 1 tablet (40 mg) by mouth daily Take 30-60 minutes before a meal.   Quantity:  30 tablet   Refills:  1         These medicines have changed or have updated prescriptions.        Dose/Directions    escitalopram 20 MG tablet   Commonly known as:  LEXAPRO   This may have changed:  See the new instructions.   Used for:  Anxiety        TAKE 1 TABLET BY MOUTH EVERY DAY   Quantity:  30 tablet   Refills:  5       ondansetron 4 MG tablet   Commonly known as:  ZOFRAN   This may have changed:    - when to take this  - reasons to take this   Used for:  Nausea   Changed by:  Jaden Lee NP        Dose:  4 mg   Take 1 tablet (4 mg) by mouth 3 times daily   Quantity:  48 tablet   Refills:  0       simvastatin 40 MG tablet   Commonly known as:  ZOCOR   This may have changed:    - how much to take  - additional instructions   Used for:  Mixed hyperlipidemia        TAKE 1 TABLET BY MOUTH EVERY DAY IN THE EVENING . GENERIC FOR ZOCOR.   Quantity:  90 tablet   Refills:  0       zolpidem 10 " MG tablet   Commonly known as:  AMBIEN   This may have changed:  These instructions start on 10/15/2018. If you are unsure what to do until then, ask your doctor or other care provider.   Used for:  Persistent insomnia   Changed by:  Jaden Lee NP        Dose:  10 mg   Start taking on:  10/15/2018   Take 1 tablet (10 mg) by mouth nightly as needed for sleep Take 1 tablet at night for sleep-may repeat X1.   Quantity:  60 tablet   Refills:  0            Where to get your medicines      These medications were sent to [id number] - [place], [place] - [address]  [address] [address]     Phone:  [phone]     ondansetron 4 MG tablet    pantoprazole 40 MG EC tablet         Some of these will need a paper prescription and others can be bought over the counter.  Ask your nurse if you have questions.     Bring a paper prescription for each of these medications     zolpidem 10 MG tablet                Primary Care Provider Office Phone # Fax #    Jaden Lee [phone] [address] [address]        Equal Access to Services     [facility] : Hadii jefry ku hadasho Soomaali, waaxda luqadaha, qaybta kaalmada adeegyada, racquel craven. So [facility] [phone].    ATENCIÓN: Si habla español, tiene a gross disposición servicios gratuitos de asistencia lingüística. [facility] [phone].    We comply with applicable federal civil rights laws and [place] laws. We do not discriminate on the basis of race, color, national origin, age, disability, sex, sexual orientation, or gender identity.            Thank you!     Thank you for choosing [facility]  for your care. Our goal is always to provide you with excellent care. Hearing back from our patients is one way we can continue to improve our services. Please take a few minutes to complete the written survey that you may receive in the mail after your visit with  us. Thank you!             Your Updated Medication List - Protect others around you: Learn how to safely use, store and throw away your medicines at www.disposemymeds.org.          This list is accurate as of 10/12/18  3:54 PM.  Always use your most recent med list.                   Brand Name Dispense Instructions for use Diagnosis    acetaminophen 325 MG tablet    TYLENOL    100 tablet    Take 2-3 tablets (650-975 mg) by mouth every 4 hours as needed for other (mild to moderate pain)    Status post total left knee replacement       CALCIUM + D PO      Take 2 tablets by mouth daily as needed        cyanocobalamin 1000 MCG tablet    vitamin  B-12     Take 1 tablet by mouth daily as needed        cyclobenzaprine 10 MG tablet    FLEXERIL    90 tablet    TAKE 1 TABLET BY MOUTH THREE TIMES DAILY AS NEEDED    Cervicalgia       docusate sodium 50 MG capsule    COLACE    60 capsule    Take 1 capsule (50 mg) by mouth 2 times daily as needed for constipation    S/P total knee arthroplasty, right       escitalopram 20 MG tablet    LEXAPRO    30 tablet    TAKE 1 TABLET BY MOUTH EVERY DAY    Anxiety       ferrous sulfate 325 (65 Fe) MG tablet    IRON    90 tablet    Take 1 tablet (325 mg) by mouth 3 times daily (with meals)    Iron deficiency       folic acid 1 MG tablet    FOLVITE    90 tablet    TAKE ONE TABLET DAILY BY MOUTH    Health care maintenance       gabapentin 300 MG capsule    NEURONTIN    90 capsule    TAKE ONE CAPSULE 3 TIMES A DAY BY MOUTH    Postherpetic polyneuropathy       HYDROcodone-acetaminophen 7.5-325 MG per tablet    NORCO    120 tablet    Take 1 tablet by mouth every 6 hours as needed for severe pain    Acute pain of right knee       levothyroxine 150 MCG tablet    SYNTHROID/LEVOTHROID    90 tablet    TAKE 1 TABLET (150 MCG) BY MOUTH DAILY    Hypothyroidism, unspecified type       * MULTIVITAMIN PO      Take 1 tablet by mouth daily as needed        * multivitamin Tabs tablet      Take 1 tablet by mouth  daily as needed        NYSTOP [id number] UNIT/GM Powd   Generic drug:  nystatin     60 g    APPLY TOPICALLY 2 TIMES DAILY AS NEEDED    Rash       omeprazole 40 MG capsule    priLOSEC    90 capsule    TAKE 1 CAPSULE DAILY BY MOUTH        ondansetron 4 MG tablet    ZOFRAN    48 tablet    Take 1 tablet (4 mg) by mouth 3 times daily    Nausea       pantoprazole 40 MG EC tablet    PROTONIX    30 tablet    Take 1 tablet (40 mg) by mouth daily Take 30-60 minutes before a meal.    Gastroesophageal reflux disease with esophagitis       * RANITIDINE HCL PO      Take 150 mg by mouth daily as needed        * ranitidine 150 MG tablet    ZANTAC    60 tablet    Take 1 tablet (150 mg) by mouth 2 times daily    Gastritis without bleeding, unspecified chronicity, unspecified gastritis type       simvastatin 40 MG tablet    ZOCOR    90 tablet    TAKE 1 TABLET BY MOUTH EVERY DAY IN THE EVENING . GENERIC FOR ZOCOR.    Mixed hyperlipidemia       zolpidem 10 MG tablet   Start taking on:  10/15/2018    AMBIEN    60 tablet    Take 1 tablet (10 mg) by mouth nightly as needed for sleep Take 1 tablet at night for sleep-may repeat X1.    Persistent insomnia       * Notice:  This list has 4 medication(s) that are the same as other medications prescribed for you. Read the directions carefully, and ask your doctor or other care provider to review them with you.

## 2018-10-15 ENCOUNTER — TELEPHONE (OUTPATIENT)
Dept: INTERNAL MEDICINE | Facility: OTHER | Age: 68
End: 2018-10-15

## 2018-10-15 NOTE — TELEPHONE ENCOUNTER
3:18 PM    Reason for Call: OVERBOOK    Patient is having the following symptoms: 2 week follow up for ulcers for 2 weeks.    The patient is requesting an appointment for 2 weeks with provider.    Was an appointment offered for this call? No  If yes : Appointment type              Date    Preferred method for responding to this message: Telephone Call  What is your phone number ?[phone]    If we cannot reach you directly, may we leave a detailed response at the number you provided? Yes    Can this message wait until your PCP/provider returns, if unavailable today? Not applicable, provider is in    Margie Browning

## 2018-10-18 DIAGNOSIS — B02.23 POSTHERPETIC POLYNEUROPATHY: ICD-10-CM

## 2018-10-18 RX ORDER — GABAPENTIN 300 MG/1
CAPSULE ORAL
Qty: 90 CAPSULE | Refills: 0 | Status: SHIPPED | OUTPATIENT
Start: 2018-10-18 | End: 2018-11-11

## 2018-10-18 NOTE — TELEPHONE ENCOUNTER
Neurontin  Last visit: 10.12.18  Last refill: 9.21.18 #90    Next 5 appointments (look out 90 days)     Nov 08, 2018 10:00 AM CST   (Arrive by 9:45 AM)   SHORT with Jaden Lee NP   [facility]    [address] [place] [address]   [phone]

## 2018-10-22 ENCOUNTER — TELEPHONE (OUTPATIENT)
Dept: INTERNAL MEDICINE | Facility: OTHER | Age: 68
End: 2018-10-22

## 2018-10-22 DIAGNOSIS — M54.2 CERVICALGIA: ICD-10-CM

## 2018-10-22 DIAGNOSIS — M25.561 ACUTE PAIN OF RIGHT KNEE: ICD-10-CM

## 2018-10-22 RX ORDER — CYCLOBENZAPRINE HCL 10 MG
TABLET ORAL
Qty: 90 TABLET | Refills: 0 | Status: SHIPPED | OUTPATIENT
Start: 2018-10-22 | End: 2018-11-11

## 2018-10-22 NOTE — TELEPHONE ENCOUNTER
11:10 AM    Reason for Call: Phone Call    Description: Aure needs a nurse to call her about your lab results and other questions about IV iron.     Was an appointment offered for this call?  No    Preferred method for responding to this message: [phone]    If we cannot reach you directly, may we leave a detailed response at the number you provided?   Yes    Can this message wait until your PCP/provider returns, if available today?    ELIS Koenig

## 2018-10-22 NOTE — TELEPHONE ENCOUNTER
Flexeril  Last Written Prescription Date:  9/25/18  Last Fill Qty:  90, # Refills:  0  Last Office Visit:  10/12/18    PCP is Jaden Lee.  Medication is pended.  Thank you.

## 2018-10-22 NOTE — TELEPHONE ENCOUNTER
Left message notifying pt that Jaden is out all week and to call back with questions. Deedee England LPN

## 2018-10-22 NOTE — TELEPHONE ENCOUNTER
Controlled Substance Refill Request for HYDROcodone-acetaminophen (NORCO) 7.5-325 MG per tablet  Problem List Complete:  Yes    Last Written Prescription Date:  9/25/18  Last Fill Quantity: 120,   # refills: 0    Last Office Visit with [facility] primary care provider: 10/12/18    Future Office visit:   Next 5 appointments (look out 90 days)     Nov 08, 2018 10:00 AM CST   (Arrive by 9:45 AM)   SHORT with Jaden Lee NP   [facility] ([facility]    [address] [place] [address]   [phone]                  Controlled substance agreement on file: Yes:  Date 7/20/17.     Processing:  Patient will  in clinic

## 2018-10-22 NOTE — TELEPHONE ENCOUNTER
Pt called back and states she is feeling weak and no energy, she said she vagally remembers Jaden saying something about a positive swab with blood and possible infusions. Pt as notified she had a positive ifob test and will most likely have to have a colonoscopy. Pt is also inquiring about if she needs to have some type of blood or iron transfusion. Please advise as Jaden is out all week. Deedee England LPN

## 2018-10-22 NOTE — TELEPHONE ENCOUNTER
Pt scheduled to see Dr. Milton on wed to look at hgb and TSH. Will follow up with Jaden from there.Deedee England LPN

## 2018-10-23 RX ORDER — HYDROCODONE BITARTRATE AND ACETAMINOPHEN 7.5; 325 MG/1; MG/1
1 TABLET ORAL EVERY 6 HOURS PRN
Qty: 120 TABLET | Refills: 0 | Status: SHIPPED | OUTPATIENT
Start: 2018-10-24 | End: 2018-12-03

## 2018-10-23 NOTE — TELEPHONE ENCOUNTER
Notified patient via phone of rx ready for .  This writer walked hard copy of NORCO up front of [facility] for patient .

## 2018-10-24 ENCOUNTER — OFFICE VISIT (OUTPATIENT)
Dept: INTERNAL MEDICINE | Facility: OTHER | Age: 68
End: 2018-10-24
Attending: INTERNAL MEDICINE
Payer: COMMERCIAL

## 2018-10-24 VITALS
RESPIRATION RATE: 20 BRPM | BODY MASS INDEX: 32.46 KG/M2 | DIASTOLIC BLOOD PRESSURE: 74 MMHG | HEIGHT: 62 IN | HEART RATE: 108 BPM | TEMPERATURE: 98.6 F | WEIGHT: 176.4 LBS | SYSTOLIC BLOOD PRESSURE: 128 MMHG | OXYGEN SATURATION: 99 %

## 2018-10-24 DIAGNOSIS — G89.4 CHRONIC PAIN SYNDROME: ICD-10-CM

## 2018-10-24 DIAGNOSIS — E03.8 OTHER SPECIFIED HYPOTHYROIDISM: ICD-10-CM

## 2018-10-24 DIAGNOSIS — D50.8 OTHER IRON DEFICIENCY ANEMIA: Primary | ICD-10-CM

## 2018-10-24 DIAGNOSIS — R53.83 OTHER FATIGUE: ICD-10-CM

## 2018-10-24 DIAGNOSIS — E03.9 HYPOTHYROIDISM, UNSPECIFIED TYPE: ICD-10-CM

## 2018-10-24 LAB
BASOPHILS # BLD AUTO: 0 10E9/L (ref 0–0.2)
BASOPHILS NFR BLD AUTO: 0.2 %
DIFFERENTIAL METHOD BLD: ABNORMAL
EOSINOPHIL # BLD AUTO: 0.9 10E9/L (ref 0–0.7)
EOSINOPHIL NFR BLD AUTO: 7.6 %
ERYTHROCYTE [DISTWIDTH] IN BLOOD BY AUTOMATED COUNT: 16 % (ref 10–15)
HCT VFR BLD AUTO: 36.6 % (ref 35–47)
HGB BLD-MCNC: 11.2 G/DL (ref 11.7–15.7)
LYMPHOCYTES # BLD AUTO: 3.9 10E9/L (ref 0.8–5.3)
LYMPHOCYTES NFR BLD AUTO: 32.6 %
MCH RBC QN AUTO: 24 PG (ref 26.5–33)
MCHC RBC AUTO-ENTMCNC: 30.6 G/DL (ref 31.5–36.5)
MCV RBC AUTO: 78 FL (ref 78–100)
MONOCYTES # BLD AUTO: 1.2 10E9/L (ref 0–1.3)
MONOCYTES NFR BLD AUTO: 9.8 %
NEUTROPHILS # BLD AUTO: 6 10E9/L (ref 1.6–8.3)
NEUTROPHILS NFR BLD AUTO: 49.8 %
PLATELET # BLD AUTO: 544 10E9/L (ref 150–450)
RBC # BLD AUTO: 4.67 10E12/L (ref 3.8–5.2)
WBC # BLD AUTO: 12 10E9/L (ref 4–11)

## 2018-10-24 PROCEDURE — 36415 COLL VENOUS BLD VENIPUNCTURE: CPT | Mod: ZL | Performed by: INTERNAL MEDICINE

## 2018-10-24 PROCEDURE — G0463 HOSPITAL OUTPT CLINIC VISIT: HCPCS

## 2018-10-24 PROCEDURE — 85025 COMPLETE CBC W/AUTO DIFF WBC: CPT | Mod: ZL | Performed by: INTERNAL MEDICINE

## 2018-10-24 PROCEDURE — 84439 ASSAY OF FREE THYROXINE: CPT | Mod: ZL | Performed by: INTERNAL MEDICINE

## 2018-10-24 PROCEDURE — 84443 ASSAY THYROID STIM HORMONE: CPT | Mod: ZL | Performed by: NURSE PRACTITIONER

## 2018-10-24 PROCEDURE — 99214 OFFICE O/P EST MOD 30 MIN: CPT | Performed by: INTERNAL MEDICINE

## 2018-10-24 ASSESSMENT — PAIN SCALES - GENERAL: PAINLEVEL: MODERATE PAIN (5)

## 2018-10-24 NOTE — MR AVS SNAPSHOT
After Visit Summary   10/24/2018    Aure Lanier    MRN: [id number]           Patient Information     Date Of Birth          1950        Visit Information        Provider Department      10/24/2018 4:00 PM Balta Milton DO [facility]        Today's Diagnoses     Other iron deficiency anemia    -  1    Other fatigue           Follow-ups after your visit        Additional Services     GENERAL SURG ADULT REFERRAL       Your provider has referred you to: Colonoscopy +/- EGD     Please be aware that coverage of these services is subject to the terms and limitations of your health insurance plan.  Call member services at your health plan with any benefit or coverage questions.      Please bring the following with you to your appointment:    (1) Any X-Rays, CTs or MRIs which have been performed.  Contact the facility where they were done to arrange for  prior to your scheduled appointment.   (2) List of current medications   (3) This referral request   (4) Any documents/labs given to you for this referral                  Your next 10 appointments already scheduled     Nov 08, 2018 10:00 AM CST   (Arrive by 9:45 AM)   SHORT with Jaden Lee NP   [facility] ([facility]    [address]  [facility] [address]   [phone]              Who to contact     If you have questions or need follow up information about today's clinic visit or your schedule please contact [facility] directly at [phone].  Normal or non-critical lab and imaging results will be communicated to you by MyChart, letter or phone within 4 business days after the clinic has received the results. If you do not hear from us within 7 days, please contact the clinic through MyChart or phone. If you have a critical or abnormal lab result, we will notify you by phone as soon as possible.  Submit refill requests through [employer]  "or call your pharmacy and they will forward the refill request to us. Please allow 3 business days for your refill to be completed.          Additional Information About Your Visit        [employer] Information     [employer] gives you secure access to your electronic health record. If you see a primary care provider, you can also send messages to your care team and make appointments. If you have questions, please call your primary care clinic.  If you do not have a primary care provider, please call [phone] and they will assist you.        Care EveryWhere ID     This is your Care EveryWhere ID. This could be used by other organizations to access your [facility] medical records  [id number]        Your Vitals Were     Pulse Temperature Respirations Height Pulse Oximetry BMI (Body Mass Index)    108 98.6  F (37  C) (Tympanic) 20 5' 2\" (1.575 m) 99% 32.26 kg/m2       Blood Pressure from Last 3 Encounters:   10/24/18 128/74   10/12/18 128/70   09/25/18 120/60    Weight from Last 3 Encounters:   10/24/18 176 lb 6.4 oz (80 kg)   10/12/18 182 lb (82.6 kg)   09/25/18 191 lb (86.6 kg)              We Performed the Following     CBC with platelets and differential     GENERAL SURG ADULT REFERRAL          Today's Medication Changes          These changes are accurate as of 10/24/18  4:32 PM.  If you have any questions, ask your nurse or doctor.               These medicines have changed or have updated prescriptions.        Dose/Directions    escitalopram 20 MG tablet   Commonly known as:  LEXAPRO   This may have changed:  See the new instructions.   Used for:  Anxiety        TAKE 1 TABLET BY MOUTH EVERY DAY   Quantity:  30 tablet   Refills:  5       simvastatin 40 MG tablet   Commonly known as:  ZOCOR   This may have changed:    - how much to take  - additional instructions   Used for:  Mixed hyperlipidemia        TAKE 1 TABLET BY MOUTH EVERY DAY IN THE EVENING . GENERIC FOR ZOCOR.   Quantity:  90 tablet   Refills:  0          "       Primary Care Provider Office Phone # Fax #    Jaden JL Lee [phone]       [facility] [address]        Equal Access to Services     ALYSSA ADAMES : Hadelisha jefry brito marleyo Sojun, waaxda luqadaha, qaybta kaalmada romina, racquel leigh laKarenjuventino craven. So [facility] [phone].    ATENCIÓN: Si habla español, tiene a gross disposición servicios gratuitos de asistencia lingüística. Llame al [phone].    We comply with applicable federal civil rights laws and [place] laws. We do not discriminate on the basis of race, color, national origin, age, disability, sex, sexual orientation, or gender identity.            Thank you!     Thank you for choosing [facility]  for your care. Our goal is always to provide you with excellent care. Hearing back from our patients is one way we can continue to improve our services. Please take a few minutes to complete the written survey that you may receive in the mail after your visit with us. Thank you!             Your Updated Medication List - Protect others around you: Learn how to safely use, store and throw away your medicines at www.disposemymeds.org.          This list is accurate as of 10/24/18  4:32 PM.  Always use your most recent med list.                   Brand Name Dispense Instructions for use Diagnosis    acetaminophen 325 MG tablet    TYLENOL    100 tablet    Take 2-3 tablets (650-975 mg) by mouth every 4 hours as needed for other (mild to moderate pain)    Status post total left knee replacement       CALCIUM + D PO      Take 2 tablets by mouth daily as needed        cyanocobalamin 1000 MCG tablet    vitamin  B-12     Take 1 tablet by mouth daily as needed        cyclobenzaprine 10 MG tablet    FLEXERIL    90 tablet    TAKE 1 TABLET BY MOUTH THREE TIMES DAILY AS NEEDED    Cervicalgia       docusate sodium 50 MG capsule    COLACE    60 capsule    Take 1 capsule (50 mg) by mouth 2 times daily  as needed for constipation    S/P total knee arthroplasty, right       escitalopram 20 MG tablet    LEXAPRO    30 tablet    TAKE 1 TABLET BY MOUTH EVERY DAY    Anxiety       ferrous sulfate 325 (65 Fe) MG tablet    IRON    90 tablet    Take 1 tablet (325 mg) by mouth 3 times daily (with meals)    Iron deficiency       folic acid 1 MG tablet    FOLVITE    90 tablet    TAKE ONE TABLET DAILY BY MOUTH    Health care maintenance       gabapentin 300 MG capsule    NEURONTIN    90 capsule    TAKE 1 CAPSULE BY MOUTH 3 TIMES DAILY    Postherpetic polyneuropathy       HYDROcodone-acetaminophen 7.5-325 MG per tablet    NORCO    120 tablet    Take 1 tablet by mouth every 6 hours as needed for severe pain    Acute pain of right knee       levothyroxine 150 MCG tablet    SYNTHROID/LEVOTHROID    90 tablet    TAKE 1 TABLET (150 MCG) BY MOUTH DAILY    Hypothyroidism, unspecified type       * MULTIVITAMIN PO      Take 1 tablet by mouth daily as needed        * multivitamin Tabs tablet      Take 1 tablet by mouth daily as needed        NYSTOP 412649 UNIT/GM Powd   Generic drug:  nystatin     60 g    APPLY TOPICALLY 2 TIMES DAILY AS NEEDED    Rash       omeprazole 40 MG capsule    priLOSEC    90 capsule    TAKE 1 CAPSULE DAILY BY MOUTH        ondansetron 4 MG tablet    ZOFRAN    48 tablet    Take 1 tablet (4 mg) by mouth 3 times daily    Nausea       pantoprazole 40 MG EC tablet    PROTONIX    30 tablet    Take 1 tablet (40 mg) by mouth daily Take 30-60 minutes before a meal.    Gastroesophageal reflux disease with esophagitis       ranitidine 150 MG tablet    ZANTAC    60 tablet    Take 1 tablet (150 mg) by mouth 2 times daily    Gastritis without bleeding, unspecified chronicity, unspecified gastritis type       simvastatin 40 MG tablet    ZOCOR    90 tablet    TAKE 1 TABLET BY MOUTH EVERY DAY IN THE EVENING . GENERIC FOR ZOCOR.    Mixed hyperlipidemia       zolpidem 10 MG tablet    AMBIEN    60 tablet    Take 1 tablet (10 mg) by  mouth nightly as needed for sleep Take 1 tablet at night for sleep-may repeat X1.    Persistent insomnia       * Notice:  This list has 2 medication(s) that are the same as other medications prescribed for you. Read the directions carefully, and ask your doctor or other care provider to review them with you.

## 2018-10-24 NOTE — NURSING NOTE
"Chief Complaint   Patient presents with     RECHECK       Initial /74 (BP Location: Left arm, Patient Position: Sitting, Cuff Size: Adult Regular)  Pulse 108  Temp 98.6  F (37  C) (Tympanic)  Resp 20  Ht 5' 2\" (1.575 m)  Wt 176 lb 6.4 oz (80 kg)  SpO2 99%  BMI 32.26 kg/m2 Estimated body mass index is 32.26 kg/(m^2) as calculated from the following:    Height as of this encounter: 5' 2\" (1.575 m).    Weight as of this encounter: 176 lb 6.4 oz (80 kg).  Medication Reconciliation: complete    Amanda Lr MA  "

## 2018-10-24 NOTE — PROGRESS NOTES
SUBJECTIVE:   Aure Lanier is a 68 year old female who presents to clinic today for the following health issues:      recheck      Duration: 10/12/18    Description (location/character/radiation): pt states feeling weak and tired, last hgb was low    Intensity:  n/a    Accompanying signs and symptoms: also had positive Ifob on 9/19/18 with no follow up     History (similar episodes/previous evaluation): None    Precipitating or alleviating factors: None    Therapies tried and outcome: None     Aure presents today due to fatigue.  She has had a drop in her Hgb c/w iron deficiency and most recent Hgb was 9.1.   She has started oral iron therapy. Unfortunately she does not tolerate taking iron.  She has numerous complaints today with the primary being fatigue.  She has not completed a colonoscopy ever and may also benefit from an EGD due to ongoing nausea and dyspepsia.  Hgb today improved to 11.2.      Problem list and histories reviewed & adjusted, as indicated.  Additional history: as documented    Patient Active Problem List   Diagnosis     Allergic arthritis involving hand     Hypothyroidism     Insomnia     Headache     Postherpetic polyneuropathy     Dysthymia     Anxiety state     Hyperlipidemia     Migraine     Osteoporosis     GERD     Low back pain     Hyperlipidemia with target LDL less than 100     Bilateral chronic knee pain     Back muscle spasm     Chronic pain syndrome     Status post total left knee replacement     S/P total knee arthroplasty, right     Status post total right knee replacement     Family history of other musculoskeletal diseases(V17.89)     Past Surgical History:   Procedure Laterality Date     ARTHROPLASTY KNEE Left 4/17/2018    Procedure: ARTHROPLASTY KNEE;  LEFT TOTAL KNEE ARTHROPLASTY S/N JOURNEY II;  Surgeon: Ty Hernandez MD;  Location: [facility]     ARTHROPLASTY KNEE Right 9/4/2018    Procedure: ARTHROPLASTY KNEE;  RIGHT TOTAL KNEE ARTHROPLASTY ;  Surgeon: Ty Hernandez,  MD;  Location: [facility] OR     D & C       ESOPHAGOSCOPY, GASTROSCOPY, DUODENOSCOPY (EGD), COMBINED N/A 9/11/2017    Procedure: COMBINED ESOPHAGOSCOPY, GASTROSCOPY, DUODENOSCOPY (EGD);  Upper Endoscopy: Removal of Food Impaction;  Surgeon: Lino Zhu DO;  Location: [facility] OR     STOMACH SURGERY       stomach surgery peritinitis         Social History   Substance Use Topics     Smoking status: Never Smoker     Smokeless tobacco: Never Used      Comment: no passive exposure     Alcohol use Yes      Comment: rarely, maybe yearly     Family History   Problem Relation Age of Onset     Cerebrovascular Disease Mother      CVA     Hypertension Mother      Other - See Comments Father 40     mining accident; cause of death     Other - See Comments Brother      muscle dystrophy     Cancer Daughter 34     skin cancer     Melanoma Daughter          Current Outpatient Prescriptions   Medication Sig Dispense Refill     acetaminophen (TYLENOL) 325 MG tablet Take 2-3 tablets (650-975 mg) by mouth every 4 hours as needed for other (mild to moderate pain) 100 tablet      Calcium Citrate-Vitamin D (CALCIUM + D PO) Take 2 tablets by mouth daily as needed       cyanocolbalamin (VITAMIN  B-12) 1000 MCG tablet Take 1 tablet by mouth daily as needed        cyclobenzaprine (FLEXERIL) 10 MG tablet TAKE 1 TABLET BY MOUTH THREE TIMES DAILY AS NEEDED 90 tablet 0     docusate sodium (COLACE) 50 MG capsule Take 1 capsule (50 mg) by mouth 2 times daily as needed for constipation 60 capsule 0     escitalopram (LEXAPRO) 20 MG tablet TAKE 1 TABLET BY MOUTH EVERY DAY (Patient taking differently: TAKE 1 TABLET BY MOUTH EVERY DAY AT BEDTIME) 30 tablet 5     ferrous sulfate (IRON) 325 (65 Fe) MG tablet Take 1 tablet (325 mg) by mouth 3 times daily (with meals) 90 tablet 2     folic acid (FOLVITE) 1 MG tablet TAKE ONE TABLET DAILY BY MOUTH 90 tablet 1     gabapentin (NEURONTIN) 300 MG capsule TAKE 1 CAPSULE BY MOUTH 3 TIMES DAILY 90 capsule 0      HYDROcodone-acetaminophen (NORCO) 7.5-325 MG per tablet Take 1 tablet by mouth every 6 hours as needed for severe pain 120 tablet 0     levothyroxine (SYNTHROID/LEVOTHROID) 150 MCG tablet TAKE 1 TABLET (150 MCG) BY MOUTH DAILY 90 tablet 1     Multiple Vitamins-Minerals (MULTIVITAMIN OR) Take 1 tablet by mouth daily as needed        multivitamin (OCUVITE) TABS Take 1 tablet by mouth daily as needed        NYSTOP 649873 UNIT/GM POWD powder APPLY TOPICALLY 2 TIMES DAILY AS NEEDED 60 g 3     omeprazole (PRILOSEC) 40 MG capsule TAKE 1 CAPSULE DAILY BY MOUTH 90 capsule 0     ondansetron (ZOFRAN) 4 MG tablet Take 1 tablet (4 mg) by mouth 3 times daily 48 tablet 0     pantoprazole (PROTONIX) 40 MG EC tablet Take 1 tablet (40 mg) by mouth daily Take 30-60 minutes before a meal. 30 tablet 1     ranitidine (ZANTAC) 150 MG tablet Take 1 tablet (150 mg) by mouth 2 times daily 60 tablet 1     simvastatin (ZOCOR) 40 MG tablet TAKE 1 TABLET BY MOUTH EVERY DAY IN THE EVENING . GENERIC FOR ZOCOR. (Patient taking differently: 40 mg TAKE 1/2 TABLET BY MOUTH EVERY DAY IN THE EVENING . GENERIC FOR ZOCOR.) 90 tablet 0     zolpidem (AMBIEN) 10 MG tablet Take 1 tablet (10 mg) by mouth nightly as needed for sleep Take 1 tablet at night for sleep-may repeat X1. 60 tablet 0     Allergies   Allergen Reactions     Erythromycin Base [Kdc:Yellow Dye+Erythromycin+Brilliant Blue Fcf] Nausea and Vomiting     Penicillins Rash     BP Readings from Last 3 Encounters:   10/24/18 128/74   10/12/18 128/70   09/25/18 120/60    Wt Readings from Last 3 Encounters:   10/24/18 176 lb 6.4 oz (80 kg)   10/12/18 182 lb (82.6 kg)   09/25/18 191 lb (86.6 kg)                    Reviewed and updated as needed this visit by clinical staff       Reviewed and updated as needed this visit by Provider         ROS:  Constitutional, HEENT, cardiovascular, pulmonary, gi and gu systems are negative, except as otherwise noted.    OBJECTIVE:     /74 (BP Location: Left arm,  "Patient Position: Sitting, Cuff Size: Adult Regular)  Pulse 108  Temp 98.6  F (37  C) (Tympanic)  Resp 20  Ht 5' 2\" (1.575 m)  Wt 176 lb 6.4 oz (80 kg)  SpO2 99%  BMI 32.26 kg/m2  Body mass index is 32.26 kg/(m^2).   GENERAL: Alert and no distress  EYES: Eyes grossly normal to inspection, PERRL and conjunctivae and sclerae normal  NECK: no adenopathy, no asymmetry, masses, or scars and thyroid normal to palpation  RESP: lungs clear to auscultation - no rales, rhonchi or wheezes  CV: regular rate and rhythm, normal S1 S2, no S3 or S4, no murmur, click or rub, no peripheral edema and peripheral pulses strong  ABDOMEN: soft, nontender, no hepatosplenomegaly, no masses and bowel sounds normal  MS: no gross musculoskeletal defects noted, no edema  SKIN: no suspicious lesions or rashes  NEURO: Normal strength and tone, mentation intact and speech normal  PSYCH: tangential and anxious    Diagnostic Test Results:  Results for orders placed or performed in visit on 10/24/18 (from the past 24 hour(s))   CBC with platelets and differential   Result Value Ref Range    WBC 12.0 (H) 4.0 - 11.0 10e9/L    RBC Count 4.67 3.8 - 5.2 10e12/L    Hemoglobin 11.2 (L) 11.7 - 15.7 g/dL    Hematocrit 36.6 35.0 - 47.0 %    MCV 78 78 - 100 fl    MCH 24.0 (L) 26.5 - 33.0 pg    MCHC 30.6 (L) 31.5 - 36.5 g/dL    RDW 16.0 (H) 10.0 - 15.0 %    Platelet Count 544 (H) 150 - 450 10e9/L    % Neutrophils 49.8 %    % Lymphocytes 32.6 %    % Monocytes 9.8 %    % Eosinophils 7.6 %    % Basophils 0.2 %    Absolute Neutrophil 6.0 1.6 - 8.3 10e9/L    Absolute Lymphocytes 3.9 0.8 - 5.3 10e9/L    Absolute Monocytes 1.2 0.0 - 1.3 10e9/L    Absolute Eosinophils 0.9 (H) 0.0 - 0.7 10e9/L    Absolute Basophils 0.0 0.0 - 0.2 10e9/L    Diff Method Automated Method      Results for orders placed or performed in visit on 10/24/18   CBC with platelets and differential   Result Value Ref Range    WBC 12.0 (H) 4.0 - 11.0 10e9/L    RBC Count 4.67 3.8 - 5.2 10e12/L    " Hemoglobin 11.2 (L) 11.7 - 15.7 g/dL    Hematocrit 36.6 35.0 - 47.0 %    MCV 78 78 - 100 fl    MCH 24.0 (L) 26.5 - 33.0 pg    MCHC 30.6 (L) 31.5 - 36.5 g/dL    RDW 16.0 (H) 10.0 - 15.0 %    Platelet Count 544 (H) 150 - 450 10e9/L    % Neutrophils 49.8 %    % Lymphocytes 32.6 %    % Monocytes 9.8 %    % Eosinophils 7.6 %    % Basophils 0.2 %    Absolute Neutrophil 6.0 1.6 - 8.3 10e9/L    Absolute Lymphocytes 3.9 0.8 - 5.3 10e9/L    Absolute Monocytes 1.2 0.0 - 1.3 10e9/L    Absolute Eosinophils 0.9 (H) 0.0 - 0.7 10e9/L    Absolute Basophils 0.0 0.0 - 0.2 10e9/L    Diff Method Automated Method        ASSESSMENT/PLAN:     Problem List Items Addressed This Visit     Hypothyroidism TSH and T4 pending    Chronic pain syndrome      Other Visit Diagnoses     Other iron deficiency anemia    -  Primary    Relevant Orders    CBC with platelets and differential (Completed)    GENERAL SURG ADULT REFERRAL    Other fatigue                 Balta Milton,   [facility] [place] [facility]

## 2018-10-25 LAB
T4 FREE SERPL-MCNC: 0.86 NG/DL (ref 0.76–1.46)
TSH SERPL DL<=0.005 MIU/L-ACNC: 7.38 MU/L (ref 0.4–4)

## 2018-10-31 ENCOUNTER — OFFICE VISIT (OUTPATIENT)
Dept: SURGERY | Facility: OTHER | Age: 68
End: 2018-10-31
Attending: INTERNAL MEDICINE
Payer: COMMERCIAL

## 2018-10-31 VITALS
TEMPERATURE: 98.2 F | SYSTOLIC BLOOD PRESSURE: 134 MMHG | WEIGHT: 174.4 LBS | BODY MASS INDEX: 32.09 KG/M2 | HEIGHT: 62 IN | DIASTOLIC BLOOD PRESSURE: 76 MMHG | HEART RATE: 116 BPM | OXYGEN SATURATION: 99 %

## 2018-10-31 DIAGNOSIS — K29.00 ACUTE GASTRITIS, PRESENCE OF BLEEDING UNSPECIFIED, UNSPECIFIED GASTRITIS TYPE: ICD-10-CM

## 2018-10-31 DIAGNOSIS — Z12.11 ENCOUNTER FOR COLORECTAL CANCER SCREENING: ICD-10-CM

## 2018-10-31 DIAGNOSIS — D50.8 OTHER IRON DEFICIENCY ANEMIA: ICD-10-CM

## 2018-10-31 DIAGNOSIS — Z12.12 ENCOUNTER FOR COLORECTAL CANCER SCREENING: ICD-10-CM

## 2018-10-31 DIAGNOSIS — R10.13 EPIGASTRIC PAIN: Primary | ICD-10-CM

## 2018-10-31 PROCEDURE — 99214 OFFICE O/P EST MOD 30 MIN: CPT | Performed by: SURGERY

## 2018-10-31 PROCEDURE — G0463 HOSPITAL OUTPT CLINIC VISIT: HCPCS

## 2018-10-31 RX ORDER — SUCRALFATE 1 G/1
1 TABLET ORAL 4 TIMES DAILY
Status: ON HOLD | COMMUNITY
End: 2018-11-05

## 2018-10-31 ASSESSMENT — PAIN SCALES - GENERAL: PAINLEVEL: SEVERE PAIN (6)

## 2018-10-31 NOTE — MR AVS SNAPSHOT
After Visit Summary   10/31/2018    Aure Lanier    MRN: [id number]           Patient Information     Date Of Birth          1950        Visit Information        Provider Department      10/31/2018 2:30 PM Dieudonne Jackson MD [facility]        Today's Diagnoses     Other iron deficiency anemia          Care Instructions    GUIDE TO YOUR UPPER ENDOSCOPY/COLONOSCOPY WITH SUPREP  At [facility], we want to make sure that your colonoscopy is as pleasant as possible. This guide is designed to answer any questions you might have and to walk you through the preparations you will need to make before your procedure.  Should you have additional questions, please feel free to contact us. Contact numbers are listed below. Thank you for choosing [facility].    Clinic Health Unit Coordinator: [phone]  Clinic Nurse: [phone] (or [phone]; [phone])  Surgery Education Nurse: [phone] or toll free [phone]    Date of Procedure: 11/5/18 with Dr. Jackson  Admit time: Surgery Department will call you the day before your procedure by 5pm with your admit time. If your surgery is on Monday, please expect a call on Friday.  If we were unable to reach you before 5PM, you may call admitting at [phone] or toll free at [phone] ext [phone]    Please call the Registered Nurse in Surgery Education at [phone] or toll free at [phone] one to two weeks before your procedure and have an allergy and medication list ready     Call the surgery nurse or your primary care provider if you should become ill within 1 week of your procedure and we will reschedule it when you are healthy. This includes signs or symptoms of a cold or the flu. This can include fever, chills, sore throat, cough, chest congestions, productive cough, runny nose.     All nursing questions or concerns can be directed to the clinic or surgery education nurse at any of  "the numbers listed above. If you have a scheduling or appointment question, please call the Health Unit Coordinator between 8am and 4pm Monday through Friday. After hours or on weekends, please call [phone] to postpone.     COLONOSCOPY PREP    7 DAYS BEFORE THE EXAM:  Do not take Aspirin (325mg)or other NSAIDS (Ibuprofen, Motrin, Aleve, Celebrex, Naproxen, etc) 7 days before your surgery. Tylenol is fine. Stop taking fiber supplements, vitamins, iron or vitamins that contain iron, and herbals.  Do not eat any corn, nuts or seeds.     Arrange transportation with a family member or friend to drive you home and have an adult available to stay with you for the next 4 hours when you arrive home for your safety. If you need to take a taxi or the bus, you MUST have a responsible adult to ride with you OR YOUR PROCEDURE WILL BE CANCELLED. It is recommended that you DO NOT DRIVE for the next 24 hours after receiving anesthesia.      prescriptions at your pharmacy as soon as possible. If it has been more than one week since your appointment was scheduled, please call your pharmacy to verify it is still ready for .     2 DAYS BEFORE THE EXAM:   Begin a low fiber diet. No raw fruits or vegatables or whole grains.  Please see the list of foods you can have and foods to avoid on page 3 of the separate \"Split-Dose SuPrep\" colonoscopy instruction packet.   Drink at least 4-6 large glasses of sports drink each day (not red or purple).    1 DAY BEFORE THE EXAM:  DO NOT EAT ANY SOLID FOOD OR MILK PRODUCTS AFTER 12:00 AM (MIDNIGHT).  Drink only clear liquids for breakfast, lunch and dinner. (No red or purple colors)  Please see the list of liquids you can have and what to avoid on page 2 of the separate \"Split-Dose SuPrep\" colonoscopy instruction packet.   Follow the instructions of your colon preparation.  No red or purple colors, milk products, or alcohol.     AT 6:00 PM THE EVENING PRIOR TO PROCEDURE:  Pour one 16 " "ounce bottle of SUPREP liquid into the mixing container.  Add cool drinking water to the 16 ounce line on the container and mix.  Note: Be sure to dilute SUPREP before you drink it.  Drink ALL the liquid in the container.  You must drink 2 or more 16 ounce containers of water over the next hour.  Stay near a toilet while using this medication.     DAY OF COLONOSCOPY PROCEDURE:  6 HOURS PRIOR TO THE EXAM (set an alarm):  Pour the 2nd 16 ounce bottle of SUPREP liquid into the mixing container.  Add cool drinking water to the 16 ounce line on the container and mix.  Note: Be sure to dilute SUPREP before you drink it.  Drink ALL the liquid in the container.  You must drink 2 or more 16 ounce containers of water over the next hour.  You should be done with with prep 4 hours before the exam.    You may continue clear liquids until 3 hours prior to exam.   If you must take medication, take it with a sip of water.    Wear comfortable clothes. No jewelry, body piercings, make-up, nail polish, hair spray, lotions, perfumes or colognes. Shower before you arrive.  [place] in Admitting through the [facility].  You must have a  with you and and adult available to stay with your for 4 hours at home. The medicine used in this test will make you sleepy. If you do not have someone, please reschedule or your test will be cancelled.  It is recommended that you do not drive for 24 hours after your test. Do not operate power equipment, drink alcoholic beverages, make important decisions or sign legal documents.     COLONOSCOPY FREQUENTLY ASKED QUESTIONS  What is a colonoscopy?    A colonoscopy is a test to look at the lining of your large intestine. The purpose of the exam is to check for abnormalities including growths called \"polyps\" that can lead to serious disease. A flexibles scope is inserted into your rectum by the doctor to examine your large intestine.    What are polyps?  Polyps are abnormal growths on the lining of " the colon. Most polyps are not cancerous, but some polyps have the potential to turn into cancer with time. Polyps can also bleed. For these reasons, most polyps are removed during a colonoscopy and sent to the laboratory for microscopic examination.    What preparation is needed?  The colon must be completely clean for the procedure to be performed. You may be given one or two different prep solutions to cleanse your bowel. You will also need to follow a clear liquid diet the day before your procedure.    What happens after the procedure?  After your procedure is complete, you will be taken back to your day surgery room where you will be monitored for approximately 1 hour. You can expect to feel drowsy for several hours afterward. You may experience some cramping or bloating due to the air introduced into your colon during the exam. You will not be able to drive or operate machinery the rest of the day. You will be given written discharge instructions and appropriate learning material before you go home. You must have an adult to stay at home with you for the next 4 hours after you leave the hospital for your safety.    When will I find out the results of my test?  Your surgeon will talk to you and your designated  before you leave and usually the preliminary results can be given to you at that time. If a biopsy was taken during your procedure, it will be sent to the laboratory for examination. Results usually take one week. You will be contacted by phone or by letter with results.      TIPS FOR COLON CLEANSING BEFORE YOUR COLONOSCOPY  To get accurate results from your exam, your colon must be completely clean and empty. Please follow your doctor's instructions. If you do not, you may need to repeat both the exam and colon-cleansing process.    The medicine you take may cause bloating, nausea and other discomfort. Follow these tips to make the process as easy as possible:     You may use alcohol-free baby  wipes to ease anal irritation. You may also use Vaseline to help protect the skin. Other options include Tucks wipes, hemorrhoid treatments and hydrocortisone cream.    To chill the solution, put it in your refrigerator or set it in a bowl of ice. Do not add ice in your drinking glass. You may remove the colon preparation from the refrigerator 15-30 minutes before drinking.    Stay near a toilet! You will have diarrhea (loose watery stools) and may also have chills. Dress for comfort. Expect to feel discomfort until the stool clears from your colon. This usually takes about 2 to 4 hours.    If you followed your doctor's orders and your stool is a clear or yellow liquid, you are ready for the exam. If you are not sure if your colon is clean, please call your clinic and ask to speak to a nurse.       If SuPrep is not covered by insurance and you would like to opt for Golytely as an alternate prep, please call the nurse to request a new prescription to your pharmacy. Sometimes the pharmacy will contact our office in advance if the prescription is not covered. The dietary instructions are the same for both bowel preps. Take Dulcolax 5mg at bedtime 2 nights before procedure and at 3pm day before procedure. Drink 1/2 of the the golytely at 6pm day before procedure 1  8 oz glass every 15 minutes. Repeat with the 2nd 1/2 of Golytely day of procedure 6 hours prior to check in time.                      Follow-ups after your visit        Your next 10 appointments already scheduled     Nov 08, 2018 10:00 AM CST   (Arrive by 9:45 AM)   SHORT with Jaden Lee NP   [facility]    [address] [place] [address]   [phone]              Who to contact     If you have questions or need follow up information about today's clinic visit or your schedule please contact [facility] directly at [phone].  Normal or non-critical lab and imaging  "results will be communicated to you by MyChart, letter or phone within 4 business days after the clinic has received the results. If you do not hear from us within 7 days, please contact the clinic through [employer] or phone. If you have a critical or abnormal lab result, we will notify you by phone as soon as possible.  Submit refill requests through [employer] or call your pharmacy and they will forward the refill request to us. Please allow 3 business days for your refill to be completed.          Additional Information About Your Visit        [employer] Information     [employer] gives you secure access to your electronic health record. If you see a primary care provider, you can also send messages to your care team and make appointments. If you have questions, please call your primary care clinic.  If you do not have a primary care provider, please call [phone] and they will assist you.        Care EveryWhere ID     This is your Care EveryWhere ID. This could be used by other organizations to access your [place] medical records  [id number]        Your Vitals Were     Pulse Temperature Height Pulse Oximetry BMI (Body Mass Index)       118 98.2  F (36.8  C) 5' 2\" (1.575 m) 99% 31.9 kg/m2        Blood Pressure from Last 3 Encounters:   10/31/18 134/76   10/24/18 128/74   10/12/18 128/70    Weight from Last 3 Encounters:   10/31/18 174 lb 6.4 oz (79.1 kg)   10/24/18 176 lb 6.4 oz (80 kg)   10/12/18 182 lb (82.6 kg)              Today, you had the following     No orders found for display       Primary Care Provider Office Phone # Fax #    Jaden Lee [phone]       [facility] [address]        Equal Access to Services     NEREIDA ADAMES : Hadii jefry Anderosn, waaxda luqadaha, qaybta kaalmada adebiayada, racquel craven. So [facility] [phone].    ATENCIÓN: Si habla español, tiene a gross disposición servicios gratuitos de asistencia lingüística. " Lei gee [phone].    We comply with applicable federal civil rights laws and [place] laws. We do not discriminate on the basis of race, color, national origin, age, disability, sex, sexual orientation, or gender identity.            Thank you!     Thank you for choosing [facility]  for your care. Our goal is always to provide you with excellent care. Hearing back from our patients is one way we can continue to improve our services. Please take a few minutes to complete the written survey that you may receive in the mail after your visit with us. Thank you!             Your Updated Medication List - Protect others around you: Learn how to safely use, store and throw away your medicines at www.disposemymeds.org.          This list is accurate as of 10/31/18  3:43 PM.  Always use your most recent med list.                   Brand Name Dispense Instructions for use Diagnosis    acetaminophen 325 MG tablet    TYLENOL    100 tablet    Take 2-3 tablets (650-975 mg) by mouth every 4 hours as needed for other (mild to moderate pain)    Status post total left knee replacement       CALCIUM + D PO      Take 2 tablets by mouth daily as needed        cyanocobalamin 1000 MCG tablet    vitamin  B-12     Take 1 tablet by mouth daily as needed        cyclobenzaprine 10 MG tablet    FLEXERIL    90 tablet    TAKE 1 TABLET BY MOUTH THREE TIMES DAILY AS NEEDED    Cervicalgia       docusate sodium 50 MG capsule    COLACE    60 capsule    Take 1 capsule (50 mg) by mouth 2 times daily as needed for constipation    S/P total knee arthroplasty, right       escitalopram 20 MG tablet    LEXAPRO    30 tablet    TAKE 1 TABLET BY MOUTH EVERY DAY    Anxiety       ferrous sulfate 325 (65 Fe) MG tablet    IRON    90 tablet    Take 1 tablet (325 mg) by mouth 3 times daily (with meals)    Iron deficiency       folic acid 1 MG tablet    FOLVITE    90 tablet    TAKE ONE TABLET DAILY BY MOUTH    Health care maintenance        gabapentin 300 MG capsule    NEURONTIN    90 capsule    TAKE 1 CAPSULE BY MOUTH 3 TIMES DAILY    Postherpetic polyneuropathy       HYDROcodone-acetaminophen 7.5-325 MG per tablet    NORCO    120 tablet    Take 1 tablet by mouth every 6 hours as needed for severe pain    Acute pain of right knee       levothyroxine 150 MCG tablet    SYNTHROID/LEVOTHROID    90 tablet    TAKE 1 TABLET (150 MCG) BY MOUTH DAILY    Hypothyroidism, unspecified type       * MULTIVITAMIN PO      Take 1 tablet by mouth daily as needed        * multivitamin Tabs tablet      Take 1 tablet by mouth daily as needed        NYSTOP [id number] UNIT/GM Powd   Generic drug:  nystatin     60 g    APPLY TOPICALLY 2 TIMES DAILY AS NEEDED    Rash       omeprazole 40 MG capsule    priLOSEC    90 capsule    TAKE 1 CAPSULE DAILY BY MOUTH        ondansetron 4 MG tablet    ZOFRAN    48 tablet    TAKE 1 TABLET (4 MG) BY MOUTH 3 TIMES DAILY    Nausea       pantoprazole 40 MG EC tablet    PROTONIX    30 tablet    Take 1 tablet (40 mg) by mouth daily Take 30-60 minutes before a meal.    Gastroesophageal reflux disease with esophagitis       ranitidine 150 MG tablet    ZANTAC    60 tablet    Take 1 tablet (150 mg) by mouth 2 times daily    Gastritis without bleeding, unspecified chronicity, unspecified gastritis type       simvastatin 40 MG tablet    ZOCOR    90 tablet    TAKE 1 TABLET BY MOUTH EVERY DAY IN THE EVENING . GENERIC FOR ZOCOR.    Mixed hyperlipidemia       sucralfate 1 GM tablet    CARAFATE     Take 1 g by mouth 4 times daily        zolpidem 10 MG tablet    AMBIEN    60 tablet    Take 1 tablet (10 mg) by mouth nightly as needed for sleep Take 1 tablet at night for sleep-may repeat X1.    Persistent insomnia       * Notice:  This list has 2 medication(s) that are the same as other medications prescribed for you. Read the directions carefully, and ask your doctor or other care provider to review them with you.

## 2018-10-31 NOTE — PATIENT INSTRUCTIONS
GUIDE TO YOUR UPPER ENDOSCOPY/COLONOSCOPY WITH SUPREP (May use Gatorade Prep if not covered by insurance.)  At [facility], we want to make sure that your colonoscopy is as pleasant as possible. This guide is designed to answer any questions you might have and to walk you through the preparations you will need to make before your procedure.  Should you have additional questions, please feel free to contact us. Contact numbers are listed below. Thank you for choosing [facility].    Clinic Health Unit Coordinator: [phone]  Clinic Nurse: [phone] (or [phone]; [phone])  Surgery Education Nurse: [phone] or toll free [phone]    Date of Procedure: 11/5/18 with Dr. Jackson  Admit time: Surgery Department will call you the day before your procedure by 5pm with your admit time. If your surgery is on Monday, please expect a call on Friday.  If we were unable to reach you before 5PM, you may call admitting at [phone] or toll free at [phone] ext [phone]    Please call the Registered Nurse in Surgery Education at [phone] or toll free at [phone] one to two weeks before your procedure and have an allergy and medication list ready     Call the surgery nurse or your primary care provider if you should become ill within 1 week of your procedure and we will reschedule it when you are healthy. This includes signs or symptoms of a cold or the flu. This can include fever, chills, sore throat, cough, chest congestions, productive cough, runny nose.     All nursing questions or concerns can be directed to the clinic or surgery education nurse at any of the numbers listed above. If you have a scheduling or appointment question, please call the Health Unit Coordinator between 8am and 4pm Monday through Friday. After hours or on weekends, please call [phone] to postpone.     COLONOSCOPY PREP    7 DAYS BEFORE THE EXAM:  Do not take Aspirin (325mg)or other NSAIDS  "(Ibuprofen, Motrin, Aleve, Celebrex, Naproxen, etc) 7 days before your surgery. Tylenol is fine. Stop taking fiber supplements, vitamins, iron or vitamins that contain iron, and herbals.  Do not eat any corn, nuts or seeds.     Arrange transportation with a family member or friend to drive you home and have an adult available to stay with you for the next 4 hours when you arrive home for your safety. If you need to take a taxi or the bus, you MUST have a responsible adult to ride with you OR YOUR PROCEDURE WILL BE CANCELLED. It is recommended that you DO NOT DRIVE for the next 24 hours after receiving anesthesia.      prescriptions at your pharmacy as soon as possible. If it has been more than one week since your appointment was scheduled, please call your pharmacy to verify it is still ready for .     2 DAYS BEFORE THE EXAM:   Begin a low fiber diet. No raw fruits or vegatables or whole grains.  Please see the list of foods you can have and foods to avoid on page 3 of the separate \"Split-Dose SuPrep\" colonoscopy instruction packet.   Drink at least 4-6 large glasses of sports drink each day (not red or purple).    1 DAY BEFORE THE EXAM:  DO NOT EAT ANY SOLID FOOD OR MILK PRODUCTS AFTER 12:00 AM (MIDNIGHT).  Drink only clear liquids for breakfast, lunch and dinner. (No red or purple colors)  Please see the list of liquids you can have and what to avoid on page 2 of the separate \"Split-Dose SuPrep\" colonoscopy instruction packet.   Follow the instructions of your colon preparation.  No red or purple colors, milk products, or alcohol.     AT 6:00 PM THE EVENING PRIOR TO PROCEDURE:  Pour one 16 ounce bottle of SUPREP liquid into the mixing container.  Add cool drinking water to the 16 ounce line on the container and mix.  Note: Be sure to dilute SUPREP before you drink it.  Drink ALL the liquid in the container.  You must drink 2 or more 16 ounce containers of water over the next hour.  Stay near a toilet " "while using this medication.     DAY OF COLONOSCOPY PROCEDURE:  6 HOURS PRIOR TO THE EXAM (set an alarm):  Pour the 2nd 16 ounce bottle of SUPREP liquid into the mixing container.  Add cool drinking water to the 16 ounce line on the container and mix.  Note: Be sure to dilute SUPREP before you drink it.  Drink ALL the liquid in the container.  You must drink 2 or more 16 ounce containers of water over the next hour.  You should be done with with prep 4 hours before the exam.    You may continue clear liquids until 4 hours prior to exam.   If you must take medication, take it with a sip of water.    Wear comfortable clothes. No jewelry, body piercings, make-up, nail polish, hair spray, lotions, perfumes or colognes. Shower before you arrive.  [place] in Admitting through the [place] Entr[facility].  You must have a  with you and and adult available to stay with your for 4 hours at home. The medicine used in this test will make you sleepy. If you do not have someone, please reschedule or your test will be cancelled.  It is recommended that you do not drive for 24 hours after your test. Do not operate power equipment, drink alcoholic beverages, make important decisions or sign legal documents.     COLONOSCOPY FREQUENTLY ASKED QUESTIONS  What is a colonoscopy?    A colonoscopy is a test to look at the lining of your large intestine. The purpose of the exam is to check for abnormalities including growths called \"polyps\" that can lead to serious disease. A flexibles scope is inserted into your rectum by the doctor to examine your large intestine.    What are polyps?  Polyps are abnormal growths on the lining of the colon. Most polyps are not cancerous, but some polyps have the potential to turn into cancer with time. Polyps can also bleed. For these reasons, most polyps are removed during a colonoscopy and sent to the laboratory for microscopic examination.    What preparation is needed?  The colon must be completely clean " for the procedure to be performed. You may be given one or two different prep solutions to cleanse your bowel. You will also need to follow a clear liquid diet the day before your procedure.    What happens after the procedure?  After your procedure is complete, you will be taken back to your day surgery room where you will be monitored for approximately 1 hour. You can expect to feel drowsy for several hours afterward. You may experience some cramping or bloating due to the air introduced into your colon during the exam. You will not be able to drive or operate machinery the rest of the day. You will be given written discharge instructions and appropriate learning material before you go home. You must have an adult to stay at home with you for the next 4 hours after you leave the hospital for your safety.    When will I find out the results of my test?  Your surgeon will talk to you and your designated  before you leave and usually the preliminary results can be given to you at that time. If a biopsy was taken during your procedure, it will be sent to the laboratory for examination. Results usually take one week. You will be contacted by phone or by letter with results.      TIPS FOR COLON CLEANSING BEFORE YOUR COLONOSCOPY  To get accurate results from your exam, your colon must be completely clean and empty. Please follow your doctor's instructions. If you do not, you may need to repeat both the exam and colon-cleansing process.    The medicine you take may cause bloating, nausea and other discomfort. Follow these tips to make the process as easy as possible:     You may use alcohol-free baby wipes to ease anal irritation. You may also use Vaseline to help protect the skin. Other options include Tucks wipes, hemorrhoid treatments and hydrocortisone cream.    To chill the solution, put it in your refrigerator or set it in a bowl of ice. Do not add ice in your drinking glass. You may remove the colon  preparation from the refrigerator 15-30 minutes before drinking.    Stay near a toilet! You will have diarrhea (loose watery stools) and may also have chills. Dress for comfort. Expect to feel discomfort until the stool clears from your colon. This usually takes about 2 to 4 hours.    If you followed your doctor's orders and your stool is a clear or yellow liquid, you are ready for the exam. If you are not sure if your colon is clean, please call your clinic and ask to speak to a nurse.       If the Suprep is not covered by your insurance, you may opt for the Gatorade prep instead. The medication and dietary instructions for this bowel prep are listed below:    7 DAYS BEFORE THE EXAM:  Do not take Aspirin (325mg) or other NSAIDS (Ibuprofen, Motrin, Aleve, Celebrex, Naproxen, etc) 7 days before your surgery. Stop taking fiber supplements, vitamins, iron or multivitamins that contain iron.Stop eating nuts and seeds.    Please  the items listed below from your preferred pharmacy. They are all over the counter and do not require a prescription.     Two 5 mg Dulcolax (bisacodyl) tablets   One 8.3 ounce bottle of miralax   One 10 ounce bottle of magnesium citrate   One 64 ounce bottle of gatorade-No red or purple, not powdered.    2 DAYS BEFORE THE EXAM:   Begin a low fiber diet.   Drink at least 4-6 large glasses of sports drink each day (not red or purple).    1 DAY BEFORE THE EXAM:  Drink only clear liquids all day, at least 8-10 glasses. (not red or purple)    AT 12:00 PM NOON THE DAY BEFORE EXAM:  Take 2 Dulcolax tablets by mouth with clear liquids.    AT 6:00 PM THE DAY BEFORE EXAM:  Mix the bottle of Miralax and 64 ounces of Gatorade in a pitcher. Drink one 8 ounce glass every 15 minutes until gone.Stay near a toilet. You will have watery stools and may have cramping, bloating and nausea.    DAY OF COLONOSCOPY EXAM:   6 HOURS PRIOR TO EXAM DAY OF PROCEDURE:  Drink the bottle of magnesium citrate. Right after  drinking the medication, drink one full glass of water. You many have clear liquids up until 4 hours before you check in at admitting.

## 2018-10-31 NOTE — NURSING NOTE
"Chief Complaint   Patient presents with     Consult For     iron deficiency anemia. Referred by Dr. Milton       Initial /76  Pulse 118  Temp 98.2  F (36.8  C)  Ht 5' 2\" (1.575 m)  Wt 174 lb 6.4 oz (79.1 kg)  SpO2 99%  BMI 31.9 kg/m2 Estimated body mass index is 31.9 kg/(m^2) as calculated from the following:    Height as of this encounter: 5' 2\" (1.575 m).    Weight as of this encounter: 174 lb 6.4 oz (79.1 kg).  Medication Reconciliation: complete    USHA ALVAREZ LPN    Pulse recheck was 116.    USHA ALVAREZ LPN on 10/31/2018 at 4:29 PM      "

## 2018-10-31 NOTE — PROGRESS NOTES
Scheduled for colonoscopy/EGD for 11/5. Patient is undecided about which bowel prep she will do. Instructions for both SuPrep and Gatorade preps discussed in detail and with written information given to review.

## 2018-11-01 RX ORDER — SODIUM, POTASSIUM,MAG SULFATES 17.5-3.13G
SOLUTION, RECONSTITUTED, ORAL ORAL
Qty: 2 BOTTLE | Refills: 0 | Status: SHIPPED | OUTPATIENT
Start: 2018-11-01 | End: 2018-11-08

## 2018-11-01 NOTE — PROGRESS NOTES
[facility] Surgery Consultation    CC:  Anemia    HPI:  This 68 year old year old female is seen at the request of Balta Milton for evaluation of anemia.  The history is obtained from the patient, and reviewing the medical record.  She is good medical historian.  She states that she is undergone previous left knee replacement in the right knee replacement.  Left knee was replaced back in April of this year.  After operation she was being treated with Carafate and Toradol.  The Carafate for mucosal barrier and Toradol for pain control.  She states that she is unable take any other pain medications.  She  did well after that operation her hemoglobin had drifted down to around 9 and slowly came up close to 11.  She then had the second knee done in September.  Since that time she states that she is had weakness, lightheadedness, palpitations.  She was seen and evaluated in the emergency room after being seen in orthopedics in late September.  At that time she was found to be dehydrated with leukocytosis and anemic.  She was admitted and hospitalized.  After second the operation as well she was placed on Carafate and Toradol.  She was then seen by her primary care provider after that hospitalization.  During that office visit she is still having postoperative pain.  Discussion and evaluation were done to minimize her narcotic usage.  During her second follow-up examination there is concern for gastritis without bleeding she was placed on Prilosec 40 mg twice a day and Zantac 150 mg twice a day stopping the Carafate.She was then seen once again for evaluation with recommendations for Carafate 4 times daily and Prilosec.  As she had persistent nausea, anemia, and epigastric abdominal pain she was then referred to general surgery.    Mrs. VASQUEZ has a complex past medical history of previous peptic ulcer disease undergone a Billroth I reconstruction.  Since that time and even prior to that she has had problems  with nausea and epigastric discomfort.  Most recently she was started on Toradol and Carafate for postoperative pain after her knee replacements.  Since that time she has developed ongoing anemia and worsening epigastric abdominal pain.  When she was seen and evaluated she was found to have a fecal occult blood test that was positive was then referred to general surgery for further evaluation and cares.    Past Medical History:   Diagnosis Date     Allergic arthritis involving hand 01/01/2011     Anemia, Iron Deficiency 01/01/2011     Anxiety 01/01/2011     Contact dermatitis and other eczema, unspecified c 01/01/2011     Depression 01/01/2011     Edema 01/01/2011     Gastrointestinal mucositis (ulcerative) 01/01/2011     GERD 01/01/2011     Headache(784.0) 01/01/2011     Hypothyroidism 01/01/2011     Insomnia, unspecified 01/01/2011     Migraine 01/01/2011     Nonallopathic lesion of cervical region, not elsewhere classified 10/16/2000     Osteoporosis 01/01/2011     Other and unspecified hyperlipidemia 01/01/2011     Other malaise and fatigue 08/27/2001     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       Past Surgical History:   Procedure Laterality Date     ARTHROPLASTY KNEE Left 4/17/2018    Procedure: ARTHROPLASTY KNEE;  LEFT TOTAL KNEE ARTHROPLASTY S/N JOURNEY II;  Surgeon: Ty Hernandez MD;  Location: [facility] OR     ARTHROPLASTY KNEE Right 9/4/2018    Procedure: ARTHROPLASTY KNEE;  RIGHT TOTAL KNEE ARTHROPLASTY ;  Surgeon: Ty Hernandez MD;  Location: [facility] OR     D & C       ESOPHAGOSCOPY, GASTROSCOPY, DUODENOSCOPY (EGD), COMBINED N/A 9/11/2017    Procedure: COMBINED ESOPHAGOSCOPY, GASTROSCOPY, DUODENOSCOPY (EGD);  Upper Endoscopy: Removal of Food Impaction;  Surgeon: Lino Zhu DO;  Location: [facility] OR     STOMACH SURGERY       stomach surgery peritinitis         Pt denied problems with bleeding or anesthesia    Prior to Admission medications    Medication Sig Start Date End Date Taking? Authorizing Provider    acetaminophen (TYLENOL) 325 MG tablet Take 2-3 tablets (650-975 mg) by mouth every 4 hours as needed for other (mild to moderate pain) 4/20/18  Yes Jignesh Gardiner MD   Calcium Citrate-Vitamin D (CALCIUM + D PO) Take 2 tablets by mouth daily as needed   Yes Reported, Patient   cyanocolbalamin (VITAMIN  B-12) 1000 MCG tablet Take 1 tablet by mouth daily as needed    Yes Reported, Patient   cyclobenzaprine (FLEXERIL) 10 MG tablet TAKE 1 TABLET BY MOUTH THREE TIMES DAILY AS NEEDED 10/22/18  Yes Balta Milton DO   docusate sodium (COLACE) 50 MG capsule Take 1 capsule (50 mg) by mouth 2 times daily as needed for constipation 9/5/18  Yes Ty Hernandez MD   HYDROcodone-acetaminophen (NORCO) 7.5-325 MG per tablet Take 1 tablet by mouth every 6 hours as needed for severe pain 10/24/18  Yes Quinn Cruz,    levothyroxine (SYNTHROID/LEVOTHROID) 150 MCG tablet TAKE 1 TABLET (150 MCG) BY MOUTH DAILY 10/12/18  Yes Jaden Lee NP   NYSTOP 828687 UNIT/GM POWD powder APPLY TOPICALLY 2 TIMES DAILY AS NEEDED 12/18/17  Yes Jaden Lee NP   ondansetron (ZOFRAN) 4 MG tablet TAKE 1 TABLET (4 MG) BY MOUTH 3 TIMES DAILY 10/26/18  Yes Jaden Lee NP   pantoprazole (PROTONIX) 40 MG EC tablet Take 1 tablet (40 mg) by mouth daily Take 30-60 minutes before a meal. 10/12/18  Yes Jaden Lee NP   ranitidine (ZANTAC) 150 MG tablet Take 1 tablet (150 mg) by mouth 2 times daily 9/25/18  Yes Jaden Lee NP   sucralfate (CARAFATE) 1 GM tablet Take 1 g by mouth 4 times daily   Yes Reported, Patient   zolpidem (AMBIEN) 10 MG tablet Take 1 tablet (10 mg) by mouth nightly as needed for sleep Take 1 tablet at night for sleep-may repeat X1. 10/15/18  Yes Jaden Lee NP   escitalopram (LEXAPRO) 20 MG tablet TAKE 1 TABLET BY MOUTH EVERY DAY  Patient not taking: Reported on 10/31/2018 8/21/18   Jaden Lee, NP   ferrous sulfate (IRON) 325 (65 Fe) MG tablet Take 1 tablet (325 mg) by mouth 3 times daily  "(with meals)  Patient not taking: Reported on 10/31/2018 8/31/18   Jaden Lee NP   folic acid (FOLVITE) 1 MG tablet TAKE ONE TABLET DAILY BY MOUTH  Patient not taking: Reported on 10/31/2018 10/12/18   Jaden Lee NP   gabapentin (NEURONTIN) 300 MG capsule TAKE 1 CAPSULE BY MOUTH 3 TIMES DAILY  Patient not taking: Reported on 10/31/2018 10/18/18   Jaden Lee NP   Multiple Vitamins-Minerals (MULTIVITAMIN OR) Take 1 tablet by mouth daily as needed     Reported, Patient   multivitamin (OCUVITE) TABS Take 1 tablet by mouth daily as needed     Reported, Patient   omeprazole (PRILOSEC) 40 MG capsule TAKE 1 CAPSULE DAILY BY MOUTH  Patient not taking: Reported on 10/31/2018 1/26/18   Jaden Lee NP   simvastatin (ZOCOR) 40 MG tablet TAKE 1 TABLET BY MOUTH EVERY DAY IN THE EVENING . GENERIC FOR ZOCOR.  Patient not taking: Reported on 10/31/2018 3/21/17   Jaden Lee NP          Allergies   Allergen Reactions     Erythromycin Base [Kdc:Yellow Dye+Erythromycin+Brilliant Blue Fcf] Nausea and Vomiting     Penicillins Rash         HABITS:    Social History   Substance Use Topics     Smoking status: Never Smoker     Smokeless tobacco: Never Used      Comment: no passive exposure     Alcohol use Yes      Comment: rarely, maybe yearly     No mood altering drug use.    Family History   Problem Relation Age of Onset     Cerebrovascular Disease Mother      CVA     Hypertension Mother      Other - See Comments Father 40     mining accident; cause of death     Other - See Comments Brother      muscle dystrophy     Cancer Daughter 34     skin cancer     Melanoma Daughter        REVIEW OF SYSTEMS:  Ten point review of systems negative except those mentioned in the HPI.     The patient denies sleep apnea, latex allergies or MRSA    OBJECTIVE:    /76  Pulse 116  Temp 98.2  F (36.8  C)  Ht 5' 2\" (1.575 m)  Wt 174 lb 6.4 oz (79.1 kg)  SpO2 99%  BMI 31.9 kg/m2    GENERAL: Generally appears well, in no distress " with appropriate affect.  HEENT:   Sclerae anicteric - No cervical, supra/infraclavicular lymphadenopathy, no thyroid masses  Respiratory:  Lungs clear to ausculation bilaterally with good air excursion  Cardiovascular:  Regular Rate and Rhythm with no murmurs gallops or rubs, normal   Abdomen: soft, mild epigastric tenderness with no rebound or guarding  :  deferred  Extremities:  Extremities normal. No deformities, edema, or skin discoloration.  Skin:  no suspicious lesions or rashes  Neurological: grossly intact    Psych:  Alert, oriented, affect appropriate with normal decision making ability.      IMPRESSION:  69 yo female with anemia  Positive fecal occult blood  Epigastric abdominal pain  History of Billroth I reconstruction  NSAID use recently    PLAN:  At this point it appears that most likely this patient has gastric ulcer, gastritis secondarily to NSAID use from her postoperative course.  She had previous NSAID induced peptic ulcers requiring operative intervention many years ago.  Unfortunately even with utilization of Carafate which will act as a mucosal protectant it will not decrease the biochemical effects of ketorolac.  Ketorolac will simply decrease the prostaglandins that are naturally within the stomach and therefore increase acid production.  As this patient has previous NSAID-induced ulcerations it appears that she is most likely prone to this by simply adjusted and given her Carafate to help coat it is ineffective.  As she has had anemia with a fecal occult blood test being positive I recommended that she undergo upper endoscopy with colonoscopy.     The indications, risks, benefits and technical aspects of esophagogastroduodenoscopy were reviewed with her questions asked and answered.  Antral biopsy for histologic examination will be performed and the place of H. pylori in gastritis was discussed.  The indications, risks, benefits and technical aspects of whole colon colonoscopy were  outlined with risks including, but not limited to, perforation, bleeding and inability to visualize entire colon.  Management of each was reviewed.  The need of mechanical preparation of the colon was reviewed along with the use of monitored anesthetic care.  The patient's questions were asked and answered.  Scheduled first available date.    A detailed description of the [place] Preventive Task Force development of colorectal cancer screening was had. I described the pathology of the adenoma to carcinoma progression and its genetic changes that occur. I discussed how with colorectal cancer screening by endoscopic surveillance we are able to identify potential malignancies and remove them before they progress along the adenoma to carcinoma pathway. The risks for colon cancer progression were discussed including first degree relatives with colon cancer, inflammatory bowel disease, smoking, obesity, and diet. I then discussed how there are certain attributes which can decrease the risk of colon cancer such as a healthy diet and physical activity. The patient understood the adenoma to carcinoma sequence, the reasoning for screening at specific intervals, and risk factor modification. I then described the technical portion of the procedure.      Thank you for allowing me to participate in the care of your patient.       Dieudonne Jackson MD    11/1/2018  1:05 PM    cc:  Jaden Lee

## 2018-11-02 ENCOUNTER — ANESTHESIA EVENT (OUTPATIENT)
Dept: SURGERY | Facility: HOSPITAL | Age: 68
End: 2018-11-02
Payer: COMMERCIAL

## 2018-11-02 NOTE — H&P (VIEW-ONLY)
[facility] Surgery Consultation    CC:  Anemia    HPI:  This 68 year old year old female is seen at the request of Balta Milton for evaluation of anemia.  The history is obtained from the patient, and reviewing the medical record.  She is good medical historian.  She states that she is undergone previous left knee replacement in the right knee replacement.  Left knee was replaced back in April of this year.  After operation she was being treated with Carafate and Toradol.  The Carafate for mucosal barrier and Toradol for pain control.  She states that she is unable take any other pain medications.  She  did well after that operation her hemoglobin had drifted down to around 9 and slowly came up close to 11.  She then had the second knee done in September.  Since that time she states that she is had weakness, lightheadedness, palpitations.  She was seen and evaluated in the emergency room after being seen in orthopedics in late September.  At that time she was found to be dehydrated with leukocytosis and anemic.  She was admitted and hospitalized.  After second the operation as well she was placed on Carafate and Toradol.  She was then seen by her primary care provider after that hospitalization.  During that office visit she is still having postoperative pain.  Discussion and evaluation were done to minimize her narcotic usage.  During her second follow-up examination there is concern for gastritis without bleeding she was placed on Prilosec 40 mg twice a day and Zantac 150 mg twice a day stopping the Carafate.She was then seen once again for evaluation with recommendations for Carafate 4 times daily and Prilosec.  As she had persistent nausea, anemia, and epigastric abdominal pain she was then referred to general surgery.    Mrs. VASQUEZ has a complex past medical history of previous peptic ulcer disease undergone a Billroth I reconstruction.  Since that time and even prior to that she has had problems  with nausea and epigastric discomfort.  Most recently she was started on Toradol and Carafate for postoperative pain after her knee replacements.  Since that time she has developed ongoing anemia and worsening epigastric abdominal pain.  When she was seen and evaluated she was found to have a fecal occult blood test that was positive was then referred to general surgery for further evaluation and cares.    Past Medical History:   Diagnosis Date     Allergic arthritis involving hand 01/01/2011     Anemia, Iron Deficiency 01/01/2011     Anxiety 01/01/2011     Contact dermatitis and other eczema, unspecified c 01/01/2011     Depression 01/01/2011     Edema 01/01/2011     Gastrointestinal mucositis (ulcerative) 01/01/2011     GERD 01/01/2011     Headache(784.0) 01/01/2011     Hypothyroidism 01/01/2011     Insomnia, unspecified 01/01/2011     Migraine 01/01/2011     Nonallopathic lesion of cervical region, not elsewhere classified 10/16/2000     Osteoporosis 01/01/2011     Other and unspecified hyperlipidemia 01/01/2011     Other malaise and fatigue 08/27/2001     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       Past Surgical History:   Procedure Laterality Date     ARTHROPLASTY KNEE Left 4/17/2018    Procedure: ARTHROPLASTY KNEE;  LEFT TOTAL KNEE ARTHROPLASTY S/N JOURNEY II;  Surgeon: Ty Hernandez MD;  Location: [facility]     ARTHROPLASTY KNEE Right 9/4/2018    Procedure: ARTHROPLASTY KNEE;  RIGHT TOTAL KNEE ARTHROPLASTY ;  Surgeon: Ty Hernandez MD;  Location: [facility] OR     D & C       ESOPHAGOSCOPY, GASTROSCOPY, DUODENOSCOPY (EGD), COMBINED N/A 9/11/2017    Procedure: COMBINED ESOPHAGOSCOPY, GASTROSCOPY, DUODENOSCOPY (EGD);  Upper Endoscopy: Removal of Food Impaction;  Surgeon: Lino Zhu DO;  Location: [facility] OR     STOMACH SURGERY       stomach surgery peritinitis         Pt denied problems with bleeding or anesthesia    Prior to Admission medications    Medication Sig Start Date End Date Taking? Authorizing Provider    acetaminophen (TYLENOL) 325 MG tablet Take 2-3 tablets (650-975 mg) by mouth every 4 hours as needed for other (mild to moderate pain) 4/20/18  Yes Jignesh Gardiner MD   Calcium Citrate-Vitamin D (CALCIUM + D PO) Take 2 tablets by mouth daily as needed   Yes Reported, Patient   cyanocolbalamin (VITAMIN  B-12) 1000 MCG tablet Take 1 tablet by mouth daily as needed    Yes Reported, Patient   cyclobenzaprine (FLEXERIL) 10 MG tablet TAKE 1 TABLET BY MOUTH THREE TIMES DAILY AS NEEDED 10/22/18  Yes Balta Milton DO   docusate sodium (COLACE) 50 MG capsule Take 1 capsule (50 mg) by mouth 2 times daily as needed for constipation 9/5/18  Yes Ty Hernandez MD   HYDROcodone-acetaminophen (NORCO) 7.5-325 MG per tablet Take 1 tablet by mouth every 6 hours as needed for severe pain 10/24/18  Yes Quinn Cruz,    levothyroxine (SYNTHROID/LEVOTHROID) 150 MCG tablet TAKE 1 TABLET (150 MCG) BY MOUTH DAILY 10/12/18  Yes Jaden Lee NP   NYSTOP 104506 UNIT/GM POWD powder APPLY TOPICALLY 2 TIMES DAILY AS NEEDED 12/18/17  Yes Jaden Lee NP   ondansetron (ZOFRAN) 4 MG tablet TAKE 1 TABLET (4 MG) BY MOUTH 3 TIMES DAILY 10/26/18  Yes Jaden Lee NP   pantoprazole (PROTONIX) 40 MG EC tablet Take 1 tablet (40 mg) by mouth daily Take 30-60 minutes before a meal. 10/12/18  Yes Jaden Lee NP   ranitidine (ZANTAC) 150 MG tablet Take 1 tablet (150 mg) by mouth 2 times daily 9/25/18  Yes Jaden Lee NP   sucralfate (CARAFATE) 1 GM tablet Take 1 g by mouth 4 times daily   Yes Reported, Patient   zolpidem (AMBIEN) 10 MG tablet Take 1 tablet (10 mg) by mouth nightly as needed for sleep Take 1 tablet at night for sleep-may repeat X1. 10/15/18  Yes Jaden Lee NP   escitalopram (LEXAPRO) 20 MG tablet TAKE 1 TABLET BY MOUTH EVERY DAY  Patient not taking: Reported on 10/31/2018 8/21/18   Jaden Lee, NP   ferrous sulfate (IRON) 325 (65 Fe) MG tablet Take 1 tablet (325 mg) by mouth 3 times daily  "(with meals)  Patient not taking: Reported on 10/31/2018 8/31/18   Jaden Lee NP   folic acid (FOLVITE) 1 MG tablet TAKE ONE TABLET DAILY BY MOUTH  Patient not taking: Reported on 10/31/2018 10/12/18   Jaden Lee NP   gabapentin (NEURONTIN) 300 MG capsule TAKE 1 CAPSULE BY MOUTH 3 TIMES DAILY  Patient not taking: Reported on 10/31/2018 10/18/18   Jaden Lee NP   Multiple Vitamins-Minerals (MULTIVITAMIN OR) Take 1 tablet by mouth daily as needed     Reported, Patient   multivitamin (OCUVITE) TABS Take 1 tablet by mouth daily as needed     Reported, Patient   omeprazole (PRILOSEC) 40 MG capsule TAKE 1 CAPSULE DAILY BY MOUTH  Patient not taking: Reported on 10/31/2018 1/26/18   Jaden Lee NP   simvastatin (ZOCOR) 40 MG tablet TAKE 1 TABLET BY MOUTH EVERY DAY IN THE EVENING . GENERIC FOR ZOCOR.  Patient not taking: Reported on 10/31/2018 3/21/17   Jaden Lee NP          Allergies   Allergen Reactions     Erythromycin Base [Kdc:Yellow Dye+Erythromycin+Brilliant Blue Fcf] Nausea and Vomiting     Penicillins Rash         HABITS:    Social History   Substance Use Topics     Smoking status: Never Smoker     Smokeless tobacco: Never Used      Comment: no passive exposure     Alcohol use Yes      Comment: rarely, maybe yearly     No mood altering drug use.    Family History   Problem Relation Age of Onset     Cerebrovascular Disease Mother      CVA     Hypertension Mother      Other - See Comments Father 40     mining accident; cause of death     Other - See Comments Brother      muscle dystrophy     Cancer Daughter 34     skin cancer     Melanoma Daughter        REVIEW OF SYSTEMS:  Ten point review of systems negative except those mentioned in the HPI.     The patient denies sleep apnea, latex allergies or MRSA    OBJECTIVE:    /76  Pulse 116  Temp 98.2  F (36.8  C)  Ht 5' 2\" (1.575 m)  Wt 174 lb 6.4 oz (79.1 kg)  SpO2 99%  BMI 31.9 kg/m2    GENERAL: Generally appears well, in no distress " with appropriate affect.  HEENT:   Sclerae anicteric - No cervical, supra/infraclavicular lymphadenopathy, no thyroid masses  Respiratory:  Lungs clear to ausculation bilaterally with good air excursion  Cardiovascular:  Regular Rate and Rhythm with no murmurs gallops or rubs, normal   Abdomen: soft, mild epigastric tenderness with no rebound or guarding  :  deferred  Extremities:  Extremities normal. No deformities, edema, or skin discoloration.  Skin:  no suspicious lesions or rashes  Neurological: grossly intact    Psych:  Alert, oriented, affect appropriate with normal decision making ability.      IMPRESSION:  69 yo female with anemia  Positive fecal occult blood  Epigastric abdominal pain  History of Billroth I reconstruction  NSAID use recently    PLAN:  At this point it appears that most likely this patient has gastric ulcer, gastritis secondarily to NSAID use from her postoperative course.  She had previous NSAID induced peptic ulcers requiring operative intervention many years ago.  Unfortunately even with utilization of Carafate which will act as a mucosal protectant it will not decrease the biochemical effects of ketorolac.  Ketorolac will simply decrease the prostaglandins that are naturally within the stomach and therefore increase acid production.  As this patient has previous NSAID-induced ulcerations it appears that she is most likely prone to this by simply adjusted and given her Carafate to help coat it is ineffective.  As she has had anemia with a fecal occult blood test being positive I recommended that she undergo upper endoscopy with colonoscopy.     The indications, risks, benefits and technical aspects of esophagogastroduodenoscopy were reviewed with her questions asked and answered.  Antral biopsy for histologic examination will be performed and the place of H. pylori in gastritis was discussed.  The indications, risks, benefits and technical aspects of whole colon colonoscopy were  outlined with risks including, but not limited to, perforation, bleeding and inability to visualize entire colon.  Management of each was reviewed.  The need of mechanical preparation of the colon was reviewed along with the use of monitored anesthetic care.  The patient's questions were asked and answered.  Scheduled first available date.    A detailed description of the [place] Preventive Task Force development of colorectal cancer screening was had. I described the pathology of the adenoma to carcinoma progression and its genetic changes that occur. I discussed how with colorectal cancer screening by endoscopic surveillance we are able to identify potential malignancies and remove them before they progress along the adenoma to carcinoma pathway. The risks for colon cancer progression were discussed including first degree relatives with colon cancer, inflammatory bowel disease, smoking, obesity, and diet. I then discussed how there are certain attributes which can decrease the risk of colon cancer such as a healthy diet and physical activity. The patient understood the adenoma to carcinoma sequence, the reasoning for screening at specific intervals, and risk factor modification. I then described the technical portion of the procedure.      Thank you for allowing me to participate in the care of your patient.       Dieudonne Jackson MD    11/1/2018  1:05 PM    cc:  Jaden Lee

## 2018-11-02 NOTE — ANESTHESIA PREPROCEDURE EVALUATION
Anesthesia Evaluation     . Pt has had prior anesthetic. Type: General    History of anesthetic complications   - PONV        ROS/MED HX    ENT/Pulmonary:      (-) tobacco use   Neurologic:     (+)neuropathy migraines,     Cardiovascular:  - neg cardiovascular ROS       METS/Exercise Tolerance:     Hematologic:  - neg hematologic  ROS       Musculoskeletal:   (+) arthritis, , , -       GI/Hepatic:     (+) GERD Asymptomatic on medication,       Renal/Genitourinary:  - ROS Renal section negative       Endo:     (+) thyroid problem hypothyroidism, .      Psychiatric:     (+) psychiatric history anxiety and depression      Infectious Disease:  - neg infectious disease ROS       Malignancy:      - no malignancy   Other:    (+) H/O Chronic Pain,                   Physical Exam      Airway   Mallampati: II  TM distance: >3 FB  Neck ROM: full    Dental   (+) partials and missing    Cardiovascular   Rhythm and rate: regular and normal      Pulmonary    breath sounds clear to auscultation                    Anesthesia Plan      History & Physical Review  History and physical reviewed and following examination; no interval change.    ASA Status:  3 .    NPO Status:  > 8 hours    Plan for MAC Reason for MAC:  Difficulty with conscious sedation (QS) and Procedure to face, neck, head or breast         Postoperative Care      Consents  Anesthetic plan, risks, benefits and alternatives discussed with:  Patient..                          .

## 2018-11-03 ENCOUNTER — APPOINTMENT (OUTPATIENT)
Dept: GENERAL RADIOLOGY | Facility: HOSPITAL | Age: 68
End: 2018-11-03
Attending: EMERGENCY MEDICINE
Payer: COMMERCIAL

## 2018-11-03 ENCOUNTER — HOSPITAL ENCOUNTER (EMERGENCY)
Facility: HOSPITAL | Age: 68
Discharge: HOME | End: 2018-11-03
Attending: EMERGENCY MEDICINE | Admitting: EMERGENCY MEDICINE
Payer: COMMERCIAL

## 2018-11-03 VITALS
DIASTOLIC BLOOD PRESSURE: 88 MMHG | TEMPERATURE: 98.1 F | RESPIRATION RATE: 19 BRPM | SYSTOLIC BLOOD PRESSURE: 160 MMHG | HEART RATE: 120 BPM | OXYGEN SATURATION: 99 %

## 2018-11-03 DIAGNOSIS — E86.0 DEHYDRATION: ICD-10-CM

## 2018-11-03 DIAGNOSIS — R21 RASH: ICD-10-CM

## 2018-11-03 DIAGNOSIS — E87.6 HYPOKALEMIA: ICD-10-CM

## 2018-11-03 DIAGNOSIS — R11.2 INTRACTABLE VOMITING WITH NAUSEA, UNSPECIFIED VOMITING TYPE: Primary | ICD-10-CM

## 2018-11-03 LAB
ALBUMIN SERPL-MCNC: 3.3 G/DL (ref 3.4–5)
ALP SERPL-CCNC: 109 U/L (ref 40–150)
ALT SERPL W P-5'-P-CCNC: 21 U/L (ref 0–50)
ANION GAP SERPL CALCULATED.3IONS-SCNC: 10 MMOL/L (ref 3–14)
AST SERPL W P-5'-P-CCNC: 13 U/L (ref 0–45)
BASOPHILS # BLD AUTO: 0.1 10E9/L (ref 0–0.2)
BASOPHILS NFR BLD AUTO: 0.5 %
BILIRUB SERPL-MCNC: 0.2 MG/DL (ref 0.2–1.3)
BUN SERPL-MCNC: 12 MG/DL (ref 7–30)
CALCIUM SERPL-MCNC: 8.2 MG/DL (ref 8.5–10.1)
CHLORIDE SERPL-SCNC: 107 MMOL/L (ref 94–109)
CO2 SERPL-SCNC: 23 MMOL/L (ref 20–32)
CREAT SERPL-MCNC: 0.53 MG/DL (ref 0.52–1.04)
CRP SERPL-MCNC: <2.9 MG/L (ref 0–8)
DIFFERENTIAL METHOD BLD: ABNORMAL
EOSINOPHIL # BLD AUTO: 0.1 10E9/L (ref 0–0.7)
EOSINOPHIL NFR BLD AUTO: 0.6 %
ERYTHROCYTE [DISTWIDTH] IN BLOOD BY AUTOMATED COUNT: 15.2 % (ref 10–15)
ERYTHROCYTE [SEDIMENTATION RATE] IN BLOOD BY WESTERGREN METHOD: 22 MM/H (ref 0–30)
GFR SERPL CREATININE-BSD FRML MDRD: >90 ML/MIN/1.7M2
GLUCOSE SERPL-MCNC: 107 MG/DL (ref 70–99)
HCT VFR BLD AUTO: 32.9 % (ref 35–47)
HGB BLD-MCNC: 10.1 G/DL (ref 11.7–15.7)
IMM GRANULOCYTES # BLD: 0 10E9/L (ref 0–0.4)
IMM GRANULOCYTES NFR BLD: 0.4 %
LACTATE BLD-SCNC: 1 MMOL/L (ref 0.7–2)
LIPASE SERPL-CCNC: 36 U/L (ref 73–393)
LYMPHOCYTES # BLD AUTO: 2.3 10E9/L (ref 0.8–5.3)
LYMPHOCYTES NFR BLD AUTO: 22 %
MCH RBC QN AUTO: 23.2 PG (ref 26.5–33)
MCHC RBC AUTO-ENTMCNC: 30.7 G/DL (ref 31.5–36.5)
MCV RBC AUTO: 76 FL (ref 78–100)
MONOCYTES # BLD AUTO: 0.6 10E9/L (ref 0–1.3)
MONOCYTES NFR BLD AUTO: 5.9 %
NEUTROPHILS # BLD AUTO: 7.4 10E9/L (ref 1.6–8.3)
NEUTROPHILS NFR BLD AUTO: 70.6 %
NRBC # BLD AUTO: 0 10*3/UL
NRBC BLD AUTO-RTO: 0 /100
PLATELET # BLD AUTO: 465 10E9/L (ref 150–450)
POTASSIUM SERPL-SCNC: 2.9 MMOL/L (ref 3.4–5.3)
PROT SERPL-MCNC: 6.6 G/DL (ref 6.8–8.8)
RBC # BLD AUTO: 4.35 10E12/L (ref 3.8–5.2)
SODIUM SERPL-SCNC: 140 MMOL/L (ref 133–144)
TROPONIN I SERPL-MCNC: <0.015 UG/L (ref 0–0.04)
WBC # BLD AUTO: 10.4 10E9/L (ref 4–11)

## 2018-11-03 PROCEDURE — 84484 ASSAY OF TROPONIN QUANT: CPT | Performed by: EMERGENCY MEDICINE

## 2018-11-03 PROCEDURE — 85652 RBC SED RATE AUTOMATED: CPT | Performed by: EMERGENCY MEDICINE

## 2018-11-03 PROCEDURE — 80053 COMPREHEN METABOLIC PANEL: CPT | Performed by: EMERGENCY MEDICINE

## 2018-11-03 PROCEDURE — 96361 HYDRATE IV INFUSION ADD-ON: CPT

## 2018-11-03 PROCEDURE — 85025 COMPLETE CBC W/AUTO DIFF WBC: CPT | Performed by: EMERGENCY MEDICINE

## 2018-11-03 PROCEDURE — 99284 EMERGENCY DEPT VISIT MOD MDM: CPT | Mod: Z6 | Performed by: EMERGENCY MEDICINE

## 2018-11-03 PROCEDURE — 25000132 ZZH RX MED GY IP 250 OP 250 PS 637: Performed by: EMERGENCY MEDICINE

## 2018-11-03 PROCEDURE — 83605 ASSAY OF LACTIC ACID: CPT | Performed by: EMERGENCY MEDICINE

## 2018-11-03 PROCEDURE — 36415 COLL VENOUS BLD VENIPUNCTURE: CPT | Performed by: EMERGENCY MEDICINE

## 2018-11-03 PROCEDURE — 96375 TX/PRO/DX INJ NEW DRUG ADDON: CPT

## 2018-11-03 PROCEDURE — 83690 ASSAY OF LIPASE: CPT | Performed by: EMERGENCY MEDICINE

## 2018-11-03 PROCEDURE — 99284 EMERGENCY DEPT VISIT MOD MDM: CPT | Mod: 25

## 2018-11-03 PROCEDURE — 25000128 H RX IP 250 OP 636: Performed by: EMERGENCY MEDICINE

## 2018-11-03 PROCEDURE — 86140 C-REACTIVE PROTEIN: CPT | Performed by: EMERGENCY MEDICINE

## 2018-11-03 PROCEDURE — 96365 THER/PROPH/DIAG IV INF INIT: CPT

## 2018-11-03 PROCEDURE — 74019 RADEX ABDOMEN 2 VIEWS: CPT | Mod: TC

## 2018-11-03 RX ORDER — POTASSIUM CHLORIDE 7.45 MG/ML
10 INJECTION INTRAVENOUS CONTINUOUS
Status: DISCONTINUED | OUTPATIENT
Start: 2018-11-03 | End: 2018-11-03 | Stop reason: HOSPADM

## 2018-11-03 RX ORDER — METOCLOPRAMIDE HYDROCHLORIDE 5 MG/ML
5 INJECTION INTRAMUSCULAR; INTRAVENOUS ONCE
Status: COMPLETED | OUTPATIENT
Start: 2018-11-03 | End: 2018-11-03

## 2018-11-03 RX ORDER — SODIUM CHLORIDE 9 MG/ML
1000 INJECTION, SOLUTION INTRAVENOUS CONTINUOUS
Status: DISCONTINUED | OUTPATIENT
Start: 2018-11-03 | End: 2018-11-03 | Stop reason: HOSPADM

## 2018-11-03 RX ORDER — POTASSIUM CHLORIDE 1500 MG/1
40 TABLET, EXTENDED RELEASE ORAL ONCE
Status: COMPLETED | OUTPATIENT
Start: 2018-11-03 | End: 2018-11-03

## 2018-11-03 RX ADMIN — SODIUM CHLORIDE 1000 ML: 9 INJECTION, SOLUTION INTRAVENOUS at 03:57

## 2018-11-03 RX ADMIN — POTASSIUM CHLORIDE 10 MEQ: 7.46 INJECTION, SOLUTION INTRAVENOUS at 03:59

## 2018-11-03 RX ADMIN — METOCLOPRAMIDE 5 MG: 5 INJECTION, SOLUTION INTRAMUSCULAR; INTRAVENOUS at 02:29

## 2018-11-03 RX ADMIN — SODIUM CHLORIDE 1000 ML: 9 INJECTION, SOLUTION INTRAVENOUS at 02:29

## 2018-11-03 RX ADMIN — POTASSIUM CHLORIDE 40 MEQ: 1500 TABLET, EXTENDED RELEASE ORAL at 05:19

## 2018-11-03 RX ADMIN — PROCHLORPERAZINE EDISYLATE 5 MG: 5 INJECTION INTRAMUSCULAR; INTRAVENOUS at 04:12

## 2018-11-03 ASSESSMENT — ENCOUNTER SYMPTOMS
ABDOMINAL PAIN: 1
NAUSEA: 1
VOMITING: 1
FEVER: 0
SHORTNESS OF BREATH: 0

## 2018-11-03 NOTE — ED PROVIDER NOTES
History     Chief Complaint   Patient presents with     Nausea & Vomiting     since 1600 11/02/18     HPI  Aure Lanier is a 68 year old female who presents to the emergency department with complaints of nausea and vomiting for the last 2 days.  Patient has had multiple episodes of vomiting this evening and it is bilious.  She is also had chronic epigastric abdominal pain and discomfort for more than 2 months.  She is currently on Zofran, Carafate and omeprazole with minimal improvement of her symptoms.  She had right knee arthroplasty in September this year and since then she has not been feeling well.  She is scheduled for EGD on Monday.  She feels dehydrated and is requesting IV fluids and some medication to help with a nausea and vomiting.  She denies any overt abdominal pain apart from the epigastric discomfort.  No diarrhea or fever.  No chest pain.    Problem List:    Patient Active Problem List    Diagnosis Date Noted     S/P total knee arthroplasty, right 09/04/2018     Priority: Medium     Status post total right knee replacement 09/04/2018     Priority: Medium     Status post total left knee replacement 04/17/2018     Priority: Medium     Chronic pain syndrome 07/13/2017     Priority: Medium     Patient is followed by Jaden Lee NP for ongoing prescription of pain medication.  All refills should only be approved by this provider, or covering partner.    Medication(s): Norco 7.5/325mg.   Maximum quantity per month: #120  Clinic visit frequency required: Q 3 months     Controlled substance agreement:  Encounter-Level CSA - 07/11/2017:          Controlled Substance Agreement - Scan on 7/20/2017 12:19 PM : CONTROLLED SUBSTANCE AGREEMENT (below)              Pain Clinic evaluation in the past: No    DIRE Total Score(s):  No flowsheet data found.    Last [facility] website verification:  Done on 8.22.18   [email or web address]         Back muscle spasm 05/09/2017     Priority: Medium     Bilateral  chronic knee pain 2016     Priority: Medium     Both knees.         Hyperlipidemia with target LDL less than 100 2014     Priority: Medium     Diagnosis updated by automated process. Provider to review and confirm.       Low back pain 2013     Priority: Medium     Diagnosis updated by automated process. Provider to review and confirm.       Family history of other musculoskeletal diseases(V17.89) 2012     Priority: Medium     Overview:    Brother with Myotonic Dystrophy    [facility] Myotonic Dystrophy testing was reported normal by [facility] lab for the DM1 with repeats of 5 and 26, DM2 with repeats of 140 and 140, and CLCN1 with no abnormal DNA sequence variants identified in the select exons analyzed (drawn 3-20-12).       Allergic arthritis involving hand 2011     Priority: Medium     Hypothyroidism 2011     Priority: Medium     Problem list name updated by automated process. Provider to review       Insomnia 2011     Priority: Medium     Problem list name updated by automated process. Provider to review       Headache 2011     Priority: Medium     Problem list name updated by automated process. Provider to review       Postherpetic polyneuropathy 2011     Priority: Medium     Dysthymia 2011     Priority: Medium     Anxiety state 2011     Priority: Medium     Problem list name updated by automated process. Provider to review       Hyperlipidemia 2011     Priority: Medium     Problem list name updated by automated process. Provider to review       Migraine 2011     Priority: Medium     Problem list name updated by automated process. Provider to review       Osteoporosis 2011     Priority: Medium     Problem list name updated by automated process. Provider to review       GERD 2011     Priority: Medium        Past Medical History:    Past Medical History:   Diagnosis Date     Allergic arthritis involving hand  01/01/2011     Anemia, Iron Deficiency 01/01/2011     Anxiety 01/01/2011     Contact dermatitis and other eczema, unspecified c 01/01/2011     Depression 01/01/2011     Edema 01/01/2011     Gastrointestinal mucositis (ulcerative) 01/01/2011     GERD 01/01/2011     Headache(784.0) 01/01/2011     Hypothyroidism 01/01/2011     Insomnia, unspecified 01/01/2011     Migraine 01/01/2011     Nonallopathic lesion of cervical region, not elsewhere classified 10/16/2000     Osteoporosis 01/01/2011     Other and unspecified hyperlipidemia 01/01/2011     Other malaise and fatigue 08/27/2001     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       Past Surgical History:    Past Surgical History:   Procedure Laterality Date     ARTHROPLASTY KNEE Left 4/17/2018    Procedure: ARTHROPLASTY KNEE;  LEFT TOTAL KNEE ARTHROPLASTY S/N JOURNEY II;  Surgeon: Ty Hernandez MD;  Location: [facility]     ARTHROPLASTY KNEE Right 9/4/2018    Procedure: ARTHROPLASTY KNEE;  RIGHT TOTAL KNEE ARTHROPLASTY ;  Surgeon: Ty Hernandez MD;  Location: [facility] OR     D & C       ESOPHAGOSCOPY, GASTROSCOPY, DUODENOSCOPY (EGD), COMBINED N/A 9/11/2017    Procedure: COMBINED ESOPHAGOSCOPY, GASTROSCOPY, DUODENOSCOPY (EGD);  Upper Endoscopy: Removal of Food Impaction;  Surgeon: Lino Zhu DO;  Location: [facility] OR     STOMACH SURGERY       stomach surgery peritinitis         Family History:    Family History   Problem Relation Age of Onset     Cerebrovascular Disease Mother      CVA     Hypertension Mother      Other - See Comments Father 40     mining accident; cause of death     Other - See Comments Brother      muscle dystrophy     Cancer Daughter 34     skin cancer     Melanoma Daughter        Social History:  Marital Status:  Legally  [3]  Social History   Substance Use Topics     Smoking status: Never Smoker     Smokeless tobacco: Never Used      Comment: no passive exposure     Alcohol use Yes      Comment: rarely, maybe yearly        Medications:       acetaminophen (TYLENOL) 325 MG tablet   Calcium Citrate-Vitamin D (CALCIUM + D PO)   cyanocolbalamin (VITAMIN  B-12) 1000 MCG tablet   cyclobenzaprine (FLEXERIL) 10 MG tablet   docusate sodium (COLACE) 50 MG capsule   gabapentin (NEURONTIN) 300 MG capsule   HYDROcodone-acetaminophen (NORCO) 7.5-325 MG per tablet   levothyroxine (SYNTHROID/LEVOTHROID) 150 MCG tablet   Multiple Vitamins-Minerals (MULTIVITAMIN OR)   multivitamin (OCUVITE) TABS   ondansetron (ZOFRAN) 4 MG tablet   pantoprazole (PROTONIX) 40 MG EC tablet   ranitidine (ZANTAC) 150 MG tablet   simvastatin (ZOCOR) 40 MG tablet   sucralfate (CARAFATE) 1 GM tablet   zolpidem (AMBIEN) 10 MG tablet   Na Sulfate-K Sulfate-Mg Sulf (SUPREP BOWEL PREP KIT) solution   NYSTOP [id number] UNIT/GM POWD powder         Review of Systems   Constitutional: Negative for fever.   Respiratory: Negative for shortness of breath.    Cardiovascular: Negative for chest pain.   Gastrointestinal: Positive for abdominal pain, nausea and vomiting.   All other systems reviewed and are negative.      Physical Exam   BP: 136/85  Pulse: 120  Heart Rate: 118  Temp: 97.6  F (36.4  C)  Resp: 16  SpO2: 100 %      Physical Exam   Constitutional: She appears well-developed and well-nourished. She appears distressed.   HENT:   Head: Normocephalic and atraumatic.   Mouth/Throat: Oropharynx is clear and moist.   Eyes: Pupils are equal, round, and reactive to light.   Cardiovascular: Normal rate, regular rhythm and normal heart sounds.    Pulmonary/Chest: Effort normal and breath sounds normal. No respiratory distress.   Abdominal: Soft. Bowel sounds are normal. She exhibits no distension. There is no tenderness.   Neurological: She is alert.   Skin: She is not diaphoretic.   Nursing note and vitals reviewed.      ED Course   Patient evaluated and laboratory tests ordered.  Started on IV fluid hydration.  IV Reglan given for nausea.     ED Course     Procedures      No results found for this or any  previous visit (from the past 24 hour(s)).    Medications   0.9% sodium chloride BOLUS (0 mLs Intravenous Stopped 11/3/18 4386)   metoclopramide (REGLAN) injection 5 mg (5 mg Intravenous Given 11/3/18 9009)   potassium chloride SA (K-DUR/KLOR-CON M) CR tablet 40 mEq (40 mEq Oral Given 11/3/18 0817)   prochlorperazine (COMPAZINE) injection 5 mg (5 mg Intravenous Given 11/3/18 4040)       Assessments & Plan (with Medical Decision Making)   Intractable vomiting and dehydration: Patient presented to the ED with a 2-day history of intractable vomiting and feeling hydrated.  She received 1 L bolus of IV normal saline and Reglan.  Her symptoms subsided.  Laboratory testing done showed a low potassium and patient was started on 40 mEq of oral potassium.  She also got a dose of Compazine 5 mg IV for nausea.  At the time of discharge from ED patient was feeling better without any nausea or vomiting.  She is scheduled for EGD on Monday.  Return to ED if condition worsens.    I have reviewed the nursing notes.    I have reviewed the findings, diagnosis, plan and need for follow up with the patient.    Discharge Medication List as of 11/3/2018  5:27 AM          Final diagnoses:   Dehydration   Hypokalemia   Intractable vomiting with nausea, unspecified vomiting type       11/3/2018   [facility]     Ameya Hanson MD  11/04/18 7406

## 2018-11-03 NOTE — DISCHARGE INSTRUCTIONS
"   * VOMITING [8yr-Adult]  Vomiting is a common symptom that may be due to different causes. These include gastroenteritis (\"stomach-flu\"), food poisoning and gastritis. There are other more serious causes of vomiting which may be hard to diagnose early in the illness. Therefore, it is important to watch for the warning signs listed below.  The main danger from repeated vomiting is \"dehydration\". This is due to excess loss of water and minerals from the body. When this occurs, body fluids must be replaced.`  HOME CARE:      If symptoms are severe, rest at home for the next 24 hours.    You may use acetaminophen (Tylenol) 650-1000 mg every 6 hours to control fever, unless another medicine was prescribed. [ NOTE : If you have chronic liver disease, talk with your doctor before using acetaminophen.] (Aspirin should never be used in anyone under 18 years of age who is ill with a fever. It may cause severe liver damage.)    Avoid tobacco and alcohol use, which may worsen your symptoms.    If medicines for vomiting were prescribed, take as directed.  DURING THE FIRST 12-24 HOURS follow the diet below. Try to take frequent small sips even if you vomit occasionally:    FRUIT JUICES: Apple, grape juice, clear fruit drinks, electrolyte replacement and sports drinks.    BEVERAGES: Sport drinks such as Gatorade, soft drinks without caffeine; mineral water (plain or flavored), decaffeinated tea and coffee.    SOUPS: Clear broth, consommé and bouillon    DESSERTS: Plain gelatin (Jell-O), popsicles and fruit juice bars.  DURING THE NEXT 24 HOURS you may add the following to the above:    Hot cereal, plain toast, bread, rolls, crackers    Plain noodles, rice, mashed potatoes, chicken noodle or rice soup    Unsweetened canned fruit (avoid pineapple), bananas    Avoid dairy products    Limit caffeine and chocolate. No spices or seasonings except salt.  DURING THE NEXT 24 HOURS  Slowly go back to a normal diet, as you feel better and " your symptoms lessen.  FOLLOW UP with your doctor as advised if you are not improving over the next 2-3 days.  GET PROMPT MEDICAL ATTENTION if any of the following occur:    Constant abdominal pain that stays in the same spot or gets worse    Continued vomiting (unable to keep liquids down) for 24 hours    Frequent diarrhea (more than 5 times a day); blood (red or black color) in diarrhea    No urine output for 12 hours or extreme thirst    Weakness, dizziness or fainting    Unusually drowsy or confused    Fever over 101.0  F (38.3  C) for more than 3 days    Yellow color of the eyes or skin    6080-6458 [employer]. [address] [place] [address]. All rights reserved. This information is not intended as a substitute for professional medical care. Always follow your healthcare professional's instructions.  This information has been modified by your health care provider with permission from the publisher.

## 2018-11-03 NOTE — ED AVS SNAPSHOT
[facility]    [address] [address]    Phone:  [phone]                                       Aure Lanier   MRN: [id number]    Department:  [facility]   Date of Visit:  11/3/2018           After Visit Summary Signature Page     I have received my discharge instructions, and my questions have been answered. I have discussed any challenges I see with this plan with the nurse or doctor.    ..........................................................................................................................................  Patient/Patient Representative Signature      ..........................................................................................................................................  Patient Representative Print Name and Relationship to Patient    ..................................................               ................................................  Date                                   Time    ..........................................................................................................................................  Reviewed by Signature/Title    ...................................................              ..............................................  Date                                               Time          22EPIC Rev 08/18

## 2018-11-03 NOTE — ED NOTES
Placed on call light and states potassium is causing discomfort. Potassium rate slowed down for patient comfort.

## 2018-11-03 NOTE — ED AVS SNAPSHOT
" [facility]    [address] [facility] [address]    Phone:  [phone]                                       Aure Lanier   MRN: [id number]    Department:  [facility]   Date of Visit:  11/3/2018           Patient Information     Date Of Birth          1950        Your diagnoses for this visit were:     Dehydration     Hypokalemia     Intractable vomiting with nausea, unspecified vomiting type        You were seen by Ameya Hanson MD.      Follow-up Information     Follow up with Jaden Lee NP In 2 days.    Specialty:  Internal Medicine    Why:  Re-evaluation, Continuity of care    Contact information:    [address] [facility] [address]  [phone]          Discharge Instructions          * VOMITING [6yr-Adult]  Vomiting is a common symptom that may be due to different causes. These include gastroenteritis (\"stomach-flu\"), food poisoning and gastritis. There are other more serious causes of vomiting which may be hard to diagnose early in the illness. Therefore, it is important to watch for the warning signs listed below.  The main danger from repeated vomiting is \"dehydration\". This is due to excess loss of water and minerals from the body. When this occurs, body fluids must be replaced.`  HOME CARE:      If symptoms are severe, rest at home for the next 24 hours.    You may use acetaminophen (Tylenol) 650-1000 mg every 6 hours to control fever, unless another medicine was prescribed. [ NOTE : If you have chronic liver disease, talk with your doctor before using acetaminophen.] (Aspirin should never be used in anyone under 18 years of age who is ill with a fever. It may cause severe liver damage.)    Avoid tobacco and alcohol use, which may worsen your symptoms.    If medicines for vomiting were prescribed, take as directed.  DURING THE FIRST 12-24 HOURS follow the diet below. Try to take frequent small sips even if you vomit occasionally:    FRUIT " JUICES: Apple, grape juice, clear fruit drinks, electrolyte replacement and sports drinks.    BEVERAGES: Sport drinks such as Gatorade, soft drinks without caffeine; mineral water (plain or flavored), decaffeinated tea and coffee.    SOUPS: Clear broth, consommé and bouillon    DESSERTS: Plain gelatin (Jell-O), popsicles and fruit juice bars.  DURING THE NEXT 24 HOURS you may add the following to the above:    Hot cereal, plain toast, bread, rolls, crackers    Plain noodles, rice, mashed potatoes, chicken noodle or rice soup    Unsweetened canned fruit (avoid pineapple), bananas    Avoid dairy products    Limit caffeine and chocolate. No spices or seasonings except salt.  DURING THE NEXT 24 HOURS  Slowly go back to a normal diet, as you feel better and your symptoms lessen.  FOLLOW UP with your doctor as advised if you are not improving over the next 2-3 days.  GET PROMPT MEDICAL ATTENTION if any of the following occur:    Constant abdominal pain that stays in the same spot or gets worse    Continued vomiting (unable to keep liquids down) for 24 hours    Frequent diarrhea (more than 5 times a day); blood (red or black color) in diarrhea    No urine output for 12 hours or extreme thirst    Weakness, dizziness or fainting    Unusually drowsy or confused    Fever over 101.0  F (38.3  C) for more than 3 days    Yellow color of the eyes or skin    1551-3954 [employer]. [address] [place] [address]. All rights reserved. This information is not intended as a substitute for professional medical care. Always follow your healthcare professional's instructions.  This information has been modified by your health care provider with permission from the publisher.      Your next 10 appointments already scheduled     Nov 05, 2018   Procedure with Dieudonne Jackson MD   [facility] ([address] [address] [phone]            Nov 08, 2018  10:00 AM CST   (Arrive by 9:45 AM)   SHORT with Jaden Lee NP   [facility] ([facility] )    [address]  [place] [address]   [phone]                 Review of your medicines      Our records show that you are taking the medicines listed below. If these are incorrect, please call your family doctor or clinic.        Dose / Directions Last dose taken    acetaminophen 325 MG tablet   Commonly known as:  TYLENOL   Dose:  650-975 mg   Quantity:  100 tablet        Take 2-3 tablets (650-975 mg) by mouth every 4 hours as needed for other (mild to moderate pain)   Refills:  0        CALCIUM + D PO   Dose:  2 tablet        Take 2 tablets by mouth daily as needed   Refills:  0        cyanocobalamin 1000 MCG tablet   Commonly known as:  vitamin  B-12   Dose:  1 tablet        Take 1 tablet by mouth daily as needed   Refills:  0        cyclobenzaprine 10 MG tablet   Commonly known as:  FLEXERIL   Quantity:  90 tablet        TAKE 1 TABLET BY MOUTH THREE TIMES DAILY AS NEEDED   Refills:  0        docusate sodium 50 MG capsule   Commonly known as:  COLACE   Dose:  50 mg   Quantity:  60 capsule        Take 1 capsule (50 mg) by mouth 2 times daily as needed for constipation   Refills:  0        gabapentin 300 MG capsule   Commonly known as:  NEURONTIN   Quantity:  90 capsule        TAKE 1 CAPSULE BY MOUTH 3 TIMES DAILY   Refills:  0        HYDROcodone-acetaminophen 7.5-325 MG per tablet   Commonly known as:  NORCO   Dose:  1 tablet   Quantity:  120 tablet        Take 1 tablet by mouth every 6 hours as needed for severe pain   Refills:  0        levothyroxine 150 MCG tablet   Commonly known as:  SYNTHROID/LEVOTHROID   Quantity:  90 tablet        TAKE 1 TABLET (150 MCG) BY MOUTH DAILY   Refills:  1        * MULTIVITAMIN PO   Dose:  1 tablet        Take 1 tablet by mouth daily as needed   Refills:  0        * multivitamin Tabs tablet   Dose:  1 tablet        Take 1 tablet by  mouth daily as needed   Refills:  0        Na Sulfate-K Sulfate-Mg Sulf solution   Commonly known as:  SUPREP BOWEL PREP KIT   Quantity:  2 Bottle        Drink 1 bottle 6PM prior to procedure followed by 2 16 oz glasses water over next hour. Repeat with 2nd bottle 6 hours prior to procedure   Refills:  0        NYSTOP 038395 UNIT/GM Powd   Quantity:  60 g   Generic drug:  nystatin        APPLY TOPICALLY 2 TIMES DAILY AS NEEDED   Refills:  3        ondansetron 4 MG tablet   Commonly known as:  ZOFRAN   Quantity:  48 tablet        TAKE 1 TABLET (4 MG) BY MOUTH 3 TIMES DAILY   Refills:  11        pantoprazole 40 MG EC tablet   Commonly known as:  PROTONIX   Dose:  40 mg   Quantity:  30 tablet        Take 1 tablet (40 mg) by mouth daily Take 30-60 minutes before a meal.   Refills:  1        ranitidine 150 MG tablet   Commonly known as:  ZANTAC   Dose:  150 mg   Quantity:  60 tablet        Take 1 tablet (150 mg) by mouth 2 times daily   Refills:  1        simvastatin 40 MG tablet   Commonly known as:  ZOCOR   Quantity:  90 tablet        TAKE 1 TABLET BY MOUTH EVERY DAY IN THE EVENING . GENERIC FOR ZOCOR.   Refills:  0        sucralfate 1 GM tablet   Commonly known as:  CARAFATE   Dose:  1 g        Take 1 g by mouth 4 times daily   Refills:  0        zolpidem 10 MG tablet   Commonly known as:  AMBIEN   Dose:  10 mg   Quantity:  60 tablet        Take 1 tablet (10 mg) by mouth nightly as needed for sleep Take 1 tablet at night for sleep-may repeat X1.   Refills:  0        * Notice:  This list has 2 medication(s) that are the same as other medications prescribed for you. Read the directions carefully, and ask your doctor or other care provider to review them with you.            Procedures and tests performed during your visit     CBC with platelets differential    CRP inflammation    Comprehensive metabolic panel    Erythrocyte sedimentation rate auto    Lactic acid whole blood    Lipase    Troponin I    XR Abdomen 2 Views       Orders Needing Specimen Collection     Ordered          11/03/18 0218  UA reflex to Microscopic and Culture - STAT, [facility]: STAT, Status: Sent     Scheduled Task Status   11/03/18 0219 [email or web address] CC Reminder: Open   Order Class:  PCU Collect                  Pending Results     Date and Time Order Name Status Description    11/3/2018 0218 XR Abdomen 2 Views In process             Pending Culture Results     No orders found from 11/1/2018 to 11/4/2018.            Thank you for choosing [facility]       Thank you for choosing [facility] for your care. Our goal is always to provide you with excellent care. Hearing back from our patients is one way we can continue to improve our services. Please take a few minutes to complete the written survey that you may receive in the mail after you visit with us. Thank you!        [employer] Information     [facility] gives you secure access to your electronic health record. If you see a primary care provider, you can also send messages to your care team and make appointments. If you have questions, please call your primary care clinic.  If you do not have a primary care provider, please call [phone] and they will assist you.        Care EveryWhere ID     This is your Care EveryWhere ID. This could be used by other organizations to access your [facility] medical records  [id number]        Equal Access to Services     ALYSSA ADAMES : Hadii jefry Anderson, watania culver, qagovind kaalmada romina, racquel craven. So [facility] [phone].    ATENCIÓN: Si habla español, tiene a gross disposición servicios gratuitos de asistencia lingüística. Llame al [phone].    We comply with applicable federal civil rights laws and [place] laws. We do not discriminate on the basis of race, color, national origin, age, disability, sex, sexual orientation, or gender identity.            After Visit Summary       This is your record. Keep this with you and show to your  community pharmacist(s) and doctor(s) at your next visit.

## 2018-11-03 NOTE — ED NOTES
Discharge instructions given. Verbalized understanding. States pain is the same and rates 5/10. IV removed. Catheter intact. Coban applied. Instructed to remove coban in 30 minutes. Verbalized understanding. Ambulated out of ED independently.

## 2018-11-05 ENCOUNTER — SURGERY (OUTPATIENT)
Age: 68
End: 2018-11-05

## 2018-11-05 ENCOUNTER — ANESTHESIA (OUTPATIENT)
Dept: SURGERY | Facility: HOSPITAL | Age: 68
End: 2018-11-05
Payer: COMMERCIAL

## 2018-11-05 ENCOUNTER — HOSPITAL ENCOUNTER (OUTPATIENT)
Facility: HOSPITAL | Age: 68
Discharge: HOME | End: 2018-11-05
Attending: SURGERY | Admitting: SURGERY
Payer: COMMERCIAL

## 2018-11-05 VITALS
BODY MASS INDEX: 30.36 KG/M2 | TEMPERATURE: 98.2 F | HEART RATE: 112 BPM | OXYGEN SATURATION: 100 % | RESPIRATION RATE: 14 BRPM | DIASTOLIC BLOOD PRESSURE: 78 MMHG | SYSTOLIC BLOOD PRESSURE: 142 MMHG | HEIGHT: 62 IN | WEIGHT: 165 LBS

## 2018-11-05 DIAGNOSIS — K25.2: Primary | ICD-10-CM

## 2018-11-05 PROCEDURE — 43239 EGD BIOPSY SINGLE/MULTIPLE: CPT | Performed by: ANESTHESIOLOGY

## 2018-11-05 PROCEDURE — 88341 IMHCHEM/IMCYTCHM EA ADD ANTB: CPT | Mod: TC | Performed by: SURGERY

## 2018-11-05 PROCEDURE — 25000128 H RX IP 250 OP 636: Performed by: ANESTHESIOLOGY

## 2018-11-05 PROCEDURE — 25000128 H RX IP 250 OP 636: Performed by: NURSE ANESTHETIST, CERTIFIED REGISTERED

## 2018-11-05 PROCEDURE — 43239 EGD BIOPSY SINGLE/MULTIPLE: CPT | Performed by: NURSE ANESTHETIST, CERTIFIED REGISTERED

## 2018-11-05 PROCEDURE — 37000008 ZZH ANESTHESIA TECHNICAL FEE, 1ST 30 MIN: Performed by: SURGERY

## 2018-11-05 PROCEDURE — 36000050 ZZH SURGERY LEVEL 2 1ST 30 MIN: Performed by: SURGERY

## 2018-11-05 PROCEDURE — 88342 IMHCHEM/IMCYTCHM 1ST ANTB: CPT | Mod: TC | Performed by: SURGERY

## 2018-11-05 PROCEDURE — 88305 TISSUE EXAM BY PATHOLOGIST: CPT | Mod: TC | Performed by: SURGERY

## 2018-11-05 PROCEDURE — 40000306 ZZH STATISTIC PRE PROC ASSESS II: Performed by: SURGERY

## 2018-11-05 PROCEDURE — 25000125 ZZHC RX 250: Performed by: SURGERY

## 2018-11-05 PROCEDURE — 25000125 ZZHC RX 250: Performed by: NURSE ANESTHETIST, CERTIFIED REGISTERED

## 2018-11-05 PROCEDURE — 27210794 ZZH OR GENERAL SUPPLY STERILE: Performed by: SURGERY

## 2018-11-05 PROCEDURE — 71000027 ZZH RECOVERY PHASE 2 EACH 15 MINS: Performed by: SURGERY

## 2018-11-05 PROCEDURE — 43239 EGD BIOPSY SINGLE/MULTIPLE: CPT | Performed by: SURGERY

## 2018-11-05 RX ORDER — SODIUM CHLORIDE, SODIUM LACTATE, POTASSIUM CHLORIDE, CALCIUM CHLORIDE 600; 310; 30; 20 MG/100ML; MG/100ML; MG/100ML; MG/100ML
INJECTION, SOLUTION INTRAVENOUS CONTINUOUS
Status: DISCONTINUED | OUTPATIENT
Start: 2018-11-05 | End: 2018-11-05 | Stop reason: HOSPADM

## 2018-11-05 RX ORDER — SUCRALFATE ORAL 1 G/10ML
1 SUSPENSION ORAL 4 TIMES DAILY
Qty: 420 ML | Refills: 1 | Status: SHIPPED | OUTPATIENT
Start: 2018-11-05 | End: 2018-11-28

## 2018-11-05 RX ORDER — MEPERIDINE HYDROCHLORIDE 50 MG/ML
12.5 INJECTION INTRAMUSCULAR; INTRAVENOUS; SUBCUTANEOUS
Status: DISCONTINUED | OUTPATIENT
Start: 2018-11-05 | End: 2018-11-05 | Stop reason: HOSPADM

## 2018-11-05 RX ORDER — LIDOCAINE 40 MG/G
CREAM TOPICAL
Status: DISCONTINUED | OUTPATIENT
Start: 2018-11-05 | End: 2018-11-05 | Stop reason: HOSPADM

## 2018-11-05 RX ORDER — NALOXONE HYDROCHLORIDE 0.4 MG/ML
.1-.4 INJECTION, SOLUTION INTRAMUSCULAR; INTRAVENOUS; SUBCUTANEOUS
Status: DISCONTINUED | OUTPATIENT
Start: 2018-11-05 | End: 2018-11-05 | Stop reason: HOSPADM

## 2018-11-05 RX ORDER — NYSTATIN 100000 [USP'U]/G
POWDER TOPICAL
Qty: 60 G | Refills: 1 | Status: SHIPPED | OUTPATIENT
Start: 2018-11-05 | End: 2019-10-23

## 2018-11-05 RX ORDER — FLUMAZENIL 0.1 MG/ML
0.2 INJECTION, SOLUTION INTRAVENOUS
Status: DISCONTINUED | OUTPATIENT
Start: 2018-11-05 | End: 2018-11-05 | Stop reason: HOSPADM

## 2018-11-05 RX ORDER — ONDANSETRON 4 MG/1
4 TABLET, ORALLY DISINTEGRATING ORAL EVERY 30 MIN PRN
Status: DISCONTINUED | OUTPATIENT
Start: 2018-11-05 | End: 2018-11-05 | Stop reason: HOSPADM

## 2018-11-05 RX ORDER — ONDANSETRON 2 MG/ML
4 INJECTION INTRAMUSCULAR; INTRAVENOUS EVERY 30 MIN PRN
Status: DISCONTINUED | OUTPATIENT
Start: 2018-11-05 | End: 2018-11-05 | Stop reason: HOSPADM

## 2018-11-05 RX ORDER — ONDANSETRON 2 MG/ML
4 INJECTION INTRAMUSCULAR; INTRAVENOUS ONCE
Status: COMPLETED | OUTPATIENT
Start: 2018-11-05 | End: 2018-11-05

## 2018-11-05 RX ORDER — PROPOFOL 10 MG/ML
INJECTION, EMULSION INTRAVENOUS PRN
Status: DISCONTINUED | OUTPATIENT
Start: 2018-11-05 | End: 2018-11-05

## 2018-11-05 RX ORDER — LIDOCAINE HYDROCHLORIDE 20 MG/ML
INJECTION, SOLUTION INFILTRATION; PERINEURAL PRN
Status: DISCONTINUED | OUTPATIENT
Start: 2018-11-05 | End: 2018-11-05

## 2018-11-05 RX ADMIN — LIDOCAINE HYDROCHLORIDE 40 MG: 20 INJECTION, SOLUTION INFILTRATION; PERINEURAL at 13:40

## 2018-11-05 RX ADMIN — PROPOFOL 20 MG: 10 INJECTION, EMULSION INTRAVENOUS at 13:47

## 2018-11-05 RX ADMIN — PROPOFOL 20 MG: 10 INJECTION, EMULSION INTRAVENOUS at 13:50

## 2018-11-05 RX ADMIN — SODIUM CHLORIDE, POTASSIUM CHLORIDE, SODIUM LACTATE AND CALCIUM CHLORIDE: 600; 310; 30; 20 INJECTION, SOLUTION INTRAVENOUS at 13:16

## 2018-11-05 RX ADMIN — PROPOFOL 20 MG: 10 INJECTION, EMULSION INTRAVENOUS at 13:53

## 2018-11-05 RX ADMIN — TOPICAL ANESTHETIC 1 SPRAY: 200 SPRAY DENTAL; PERIODONTAL at 13:43

## 2018-11-05 RX ADMIN — PROPOFOL 50 MG: 10 INJECTION, EMULSION INTRAVENOUS at 13:40

## 2018-11-05 RX ADMIN — PROPOFOL 20 MG: 10 INJECTION, EMULSION INTRAVENOUS at 13:42

## 2018-11-05 RX ADMIN — ONDANSETRON 4 MG: 2 INJECTION, SOLUTION INTRAMUSCULAR; INTRAVENOUS at 13:04

## 2018-11-05 RX ADMIN — PROPOFOL 20 MG: 10 INJECTION, EMULSION INTRAVENOUS at 13:44

## 2018-11-05 RX ADMIN — PROPOFOL 20 MG: 10 INJECTION, EMULSION INTRAVENOUS at 13:45

## 2018-11-05 RX ADMIN — PROPOFOL 20 MG: 10 INJECTION, EMULSION INTRAVENOUS at 13:43

## 2018-11-05 ASSESSMENT — LIFESTYLE VARIABLES: TOBACCO_USE: 0

## 2018-11-05 NOTE — IP AVS SNAPSHOT
MRN:[id number]                      After Visit Summary   11/5/2018    Aure Lanier    MRN: [id number]           Thank you!     Thank you for choosing [facility] for your care. Our goal is always to provide you with excellent care. Hearing back from our patients is one way we can continue to improve our services. Please take a few minutes to complete the written survey that you may receive in the mail after you visit with us. Thank you!        Patient Information     Date Of Birth          1950        About your hospital stay     You were admitted on:  November 5, 2018 You last received care in the:  [facility] Preop/Phase II    You were discharged on:  November 5, 2018       Who to Call     For medical emergencies, please call 911.  For non-urgent questions about your medical care, please call your primary care provider or clinic, [phone]  For questions related to your surgery, please call your surgery clinic        Attending Provider     Provider Specialty    Dieudonne Jackson MD Surgery       Primary Care Provider Office Phone # Fax #    Jaden Lee [phone]      After Care Instructions     Discharge Instructions       Resume pre procedure diet            Discharge Instructions       Restart home medications.                  Your next 10 appointments already scheduled     Nov 08, 2018 10:00 AM CST   (Arrive by 9:45 AM)   SHORT with Jaden Lee NP   [facility] ([facility] )    [address] [place] [address]   [phone]              Further instructions from your care team           Post-Anesthesia Patient Instructions    IMMEDIATELY FOLLOWING SURGERY:  Do not drive or operate machinery for the first twenty four hours after surgery.  Do not make any important decisions for twenty four hours after surgery or while taking narcotic pain medications or sedatives.  If you develop intractable nausea and vomiting or a  severe headache please notify your doctor immediately.    FOLLOW-UP:  Please make an appointment with your surgeon as instructed. You do not need to follow up with anesthesia unless specifically instructed to do so.    WOUND CARE INSTRUCTIONS (if applicable):  Keep a dry clean dressing on the anesthesia/puncture wound site if there is drainage.  Once the wound has quit draining you may leave it open to air.  Generally you should leave the bandage intact for twenty four hours unless there is drainage.  If the epidural site drains for more than 36-48 hours please call the anesthesia department.    QUESTIONS?:  Please feel free to call your physician or the hospital  if you have any questions, and they will be happy to assist you.             UPPER ENDOSCOPY    PURPOSE:  An Upper Endoscopy is a procedure in which the doctor passes a flexible, lighted tube called a gastroscope through your mouth into your stomach in order to:    See the lining of the esophagus, stomach, and duodenum (first part of the small intestine).    Look for bleeding, inflammation (swelling), abnormal tumors or tissue, or ulcers.    Take biopsy specimens.  (Biopsies do not hurt.)    TELL YOUR DOCTOR IF:     You have a heart condition, heart murmur, or have had heart valve surgery.    You have had angioplasty with stents put in within the last year.    You are taking blood thinners - Aspirin, Anti-inflammatory pain pills (like Motrin, ibuprofen, Naproxen, Feldene, Advil, etc.), Coumadin, Plavix.    You have diabetes - contact your regular doctor for management of your insulin.    YOU NEED TO:    Have someone drive you home.  You are not allowed to drive until the next day.  (If you take a taxi, the person staying with you after surgery must ride with you in the taxi.  The  is not responsible for you).    Have someone stay with you for four (4) hours after you leave the hospital.    Know the time to be at admitting:  A nurse will  call you with the time the afternoon before your procedure.  If your procedure is on Monday, you will be called on Friday.  If you have not been contacted with the time, call the Admitting Department at [phone] or [phone], ext. [phone] after 5 pm (Admitting is open 24 hours daily).    Talk with the Surgery Patient Education Nurses.  Please call them @ [phone] or toll free [phone] after 8:00 a.m. Monday through Friday.    TO PREPARE FOR THE PROCEDURE:      ONE WEEK BEFORE:    Stop Aspirin, anti-inflammatory pain pills (Motrin, ibuprofen, Naproxen, Feldene, Advil, etc.).  It is OK to take Tylenol (acetaminophen).    Your doctor will tell you what to do if you are on Coumadin or Plavix.     NIGHT BEFORE:    Do not eat or drink anything after midnight.  Your stomach needs to be empty.     DAY OF YOUR PROCEDURE:    Take your pills you were told to take.  Do not take diabetic pills.  If you are on Insulin, take the dose your doctor told you to take.    Wear loose, comfortable clothing and shoes.    Remove ALL jewelry including wedding rings.  Leave valuables at home.    Come to the hospital Admitting Department located at the [facility] on the lower level.    In Same Day surgery, you will be asked to change into an exam gown.    You will be asked your name, birth date, and what you are having done by every person who is involved with your care.    Your health history, medications, and allergies will be reviewed and verified.    An IV (intravenous line) will be started in your hand.  DURING THE UPPER ENDOSCOPY:    Your doctor and a registered nurse will be with you throughout the procedure which takes about 30 minutes.    You will either lie on your left side or on your back.    Medications will be given to you to help you relax and reduce the gag reflex when swallowing the scope.    Your doctor may need to insert air into your stomach to see better.  This may cause fullness or a cramping sensation.   The air will be removed at the end of the exam.    AFTER THE PROCEDURE    You will return to Same Day Surgery to rest for about an hour before you go home.    The doctor will talk with you and your family.    A family member/friend may visit with you.    You may burp up any air remaining in your stomach.    You may feel dizzy or light-headed from the medicine.    Your nurse will go over the discharge instructions with you and your caregiver and answer any of your questions.      You will be contacted the next day to check on how you are doing.    If biopsies were taken, you will be contacted with the results usually within 3 days.  BACK AT HOME    Rest for an hour or two after you get home.    When your throat is no longer numb and you have a gag reflex, take a few sips of cool water.  If you can swallow comfortably, you may start eating again.    You may have a mild sore throat for the rest of the day.  WHAT TO WATCH FOR:  Problems rarely occur after the exam, but it is important to be aware of the early signs of a complication.  Call your doctor immediately if you have:    Difficulty swallowing or breathing    Unusual pain in your stomach or chest    Vomiting blood or dark material that looks like coffee grounds    Black or tarry stools    Temperature over 100.6 degrees    MORE QUESTIONS?  Please ask your doctor or nurse before the exam begins  or call your doctor at the clinic.    IF YOU MUST CANCEL YOUR PROCEDURE THE EVENING/NIGHT BEFORE, PLEASE CALL HOSPITAL ADMITTING -[phone] OR TOLL FREE [phone], EXT. [phone].    Phone Numbers:  [facility] - [phone] [phone]  [facility] - [phone]  Surgery Patient Education - [phone] or toll free [phone]    Pending Results     Date and Time Order Name Status Description    11/5/2018 1351 Surgical pathology exam In process             Admission Information     Date & Time Provider Department Dept. Phone    11/5/2018 Dieudonne Jackson,  "MD [facility] Preop/Phase -[phone]      Your Vitals Were     Blood Pressure Pulse Temperature Respirations Height Weight    146/74 116 98.5  F (36.9  C) (Temporal) 16 1.575 m (5' 2\") 74.8 kg (165 lb)    Pulse Oximetry BMI (Body Mass Index)                100% 30.18 kg/m2          [facility] Information     [facility] gives you secure access to your electronic health record. If you see a primary care provider, you can also send messages to your care team and make appointments. If you have questions, please call your primary care clinic.  If you do not have a primary care provider, please call [phone] and they will assist you.        Care EveryWhere ID     This is your Care EveryWhere ID. This could be used by other organizations to access your [facility] medical records  [id number]        Equal Access to Services     ALYSSA ADAMES : Kerry Anderson, halley culver, racquel quan. So [facility] [phone].    ATENCIÓN: Si habla español, tiene a gross disposición servicios gratuitos de asistencia lingüística. Lei al [phone].    We comply with applicable federal civil rights laws and [place] laws. We do not discriminate on the basis of race, color, national origin, age, disability, sex, sexual orientation, or gender identity.               Review of your medicines      START taking        Dose / Directions    sucralfate 1 GM/10ML suspension   Commonly known as:  CARAFATE   Used for:  Acute stomach ulcer hem/perf, obst (H)   Replaces:  sucralfate 1 GM tablet        Dose:  1 g   Take 10 mLs (1 g) by mouth 4 times daily   Quantity:  420 mL   Refills:  1         CONTINUE these medicines which have NOT CHANGED        Dose / Directions    acetaminophen 325 MG tablet   Commonly known as:  TYLENOL   Used for:  Status post total left knee replacement        Dose:  650-975 mg   Take 2-3 tablets (650-975 mg) by mouth every 4 hours as needed for other (mild to " moderate pain)   Quantity:  100 tablet   Refills:  0       CALCIUM + D PO        Dose:  2 tablet   Take 2 tablets by mouth daily as needed   Refills:  0       cyanocobalamin 1000 MCG tablet   Commonly known as:  vitamin  B-12        Dose:  1 tablet   Take 1 tablet by mouth daily as needed   Refills:  0       cyclobenzaprine 10 MG tablet   Commonly known as:  FLEXERIL   Used for:  Cervicalgia        TAKE 1 TABLET BY MOUTH THREE TIMES DAILY AS NEEDED   Quantity:  90 tablet   Refills:  0       docusate sodium 50 MG capsule   Commonly known as:  COLACE   Used for:  S/P total knee arthroplasty, right        Dose:  50 mg   Take 1 capsule (50 mg) by mouth 2 times daily as needed for constipation   Quantity:  60 capsule   Refills:  0       gabapentin 300 MG capsule   Commonly known as:  NEURONTIN   Used for:  Postherpetic polyneuropathy        TAKE 1 CAPSULE BY MOUTH 3 TIMES DAILY   Quantity:  90 capsule   Refills:  0       HYDROcodone-acetaminophen 7.5-325 MG per tablet   Commonly known as:  NORCO   Used for:  Acute pain of right knee        Dose:  1 tablet   Take 1 tablet by mouth every 6 hours as needed for severe pain   Quantity:  120 tablet   Refills:  0       levothyroxine 150 MCG tablet   Commonly known as:  SYNTHROID/LEVOTHROID   Used for:  Hypothyroidism, unspecified type        TAKE 1 TABLET (150 MCG) BY MOUTH DAILY   Quantity:  90 tablet   Refills:  1       * MULTIVITAMIN PO        Dose:  1 tablet   Take 1 tablet by mouth daily as needed   Refills:  0       * multivitamin Tabs tablet        Dose:  1 tablet   Take 1 tablet by mouth daily as needed   Refills:  0       Na Sulfate-K Sulfate-Mg Sulf solution   Commonly known as:  SUPREP BOWEL PREP KIT   Used for:  Other iron deficiency anemia, Epigastric pain        Drink 1 bottle 6PM prior to procedure followed by 2 16 oz glasses water over next hour. Repeat with 2nd bottle 6 hours prior to procedure   Quantity:  2 Bottle   Refills:  0       NYAMYC 676360 UNIT/GM  Powd   Used for:  Rash   Generic drug:  nystatin        APPLY TOPICALLY 2 TIMES DAILY AS NEEDED   Quantity:  60 g   Refills:  1       ondansetron 4 MG tablet   Commonly known as:  ZOFRAN   Used for:  Nausea        TAKE 1 TABLET (4 MG) BY MOUTH 3 TIMES DAILY   Quantity:  48 tablet   Refills:  11       pantoprazole 40 MG EC tablet   Commonly known as:  PROTONIX   Used for:  Gastroesophageal reflux disease with esophagitis        Dose:  40 mg   Take 1 tablet (40 mg) by mouth daily Take 30-60 minutes before a meal.   Quantity:  30 tablet   Refills:  1       ranitidine 150 MG tablet   Commonly known as:  ZANTAC   Used for:  Gastritis without bleeding, unspecified chronicity, unspecified gastritis type        Dose:  150 mg   Take 1 tablet (150 mg) by mouth 2 times daily   Quantity:  60 tablet   Refills:  1       simvastatin 40 MG tablet   Commonly known as:  ZOCOR   Used for:  Mixed hyperlipidemia        TAKE 1 TABLET BY MOUTH EVERY DAY IN THE EVENING . GENERIC FOR ZOCOR.   Quantity:  90 tablet   Refills:  0       zolpidem 10 MG tablet   Commonly known as:  AMBIEN   Used for:  Persistent insomnia        Dose:  10 mg   Take 1 tablet (10 mg) by mouth nightly as needed for sleep Take 1 tablet at night for sleep-may repeat X1.   Quantity:  60 tablet   Refills:  0       * Notice:  This list has 2 medication(s) that are the same as other medications prescribed for you. Read the directions carefully, and ask your doctor or other care provider to review them with you.      STOP taking     sucralfate 1 GM tablet   Commonly known as:  CARAFATE   Replaced by:  sucralfate 1 GM/10ML suspension                Where to get your medicines      These medications were sent to [id number] - [place] - [address]  [facility] [address] [place] [address]     Phone:  [phone]     sucralfate 1 GM/10ML suspension                Protect others around you: Learn how to safely use, store and throw away your medicines at  www.disposemymeds.org.             Medication List: This is a list of all your medications and when to take them. Check marks below indicate your daily home schedule. Keep this list as a reference.      Medications           Morning Afternoon Evening Bedtime As Needed    acetaminophen 325 MG tablet   Commonly known as:  TYLENOL   Take 2-3 tablets (650-975 mg) by mouth every 4 hours as needed for other (mild to moderate pain)                                CALCIUM + D PO   Take 2 tablets by mouth daily as needed                                cyanocobalamin 1000 MCG tablet   Commonly known as:  vitamin  B-12   Take 1 tablet by mouth daily as needed                                cyclobenzaprine 10 MG tablet   Commonly known as:  FLEXERIL   TAKE 1 TABLET BY MOUTH THREE TIMES DAILY AS NEEDED                                docusate sodium 50 MG capsule   Commonly known as:  COLACE   Take 1 capsule (50 mg) by mouth 2 times daily as needed for constipation                                gabapentin 300 MG capsule   Commonly known as:  NEURONTIN   TAKE 1 CAPSULE BY MOUTH 3 TIMES DAILY                                HYDROcodone-acetaminophen 7.5-325 MG per tablet   Commonly known as:  NORCO   Take 1 tablet by mouth every 6 hours as needed for severe pain                                levothyroxine 150 MCG tablet   Commonly known as:  SYNTHROID/LEVOTHROID   TAKE 1 TABLET (150 MCG) BY MOUTH DAILY                                * MULTIVITAMIN PO   Take 1 tablet by mouth daily as needed                                * multivitamin Tabs tablet   Take 1 tablet by mouth daily as needed                                Na Sulfate-K Sulfate-Mg Sulf solution   Commonly known as:  SUPREP BOWEL PREP KIT   Drink 1 bottle 6PM prior to procedure followed by 2 16 oz glasses water over next hour. Repeat with 2nd bottle 6 hours prior to procedure                                NYAMYC 257110 UNIT/GM Powd   APPLY TOPICALLY 2 TIMES DAILY AS  NEEDED   Generic drug:  nystatin                                ondansetron 4 MG tablet   Commonly known as:  ZOFRAN   TAKE 1 TABLET (4 MG) BY MOUTH 3 TIMES DAILY                                pantoprazole 40 MG EC tablet   Commonly known as:  PROTONIX   Take 1 tablet (40 mg) by mouth daily Take 30-60 minutes before a meal.                                ranitidine 150 MG tablet   Commonly known as:  ZANTAC   Take 1 tablet (150 mg) by mouth 2 times daily                                simvastatin 40 MG tablet   Commonly known as:  ZOCOR   TAKE 1 TABLET BY MOUTH EVERY DAY IN THE EVENING . GENERIC FOR ZOCOR.                                sucralfate 1 GM/10ML suspension   Commonly known as:  CARAFATE   Take 10 mLs (1 g) by mouth 4 times daily                                zolpidem 10 MG tablet   Commonly known as:  AMBIEN   Take 1 tablet (10 mg) by mouth nightly as needed for sleep Take 1 tablet at night for sleep-may repeat X1.                                * Notice:  This list has 2 medication(s) that are the same as other medications prescribed for you. Read the directions carefully, and ask your doctor or other care provider to review them with you.

## 2018-11-05 NOTE — OP NOTE
Aure Lanier MRN# [id number]   YOB: 1950 Age: 68 year old      Date of Admission:  11/5/2018    Primary care provider: Jaden Lee    PREOPERATIVE DIAGNOSIS:  Anemia      POSTOPERATIVE DIAGNOSES:    Severe gastritis, gastro-duodenal anastomotic stricture, sliding hiatal hernia, gastric ulceration      PROCEDURE:  Esophagogastroduodenoscopy with cold forceps biopsies.       HISTORY OF PRESENT ILLNESS:  This 68 year old female developed anemia and epigastric abdominal pain post operatively after she had two knee operations. She was treated with Toradol and developed epigastric discomfort similar to her previous peptic ulcer disease and required upper endoscopy for evaluation.     OPERATIVE FINDINGS:  The gastroesophageal junction was noted 36 cm from the incisors.  The Z line was irregular with changes suggesting reflux.  There was evidence of ulceration within the stomach and stricture at the gastro-duodenal anastomosis from her previous BI reconstruction. There was a small sliding hiatal hernia.    Specimen(s) submitted to pathology:  Stomach ulcer biopsies, GE junction biopsies    PROCEDURE:  With the patient in the supine position on the transport cart, IV sedation was administered by the nurse anesthetist.  Her correct identity and planned procedure were confirmed during a requisite timeout pause.  A bite block was placed and the fiberoptic endoscope was introduced and negotiated through the cricopharyngeus without difficulty.  The length of the esophagus was examined without findings.  The gastroesophageal junction was noted 36 cm from the incisors; the Z line was irregular without ulceration, bleeding source or stricture.  There was no evidence of Francisco's change.  The stomach was entered and there was extensive gastritis. The entire mucosal surface of the stomach was inflamed and friable. There was evidence of ulceration at the BI reconstruction. There was food remnant within the stomach.  The stomach was irrigated thoroughly to clean it out. The scope was advanced and due to a stricture at the gastro-duodenal anastomosis the scope could not be passed.     Multiple biopsies were taken of the stomach ulceration and the randomly within the stomach. The stomach easily bled and it was inspected after biopsy and hemostasis was maintained. The endoscope was returned to the GE junction. There was a small 1-2 cm sliding hiatal hernia.  Circumferential biopsies were obtained; hemostasis was satisfactory.  A second circumferential examination of the esophagus was performed on slow withdrawal of the endoscope without additional finding.  The procedure was satisfactorially tolerated; there was no appreciable blood loss and the patient was returned to Day Surgery in good condition.      Dieudonne Jackson MD  11/5/2018  1:55 PM

## 2018-11-05 NOTE — TELEPHONE ENCOUNTER
nystop      Last Written Prescription Date:  12/18/17  Last Fill Quantity: 60g,   # refills: 3   Last Office Visit: 10/24/18  Future Office visit:    Next 5 appointments (look out 90 days)     Nov 08, 2018 10:00 AM CST   (Arrive by 9:45 AM)   SHORT with Jaden Lee NP   [facility]    [address] [place] [address]   [phone]

## 2018-11-05 NOTE — OR NURSING
Patient and responsible adult given discharge instructions with no questions regarding instructions. Светлана score 20/20. Pain level 0/10.  Discharged from unit via w/c. Patient discharged to home.

## 2018-11-05 NOTE — DISCHARGE INSTRUCTIONS
Post-Anesthesia Patient Instructions    IMMEDIATELY FOLLOWING SURGERY:  Do not drive or operate machinery for the first twenty four hours after surgery.  Do not make any important decisions for twenty four hours after surgery or while taking narcotic pain medications or sedatives.  If you develop intractable nausea and vomiting or a severe headache please notify your doctor immediately.    FOLLOW-UP:  Please make an appointment with your surgeon as instructed. You do not need to follow up with anesthesia unless specifically instructed to do so.    WOUND CARE INSTRUCTIONS (if applicable):  Keep a dry clean dressing on the anesthesia/puncture wound site if there is drainage.  Once the wound has quit draining you may leave it open to air.  Generally you should leave the bandage intact for twenty four hours unless there is drainage.  If the epidural site drains for more than 36-48 hours please call the anesthesia department.    QUESTIONS?:  Please feel free to call your physician or the hospital  if you have any questions, and they will be happy to assist you.             UPPER ENDOSCOPY    PURPOSE:  An Upper Endoscopy is a procedure in which the doctor passes a flexible, lighted tube called a gastroscope through your mouth into your stomach in order to:    See the lining of the esophagus, stomach, and duodenum (first part of the small intestine).    Look for bleeding, inflammation (swelling), abnormal tumors or tissue, or ulcers.    Take biopsy specimens.  (Biopsies do not hurt.)    TELL YOUR DOCTOR IF:     You have a heart condition, heart murmur, or have had heart valve surgery.    You have had angioplasty with stents put in within the last year.    You are taking blood thinners - Aspirin, Anti-inflammatory pain pills (like Motrin, ibuprofen, Naproxen, Feldene, Advil, etc.), Coumadin, Plavix.    You have diabetes - contact your regular doctor for management of your insulin.    YOU NEED TO:    Have someone  drive you home.  You are not allowed to drive until the next day.  (If you take a taxi, the person staying with you after surgery must ride with you in the taxi.  The  is not responsible for you).    Have someone stay with you for four (4) hours after you leave the hospital.    Know the time to be at admitting:  A nurse will call you with the time the afternoon before your procedure.  If your procedure is on Monday, you will be called on Friday.  If you have not been contacted with the time, call the Admitting Department at [phone] or [phone], ext. [phone] after 5 pm (Admitting is open 24 hours daily).    Talk with the Surgery Patient Education Nurses.  Please call them @ [phone] or toll free [phone] after 8:00 a.m. Monday through Friday.    TO PREPARE FOR THE PROCEDURE:      ONE WEEK BEFORE:    Stop Aspirin, anti-inflammatory pain pills (Motrin, ibuprofen, Naproxen, Feldene, Advil, etc.).  It is OK to take Tylenol (acetaminophen).    Your doctor will tell you what to do if you are on Coumadin or Plavix.     NIGHT BEFORE:    Do not eat or drink anything after midnight.  Your stomach needs to be empty.     DAY OF YOUR PROCEDURE:    Take your pills you were told to take.  Do not take diabetic pills.  If you are on Insulin, take the dose your doctor told you to take.    Wear loose, comfortable clothing and shoes.    Remove ALL jewelry including wedding rings.  Leave valuables at home.    Come to the hospital Admitting Department located at the [facility] on the lower level.    In Same Day surgery, you will be asked to change into an exam gown.    You will be asked your name, birth date, and what you are having done by every person who is involved with your care.    Your health history, medications, and allergies will be reviewed and verified.    An IV (intravenous line) will be started in your hand.  DURING THE UPPER ENDOSCOPY:    Your doctor and a registered nurse will be with you  throughout the procedure which takes about 30 minutes.    You will either lie on your left side or on your back.    Medications will be given to you to help you relax and reduce the gag reflex when swallowing the scope.    Your doctor may need to insert air into your stomach to see better.  This may cause fullness or a cramping sensation.  The air will be removed at the end of the exam.    AFTER THE PROCEDURE    You will return to Same Day Surgery to rest for about an hour before you go home.    The doctor will talk with you and your family.    A family member/friend may visit with you.    You may burp up any air remaining in your stomach.    You may feel dizzy or light-headed from the medicine.    Your nurse will go over the discharge instructions with you and your caregiver and answer any of your questions.      You will be contacted the next day to check on how you are doing.    If biopsies were taken, you will be contacted with the results usually within 3 days.  BACK AT HOME    Rest for an hour or two after you get home.    When your throat is no longer numb and you have a gag reflex, take a few sips of cool water.  If you can swallow comfortably, you may start eating again.    You may have a mild sore throat for the rest of the day.  WHAT TO WATCH FOR:  Problems rarely occur after the exam, but it is important to be aware of the early signs of a complication.  Call your doctor immediately if you have:    Difficulty swallowing or breathing    Unusual pain in your stomach or chest    Vomiting blood or dark material that looks like coffee grounds    Black or tarry stools    Temperature over 100.6 degrees    MORE QUESTIONS?  Please ask your doctor or nurse before the exam begins  or call your doctor at the clinic.    IF YOU MUST CANCEL YOUR PROCEDURE THE EVENING/NIGHT BEFORE, PLEASE CALL HOSPITAL ADMITTING -[phone] OR TOLL FREE [phone], [phone]. [phone].    Phone Numbers:  [facility] - [phone]  [phone]  [facility] - [phone]  Surgery Patient Education - [phone] or toll free [phone]

## 2018-11-05 NOTE — ANESTHESIA POSTPROCEDURE EVALUATION
Patient: Aure Lanier    Procedure(s):  UPPER ENDOSCOPY WITH BIOPSY    Diagnosis:EPIGASTRIC PAIN  Diagnosis Additional Information: No value filed.    Anesthesia Type:  MAC    Note:  Anesthesia Post Evaluation    Patient location during evaluation: PACU  Patient participation: Able to fully participate in evaluation  Level of consciousness: awake and alert  Pain management: adequate  Airway patency: patent  Cardiovascular status: acceptable  Respiratory status: acceptable  Hydration status: acceptable  PONV: none             Last vitals:  Vitals:    11/05/18 1200 11/05/18 1410 11/05/18 1415   BP: 173/95 138/70 140/72   Pulse: 116     Resp: 18 16 16   Temp: 98.5  F (36.9  C)     SpO2: 98% 99% 100%         Electronically Signed By: Orion Hurley MD  November 5, 2018  2:22 PM

## 2018-11-05 NOTE — ANESTHESIA CARE TRANSFER NOTE
Patient: Aure Lanier    Procedure(s):  UPPER ENDOSCOPY WITH BIOPSY    Diagnosis: EPIGASTRIC PAIN  Diagnosis Additional Information: No value filed.    Anesthesia Type:   MAC     Note:  Airway :Nasal Cannula  Patient transferred to:Phase II  Handoff Report: Identifed the Patient, Identified the Reponsible Provider, Reviewed the pertinent medical history, Discussed the surgical course, Reviewed Intra-OP anesthesia mangement and issues during anesthesia, Set expectations for post-procedure period and Allowed opportunity for questions and acknowledgement of understanding      Vitals: (Last set prior to Anesthesia Care Transfer)    CRNA VITALS  11/5/2018 1328 - 11/5/2018 1401      11/5/2018             Pulse: 107    SpO2: 94 %    Resp Rate (set): 8                Electronically Signed By: ASHLEIGH Eugene CRNA  November 5, 2018  2:01 PM

## 2018-11-05 NOTE — IP AVS SNAPSHOT
[facility] Preop/Phase II    [address] [address]    Phone:  [phone]                                       After Visit Summary   11/5/2018    Aure Lanier    MRN: [id number]           After Visit Summary Signature Page     I have received my discharge instructions, and my questions have been answered. I have discussed any challenges I see with this plan with the nurse or doctor.    ..........................................................................................................................................  Patient/Patient Representative Signature      ..........................................................................................................................................  Patient Representative Print Name and Relationship to Patient    ..................................................               ................................................  Date                                   Time    ..........................................................................................................................................  Reviewed by Signature/Title    ...................................................              ..............................................  Date                                               Time          22EPIC Rev 08/18

## 2018-11-08 ENCOUNTER — INFUSION THERAPY VISIT (OUTPATIENT)
Dept: INFUSION THERAPY | Facility: OTHER | Age: 68
End: 2018-11-08
Attending: NURSE PRACTITIONER
Payer: COMMERCIAL

## 2018-11-08 ENCOUNTER — OFFICE VISIT (OUTPATIENT)
Dept: INTERNAL MEDICINE | Facility: OTHER | Age: 68
End: 2018-11-08
Attending: NURSE PRACTITIONER
Payer: COMMERCIAL

## 2018-11-08 VITALS
HEART RATE: 107 BPM | HEIGHT: 62 IN | WEIGHT: 165 LBS | OXYGEN SATURATION: 99 % | TEMPERATURE: 98 F | DIASTOLIC BLOOD PRESSURE: 70 MMHG | SYSTOLIC BLOOD PRESSURE: 130 MMHG | BODY MASS INDEX: 30.36 KG/M2

## 2018-11-08 VITALS
OXYGEN SATURATION: 98 % | BODY MASS INDEX: 30.9 KG/M2 | HEART RATE: 101 BPM | HEIGHT: 62 IN | TEMPERATURE: 96.8 F | RESPIRATION RATE: 16 BRPM | WEIGHT: 167.9 LBS | DIASTOLIC BLOOD PRESSURE: 72 MMHG | SYSTOLIC BLOOD PRESSURE: 128 MMHG

## 2018-11-08 DIAGNOSIS — B02.23 POSTHERPETIC POLYNEUROPATHY: ICD-10-CM

## 2018-11-08 DIAGNOSIS — E03.8 OTHER SPECIFIED HYPOTHYROIDISM: ICD-10-CM

## 2018-11-08 DIAGNOSIS — F34.1 DYSTHYMIA: Primary | ICD-10-CM

## 2018-11-08 DIAGNOSIS — E87.6 LOW BLOOD POTASSIUM: ICD-10-CM

## 2018-11-08 DIAGNOSIS — G89.4 CHRONIC PAIN SYNDROME: ICD-10-CM

## 2018-11-08 DIAGNOSIS — M54.2 CERVICALGIA: ICD-10-CM

## 2018-11-08 DIAGNOSIS — E61.1 IRON DEFICIENCY: ICD-10-CM

## 2018-11-08 DIAGNOSIS — D50.9 IRON DEFICIENCY ANEMIA, UNSPECIFIED IRON DEFICIENCY ANEMIA TYPE: ICD-10-CM

## 2018-11-08 DIAGNOSIS — K29.50 OTHER CHRONIC GASTRITIS WITHOUT HEMORRHAGE: ICD-10-CM

## 2018-11-08 DIAGNOSIS — E87.6 HYPOKALEMIA: ICD-10-CM

## 2018-11-08 DIAGNOSIS — E87.6 HYPOKALEMIA: Primary | ICD-10-CM

## 2018-11-08 DIAGNOSIS — E83.42 HYPOMAGNESEMIA: ICD-10-CM

## 2018-11-08 LAB
ALBUMIN SERPL-MCNC: 3.6 G/DL (ref 3.4–5)
ALP SERPL-CCNC: 115 U/L (ref 40–150)
ALT SERPL W P-5'-P-CCNC: 41 U/L (ref 0–50)
ANION GAP SERPL CALCULATED.3IONS-SCNC: 9 MMOL/L (ref 3–14)
AST SERPL W P-5'-P-CCNC: 22 U/L (ref 0–45)
BILIRUB SERPL-MCNC: 0.2 MG/DL (ref 0.2–1.3)
BUN SERPL-MCNC: 8 MG/DL (ref 7–30)
CALCIUM SERPL-MCNC: 8.6 MG/DL (ref 8.5–10.1)
CHLORIDE SERPL-SCNC: 106 MMOL/L (ref 94–109)
CO2 SERPL-SCNC: 26 MMOL/L (ref 20–32)
CREAT SERPL-MCNC: 0.48 MG/DL (ref 0.52–1.04)
GFR SERPL CREATININE-BSD FRML MDRD: >90 ML/MIN/1.7M2
GLUCOSE SERPL-MCNC: 107 MG/DL (ref 70–99)
POTASSIUM SERPL-SCNC: 2.8 MMOL/L (ref 3.4–5.3)
PROT SERPL-MCNC: 7.1 G/DL (ref 6.8–8.8)
SODIUM SERPL-SCNC: 141 MMOL/L (ref 133–144)

## 2018-11-08 PROCEDURE — 99214 OFFICE O/P EST MOD 30 MIN: CPT | Performed by: NURSE PRACTITIONER

## 2018-11-08 PROCEDURE — 96365 THER/PROPH/DIAG IV INF INIT: CPT

## 2018-11-08 PROCEDURE — 25000128 H RX IP 250 OP 636: Performed by: NURSE PRACTITIONER

## 2018-11-08 PROCEDURE — G0463 HOSPITAL OUTPT CLINIC VISIT: HCPCS | Mod: 25

## 2018-11-08 PROCEDURE — 80053 COMPREHEN METABOLIC PANEL: CPT | Mod: ZL | Performed by: NURSE PRACTITIONER

## 2018-11-08 PROCEDURE — G0463 HOSPITAL OUTPT CLINIC VISIT: HCPCS

## 2018-11-08 PROCEDURE — 96366 THER/PROPH/DIAG IV INF ADDON: CPT

## 2018-11-08 PROCEDURE — 36415 COLL VENOUS BLD VENIPUNCTURE: CPT | Mod: ZL | Performed by: NURSE PRACTITIONER

## 2018-11-08 RX ORDER — DIPHENHYDRAMINE HCL 25 MG
25 CAPSULE ORAL
Status: CANCELLED | OUTPATIENT
Start: 2018-11-08

## 2018-11-08 RX ORDER — ACETAMINOPHEN 325 MG/1
650 TABLET ORAL
Status: CANCELLED
Start: 2018-11-08

## 2018-11-08 RX ORDER — ESCITALOPRAM OXALATE 10 MG/1
TABLET ORAL
Qty: 90 TABLET | Refills: 3 | Status: SHIPPED | OUTPATIENT
Start: 2018-11-08 | End: 2018-12-04

## 2018-11-08 RX ADMIN — POTASSIUM CHLORIDE AND SODIUM CHLORIDE: 900; 150 INJECTION, SOLUTION INTRAVENOUS at 14:47

## 2018-11-08 ASSESSMENT — PAIN SCALES - GENERAL: PAINLEVEL: MILD PAIN (2)

## 2018-11-08 NOTE — PROGRESS NOTES
Patient is a 68 year old female here accompanied by self today for infusion of IV Potassium per order of Jaden Lee.  Patient meets parameters for today's infusion. Patient identified with two identifiers, order verified, and verbal consent for today's infusion obtained from patient.      Today's lab values:  Potassium: 2.8.   Patient meets order parameters for today's treatment.     22 gauge angio cath inserted into right outer forearm.  Immediate blood return noted.  IV secured with sterile, transparent dressing and tape.  Patient tolerated well, denies pain or discomfort at this time.  Flushes easily without resistance, no signs or symptoms of infiltration or infection.   Patient denies questions or concerns regarding infusion and/or medication(s) being administered.    1447: IV pump verified with NS 1000ml with 20 mEq dose, drug, and rate of administration with MAR and medication label.  Infusion administered per protocol.     Patient to receive Feraheme infusions starting tomorrow per order of Jaden Lee.

## 2018-11-08 NOTE — PATIENT INSTRUCTIONS
1. Will check where order for IV iron is.   2.  Drink at least 6-8 glasses water a day.    3, Take Levothyroxine 150mcg a day  And recheck in 1 month.    4. Return 1 month.  5. Flexeril  1 times a day  6. Gabapentin 1 at night.     7. Continue Pantoprazole/Protonix. 2 times a day.

## 2018-11-08 NOTE — PROGRESS NOTES
Verbal order per Jaden Lee for patient to have IV Potassium 20mEq today and also set up Venofer infusions, 200mg x5. Therapy plans entered. HUC updated to schedule patient.

## 2018-11-08 NOTE — PROGRESS NOTES
Patient tolerated infusion well, no signs or symptoms of adverse reaction noted.  Patient denies pain nor discomfort.     IV removed, catheter intact.  Site clean, dry and intact.  No signs or symptoms of infiltration or infection.  Covered with a sterile bandage, slight pressure applied for 30 seconds.  Pt instructed to leave bandage intact for a minimum of one hour, and to call with questions or concerns.    Copy of appointments, discharge instructions, and after visit summary (AVS) provided to patient.  Patient states understanding, discharged ambulatory.

## 2018-11-08 NOTE — MR AVS SNAPSHOT
After Visit Summary   11/8/2018    Aure Lanier    MRN: [id number]           Patient Information     Date Of Birth          1950        Visit Information        Provider Department      11/8/2018 2:00 PM  INF [facility] [facility]        Today's Diagnoses     Hypokalemia    -  1    Iron deficiency anemia, unspecified iron deficiency anemia type          Care Instructions    Potassium Chloride Solution for injection  What is this medicine?  POTASSIUM (bao TASS i um) is a natural salt that is important for the heart, muscles, and nerves. Too much or too little potassium in the body can cause serious problems. This medicine is used to treat low potassium.  This medicine may be used for other purposes; ask your health care provider or pharmacist if you have questions.  What should I tell my health care provider before I take this medicine?  They need to know if you have any of these conditions:    adrenal disease    heart disease    high levels of potassium in the blood    kidney disease    liver disease    an unusual or allergic reaction to potassium, other medicines, foods, dyes, or preservatives    pregnant or trying to get pregnant    breast-feeding  How should I use this medicine?  This medicine is for infusion into a vein. It is given by a health care professional in a hospital or clinic setting.  Talk to your pediatrician regarding the use of this medicine in children. Special care may be needed.  Overdosage: If you think you have taken too much of this medicine contact a poison control center or emergency room at once.  NOTE: This medicine is only for you. Do not share this medicine with others.  What if I miss a dose?  This does not apply.  What may interact with this medicine?  Do not take this medicine with any of the following medications:    eplerenone    sodium polystyrene sulfonate  This medicine may also interact with the following medications:    heart  medicines    medicines for cold or allergies    medicines for Parkinson's disease    medicines for the stomach like metoclopramide, dicyclomine, glycopyrrolate    NSAIDs, medicines for pain and inflammation, like ibuprofen or naproxen    other potassium supplements    salt substitutes    some diuretics  This list may not describe all possible interactions. Give your health care provider a list of all the medicines, herbs, non-prescription drugs, or dietary supplements you use. Also tell them if you smoke, drink alcohol, or use illegal drugs. Some items may interact with your medicine.  What should I watch for while using this medicine?  Your condition will be monitored carefully while you are receiving this medicine.  What side effects may I notice from receiving this medicine?  Side effects that you should report to your doctor or health care professional as soon as possible:    allergic reactions like skin rash, itching or hives, swelling of the face, lips, or tongue    breathing problems    confusion    fast, irregular heartbeat    feeling faint or lightheaded, falls    low blood pressure    numbness or tingling in hands or feet    unusually weak or tired    weakness, heaviness of legs  Side effects that usually do not require medical attention (report to your doctor or health care professional if they continue or are bothersome):    nausea, vomiting    stomach upset  This list may not describe all possible side effects. Call your doctor for medical advice about side effects. You may report side effects to FDA at 5-514-FDA-1057.  NOTE:This sheet is a summary. It may not cover all possible information. If you have questions about this medicine, talk to your doctor, pharmacist, or health care provider. Copyright  2016 Gold Standard                Follow-ups after your visit        Your next 10 appointments already scheduled     Nov 09, 2018 12:00 PM CST   Level 2 with  INF  5205   [facility] ([facility] [place] )    [address]  [place] [address]   [phone]            Dec 13, 2018 10:30 AM CST   (Arrive by 10:15 AM)   Post Op with Dieudonne Jackson MD   [facility] [place] ([facility] [place] )    [address]  [place] [address]   [phone]              Who to contact     If you have questions or need follow up information about today's clinic visit or your schedule please contact [facility] directly at [phone].  Normal or non-critical lab and imaging results will be communicated to you by MyChart, letter or phone within 4 business days after the clinic has received the results. If you do not hear from us within 7 days, please contact the clinic through "[employer]hart or phone. If you have a critical or abnormal lab result, we will notify you by phone as soon as possible.  Submit refill requests through [email or web address] or call your pharmacy and they will forward the refill request to us. Please allow 3 business days for your refill to be completed.          Additional Information About Your Visit        "[employer] Information     [email or web address] gives you secure access to your electronic health record. If you see a primary care provider, you can also send messages to your care team and make appointments. If you have questions, please call your primary care clinic.  If you do not have a primary care provider, please call [phone] and they will assist you.        Care EveryWhere ID     This is your Care EveryWhere ID. This could be used by other organizations to access your [facility] medical records  [id number]        Your Vitals Were     Pulse Temperature Respirations Height Pulse Oximetry BMI (Body Mass Index)    101 96.8  F (36  C) (Oral) 16 1.575 m (5' 2\") 98% 30.71 kg/m2       Blood Pressure from Last 3 Encounters:   11/08/18 128/72   11/08/18 130/70   11/05/18 142/78    Weight from Last 3 Encounters:   11/08/18 76.2 kg (167 " lb 14.4 oz)   11/08/18 74.8 kg (165 lb)   11/05/18 74.8 kg (165 lb)              Today, you had the following     No orders found for display         Today's Medication Changes          These changes are accurate as of 11/8/18  4:58 PM.  If you have any questions, ask your nurse or doctor.               Start taking these medicines.        Dose/Directions    escitalopram 10 MG tablet   Commonly known as:  LEXAPRO   Used for:  Dysthymia   Started by:  Jaden Lee NP        Take 1 tablet 2 times a day.   Quantity:  90 tablet   Refills:  3         Stop taking these medicines if you haven't already. Please contact your care team if you have questions.     Na Sulfate-K Sulfate-Mg Sulf solution   Commonly known as:  SUPREP BOWEL PREP KIT   Stopped by:  Jaden Lee NP           ranitidine 150 MG tablet   Commonly known as:  ZANTAC   Stopped by:  Jaden Lee NP                Where to get your medicines      These medications were sent to [id number] - [place], [place] - [id number] [facility] [address]     Phone:  [phone]     escitalopram 10 MG tablet                Primary Care Provider Office Phone # Fax #    Jaden Lee [phone]       [facility] [address]        Equal Access to Services     ALYSSA ADAMES AH: Hadii jefry ku hadasho Soomaali, waaxda luqadaha, qaybta kaalmada adeegyada, waxay kayodein hayjuventino craven. So [facility] [phone].    ATENCIÓN: Si habla español, tiene a gross disposición servicios gratuitos de asistencia lingüística. Llame al [phone].    We comply with applicable federal civil rights laws and [place] laws. We do not discriminate on the basis of race, color, national origin, age, disability, sex, sexual orientation, or gender identity.            Thank you!     Thank you for choosing [facility]  for your care. Our goal is always to provide you with excellent care. Hearing back  from our patients is one way we can continue to improve our services. Please take a few minutes to complete the written survey that you may receive in the mail after your visit with us. Thank you!             Your Updated Medication List - Protect others around you: Learn how to safely use, store and throw away your medicines at www.disposemymeds.org.          This list is accurate as of 11/8/18  4:58 PM.  Always use your most recent med list.                   Brand Name Dispense Instructions for use Diagnosis    acetaminophen 325 MG tablet    TYLENOL    100 tablet    Take 2-3 tablets (650-975 mg) by mouth every 4 hours as needed for other (mild to moderate pain)    Status post total left knee replacement       CALCIUM + D PO      Take 2 tablets by mouth daily as needed        cyanocobalamin 1000 MCG tablet    vitamin  B-12     Take 1 tablet by mouth daily as needed        cyclobenzaprine 10 MG tablet    FLEXERIL    90 tablet    TAKE 1 TABLET BY MOUTH THREE TIMES DAILY AS NEEDED    Cervicalgia       docusate sodium 50 MG capsule    COLACE    60 capsule    Take 1 capsule (50 mg) by mouth 2 times daily as needed for constipation    S/P total knee arthroplasty, right       escitalopram 10 MG tablet    LEXAPRO    90 tablet    Take 1 tablet 2 times a day.    Dysthymia       gabapentin 300 MG capsule    NEURONTIN    90 capsule    TAKE 1 CAPSULE BY MOUTH 3 TIMES DAILY    Postherpetic polyneuropathy       HYDROcodone-acetaminophen 7.5-325 MG per tablet    NORCO    120 tablet    Take 1 tablet by mouth every 6 hours as needed for severe pain    Acute pain of right knee       levothyroxine 150 MCG tablet    SYNTHROID/LEVOTHROID    90 tablet    TAKE 1 TABLET (150 MCG) BY MOUTH DAILY    Hypothyroidism, unspecified type       * MULTIVITAMIN PO      Take 1 tablet by mouth daily as needed        * multivitamin Tabs tablet      Take 1 tablet by mouth daily as needed        NYAMYC 245843 UNIT/GM Powd   Generic drug:  nystatin     60  g    APPLY TOPICALLY 2 TIMES DAILY AS NEEDED    Rash       ondansetron 4 MG tablet    ZOFRAN    48 tablet    TAKE 1 TABLET (4 MG) BY MOUTH 3 TIMES DAILY    Nausea       pantoprazole 40 MG EC tablet    PROTONIX    30 tablet    Take 1 tablet (40 mg) by mouth daily Take 30-60 minutes before a meal.    Gastroesophageal reflux disease with esophagitis       simvastatin 40 MG tablet    ZOCOR    90 tablet    TAKE 1 TABLET BY MOUTH EVERY DAY IN THE EVENING . GENERIC FOR ZOCOR.    Mixed hyperlipidemia       sucralfate 1 GM/10ML suspension    CARAFATE    420 mL    Take 10 mLs (1 g) by mouth 4 times daily    Acute stomach ulcer hem/perf, obst (H)       zolpidem 10 MG tablet    AMBIEN    60 tablet    Take 1 tablet (10 mg) by mouth nightly as needed for sleep Take 1 tablet at night for sleep-may repeat X1.    Persistent insomnia       * Notice:  This list has 2 medication(s) that are the same as other medications prescribed for you. Read the directions carefully, and ask your doctor or other care provider to review them with you.

## 2018-11-08 NOTE — NURSING NOTE
"Chief Complaint   Patient presents with     Hospital F/U       Initial /70 (BP Location: Left arm, Patient Position: Sitting, Cuff Size: Adult Regular)  Pulse 107  Temp 98  F (36.7  C) (Tympanic)  Ht 5' 2\" (1.575 m)  Wt 165 lb (74.8 kg)  SpO2 99%  BMI 30.18 kg/m2 Estimated body mass index is 30.18 kg/(m^2) as calculated from the following:    Height as of this encounter: 5' 2\" (1.575 m).    Weight as of this encounter: 165 lb (74.8 kg).  Medication Reconciliation: complete    Tiffanie Dowling LPN  "

## 2018-11-08 NOTE — PATIENT INSTRUCTIONS
Potassium Chloride Solution for injection  What is this medicine?  POTASSIUM (bao TASS i um) is a natural salt that is important for the heart, muscles, and nerves. Too much or too little potassium in the body can cause serious problems. This medicine is used to treat low potassium.  This medicine may be used for other purposes; ask your health care provider or pharmacist if you have questions.  What should I tell my health care provider before I take this medicine?  They need to know if you have any of these conditions:    adrenal disease    heart disease    high levels of potassium in the blood    kidney disease    liver disease    an unusual or allergic reaction to potassium, other medicines, foods, dyes, or preservatives    pregnant or trying to get pregnant    breast-feeding  How should I use this medicine?  This medicine is for infusion into a vein. It is given by a health care professional in a hospital or clinic setting.  Talk to your pediatrician regarding the use of this medicine in children. Special care may be needed.  Overdosage: If you think you have taken too much of this medicine contact a poison control center or emergency room at once.  NOTE: This medicine is only for you. Do not share this medicine with others.  What if I miss a dose?  This does not apply.  What may interact with this medicine?  Do not take this medicine with any of the following medications:    eplerenone    sodium polystyrene sulfonate  This medicine may also interact with the following medications:    heart medicines    medicines for cold or allergies    medicines for Parkinson's disease    medicines for the stomach like metoclopramide, dicyclomine, glycopyrrolate    NSAIDs, medicines for pain and inflammation, like ibuprofen or naproxen    other potassium supplements    salt substitutes    some diuretics  This list may not describe all possible interactions. Give your health care provider a list of all the medicines, herbs,  non-prescription drugs, or dietary supplements you use. Also tell them if you smoke, drink alcohol, or use illegal drugs. Some items may interact with your medicine.  What should I watch for while using this medicine?  Your condition will be monitored carefully while you are receiving this medicine.  What side effects may I notice from receiving this medicine?  Side effects that you should report to your doctor or health care professional as soon as possible:    allergic reactions like skin rash, itching or hives, swelling of the face, lips, or tongue    breathing problems    confusion    fast, irregular heartbeat    feeling faint or lightheaded, falls    low blood pressure    numbness or tingling in hands or feet    unusually weak or tired    weakness, heaviness of legs  Side effects that usually do not require medical attention (report to your doctor or health care professional if they continue or are bothersome):    nausea, vomiting    stomach upset  This list may not describe all possible side effects. Call your doctor for medical advice about side effects. You may report side effects to FDA at 5-761-FDA-6315.  NOTE:This sheet is a summary. It may not cover all possible information. If you have questions about this medicine, talk to your doctor, pharmacist, or health care provider. Copyright  2016 Gold Standard

## 2018-11-08 NOTE — MR AVS SNAPSHOT
After Visit Summary   11/8/2018    Aure Lanier    MRN: [id number]           Patient Information     Date Of Birth          1950        Visit Information        Provider Department      11/8/2018 10:00 AM Jaden Lee NP [facility]        Today's Diagnoses     Dysthymia    -  1    Low blood potassium          Care Instructions    1. Will check where order for IV iron is.   2.  Drink at least 6-8 glasses water a day.    3, Take Levothyroxine 150mcg a day  And recheck in 1 month.    4. Return 1 month.  5. Flexeril  1 times a day  6. Gabapentin 1 at night.     7. Continue Pantoprazole/Protonix. 2 times a day.            Follow-ups after your visit        Your next 10 appointments already scheduled     Dec 13, 2018 10:30 AM CST   (Arrive by 10:15 AM)   Post Op with Dieudonne Jackson MD   [facility]    [address]  [place] [address]   [phone]              Who to contact     If you have questions or need follow up information about today's clinic visit or your schedule please contact [facility] directly at [phone].  Normal or non-critical lab and imaging results will be communicated to you by Xceediumhart, letter or phone within 4 business days after the clinic has received the results. If you do not hear from us within 7 days, please contact the clinic through Xceediumhart or phone. If you have a critical or abnormal lab result, we will notify you by phone as soon as possible.  Submit refill requests through Fileblaze or call your pharmacy and they will forward the refill request to us. Please allow 3 business days for your refill to be completed.          Additional Information About Your Visit        MyChart Information     Fileblaze gives you secure access to your electronic health record. If you see a primary care provider, you can also send messages to your care team and make  "appointments. If you have questions, please call your primary care clinic.  If you do not have a primary care provider, please call [phone] and they will assist you.        Care EveryWhere ID     This is your Care EveryWhere ID. This could be used by other organizations to access your [place] medical records  [id number]        Your Vitals Were     Pulse Temperature Height Pulse Oximetry BMI (Body Mass Index)       107 98  F (36.7  C) (Tympanic) 5' 2\" (1.575 m) 99% 30.18 kg/m2        Blood Pressure from Last 3 Encounters:   11/08/18 130/70   11/05/18 142/78   11/03/18 160/88    Weight from Last 3 Encounters:   11/08/18 165 lb (74.8 kg)   11/05/18 165 lb (74.8 kg)   10/31/18 174 lb 6.4 oz (79.1 kg)              We Performed the Following     Comprehensive metabolic panel          Today's Medication Changes          These changes are accurate as of 11/8/18 11:11 AM.  If you have any questions, ask your nurse or doctor.               Start taking these medicines.        Dose/Directions    escitalopram 10 MG tablet   Commonly known as:  LEXAPRO   Used for:  Dysthymia   Started by:  Jaden Lee NP        Take 1 tablet 2 times a day.   Quantity:  90 tablet   Refills:  3         Stop taking these medicines if you haven't already. Please contact your care team if you have questions.     Na Sulfate-K Sulfate-Mg Sulf solution   Commonly known as:  SUPREP BOWEL PREP KIT   Stopped by:  Jaden Lee NP           ranitidine 150 MG tablet   Commonly known as:  ZANTAC   Stopped by:  Jaden Lee NP                Where to get your medicines      These medications were sent to [id number] - [place] - [address]  [address] [facility] [address]     Phone:  [phone]     escitalopram 10 MG tablet                Primary Care Provider Office Phone # Fax #    Jaden Lee [phone]       [address] [place] [address]  [place] [address]        Equal Access to Services     [facility] " GAAR : Hadii aad ku hadtina Anderson, waaxda luqadaha, qaybta kaalmada adebear, racquel edvin pankajshakila jackson silvinomonica mendez . So [facility] [phone].    ATENCIÓN: Si habla español, tiene a gross disposición servicios gratuitos de asistencia lingüística. Glennyame al [phone].    We comply with applicable federal civil rights laws and [place] laws. We do not discriminate on the basis of race, color, national origin, age, disability, sex, sexual orientation, or gender identity.            Thank you!     Thank you for choosing [facility]  for your care. Our goal is always to provide you with excellent care. Hearing back from our patients is one way we can continue to improve our services. Please take a few minutes to complete the written survey that you may receive in the mail after your visit with us. Thank you!             Your Updated Medication List - Protect others around you: Learn how to safely use, store and throw away your medicines at www.disposemymeds.org.          This list is accurate as of 11/8/18 11:11 AM.  Always use your most recent med list.                   Brand Name Dispense Instructions for use Diagnosis    acetaminophen 325 MG tablet    TYLENOL    100 tablet    Take 2-3 tablets (650-975 mg) by mouth every 4 hours as needed for other (mild to moderate pain)    Status post total left knee replacement       CALCIUM + D PO      Take 2 tablets by mouth daily as needed        cyanocobalamin 1000 MCG tablet    vitamin  B-12     Take 1 tablet by mouth daily as needed        cyclobenzaprine 10 MG tablet    FLEXERIL    90 tablet    TAKE 1 TABLET BY MOUTH THREE TIMES DAILY AS NEEDED    Cervicalgia       docusate sodium 50 MG capsule    COLACE    60 capsule    Take 1 capsule (50 mg) by mouth 2 times daily as needed for constipation    S/P total knee arthroplasty, right       escitalopram 10 MG tablet    LEXAPRO    90 tablet    Take 1 tablet 2 times a day.    Dysthymia       gabapentin  300 MG capsule    NEURONTIN    90 capsule    TAKE 1 CAPSULE BY MOUTH 3 TIMES DAILY    Postherpetic polyneuropathy       HYDROcodone-acetaminophen 7.5-325 MG per tablet    NORCO    120 tablet    Take 1 tablet by mouth every 6 hours as needed for severe pain    Acute pain of right knee       levothyroxine 150 MCG tablet    SYNTHROID/LEVOTHROID    90 tablet    TAKE 1 TABLET (150 MCG) BY MOUTH DAILY    Hypothyroidism, unspecified type       * MULTIVITAMIN PO      Take 1 tablet by mouth daily as needed        * multivitamin Tabs tablet      Take 1 tablet by mouth daily as needed        NYAMYC 203869 UNIT/GM Powd   Generic drug:  nystatin     60 g    APPLY TOPICALLY 2 TIMES DAILY AS NEEDED    Rash       ondansetron 4 MG tablet    ZOFRAN    48 tablet    TAKE 1 TABLET (4 MG) BY MOUTH 3 TIMES DAILY    Nausea       pantoprazole 40 MG EC tablet    PROTONIX    30 tablet    Take 1 tablet (40 mg) by mouth daily Take 30-60 minutes before a meal.    Gastroesophageal reflux disease with esophagitis       simvastatin 40 MG tablet    ZOCOR    90 tablet    TAKE 1 TABLET BY MOUTH EVERY DAY IN THE EVENING . GENERIC FOR ZOCOR.    Mixed hyperlipidemia       sucralfate 1 GM/10ML suspension    CARAFATE    420 mL    Take 10 mLs (1 g) by mouth 4 times daily    Acute stomach ulcer hem/perf, obst (H)       zolpidem 10 MG tablet    AMBIEN    60 tablet    Take 1 tablet (10 mg) by mouth nightly as needed for sleep Take 1 tablet at night for sleep-may repeat X1.    Persistent insomnia       * Notice:  This list has 2 medication(s) that are the same as other medications prescribed for you. Read the directions carefully, and ask your doctor or other care provider to review them with you.

## 2018-11-08 NOTE — PROGRESS NOTES
SUBJECTIVE:   Aure Lanier is a 68 year old female who presents to clinic today for the following health issues:      ED/UC Followup:    Facility:  [place]  Date of visit: 11/3/18  Reason for visit:  Seen By Dr. Milton for epigastric pain.on 10/31/18 .  Set up with Dr. Jackson for EGD.  Ended up in ER on 11/3/18  for GI pain.  EGD done on11/5/18     Intractable vomiting with nausea, unspecified vomiting type   No Vomiting since Tuesday Am.    Is not taking zofran.  Though has some nausea.  Had EGD On 11/5/18.   showing severe gastritis.at the gastric -duodenal outlet.      Is on Liquid Carafate 4 times a day(she had previously told me no way could take the Liquid!)    Current Status: Slight improvement   Hypokalemia: Got KCL bump in ER for low potassium  Will recheck today.    Dysthymia:  Has been off Lexapro for some reason Will  Restart.      Constipation  :  Took OTC.  Senna.DSS. Then got diarrhea.    Chronic pain med.  Have been having discussions of chronic pain med usage. Patient very resistent to reducing dosing. Discussed  The way she took her pain med after her surgery( did not eat but took dilaudid and Toradol  ) probably furthered her gastric disease.  She states pain med doesn't bother her stomach.  She was put on pain med many years back for post herpetic pain, and cervical pain. With  Migraine HA's  She is on gabapentin, flexeril also.           Problem list and histories reviewed & adjusted, as indicated.  Additional history: as documented    Patient Active Problem List   Diagnosis     Allergic arthritis involving hand     Hypothyroidism     Insomnia     Headache     Postherpetic polyneuropathy     Dysthymia     Anxiety state     Hyperlipidemia     Migraine     Osteoporosis     GERD     Low back pain     Hyperlipidemia with target LDL less than 100     Bilateral chronic knee pain     Back muscle spasm     Chronic pain syndrome     Status post total left knee replacement     S/P total knee  arthroplasty, right     Status post total right knee replacement     Family history of other musculoskeletal diseases(V17.89)     Past Surgical History:   Procedure Laterality Date     ARTHROPLASTY KNEE Left 4/17/2018    Procedure: ARTHROPLASTY KNEE;  LEFT TOTAL KNEE ARTHROPLASTY S/N JOURNEY II;  Surgeon: Ty Hernandez MD;  Location: [facility]     ARTHROPLASTY KNEE Right 9/4/2018    Procedure: ARTHROPLASTY KNEE;  RIGHT TOTAL KNEE ARTHROPLASTY ;  Surgeon: Ty Hernandez MD;  Location: [facility] OR     D & C       ESOPHAGOSCOPY, GASTROSCOPY, DUODENOSCOPY (EGD), COMBINED N/A 9/11/2017    Procedure: COMBINED ESOPHAGOSCOPY, GASTROSCOPY, DUODENOSCOPY (EGD);  Upper Endoscopy: Removal of Food Impaction;  Surgeon: Lino Zhu DO;  Location: [facility]     ESOPHAGOSCOPY, GASTROSCOPY, DUODENOSCOPY (EGD), COMBINED N/A 11/5/2018    Procedure: UPPER ENDOSCOPY WITH BIOPSY;  Surgeon: Diuedonne Jackson MD;  Location: [facility]     STOMACH SURGERY       stomach surgery peritinitis         Social History   Substance Use Topics     Smoking status: Never Smoker     Smokeless tobacco: Never Used      Comment: no passive exposure     Alcohol use Yes      Comment: rarely, maybe yearly     Family History   Problem Relation Age of Onset     Cerebrovascular Disease Mother      CVA     Hypertension Mother      Other - See Comments Father 40     mining accident; cause of death     Other - See Comments Brother      muscle dystrophy     Cancer Daughter 34     skin cancer     Melanoma Daughter          Current Outpatient Prescriptions   Medication Sig Dispense Refill     acetaminophen (TYLENOL) 325 MG tablet Take 2-3 tablets (650-975 mg) by mouth every 4 hours as needed for other (mild to moderate pain) 100 tablet      Calcium Citrate-Vitamin D (CALCIUM + D PO) Take 2 tablets by mouth daily as needed       cyanocolbalamin (VITAMIN  B-12) 1000 MCG tablet Take 1 tablet by mouth daily as needed        cyclobenzaprine (FLEXERIL) 10 MG tablet TAKE 1 TABLET BY  MOUTH THREE TIMES DAILY AS NEEDED 90 tablet 0     docusate sodium (COLACE) 50 MG capsule Take 1 capsule (50 mg) by mouth 2 times daily as needed for constipation 60 capsule 0     gabapentin (NEURONTIN) 300 MG capsule TAKE 1 CAPSULE BY MOUTH 3 TIMES DAILY 90 capsule 0     HYDROcodone-acetaminophen (NORCO) 7.5-325 MG per tablet Take 1 tablet by mouth every 6 hours as needed for severe pain 120 tablet 0     levothyroxine (SYNTHROID/LEVOTHROID) 150 MCG tablet TAKE 1 TABLET (150 MCG) BY MOUTH DAILY 90 tablet 1     Multiple Vitamins-Minerals (MULTIVITAMIN OR) Take 1 tablet by mouth daily as needed        multivitamin (OCUVITE) TABS Take 1 tablet by mouth daily as needed        NYAMYC 955623 UNIT/GM POWD APPLY TOPICALLY 2 TIMES DAILY AS NEEDED 60 g 1     ondansetron (ZOFRAN) 4 MG tablet TAKE 1 TABLET (4 MG) BY MOUTH 3 TIMES DAILY 48 tablet 11     pantoprazole (PROTONIX) 40 MG EC tablet Take 1 tablet (40 mg) by mouth daily Take 30-60 minutes before a meal. 30 tablet 1     simvastatin (ZOCOR) 40 MG tablet TAKE 1 TABLET BY MOUTH EVERY DAY IN THE EVENING . GENERIC FOR ZOCOR. 90 tablet 0     sucralfate (CARAFATE) 1 GM/10ML suspension Take 10 mLs (1 g) by mouth 4 times daily 420 mL 1     zolpidem (AMBIEN) 10 MG tablet Take 1 tablet (10 mg) by mouth nightly as needed for sleep Take 1 tablet at night for sleep-may repeat X1. 60 tablet 0     Allergies   Allergen Reactions     Erythromycin Base [Kdc:Yellow Dye+Erythromycin+Brilliant Blue Fcf] Nausea and Vomiting     Penicillins Rash       Reviewed and updated as needed this visit by clinical staff       Reviewed and updated as needed this visit by Provider    Review of Systems   Constitutional: Positive for appetite change and fatigue. Negative for fever.   HENT: Negative.    Respiratory: Negative for cough and shortness of breath.    Cardiovascular: Negative for chest pain, palpitations and leg swelling.   Gastrointestinal: Positive for abdominal pain, constipation and nausea.  "Negative for diarrhea and vomiting.        States epigastric pain is better.     Musculoskeletal: Positive for arthralgias, back pain, myalgias and neck pain.        Knee pain.     Neurological: Negative for dizziness and headaches.   Psychiatric/Behavioral: Positive for dysphoric mood and sleep disturbance. The patient is nervous/anxious.    Vitals: /70 (BP Location: Left arm, Patient Position: Sitting, Cuff Size: Adult Regular)  Pulse 107  Temp 98  F (36.7  C) (Tympanic)  Ht 5' 2\" (1.575 m)  Wt 165 lb (74.8 kg)  SpO2 99%  BMI 30.18 kg/m2  BMI= Body mass index is 30.18 kg/(m^2).   Physical Exam   Constitutional: She is oriented to person, place, and time. She appears well-developed and well-nourished. No distress.   Eyes: Conjunctivae and EOM are normal. Pupils are equal, round, and reactive to light.   Neck: Normal range of motion. Neck supple. No thyromegaly present.   Cardiovascular: Normal rate, regular rhythm, normal heart sounds and intact distal pulses.    No murmur heard.  Pulmonary/Chest: Effort normal and breath sounds normal.   Abdominal: Soft. Bowel sounds are normal. She exhibits no mass. There is no tenderness.   Musculoskeletal: Normal range of motion. She exhibits no edema.   Lymphadenopathy:     She has no cervical adenopathy.   Neurological: She is alert and oriented to person, place, and time.   Skin: Skin is warm and dry.   Psychiatric: She has a normal mood and affect.     Results for orders placed or performed in visit on 11/08/18   Comprehensive metabolic panel   Result Value Ref Range    Sodium 141 133 - 144 mmol/L    Potassium 2.8 (LL) 3.4 - 5.3 mmol/L    Chloride 106 94 - 109 mmol/L    Carbon Dioxide 26 20 - 32 mmol/L    Anion Gap 9 3 - 14 mmol/L    Glucose 107 (H) 70 - 99 mg/dL    Urea Nitrogen 8 7 - 30 mg/dL    Creatinine 0.48 (L) 0.52 - 1.04 mg/dL    GFR Estimate >90 >60 mL/min/1.7m2    GFR Estimate If Black >90 >60 mL/min/1.7m2    Calcium 8.6 8.5 - 10.1 mg/dL    Bilirubin " Total 0.2 0.2 - 1.3 mg/dL    Albumin 3.6 3.4 - 5.0 g/dL    Protein Total 7.1 6.8 - 8.8 g/dL    Alkaline Phosphatase 115 40 - 150 U/L    ALT 41 0 - 50 U/L    AST 22 0 - 45 U/L     ASSESSMENT / PLAN:  (F34.1) Dysthymia  (primary encounter diagnosis)  Comment: Lexapro 10mg a day restarted on med list.    Plan: escitalopram (LEXAPRO) 10 MG tablet          (E87.6) Low blood potassium  Comment: Potassium was low. Set up with IV infusion for 2 KCL 10meq doses.  Will put on liquid potassium  Klor 20meq a day.    Potassium   Date Value Ref Range Status   11/08/2018 2.8 (LL) 3.4 - 5.3 mmol/L Final     Comment:     Critical Value called to and read back by  Jaden Lee at 1215 by SG         Plan: Comprehensive metabolic panel      (G89.4) Chronic pain syndrome  Comment: Will decrease Flexeril to 10mg each evening  , and gabapentin to 300mg in AM   Had shingles many years ago and Post herpetic pain has not been a problem  Has cervical neck pain with headaches  Will look at decreasing pain med to 5.325s  4 times a day instead of 7.5mg    Plan: Will gradually decrease lortab amount.      (K29.50) Other chronic gastritis without hemorrhage  Comment  Again discussed needs to follow directions to heal her gut.  Hadrosetta istory of gastric surgery for ulcers many years ago. Gastric outlet found to be swollen. She needs to eat regular meals    Plan: On Liquid carafate, and Protonix  40mg      (E61.1) Iron deficiency  Comment:iron=17 with saturation of 6   Will set up for   Injections. Insurance won't cover Venofer  So will give Fereheme.IV. ass cannot tolerate oral iron.      Plan: Fereheme.      (E03.8)  Hypothyroidism   Comment: Will continue 150mcg a day and recheck in 1 month.    Plan : Return 1 month.

## 2018-11-09 ENCOUNTER — INFUSION THERAPY VISIT (OUTPATIENT)
Dept: INFUSION THERAPY | Facility: OTHER | Age: 68
End: 2018-11-09
Attending: NURSE PRACTITIONER
Payer: COMMERCIAL

## 2018-11-09 VITALS
BODY MASS INDEX: 31.3 KG/M2 | SYSTOLIC BLOOD PRESSURE: 124 MMHG | RESPIRATION RATE: 18 BRPM | HEART RATE: 105 BPM | DIASTOLIC BLOOD PRESSURE: 68 MMHG | HEIGHT: 62 IN | OXYGEN SATURATION: 96 % | TEMPERATURE: 97 F | WEIGHT: 170.1 LBS

## 2018-11-09 DIAGNOSIS — D50.9 IRON DEFICIENCY ANEMIA, UNSPECIFIED IRON DEFICIENCY ANEMIA TYPE: Primary | ICD-10-CM

## 2018-11-09 DIAGNOSIS — Z96.651 STATUS POST TOTAL RIGHT KNEE REPLACEMENT: ICD-10-CM

## 2018-11-09 PROCEDURE — 25000128 H RX IP 250 OP 636: Performed by: NURSE PRACTITIONER

## 2018-11-09 PROCEDURE — 96365 THER/PROPH/DIAG IV INF INIT: CPT

## 2018-11-09 PROCEDURE — 96368 THER/DIAG CONCURRENT INF: CPT

## 2018-11-09 RX ADMIN — SODIUM CHLORIDE 500 ML: 9 INJECTION, SOLUTION INTRAVENOUS at 12:28

## 2018-11-09 RX ADMIN — FERUMOXYTOL 510 MG: 510 INJECTION INTRAVENOUS at 12:35

## 2018-11-09 NOTE — PROGRESS NOTES
Patient is a 68 year old female here accompanied by self today for infusion of Feraheme / IVF per order of Jaden Lee.  Patient meets parameters for today's infusion. Patient identified with two identifiers, order verified, and verbal consent for today's infusion obtained from patient.       24 gauge angio cath inserted into right outer hand.  Immediate blood return noted.  IV secured with sterile, transparent dressing and tape.  Patient tolerated well, denies pain or discomfort at this time.  Flushes easily without resistance, no signs or symptoms of infiltration or infection.   Patient denies questions or concerns regarding infusion and/or medication(s) being administered.    1235: IV pump verified with Feraheme 510mg dose, drug, and rate of administration with MAR and medication label.  Infusion administered per protocol.

## 2018-11-09 NOTE — MR AVS SNAPSHOT
After Visit Summary   11/9/2018    Aure Lanier    MRN: [id number]           Patient Information     Date Of Birth          1950        Visit Information        Provider Department      11/9/2018 12:00 PM  INF [facility] [address] [facility] [place]        Today's Diagnoses     Iron deficiency anemia, unspecified iron deficiency anemia type    -  1    Status post total right knee replacement          Care Instructions    Feraheme (Ferumoxytol Solution) for injection  What is this medicine?  FERUMOXYTOL is an iron complex. Iron is used to make healthy red blood cells, which carry oxygen and nutrients throughout the body. This medicine is used to treat iron deficiency anemia in people with chronic kidney disease.  This medicine may be used for other purposes; ask your health care provider or pharmacist if you have questions.  What should I tell my health care provider before I take this medicine?  They need to know if you have any of these conditions:    anemia not caused by low iron levels    high levels of iron in the blood    magnetic resonance imaging (MRI) test scheduled    an unusual or allergic reaction to iron, other medicines, foods, dyes, or preservatives    pregnant or trying to get pregnant    breast-feeding  How should I use this medicine?  This medicine is for injection into a vein. It is given by a health care professional in a hospital or clinic setting.  Talk to your pediatrician regarding the use of this medicine in children. Special care may be needed.  Overdosage: If you think you've taken too much of this medicine contact a poison control center or emergency room at once.  NOTE: This medicine is only for you. Do not share this medicine with others.  What if I miss a dose?  It is important not to miss your dose. Call your doctor or health care professional if you are unable to keep an appointment.  What may interact with this medicine?  This medicine may interact with the  following medications:    other iron products  This list may not describe all possible interactions. Give your health care provider a list of all the medicines, herbs, non-prescription drugs, or dietary supplements you use. Also tell them if you smoke, drink alcohol, or use illegal drugs. Some items may interact with your medicine.  What should I watch for while using this medicine?  Visit your doctor or healthcare professional regularly. Tell your doctor or healthcare professional if your symptoms do not start to get better or if they get worse. You may need blood work done while you are taking this medicine.  You may need to follow a special diet. Talk to your doctor. Foods that contain iron include: whole grains/cereals, dried fruits, beans, or peas, leafy green vegetables, and organ meats (liver, kidney).  What side effects may I notice from receiving this medicine?  Side effects that you should report to your doctor or health care professional as soon as possible:    allergic reactions like skin rash, itching or hives, swelling of the face, lips, or tongue    breathing problems    changes in blood pressure    feeling faint or lightheaded, falls    fever or chills    flushing, sweating, or hot feelings    swelling of the ankles or feet  Side effects that usually do not require medical attention (Report these to your doctor or health care professional if they continue or are bothersome.):    diarrhea    headache    nausea, vomiting    stomach pain  This list may not describe all possible side effects. Call your doctor for medical advice about side effects. You may report side effects to FDA at 9-489-FDA-8674.  Where should I keep my medicine?  This drug is given in a hospital or clinic and will not be stored at home.  NOTE: This sheet is a summary. It may not cover all possible information. If you have questions about this medicine, talk to your doctor, pharmacist, or health care provider.  NOTE:This sheet is a  "summary. It may not cover all possible information. If you have questions about this medicine, talk to your doctor, pharmacist, or health care provider. Copyright  2016 [employer] Standard                Follow-ups after your visit        Your next 10 appointments already scheduled     Dec 13, 2018 10:30 AM CST   (Arrive by 10:15 AM)   Post Op with Dieudonne Jackson MD   [facility] ([facility] )    [place] [address]   [phone]              Who to contact     If you have questions or need follow up information about today's clinic visit or your schedule please contact [facility] directly at [phone].  Normal or non-critical lab and imaging results will be communicated to you by MyChart, letter or phone within 4 business days after the clinic has received the results. If you do not hear from us within 7 days, please contact the clinic through [employer] or phone. If you have a critical or abnormal lab result, we will notify you by phone as soon as possible.  Submit refill requests through [employer] or call your pharmacy and they will forward the refill request to us. Please allow 3 business days for your refill to be completed.          Additional Information About Your Visit        MyChart Information     [employer] gives you secure access to your electronic health record. If you see a primary care provider, you can also send messages to your care team and make appointments. If you have questions, please call your primary care clinic.  If you do not have a primary care provider, please call [phone] and they will assist you.        Care EveryWhere ID     This is your Care EveryWhere ID. This could be used by other organizations to access your [facility] medical records  [id number]        Your Vitals Were     Pulse Temperature Respirations Height Pulse Oximetry BMI (Body Mass Index)    105 97  F (36.1  C) (Oral) 18 1.575 m (5' 2\") " 96% 31.11 kg/m2       Blood Pressure from Last 3 Encounters:   11/09/18 124/68   11/08/18 128/72   11/08/18 130/70    Weight from Last 3 Encounters:   11/09/18 77.2 kg (170 lb 1.6 oz)   11/08/18 76.2 kg (167 lb 14.4 oz)   11/08/18 74.8 kg (165 lb)              Today, you had the following     No orders found for display       Primary Care Provider Office Phone # Fax #    Jaden Lee [phone] [address] [facility] [address]        Equal Access to Services     [facility]: Hadii jefry Anderson, waaxda lujimadaha, qaybta kaalmada adebiayada, racquel mendez . So [facility] [phone].    ATENCIÓN: Si habla español, tiene a gross disposición servicios gratuitos de asistencia lingüística. [facility] [phone].    We comply with applicable federal civil rights laws and [place] laws. We do not discriminate on the basis of race, color, national origin, age, disability, sex, sexual orientation, or gender identity.            Thank you!     Thank you for choosing [facility]  for your care. Our goal is always to provide you with excellent care. Hearing back from our patients is one way we can continue to improve our services. Please take a few minutes to complete the written survey that you may receive in the mail after your visit with us. Thank you!             Your Updated Medication List - Protect others around you: Learn how to safely use, store and throw away your medicines at www.disposemymeds.org.          This list is accurate as of 11/9/18  1:45 PM.  Always use your most recent med list.                   Brand Name Dispense Instructions for use Diagnosis    acetaminophen 325 MG tablet    TYLENOL    100 tablet    Take 2-3 tablets (650-975 mg) by mouth every 4 hours as needed for other (mild to moderate pain)    Status post total left knee replacement       CALCIUM + D PO      Take 2 tablets by mouth daily as needed         cyanocobalamin 1000 MCG tablet    vitamin  B-12     Take 1 tablet by mouth daily as needed        cyclobenzaprine 10 MG tablet    FLEXERIL    90 tablet    TAKE 1 TABLET BY MOUTH THREE TIMES DAILY AS NEEDED    Cervicalgia       docusate sodium 50 MG capsule    COLACE    60 capsule    Take 1 capsule (50 mg) by mouth 2 times daily as needed for constipation    S/P total knee arthroplasty, right       escitalopram 10 MG tablet    LEXAPRO    90 tablet    Take 1 tablet 2 times a day.    Dysthymia       gabapentin 300 MG capsule    NEURONTIN    90 capsule    TAKE 1 CAPSULE BY MOUTH 3 TIMES DAILY    Postherpetic polyneuropathy       HYDROcodone-acetaminophen 7.5-325 MG per tablet    NORCO    120 tablet    Take 1 tablet by mouth every 6 hours as needed for severe pain    Acute pain of right knee       levothyroxine 150 MCG tablet    SYNTHROID/LEVOTHROID    90 tablet    TAKE 1 TABLET (150 MCG) BY MOUTH DAILY    Hypothyroidism, unspecified type       * MULTIVITAMIN PO      Take 1 tablet by mouth daily as needed        * multivitamin Tabs tablet      Take 1 tablet by mouth daily as needed        NYAMYC [id number] UNIT/GM Powd   Generic drug:  nystatin     60 g    APPLY TOPICALLY 2 TIMES DAILY AS NEEDED    Rash       ondansetron 4 MG tablet    ZOFRAN    48 tablet    TAKE 1 TABLET (4 MG) BY MOUTH 3 TIMES DAILY    Nausea       pantoprazole 40 MG EC tablet    PROTONIX    30 tablet    Take 1 tablet (40 mg) by mouth daily Take 30-60 minutes before a meal.    Gastroesophageal reflux disease with esophagitis       simvastatin 40 MG tablet    ZOCOR    90 tablet    TAKE 1 TABLET BY MOUTH EVERY DAY IN THE EVENING . GENERIC FOR ZOCOR.    Mixed hyperlipidemia       sucralfate 1 GM/10ML suspension    CARAFATE    420 mL    Take 10 mLs (1 g) by mouth 4 times daily    Acute stomach ulcer hem/perf, obst (H)       zolpidem 10 MG tablet    AMBIEN    60 tablet    Take 1 tablet (10 mg) by mouth nightly as needed for sleep Take 1 tablet at night for  sleep-may repeat X1.    Persistent insomnia       * Notice:  This list has 2 medication(s) that are the same as other medications prescribed for you. Read the directions carefully, and ask your doctor or other care provider to review them with you.

## 2018-11-09 NOTE — PATIENT INSTRUCTIONS
Feraheme (Ferumoxytol Solution) for injection  What is this medicine?  FERUMOXYTOL is an iron complex. Iron is used to make healthy red blood cells, which carry oxygen and nutrients throughout the body. This medicine is used to treat iron deficiency anemia in people with chronic kidney disease.  This medicine may be used for other purposes; ask your health care provider or pharmacist if you have questions.  What should I tell my health care provider before I take this medicine?  They need to know if you have any of these conditions:    anemia not caused by low iron levels    high levels of iron in the blood    magnetic resonance imaging (MRI) test scheduled    an unusual or allergic reaction to iron, other medicines, foods, dyes, or preservatives    pregnant or trying to get pregnant    breast-feeding  How should I use this medicine?  This medicine is for injection into a vein. It is given by a health care professional in a hospital or clinic setting.  Talk to your pediatrician regarding the use of this medicine in children. Special care may be needed.  Overdosage: If you think you've taken too much of this medicine contact a poison control center or emergency room at once.  NOTE: This medicine is only for you. Do not share this medicine with others.  What if I miss a dose?  It is important not to miss your dose. Call your doctor or health care professional if you are unable to keep an appointment.  What may interact with this medicine?  This medicine may interact with the following medications:    other iron products  This list may not describe all possible interactions. Give your health care provider a list of all the medicines, herbs, non-prescription drugs, or dietary supplements you use. Also tell them if you smoke, drink alcohol, or use illegal drugs. Some items may interact with your medicine.  What should I watch for while using this medicine?  Visit your doctor or healthcare professional regularly. Tell  your doctor or healthcare professional if your symptoms do not start to get better or if they get worse. You may need blood work done while you are taking this medicine.  You may need to follow a special diet. Talk to your doctor. Foods that contain iron include: whole grains/cereals, dried fruits, beans, or peas, leafy green vegetables, and organ meats (liver, kidney).  What side effects may I notice from receiving this medicine?  Side effects that you should report to your doctor or health care professional as soon as possible:    allergic reactions like skin rash, itching or hives, swelling of the face, lips, or tongue    breathing problems    changes in blood pressure    feeling faint or lightheaded, falls    fever or chills    flushing, sweating, or hot feelings    swelling of the ankles or feet  Side effects that usually do not require medical attention (Report these to your doctor or health care professional if they continue or are bothersome.):    diarrhea    headache    nausea, vomiting    stomach pain  This list may not describe all possible side effects. Call your doctor for medical advice about side effects. You may report side effects to FDA at 5-919-FDA-5238.  Where should I keep my medicine?  This drug is given in a hospital or clinic and will not be stored at home.  NOTE: This sheet is a summary. It may not cover all possible information. If you have questions about this medicine, talk to your doctor, pharmacist, or health care provider.  NOTE:This sheet is a summary. It may not cover all possible information. If you have questions about this medicine, talk to your doctor, pharmacist, or health care provider. Copyright  2016 [employer] Standard

## 2018-11-09 NOTE — PROGRESS NOTES
Iron infusions changed to Feraheme d/t insurance coverage along with 500ml NS with each infusion per Jaden Lee. Therapy plan updated and patient scheduled accordingly.

## 2018-11-11 RX ORDER — POTASSIUM CHLORIDE 1.5 G/1.58G
20 POWDER, FOR SOLUTION ORAL DAILY
Qty: 30 PACKET | Refills: 0 | Status: SHIPPED | OUTPATIENT
Start: 2018-11-11 | End: 2018-12-04

## 2018-11-11 RX ORDER — CYCLOBENZAPRINE HCL 10 MG
TABLET ORAL
Qty: 30 TABLET | Refills: 0 | COMMUNITY
Start: 2018-11-11 | End: 2018-11-27

## 2018-11-11 RX ORDER — GABAPENTIN 300 MG/1
300 CAPSULE ORAL DAILY
Qty: 30 CAPSULE | Refills: 0 | Status: SHIPPED | OUTPATIENT
Start: 2018-11-11 | End: 2018-12-06

## 2018-11-11 ASSESSMENT — ENCOUNTER SYMPTOMS
APPETITE CHANGE: 1
NERVOUS/ANXIOUS: 1
PALPITATIONS: 0
FATIGUE: 1
COUGH: 0
FEVER: 0
HEADACHES: 0
CONSTIPATION: 1
ARTHRALGIAS: 1
DIARRHEA: 0
VOMITING: 0
ABDOMINAL PAIN: 1
SHORTNESS OF BREATH: 0
SLEEP DISTURBANCE: 1
NAUSEA: 1
DYSPHORIC MOOD: 1
NECK PAIN: 1
MYALGIAS: 1
BACK PAIN: 1
DIZZINESS: 0

## 2018-11-12 ENCOUNTER — TELEPHONE (OUTPATIENT)
Dept: SURGERY | Facility: OTHER | Age: 68
End: 2018-11-12

## 2018-11-12 ENCOUNTER — MYC MEDICAL ADVICE (OUTPATIENT)
Dept: INTERNAL MEDICINE | Facility: OTHER | Age: 68
End: 2018-11-12

## 2018-11-12 DIAGNOSIS — E87.6 LOW POTASSIUM SYNDROME: Primary | ICD-10-CM

## 2018-11-12 DIAGNOSIS — G47.00 PERSISTENT INSOMNIA: ICD-10-CM

## 2018-11-12 RX ORDER — ZOLPIDEM TARTRATE 10 MG/1
10 TABLET ORAL
Qty: 60 TABLET | Refills: 0 | Status: SHIPPED | OUTPATIENT
Start: 2018-11-12 | End: 2018-12-10

## 2018-11-12 NOTE — TELEPHONE ENCOUNTER
Patient called and stated she had surgery with Dr Jackson on 11-05-18 and is still dehydrated and nauseous.  Patient stated that her primary Dr gave her a powder for potassium but on the label the side effects are the same as she is experiencing now and doesn't want to intensify them.  Patients wants to know if she should take this or have her levels checked and have another infusion?  Patient also stated that she hasn't taken this yet.  Also patient stated that her primary Dr. said she should up he Pantoprazole to 2 a day.  Patient said Dr. Jackson was very specific about checking with him first. Patient can be reached at [phone].  Patient understands that Dr. Jackson is not in today and that the nurse will discuss this with him and get back to her sometime tomorrow.

## 2018-11-13 ENCOUNTER — INFUSION THERAPY VISIT (OUTPATIENT)
Dept: INFUSION THERAPY | Facility: OTHER | Age: 68
End: 2018-11-13
Attending: NURSE PRACTITIONER
Payer: COMMERCIAL

## 2018-11-13 VITALS
HEIGHT: 62 IN | DIASTOLIC BLOOD PRESSURE: 66 MMHG | HEART RATE: 94 BPM | SYSTOLIC BLOOD PRESSURE: 134 MMHG | RESPIRATION RATE: 20 BRPM | TEMPERATURE: 98.2 F | OXYGEN SATURATION: 96 % | BODY MASS INDEX: 31.36 KG/M2 | WEIGHT: 170.4 LBS

## 2018-11-13 DIAGNOSIS — D50.9 IRON DEFICIENCY ANEMIA, UNSPECIFIED IRON DEFICIENCY ANEMIA TYPE: Primary | ICD-10-CM

## 2018-11-13 DIAGNOSIS — Z96.651 STATUS POST TOTAL RIGHT KNEE REPLACEMENT: ICD-10-CM

## 2018-11-13 DIAGNOSIS — E87.6 LOW POTASSIUM SYNDROME: Primary | ICD-10-CM

## 2018-11-13 PROCEDURE — 25000128 H RX IP 250 OP 636: Performed by: NURSE PRACTITIONER

## 2018-11-13 PROCEDURE — 96365 THER/PROPH/DIAG IV INF INIT: CPT

## 2018-11-13 RX ORDER — ESCITALOPRAM OXALATE 20 MG/1
TABLET ORAL
Refills: 1 | COMMUNITY
Start: 2018-11-08 | End: 2019-03-12

## 2018-11-13 RX ADMIN — SODIUM CHLORIDE 500 ML: 9 INJECTION, SOLUTION INTRAVENOUS at 14:54

## 2018-11-13 RX ADMIN — FERUMOXYTOL 510 MG: 510 INJECTION INTRAVENOUS at 14:54

## 2018-11-13 NOTE — PATIENT INSTRUCTIONS
I  Ferumoxytol Solution for injection  What is this medicine?  FERUMOXYTOL is an iron complex. Iron is used to make healthy red blood cells, which carry oxygen and nutrients throughout the body. This medicine is used to treat iron deficiency anemia in people with chronic kidney disease.  This medicine may be used for other purposes; ask your health care provider or pharmacist if you have questions.  What should I tell my health care provider before I take this medicine?  They need to know if you have any of these conditions:    anemia not caused by low iron levels    high levels of iron in the blood    magnetic resonance imaging (MRI) test scheduled    an unusual or allergic reaction to iron, other medicines, foods, dyes, or preservatives    pregnant or trying to get pregnant    breast-feeding  How should I use this medicine?  This medicine is for injection into a vein. It is given by a health care professional in a hospital or clinic setting.  Talk to your pediatrician regarding the use of this medicine in children. Special care may be needed.  Overdosage: If you think you've taken too much of this medicine contact a poison control center or emergency room at once.  NOTE: This medicine is only for you. Do not share this medicine with others.  What if I miss a dose?  It is important not to miss your dose. Call your doctor or health care professional if you are unable to keep an appointment.  What may interact with this medicine?  This medicine may interact with the following medications:    other iron products  This list may not describe all possible interactions. Give your health care provider a list of all the medicines, herbs, non-prescription drugs, or dietary supplements you use. Also tell them if you smoke, drink alcohol, or use illegal drugs. Some items may interact with your medicine.  What should I watch for while using this medicine?  Visit your doctor or healthcare professional regularly. Tell your  doctor or healthcare professional if your symptoms do not start to get better or if they get worse. You may need blood work done while you are taking this medicine.  You may need to follow a special diet. Talk to your doctor. Foods that contain iron include: whole grains/cereals, dried fruits, beans, or peas, leafy green vegetables, and organ meats (liver, kidney).  What side effects may I notice from receiving this medicine?  Side effects that you should report to your doctor or health care professional as soon as possible:    allergic reactions like skin rash, itching or hives, swelling of the face, lips, or tongue    breathing problems    changes in blood pressure    feeling faint or lightheaded, falls    fever or chills    flushing, sweating, or hot feelings    swelling of the ankles or feet  Side effects that usually do not require medical attention (Report these to your doctor or health care professional if they continue or are bothersome.):    diarrhea    headache    nausea, vomiting    stomach pain  This list may not describe all possible side effects. Call your doctor for medical advice about side effects. You may report side effects to FDA at 5-565-FDA-9822.  Where should I keep my medicine?  This drug is given in a hospital or clinic and will not be stored at home.  NOTE: This sheet is a summary. It may not cover all possible information. If you have questions about this medicine, talk to your doctor, pharmacist, or health care provider.  NOTE:This sheet is a summary. It may not cover all possible information. If you have questions about this medicine, talk to your doctor, pharmacist, or health care provider. Copyright  2016 [employer] Standard

## 2018-11-13 NOTE — MR AVS SNAPSHOT
After Visit Summary   11/13/2018    Aure Lanier    MRN: [id number]           Patient Information     Date Of Birth          1950        Visit Information        Provider Department      11/13/2018 2:00 PM  INF [facility] [place]        Today's Diagnoses     Iron deficiency anemia, unspecified iron deficiency anemia type    -  1    Status post total right knee replacement          Care Instructions      I  Ferumoxytol Solution for injection  What is this medicine?  FERUMOXYTOL is an iron complex. Iron is used to make healthy red blood cells, which carry oxygen and nutrients throughout the body. This medicine is used to treat iron deficiency anemia in people with chronic kidney disease.  This medicine may be used for other purposes; ask your health care provider or pharmacist if you have questions.  What should I tell my health care provider before I take this medicine?  They need to know if you have any of these conditions:    anemia not caused by low iron levels    high levels of iron in the blood    magnetic resonance imaging (MRI) test scheduled    an unusual or allergic reaction to iron, other medicines, foods, dyes, or preservatives    pregnant or trying to get pregnant    breast-feeding  How should I use this medicine?  This medicine is for injection into a vein. It is given by a health care professional in a hospital or clinic setting.  Talk to your pediatrician regarding the use of this medicine in children. Special care may be needed.  Overdosage: If you think you've taken too much of this medicine contact a poison control center or emergency room at once.  NOTE: This medicine is only for you. Do not share this medicine with others.  What if I miss a dose?  It is important not to miss your dose. Call your doctor or health care professional if you are unable to keep an appointment.  What may interact with this medicine?  This medicine may interact with the  following medications:    other iron products  This list may not describe all possible interactions. Give your health care provider a list of all the medicines, herbs, non-prescription drugs, or dietary supplements you use. Also tell them if you smoke, drink alcohol, or use illegal drugs. Some items may interact with your medicine.  What should I watch for while using this medicine?  Visit your doctor or healthcare professional regularly. Tell your doctor or healthcare professional if your symptoms do not start to get better or if they get worse. You may need blood work done while you are taking this medicine.  You may need to follow a special diet. Talk to your doctor. Foods that contain iron include: whole grains/cereals, dried fruits, beans, or peas, leafy green vegetables, and organ meats (liver, kidney).  What side effects may I notice from receiving this medicine?  Side effects that you should report to your doctor or health care professional as soon as possible:    allergic reactions like skin rash, itching or hives, swelling of the face, lips, or tongue    breathing problems    changes in blood pressure    feeling faint or lightheaded, falls    fever or chills    flushing, sweating, or hot feelings    swelling of the ankles or feet  Side effects that usually do not require medical attention (Report these to your doctor or health care professional if they continue or are bothersome.):    diarrhea    headache    nausea, vomiting    stomach pain  This list may not describe all possible side effects. Call your doctor for medical advice about side effects. You may report side effects to FDA at 9-448-FDA-4440.  Where should I keep my medicine?  This drug is given in a hospital or clinic and will not be stored at home.  NOTE: This sheet is a summary. It may not cover all possible information. If you have questions about this medicine, talk to your doctor, pharmacist, or health care provider.  NOTE:This sheet is a  summary. It may not cover all possible information. If you have questions about this medicine, talk to your doctor, pharmacist, or health care provider. Copyright  2016 Gold Standard                Follow-ups after your visit        Your next 10 appointments already scheduled     Dec 06, 2018  4:00 PM CST   (Arrive by 3:45 PM)   SHORT with Jaden Lee NP   [facility] [place] ([facility] )    [address]  [place] [address]   [phone]            Dec 13, 2018 10:30 AM CST   (Arrive by 10:15 AM)   Post Op with Dieudonne Jackson MD   [facility] [place] ([facility] )    [address]  [place] [address]   [phone]              Future tests that were ordered for you today     Open Future Orders        Priority Expected Expires Ordered    Comprehensive metabolic panel (BMP + Alb, Alk Phos, ALT, AST, Total. Bili, TP) Routine 11/16/2018 11/13/2019 11/13/2018            Who to contact     If you have questions or need follow up information about today's clinic visit or your schedule please contact [facility] directly at [phone].  Normal or non-critical lab and imaging results will be communicated to you by MyChart, letter or phone within 4 business days after the clinic has received the results. If you do not hear from us within 7 days, please contact the clinic through [email or web address] or phone. If you have a critical or abnormal lab result, we will notify you by phone as soon as possible.  Submit refill requests through [employer] or call your pharmacy and they will forward the refill request to us. Please allow 3 business days for your refill to be completed.          Additional Information About Your Visit        [email or web address] Information     [employer] gives you secure access to your electronic health record. If you see a primary care provider, you can also send messages to your care team and make appointments. If you have  "questions, please call your primary care clinic.  If you do not have a primary care provider, please call [phone] and they will assist you.        Care EveryWhere ID     This is your Care EveryWhere ID. This could be used by other organizations to access your [facility] medical records  [id number]        Your Vitals Were     Pulse Temperature Respirations Height Pulse Oximetry BMI (Body Mass Index)    94 98.2  F (36.8  C) (Tympanic) 20 1.575 m (5' 2\") 96% 31.17 kg/m2       Blood Pressure from Last 3 Encounters:   11/13/18 134/66   11/09/18 124/68   11/08/18 128/72    Weight from Last 3 Encounters:   11/13/18 77.3 kg (170 lb 6.4 oz)   11/09/18 77.2 kg (170 lb 1.6 oz)   11/08/18 76.2 kg (167 lb 14.4 oz)              Today, you had the following     No orders found for display       Primary Care Provider Office Phone # Fax #    Jaden Lee [phone]       [address] [place] [address]        Equal Access to Services     [facility] : Hadii aad ku hadasho Soomaali, waaxda luqadaha, qaybta kaalmada adeegyada, racquel mendez . So [facility] [phone].    ATENCIÓN: Si habla español, tiene a gross disposición servicios gratuitos de asistencia lingüística. Llame al [phone].    We comply with applicable federal civil rights laws and [place] laws. We do not discriminate on the basis of race, color, national origin, age, disability, sex, sexual orientation, or gender identity.            Thank you!     Thank you for choosing [facility]  for your care. Our goal is always to provide you with excellent care. Hearing back from our patients is one way we can continue to improve our services. Please take a few minutes to complete the written survey that you may receive in the mail after your visit with us. Thank you!             Your Updated Medication List - Protect others around you: Learn how to safely use, store and throw away your medicines " at www.disposemymeds.org.          This list is accurate as of 11/13/18  4:16 PM.  Always use your most recent med list.                   Brand Name Dispense Instructions for use Diagnosis    acetaminophen 325 MG tablet    TYLENOL    100 tablet    Take 2-3 tablets (650-975 mg) by mouth every 4 hours as needed for other (mild to moderate pain)    Status post total left knee replacement       CALCIUM + D PO      Take 2 tablets by mouth daily as needed        cyanocobalamin 1000 MCG tablet    vitamin  B-12     Take 1 tablet by mouth daily as needed        cyclobenzaprine 10 MG tablet    FLEXERIL    30 tablet    TAKE 1 TABLET BY MOUTH one TIMES DAILY AS NEEDED    Cervicalgia       docusate sodium 50 MG capsule    COLACE    60 capsule    Take 1 capsule (50 mg) by mouth 2 times daily as needed for constipation    S/P total knee arthroplasty, right       * escitalopram 10 MG tablet    LEXAPRO    90 tablet    Take 1 tablet 2 times a day.    Dysthymia       * escitalopram 20 MG tablet    LEXAPRO     TAKE ONE-HALF TABLET BY MOUTH TWICE A DAY        gabapentin 300 MG capsule    NEURONTIN    30 capsule    Take 1 capsule (300 mg) by mouth daily    Postherpetic polyneuropathy       HYDROcodone-acetaminophen 7.5-325 MG per tablet    NORCO    120 tablet    Take 1 tablet by mouth every 6 hours as needed for severe pain    Acute pain of right knee       levothyroxine 150 MCG tablet    SYNTHROID/LEVOTHROID    90 tablet    TAKE 1 TABLET (150 MCG) BY MOUTH DAILY    Hypothyroidism, unspecified type       * MULTIVITAMIN PO      Take 1 tablet by mouth daily as needed        * multivitamin Tabs tablet      Take 1 tablet by mouth daily as needed        NYAMYC 558504 UNIT/GM Powd   Generic drug:  nystatin     60 g    APPLY TOPICALLY 2 TIMES DAILY AS NEEDED    Rash       ondansetron 4 MG tablet    ZOFRAN    48 tablet    TAKE 1 TABLET (4 MG) BY MOUTH 3 TIMES DAILY    Nausea       pantoprazole 40 MG EC tablet    PROTONIX    30 tablet    Take 1  tablet (40 mg) by mouth daily Take 30-60 minutes before a meal.    Gastroesophageal reflux disease with esophagitis       potassium chloride 20 MEQ Packet    KLOR-CON    30 packet    Take 20 mEq by mouth daily    Hypokalemia       simvastatin 40 MG tablet    ZOCOR    90 tablet    TAKE 1 TABLET BY MOUTH EVERY DAY IN THE EVENING . GENERIC FOR ZOCOR.    Mixed hyperlipidemia       sucralfate 1 GM/10ML suspension    CARAFATE    420 mL    Take 10 mLs (1 g) by mouth 4 times daily    Acute stomach ulcer hem/perf, obst (H)       zolpidem 10 MG tablet    AMBIEN    60 tablet    Take 1 tablet (10 mg) by mouth nightly as needed for sleep Take 1 tablet at night for sleep-may repeat X1.    Persistent insomnia       * Notice:  This list has 4 medication(s) that are the same as other medications prescribed for you. Read the directions carefully, and ask your doctor or other care provider to review them with you.

## 2018-11-13 NOTE — TELEPHONE ENCOUNTER
Discussed with Dr. Jackson. She just needs to take the Pantoprazole and should handle the potassium through Jaden Lee's office. Attempted to call patient, but she didn't answer.

## 2018-11-13 NOTE — PROGRESS NOTES
Patient is a 68 here accompanied by self today for infusion of IVF & Feraheme  per order of  Jaden Lee CNP.  Patient meets parameters for today's infusion. Patient identified with two identifiers, order verified, and verbal consent for today's infusion obtained from patient.  Written consent for treatment is on file and valid.    IV pump verified with dose Ferraheme 510mg & IVF Bolus 500ml NS , drug, and rate.  Infusion administered per protocol.  Patient tolerated infusion WELL, no signs or symptoms of adverse reaction noted.  Patient denies pain nor discomfort.     1609 IV removed, catheter intact.  Site clean, dry and intact.  No signs or symptoms of infiltration or infection.  Covered with a sterile bandage, slight pressure applied for 30 seconds.  Pt instructed to leave bandage intact for a minimum of one hour, and to call with questions or concerns.  Copy of appointments, discharge instructions, and after visit summary (AVS) provided to patient.  Patient states understanding, discharged ambulatory.

## 2018-11-14 NOTE — TELEPHONE ENCOUNTER
Jaden, this patient called Dr. Jackson's office about her potassium. Dr. Jackson wants this patient to handle the potassium through your office. She has not even started it yet and states that she is waiting to take it until she does labs on Friday. I emphasized multiple times to patient that she needs to discuss this with your office.

## 2018-11-15 DIAGNOSIS — K25.9 GASTRIC ULCER WITHOUT HEMORRHAGE OR PERFORATION, UNSPECIFIED CHRONICITY: Primary | ICD-10-CM

## 2018-11-15 LAB — COPATH REPORT: NORMAL

## 2018-11-16 DIAGNOSIS — K25.9 GASTRIC ULCER WITHOUT HEMORRHAGE OR PERFORATION, UNSPECIFIED CHRONICITY: ICD-10-CM

## 2018-11-16 DIAGNOSIS — E87.6 LOW POTASSIUM SYNDROME: ICD-10-CM

## 2018-11-16 DIAGNOSIS — K25.9 GASTRIC ULCER WITHOUT HEMORRHAGE OR PERFORATION, UNSPECIFIED CHRONICITY: Primary | ICD-10-CM

## 2018-11-16 LAB
ALBUMIN SERPL-MCNC: 3.8 G/DL (ref 3.4–5)
ALP SERPL-CCNC: 112 U/L (ref 40–150)
ALT SERPL W P-5'-P-CCNC: 19 U/L (ref 0–50)
ANION GAP SERPL CALCULATED.3IONS-SCNC: 10 MMOL/L (ref 3–14)
AST SERPL W P-5'-P-CCNC: 17 U/L (ref 0–45)
BILIRUB SERPL-MCNC: 0.2 MG/DL (ref 0.2–1.3)
BUN SERPL-MCNC: 10 MG/DL (ref 7–30)
CALCIUM SERPL-MCNC: 8.8 MG/DL (ref 8.5–10.1)
CHLORIDE SERPL-SCNC: 106 MMOL/L (ref 94–109)
CO2 SERPL-SCNC: 26 MMOL/L (ref 20–32)
CREAT SERPL-MCNC: 0.51 MG/DL (ref 0.52–1.04)
GFR SERPL CREATININE-BSD FRML MDRD: >90 ML/MIN/1.7M2
GLUCOSE SERPL-MCNC: 107 MG/DL (ref 70–99)
POTASSIUM SERPL-SCNC: 3 MMOL/L (ref 3.4–5.3)
PROT SERPL-MCNC: 7.5 G/DL (ref 6.8–8.8)
SODIUM SERPL-SCNC: 142 MMOL/L (ref 133–144)

## 2018-11-16 PROCEDURE — 83883 ASSAY NEPHELOMETRY NOT SPEC: CPT | Mod: 90 | Performed by: SURGERY

## 2018-11-16 PROCEDURE — 80053 COMPREHEN METABOLIC PANEL: CPT | Mod: ZL | Performed by: NURSE PRACTITIONER

## 2018-11-16 PROCEDURE — 86335 IMMUNFIX E-PHORSIS/URINE/CSF: CPT | Mod: 90 | Performed by: SURGERY

## 2018-11-16 PROCEDURE — 36415 COLL VENOUS BLD VENIPUNCTURE: CPT | Mod: ZL | Performed by: NURSE PRACTITIONER

## 2018-11-16 PROCEDURE — 84156 ASSAY OF PROTEIN URINE: CPT | Mod: 90 | Performed by: SURGERY

## 2018-11-16 PROCEDURE — 99000 SPECIMEN HANDLING OFFICE-LAB: CPT | Performed by: SURGERY

## 2018-11-16 NOTE — TELEPHONE ENCOUNTER
Called patient  11/16/18  K+=3.0. Encouraged to take Potassium given,  Sprinkle on bread and make sandwich if concerned about drinking it.  Stick in your bANANA,   .  Mg+ CHECKED.  jm

## 2018-11-21 ENCOUNTER — HOSPITAL ENCOUNTER (OUTPATIENT)
Dept: CT IMAGING | Facility: HOSPITAL | Age: 68
Discharge: HOME | End: 2018-11-21
Attending: SURGERY | Admitting: SURGERY
Payer: COMMERCIAL

## 2018-11-21 DIAGNOSIS — K25.9 GASTRIC ULCER WITHOUT HEMORRHAGE OR PERFORATION, UNSPECIFIED CHRONICITY: ICD-10-CM

## 2018-11-21 LAB
KAPPA LC UR-MCNC: 0.77 MG/DL (ref 0.33–1.94)
KAPPA LC/LAMBDA SER: 0.91 {RATIO} (ref 0.26–1.65)
LAMBDA LC SERPL-MCNC: 0.85 MG/DL (ref 0.57–2.63)

## 2018-11-21 PROCEDURE — 25500064 ZZH RX 255 OP 636: Performed by: RADIOLOGY

## 2018-11-21 PROCEDURE — 74177 CT ABD & PELVIS W/CONTRAST: CPT | Mod: TC

## 2018-11-21 RX ORDER — IOPAMIDOL 612 MG/ML
100 INJECTION, SOLUTION INTRAVASCULAR ONCE
Status: COMPLETED | OUTPATIENT
Start: 2018-11-21 | End: 2018-11-21

## 2018-11-21 RX ADMIN — DIATRIZOATE MEGLUMINE AND DIATRIZOATE SODIUM 30 ML: 660; 100 SOLUTION ORAL; RECTAL at 15:33

## 2018-11-21 RX ADMIN — IOPAMIDOL 100 ML: 612 INJECTION, SOLUTION INTRAVENOUS at 15:32

## 2018-11-27 ENCOUNTER — TRANSFERRED RECORDS (OUTPATIENT)
Dept: HEALTH INFORMATION MANAGEMENT | Facility: CLINIC | Age: 68
End: 2018-11-27

## 2018-11-27 ENCOUNTER — TELEPHONE (OUTPATIENT)
Dept: INTERNAL MEDICINE | Facility: OTHER | Age: 68
End: 2018-11-27

## 2018-11-27 DIAGNOSIS — M54.2 CERVICALGIA: ICD-10-CM

## 2018-11-27 DIAGNOSIS — R11.2 NAUSEA AND VOMITING, INTRACTABILITY OF VOMITING NOT SPECIFIED, UNSPECIFIED VOMITING TYPE: Primary | ICD-10-CM

## 2018-11-27 NOTE — TELEPHONE ENCOUNTER
cyclobenzaprine (FLEXERIL) 10 MG tablet  Last Written Prescription Date:  11/11/18  Last Fill Quantity: 30,   # refills: 0  Last Office Visit: 11/8/18  Future Office visit:    Next 5 appointments (look out 90 days)     Dec 04, 2018  4:00 PM CST   (Arrive by 3:45 PM)   Return Visit with Dieudonne Jackson MD   [facility] ([facility] )    [address] [place] [address]   [phone]            Dec 06, 2018  4:00 PM CST   (Arrive by 3:45 PM)   SHORT with Jaden Lee NP   [facility] ([facility] )    [address] [place] [address]   [phone]                   Routing refill request to provider for review/approval because:  Drug not on the [facility], [facility] or [facility] refill protocol or controlled substance

## 2018-11-27 NOTE — TELEPHONE ENCOUNTER
Patient is unable to keep potassium mixture down as she is suffering from violent emesis, would like an order to come in to lab and have potassium checked as soon as possible.

## 2018-11-28 ENCOUNTER — TELEPHONE (OUTPATIENT)
Dept: SURGERY | Facility: OTHER | Age: 68
End: 2018-11-28

## 2018-11-28 DIAGNOSIS — K76.9 LIVER LESION: Primary | ICD-10-CM

## 2018-11-28 RX ORDER — CYCLOBENZAPRINE HCL 10 MG
TABLET ORAL
Qty: 30 TABLET | Refills: 0 | Status: SHIPPED | OUTPATIENT
Start: 2018-11-28 | End: 2018-12-06

## 2018-11-28 NOTE — TELEPHONE ENCOUNTER
Patient has not complied with taking potassium, and if is having violent vomiting, has not notified this office.   Or come to clinic.  Not sure how to help her.  Has stated   Wants IV potassium  Because gets nauseated with thought of taking oral potassium  . Discussed it is not a normal process to supplement with IV potassium on a regular basis.   Chem profile placed. Jaden

## 2018-11-28 NOTE — TELEPHONE ENCOUNTER
Refill request via paper fax received for Carafate. Upon review, this was just filled today by Jaden Lee.

## 2018-11-29 ENCOUNTER — TELEPHONE (OUTPATIENT)
Dept: SURGERY | Facility: OTHER | Age: 68
End: 2018-11-29

## 2018-11-29 DIAGNOSIS — E87.6 LOW POTASSIUM SYNDROME: ICD-10-CM

## 2018-11-29 DIAGNOSIS — R11.2 NAUSEA AND VOMITING, INTRACTABILITY OF VOMITING NOT SPECIFIED, UNSPECIFIED VOMITING TYPE: ICD-10-CM

## 2018-11-29 LAB
ALBUMIN SERPL-MCNC: 4.3 G/DL (ref 3.4–5)
ALP SERPL-CCNC: 105 U/L (ref 40–150)
ALT SERPL W P-5'-P-CCNC: 31 U/L (ref 0–50)
ANION GAP SERPL CALCULATED.3IONS-SCNC: 9 MMOL/L (ref 3–14)
AST SERPL W P-5'-P-CCNC: 14 U/L (ref 0–45)
BILIRUB SERPL-MCNC: 0.4 MG/DL (ref 0.2–1.3)
BUN SERPL-MCNC: 11 MG/DL (ref 7–30)
CALCIUM SERPL-MCNC: 9.2 MG/DL (ref 8.5–10.1)
CHLORIDE SERPL-SCNC: 104 MMOL/L (ref 94–109)
CO2 SERPL-SCNC: 25 MMOL/L (ref 20–32)
CREAT SERPL-MCNC: 0.61 MG/DL (ref 0.52–1.04)
GFR SERPL CREATININE-BSD FRML MDRD: >90 ML/MIN/1.7M2
GLUCOSE SERPL-MCNC: 124 MG/DL (ref 70–99)
MAGNESIUM SERPL-MCNC: 2.1 MG/DL (ref 1.6–2.3)
POTASSIUM SERPL-SCNC: 3.2 MMOL/L (ref 3.4–5.3)
PROT SERPL-MCNC: 8.1 G/DL (ref 6.8–8.8)
SODIUM SERPL-SCNC: 138 MMOL/L (ref 133–144)

## 2018-11-29 PROCEDURE — 36415 COLL VENOUS BLD VENIPUNCTURE: CPT | Mod: ZL | Performed by: NURSE PRACTITIONER

## 2018-11-29 PROCEDURE — 83735 ASSAY OF MAGNESIUM: CPT | Mod: ZL | Performed by: NURSE PRACTITIONER

## 2018-11-29 PROCEDURE — 80053 COMPREHEN METABOLIC PANEL: CPT | Mod: ZL | Performed by: NURSE PRACTITIONER

## 2018-11-29 NOTE — TELEPHONE ENCOUNTER
I called Mrs. Lanier to discuss her recent CT scan and her urinary results. I discussed that there were two small lesion on her liver which will be followed up with an ultrasound. 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 She continues to have nausea and emesis. I recommended that she may have to have revision of her anastomosis or possible endoscopic dilation, but would recommend revision. I then discussed the recent results of her urine plasma light chains. I discussed that they were abnormal and I will be discussing with hematology for further evaluation.

## 2018-11-30 ENCOUNTER — TRANSCRIBE ORDERS (OUTPATIENT)
Dept: ONCOLOGY | Facility: OTHER | Age: 68
End: 2018-11-30

## 2018-11-30 DIAGNOSIS — D46.9 MYELODYSPLASIA (MYELODYSPLASTIC SYNDROME) (H): Primary | ICD-10-CM

## 2018-12-03 ENCOUNTER — HOSPITAL ENCOUNTER (OUTPATIENT)
Dept: ULTRASOUND IMAGING | Facility: HOSPITAL | Age: 68
Discharge: HOME | End: 2018-12-03
Attending: NURSE PRACTITIONER | Admitting: NURSE PRACTITIONER
Payer: COMMERCIAL

## 2018-12-03 DIAGNOSIS — K76.9 LIVER LESION: ICD-10-CM

## 2018-12-03 DIAGNOSIS — M25.561 ACUTE PAIN OF RIGHT KNEE: ICD-10-CM

## 2018-12-03 PROCEDURE — 76705 ECHO EXAM OF ABDOMEN: CPT | Mod: TC

## 2018-12-03 NOTE — TELEPHONE ENCOUNTER
HYDROcodone-acetaminophen (NORCO) 7.5-325 MG per tablet  Last Written Prescription Date:  10/23/18  Last Fill Quantity: 120,   # refills: 0  Last Office Visit: 11/8/18  Future Office visit:    Next 5 appointments (look out 90 days)     Dec 04, 2018  4:00 PM CST   (Arrive by 3:45 PM)   Return Visit with Dieudonne Jackson MD   [facility] ([facility] )    [address] [place] [address]   [phone]            Dec 06, 2018  4:00 PM CST   (Arrive by 3:45 PM)   SHORT with Jaden Lee NP   [facility]    [address] [place] [address]   [phone]                   Routing refill request to provider for review/approval because:  Drug not on the [facility], [facility] or [facility] refill protocol or controlled substance

## 2018-12-04 ENCOUNTER — OFFICE VISIT (OUTPATIENT)
Dept: SURGERY | Facility: OTHER | Age: 68
End: 2018-12-04
Attending: SURGERY
Payer: COMMERCIAL

## 2018-12-04 VITALS
HEIGHT: 62 IN | SYSTOLIC BLOOD PRESSURE: 126 MMHG | WEIGHT: 162 LBS | BODY MASS INDEX: 29.81 KG/M2 | TEMPERATURE: 97.2 F | DIASTOLIC BLOOD PRESSURE: 74 MMHG | HEART RATE: 113 BPM | OXYGEN SATURATION: 97 %

## 2018-12-04 DIAGNOSIS — D46.9 MYELODYSPLASIA (MYELODYSPLASTIC SYNDROME) (H): ICD-10-CM

## 2018-12-04 DIAGNOSIS — D89.0 POLYCLONAL GAMMOPATHY: Primary | ICD-10-CM

## 2018-12-04 DIAGNOSIS — K28.9 ANASTOMOTIC ULCER: ICD-10-CM

## 2018-12-04 LAB
ALBUMIN SERPL-MCNC: 4.1 G/DL (ref 3.4–5)
ALP SERPL-CCNC: 90 U/L (ref 40–150)
ALT SERPL W P-5'-P-CCNC: 28 U/L (ref 0–50)
ANION GAP SERPL CALCULATED.3IONS-SCNC: 7 MMOL/L (ref 3–14)
AST SERPL W P-5'-P-CCNC: 19 U/L (ref 0–45)
BASOPHILS # BLD AUTO: 0.1 10E9/L (ref 0–0.2)
BASOPHILS NFR BLD AUTO: 0.7 %
BILIRUB SERPL-MCNC: 0.2 MG/DL (ref 0.2–1.3)
BUN SERPL-MCNC: 10 MG/DL (ref 7–30)
CALCIUM SERPL-MCNC: 9.5 MG/DL (ref 8.5–10.1)
CHLORIDE SERPL-SCNC: 107 MMOL/L (ref 94–109)
CO2 SERPL-SCNC: 26 MMOL/L (ref 20–32)
CREAT SERPL-MCNC: 0.47 MG/DL (ref 0.52–1.04)
DIFFERENTIAL METHOD BLD: ABNORMAL
EOSINOPHIL # BLD AUTO: 0.5 10E9/L (ref 0–0.7)
EOSINOPHIL NFR BLD AUTO: 4.5 %
ERYTHROCYTE [DISTWIDTH] IN BLOOD BY AUTOMATED COUNT: 22.2 % (ref 10–15)
FERRITIN SERPL-MCNC: 358 NG/ML (ref 8–252)
GFR SERPL CREATININE-BSD FRML MDRD: >90 ML/MIN/1.7M2
GLUCOSE SERPL-MCNC: 89 MG/DL (ref 70–99)
HCT VFR BLD AUTO: 44.5 % (ref 35–47)
HGB BLD-MCNC: 13.9 G/DL (ref 11.7–15.7)
IMM GRANULOCYTES # BLD: 0 10E9/L (ref 0–0.4)
IMM GRANULOCYTES NFR BLD: 0.2 %
IRON SATN MFR SERPL: 23 % (ref 15–46)
IRON SERPL-MCNC: 63 UG/DL (ref 35–180)
LDH SERPL L TO P-CCNC: 171 U/L (ref 81–234)
LYMPHOCYTES # BLD AUTO: 4.2 10E9/L (ref 0.8–5.3)
LYMPHOCYTES NFR BLD AUTO: 42.1 %
MCH RBC QN AUTO: 24.7 PG (ref 26.5–33)
MCHC RBC AUTO-ENTMCNC: 31.2 G/DL (ref 31.5–36.5)
MCV RBC AUTO: 79 FL (ref 78–100)
MONOCYTES # BLD AUTO: 0.9 10E9/L (ref 0–1.3)
MONOCYTES NFR BLD AUTO: 9 %
NEUTROPHILS # BLD AUTO: 4.3 10E9/L (ref 1.6–8.3)
NEUTROPHILS NFR BLD AUTO: 43.5 %
NRBC # BLD AUTO: 0 10*3/UL
NRBC BLD AUTO-RTO: 0 /100
PLATELET # BLD AUTO: 388 10E9/L (ref 150–450)
POTASSIUM SERPL-SCNC: 3.3 MMOL/L (ref 3.4–5.3)
PROT SERPL-MCNC: 8.1 G/DL (ref 6.8–8.8)
RBC # BLD AUTO: 5.62 10E12/L (ref 3.8–5.2)
SODIUM SERPL-SCNC: 140 MMOL/L (ref 133–144)
TIBC SERPL-MCNC: 272 UG/DL (ref 240–430)
WBC # BLD AUTO: 10 10E9/L (ref 4–11)

## 2018-12-04 PROCEDURE — 82746 ASSAY OF FOLIC ACID SERUM: CPT | Mod: ZL | Performed by: INTERNAL MEDICINE

## 2018-12-04 PROCEDURE — 83615 LACTATE (LD) (LDH) ENZYME: CPT | Mod: ZL | Performed by: INTERNAL MEDICINE

## 2018-12-04 PROCEDURE — 82607 VITAMIN B-12: CPT | Mod: ZL | Performed by: INTERNAL MEDICINE

## 2018-12-04 PROCEDURE — 84165 PROTEIN E-PHORESIS SERUM: CPT | Mod: ZL | Performed by: INTERNAL MEDICINE

## 2018-12-04 PROCEDURE — 82232 ASSAY OF BETA-2 PROTEIN: CPT | Mod: ZL | Performed by: INTERNAL MEDICINE

## 2018-12-04 PROCEDURE — 82728 ASSAY OF FERRITIN: CPT | Mod: ZL | Performed by: INTERNAL MEDICINE

## 2018-12-04 PROCEDURE — 00000402 ZZHCL STATISTIC TOTAL PROTEIN: Mod: ZL | Performed by: INTERNAL MEDICINE

## 2018-12-04 PROCEDURE — 83540 ASSAY OF IRON: CPT | Mod: ZL | Performed by: INTERNAL MEDICINE

## 2018-12-04 PROCEDURE — 85025 COMPLETE CBC W/AUTO DIFF WBC: CPT | Mod: ZL | Performed by: INTERNAL MEDICINE

## 2018-12-04 PROCEDURE — 82784 ASSAY IGA/IGD/IGG/IGM EACH: CPT | Mod: ZL | Performed by: INTERNAL MEDICINE

## 2018-12-04 PROCEDURE — 36415 COLL VENOUS BLD VENIPUNCTURE: CPT | Mod: ZL | Performed by: INTERNAL MEDICINE

## 2018-12-04 PROCEDURE — 83550 IRON BINDING TEST: CPT | Mod: ZL | Performed by: INTERNAL MEDICINE

## 2018-12-04 PROCEDURE — 86334 IMMUNOFIX E-PHORESIS SERUM: CPT | Mod: ZL | Performed by: INTERNAL MEDICINE

## 2018-12-04 PROCEDURE — 85810 BLOOD VISCOSITY EXAMINATION: CPT | Mod: ZL | Performed by: INTERNAL MEDICINE

## 2018-12-04 PROCEDURE — 83883 ASSAY NEPHELOMETRY NOT SPEC: CPT | Mod: ZL | Performed by: INTERNAL MEDICINE

## 2018-12-04 PROCEDURE — 99000 SPECIMEN HANDLING OFFICE-LAB: CPT | Performed by: INTERNAL MEDICINE

## 2018-12-04 PROCEDURE — 80053 COMPREHEN METABOLIC PANEL: CPT | Mod: ZL | Performed by: INTERNAL MEDICINE

## 2018-12-04 PROCEDURE — G0463 HOSPITAL OUTPT CLINIC VISIT: HCPCS

## 2018-12-04 PROCEDURE — 99213 OFFICE O/P EST LOW 20 MIN: CPT | Performed by: SURGERY

## 2018-12-04 RX ORDER — HYDROCODONE BITARTRATE AND ACETAMINOPHEN 7.5; 325 MG/1; MG/1
1 TABLET ORAL EVERY 6 HOURS PRN
Qty: 120 TABLET | Refills: 0 | Status: SHIPPED | OUTPATIENT
Start: 2018-12-04 | End: 2019-01-03

## 2018-12-04 ASSESSMENT — PAIN SCALES - GENERAL: PAINLEVEL: NO PAIN (1)

## 2018-12-04 NOTE — TELEPHONE ENCOUNTER
Patient notified prescription is ready to be picked up at the [facility], Registration.

## 2018-12-04 NOTE — PATIENT INSTRUCTIONS
"Thank you for allowing Dr. Jackson and our surgical team to participate in your care.  If you have a scheduling question please contact our Health Unit Coordinator at [phone].   All questions or concerns can be directed to the clinic nurse at: [phone]  You are scheduled for : upper endoscopy   Your procedure date is: 12/21/18    Please call the Surgery Education Nurses 1-2 weeks prior to your surgery date at  [phone]. Please have you medication and allergy lists ready.    Do not take Aspirin (325mg), other NSAIDs (Ibuprofen, Motrin, Aleve, Celebrex, Naproxen, etc...) vitamins or supplements 7 days before your surgery. If you are on blood thinners, please call your primary care provider for instruction.     Hospital Surgery will call you the afternoon before your surgery with your arrival time. If you are scheduled on a Monday admitting will call you the Friday before your surgery. You may call admitting at [phone] if you have not been contacted by 5pm.    Please call the surgery nurse or your primary care physician if you should become ill within 1 week of scheduled surgery. (ex.vomiting, diarrhea, fever, cough)    DO NOT eat any solid foods after 10pm the night prior to surgery. You may have clear liquids only up until 4 hours prior to surgery. Please see the list of liquids you may have and you can not have on page 2 of the separate \"Instructions for Your Upper Endoscopy\" packet.     You need a responsible adult available to drive you home and stay with you for 24 hours after you leave the hospital. You will not be allowed to drive yourself. If you need to take a taxi or the bus you must have a responsible person to ride with you. Your procedure will be cancelled if you do not bring a responsible adult.    You do not need a preoperative appointment with your primary care provider prior to surgery.    Lab test(s) needed as ordered by Dr. Randle.        "

## 2018-12-04 NOTE — MR AVS SNAPSHOT
"              After Visit Summary   12/4/2018    Aure Lanier    MRN: [id number]           Patient Information     Date Of Birth          1950        Visit Information        Provider Department      12/4/2018 4:00 PM Dieudonne Jackson MD [facility]        Care Instructions    Thank you for allowing Dr. Jackson and our surgical team to participate in your care.  If you have a scheduling question please contact our Health Unit Coordinator at [phone].   All questions or concerns can be directed to the clinic nurse at: [phone]  You are scheduled for : upper endoscopy   Your procedure date is: 12/21/18    Please call the Surgery Education Nurses 1-2 weeks prior to your surgery date at  [phone]. Please have you medication and allergy lists ready.    Do not take Aspirin (325mg), other NSAIDs (Ibuprofen, Motrin, Aleve, Celebrex, Naproxen, etc...) vitamins or supplements 7 days before your surgery. If you are on blood thinners, please call your primary care provider for instruction.     Hospital Surgery will call you the afternoon before your surgery with your arrival time. If you are scheduled on a Monday admitting will call you the Friday before your surgery. You may call admitting at [phone] if you have not been contacted by 5pm.    Please call the surgery nurse or your primary care physician if you should become ill within 1 week of scheduled surgery. (ex.vomiting, diarrhea, fever, cough)    DO NOT eat any solid foods after 10pm the night prior to surgery. You may have clear liquids only up until 4 hours prior to surgery. Please see the list of liquids you may have and you can not have on page 2 of the separate \"Instructions for Your Upper Endoscopy\" packet.     You need a responsible adult available to drive you home and stay with you for 24 hours after you leave the hospital. You will not be allowed to drive yourself. If you need to take a taxi or the bus you " must have a responsible person to ride with you. Your procedure will be cancelled if you do not bring a responsible adult.    You do not need a preoperative appointment with your primary care provider prior to surgery.    Lab test(s) needed as ordered by Dr. Randle.                Follow-ups after your visit        Your next 10 appointments already scheduled     Dec 06, 2018  4:00 PM CST   (Arrive by 3:45 PM)   [facility] Jaden Lee NP   [facility]    [address] [address]   [phone]            Dec 13, 2018 11:15 AM CST   (Arrive by 11:00 AM)   Post Op with Dieudonne Jackson MD   [facility]    [address] [facility] [address]   [phone]              Who to contact     If you have questions or need follow up information about today's clinic visit or your schedule please contact [facility] directly at [phone].  Normal or non-critical lab and imaging results will be communicated to you by [employer], letter or phone within 4 business days after the clinic has received the results. If you do not hear from us within 7 days, please contact the clinic through [employer] or phone. If you have a critical or abnormal lab result, we will notify you by phone as soon as possible.  Submit refill requests through [employer] or call your pharmacy and they will forward the refill request to us. Please allow 3 business days for your refill to be completed.          Additional Information About Your Visit        [employer] Information     [employer] gives you secure access to your electronic health record. If you see a primary care provider, you can also send messages to your care team and make appointments. If you have questions, please call your primary care clinic.  If you do not have a primary care provider, please call [phone] and they will assist you.        Care  "EveryWhere ID     This is your Care EveryWhere ID. This could be used by other organizations to access your [place] medical records  [id number]        Your Vitals Were     Pulse Temperature Height Pulse Oximetry BMI (Body Mass Index)       113 97.2  F (36.2  C) (Tympanic) 5' 2\" (1.575 m) 97% 29.63 kg/m2        Blood Pressure from Last 3 Encounters:   12/04/18 126/74   11/13/18 134/66   11/09/18 124/68    Weight from Last 3 Encounters:   12/04/18 162 lb (73.5 kg)   11/13/18 170 lb 6.4 oz (77.3 kg)   11/09/18 170 lb 1.6 oz (77.2 kg)              Today, you had the following     No orders found for display         Today's Medication Changes          These changes are accurate as of 12/4/18  4:42 PM.  If you have any questions, ask your nurse or doctor.               These medicines have changed or have updated prescriptions.        Dose/Directions    escitalopram 20 MG tablet   Commonly known as:  LEXAPRO   This may have changed:  Another medication with the same name was removed. Continue taking this medication, and follow the directions you see here.   Changed by:  Dieudonne Jackson MD        TAKE ONE-HALF TABLET BY MOUTH TWICE A DAY   Refills:  1         Stop taking these medicines if you haven't already. Please contact your care team if you have questions.     docusate sodium 50 MG capsule   Commonly known as:  COLACE   Stopped by:  Dieudonne Jackson MD           potassium chloride 20 MEQ packet   Commonly known as:  KLOR-CON   Stopped by:  Dieudonne Jackson MD                    Primary Care Provider Office Phone # Fax #    Jaden JL Lee [phone]       [facility] [place] [address]        Equal Access to Services     NEREIDA ADAMES AH: Kerry peace Sojun, waaxda luqadaha, qaybta kaalmada adeegyada, racquel craven. So [facility] [phone].    ATENCIÓN: Si habla español, tiene a gross disposición servicios gratuitos de asistencia lingüística. " Lei gee [phone].    We comply with applicable federal civil rights laws and [place] laws. We do not discriminate on the basis of race, color, national origin, age, disability, sex, sexual orientation, or gender identity.            Thank you!     Thank you for choosing [facility] [place] [facility]  for your care. Our goal is always to provide you with excellent care. Hearing back from our patients is one way we can continue to improve our services. Please take a few minutes to complete the written survey that you may receive in the mail after your visit with us. Thank you!             Your Updated Medication List - Protect others around you: Learn how to safely use, store and throw away your medicines at www.disposemymeds.org.          This list is accurate as of 12/4/18  4:42 PM.  Always use your most recent med list.                   Brand Name Dispense Instructions for use Diagnosis    acetaminophen 325 MG tablet    TYLENOL    100 tablet    Take 2-3 tablets (650-975 mg) by mouth every 4 hours as needed for other (mild to moderate pain)    Status post total left knee replacement       CALCIUM + D PO      Take 2 tablets by mouth daily as needed        CARAFATE 1 GM/10ML suspension   Generic drug:  sucralfate     420 mL    TAKE 10MLS BY MOUTH FOUR TIMES DAILY    Acute stomach ulcer hem/perf, obst (H)       cyclobenzaprine 10 MG tablet    FLEXERIL    30 tablet    TAKE 1 TABLET BY MOUTH one TIMES DAILY AS NEEDED    Cervicalgia       escitalopram 20 MG tablet    LEXAPRO     TAKE ONE-HALF TABLET BY MOUTH TWICE A DAY        gabapentin 300 MG capsule    NEURONTIN    30 capsule    Take 1 capsule (300 mg) by mouth daily    Postherpetic polyneuropathy       HYDROcodone-acetaminophen 7.5-325 MG per tablet    NORCO    120 tablet    Take 1 tablet by mouth every 6 hours as needed for severe pain    Acute pain of right knee       levothyroxine 150 MCG tablet    SYNTHROID/LEVOTHROID    90 tablet    TAKE 1 TABLET (150  MCG) BY MOUTH DAILY    Hypothyroidism, unspecified type       * MULTIVITAMIN PO      Take 1 tablet by mouth daily as needed        * multivitamin Tabs tablet      Take 1 tablet by mouth daily as needed        NYAMYC 600170 UNIT/GM external powder   Generic drug:  nystatin     60 g    APPLY TOPICALLY 2 TIMES DAILY AS NEEDED    Rash       ondansetron 4 MG tablet    ZOFRAN    48 tablet    TAKE 1 TABLET (4 MG) BY MOUTH 3 TIMES DAILY    Nausea       pantoprazole 40 MG EC tablet    PROTONIX    30 tablet    Take 1 tablet (40 mg) by mouth daily Take 30-60 minutes before a meal.    Gastroesophageal reflux disease with esophagitis       simvastatin 40 MG tablet    ZOCOR    90 tablet    TAKE 1 TABLET BY MOUTH EVERY DAY IN THE EVENING . GENERIC FOR ZOCOR.    Mixed hyperlipidemia       vitamin B-12 1000 MCG tablet    CYANOCOBALAMIN     Take 1 tablet by mouth daily as needed        zolpidem 10 MG tablet    AMBIEN    60 tablet    Take 1 tablet (10 mg) by mouth nightly as needed for sleep Take 1 tablet at night for sleep-may repeat X1.    Persistent insomnia       * Notice:  This list has 2 medication(s) that are the same as other medications prescribed for you. Read the directions carefully, and ask your doctor or other care provider to review them with you.

## 2018-12-04 NOTE — NURSING NOTE
"Chief Complaint   Patient presents with     Surgical Followup     s/p upper endoscopy 11/5/18.  CT done 11/21/18, labs done 11/16/18.         Initial /74 (BP Location: Left arm, Patient Position: Chair, Cuff Size: Adult Regular)  Pulse 113  Temp 97.2  F (36.2  C) (Tympanic)  Ht 5' 2\" (1.575 m)  Wt 162 lb (73.5 kg)  SpO2 97%  BMI 29.63 kg/m2 Estimated body mass index is 29.63 kg/(m^2) as calculated from the following:    Height as of this encounter: 5' 2\" (1.575 m).    Weight as of this encounter: 162 lb (73.5 kg).  Medication Reconciliation: complete    RASHAD MASTERSON LPN    "

## 2018-12-04 NOTE — TELEPHONE ENCOUNTER
Last note states:  (G89.4) Chronic pain syndrome  Comment: Will decrease Flexeril to 10mg each evening  , and gabapentin to 300mg in AM   Had shingles many years ago and Post herpetic pain has not been a problem  Has cervical neck pain with headaches  Will look at decreasing pain med to 5.325s  4 times a day instead of 7.5mg    Plan: Will gradually decrease lortab amount.      Are we decreasing to 5/325mg??

## 2018-12-05 DIAGNOSIS — D46.9 MYELODYSPLASIA (MYELODYSPLASTIC SYNDROME) (H): ICD-10-CM

## 2018-12-05 LAB
FOLATE SERPL-MCNC: 31.6 NG/ML
VIT B12 SERPL-MCNC: 524 PG/ML (ref 193–986)

## 2018-12-05 PROCEDURE — 86335 IMMUNFIX E-PHORSIS/URINE/CSF: CPT | Mod: ZL | Performed by: SURGERY

## 2018-12-05 PROCEDURE — 99000 SPECIMEN HANDLING OFFICE-LAB: CPT | Performed by: SURGERY

## 2018-12-05 NOTE — PROGRESS NOTES
"SUBJECTIVE:  Mrs. Lanier presents to discuss her recent labs and imaging results. She says that she still is having nausea and pain with eating. She said that today was a better day than most that she has had. She still feels very full and bloated after eating. She isn't able to eat much.     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 I am unsure as to the exact etiology for this, and her case was discussed with Dr. Randle who will see her once her labs are drawn and in within a week. Her ultrasound demonstrated possible hemangiomas to her liver but it was not entirely sensitive for diagnosis. We may need further evaluation and I discussed that we can wait until her hematology workup is complete. I will re-evaluate her anastomotic stricture and ulcer in approximately 7-8 weeks from the initial endoscopy. I discussed that I am doubtful that endoscopic dilations would cure her gastric outlet obstruction and I believe she will need operative intervention.  As she has undergone previous Bilroth I reconstruction she may need a Yesenia en Y or a gastro-jejunostomy with Gates defunctionalization for biliary drainage. At this time she would like a laparoscopic procedure but I discussed that it may be possible but that it likely would need to be converted due to her previous operation. As for her cholelithiasis she has not had any right upper quadrant pain. If she does need operative revision of her " anastomosis I would perform a cholecystectomy at the same time. For now I will proceed with endoscopic evaluation. All questions and concerns were addressed with adequate time spent answering all concerns.    Dieudonne Jackson MD  12/4/2018

## 2018-12-06 ENCOUNTER — OFFICE VISIT (OUTPATIENT)
Dept: INTERNAL MEDICINE | Facility: OTHER | Age: 68
End: 2018-12-06
Attending: NURSE PRACTITIONER
Payer: COMMERCIAL

## 2018-12-06 VITALS
WEIGHT: 155 LBS | SYSTOLIC BLOOD PRESSURE: 138 MMHG | DIASTOLIC BLOOD PRESSURE: 70 MMHG | HEART RATE: 99 BPM | BODY MASS INDEX: 28.52 KG/M2 | OXYGEN SATURATION: 99 % | HEIGHT: 62 IN | RESPIRATION RATE: 18 BRPM | TEMPERATURE: 96.1 F

## 2018-12-06 DIAGNOSIS — K31.1 GASTRIC OUTLET OBSTRUCTION: ICD-10-CM

## 2018-12-06 DIAGNOSIS — E87.6 HYPOKALEMIA: ICD-10-CM

## 2018-12-06 DIAGNOSIS — B02.23 POSTHERPETIC POLYNEUROPATHY: ICD-10-CM

## 2018-12-06 DIAGNOSIS — Z01.818 PREOP GENERAL PHYSICAL EXAM: Primary | ICD-10-CM

## 2018-12-06 DIAGNOSIS — R82.90 URINE ABNORMALITY: ICD-10-CM

## 2018-12-06 DIAGNOSIS — M54.2 CERVICALGIA: ICD-10-CM

## 2018-12-06 LAB
B2 MICROGLOB SERPL-MCNC: 2.5 MG/L
VISC SER: 1.6 CP (ref 1.4–1.8)

## 2018-12-06 PROCEDURE — 93005 ELECTROCARDIOGRAM TRACING: CPT

## 2018-12-06 PROCEDURE — G0463 HOSPITAL OUTPT CLINIC VISIT: HCPCS

## 2018-12-06 PROCEDURE — 99214 OFFICE O/P EST MOD 30 MIN: CPT | Mod: 25 | Performed by: NURSE PRACTITIONER

## 2018-12-06 PROCEDURE — 94760 N-INVAS EAR/PLS OXIMETRY 1: CPT

## 2018-12-06 PROCEDURE — 93010 ELECTROCARDIOGRAM REPORT: CPT | Performed by: INTERNAL MEDICINE

## 2018-12-06 RX ORDER — GABAPENTIN 300 MG/1
300 CAPSULE ORAL 3 TIMES DAILY
Qty: 90 CAPSULE | Refills: 1 | Status: SHIPPED | OUTPATIENT
Start: 2018-12-06 | End: 2019-01-28

## 2018-12-06 RX ORDER — CYCLOBENZAPRINE HCL 10 MG
TABLET ORAL
Qty: 90 TABLET | Refills: 1 | Status: SHIPPED | OUTPATIENT
Start: 2018-12-06 | End: 2018-12-10

## 2018-12-06 ASSESSMENT — ENCOUNTER SYMPTOMS
PALPITATIONS: 0
HEADACHES: 1
NECK PAIN: 1
NERVOUS/ANXIOUS: 0
DIFFICULTY URINATING: 0
WEAKNESS: 1
SORE THROAT: 0
RHINORRHEA: 0
SLEEP DISTURBANCE: 0
SINUS PAIN: 0
TROUBLE SWALLOWING: 0
SHORTNESS OF BREATH: 0
CONSTIPATION: 1
SINUS PRESSURE: 0
JOINT SWELLING: 1
COUGH: 0
NAUSEA: 1
ABDOMINAL PAIN: 0
DYSPHORIC MOOD: 0
VOMITING: 1
APPETITE CHANGE: 1
FATIGUE: 1
FEVER: 0
BACK PAIN: 1
DIZZINESS: 0

## 2018-12-06 ASSESSMENT — PAIN SCALES - GENERAL: PAINLEVEL: MILD PAIN (2)

## 2018-12-06 NOTE — MR AVS SNAPSHOT
After Visit Summary   12/6/2018    Aure Lanier    MRN: [id number]           Patient Information     Date Of Birth          1950        Visit Information        Provider Department      12/6/2018 4:00 PM Jaden Lee NP [facility]        Today's Diagnoses     Preop general physical exam    -  1    Postherpetic polyneuropathy        Cervicalgia          Care Instructions    1.  Take Levothyroxine AM of surgery. Can take Zofran if upset stomach.    Before Your Surgery      Call your surgeon if there is any change in your health. This includes signs of a cold or flu (such as a sore throat, runny nose, cough, rash or fever).    Do not smoke, drink alcohol or take over the counter medicine (unless your surgeon or primary care doctor tells you to) for the 24 hours before and after surgery.    If you take prescribed drugs: Follow your doctor s orders about which medicines to take and which to stop until after surgery.    Eating and drinking prior to surgery: follow the instructions from your surgeon    Take a shower or bath the night before surgery. Use the soap your surgeon gave you to gently clean your skin. If you do not have soap from your surgeon, use your regular soap. Do not shave or scrub the surgery site.  Wear clean pajamas and have clean sheets on your bed.           Follow-ups after your visit        Your next 10 appointments already scheduled     Dec 21, 2018   Procedure with Dieudonne Jackson MD   [facility] ([address] [id number] [phone]              Who to contact     If you have questions or need follow up information about today's clinic visit or your schedule please contact [facility] directly at [phone].  Normal or non-critical lab and imaging results will be communicated to you by MyChart, letter or phone within 4 business days after the clinic has received the  "results. If you do not hear from us within 7 days, please contact the clinic through Contently or phone. If you have a critical or abnormal lab result, we will notify you by phone as soon as possible.  Submit refill requests through Contently or call your pharmacy and they will forward the refill request to us. Please allow 3 business days for your refill to be completed.          Additional Information About Your Visit        [employer] Information     Contently gives you secure access to your electronic health record. If you see a primary care provider, you can also send messages to your care team and make appointments. If you have questions, please call your primary care clinic.  If you do not have a primary care provider, please call [phone] and they will assist you.        Care EveryWhere ID     This is your Care EveryWhere ID. This could be used by other organizations to access your [place] medical records  [id number]        Your Vitals Were     Pulse Temperature Respirations Height Pulse Oximetry BMI (Body Mass Index)    99 96.1  F (35.6  C) (Tympanic) 18 5' 2\" (1.575 m) 99% 28.35 kg/m2       Blood Pressure from Last 3 Encounters:   12/06/18 138/70   12/04/18 126/74   11/13/18 134/66    Weight from Last 3 Encounters:   12/06/18 155 lb (70.3 kg)   12/04/18 162 lb (73.5 kg)   11/13/18 170 lb 6.4 oz (77.3 kg)              We Performed the Following     EKG 12-lead complete w/read - (Clinic Performed)          Today's Medication Changes          These changes are accurate as of 12/6/18  5:01 PM.  If you have any questions, ask your nurse or doctor.               These medicines have changed or have updated prescriptions.        Dose/Directions    gabapentin 300 MG capsule   Commonly known as:  NEURONTIN   This may have changed:  when to take this   Used for:  Postherpetic polyneuropathy   Changed by:  Jaden Lee NP        Dose:  300 mg   Take 1 capsule (300 mg) by mouth 3 times daily   Quantity:  90 capsule "   Refills:  1            Where to get your medicines      These medications were sent to [id number] - [place], [place] - [address]  [address] [facility] [address]     Phone:  [phone]     cyclobenzaprine 10 MG tablet    gabapentin 300 MG capsule                Primary Care Provider Office Phone # Fax #    Jaden LeeARMAAN [phone]       [address] [facility] [facility] [address]        Equal Access to Services     NEREIDA ADAMES : Hadii aad ku hadasho Soomaali, waaxda luqadaha, qaybta kaalmada adeegyada, waxay idiin hayaan adeeg kharash la'aan . So [facility] [phone].    ATENCIÓN: Si habla español, tiene a gross disposición servicios gratuitos de asistencia lingüística. [facility] [phone].    We comply with applicable federal civil rights laws and [place] laws. We do not discriminate on the basis of race, color, national origin, age, disability, sex, sexual orientation, or gender identity.            Thank you!     Thank you for choosing [facility]  for your care. Our goal is always to provide you with excellent care. Hearing back from our patients is one way we can continue to improve our services. Please take a few minutes to complete the written survey that you may receive in the mail after your visit with us. Thank you!             Your Updated Medication List - Protect others around you: Learn how to safely use, store and throw away your medicines at www.disposemymeds.org.          This list is accurate as of 12/6/18  5:01 PM.  Always use your most recent med list.                   Brand Name Dispense Instructions for use Diagnosis    acetaminophen 325 MG tablet    TYLENOL    100 tablet    Take 2-3 tablets (650-975 mg) by mouth every 4 hours as needed for other (mild to moderate pain)    Status post total left knee replacement       CALCIUM + D PO      Take 2 tablets by mouth daily as needed        CARAFATE 1 GM/10ML suspension   Generic drug:   sucralfate     420 mL    TAKE 10MLS BY MOUTH FOUR TIMES DAILY    Acute stomach ulcer hem/perf, obst (H)       cyclobenzaprine 10 MG tablet    FLEXERIL    90 tablet    TAKE 1 TABLET BY MOUTH one TIMES DAILY AS NEEDED    Cervicalgia       escitalopram 20 MG tablet    LEXAPRO     TAKE ONE-HALF TABLET BY MOUTH TWICE A DAY        gabapentin 300 MG capsule    NEURONTIN    90 capsule    Take 1 capsule (300 mg) by mouth 3 times daily    Postherpetic polyneuropathy       HYDROcodone-acetaminophen 7.5-325 MG per tablet    NORCO    120 tablet    Take 1 tablet by mouth every 6 hours as needed for severe pain    Acute pain of right knee       levothyroxine 150 MCG tablet    SYNTHROID/LEVOTHROID    90 tablet    TAKE 1 TABLET (150 MCG) BY MOUTH DAILY    Hypothyroidism, unspecified type       * MULTIVITAMIN PO      Take 1 tablet by mouth daily as needed        * multivitamin Tabs tablet      Take 1 tablet by mouth daily as needed        NYAMYC 587996 UNIT/GM external powder   Generic drug:  nystatin     60 g    APPLY TOPICALLY 2 TIMES DAILY AS NEEDED    Rash       ondansetron 4 MG tablet    ZOFRAN    48 tablet    TAKE 1 TABLET (4 MG) BY MOUTH 3 TIMES DAILY    Nausea       pantoprazole 40 MG EC tablet    PROTONIX    30 tablet    Take 1 tablet (40 mg) by mouth daily Take 30-60 minutes before a meal.    Gastroesophageal reflux disease with esophagitis       simvastatin 40 MG tablet    ZOCOR    90 tablet    TAKE 1 TABLET BY MOUTH EVERY DAY IN THE EVENING . GENERIC FOR ZOCOR.    Mixed hyperlipidemia       vitamin B-12 1000 MCG tablet    CYANOCOBALAMIN     Take 1 tablet by mouth daily as needed        zolpidem 10 MG tablet    AMBIEN    60 tablet    Take 1 tablet (10 mg) by mouth nightly as needed for sleep Take 1 tablet at night for sleep-may repeat X1.    Persistent insomnia       * Notice:  This list has 2 medication(s) that are the same as other medications prescribed for you. Read the directions carefully, and ask your doctor or  other care provider to review them with you.

## 2018-12-06 NOTE — PATIENT INSTRUCTIONS
1.  Take Levothyroxine AM of surgery. Can take Zofran if upset stomach.    Before Your Surgery      Call your surgeon if there is any change in your health. This includes signs of a cold or flu (such as a sore throat, runny nose, cough, rash or fever).    Do not smoke, drink alcohol or take over the counter medicine (unless your surgeon or primary care doctor tells you to) for the 24 hours before and after surgery.    If you take prescribed drugs: Follow your doctor s orders about which medicines to take and which to stop until after surgery.    Eating and drinking prior to surgery: follow the instructions from your surgeon    Take a shower or bath the night before surgery. Use the soap your surgeon gave you to gently clean your skin. If you do not have soap from your surgeon, use your regular soap. Do not shave or scrub the surgery site.  Wear clean pajamas and have clean sheets on your bed.

## 2018-12-06 NOTE — PROGRESS NOTES
[facility] [place]  [address]  [place] [address]  [phone]  Dept: [phone]    PRE-OP EVALUATION:  Today's date: 2018    Aure Lanier (: 1950) presents for pre-operative evaluation assessment as requested by Dr. Jackson.  She requires evaluation and anesthesia risk assessment prior to undergoing surgery/procedure for treatment of EGD .    Proposed Surgery/ Procedure: EGD  Date of Surgery/ Procedure: 2018  Time of Surgery/ Procedure: TO BE DETERMINED  Hospital/Surgical Facility: [facility]  Primary Physician: Jaden Lee  Type of Anesthesia Anticipated: to be determined    Patient has a Health Care Directive or Living Will:  NO    1. NO - Do you have a history of heart attack, stroke, stent, bypass or surgery on an artery in the head, neck, heart or legs?  2. NO - Do you ever have any pain or discomfort in your chest?  3. NO - Do you have a history of  Heart Failure?  4. NO - Are you troubled by shortness of breath when: walking on the level, up a slight hill or at night?  5. NO - Do you currently have a cold, bronchitis or other respiratory infection?  6. NO - Do you have a cough, shortness of breath or wheezing?  7. NO - Do you sometimes get pains in the calves of your legs when you walk?  8. NO - Do you or anyone in your family have previous history of blood clots?  9. NO - Do you or does anyone in your family have a serious bleeding problem such as prolonged bleeding following surgeries or cuts?  10. YES - HAVE YOU EVER HAD PROBLEMS WITH ANEMIA OR BEEN TOLD TO TAKE IRON PILLS? Has taken iron pills in the past  11. NO - Have you had any abnormal blood loss such as black, tarry or bloody stools, or abnormal vaginal bleeding?  12. NO - Have you ever had a blood transfusion?  13. YES - HAVE YOU OR ANY OF YOUR RELATIVES EVER HAD PROBLEMS WITH ANESTHESIA? Patient got nausea in the past  14. NO - Do you have sleep apnea, excessive snoring or daytime  drowsiness?  15. NO - Do you have any prosthetic heart valves?  16. NO - Do you have prosthetic joints?  17. NO - Is there any chance that you may be pregnant?      HPI:     HPI related to upcoming procedure: Persistent abdominal pain, nausea and vomiting after found to have anastomosis  swelling and stricture during EGD.  Post  Toradol use(4-5 days)   post knee surgery. Did not eat when took pain meds!   Hx Bilroth I reconstruction  and GI bleed.            MEDICAL HISTORY:     Patient Active Problem List    Diagnosis Date Noted     Hypokalemia 11/08/2018     Priority: Medium     Entered in error         Anemia, iron deficiency 11/08/2018     Priority: Medium     S/P total knee arthroplasty, right 09/04/2018     Priority: Medium     Status post total right knee replacement 09/04/2018     Priority: Medium     Status post total left knee replacement 04/17/2018     Priority: Medium     Chronic pain syndrome 07/13/2017     Priority: Medium     Patient is followed by Jaden Lee NP for ongoing prescription of pain medication.  All refills should only be approved by this provider, or covering partner.    Medication(s): Norco 7.5/325mg.   Maximum quantity per month: #120  Clinic visit frequency required: Q 3 months     Controlled substance agreement:  Encounter-Level CSA - 07/11/2017:          Controlled Substance Agreement - Scan on 7/20/2017 12:19 PM : CONTROLLED SUBSTANCE AGREEMENT (below)              Pain Clinic evaluation in the past: No    DIRE Total Score(s):  No flowsheet data found.    Last [facility] website verification:  Done on 8.22.18   [email or web address]         Back muscle spasm 05/09/2017     Priority: Medium     Bilateral chronic knee pain 04/14/2016     Priority: Medium     Both knees.         Hyperlipidemia with target LDL less than 100 07/03/2014     Priority: Medium     Diagnosis updated by automated process. Provider to review and confirm.       Low back pain 07/09/2013     Priority:  Medium     Diagnosis updated by automated process. Provider to review and confirm.       Family history of other musculoskeletal diseases(V17.89) 2012     Priority: Medium     Overview:    Brother with Myotonic Dystrophy    Samans Myotonic Dystrophy testing was reported normal by [facility] lab for the DM1 with repeats of 5 and 26, DM2 with repeats of 140 and 140, and CLCN1 with no abnormal DNA sequence variants identified in the select exons analyzed (drawn 3).       Allergic arthritis involving hand 2011     Priority: Medium     Hypothyroidism 2011     Priority: Medium     Problem list name updated by automated process. Provider to review       Insomnia 2011     Priority: Medium     Problem list name updated by automated process. Provider to review       Headache 2011     Priority: Medium     Problem list name updated by automated process. Provider to review       Postherpetic polyneuropathy 2011     Priority: Medium     Dysthymia 2011     Priority: Medium     Anxiety state 2011     Priority: Medium     Problem list name updated by automated process. Provider to review       Hyperlipidemia 2011     Priority: Medium     Problem list name updated by automated process. Provider to review       Migraine 2011     Priority: Medium     Problem list name updated by automated process. Provider to review       Osteoporosis 2011     Priority: Medium     Problem list name updated by automated process. Provider to review       GERD 2011     Priority: Medium      Past Medical History:   Diagnosis Date     Allergic arthritis involving hand 2011     Anemia, Iron Deficiency 2011     Anxiety 2011     Contact dermatitis and other eczema, unspecified c 2011     Depression 2011     Edema 2011     Gastrointestinal mucositis (ulcerative) 2011     GERD 2011     Headache(784.0) 2011      Hypothyroidism 01/01/2011     Insomnia, unspecified 01/01/2011     Migraine 01/01/2011     Nonallopathic lesion of cervical region, not elsewhere classified 10/16/2000     Osteoporosis 01/01/2011     Other and unspecified hyperlipidemia 01/01/2011     Other malaise and fatigue 08/27/2001     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     Past Surgical History:   Procedure Laterality Date     ARTHROPLASTY KNEE Left 4/17/2018    Procedure: ARTHROPLASTY KNEE;  LEFT TOTAL KNEE ARTHROPLASTY S/N JOURNEY II;  Surgeon: Ty Hernandez MD;  Location: [facility]     ARTHROPLASTY KNEE Right 9/4/2018    Procedure: ARTHROPLASTY KNEE;  RIGHT TOTAL KNEE ARTHROPLASTY ;  Surgeon: Ty Hernandez MD;  Location: [facility] OR     D & C       ESOPHAGOSCOPY, GASTROSCOPY, DUODENOSCOPY (EGD), COMBINED N/A 9/11/2017    Procedure: COMBINED ESOPHAGOSCOPY, GASTROSCOPY, DUODENOSCOPY (EGD);  Upper Endoscopy: Removal of Food Impaction;  Surgeon: Lino Zhu DO;  Location: [facility] OR     ESOPHAGOSCOPY, GASTROSCOPY, DUODENOSCOPY (EGD), COMBINED N/A 11/5/2018    Procedure: UPPER ENDOSCOPY WITH BIOPSY;  Surgeon: Dieudonne Jackson MD;  Location: [facility] OR     STOMACH SURGERY       stomach surgery peritinitis       Current Outpatient Prescriptions   Medication Sig Dispense Refill     acetaminophen (TYLENOL) 325 MG tablet Take 2-3 tablets (650-975 mg) by mouth every 4 hours as needed for other (mild to moderate pain) 100 tablet      Calcium Citrate-Vitamin D (CALCIUM + D PO) Take 2 tablets by mouth daily as needed       CARAFATE 1 GM/10ML suspension TAKE 10MLS BY MOUTH FOUR TIMES DAILY 420 mL 11     cyanocolbalamin (VITAMIN  B-12) 1000 MCG tablet Take 1 tablet by mouth daily as needed        cyclobenzaprine (FLEXERIL) 10 MG tablet TAKE 1 TABLET BY MOUTH one TIMES DAILY AS NEEDED 30 tablet 0     escitalopram (LEXAPRO) 20 MG tablet TAKE ONE-HALF TABLET BY MOUTH TWICE A DAY  1     gabapentin (NEURONTIN) 300 MG capsule Take 1 capsule (300 mg) by mouth daily 30 capsule 0      HYDROcodone-acetaminophen (NORCO) 7.5-325 MG per tablet Take 1 tablet by mouth every 6 hours as needed for severe pain 120 tablet 0     levothyroxine (SYNTHROID/LEVOTHROID) 150 MCG tablet TAKE 1 TABLET (150 MCG) BY MOUTH DAILY 90 tablet 1     Multiple Vitamins-Minerals (MULTIVITAMIN OR) Take 1 tablet by mouth daily as needed        multivitamin (OCUVITE) TABS Take 1 tablet by mouth daily as needed        NYAMYC 041331 UNIT/GM POWD APPLY TOPICALLY 2 TIMES DAILY AS NEEDED 60 g 1     ondansetron (ZOFRAN) 4 MG tablet TAKE 1 TABLET (4 MG) BY MOUTH 3 TIMES DAILY 48 tablet 11     pantoprazole (PROTONIX) 40 MG EC tablet Take 1 tablet (40 mg) by mouth daily Take 30-60 minutes before a meal. 30 tablet 1     simvastatin (ZOCOR) 40 MG tablet TAKE 1 TABLET BY MOUTH EVERY DAY IN THE EVENING . GENERIC FOR ZOCOR. 90 tablet 0     zolpidem (AMBIEN) 10 MG tablet Take 1 tablet (10 mg) by mouth nightly as needed for sleep Take 1 tablet at night for sleep-may repeat X1. 60 tablet 0     OTC products: None, except as noted above    Allergies   Allergen Reactions     Erythromycin Base [Kdc:Yellow Dye+Erythromycin+Brilliant Blue Fcf] Nausea and Vomiting     Penicillins Rash      Latex Allergy: NO    Social History   Substance Use Topics     Smoking status: Never Smoker     Smokeless tobacco: Never Used      Comment: no passive exposure     Alcohol use Yes      Comment: rarely, maybe yearly     History   Drug Use No       REVIEW OF SYSTEMS:   Review of Systems   Constitutional: Positive for appetite change and fatigue. Negative for fever.   HENT: Negative for congestion, ear discharge, ear pain, nosebleeds, postnasal drip, rhinorrhea, sinus pain, sinus pressure, sore throat, tinnitus and trouble swallowing.         Has trouble swallowing when doesn't want to eat something.     Respiratory: Negative for cough and shortness of breath.    Cardiovascular: Negative for chest pain, palpitations and leg swelling.   Gastrointestinal: Positive for  "constipation, nausea and vomiting. Negative for abdominal pain.        Stools X1 a week,  Takes 3 stool softeners.     Genitourinary: Negative for difficulty urinating.   Musculoskeletal: Positive for back pain, joint swelling and neck pain.   Skin: Negative for rash.   Neurological: Positive for weakness and headaches. Negative for dizziness.   Psychiatric/Behavioral: Negative for dysphoric mood and sleep disturbance. The patient is not nervous/anxious.        EXAM:   /70  Pulse 99  Temp 96.1  F (35.6  C) (Tympanic)  Resp 18  Ht 5' 2\" (1.575 m)  Wt 155 lb (70.3 kg)  SpO2 99%  BMI 28.35 kg/m2  Physical Exam   Constitutional: She is oriented to person, place, and time. She appears well-developed and well-nourished. No distress.   HENT:   Right Ear: External ear normal.   Left Ear: External ear normal.   Nose: Nose normal.   Mouth/Throat: Oropharynx is clear and moist.   Eyes: Conjunctivae and EOM are normal. Pupils are equal, round, and reactive to light.   Neck: Normal range of motion. Neck supple. No JVD present. No thyromegaly present.   Cardiovascular: Normal rate, regular rhythm, normal heart sounds and intact distal pulses.    No murmur heard.  Pulmonary/Chest: Effort normal and breath sounds normal.   Abdominal: Soft. Bowel sounds are normal. She exhibits no mass. There is no tenderness.   Musculoskeletal: Normal range of motion. She exhibits no edema.   Lymphadenopathy:     She has no cervical adenopathy.   Neurological: She is alert and oriented to person, place, and time.   Skin: Skin is warm and dry.   Psychiatric: She has a normal mood and affect.       DIAGNOSTICS:     Results for orders placed or performed in visit on 12/04/18   Beta 2 microglobulin   Result Value Ref Range    Beta-2-Microglobulin 2.5 (H) <2.3 mg/L   CBC with platelets differential   Result Value Ref Range    WBC 10.0 4.0 - 11.0 10e9/L    RBC Count 5.62 (H) 3.8 - 5.2 10e12/L    Hemoglobin 13.9 11.7 - 15.7 g/dL    Hematocrit " 44.5 35.0 - 47.0 %    MCV 79 78 - 100 fl    MCH 24.7 (L) 26.5 - 33.0 pg    MCHC 31.2 (L) 31.5 - 36.5 g/dL    RDW 22.2 (H) 10.0 - 15.0 %    Platelet Count 388 150 - 450 10e9/L    Diff Method Automated Method     % Neutrophils 43.5 %    % Lymphocytes 42.1 %    % Monocytes 9.0 %    % Eosinophils 4.5 %    % Basophils 0.7 %    % Immature Granulocytes 0.2 %    Nucleated RBCs 0 0 /100    Absolute Neutrophil 4.3 1.6 - 8.3 10e9/L    Absolute Lymphocytes 4.2 0.8 - 5.3 10e9/L    Absolute Monocytes 0.9 0.0 - 1.3 10e9/L    Absolute Eosinophils 0.5 0.0 - 0.7 10e9/L    Absolute Basophils 0.1 0.0 - 0.2 10e9/L    Abs Immature Granulocytes 0.0 0 - 0.4 10e9/L    Absolute Nucleated RBC 0.0    Comprehensive metabolic panel   Result Value Ref Range    Sodium 140 133 - 144 mmol/L    Potassium 3.3 (L) 3.4 - 5.3 mmol/L    Chloride 107 94 - 109 mmol/L    Carbon Dioxide 26 20 - 32 mmol/L    Anion Gap 7 3 - 14 mmol/L    Glucose 89 70 - 99 mg/dL    Urea Nitrogen 10 7 - 30 mg/dL    Creatinine 0.47 (L) 0.52 - 1.04 mg/dL    GFR Estimate >90 >60 mL/min/1.7m2    GFR Estimate If Black >90 >60 mL/min/1.7m2    Calcium 9.5 8.5 - 10.1 mg/dL    Bilirubin Total 0.2 0.2 - 1.3 mg/dL    Albumin 4.1 3.4 - 5.0 g/dL    Protein Total 8.1 6.8 - 8.8 g/dL    Alkaline Phosphatase 90 40 - 150 U/L    ALT 28 0 - 50 U/L    AST 19 0 - 45 U/L   ELP reflex to IELP   Result Value Ref Range    Albumin Fraction PENDING 3.7 - 5.1 g/dL    Alpha 1 Fraction PENDING 0.2 - 0.4 g/dL    Alpha 2 Fraction PENDING 0.5 - 0.9 g/dL    Beta Fraction PENDING 0.6 - 1.0 g/dL    Gamma Fraction PENDING 0.7 - 1.6 g/dL    Monoclonal Peak PENDING 0.0 g/dL    ELP Interpretation: PENDING    Ferritin   Result Value Ref Range    Ferritin 358 (H) 8 - 252 ng/mL   Folate   Result Value Ref Range    Folate 31.6 >5.4 ng/mL   Iron and iron binding capacity   Result Value Ref Range    Iron 63 35 - 180 ug/dL    Iron Binding Cap 272 240 - 430 ug/dL    Iron Saturation Index 23 15 - 46 %   Lactate Dehydrogenase    Result Value Ref Range    Lactate Dehydrogenase 171 81 - 234 U/L   Macroglobulins   Result Value Ref Range    Viscosity Index 1.6 1.4 - 1.8   Protein Immunofixation Serum   Result Value Ref Range    Immunofixation ELP PENDING     IGG 1080 695 - 1620 mg/dL     70 - 380 mg/dL    IGM 74 60 - 265 mg/dL   Vitamin B12   Result Value Ref Range    Vitamin B12 524 193 - 986 pg/mL       Recent Labs   Lab Test  12/04/18   1712  11/29/18   1001   11/03/18   0315   09/19/18   1037   HGB  13.9   --    --   10.1*   < >  9.1*   PLT  388   --    --   465*   < >  751*   INR   --    --    --    --    --   1.25*   NA  140  138   < >  140   --   138   POTASSIUM  3.3*  3.2*   < >  2.9*   --   3.4   CR  0.47*  0.61   < >  0.53   --   0.54    < > = values in this interval not displayed.        IMPRESSION:   Preop History and Physical for EGD with Dr. Jackson, at [facility], on  12/21/18.      The proposed surgical procedure is considered LOW risk.    REVISED CARDIAC RISK INDEX  The patient has the following serious cardiovascular risks for perioperative complications such as (MI, PE, VFib and 3  AV Block):  No serious cardiac risks  INTERPRETATION: 0 risks: Class I (very low risk - 0.4% complication rate)    The patient has the following additional risks for perioperative complications:  Hx GI bleed  Hypothyroidism        ICD-10-CM    1. Preop general physical exam Z01.818      ASSESSMENT / PLAN:  (Z01.818) Preop general physical exam  (primary encounter diagnosis)  Comment: EKG normal.    Hemoglobin   Date Value Ref Range Status   12/04/2018 13.9 11.7 - 15.7 g/dL Final   ]  Potassium   Date Value Ref Range Status   12/04/2018 3.3 (L) 3.4 - 5.3 mmol/L Final     Hemoglobin   Date Value Ref Range Status   12/04/2018 13.9 11.7 - 15.7 g/dL Final   ]    Plan: EKG 12-lead complete w/read - (Clinic         Performed)      (B02.23) Postherpetic polyneuropathy  Comment:Had cut back on Gabapentin to see if helped nausea and vomiting.  Patient  stated no, and would like to go back to previous amounts.  Gabapentin  300mg  3 times a day    Plan: gabapentin (NEURONTIN) 300 MG capsule         (M54.2) Cervicalgia  Comment: Had cut back on Flexeril to see if less nausea and vomiting.  Patient stated no  And would like to go back to previous amounts.  Flexeril 10mg  1 tab TID.  Also states pain meds do not hurt her stomach.  Had long discussion should never take any these meds on an empty stomach.   Plan: cyclobenzaprine (FLEXERIL) 10 MG tablet           (R82.90) Urine abnormality  Comment: Had free Kappa and Lambda light chains in urine,   Plan: Dr. Jackson has referred to Dr. Solares for further workup.      (E87.6) Hypokalemia  Comment: Has extreme difficulty taking ANY form of potassium orally.  States it caused her gastric pain and vomiting.    Plan: Levels have come up from her eating bananas.      (K31.1) Gastric outlet obstruction  Comment: Has continued nausea and vomiting.  Has Zofran, but has not necessarily been helpful. States feels full and vomits almost every day.  Has lost almost 25 lbs.      Has Cholelithiasis.      Had unusual inflammatory process , and  Positive   , and Greater than  2:1 Kappa/Lambda ratio  on pathology of gastric biopsy from previous  EGD.  . Dr. Jackson  Has referred patient to Dr. Solares for further workup.       Patient concerned about who will be referred to if has to have further revision on gastric outlet.  Discussed options. , will refer to   Dr. Devlin  at [facility] if it comes to that.  Does not want extensive surgery in [place].            RECOMMENDATIONS:     Take Levothyroxine AM of surgery.  Can take Zofran if need to.      APPROVAL GIVEN to proceed with proposed procedure, without further diagnostic evaluation       Signed Electronically by: Jdaen Lee NP    Copy of this evaluation report is provided to requesting physician.    [place] Preop Guidelines    Revised Cardiac Risk Index

## 2018-12-06 NOTE — NURSING NOTE
"Chief Complaint   Patient presents with     Pre-Op Exam     EGD       Initial /70  Pulse 99  Temp 96.1  F (35.6  C) (Tympanic)  Resp 18  Ht 5' 2\" (1.575 m)  Wt 155 lb (70.3 kg)  SpO2 99%  BMI 28.35 kg/m2 Estimated body mass index is 28.35 kg/(m^2) as calculated from the following:    Height as of this encounter: 5' 2\" (1.575 m).    Weight as of this encounter: 155 lb (70.3 kg).  Medication Reconciliation: complete    Elly Arredondo LPN  "

## 2018-12-07 NOTE — H&P (VIEW-ONLY)
[facility] [place]  [address]  [place] [address]  [phone]  Dept: [phone]    PRE-OP EVALUATION:  Today's date: 2018    Aure Lanier (: 1950) presents for pre-operative evaluation assessment as requested by Dr. Jackson.  She requires evaluation and anesthesia risk assessment prior to undergoing surgery/procedure for treatment of EGD .    Proposed Surgery/ Procedure: EGD  Date of Surgery/ Procedure: 2018  Time of Surgery/ Procedure: TO BE DETERMINED  Hospital/Surgical Facility: [facility]  Primary Physician: Jaden Lee  Type of Anesthesia Anticipated: to be determined    Patient has a Health Care Directive or Living Will:  NO    1. NO - Do you have a history of heart attack, stroke, stent, bypass or surgery on an artery in the head, neck, heart or legs?  2. NO - Do you ever have any pain or discomfort in your chest?  3. NO - Do you have a history of  Heart Failure?  4. NO - Are you troubled by shortness of breath when: walking on the level, up a slight hill or at night?  5. NO - Do you currently have a cold, bronchitis or other respiratory infection?  6. NO - Do you have a cough, shortness of breath or wheezing?  7. NO - Do you sometimes get pains in the calves of your legs when you walk?  8. NO - Do you or anyone in your family have previous history of blood clots?  9. NO - Do you or does anyone in your family have a serious bleeding problem such as prolonged bleeding following surgeries or cuts?  10. YES - HAVE YOU EVER HAD PROBLEMS WITH ANEMIA OR BEEN TOLD TO TAKE IRON PILLS? Has taken iron pills in the past  11. NO - Have you had any abnormal blood loss such as black, tarry or bloody stools, or abnormal vaginal bleeding?  12. NO - Have you ever had a blood transfusion?  13. YES - HAVE YOU OR ANY OF YOUR RELATIVES EVER HAD PROBLEMS WITH ANESTHESIA? Patient got nausea in the past  14. NO - Do you have sleep apnea, excessive snoring or daytime  drowsiness?  15. NO - Do you have any prosthetic heart valves?  16. NO - Do you have prosthetic joints?  17. NO - Is there any chance that you may be pregnant?      HPI:     HPI related to upcoming procedure: Persistent abdominal pain, nausea and vomiting after found to have anastomosis  swelling and stricture during EGD.  Post  Toradol use(4-5 days)   post knee surgery. Did not eat when took pain meds!   Hx Bilroth I reconstruction  and GI bleed.            MEDICAL HISTORY:     Patient Active Problem List    Diagnosis Date Noted     Hypokalemia 11/08/2018     Priority: Medium     Entered in error         Anemia, iron deficiency 11/08/2018     Priority: Medium     S/P total knee arthroplasty, right 09/04/2018     Priority: Medium     Status post total right knee replacement 09/04/2018     Priority: Medium     Status post total left knee replacement 04/17/2018     Priority: Medium     Chronic pain syndrome 07/13/2017     Priority: Medium     Patient is followed by Jaden Lee NP for ongoing prescription of pain medication.  All refills should only be approved by this provider, or covering partner.    Medication(s): Norco 7.5/325mg.   Maximum quantity per month: #120  Clinic visit frequency required: Q 3 months     Controlled substance agreement:  Encounter-Level CSA - 07/11/2017:          Controlled Substance Agreement - Scan on 7/20/2017 12:19 PM : CONTROLLED SUBSTANCE AGREEMENT (below)              Pain Clinic evaluation in the past: No    DIRE Total Score(s):  No flowsheet data found.    Last [facility] website verification:  Done on 8.22.18   [email or web address]         Back muscle spasm 05/09/2017     Priority: Medium     Bilateral chronic knee pain 04/14/2016     Priority: Medium     Both knees.         Hyperlipidemia with target LDL less than 100 07/03/2014     Priority: Medium     Diagnosis updated by automated process. Provider to review and confirm.       Low back pain 07/09/2013     Priority:  Medium     Diagnosis updated by automated process. Provider to review and confirm.       Family history of other musculoskeletal diseases(V17.89) 2012     Priority: Medium     Overview:    Brother with Myotonic Dystrophy    Samans Myotonic Dystrophy testing was reported normal by [facility] lab for the DM1 with repeats of 5 and 26, DM2 with repeats of 140 and 140, and CLCN1 with no abnormal DNA sequence variants identified in the select exons analyzed (drawn 3).       Allergic arthritis involving hand 2011     Priority: Medium     Hypothyroidism 2011     Priority: Medium     Problem list name updated by automated process. Provider to review       Insomnia 2011     Priority: Medium     Problem list name updated by automated process. Provider to review       Headache 2011     Priority: Medium     Problem list name updated by automated process. Provider to review       Postherpetic polyneuropathy 2011     Priority: Medium     Dysthymia 2011     Priority: Medium     Anxiety state 2011     Priority: Medium     Problem list name updated by automated process. Provider to review       Hyperlipidemia 2011     Priority: Medium     Problem list name updated by automated process. Provider to review       Migraine 2011     Priority: Medium     Problem list name updated by automated process. Provider to review       Osteoporosis 2011     Priority: Medium     Problem list name updated by automated process. Provider to review       GERD 2011     Priority: Medium      Past Medical History:   Diagnosis Date     Allergic arthritis involving hand 2011     Anemia, Iron Deficiency 2011     Anxiety 2011     Contact dermatitis and other eczema, unspecified c 2011     Depression 2011     Edema 2011     Gastrointestinal mucositis (ulcerative) 2011     GERD 2011     Headache(784.0) 2011      Hypothyroidism 01/01/2011     Insomnia, unspecified 01/01/2011     Migraine 01/01/2011     Nonallopathic lesion of cervical region, not elsewhere classified 10/16/2000     Osteoporosis 01/01/2011     Other and unspecified hyperlipidemia 01/01/2011     Other malaise and fatigue 08/27/2001     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     Past Surgical History:   Procedure Laterality Date     ARTHROPLASTY KNEE Left 4/17/2018    Procedure: ARTHROPLASTY KNEE;  LEFT TOTAL KNEE ARTHROPLASTY S/N JOURNEY II;  Surgeon: Ty Hernandez MD;  Location: [facility]     ARTHROPLASTY KNEE Right 9/4/2018    Procedure: ARTHROPLASTY KNEE;  RIGHT TOTAL KNEE ARTHROPLASTY ;  Surgeon: Ty Hernandez MD;  Location: [facility] OR     D & C       ESOPHAGOSCOPY, GASTROSCOPY, DUODENOSCOPY (EGD), COMBINED N/A 9/11/2017    Procedure: COMBINED ESOPHAGOSCOPY, GASTROSCOPY, DUODENOSCOPY (EGD);  Upper Endoscopy: Removal of Food Impaction;  Surgeon: Lino Zhu DO;  Location: [facility] OR     ESOPHAGOSCOPY, GASTROSCOPY, DUODENOSCOPY (EGD), COMBINED N/A 11/5/2018    Procedure: UPPER ENDOSCOPY WITH BIOPSY;  Surgeon: Dieudonne Jackson MD;  Location: [facility]     STOMACH SURGERY       stomach surgery peritinitis       Current Outpatient Prescriptions   Medication Sig Dispense Refill     acetaminophen (TYLENOL) 325 MG tablet Take 2-3 tablets (650-975 mg) by mouth every 4 hours as needed for other (mild to moderate pain) 100 tablet      Calcium Citrate-Vitamin D (CALCIUM + D PO) Take 2 tablets by mouth daily as needed       CARAFATE 1 GM/10ML suspension TAKE 10MLS BY MOUTH FOUR TIMES DAILY 420 mL 11     cyanocolbalamin (VITAMIN  B-12) 1000 MCG tablet Take 1 tablet by mouth daily as needed        cyclobenzaprine (FLEXERIL) 10 MG tablet TAKE 1 TABLET BY MOUTH one TIMES DAILY AS NEEDED 30 tablet 0     escitalopram (LEXAPRO) 20 MG tablet TAKE ONE-HALF TABLET BY MOUTH TWICE A DAY  1     gabapentin (NEURONTIN) 300 MG capsule Take 1 capsule (300 mg) by mouth daily 30 capsule 0      HYDROcodone-acetaminophen (NORCO) 7.5-325 MG per tablet Take 1 tablet by mouth every 6 hours as needed for severe pain 120 tablet 0     levothyroxine (SYNTHROID/LEVOTHROID) 150 MCG tablet TAKE 1 TABLET (150 MCG) BY MOUTH DAILY 90 tablet 1     Multiple Vitamins-Minerals (MULTIVITAMIN OR) Take 1 tablet by mouth daily as needed        multivitamin (OCUVITE) TABS Take 1 tablet by mouth daily as needed        NYAMYC 361254 UNIT/GM POWD APPLY TOPICALLY 2 TIMES DAILY AS NEEDED 60 g 1     ondansetron (ZOFRAN) 4 MG tablet TAKE 1 TABLET (4 MG) BY MOUTH 3 TIMES DAILY 48 tablet 11     pantoprazole (PROTONIX) 40 MG EC tablet Take 1 tablet (40 mg) by mouth daily Take 30-60 minutes before a meal. 30 tablet 1     simvastatin (ZOCOR) 40 MG tablet TAKE 1 TABLET BY MOUTH EVERY DAY IN THE EVENING . GENERIC FOR ZOCOR. 90 tablet 0     zolpidem (AMBIEN) 10 MG tablet Take 1 tablet (10 mg) by mouth nightly as needed for sleep Take 1 tablet at night for sleep-may repeat X1. 60 tablet 0     OTC products: None, except as noted above    Allergies   Allergen Reactions     Erythromycin Base [Kdc:Yellow Dye+Erythromycin+Brilliant Blue Fcf] Nausea and Vomiting     Penicillins Rash      Latex Allergy: NO    Social History   Substance Use Topics     Smoking status: Never Smoker     Smokeless tobacco: Never Used      Comment: no passive exposure     Alcohol use Yes      Comment: rarely, maybe yearly     History   Drug Use No       REVIEW OF SYSTEMS:   Review of Systems   Constitutional: Positive for appetite change and fatigue. Negative for fever.   HENT: Negative for congestion, ear discharge, ear pain, nosebleeds, postnasal drip, rhinorrhea, sinus pain, sinus pressure, sore throat, tinnitus and trouble swallowing.         Has trouble swallowing when doesn't want to eat something.     Respiratory: Negative for cough and shortness of breath.    Cardiovascular: Negative for chest pain, palpitations and leg swelling.   Gastrointestinal: Positive for  "constipation, nausea and vomiting. Negative for abdominal pain.        Stools X1 a week,  Takes 3 stool softeners.     Genitourinary: Negative for difficulty urinating.   Musculoskeletal: Positive for back pain, joint swelling and neck pain.   Skin: Negative for rash.   Neurological: Positive for weakness and headaches. Negative for dizziness.   Psychiatric/Behavioral: Negative for dysphoric mood and sleep disturbance. The patient is not nervous/anxious.        EXAM:   /70  Pulse 99  Temp 96.1  F (35.6  C) (Tympanic)  Resp 18  Ht 5' 2\" (1.575 m)  Wt 155 lb (70.3 kg)  SpO2 99%  BMI 28.35 kg/m2  Physical Exam   Constitutional: She is oriented to person, place, and time. She appears well-developed and well-nourished. No distress.   HENT:   Right Ear: External ear normal.   Left Ear: External ear normal.   Nose: Nose normal.   Mouth/Throat: Oropharynx is clear and moist.   Eyes: Conjunctivae and EOM are normal. Pupils are equal, round, and reactive to light.   Neck: Normal range of motion. Neck supple. No JVD present. No thyromegaly present.   Cardiovascular: Normal rate, regular rhythm, normal heart sounds and intact distal pulses.    No murmur heard.  Pulmonary/Chest: Effort normal and breath sounds normal.   Abdominal: Soft. Bowel sounds are normal. She exhibits no mass. There is no tenderness.   Musculoskeletal: Normal range of motion. She exhibits no edema.   Lymphadenopathy:     She has no cervical adenopathy.   Neurological: She is alert and oriented to person, place, and time.   Skin: Skin is warm and dry.   Psychiatric: She has a normal mood and affect.       DIAGNOSTICS:     Results for orders placed or performed in visit on 12/04/18   Beta 2 microglobulin   Result Value Ref Range    Beta-2-Microglobulin 2.5 (H) <2.3 mg/L   CBC with platelets differential   Result Value Ref Range    WBC 10.0 4.0 - 11.0 10e9/L    RBC Count 5.62 (H) 3.8 - 5.2 10e12/L    Hemoglobin 13.9 11.7 - 15.7 g/dL    Hematocrit " 44.5 35.0 - 47.0 %    MCV 79 78 - 100 fl    MCH 24.7 (L) 26.5 - 33.0 pg    MCHC 31.2 (L) 31.5 - 36.5 g/dL    RDW 22.2 (H) 10.0 - 15.0 %    Platelet Count 388 150 - 450 10e9/L    Diff Method Automated Method     % Neutrophils 43.5 %    % Lymphocytes 42.1 %    % Monocytes 9.0 %    % Eosinophils 4.5 %    % Basophils 0.7 %    % Immature Granulocytes 0.2 %    Nucleated RBCs 0 0 /100    Absolute Neutrophil 4.3 1.6 - 8.3 10e9/L    Absolute Lymphocytes 4.2 0.8 - 5.3 10e9/L    Absolute Monocytes 0.9 0.0 - 1.3 10e9/L    Absolute Eosinophils 0.5 0.0 - 0.7 10e9/L    Absolute Basophils 0.1 0.0 - 0.2 10e9/L    Abs Immature Granulocytes 0.0 0 - 0.4 10e9/L    Absolute Nucleated RBC 0.0    Comprehensive metabolic panel   Result Value Ref Range    Sodium 140 133 - 144 mmol/L    Potassium 3.3 (L) 3.4 - 5.3 mmol/L    Chloride 107 94 - 109 mmol/L    Carbon Dioxide 26 20 - 32 mmol/L    Anion Gap 7 3 - 14 mmol/L    Glucose 89 70 - 99 mg/dL    Urea Nitrogen 10 7 - 30 mg/dL    Creatinine 0.47 (L) 0.52 - 1.04 mg/dL    GFR Estimate >90 >60 mL/min/1.7m2    GFR Estimate If Black >90 >60 mL/min/1.7m2    Calcium 9.5 8.5 - 10.1 mg/dL    Bilirubin Total 0.2 0.2 - 1.3 mg/dL    Albumin 4.1 3.4 - 5.0 g/dL    Protein Total 8.1 6.8 - 8.8 g/dL    Alkaline Phosphatase 90 40 - 150 U/L    ALT 28 0 - 50 U/L    AST 19 0 - 45 U/L   ELP reflex to IELP   Result Value Ref Range    Albumin Fraction PENDING 3.7 - 5.1 g/dL    Alpha 1 Fraction PENDING 0.2 - 0.4 g/dL    Alpha 2 Fraction PENDING 0.5 - 0.9 g/dL    Beta Fraction PENDING 0.6 - 1.0 g/dL    Gamma Fraction PENDING 0.7 - 1.6 g/dL    Monoclonal Peak PENDING 0.0 g/dL    ELP Interpretation: PENDING    Ferritin   Result Value Ref Range    Ferritin 358 (H) 8 - 252 ng/mL   Folate   Result Value Ref Range    Folate 31.6 >5.4 ng/mL   Iron and iron binding capacity   Result Value Ref Range    Iron 63 35 - 180 ug/dL    Iron Binding Cap 272 240 - 430 ug/dL    Iron Saturation Index 23 15 - 46 %   Lactate Dehydrogenase    Result Value Ref Range    Lactate Dehydrogenase 171 81 - 234 U/L   Macroglobulins   Result Value Ref Range    Viscosity Index 1.6 1.4 - 1.8   Protein Immunofixation Serum   Result Value Ref Range    Immunofixation ELP PENDING     IGG 1080 695 - 1620 mg/dL     70 - 380 mg/dL    IGM 74 60 - 265 mg/dL   Vitamin B12   Result Value Ref Range    Vitamin B12 524 193 - 986 pg/mL       Recent Labs   Lab Test  12/04/18   1712  11/29/18   1001   11/03/18   0315   09/19/18   1037   HGB  13.9   --    --   10.1*   < >  9.1*   PLT  388   --    --   465*   < >  751*   INR   --    --    --    --    --   1.25*   NA  140  138   < >  140   --   138   POTASSIUM  3.3*  3.2*   < >  2.9*   --   3.4   CR  0.47*  0.61   < >  0.53   --   0.54    < > = values in this interval not displayed.        IMPRESSION:   Preop History and Physical for EGD with Dr. Jackson, at [facility], on  12/21/18.      The proposed surgical procedure is considered LOW risk.    REVISED CARDIAC RISK INDEX  The patient has the following serious cardiovascular risks for perioperative complications such as (MI, PE, VFib and 3  AV Block):  No serious cardiac risks  INTERPRETATION: 0 risks: Class I (very low risk - 0.4% complication rate)    The patient has the following additional risks for perioperative complications:  Hx GI bleed  Hypothyroidism        ICD-10-CM    1. Preop general physical exam Z01.818      ASSESSMENT / PLAN:  (Z01.818) Preop general physical exam  (primary encounter diagnosis)  Comment: EKG normal.    Hemoglobin   Date Value Ref Range Status   12/04/2018 13.9 11.7 - 15.7 g/dL Final   ]  Potassium   Date Value Ref Range Status   12/04/2018 3.3 (L) 3.4 - 5.3 mmol/L Final     Hemoglobin   Date Value Ref Range Status   12/04/2018 13.9 11.7 - 15.7 g/dL Final   ]    Plan: EKG 12-lead complete w/read - (Clinic         Performed)      (B02.23) Postherpetic polyneuropathy  Comment:Had cut back on Gabapentin to see if helped nausea and vomiting.  Patient  stated no, and would like to go back to previous amounts.  Gabapentin  300mg  3 times a day    Plan: gabapentin (NEURONTIN) 300 MG capsule         (M54.2) Cervicalgia  Comment: Had cut back on Flexeril to see if less nausea and vomiting.  Patient stated no  And would like to go back to previous amounts.  Flexeril 10mg  1 tab TID.  Also states pain meds do not hurt her stomach.  Had long discussion should never take any these meds on an empty stomach.   Plan: cyclobenzaprine (FLEXERIL) 10 MG tablet           (R82.90) Urine abnormality  Comment: Had free Kappa and Lambda light chains in urine,   Plan: Dr. Jackson has referred to Dr. Solares for further workup.      (E87.6) Hypokalemia  Comment: Has extreme difficulty taking ANY form of potassium orally.  States it caused her gastric pain and vomiting.    Plan: Levels have come up from her eating bananas.      (K31.1) Gastric outlet obstruction  Comment: Has continued nausea and vomiting.  Has Zofran, but has not necessarily been helpful. States feels full and vomits almost every day.  Has lost almost 25 lbs.      Has Cholelithiasis.      Had unusual inflammatory process , and  Positive   , and Greater than  2:1 Kappa/Lambda ratio  on pathology of gastric biopsy from previous  EGD.  . Dr. Jackson  Has referred patient to Dr. Solares for further workup.       Patient concerned about who will be referred to if has to have further revision on gastric outlet.  Discussed options. , will refer to   Dr. Devlin  at [facility] if it comes to that.  Does not want extensive surgery in [place].            RECOMMENDATIONS:     Take Levothyroxine AM of surgery.  Can take Zofran if need to.      APPROVAL GIVEN to proceed with proposed procedure, without further diagnostic evaluation       Signed Electronically by: Jaden Lee NP    Copy of this evaluation report is provided to requesting physician.    [place] Preop Guidelines    Revised Cardiac Risk Index

## 2018-12-09 LAB
ALBUMIN SERPL ELPH-MCNC: 4.5 G/DL (ref 3.7–5.1)
ALPHA1 GLOB SERPL ELPH-MCNC: 0.4 G/DL (ref 0.2–0.4)
ALPHA2 GLOB SERPL ELPH-MCNC: 0.7 G/DL (ref 0.5–0.9)
B-GLOBULIN SERPL ELPH-MCNC: 0.8 G/DL (ref 0.6–1)
GAMMA GLOB SERPL ELPH-MCNC: 1.1 G/DL (ref 0.7–1.6)
M PROTEIN SERPL ELPH-MCNC: 0 G/DL
PROT PATTERN SERPL ELPH-IMP: NORMAL

## 2018-12-10 ENCOUNTER — TELEPHONE (OUTPATIENT)
Dept: INTERNAL MEDICINE | Facility: OTHER | Age: 68
End: 2018-12-10

## 2018-12-10 DIAGNOSIS — M54.2 CERVICALGIA: ICD-10-CM

## 2018-12-10 DIAGNOSIS — G47.00 PERSISTENT INSOMNIA: ICD-10-CM

## 2018-12-10 LAB
IGA SERPL-MCNC: 100 MG/DL (ref 70–380)
IGG SERPL-MCNC: 1080 MG/DL (ref 695–1620)
IGM SERPL-MCNC: 74 MG/DL (ref 60–265)
PROT ELPH PNL UR ELPH: NORMAL
PROT PATTERN SERPL IFE-IMP: NORMAL

## 2018-12-10 RX ORDER — ZOLPIDEM TARTRATE 10 MG/1
10 TABLET ORAL
Qty: 60 TABLET | Refills: 0 | Status: SHIPPED | OUTPATIENT
Start: 2018-12-10 | End: 2018-12-14

## 2018-12-10 RX ORDER — CYCLOBENZAPRINE HCL 10 MG
10 TABLET ORAL 3 TIMES DAILY PRN
Qty: 90 TABLET | Refills: 1 | Status: SHIPPED | OUTPATIENT
Start: 2018-12-10 | End: 2019-02-04

## 2018-12-10 NOTE — TELEPHONE ENCOUNTER
Patient stated when you refilled medication that you had changed or talked about to changing to taking 1-3 times daily . Please advise.   Marleny Pickard LPN

## 2018-12-10 NOTE — TELEPHONE ENCOUNTER
Patient notified of presciption change to 3 times daily patient advise to call pharmacy to confirm that they received prescription. Patient agreed to plan.  Marleny Pickard LPN

## 2018-12-10 NOTE — TELEPHONE ENCOUNTER
zolpidem (AMBIEN) 10 MG tablet  Last Written Prescription Date:  11/12/18  Last Fill Quantity: 6,   # refills: 0  Last Office Visit: 12/6/18  Future Office visit:       Routing refill request to provider for review/approval because:  Drug not on the [facility], [facility] or [facility] refill protocol or controlled substance

## 2018-12-11 LAB
KAPPA LC UR-MCNC: 0.94 MG/DL (ref 0.33–1.94)
KAPPA LC/LAMBDA SER: 0.81 {RATIO} (ref 0.26–1.65)
LAMBDA LC SERPL-MCNC: 1.16 MG/DL (ref 0.57–2.63)

## 2018-12-13 ENCOUNTER — TELEPHONE (OUTPATIENT)
Dept: INTERNAL MEDICINE | Facility: OTHER | Age: 68
End: 2018-12-13

## 2018-12-13 DIAGNOSIS — G47.00 PERSISTENT INSOMNIA: ICD-10-CM

## 2018-12-13 LAB
ALBUMIN UR QL: DETECTED
ALPHA1 GLOB 24H UR QL ELPH: DETECTED
ALPHA2 GLOB UR QL ELPH: DETECTED
B-GLOBULIN UR QL ELPH: DETECTED
COLLECT DURATION TIME SPEC: ABNORMAL H
GAMMA GLOB UR QL ELPH: DETECTED
INTERPRETATION UR IFE-IMP: ABNORMAL
KAPPA LC FREE 24H UR-MRATE: ABNORMAL MG/D
KAPPA LC FREE UR-MCNC: 4.15 MG/DL (ref 0.14–2.42)
KAPPA LC FREE/LAMBDA FREE UR: 29.64 RATIO (ref 2.04–10.37)
LAMBDA LC FREE 24H UR-MRATE: ABNORMAL MG/D
LAMBDA LC FREE UR-MCNC: 0.14 MG/DL (ref 0.02–0.67)
PROT 24H UR-MRATE: ABNORMAL MG/D (ref 10–140)
SPECIMEN VOL ?TM UR: ABNORMAL ML

## 2018-12-13 NOTE — TELEPHONE ENCOUNTER
Patient had requested her medication Ambien from her pharmacy on Monday.  Jaden Jesus did sign the Rx on 12/10/18.  Can this be resent as patient stated [facility] did not get any prescription for patient.  Please advise.  Thank you.

## 2018-12-14 RX ORDER — ZOLPIDEM TARTRATE 10 MG/1
10 TABLET ORAL
Qty: 60 TABLET | Refills: 0 | Status: SHIPPED | OUTPATIENT
Start: 2018-12-14 | End: 2019-01-11

## 2018-12-14 NOTE — TELEPHONE ENCOUNTER
Med signed on 12/10. I cant find the hard copy and pharmacy never received script. Med pended. Please sign.

## 2018-12-20 ENCOUNTER — ANESTHESIA EVENT (OUTPATIENT)
Dept: SURGERY | Facility: HOSPITAL | Age: 68
End: 2018-12-20
Payer: COMMERCIAL

## 2018-12-20 NOTE — ANESTHESIA PREPROCEDURE EVALUATION
Anesthesia Pre-Procedure Evaluation    Patient: Aure Lanier   MRN: [id number] : 1950          Preoperative Diagnosis: ANASTOMOTIC ULCER/STRICTURE    Procedure(s):  UPPER ENDOSCOPY    Past Medical History:   Diagnosis Date     Allergic arthritis involving hand 2011     Anemia, Iron Deficiency 2011     Anxiety 2011     Contact dermatitis and other eczema, unspecified c 2011     Depression 2011     Edema 2011     Gastrointestinal mucositis (ulcerative) 2011     GERD 2011     Headache(784.0) 2011     Hypothyroidism 2011     Insomnia, unspecified 2011     Migraine 2011     Nonallopathic lesion of cervical region, not elsewhere classified 10/16/2000     Osteoporosis 2011     Other and unspecified hyperlipidemia 2011     Other malaise and fatigue 2001     Postherpetic polyneuropathy 2011     Past Surgical History:   Procedure Laterality Date     ARTHROPLASTY KNEE Left 2018    Procedure: ARTHROPLASTY KNEE;  LEFT TOTAL KNEE ARTHROPLASTY S/N JOURNEY II;  Surgeon: Ty Hernandez MD;  Location: [facility]     ARTHROPLASTY KNEE Right 2018    Procedure: ARTHROPLASTY KNEE;  RIGHT TOTAL KNEE ARTHROPLASTY ;  Surgeon: Ty Hernandez MD;  Location: [facility] OR     D & C       ESOPHAGOSCOPY, GASTROSCOPY, DUODENOSCOPY (EGD), COMBINED N/A 2017    Procedure: COMBINED ESOPHAGOSCOPY, GASTROSCOPY, DUODENOSCOPY (EGD);  Upper Endoscopy: Removal of Food Impaction;  Surgeon: Lino Zhu DO;  Location: [facility]     ESOPHAGOSCOPY, GASTROSCOPY, DUODENOSCOPY (EGD), COMBINED N/A 2018    Procedure: UPPER ENDOSCOPY WITH BIOPSY;  Surgeon: Dieudonne Jackson MD;  Location: [facility]     STOMACH SURGERY       stomach surgery peritinitis         Anesthesia Evaluation     . Pt has had prior anesthetic.     History of anesthetic complications   - PONV        ROS/MED HX    ENT/Pulmonary:     (+)allergic rhinitis, , . .    Neurologic:      (+)migraines,     Cardiovascular:     (+) Dyslipidemia, ----. : . . . :. .       METS/Exercise Tolerance:     Hematologic:     (+) Anemia, -      Musculoskeletal:   (+) arthritis, , , -       GI/Hepatic: Comment: anastomotic stricture and ulcer from BI reconstruction for ulcer disease,    (+) GERD       Renal/Genitourinary:  - ROS Renal section negative       Endo:     (+) thyroid problem hypothyroidism, .      Psychiatric:     (+) psychiatric history anxiety and depression      Infectious Disease:  - neg infectious disease ROS       Malignancy:      - no malignancy   Other:    (+) H/O Chronic Pain,H/O chronic opiod use ,                         Physical Exam      Airway   Mallampati: II  TM distance: >3 FB  Neck ROM: full    Dental   (+) missing    Cardiovascular   Rhythm and rate: regular and normal      Pulmonary    breath sounds clear to auscultation            Lab Results   Component Value Date    WBC 10.0 12/04/2018    HGB 13.9 12/04/2018    HCT 44.5 12/04/2018     12/04/2018    CRP <2.9 11/03/2018    SED 22 11/03/2018     12/04/2018    POTASSIUM 3.3 (L) 12/04/2018    CHLORIDE 107 12/04/2018    CO2 26 12/04/2018    BUN 10 12/04/2018    CR 0.47 (L) 12/04/2018    GLC 89 12/04/2018    SHAYY 9.5 12/04/2018    MAG 2.1 11/29/2018    ALBUMIN 4.1 12/04/2018    PROTTOTAL 8.1 12/04/2018    ALT 28 12/04/2018    AST 19 12/04/2018    ALKPHOS 90 12/04/2018    BILITOTAL 0.2 12/04/2018    LIPASE 36 (L) 11/03/2018    AMYLASE 16 (L) 09/19/2018    PTT 31 09/19/2018    INR 1.25 (H) 09/19/2018    TSH 7.38 (H) 10/24/2018    T4 0.86 10/24/2018       Preop Vitals  BP Readings from Last 3 Encounters:   12/06/18 138/70   12/04/18 126/74   11/13/18 134/66    Pulse Readings from Last 3 Encounters:   12/06/18 99   12/04/18 113   11/13/18 94      Resp Readings from Last 3 Encounters:   12/06/18 18   11/13/18 20   11/09/18 18    SpO2 Readings from Last 3 Encounters:   12/06/18 99%   12/04/18 97%   11/13/18 96%      Temp  "Readings from Last 1 Encounters:   12/06/18 96.1  F (35.6  C) (Tympanic)    Ht Readings from Last 1 Encounters:   12/06/18 1.575 m (5' 2\")      Wt Readings from Last 1 Encounters:   12/06/18 70.3 kg (155 lb)    Estimated body mass index is 28.35 kg/m  as calculated from the following:    Height as of 12/6/18: 1.575 m (5' 2\").    Weight as of 12/6/18: 70.3 kg (155 lb).       Anesthesia Plan      History & Physical Review  History and physical reviewed and following examination; no interval change.    ASA Status:  3 .        Plan for MAC with Intravenous and Propofol induction. Maintenance will be TIVA.  Reason for MAC:  Difficulty with conscious sedation (QS), Deep or markedly invasive procedure (G8), Extreme anxiety (QS) and Procedure to face, neck, head or breast  PONV prophylaxis:  Ondansetron (or other 5HT-3)  HP 12-6-18      Postoperative Care  Postoperative pain management:  IV analgesics.      Consents  Anesthetic plan, risks, benefits and alternatives discussed with:  Patient..                 ASHLEIGH Bray CRNA  "

## 2018-12-21 ENCOUNTER — ANESTHESIA (OUTPATIENT)
Dept: SURGERY | Facility: HOSPITAL | Age: 68
End: 2018-12-21
Payer: COMMERCIAL

## 2018-12-21 ENCOUNTER — HOSPITAL ENCOUNTER (OUTPATIENT)
Facility: HOSPITAL | Age: 68
Discharge: HOME | End: 2018-12-21
Attending: SURGERY | Admitting: SURGERY
Payer: COMMERCIAL

## 2018-12-21 VITALS
RESPIRATION RATE: 16 BRPM | TEMPERATURE: 98 F | DIASTOLIC BLOOD PRESSURE: 77 MMHG | BODY MASS INDEX: 29.3 KG/M2 | WEIGHT: 159.2 LBS | SYSTOLIC BLOOD PRESSURE: 129 MMHG | OXYGEN SATURATION: 98 % | HEIGHT: 62 IN

## 2018-12-21 PROCEDURE — 88305 TISSUE EXAM BY PATHOLOGIST: CPT | Mod: TC | Performed by: SURGERY

## 2018-12-21 PROCEDURE — 37000009 ZZH ANESTHESIA TECHNICAL FEE, EACH ADDTL 15 MIN: Performed by: SURGERY

## 2018-12-21 PROCEDURE — 36000052 ZZH SURGERY LEVEL 2 EA 15 ADDTL MIN: Performed by: SURGERY

## 2018-12-21 PROCEDURE — 25000128 H RX IP 250 OP 636: Performed by: NURSE ANESTHETIST, CERTIFIED REGISTERED

## 2018-12-21 PROCEDURE — 43245 EGD DILATE STRICTURE: CPT | Performed by: SURGERY

## 2018-12-21 PROCEDURE — 36000050 ZZH SURGERY LEVEL 2 1ST 30 MIN: Performed by: SURGERY

## 2018-12-21 PROCEDURE — 37000008 ZZH ANESTHESIA TECHNICAL FEE, 1ST 30 MIN: Performed by: SURGERY

## 2018-12-21 PROCEDURE — 27210794 ZZH OR GENERAL SUPPLY STERILE: Performed by: SURGERY

## 2018-12-21 PROCEDURE — 43249 ESOPH EGD DILATION <30 MM: CPT | Performed by: NURSE ANESTHETIST, CERTIFIED REGISTERED

## 2018-12-21 PROCEDURE — 43239 EGD BIOPSY SINGLE/MULTIPLE: CPT | Mod: 59 | Performed by: SURGERY

## 2018-12-21 PROCEDURE — 43249 ESOPH EGD DILATION <30 MM: CPT | Performed by: ANESTHESIOLOGY

## 2018-12-21 PROCEDURE — 40000306 ZZH STATISTIC PRE PROC ASSESS II: Performed by: SURGERY

## 2018-12-21 PROCEDURE — 25000125 ZZHC RX 250: Performed by: SURGERY

## 2018-12-21 PROCEDURE — 25000125 ZZHC RX 250: Performed by: NURSE ANESTHETIST, CERTIFIED REGISTERED

## 2018-12-21 PROCEDURE — 71000027 ZZH RECOVERY PHASE 2 EACH 15 MINS: Performed by: SURGERY

## 2018-12-21 PROCEDURE — C1726 CATH, BAL DIL, NON-VASCULAR: HCPCS | Performed by: SURGERY

## 2018-12-21 RX ORDER — SODIUM CHLORIDE, SODIUM LACTATE, POTASSIUM CHLORIDE, CALCIUM CHLORIDE 600; 310; 30; 20 MG/100ML; MG/100ML; MG/100ML; MG/100ML
INJECTION, SOLUTION INTRAVENOUS CONTINUOUS
Status: DISCONTINUED | OUTPATIENT
Start: 2018-12-21 | End: 2018-12-21 | Stop reason: HOSPADM

## 2018-12-21 RX ORDER — FENTANYL CITRATE 50 UG/ML
25-50 INJECTION, SOLUTION INTRAMUSCULAR; INTRAVENOUS
Status: DISCONTINUED | OUTPATIENT
Start: 2018-12-21 | End: 2018-12-21 | Stop reason: HOSPADM

## 2018-12-21 RX ORDER — HYDROMORPHONE HYDROCHLORIDE 1 MG/ML
.3-.5 INJECTION, SOLUTION INTRAMUSCULAR; INTRAVENOUS; SUBCUTANEOUS EVERY 10 MIN PRN
Status: DISCONTINUED | OUTPATIENT
Start: 2018-12-21 | End: 2018-12-21 | Stop reason: HOSPADM

## 2018-12-21 RX ORDER — FLUMAZENIL 0.1 MG/ML
0.2 INJECTION, SOLUTION INTRAVENOUS
Status: DISCONTINUED | OUTPATIENT
Start: 2018-12-21 | End: 2018-12-21 | Stop reason: HOSPADM

## 2018-12-21 RX ORDER — KETAMINE HYDROCHLORIDE 10 MG/ML
INJECTION INTRAMUSCULAR; INTRAVENOUS PRN
Status: DISCONTINUED | OUTPATIENT
Start: 2018-12-21 | End: 2018-12-21

## 2018-12-21 RX ORDER — LIDOCAINE 40 MG/G
CREAM TOPICAL
Status: DISCONTINUED | OUTPATIENT
Start: 2018-12-21 | End: 2018-12-21 | Stop reason: HOSPADM

## 2018-12-21 RX ORDER — NALOXONE HYDROCHLORIDE 0.4 MG/ML
.1-.4 INJECTION, SOLUTION INTRAMUSCULAR; INTRAVENOUS; SUBCUTANEOUS
Status: DISCONTINUED | OUTPATIENT
Start: 2018-12-21 | End: 2018-12-21 | Stop reason: HOSPADM

## 2018-12-21 RX ORDER — MEPERIDINE HYDROCHLORIDE 50 MG/ML
12.5 INJECTION INTRAMUSCULAR; INTRAVENOUS; SUBCUTANEOUS
Status: DISCONTINUED | OUTPATIENT
Start: 2018-12-21 | End: 2018-12-21 | Stop reason: HOSPADM

## 2018-12-21 RX ORDER — HYDRALAZINE HYDROCHLORIDE 20 MG/ML
2.5-5 INJECTION INTRAMUSCULAR; INTRAVENOUS EVERY 10 MIN PRN
Status: DISCONTINUED | OUTPATIENT
Start: 2018-12-21 | End: 2018-12-21 | Stop reason: HOSPADM

## 2018-12-21 RX ORDER — ALBUTEROL SULFATE 0.83 MG/ML
2.5 SOLUTION RESPIRATORY (INHALATION) EVERY 4 HOURS PRN
Status: DISCONTINUED | OUTPATIENT
Start: 2018-12-21 | End: 2018-12-21 | Stop reason: HOSPADM

## 2018-12-21 RX ORDER — ONDANSETRON 2 MG/ML
4 INJECTION INTRAMUSCULAR; INTRAVENOUS EVERY 30 MIN PRN
Status: DISCONTINUED | OUTPATIENT
Start: 2018-12-21 | End: 2018-12-21 | Stop reason: HOSPADM

## 2018-12-21 RX ORDER — PROPOFOL 10 MG/ML
INJECTION, EMULSION INTRAVENOUS PRN
Status: DISCONTINUED | OUTPATIENT
Start: 2018-12-21 | End: 2018-12-21

## 2018-12-21 RX ORDER — ONDANSETRON 4 MG/1
4 TABLET, ORALLY DISINTEGRATING ORAL EVERY 30 MIN PRN
Status: DISCONTINUED | OUTPATIENT
Start: 2018-12-21 | End: 2018-12-21 | Stop reason: HOSPADM

## 2018-12-21 RX ADMIN — MIDAZOLAM 1 MG: 1 INJECTION INTRAMUSCULAR; INTRAVENOUS at 09:36

## 2018-12-21 RX ADMIN — KETAMINE HYDROCHLORIDE 20 MG: 10 INJECTION, SOLUTION INTRAMUSCULAR; INTRAVENOUS at 09:36

## 2018-12-21 RX ADMIN — PROPOFOL 20 MG: 10 INJECTION, EMULSION INTRAVENOUS at 09:41

## 2018-12-21 RX ADMIN — MIDAZOLAM 1 MG: 1 INJECTION INTRAMUSCULAR; INTRAVENOUS at 09:35

## 2018-12-21 RX ADMIN — PROPOFOL 30 MG: 10 INJECTION, EMULSION INTRAVENOUS at 09:45

## 2018-12-21 RX ADMIN — PROPOFOL 20 MG: 10 INJECTION, EMULSION INTRAVENOUS at 10:03

## 2018-12-21 RX ADMIN — PROPOFOL 20 MG: 10 INJECTION, EMULSION INTRAVENOUS at 09:47

## 2018-12-21 RX ADMIN — PROPOFOL 20 MG: 10 INJECTION, EMULSION INTRAVENOUS at 10:00

## 2018-12-21 RX ADMIN — KETAMINE HYDROCHLORIDE 20 MG: 10 INJECTION, SOLUTION INTRAMUSCULAR; INTRAVENOUS at 09:53

## 2018-12-21 RX ADMIN — PROPOFOL 40 MG: 10 INJECTION, EMULSION INTRAVENOUS at 10:04

## 2018-12-21 RX ADMIN — PROPOFOL 20 MG: 10 INJECTION, EMULSION INTRAVENOUS at 09:54

## 2018-12-21 RX ADMIN — PROPOFOL 20 MG: 10 INJECTION, EMULSION INTRAVENOUS at 09:51

## 2018-12-21 RX ADMIN — SODIUM CHLORIDE, POTASSIUM CHLORIDE, SODIUM LACTATE AND CALCIUM CHLORIDE: 600; 310; 30; 20 INJECTION, SOLUTION INTRAVENOUS at 08:32

## 2018-12-21 RX ADMIN — PROPOFOL 30 MG: 10 INJECTION, EMULSION INTRAVENOUS at 10:06

## 2018-12-21 RX ADMIN — PROPOFOL 40 MG: 10 INJECTION, EMULSION INTRAVENOUS at 09:38

## 2018-12-21 RX ADMIN — PROPOFOL 30 MG: 10 INJECTION, EMULSION INTRAVENOUS at 09:58

## 2018-12-21 ASSESSMENT — MIFFLIN-ST. JEOR: SCORE: 1215.38

## 2018-12-21 NOTE — DISCHARGE INSTRUCTIONS
UPPER ENDOSCOPY    PURPOSE:  An Upper Endoscopy is a procedure in which the doctor passes a flexible, lighted tube called a gastroscope through your mouth into your stomach in order to:    See the lining of the esophagus, stomach, and duodenum (first part of the small intestine).    Look for bleeding, inflammation (swelling), abnormal tumors or tissue, or ulcers.    Take biopsy specimens.  (Biopsies do not hurt.)    TELL YOUR DOCTOR IF:     You have a heart condition, heart murmur, or have had heart valve surgery.    You have had angioplasty with stents put in within the last year.    You are taking blood thinners - Aspirin, Anti-inflammatory pain pills (like Motrin, ibuprofen, Naproxen, Feldene, Advil, etc.), Coumadin, Plavix.    You have diabetes - contact your regular doctor for management of your insulin.    YOU NEED TO:    Have someone drive you home.  You are not allowed to drive until the next day.  (If you take a taxi, the person staying with you after surgery must ride with you in the taxi.  The  is not responsible for you).    Have someone stay with you for four (4) hours after you leave the hospital.    Know the time to be at admitting:  A nurse will call you with the time the afternoon before your procedure.  If your procedure is on Monday, you will be called on Friday.  If you have not been contacted with the time, call the Admitting Department at [phone] or [phone], ext. [phone] after 5 pm (Admitting is open 24 hours daily).    Talk with the Surgery Patient Education Nurses.  Please call them @ [phone] or toll free [phone] after 8:00 a.m. Monday through Friday.    TO PREPARE FOR THE PROCEDURE:      ONE WEEK BEFORE:    Stop Aspirin, anti-inflammatory pain pills (Motrin, ibuprofen, Naproxen, Feldene, Advil, etc.).  It is OK to take Tylenol (acetaminophen).    Your doctor will tell you what to do if you are on Coumadin or Plavix.     NIGHT BEFORE:    Do not eat or drink  anything after midnight.  Your stomach needs to be empty.     DAY OF YOUR PROCEDURE:    Take your pills you were told to take.  Do not take diabetic pills.  If you are on Insulin, take the dose your doctor told you to take.    Wear loose, comfortable clothing and shoes.    Remove ALL jewelry including wedding rings.  Leave valuables at home.    Come to the hospital Admitting Department located at the [facility] on the lower level.    In Same Day surgery, you will be asked to change into an exam gown.    You will be asked your name, birth date, and what you are having done by every person who is involved with your care.    Your health history, medications, and allergies will be reviewed and verified.    An IV (intravenous line) will be started in your hand.  DURING THE UPPER ENDOSCOPY:    Your doctor and a registered nurse will be with you throughout the procedure which takes about 30 minutes.    You will either lie on your left side or on your back.    Medications will be given to you to help you relax and reduce the gag reflex when swallowing the scope.    Your doctor may need to insert air into your stomach to see better.  This may cause fullness or a cramping sensation.  The air will be removed at the end of the exam.    AFTER THE PROCEDURE    You will return to Same Day Surgery to rest for about an hour before you go home.    The doctor will talk with you and your family.    A family member/friend may visit with you.    You may burp up any air remaining in your stomach.    You may feel dizzy or light-headed from the medicine.    Your nurse will go over the discharge instructions with you and your caregiver and answer any of your questions.      You will be contacted the next day to check on how you are doing.    If biopsies were taken, you will be contacted with the results usually within 3 days.  BACK AT HOME    Rest for an hour or two after you get home.    When your throat is no longer numb and you have a  gag reflex, take a few sips of cool water.  If you can swallow comfortably, you may start eating again.    You may have a mild sore throat for the rest of the day.  WHAT TO WATCH FOR:  Problems rarely occur after the exam, but it is important to be aware of the early signs of a complication.  Call your doctor immediately if you have:    Difficulty swallowing or breathing    Unusual pain in your stomach or chest    Vomiting blood or dark material that looks like coffee grounds    Black or tarry stools    Temperature over 100.6 degrees    MORE QUESTIONS?  Please ask your doctor or nurse before the exam begins  or call your doctor at the clinic.    IF YOU MUST CANCEL YOUR PROCEDURE THE EVENING/NIGHT BEFORE, PLEASE CALL HOSPITAL ADMITTING -[phone] OR TOLL FREE [phone], [phone]. [phone].    Phone Numbers:  Hospital - [phone] [phone]  [facility] - [phone]  Surgery Patient Education - [phone] or toll free [phone]    Post-Anesthesia Patient Instructions    IMMEDIATELY FOLLOWING SURGERY:  Do not drive or operate machinery for the first twenty four hours after surgery.  Do not make any important decisions for twenty four hours after surgery or while taking narcotic pain medications or sedatives.  If you develop intractable nausea and vomiting or a severe headache please notify your doctor immediately.    FOLLOW-UP:  Please make an appointment with your surgeon as instructed. You do not need to follow up with anesthesia unless specifically instructed to do so.    WOUND CARE INSTRUCTIONS (if applicable):  Keep a dry clean dressing on the anesthesia/puncture wound site if there is drainage.  Once the wound has quit draining you may leave it open to air.  Generally you should leave the bandage intact for twenty four hours unless there is drainage.  If the epidural site drains for more than 36-48 hours please call the anesthesia department.    QUESTIONS?:  Please feel free to call your  physician or the hospital  if you have any questions, and they will be happy to assist you.

## 2018-12-21 NOTE — ANESTHESIA POSTPROCEDURE EVALUATION
Patient: Aure Lanier    Procedure(s):  UPPER ENDOSCOPY WITH BALLOON DILATION, BIOPSY    Diagnosis:ANASTOMOTIC ULCER/STRICTURE  Diagnosis Additional Information: No value filed.    Anesthesia Type:  MAC    Note:  Anesthesia Post Evaluation    Patient location during evaluation: Phase 2 and Bedside  Patient participation: Able to fully participate in evaluation  Level of consciousness: awake and alert  Pain management: adequate  Airway patency: patent  Cardiovascular status: acceptable  Respiratory status: acceptable  Hydration status: stable  PONV: none     Anesthetic complications: None          Last vitals:  Vitals:    12/21/18 0800 12/21/18 0827 12/21/18 1020   BP:  140/75 125/77   Resp:  16    Temp: 98  F (36.7  C) 98  F (36.7  C)    SpO2:  96% 96%         Electronically Signed By: Kendall Torres MD  December 21, 2018  10:26 AM

## 2018-12-21 NOTE — ANESTHESIA CARE TRANSFER NOTE
Patient: Aure Lanier    Procedure(s):  UPPER ENDOSCOPY WITH BALLOON DILATION, BIOPSY    Diagnosis: ANASTOMOTIC ULCER/STRICTURE  Diagnosis Additional Information: No value filed.    Anesthesia Type:   MAC     Note:  Airway :Room Air  Patient transferred to:Phase II  Handoff Report: Identifed the Patient, Identified the Reponsible Provider, Reviewed the pertinent medical history, Discussed the surgical course, Reviewed Intra-OP anesthesia mangement and issues during anesthesia, Set expectations for post-procedure period and Allowed opportunity for questions and acknowledgement of understanding      Vitals: (Last set prior to Anesthesia Care Transfer)    CRNA VITALS  12/21/2018 0943 - 12/21/2018 1018      12/21/2018             Pulse:  109    SpO2:  92 %                Electronically Signed By: ASHLEIGH Yi CRNA  December 21, 2018  10:18 AM

## 2018-12-21 NOTE — OP NOTE
Aure Lanier MRN# [id number]   YOB: 1950 Age: 68 year old      Date of Admission:  12/21/2018    Primary care provider: Jaden Lee    PREOPERATIVE DIAGNOSIS:  Anastomotic ulceration and stricture      POSTOPERATIVE DIAGNOSES:    Anastomotic ulceration and stricutre      PROCEDURE:  Esophagogastroduodenoscopy with cold forceps biopsies, balloon dilation of gastro-duodenal anastomotic stricture, endoscopic placement of gastric tube, gastric lavage.       HISTORY OF PRESENT ILLNESS:  This 68 year old female developed abdominal bloating, nausea and emesis requiring endoscopic surveillance. She was found to have a previous stricture and ulcer of her gastro-duodenal anastomosis requiring repeat endoscopy.     OPERATIVE FINDINGS:  The gastroesophageal junction was noted 36 cm from the incisors.  The Z line was irregular with changes suggesting reflux.  There was evidence of ulceration or stricture within the stomach at the gastro-duodenal anastomosis.      Specimen(s) submitted to pathology:  Anastomotic stricture biopsies    PROCEDURE:  With the patient in the supine position on the transport cart, IV sedation was administered by the nurse anesthetist.  Her correct identity and planned procedure were confirmed during a requisite timeout pause.  A bite block was placed and the fiberoptic endoscope was introduced and negotiated through the cricopharyngeus without difficulty.  The length of the esophagus was examined without findings.  The gastroesophageal junction was noted 36 cm from the incisors; the Z line was irregular without ulceration, bleeding source or stricture.  There was no  evidence of Francisco's change.  The stomach was entered. There was extensive food particles throughout the stomach. After washing the stomach with water I was unable to visualize the previous Gastro-duodenal anastomosis. A decision was then made to place a gastric tube endoscopically for gastric lavage. The endoscope was  withdrawn and with forceps the gastric tube was grasped and advanced to the stomach under direct visualization. The stomach was then lavaged extensively to wash out as much of the food particles as possible. After lavage I was able to visualize the anastomosis. The scope was advanced and it was able to pass through the anastomosis. There was still evidence of narrowing of the anastomosis so dilation was then performed. With a through the scope balloon dilator the anastomosis was dilated sequentially from 10 to 15 mm. At 15 mm the balloon was held for 90 seconds. There was no evidence of mucosal tears during dilation. The balloon was deflated and removed. Multiple biopsies of the anastomosis was then performed. The endoscope was returned to the GE junction.  Circumferential biopsies were obtained; hemostasis was satisfactory.  A second circumferential examination of the esophagus was performed on slow withdrawal of the endoscope without additional finding.  The procedure was satisfactorially tolerated; there was no appreciable blood loss and the patient was returned to Day Surgery in good condition.        Dieudonne Jackson MD  12/21/2018  10:13 AM

## 2018-12-21 NOTE — OR NURSING
Pateint discharged to home .  Светлана score 20. Pain level 0/10.  Discharged from unit via walking .

## 2018-12-21 NOTE — BRIEF OP NOTE
[facility] Brief Operative Note    Pre-operative diagnosis: ANASTOMOTIC ULCER/STRICTURE   Post-operative diagnosis Anastomotic ulceration and stricture   Procedure: Procedure(s):  UPPER ENDOSCOPY WITH BALLOON DILATION, BIOPSY, endoscopic gastric tube placement with gastric lavage   Surgeon: Dieudonne Jackson MD   Assistants(s): none   Estimated blood loss: Minimal    Specimens: Biopsy of anastomotic stricture   Findings: Stomach was filled with food particles. Narrowing at gastro-duodenal anastomosis allowed for scope to advance through. Dilation of stricture to 15 mm and multiple biopsies placed.

## 2018-12-26 LAB — COPATH REPORT: NORMAL

## 2018-12-28 DIAGNOSIS — M25.561 ACUTE PAIN OF RIGHT KNEE: ICD-10-CM

## 2018-12-28 NOTE — TELEPHONE ENCOUNTER
HYDROcodone-acetaminophen (NORCO) 7.5-325 MG per tablet    Last Written Prescription Date:  12/04/2018  Last Fill Quantity: 120,   # refills: 0  Last Office Visit: 12/06/2018  Future Office visit:       Routing refill request to provider for review/approval because:  Drug not on the [facility], [facility] or [facility] refill protocol or controlled substance

## 2019-01-03 ENCOUNTER — TELEPHONE (OUTPATIENT)
Dept: SURGERY | Facility: OTHER | Age: 69
End: 2019-01-03

## 2019-01-03 DIAGNOSIS — K28.9 ANASTOMOTIC ULCER: Primary | ICD-10-CM

## 2019-01-03 RX ORDER — HYDROCODONE BITARTRATE AND ACETAMINOPHEN 7.5; 325 MG/1; MG/1
1 TABLET ORAL EVERY 6 HOURS PRN
Qty: 120 TABLET | Refills: 0 | Status: SHIPPED | OUTPATIENT
Start: 2019-01-03 | End: 2019-01-29

## 2019-01-03 NOTE — LETTER
"January 3, 2019      Aure Lanier  [address] [place] [address]        Dear Aure,       Thank you for allowing Dr. Jackson and our surgical team to participate in your care. Please review the following instructions to prepare for your upcoming Upper Endoscopy(dilation). You may call any of the numbers listed below with questions you may have.  [facility] Health Unit Coordinator: [phone]  Clinic Surgery Nurse: [phone]  Surgery Education Nurse: [phone]    Date of Procedure: 1/14/19 with Dr. Jackson  Admit time: Surgery  will call you the day before your procedure by 5pm with your admit time. If your surgery is on Monday, please expect a call on Friday. If we were unable to reach you by 5PM, you may call  [phone] for your arrival time.    Please call the Surgery Education Nurses 1-2 weeks prior to your surgery date at  [phone] for further instructions. Please have your medication/allergy lists ready.    Do not take Aspirin (325mg), other NSAIDs (Ibuprofen, Motrin, Aleve, Celebrex, Naproxen, etc...) vitamins or supplements 7 days before your surgery.     Please call the clinic surgery nurse or your regular doctor if you become ill within 1-2 weeks of the procedure. (vomiting, diarrhea, fever, cough, cold or any other symptoms of illness)    Do not eat any solid foods or milk products after 10pm the night prior to surgery. You may have clear liquids only up until 4 hours prior to surgery. Please see the list of liquids you may have and you can not have on page 2 of the separate \"Instructions for Your Upper Endoscopy\" packet.     You will need a responsible adult available to drive you home and stay with you for at least 4 hours after you leave the hospital. You will not be allowed to drive yourself. If you need to take a taxi or the bus you must have a responsible person to ride with you (not the taxi/). Your procedure will be cancelled if you do not bring a responsible " adult.    Questions or concerns can be directed to the clinic or surgery education nurse at any of the numbers listed above. If you have a scheduling or appointment question, please call the Health Unit Coordinator between 8am and 4pm Monday through Friday. After hours or on weekends, please call [phone] to postpone.             Sincerely,        Dieudonne Jackson MD/Renetta COLE LPN

## 2019-01-08 ENCOUNTER — ONCOLOGY VISIT (OUTPATIENT)
Dept: ONCOLOGY | Facility: OTHER | Age: 69
End: 2019-01-08
Attending: SURGERY
Payer: COMMERCIAL

## 2019-01-08 VITALS
SYSTOLIC BLOOD PRESSURE: 132 MMHG | TEMPERATURE: 96.2 F | HEART RATE: 108 BPM | WEIGHT: 149 LBS | OXYGEN SATURATION: 99 % | BODY MASS INDEX: 27.42 KG/M2 | DIASTOLIC BLOOD PRESSURE: 76 MMHG | HEIGHT: 62 IN | RESPIRATION RATE: 20 BRPM

## 2019-01-08 DIAGNOSIS — D89.0 POLYCLONAL GAMMOPATHY: ICD-10-CM

## 2019-01-08 PROCEDURE — 99203 OFFICE O/P NEW LOW 30 MIN: CPT | Performed by: INTERNAL MEDICINE

## 2019-01-08 PROCEDURE — G0463 HOSPITAL OUTPT CLINIC VISIT: HCPCS

## 2019-01-08 ASSESSMENT — PAIN SCALES - GENERAL: PAINLEVEL: MILD PAIN (2)

## 2019-01-08 ASSESSMENT — MIFFLIN-ST. JEOR: SCORE: 1169.11

## 2019-01-08 NOTE — NURSING NOTE
"Chief Complaint   Patient presents with     Consult     Polyclonal gammopathy/ referred by dr jarrett       Initial /76   Pulse 108   Temp 96.2  F (35.7  C) (Tympanic)   Resp 20   Ht 1.575 m (5' 2\")   Wt 67.6 kg (149 lb)   SpO2 99%   BMI 27.25 kg/m   Estimated body mass index is 27.25 kg/m  as calculated from the following:    Height as of this encounter: 1.575 m (5' 2\").    Weight as of this encounter: 67.6 kg (149 lb).  Medication Reconciliation: complete     Patient was assessed using the NCCN psychosocial distress thermometer. Patient rated the score as a 0/10. Patient rated current stressors as none. Stressors will be brought to the attention of provider or Oncology RN Care Coordinator for a score of 6 or greater or per nurses discretion.     Pt declines to fill out PHQ-9    Charlotte Rendon RN    "

## 2019-01-09 NOTE — PROGRESS NOTES
Visit Date:   01/08/2019      REASON FOR CONSULTATION:  Monoclonal gammopathy.      REQUESTING PHYSICIAN:  Dr. Jackson.      HISTORY OF PRESENT ILLNESS:  Ms. Lanier is a 68-year-old white female with a prior history of peptic ulcer disease, status post Billroth 1 reconstruction followed by knee replacements.  We were asked to evaluate concerning polyclonal gammopathy.  She was referred to Dr. Jackson on 12/31/2018 for evaluation of anemia, epigastric abdominal pain as well as a positive fecal occult blood.  Dr. Jackson performed an EGD and colonoscopy and the patient was found to have anastomotic stricture biopsy where there was a plasma cell deposition of unclear etiology on the specimen.  He did order a urine and serum free kappa lambda light chains, urine showed polyclonal gammopathy and the patient is now here for further evaluation.  She did see Dr. Jackson again who performed a repeat EGD.  The patient was found to have an anastomotic ulceration and stricture and biopsies with balloon dilatation of gastroduodenal anastomotic stricture was performed, endoscopic placement of gastric tube with gastric lavage.  Pathology on this specimen revealed that there were also focal regenerative changes in the gastric antrum with chemical gastropathy.  There was no evidence of plasma cell deposition.  There were acute and chronic inflammatory cells including eosinophils that were noted with purulent exudate noted.  No evidence of plasma cell deposition per se.  The patient otherwise states that Dr. Jackson is planning to go in redilate the stricture on Monday, which is 01/14.  She still has ongoing nausea she says and epigastric pain.  She says the Zofran is not helping, but otherwise denies any family history of malignancy except for a sister with CLL.  She has had a right knee replacement in the recent past.  Has chronic right knee pain.  Otherwise, she denies any fevers, night sweats, weight loss, shortness of  breath.  She did have a CT abdomen and pelvis back in 11/2018 which revealed postop changes in the stomach, a large amount of residual food within the stomach noted.  There was low density lesion in the liver.  Ultrasound was performed and the findings were that there was a small hemangiomas on the gallbladder.  Still there is some biliary ductal enlargement of the common bile duct measured 1 cm.      PAST MEDICAL HISTORY:  Significant for history of gastric ulcer, status post Billroth 1 surgery, history of right knee replacement, iron deficiency anemia, chronic pain syndrome, bilateral chronic knee pain, back muscle spasm, status post total left knee replacement, hypokalemia, hypothyroidism, insomnia, headache, postherpetic polyneuropathy, dysthymia, anxiety state, hyperlipidemia, migraine, osteoporosis, gastroesophageal reflux disease.      DRUG ALLERGIES:  INCLUDE ERYTHROMYCIN, PENICILLIN.      MEDICATIONS:  Include:   1.  Protonix 40 mg daily.   2.  Ambien 10 mg daily.   3.  Flexeril 10 mg t.i.d. p.r.n.   4.  Neurontin 300 mg t.i.d.   5.  Carafate 1 gram 4 times daily.   6.  Lexapro 10 mg daily.   7.  Nyamyc applied topically 2 times daily.   8.  Zofran 4 mg 3 times daily.   9.  Synthroid 50 mcg daily.   10.  Tylenol 650 to 975 mg by mouth q.4h. p.r.n.   11.  Zocor 40 mg daily.   12.  Calcium citrate vitamin D, 2 tablets daily.   13.  Vitamin B12 1000 mcg daily.   14.  Multivitamin 1 tablet daily.     15.  Ocuvite vitamins 1 tablet daily.      SOCIAL HISTORY:  Tobacco is negative.  Alcohol is negative.  She was employed for at least 25 years as a [employer] .      FAMILY HISTORY:  Significant for sister with CLL and nephew with kidney cancer.      REVIEW OF SYSTEMS:   CENTRAL NERVOUS SYSTEM:  Negative for headaches, change in mental status.   RESPIRATORY:  Negative for shortness of breath, cough, hemoptysis.   CARDIAC:  Negative chest pain, palpitations.   GASTROINTESTINAL:  Significant for  epigastric pain, nausea.  No diarrhea, no bright red blood per rectum, no hematemesis.   MUSCULOSKELETAL:  Significant for chronic back pain.   GENITOURINARY:  Negative for hematuria.   CONSTITUTIONAL:  Negative for fevers, night sweats.  She may have had a mild weight loss due to loss of appetite.      PHYSICAL EXAMINATION:   GENERAL:  She is a middle-aged white female in no acute distress.   VITAL SIGNS:  Reveal blood pressure 130/76, pulse 108, respirations 20, temperature 96.2.   HEENT:  Atraumatic, normocephalic.  Oropharynx nonerythematous.   NECK:  Supple, no thyromegaly.   LUNGS:  Clear to auscultation and percussion.   HEART:  Regular rhythm, S1, S2 normal.  No murmurs, rubs or gallops.   ABDOMEN:  Soft, there is epigastric tenderness to palpation, no rebound.  Normoactive bowel sounds.   LYMPHATICS:  No cervical, supraclavicular nodes, no inguinal nodes.   EXTREMITIES:  Without edema.   NEUROLOGIC:  Nonfocal.      LABORATORY DATA:  Reveal CBC:  White count 10.0, H&H 13 and 44.5, platelet count 388, BUN 10, creatinine 0.47.  LFTs are normal.  M spike is 0.  Ferritin 58.  Folate acid 31.6.  Iron 63, TIBC 217, percent sat 23%.  Kappa free light chains and lambda free light chains are normal.  Serum protein electrophoresis reveals 2 very small monoclonal IgM problems with lambda light chain type.  Urine for immunofixation reveals no monoclonal protein.      IMPRESSION:  Polyclonal gammopathy, no evidence of myeloma.  M spike is 0.  Serum kappa lambda light chains are normal.  Ratio was normal.  Suspect this is a reactive process due to inflammatory process in the GI tract.  The plan is to have the patient proceed with dilation as per Dr. Jackson.  Otherwise, no further workup is necessary.  We will see the patient on a p.r.n. basis.      Seventy minutes was spent with patient, greater than half the time spent in counseling and coordination of care.         CLAUDIA RUEDA MD             D: 01/08/2019    T: 2019   MT: JESSE      Name:     LEON VASQUEZ   MRN:      [id number]        Account:      [id number]   :      1950           Visit Date:   2019      Document: [id number]

## 2019-01-10 NOTE — PATIENT INSTRUCTIONS
1.  Take Levothyroxine AM of surgery  2. Stop Zofran,  Phenergan 25mg  Up to 3 times a day for nausea/vomiting.    3. Bentyl 10mg  4 times a day for stomach .      Before Your Surgery      Call your surgeon if there is any change in your health. This includes signs of a cold or flu (such as a sore throat, runny nose, cough, rash or fever).    Do not smoke, drink alcohol or take over the counter medicine (unless your surgeon or primary care doctor tells you to) for the 24 hours before and after surgery.    If you take prescribed drugs: Follow your doctor s orders about which medicines to take and which to stop until after surgery.    Eating and drinking prior to surgery: follow the instructions from your surgeon    Take a shower or bath the night before surgery. Use the soap your surgeon gave you to gently clean your skin. If you do not have soap from your surgeon, use your regular soap. Do not shave or scrub the surgery site.  Wear clean pajamas and have clean sheets on your bed.

## 2019-01-10 NOTE — PROGRESS NOTES
[facility] - [place]  [address]  [place] [address]  [phone]  Dept: [phone]    PRE-OP EVALUATION:  Today's date: 2019    Aure Lanier (: 1950) presents for pre-operative evaluation assessment as requested by Dr. Jackson.  She requires evaluation and anesthesia risk assessment prior to undergoing surgery/procedure for treatment of EGD, with stretching. .    Proposed Surgery/ Procedure:Upper Endoscopy  Date of Surgery/ Procedure: 2019  Time of Surgery/ Procedure: to be determined  Hospital/Surgical Facility: [facility]  Primary Physician: Jaden Lee  Type of Anesthesia Anticipated: to be determined    Patient has a Health Care Directive or Living Will:  NO    1. NO - Do you have a history of heart attack, stroke, stent, bypass or surgery on an artery in the head, neck, heart or legs?  2. NO - Do you ever have any pain or discomfort in your chest?  3. NO - Do you have a history of  Heart Failure?  4. NO - Are you troubled by shortness of breath when: walking on the level, up a slight hill or at night?  5. NO - Do you currently have a cold, bronchitis or other respiratory infection?  6. NO - Do you have a cough, shortness of breath or wheezing?  7. NO - Do you sometimes get pains in the calves of your legs when you walk?  8. NO - Do you or anyone in your family have previous history of blood clots?  9. NO - Do you or does anyone in your family have a serious bleeding problem such as prolonged bleeding following surgeries or cuts?  10. YES - HAVE YOU EVER HAD PROBLEMS WITH ANEMIA OR BEEN TOLD TO TAKE IRON PILLS? Not on iron now but has been   11. NO - Have you had any abnormal blood loss such as black, tarry or bloody stools, or abnormal vaginal bleeding?  12. NO - Have you ever had a blood transfusion?  13. YES - HAVE YOU OR ANY OF YOUR RELATIVES EVER HAD PROBLEMS WITH ANESTHESIA? Patient gets nauseated  14. NO - Do you have sleep apnea, excessive snoring or  daytime drowsiness?  15. NO - Do you have any prosthetic heart valves?  16. NO - Do you have prosthetic joints?  17. NO - Is there any chance that you may be pregnant?      HPI:     HPI related to upcoming procedure: Hx anastomotic ulceration  and stricture  Abdominal pain, nausea and vomiting.  Has lost 30 lbs.            MEDICAL HISTORY:     Patient Active Problem List    Diagnosis Date Noted     Hypokalemia 11/08/2018     Priority: Medium     Entered in error         Anemia, iron deficiency 11/08/2018     Priority: Medium     S/P total knee arthroplasty, right 09/04/2018     Priority: Medium     Status post total right knee replacement 09/04/2018     Priority: Medium     Status post total left knee replacement 04/17/2018     Priority: Medium     Chronic pain syndrome 07/13/2017     Priority: Medium     Patient is followed by Jaden Lee NP for ongoing prescription of pain medication.  All refills should only be approved by this provider, or covering partner.    Medication(s): Norco 7.5/325mg.   Maximum quantity per month: #120  Clinic visit frequency required: Q 3 months     Controlled substance agreement:  Encounter-Level CSA - 07/11/2017:          Controlled Substance Agreement - Scan on 7/20/2017 12:19 PM : CONTROLLED SUBSTANCE AGREEMENT (below)              Pain Clinic evaluation in the past: No    DIRE Total Score(s):  No flowsheet data found.    Last [facility] website verification:  Done on 8.22.18   [email or web address]         Back muscle spasm 05/09/2017     Priority: Medium     Bilateral chronic knee pain 04/14/2016     Priority: Medium     Both knees.         Hyperlipidemia with target LDL less than 100 07/03/2014     Priority: Medium     Diagnosis updated by automated process. Provider to review and confirm.       Low back pain 07/09/2013     Priority: Medium     Diagnosis updated by automated process. Provider to review and confirm.       Family history of other musculoskeletal  diseases(V17.89) 2012     Priority: Medium     Overview:    Brother with Myotonic Dystrophy    Aure's Myotonic Dystrophy testing was reported normal by [facility] lab for the DM1 with repeats of 5 and 26, DM2 with repeats of 140 and 140, and CLCN1 with no abnormal DNA sequence variants identified in the select exons analyzed (drawn 3-20-12).       Allergic arthritis involving hand 2011     Priority: Medium     Hypothyroidism 2011     Priority: Medium     Problem list name updated by automated process. Provider to review       Insomnia 2011     Priority: Medium     Problem list name updated by automated process. Provider to review       Headache 2011     Priority: Medium     Problem list name updated by automated process. Provider to review       Postherpetic polyneuropathy 2011     Priority: Medium     Dysthymia 2011     Priority: Medium     Anxiety state 2011     Priority: Medium     Problem list name updated by automated process. Provider to review       Hyperlipidemia 2011     Priority: Medium     Problem list name updated by automated process. Provider to review       Migraine 2011     Priority: Medium     Problem list name updated by automated process. Provider to review       Osteoporosis 2011     Priority: Medium     Problem list name updated by automated process. Provider to review       GERD 2011     Priority: Medium      Past Medical History:   Diagnosis Date     Allergic arthritis involving hand 2011     Anemia, Iron Deficiency 2011     Anxiety 2011     Contact dermatitis and other eczema, unspecified c 2011     Depression 2011     Edema 2011     Gastrointestinal mucositis (ulcerative) 2011     GERD 2011     Headache(784.0) 2011     Hypothyroidism 2011     Insomnia, unspecified 2011     Migraine 2011     Nonallopathic lesion of cervical region,  not elsewhere classified 10/16/2000     Osteoporosis 01/01/2011     Other and unspecified hyperlipidemia 01/01/2011     Other malaise and fatigue 08/27/2001     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     Past Surgical History:   Procedure Laterality Date     ARTHROPLASTY KNEE Left 4/17/2018    Procedure: ARTHROPLASTY KNEE;  LEFT TOTAL KNEE ARTHROPLASTY S/N JOURNEY II;  Surgeon: Ty Hernandez MD;  Location: [facility]     ARTHROPLASTY KNEE Right 9/4/2018    Procedure: ARTHROPLASTY KNEE;  RIGHT TOTAL KNEE ARTHROPLASTY ;  Surgeon: Ty Hernandez MD;  Location: [facility] OR     D & C       ESOPHAGOSCOPY, GASTROSCOPY, DUODENOSCOPY (EGD), COMBINED N/A 9/11/2017    Procedure: COMBINED ESOPHAGOSCOPY, GASTROSCOPY, DUODENOSCOPY (EGD);  Upper Endoscopy: Removal of Food Impaction;  Surgeon: Lino Zhu DO;  Location: [facility]     ESOPHAGOSCOPY, GASTROSCOPY, DUODENOSCOPY (EGD), COMBINED N/A 11/5/2018    Procedure: UPPER ENDOSCOPY WITH BIOPSY;  Surgeon: Dieudonne Jackson MD;  Location: [facility]     ESOPHAGOSCOPY, GASTROSCOPY, DUODENOSCOPY (EGD), COMBINED N/A 12/21/2018    Procedure: UPPER ENDOSCOPY WITH BALLOON DILATION, BIOPSY;  Surgeon: Dieudonne Jackson MD;  Location: [facility]     STOMACH SURGERY       stomach surgery peritinitis       Current Outpatient Medications   Medication Sig Dispense Refill     acetaminophen (TYLENOL) 325 MG tablet Take 2-3 tablets (650-975 mg) by mouth every 4 hours as needed for other (mild to moderate pain) (Patient not taking: Reported on 1/8/2019) 100 tablet      Calcium Citrate-Vitamin D (CALCIUM + D PO) Take 2 tablets by mouth daily as needed       CARAFATE 1 GM/10ML suspension TAKE 10MLS BY MOUTH FOUR TIMES DAILY 420 mL 11     cyanocolbalamin (VITAMIN  B-12) 1000 MCG tablet Take 1 tablet by mouth daily as needed        cyclobenzaprine (FLEXERIL) 10 MG tablet Take 1 tablet (10 mg) by mouth 3 times daily as needed for muscle spasms TAKE 1 TABLET BY MOUTH  Three  TIMES DAILY AS NEEDED 90 tablet 1      escitalopram (LEXAPRO) 20 MG tablet TAKE ONE-HALF TABLET BY MOUTH TWICE A DAY  1     gabapentin (NEURONTIN) 300 MG capsule Take 1 capsule (300 mg) by mouth 3 times daily 90 capsule 1     HYDROcodone-acetaminophen (NORCO) 7.5-325 MG per tablet Take 1 tablet by mouth every 6 hours as needed for severe pain 120 tablet 0     levothyroxine (SYNTHROID/LEVOTHROID) 150 MCG tablet TAKE 1 TABLET (150 MCG) BY MOUTH DAILY 90 tablet 1     Multiple Vitamins-Minerals (MULTIVITAMIN OR) Take 1 tablet by mouth daily as needed        multivitamin (OCUVITE) TABS Take 1 tablet by mouth daily as needed        NYAMYC [id number] UNIT/GM POWD APPLY TOPICALLY 2 TIMES DAILY AS NEEDED 60 g 1     ondansetron (ZOFRAN) 4 MG tablet TAKE 1 TABLET (4 MG) BY MOUTH 3 TIMES DAILY 48 tablet 11     pantoprazole (PROTONIX) 40 MG EC tablet TAKE 1 TABLET (40 MG) BY MOUTH DAILY TAKE 30-60 MINUTES BEFORE A MEAL. 30 tablet 5     simvastatin (ZOCOR) 40 MG tablet TAKE 1 TABLET BY MOUTH EVERY DAY IN THE EVENING . GENERIC FOR ZOCOR. 90 tablet 0     zolpidem (AMBIEN) 10 MG tablet Take 1 tablet (10 mg) by mouth nightly as needed for sleep Take 1 tablet at night for sleep-may repeat X1. 60 tablet 0     OTC products: None, except as noted above    Allergies   Allergen Reactions     Erythromycin Base [Kdc:Yellow Dye+Erythromycin+Brilliant Blue Fcf] Nausea and Vomiting     Penicillins Rash      Latex Allergy: NO    Social History     Tobacco Use     Smoking status: Never Smoker     Smokeless tobacco: Never Used     Tobacco comment: no passive exposure   Substance Use Topics     Alcohol use: Yes     Comment: rarely, maybe yearly     History   Drug Use No       REVIEW OF SYSTEMS:   Review of Systems   Constitutional: Negative for fatigue and fever.   HENT: Positive for rhinorrhea. Negative for congestion, ear pain, postnasal drip, sinus pressure, sore throat, tinnitus and trouble swallowing.    Eyes: Negative.    Respiratory: Negative for cough and shortness of breath.   "  Cardiovascular: Negative for chest pain, palpitations and leg swelling.   Gastrointestinal: Positive for abdominal pain, constipation, nausea and vomiting. Negative for blood in stool and diarrhea.        Has persistent nausea and vomiting,  Has gallbladder packed with stones on US.   EGD showed gastro duodenal anastomotic stricture which was dilated with balloon  .  Hx Gastric bypass   . Has lost 30lbs with nausea and daily vomiting. Eats only a banana and 2 eggs a day. Zofran has not been effective for her nausea.     Genitourinary: Negative for difficulty urinating.   Musculoskeletal: Positive for arthralgias, back pain, myalgias and neck pain.        Hx chronic neck and back pain. Has been on pain medication for years.     Skin: Negative for rash.   Neurological: Positive for headaches. Negative for dizziness, weakness and numbness.        Has had worse HA lately.     Hematological:        Hx polyclonal gammopathy +lambda chains. Dr. Solares felt to be a reactive process.     Psychiatric/Behavioral: Positive for dysphoric mood and sleep disturbance. The patient is nervous/anxious.        EXAM:   Vitals: /72   Pulse 125   Temp 96.1  F (35.6  C) (Tympanic)   Resp 18   Ht 1.575 m (5' 2\")   Wt 65.8 kg (145 lb)   SpO2 99%   BMI 26.52 kg/m    BMI= Body mass index is 26.52 kg/m .  Physical Exam   Constitutional: She is oriented to person, place, and time. No distress.   Has lost 30lbs from vomiting     HENT:   Right Ear: External ear normal.   Left Ear: External ear normal.   Nose: Nose normal.   Mouth/Throat: Oropharynx is clear and moist.   Eyes: Conjunctivae and EOM are normal. Pupils are equal, round, and reactive to light.   Neck: Normal range of motion. Neck supple. No thyromegaly present.   Cardiovascular: Normal rate, regular rhythm, normal heart sounds and intact distal pulses.   No murmur heard.  Pulmonary/Chest: Effort normal and breath sounds normal.   Abdominal: Soft. Bowel sounds are " normal. She exhibits no mass. There is tenderness.   Musculoskeletal: Normal range of motion. She exhibits no edema.   Lymphadenopathy:     She has no cervical adenopathy.   Neurological: She is alert and oriented to person, place, and time. She displays normal reflexes. No cranial nerve deficit.   Skin: Skin is warm and dry. Capillary refill takes less than 2 seconds.   Psychiatric: She has a normal mood and affect.     DIAGNOSTICS:     Results for orders placed or performed in visit on 01/11/19   Comprehensive metabolic panel (BMP + Alb, Alk Phos, ALT, AST, Total. Bili, TP)   Result Value Ref Range    Sodium 141 133 - 144 mmol/L    Potassium 3.4 3.4 - 5.3 mmol/L    Chloride 106 94 - 109 mmol/L    Carbon Dioxide 26 20 - 32 mmol/L    Anion Gap 9 3 - 14 mmol/L    Glucose 116 (H) 70 - 99 mg/dL    Urea Nitrogen 11 7 - 30 mg/dL    Creatinine 0.48 (L) 0.52 - 1.04 mg/dL    GFR Estimate >90 >60 mL/min/[1.73_m2]    GFR Estimate If Black >90 >60 mL/min/[1.73_m2]    Calcium 8.9 8.5 - 10.1 mg/dL    Bilirubin Total 0.3 0.2 - 1.3 mg/dL    Albumin 3.7 3.4 - 5.0 g/dL    Protein Total 6.9 6.8 - 8.8 g/dL    Alkaline Phosphatase 102 40 - 150 U/L    ALT 29 0 - 50 U/L    AST 17 0 - 45 U/L   CBC with platelets and differential   Result Value Ref Range    WBC 9.4 4.0 - 11.0 10e9/L    RBC Count 5.78 (H) 3.8 - 5.2 10e12/L    Hemoglobin 14.3 11.7 - 15.7 g/dL    Hematocrit 43.6 35.0 - 47.0 %    MCV 75 (L) 78 - 100 fl    MCH 24.7 (L) 26.5 - 33.0 pg    MCHC 32.8 31.5 - 36.5 g/dL    RDW 23.5 (H) 10.0 - 15.0 %    Platelet Count 362 150 - 450 10e9/L    Diff Method Automated Method     % Neutrophils 54.3 %    % Lymphocytes 34.8 %    % Monocytes 7.8 %    % Eosinophils 2.5 %    % Basophils 0.5 %    % Immature Granulocytes 0.1 %    Nucleated RBCs 0 0 /100    Absolute Neutrophil 5.1 1.6 - 8.3 10e9/L    Absolute Lymphocytes 3.3 0.8 - 5.3 10e9/L    Absolute Monocytes 0.7 0.0 - 1.3 10e9/L    Absolute Eosinophils 0.2 0.0 - 0.7 10e9/L    Absolute Basophils  0.1 0.0 - 0.2 10e9/L    Abs Immature Granulocytes 0.0 0 - 0.4 10e9/L    Absolute Nucleated RBC 0.0        Recent Labs   Lab Test 12/04/18  1712 11/29/18  1001  11/03/18  0315  09/19/18  1037   HGB 13.9  --   --  10.1*   < > 9.1*     --   --  465*   < > 751*   INR  --   --   --   --   --  1.25*    138   < > 140  --  138   POTASSIUM 3.3* 3.2*   < > 2.9*  --  3.4   CR 0.47* 0.61   < > 0.53  --  0.54    < > = values in this interval not displayed.           EKG Interpretation:      12/6/18   Symptoms at time of EKG: None   Rhythm: Normal sinus   Rate: Normal    Comparison to prior: Unchanged    Clinical Impression: no acute changes      IMPRESSION:   SURGERY: EGD with possible balloon of anastomosis.      The proposed surgical procedure is considered LOW risk.    REVISED CARDIAC RISK INDEX  The patient has the following serious cardiovascular risks for perioperative complications such as (MI, PE, VFib and 3  AV Block):  No serious cardiac risks  INTERPRETATION: 0 risks: Class I (very low risk - 0.4% complication rate)    The patient has the following additional risks for perioperative complications:  Anastomotic stricture  Prone to nausea.    Constipation    Chronic pain      ICD-10-CM    1. Preop general physical exam Z01.818    ASSESSMENT / PLAN:  (Z01.818) Preop general physical exam  (primary encounter diagnosis)  Comment:   Hemoglobin   Date Value Ref Range Status   01/11/2019 14.3 11.7 - 15.7 g/dL Final   ]  Potassium   Date Value Ref Range Status   01/11/2019 3.4 3.4 - 5.3 mmol/L Final       Plan: Comprehensive metabolic panel (BMP + Alb, Alk         Phos, ALT, AST, Total. Bili, TP), CBC with         platelets and differential,       (G47.00) Persistent insomnia  Comment: On Ambien 20mg a night. Cannot sleep without Ambien  Per patient.    Plan: zolpidem (AMBIEN) 10 MG tablet       (E87.6) Hypokalemia  Comment: Refuses to take oral potassium supplement because increases her stomach pain.  Eats a  banana a day    Potassium   Date Value Ref Range Status   01/11/2019 3.4 3.4 - 5.3 mmol/L Final       Plan: COntinue same.      (D50.9) Iron deficiency anemia, unspecified iron deficiency anemia type  Comment: last check in December=iron=63.  Ferritin was 358.        (G89.4) Chronic pain syndrome  Comment: Has been on Hydrocodone 7.5mg for years.  Denies upsets her stomach        (E03.8) Other specified hypothyroidism  Comment: On Levothyroxine  150mcg    TSH   Date Value Ref Range Status   10/24/2018 7.38 (H) 0.40 - 4.00 mU/L Final     Plan: Need to recheck TSH in near future.   Take Levothyroxine AM of surgery      (R51) Chronic nonintractable headache, unspecified headache type  Comment: Eminates from neck pain. On FLexeril 10mg TID, and hydrocodone 7.5 QID.        (R11.2) Nausea and vomiting, intractability of vomiting not specified, unspecified vomiting type  Comment: Will try Bentyl 10mg  4 times a day for calming stomach, and stop zofran, try Phenergan 25mg TID as needed for nausea and vomiting  . Discuss needs to eat more, and control constipation   Plan:promethazine         (PHENERGAN) 25 MG tablet, dicyclomine (BENTYL)         10 MG capsule          RECOMMENDATIONS:     Meds:  Take Levothyroxine AM of surgery.   APPROVAL GIVEN to proceed with proposed procedure, without further diagnostic evaluation       Signed Electronically by: Jaden Lee NP    Copy of this evaluation report is provided to requesting physician.    [facility] Preop Guidelines    Revised Cardiac Risk Index

## 2019-01-11 ENCOUNTER — OFFICE VISIT (OUTPATIENT)
Dept: INTERNAL MEDICINE | Facility: OTHER | Age: 69
End: 2019-01-11
Attending: NURSE PRACTITIONER
Payer: COMMERCIAL

## 2019-01-11 ENCOUNTER — ANESTHESIA EVENT (OUTPATIENT)
Dept: SURGERY | Facility: HOSPITAL | Age: 69
End: 2019-01-11
Payer: COMMERCIAL

## 2019-01-11 VITALS
SYSTOLIC BLOOD PRESSURE: 132 MMHG | HEIGHT: 62 IN | OXYGEN SATURATION: 99 % | WEIGHT: 145 LBS | BODY MASS INDEX: 26.68 KG/M2 | DIASTOLIC BLOOD PRESSURE: 72 MMHG | HEART RATE: 125 BPM | RESPIRATION RATE: 18 BRPM | TEMPERATURE: 96.1 F

## 2019-01-11 DIAGNOSIS — E87.6 HYPOKALEMIA: ICD-10-CM

## 2019-01-11 DIAGNOSIS — G47.00 PERSISTENT INSOMNIA: ICD-10-CM

## 2019-01-11 DIAGNOSIS — Z01.818 PREOP GENERAL PHYSICAL EXAM: Primary | ICD-10-CM

## 2019-01-11 DIAGNOSIS — R11.2 NAUSEA AND VOMITING, INTRACTABILITY OF VOMITING NOT SPECIFIED, UNSPECIFIED VOMITING TYPE: ICD-10-CM

## 2019-01-11 DIAGNOSIS — D50.9 IRON DEFICIENCY ANEMIA, UNSPECIFIED IRON DEFICIENCY ANEMIA TYPE: ICD-10-CM

## 2019-01-11 DIAGNOSIS — R51.9 CHRONIC NONINTRACTABLE HEADACHE, UNSPECIFIED HEADACHE TYPE: ICD-10-CM

## 2019-01-11 DIAGNOSIS — G89.29 CHRONIC NONINTRACTABLE HEADACHE, UNSPECIFIED HEADACHE TYPE: ICD-10-CM

## 2019-01-11 DIAGNOSIS — G89.4 CHRONIC PAIN SYNDROME: ICD-10-CM

## 2019-01-11 DIAGNOSIS — E03.8 OTHER SPECIFIED HYPOTHYROIDISM: ICD-10-CM

## 2019-01-11 LAB
ALBUMIN SERPL-MCNC: 3.7 G/DL (ref 3.4–5)
ALP SERPL-CCNC: 102 U/L (ref 40–150)
ALT SERPL W P-5'-P-CCNC: 29 U/L (ref 0–50)
ANION GAP SERPL CALCULATED.3IONS-SCNC: 9 MMOL/L (ref 3–14)
AST SERPL W P-5'-P-CCNC: 17 U/L (ref 0–45)
BASOPHILS # BLD AUTO: 0.1 10E9/L (ref 0–0.2)
BASOPHILS NFR BLD AUTO: 0.5 %
BILIRUB SERPL-MCNC: 0.3 MG/DL (ref 0.2–1.3)
BUN SERPL-MCNC: 11 MG/DL (ref 7–30)
CALCIUM SERPL-MCNC: 8.9 MG/DL (ref 8.5–10.1)
CHLORIDE SERPL-SCNC: 106 MMOL/L (ref 94–109)
CO2 SERPL-SCNC: 26 MMOL/L (ref 20–32)
CREAT SERPL-MCNC: 0.48 MG/DL (ref 0.52–1.04)
DIFFERENTIAL METHOD BLD: ABNORMAL
EOSINOPHIL # BLD AUTO: 0.2 10E9/L (ref 0–0.7)
EOSINOPHIL NFR BLD AUTO: 2.5 %
ERYTHROCYTE [DISTWIDTH] IN BLOOD BY AUTOMATED COUNT: 23.5 % (ref 10–15)
GFR SERPL CREATININE-BSD FRML MDRD: >90 ML/MIN/{1.73_M2}
GLUCOSE SERPL-MCNC: 116 MG/DL (ref 70–99)
HCT VFR BLD AUTO: 43.6 % (ref 35–47)
HGB BLD-MCNC: 14.3 G/DL (ref 11.7–15.7)
IMM GRANULOCYTES # BLD: 0 10E9/L (ref 0–0.4)
IMM GRANULOCYTES NFR BLD: 0.1 %
LYMPHOCYTES # BLD AUTO: 3.3 10E9/L (ref 0.8–5.3)
LYMPHOCYTES NFR BLD AUTO: 34.8 %
MCH RBC QN AUTO: 24.7 PG (ref 26.5–33)
MCHC RBC AUTO-ENTMCNC: 32.8 G/DL (ref 31.5–36.5)
MCV RBC AUTO: 75 FL (ref 78–100)
MONOCYTES # BLD AUTO: 0.7 10E9/L (ref 0–1.3)
MONOCYTES NFR BLD AUTO: 7.8 %
NEUTROPHILS # BLD AUTO: 5.1 10E9/L (ref 1.6–8.3)
NEUTROPHILS NFR BLD AUTO: 54.3 %
NRBC # BLD AUTO: 0 10*3/UL
NRBC BLD AUTO-RTO: 0 /100
PLATELET # BLD AUTO: 362 10E9/L (ref 150–450)
POTASSIUM SERPL-SCNC: 3.4 MMOL/L (ref 3.4–5.3)
PROT SERPL-MCNC: 6.9 G/DL (ref 6.8–8.8)
RBC # BLD AUTO: 5.78 10E12/L (ref 3.8–5.2)
SODIUM SERPL-SCNC: 141 MMOL/L (ref 133–144)
WBC # BLD AUTO: 9.4 10E9/L (ref 4–11)

## 2019-01-11 PROCEDURE — 99214 OFFICE O/P EST MOD 30 MIN: CPT | Performed by: NURSE PRACTITIONER

## 2019-01-11 PROCEDURE — 80053 COMPREHEN METABOLIC PANEL: CPT | Mod: ZL | Performed by: NURSE PRACTITIONER

## 2019-01-11 PROCEDURE — G0463 HOSPITAL OUTPT CLINIC VISIT: HCPCS

## 2019-01-11 PROCEDURE — 36415 COLL VENOUS BLD VENIPUNCTURE: CPT | Mod: ZL | Performed by: NURSE PRACTITIONER

## 2019-01-11 PROCEDURE — 85025 COMPLETE CBC W/AUTO DIFF WBC: CPT | Mod: ZL | Performed by: NURSE PRACTITIONER

## 2019-01-11 RX ORDER — PROMETHAZINE HYDROCHLORIDE 25 MG/1
TABLET ORAL
Qty: 90 TABLET | Refills: 0 | Status: SHIPPED | OUTPATIENT
Start: 2019-01-11 | End: 2019-02-06

## 2019-01-11 RX ORDER — DICYCLOMINE HYDROCHLORIDE 10 MG/1
10 CAPSULE ORAL
Qty: 120 CAPSULE | Refills: 3 | Status: SHIPPED | OUTPATIENT
Start: 2019-01-11 | End: 2019-05-14

## 2019-01-11 RX ORDER — ZOLPIDEM TARTRATE 10 MG/1
10 TABLET ORAL
Qty: 60 TABLET | Refills: 0 | Status: SHIPPED | OUTPATIENT
Start: 2019-01-11 | End: 2019-02-07

## 2019-01-11 ASSESSMENT — ENCOUNTER SYMPTOMS
COUGH: 0
ABDOMINAL PAIN: 1
SHORTNESS OF BREATH: 0
SINUS PRESSURE: 0
DIZZINESS: 0
CONSTIPATION: 1
FATIGUE: 0
HEADACHES: 1
DIARRHEA: 0
WEAKNESS: 0
NERVOUS/ANXIOUS: 1
TROUBLE SWALLOWING: 0
RHINORRHEA: 1
NUMBNESS: 0
SORE THROAT: 0
SLEEP DISTURBANCE: 1
NAUSEA: 1
EYES NEGATIVE: 1
BLOOD IN STOOL: 0
PALPITATIONS: 0
VOMITING: 1
DYSPHORIC MOOD: 1
DIFFICULTY URINATING: 0
FEVER: 0

## 2019-01-11 ASSESSMENT — PAIN SCALES - GENERAL: PAINLEVEL: MILD PAIN (2)

## 2019-01-11 ASSESSMENT — MIFFLIN-ST. JEOR: SCORE: 1150.97

## 2019-01-11 NOTE — ANESTHESIA PREPROCEDURE EVALUATION
Anesthesia Pre-Procedure Evaluation    Patient: Aure Lanier   MRN: [id number] : 1950          Preoperative Diagnosis: ANASTOMITIC ULCER/STRICTURE    Procedure(s):  UPPER ENDOSCOPY    Past Medical History:   Diagnosis Date     Allergic arthritis involving hand 2011     Anemia, Iron Deficiency 2011     Anxiety 2011     Contact dermatitis and other eczema, unspecified c 2011     Depression 2011     Edema 2011     Gastrointestinal mucositis (ulcerative) 2011     GERD 2011     Headache(784.0) 2011     Hypothyroidism 2011     Insomnia, unspecified 2011     Migraine 2011     Nonallopathic lesion of cervical region, not elsewhere classified 10/16/2000     Osteoporosis 2011     Other and unspecified hyperlipidemia 2011     Other malaise and fatigue 2001     Postherpetic polyneuropathy 2011     Past Surgical History:   Procedure Laterality Date     ARTHROPLASTY KNEE Left 2018    Procedure: ARTHROPLASTY KNEE;  LEFT TOTAL KNEE ARTHROPLASTY S/N JOURNEY II;  Surgeon: Ty Hernandez MD;  Location: [facility]     ARTHROPLASTY KNEE Right 2018    Procedure: ARTHROPLASTY KNEE;  RIGHT TOTAL KNEE ARTHROPLASTY ;  Surgeon: Ty Hernandez MD;  Location: [facility]     D & C       ESOPHAGOSCOPY, GASTROSCOPY, DUODENOSCOPY (EGD), COMBINED N/A 2017    Procedure: COMBINED ESOPHAGOSCOPY, GASTROSCOPY, DUODENOSCOPY (EGD);  Upper Endoscopy: Removal of Food Impaction;  Surgeon: Lino Zhu DO;  Location: [facility]     ESOPHAGOSCOPY, GASTROSCOPY, DUODENOSCOPY (EGD), COMBINED N/A 2018    Procedure: UPPER ENDOSCOPY WITH BIOPSY;  Surgeon: Dieudonne Jackson MD;  [facility]: [facility]     ESOPHAGOSCOPY, GASTROSCOPY, DUODENOSCOPY (EGD), COMBINED N/A 2018    Procedure: UPPER ENDOSCOPY WITH BALLOON DILATION, BIOPSY;  Surgeon: Dieudonne Jackson MD;  Location: [facility]     STOMACH SURGERY       stomach surgery peritinitis          Anesthesia Evaluation     . Pt has had prior anesthetic.     No history of anesthetic complications          ROS/MED HX    ENT/Pulmonary:     (+)allergic rhinitis, , . .    Neurologic:     (+)neuropathy - postherpetic polyneuropathy, migraines,     Cardiovascular:     (+) Dyslipidemia, ----. : . . . :. . Previous cardiac testing date:results:date: results:ECG reviewed date:12/6/18 results:nsr date: results:          METS/Exercise Tolerance:     Hematologic:     (+) Anemia, -      Musculoskeletal:   (+) arthritis, , , -       GI/Hepatic:     (+) GERD       Renal/Genitourinary:         Endo:     (+) thyroid problem hypothyroidism, .      Psychiatric:     (+) psychiatric history anxiety and depression      Infectious Disease:         Malignancy:         Other:    (+) H/O Chronic Pain,                        Physical Exam      Airway   Mallampati: III  TM distance: >3 FB  Neck ROM: full    Dental   (+) missing and chipped    Cardiovascular   Rhythm and rate: regular and normal      Pulmonary    breath sounds clear to auscultation            Lab Results   Component Value Date    WBC 10.0 12/04/2018    HGB 13.9 12/04/2018    HCT 44.5 12/04/2018     12/04/2018    CRP <2.9 11/03/2018    SED 22 11/03/2018     12/04/2018    POTASSIUM 3.3 (L) 12/04/2018    CHLORIDE 107 12/04/2018    CO2 26 12/04/2018    BUN 10 12/04/2018    CR 0.47 (L) 12/04/2018    GLC 89 12/04/2018    SHAYY 9.5 12/04/2018    MAG 2.1 11/29/2018    ALBUMIN 4.1 12/04/2018    PROTTOTAL 8.1 12/04/2018    ALT 28 12/04/2018    AST 19 12/04/2018    ALKPHOS 90 12/04/2018    BILITOTAL 0.2 12/04/2018    LIPASE 36 (L) 11/03/2018    AMYLASE 16 (L) 09/19/2018    PTT 31 09/19/2018    INR 1.25 (H) 09/19/2018    TSH 7.38 (H) 10/24/2018    T4 0.86 10/24/2018       Preop Vitals  BP Readings from Last 3 Encounters:   01/08/19 132/76   12/21/18 129/77   12/06/18 138/70    Pulse Readings from Last 3 Encounters:   01/08/19 108   12/06/18 99   12/04/18 113     "  Resp Readings from Last 3 Encounters:   01/08/19 20   12/21/18 16   12/06/18 18    SpO2 Readings from Last 3 Encounters:   01/08/19 99%   12/21/18 98%   12/06/18 99%      Temp Readings from Last 1 Encounters:   01/08/19 96.2  F (35.7  C) (Tympanic)    Ht Readings from Last 1 Encounters:   01/08/19 1.575 m (5' 2\")      Wt Readings from Last 1 Encounters:   01/08/19 67.6 kg (149 lb)    Estimated body mass index is 27.25 kg/m  as calculated from the following:    Height as of 1/8/19: 1.575 m (5' 2\").    Weight as of 1/8/19: 67.6 kg (149 lb).       Anesthesia Plan      History & Physical Review  History and physical reviewed and following examination; no interval change.    ASA Status:  3 .        Plan for MAC with Intravenous and Propofol induction. Maintenance will be TIVA.  Reason for MAC:  Chronic cardiopulmonary disease (G9), Procedure to face, neck, head or breast and Other - see comments  PONV prophylaxis:  Ondansetron (or other 5HT-3)  Hp 1/11/19  Surgeon requests deep sedation. Patient is an ASA 3. Will provide MAC.      Postoperative Care  Postoperative pain management:  IV analgesics.      Consents  Anesthetic plan, risks, benefits and alternatives discussed with:  Patient..                 ASHLEIGH Eugene CRNA  "

## 2019-01-11 NOTE — NURSING NOTE
"Chief Complaint   Patient presents with     Pre-Op Exam       Initial /72   Pulse 125   Temp 96.1  F (35.6  C) (Tympanic)   Resp 18   Ht 1.575 m (5' 2\")   Wt 65.8 kg (145 lb)   SpO2 99%   BMI 26.52 kg/m   Estimated body mass index is 26.52 kg/m  as calculated from the following:    Height as of this encounter: 1.575 m (5' 2\").    Weight as of this encounter: 65.8 kg (145 lb).  Medication Reconciliation: complete    Elly Arredondo LPN  "

## 2019-01-12 ASSESSMENT — ENCOUNTER SYMPTOMS
MYALGIAS: 1
BACK PAIN: 1
ARTHRALGIAS: 1
NECK PAIN: 1

## 2019-01-14 ENCOUNTER — ANESTHESIA (OUTPATIENT)
Dept: SURGERY | Facility: HOSPITAL | Age: 69
End: 2019-01-14
Payer: COMMERCIAL

## 2019-01-14 ENCOUNTER — TELEPHONE (OUTPATIENT)
Dept: INTERNAL MEDICINE | Facility: OTHER | Age: 69
End: 2019-01-14

## 2019-01-14 ENCOUNTER — HOSPITAL ENCOUNTER (OUTPATIENT)
Facility: HOSPITAL | Age: 69
Discharge: HOME | End: 2019-01-14
Attending: SURGERY | Admitting: SURGERY
Payer: COMMERCIAL

## 2019-01-14 VITALS
TEMPERATURE: 97 F | BODY MASS INDEX: 26.87 KG/M2 | RESPIRATION RATE: 16 BRPM | DIASTOLIC BLOOD PRESSURE: 80 MMHG | OXYGEN SATURATION: 100 % | SYSTOLIC BLOOD PRESSURE: 149 MMHG | WEIGHT: 146 LBS | HEIGHT: 62 IN

## 2019-01-14 PROCEDURE — 25000125 ZZHC RX 250: Performed by: NURSE ANESTHETIST, CERTIFIED REGISTERED

## 2019-01-14 PROCEDURE — 43235 EGD DIAGNOSTIC BRUSH WASH: CPT | Performed by: SURGERY

## 2019-01-14 PROCEDURE — 25000128 H RX IP 250 OP 636: Performed by: NURSE ANESTHETIST, CERTIFIED REGISTERED

## 2019-01-14 PROCEDURE — 71000027 ZZH RECOVERY PHASE 2 EACH 15 MINS: Performed by: SURGERY

## 2019-01-14 PROCEDURE — 25000125 ZZHC RX 250: Performed by: SURGERY

## 2019-01-14 PROCEDURE — 43239 EGD BIOPSY SINGLE/MULTIPLE: CPT | Performed by: ANESTHESIOLOGY

## 2019-01-14 PROCEDURE — 36000050 ZZH SURGERY LEVEL 2 1ST 30 MIN: Performed by: SURGERY

## 2019-01-14 PROCEDURE — 37000008 ZZH ANESTHESIA TECHNICAL FEE, 1ST 30 MIN: Performed by: SURGERY

## 2019-01-14 PROCEDURE — 40000305 ZZH STATISTIC PRE PROC ASSESS I: Performed by: SURGERY

## 2019-01-14 PROCEDURE — 43239 EGD BIOPSY SINGLE/MULTIPLE: CPT | Performed by: NURSE ANESTHETIST, CERTIFIED REGISTERED

## 2019-01-14 PROCEDURE — 27210794 ZZH OR GENERAL SUPPLY STERILE: Performed by: SURGERY

## 2019-01-14 RX ORDER — SODIUM CHLORIDE, SODIUM LACTATE, POTASSIUM CHLORIDE, CALCIUM CHLORIDE 600; 310; 30; 20 MG/100ML; MG/100ML; MG/100ML; MG/100ML
INJECTION, SOLUTION INTRAVENOUS CONTINUOUS
Status: DISCONTINUED | OUTPATIENT
Start: 2019-01-14 | End: 2019-01-14 | Stop reason: HOSPADM

## 2019-01-14 RX ORDER — NALOXONE HYDROCHLORIDE 0.4 MG/ML
.1-.4 INJECTION, SOLUTION INTRAMUSCULAR; INTRAVENOUS; SUBCUTANEOUS
Status: DISCONTINUED | OUTPATIENT
Start: 2019-01-14 | End: 2019-01-14 | Stop reason: HOSPADM

## 2019-01-14 RX ORDER — ESMOLOL HYDROCHLORIDE 10 MG/ML
INJECTION INTRAVENOUS PRN
Status: DISCONTINUED | OUTPATIENT
Start: 2019-01-14 | End: 2019-01-14

## 2019-01-14 RX ORDER — MEPERIDINE HYDROCHLORIDE 50 MG/ML
12.5 INJECTION INTRAMUSCULAR; INTRAVENOUS; SUBCUTANEOUS
Status: DISCONTINUED | OUTPATIENT
Start: 2019-01-14 | End: 2019-01-14 | Stop reason: HOSPADM

## 2019-01-14 RX ORDER — ONDANSETRON 2 MG/ML
4 INJECTION INTRAMUSCULAR; INTRAVENOUS EVERY 30 MIN PRN
Status: DISCONTINUED | OUTPATIENT
Start: 2019-01-14 | End: 2019-01-14 | Stop reason: HOSPADM

## 2019-01-14 RX ORDER — PROPOFOL 10 MG/ML
INJECTION, EMULSION INTRAVENOUS PRN
Status: DISCONTINUED | OUTPATIENT
Start: 2019-01-14 | End: 2019-01-14

## 2019-01-14 RX ORDER — NALOXONE HYDROCHLORIDE 0.4 MG/ML
.1-.4 INJECTION, SOLUTION INTRAMUSCULAR; INTRAVENOUS; SUBCUTANEOUS
Status: DISCONTINUED | OUTPATIENT
Start: 2019-01-14 | End: 2019-01-14

## 2019-01-14 RX ORDER — LIDOCAINE 40 MG/G
CREAM TOPICAL
Status: DISCONTINUED | OUTPATIENT
Start: 2019-01-14 | End: 2019-01-14 | Stop reason: HOSPADM

## 2019-01-14 RX ORDER — ONDANSETRON 4 MG/1
4 TABLET, ORALLY DISINTEGRATING ORAL EVERY 30 MIN PRN
Status: DISCONTINUED | OUTPATIENT
Start: 2019-01-14 | End: 2019-01-14 | Stop reason: HOSPADM

## 2019-01-14 RX ORDER — FLUMAZENIL 0.1 MG/ML
0.2 INJECTION, SOLUTION INTRAVENOUS
Status: DISCONTINUED | OUTPATIENT
Start: 2019-01-14 | End: 2019-01-14 | Stop reason: HOSPADM

## 2019-01-14 RX ADMIN — PROPOFOL 20 MG: 10 INJECTION, EMULSION INTRAVENOUS at 09:21

## 2019-01-14 RX ADMIN — PROPOFOL 20 MG: 10 INJECTION, EMULSION INTRAVENOUS at 09:10

## 2019-01-14 RX ADMIN — ESMOLOL HYDROCHLORIDE 10 MG: 10 INJECTION, SOLUTION INTRAVENOUS at 09:24

## 2019-01-14 RX ADMIN — ESMOLOL HYDROCHLORIDE 10 MG: 10 INJECTION, SOLUTION INTRAVENOUS at 09:13

## 2019-01-14 RX ADMIN — PROPOFOL 20 MG: 10 INJECTION, EMULSION INTRAVENOUS at 09:13

## 2019-01-14 RX ADMIN — PROPOFOL 20 MG: 10 INJECTION, EMULSION INTRAVENOUS at 09:19

## 2019-01-14 RX ADMIN — PROPOFOL 20 MG: 10 INJECTION, EMULSION INTRAVENOUS at 09:23

## 2019-01-14 RX ADMIN — PROPOFOL 20 MG: 10 INJECTION, EMULSION INTRAVENOUS at 09:25

## 2019-01-14 RX ADMIN — PROPOFOL 20 MG: 10 INJECTION, EMULSION INTRAVENOUS at 09:18

## 2019-01-14 RX ADMIN — PROPOFOL 20 MG: 10 INJECTION, EMULSION INTRAVENOUS at 09:15

## 2019-01-14 RX ADMIN — PROPOFOL 20 MG: 10 INJECTION, EMULSION INTRAVENOUS at 09:22

## 2019-01-14 RX ADMIN — PROPOFOL 20 MG: 10 INJECTION, EMULSION INTRAVENOUS at 09:17

## 2019-01-14 RX ADMIN — SODIUM CHLORIDE, POTASSIUM CHLORIDE, SODIUM LACTATE AND CALCIUM CHLORIDE: 600; 310; 30; 20 INJECTION, SOLUTION INTRAVENOUS at 08:31

## 2019-01-14 RX ADMIN — PROPOFOL 20 MG: 10 INJECTION, EMULSION INTRAVENOUS at 09:12

## 2019-01-14 RX ADMIN — PROPOFOL 20 MG: 10 INJECTION, EMULSION INTRAVENOUS at 09:11

## 2019-01-14 RX ADMIN — PROPOFOL 50 MG: 10 INJECTION, EMULSION INTRAVENOUS at 09:07

## 2019-01-14 RX ADMIN — PROPOFOL 20 MG: 10 INJECTION, EMULSION INTRAVENOUS at 09:20

## 2019-01-14 RX ADMIN — PROPOFOL 20 MG: 10 INJECTION, EMULSION INTRAVENOUS at 09:16

## 2019-01-14 RX ADMIN — PROPOFOL 20 MG: 10 INJECTION, EMULSION INTRAVENOUS at 09:14

## 2019-01-14 ASSESSMENT — MIFFLIN-ST. JEOR: SCORE: 1155.5

## 2019-01-14 NOTE — OR NURSING
Pt eating, drinking, and tolerating well. Denies pain. Discharge instructions discussed with Pt and sister. IV removed. Dressed independently, and escorted to [facility] exit via ambulatory. Светлана 20.

## 2019-01-14 NOTE — OP NOTE
Aure Lanier MRN# [id number]   YOB: 1950 Age: 68 year old      Date of Admission:  1/14/2019    Primary care provider: Jaden Lee    PREOPERATIVE DIAGNOSIS:  Anastomotic ulceration and stricture      POSTOPERATIVE DIAGNOSES:    Anastomotic ulceration, gastric food impaction, esophageal irritation      PROCEDURE:  Esophagogastroduodenoscopy with endoscopic oral gastric tube placement, gastric lavage      HISTORY OF PRESENT ILLNESS:  This 68 year old female developed anastomotic ulceration from a previous Bilroth I. She has had ongoing nausea requiring endoscopic evaluation.     OPERATIVE FINDINGS:  The gastroesophageal junction was noted 36 cm from the incisors.  The Z line was irregular with changes suggesting reflux.  There was evidence of ulceration or stricture.  There was extensive amount of food in the stomach.     Specimen(s) submitted to pathology:  None    PROCEDURE:  With the patient in the supine position on the transport cart, IV sedation was administered by the nurse anesthetist.  Her correct identity and planned procedure were confirmed during a requisite timeout pause.  A bite block was placed and the fiberoptic endoscope was introduced and negotiated through the cricopharyngeus without difficulty.  The length of the esophagus was examined with findings of diffuse irritation and mucosal changes.  The gastroesophageal junction was noted 36 cm from the incisors; the Z line was irregular without ulceration, bleeding source or stricture.  There was no  evidence of Francisco's change.  The stomach was entered and there was extensive amount of food in the stomach. I attempted irrigation through the scope but was unsuccessful. An orogastric tube was then placed endoscopically and I performed gastric lavage, but was still unsuccessful to visualize the previous anastomosis. At this point the procedure was completed.  A second circumferential examination of the esophagus was performed on slow  withdrawal of the endoscope without additional finding.  The procedure was satisfactorially tolerated; there was no appreciable blood loss and the patient was returned to Day Surgery in good condition.      Dieudonne Jackson MD  1/14/2019  9:33 AM

## 2019-01-14 NOTE — DISCHARGE INSTRUCTIONS
UPPER ENDOSCOPY    PURPOSE:  An Upper Endoscopy is a procedure in which the doctor passes a flexible, lighted tube called a gastroscope through your mouth into your stomach in order to:    See the lining of the esophagus, stomach, and duodenum (first part of the small intestine).    Look for bleeding, inflammation (swelling), abnormal tumors or tissue, or ulcers.    Take biopsy specimens.  (Biopsies do not hurt.)    TELL YOUR DOCTOR IF:     You have a heart condition, heart murmur, or have had heart valve surgery.    You have had angioplasty with stents put in within the last year.    You are taking blood thinners - Aspirin, Anti-inflammatory pain pills (like Motrin, ibuprofen, Naproxen, Feldene, Advil, etc.), Coumadin, Plavix.    You have diabetes - contact your regular doctor for management of your insulin.    YOU NEED TO:    Have someone drive you home.  You are not allowed to drive until the next day.  (If you take a taxi, the person staying with you after surgery must ride with you in the taxi.  The  is not responsible for you).    Have someone stay with you for four (4) hours after you leave the hospital.    Know the time to be at admitting:  A nurse will call you with the time the afternoon before your procedure.  If your procedure is on Monday, you will be called on Friday.  If you have not been contacted with the time, call the Admitting Department at [phone] or [phone], ext. [phone] after 5 pm (Admitting is open 24 hours daily).    Talk with the Surgery Patient Education Nurses.  Please call them @ [phone] or toll free [phone] after 8:00 a.m. Monday through Friday.    TO PREPARE FOR THE PROCEDURE:      ONE WEEK BEFORE:    Stop Aspirin, anti-inflammatory pain pills (Motrin, ibuprofen, Naproxen, Feldene, Advil, etc.).  It is OK to take Tylenol (acetaminophen).    Your doctor will tell you what to do if you are on Coumadin or Plavix.     NIGHT BEFORE:    Do not eat or drink  anything after midnight.  Your stomach needs to be empty.     DAY OF YOUR PROCEDURE:    Take your pills you were told to take.  Do not take diabetic pills.  If you are on Insulin, take the dose your doctor told you to take.    Wear loose, comfortable clothing and shoes.    Remove ALL jewelry including wedding rings.  Leave valuables at home.    Come to the hospital Admitting Department located at the [facility] on the lower level.    In Same Day surgery, you will be asked to change into an exam gown.    You will be asked your name, birth date, and what you are having done by every person who is involved with your care.    Your health history, medications, and allergies will be reviewed and verified.    An IV (intravenous line) will be started in your hand.  DURING THE UPPER ENDOSCOPY:    Your doctor and a registered nurse will be with you throughout the procedure which takes about 30 minutes.    You will either lie on your left side or on your back.    Medications will be given to you to help you relax and reduce the gag reflex when swallowing the scope.    Your doctor may need to insert air into your stomach to see better.  This may cause fullness or a cramping sensation.  The air will be removed at the end of the exam.    AFTER THE PROCEDURE    You will return to Same Day Surgery to rest for about an hour before you go home.    The doctor will talk with you and your family.    A family member/friend may visit with you.    You may burp up any air remaining in your stomach.    You may feel dizzy or light-headed from the medicine.    Your nurse will go over the discharge instructions with you and your caregiver and answer any of your questions.      You will be contacted the next day to check on how you are doing.    If biopsies were taken, you will be contacted with the results usually within 3 days.  BACK AT HOME    Rest for an hour or two after you get home.    When your throat is no longer numb and you have a  gag reflex, take a few sips of cool water.  If you can swallow comfortably, you may start eating again.    You may have a mild sore throat for the rest of the day.  WHAT TO WATCH FOR:  Problems rarely occur after the exam, but it is important to be aware of the early signs of a complication.  Call your doctor immediately if you have:    Difficulty swallowing or breathing    Unusual pain in your stomach or chest    Vomiting blood or dark material that looks like coffee grounds    Black or tarry stools    Temperature over 100.6 degrees    MORE QUESTIONS?  Please ask your doctor or nurse before the exam begins  or call your doctor at the clinic.    IF YOU MUST CANCEL YOUR PROCEDURE THE EVENING/NIGHT BEFORE, PLEASE CALL HOSPITAL ADMITTING -[phone] OR TOLL FREE [phone], EXT. [phone].    Phone Numbers:  Hospital - [phone]  [facility] - [phone]  Surgery Patient Education - [phone] or toll free [phone]    Post-Anesthesia Patient Instructions    IMMEDIATELY FOLLOWING SURGERY:  Do not drive or operate machinery for the first twenty four hours after surgery.  Do not make any important decisions for twenty four hours after surgery or while taking narcotic pain medications or sedatives.  If you develop intractable nausea and vomiting or a severe headache please notify your doctor immediately.    FOLLOW-UP:  Please make an appointment with your surgeon as instructed. You do not need to follow up with anesthesia unless specifically instructed to do so.    WOUND CARE INSTRUCTIONS (if applicable):  Keep a dry clean dressing on the anesthesia/puncture wound site if there is drainage.  Once the wound has quit draining you may leave it open to air.  Generally you should leave the bandage intact for twenty four hours unless there is drainage.  If the epidural site drains for more than 36-48 hours please call the anesthesia department.    QUESTIONS?:  Please feel free to call your  physician or the hospital  if you have any questions, and they will be happy to assist you.

## 2019-01-14 NOTE — ANESTHESIA POSTPROCEDURE EVALUATION
Patient: Aure Lanier    Procedure(s):  UPPER ENDOSCOPY WITH ENDOSCOPIC PLACEMENT OF OG AND GASTRIC LAVAGE    Diagnosis:ANASTOMITIC ULCER/STRICTURE  Diagnosis Additional Information: No value filed.    Anesthesia Type:  MAC    Note:  Anesthesia Post Evaluation    Patient location during evaluation: Phase 2 and Bedside  Patient participation: Able to fully participate in evaluation  Level of consciousness: awake and alert  Pain management: adequate  Airway patency: patent  Cardiovascular status: acceptable  Respiratory status: acceptable  Hydration status: stable  PONV: none     Anesthetic complications: None          Last vitals:  Vitals:    01/14/19 0823 01/14/19 0935   BP: 134/80 125/78   Resp: 16 14   Temp: 97  F (36.1  C)    SpO2: 98% 97%         Electronically Signed By: Kendall Torres MD  January 14, 2019  9:53 AM

## 2019-01-14 NOTE — TELEPHONE ENCOUNTER
APPROVAL 1/14/19 Received approval from [facility] for Promethazine. Approval dates 12/30/18 thru 12/31/19. Pharmacy advised. Forms scanned to [employer].

## 2019-01-14 NOTE — ANESTHESIA CARE TRANSFER NOTE
Patient: Aure Lanier    Procedure(s):  UPPER ENDOSCOPY WITH ENDOSCOPIC PLACEMENT OF OG AND GASTRIC LAVAGE    Diagnosis: ANASTOMITIC ULCER/STRICTURE  Diagnosis Additional Information: No value filed.    Anesthesia Type:   MAC     Note:  Airway :Nasal Cannula  Patient transferred to:Phase II  Handoff Report: Identifed the Patient, Identified the Reponsible Provider, Reviewed the pertinent medical history, Discussed the surgical course, Reviewed Intra-OP anesthesia mangement and issues during anesthesia, Set expectations for post-procedure period and Allowed opportunity for questions and acknowledgement of understanding      Vitals: (Last set prior to Anesthesia Care Transfer)    CRNA VITALS  1/14/2019 0902 - 1/14/2019 0933      1/14/2019             Pulse:  87    SpO2:  100 %    Resp Rate (set):  8                Electronically Signed By: ASHLEIGH Carbajal CRNA  January 14, 2019  9:33 AM

## 2019-01-14 NOTE — TELEPHONE ENCOUNTER
1/14/19 Received PA request from Dipesh Islas for Promethazine 25mg tablets. Submitted as urgent request through [facility]. Waiting for response.

## 2019-01-15 DIAGNOSIS — K25.3 ACUTE GASTRIC ULCER WITHOUT HEMORRHAGE OR PERFORATION: Primary | ICD-10-CM

## 2019-01-29 ENCOUNTER — OFFICE VISIT (OUTPATIENT)
Dept: SURGERY | Facility: OTHER | Age: 69
End: 2019-01-29
Attending: SURGERY
Payer: COMMERCIAL

## 2019-01-29 VITALS
HEIGHT: 62 IN | SYSTOLIC BLOOD PRESSURE: 118 MMHG | TEMPERATURE: 96.1 F | HEART RATE: 104 BPM | DIASTOLIC BLOOD PRESSURE: 74 MMHG | WEIGHT: 148 LBS | BODY MASS INDEX: 27.23 KG/M2 | OXYGEN SATURATION: 98 %

## 2019-01-29 DIAGNOSIS — K31.1 PARTIAL GASTRIC OUTLET OBSTRUCTION: Primary | ICD-10-CM

## 2019-01-29 DIAGNOSIS — M25.561 ACUTE PAIN OF RIGHT KNEE: ICD-10-CM

## 2019-01-29 PROCEDURE — G0463 HOSPITAL OUTPT CLINIC VISIT: HCPCS

## 2019-01-29 PROCEDURE — 99213 OFFICE O/P EST LOW 20 MIN: CPT | Performed by: SURGERY

## 2019-01-29 RX ORDER — HYDROCODONE BITARTRATE AND ACETAMINOPHEN 7.5; 325 MG/1; MG/1
1 TABLET ORAL EVERY 6 HOURS PRN
Qty: 120 TABLET | Refills: 0 | Status: SHIPPED | OUTPATIENT
Start: 2019-02-02 | End: 2019-02-28

## 2019-01-29 ASSESSMENT — MIFFLIN-ST. JEOR: SCORE: 1164.57

## 2019-01-29 ASSESSMENT — PAIN SCALES - GENERAL: PAINLEVEL: MILD PAIN (2)

## 2019-01-29 NOTE — PATIENT INSTRUCTIONS
Thank you for allowing Dr. Jackson and our surgical team to participate in your care. Please call with any scheduling questions or concerns to our health unit coordinator at [phone] or for nursing questions to nurse at [phone].

## 2019-01-29 NOTE — TELEPHONE ENCOUNTER
HYDROcodone-acetaminophen (NORCO) 7.5-325 MG per tablet  Last Written Prescription Date:  1/3/19  Last Fill Quantity: 120,   # refills: 0  Last Office Visit: 1/11/19  Future Office visit:       Routing refill request to provider for review/approval because:  Drug not on the [facility], P or [facility] refill protocol or controlled substance

## 2019-01-29 NOTE — NURSING NOTE
"Chief Complaint   Patient presents with     Surgical Followup     EGD with endoscopic oral gastic tube placement, gastric lavage 1/14/19       Initial /74 (BP Location: Right arm, Patient Position: Chair, Cuff Size: Adult Regular)   Pulse 104   Temp 96.1  F (35.6  C) (Tympanic)   Ht 1.575 m (5' 2\")   Wt 67.1 kg (148 lb)   SpO2 98%   BMI 27.07 kg/m   Estimated body mass index is 27.07 kg/m  as calculated from the following:    Height as of this encounter: 1.575 m (5' 2\").    Weight as of this encounter: 67.1 kg (148 lb).  Medication Reconciliation: complete    Simona Darden LPN    "

## 2019-01-29 NOTE — PROGRESS NOTES
"SUBJECTIVE:  Mrs. Lanier presents after her recent EGD for discussion of further treatments. She continues to have problems with nausea and is vomiting. She says that after her initial dilation she had no symptomatic improvement. She then had a second endoscopic procedure performed and was unable to undergo dilation of her gastroduodenal anastomosis due to the extent of food in her stomach. She says that she has been continually loosing weight and has never had these problems prior to her orthopedic procedure early last year.     OBJECTIVE:  /74 (BP Location: Right arm, Patient Position: Chair, Cuff Size: Adult Regular)   Pulse 104   Temp 96.1  F (35.6  C) (Tympanic)   Ht 1.575 m (5' 2\")   Wt 67.1 kg (148 lb)   SpO2 98%   BMI 27.07 kg/m      Gen: AAA, NAD    ASSESSMENT/PLAN:  67 yo female with partial gastric outlet obstruction from anastomotic ulceration NSAID induced    I discussed with the patient that it appears unfortunately she will most likely not benefit from further endoscopic procedures and will require revision of her gastroduodenal, Billroth I reconstruction.  I discussed with her at this time that it appears that even though she has been on Protonix and has had undergone dilation x1 she still continues to have problems emptying her stomach with persistent nausea and emesis.  I recommended surgical revision with possible gastric bypass, gastric loop jejunostomy, Billroth II reconstruction.  After considerable discussion with the patient she is still very hesitant about operative revision.  She states that she does not want any operation and would like to do everything possible prior to having an operation performed.  With this I discussed that we can place a referral to gastroenterology in [place] for possible endoscopic procedures and treatment.  A referral will be placed.  She can call us in the interim with any questions or concerns.  After potential therapeutic treatment with " gastroenterology in [place] if she continues to have problems I recommend that she call our office and we can have that further discussion. All questions and concerns were addressed with adequate time spent answering all concerns.    Dieudonne Jackson MD  1/29/2019      CC: Jdaen Lee

## 2019-02-01 ENCOUNTER — TRANSFERRED RECORDS (OUTPATIENT)
Dept: HEALTH INFORMATION MANAGEMENT | Facility: CLINIC | Age: 69
End: 2019-02-01

## 2019-02-06 DIAGNOSIS — Z01.818 PREOP GENERAL PHYSICAL EXAM: ICD-10-CM

## 2019-02-07 DIAGNOSIS — G47.00 PERSISTENT INSOMNIA: ICD-10-CM

## 2019-02-07 RX ORDER — ZOLPIDEM TARTRATE 10 MG/1
10 TABLET ORAL
Qty: 60 TABLET | Refills: 0 | Status: SHIPPED | OUTPATIENT
Start: 2019-02-07 | End: 2019-03-08

## 2019-02-07 RX ORDER — PROMETHAZINE HYDROCHLORIDE 25 MG/1
TABLET ORAL
Qty: 90 TABLET | Refills: 1 | Status: SHIPPED | OUTPATIENT
Start: 2019-02-07 | End: 2019-04-01

## 2019-02-07 NOTE — TELEPHONE ENCOUNTER
zolpidem (AMBIEN) 10 MG tablet  Last Written Prescription Date:  1/11/19  Last Fill Quantity: 60,   # refills: 0  Last Office Visit: 1/11/19  Future Office visit:       Routing refill request to provider for review/approval because:  Drug not on the [facility], [facility] or [facility] refill protocol or controlled substance

## 2019-02-26 ENCOUNTER — TRANSFERRED RECORDS (OUTPATIENT)
Dept: HEALTH INFORMATION MANAGEMENT | Facility: CLINIC | Age: 69
End: 2019-02-26

## 2019-02-27 DIAGNOSIS — M25.561 ACUTE PAIN OF RIGHT KNEE: ICD-10-CM

## 2019-02-27 NOTE — TELEPHONE ENCOUNTER
norco      Last Written Prescription Date:  2/2/19  Last Fill Quantity: 120,   # refills: 0  Last Office Visit: 1/11/19  Future Office visit:    Next 5 appointments (look out 90 days)    Mar 12, 2019  3:00 PM CDT  (Arrive by 2:45 PM)  Pre-Op physical with Jaden Lee NP  [facility] ([facility] ) [address]  [place] [address]  [phone]           Routing refill request to provider for review/approval because:  Drug not on the [facility], [facility] or [facility] refill protocol or controlled substance

## 2019-02-28 RX ORDER — HYDROCODONE BITARTRATE AND ACETAMINOPHEN 7.5; 325 MG/1; MG/1
1 TABLET ORAL EVERY 6 HOURS PRN
Qty: 120 TABLET | Refills: 0 | Status: SHIPPED | OUTPATIENT
Start: 2019-03-04 | End: 2019-04-04

## 2019-03-04 DIAGNOSIS — F34.1 DYSTHYMIA: ICD-10-CM

## 2019-03-04 DIAGNOSIS — G47.00 PERSISTENT INSOMNIA: ICD-10-CM

## 2019-03-04 NOTE — TELEPHONE ENCOUNTER
ambien      Last Written Prescription Date:  2/7/19  Last Fill Quantity: 60,   # refills: 0  Last Office Visit: 1/11/19  Future Office visit:    Next 5 appointments (look out 90 days)    Mar 12, 2019  3:00 PM CDT  (Arrive by 2:45 PM)  Pre-Op physical with Jaden Lee NP  [facility] [place] ([facility] ) [address]  [place] [address]  [phone]           Routing refill request to provider for review/approval because:  Drug not on the [facility], [facility] or [facility] refill protocol or controlled substance

## 2019-03-06 RX ORDER — ESCITALOPRAM OXALATE 20 MG/1
TABLET ORAL
Qty: 90 TABLET | Refills: 0 | Status: SHIPPED | OUTPATIENT
Start: 2019-03-06 | End: 2019-05-30

## 2019-03-06 NOTE — TELEPHONE ENCOUNTER
ESCITALOPRAM 20MG TABLET   Historical med  Last Written Prescription Date: historical med  Last Fill Quantity: 1,   # refills: this field empty  Last Office Visit: 1/11/19  Future Office visit:    Next 5 appointments (look out 90 days)    Mar 12, 2019  3:00 PM CDT  (Arrive by 2:45 PM)  Pre-Op physical with Jaden Lee NP  [facility] [place] ([facility] ) [address]  [place] [address]  [phone]

## 2019-03-07 DIAGNOSIS — G47.00 PERSISTENT INSOMNIA: ICD-10-CM

## 2019-03-07 NOTE — TELEPHONE ENCOUNTER
Dr. Cruz,  Patient just called to see the status of her Ambien prescription.  She is aware that primary doc is out but knows others cover.  She called Monday for it and it was re-ordered.  She is having a very hard time sleeping without it.  Would you please be willing to sign it.  I have T'd up the medication and pharmacy for you.  Thank you so much.

## 2019-03-08 RX ORDER — ZOLPIDEM TARTRATE 10 MG/1
10 TABLET ORAL
Qty: 60 TABLET | Refills: 0 | Status: SHIPPED | OUTPATIENT
Start: 2019-03-08 | End: 2019-03-12

## 2019-03-08 RX ORDER — ZOLPIDEM TARTRATE 10 MG/1
10 TABLET ORAL
Qty: 60 TABLET | Refills: 0 | Status: SHIPPED | OUTPATIENT
Start: 2019-03-08 | End: 2019-04-08

## 2019-03-11 ENCOUNTER — HOSPITAL ENCOUNTER (OUTPATIENT)
Dept: GENERAL RADIOLOGY | Facility: HOSPITAL | Age: 69
Discharge: HOME | End: 2019-03-11
Attending: SURGERY | Admitting: SURGERY
Payer: COMMERCIAL

## 2019-03-11 DIAGNOSIS — K31.1: ICD-10-CM

## 2019-03-11 PROCEDURE — 74240 X-RAY XM UPR GI TRC 1CNTRST: CPT | Mod: TC

## 2019-03-11 NOTE — PROGRESS NOTES
[facility] [place]  [address] [facility]  [place] [address]  [phone]  Dept: [phone]    PRE-OP EVALUATION:  Today's date: 3/12/2019    Aure Lanier (: 1950) presents for pre-operative evaluation assessment as requested by Dr Devlin.  She requires evaluation and anesthesia risk assessment prior to undergoing surgery/procedure for treatment of Gastric reconstruction     Proposed Surgery/ Procedure:Laparoscopic  Gastric reconstruction:   Billroth II vs Amelia en Y gastrojejunostomy  And Cholecystectomy    Date of Surgery/ Procedure: 3/27/2019  Time of Surgery/ Procedure: to be determined  Hospital/Surgical Facility: [facility]  Primary Physician: Jaden Lee  Type of Anesthesia Anticipated: to be determined    Patient has a Health Care Directive or Living Will:  NO    1. NO - Do you have a history of heart attack, stroke, stent, bypass or surgery on an artery in the head, neck, heart or legs?  2. NO - Do you ever have any pain or discomfort in your chest?  3. NO - Do you have a history of  Heart Failure?  4. NO - Are you troubled by shortness of breath when: walking on the level, up a slight hill or at night?  5. NO - Do you currently have a cold, bronchitis or other respiratory infection?  6. NO - Do you have a cough, shortness of breath or wheezing?  7. NO - Do you sometimes get pains in the calves of your legs when you walk?  8. YES - DO YOU OR ANYONE IN YOUR FAMILY HAVE PREVIOUS HISTORY OF BLOOD CLOTS? yes  9. NO - Do you or does anyone in your family have a serious bleeding problem such as prolonged bleeding following surgeries or cuts?  10. YES - HAVE YOU EVER HAD PROBLEMS WITH ANEMIA OR BEEN TOLD TO TAKE IRON PILLS? Yes anemia  11. NO - Have you had any abnormal blood loss such as black, tarry or bloody stools, or abnormal vaginal bleeding?  12. NO - Have you ever had a blood transfusion?  13. YES - HAVE YOU OR ANY OF YOUR RELATIVES EVER HAD PROBLEMS WITH  ANESTHESIA? uncle  14. NO - Do you have sleep apnea, excessive snoring or daytime drowsiness?  15. NO - Do you have any prosthetic heart valves?  16. YES - DO YOU HAVE PROSTHETIC JOINTS? Bilateral knee replacement  17. NO - Is there any chance that you may be pregnant?      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  Has cholelithiasis.            MEDICAL HISTORY:     Patient Active Problem List    Diagnosis Date Noted     Hypokalemia 11/08/2018     Priority: Medium     Entered in error         Anemia, iron deficiency 11/08/2018     Priority: Medium     S/P total knee arthroplasty, right 09/04/2018     Priority: Medium     Status post total right knee replacement 09/04/2018     Priority: Medium     Status post total left knee replacement 04/17/2018     Priority: Medium     Chronic pain syndrome 07/13/2017     Priority: Medium     Patient is followed by Jaden Lee NP for ongoing prescription of pain medication.  All refills should only be approved by this provider, or covering partner.    Medication(s): Norco 7.5/325mg.   Maximum quantity per month: #120  Clinic visit frequency required: Q 3 months     Controlled substance agreement:  Encounter-Level CSA - 07/11/2017:          Controlled Substance Agreement - Scan on 7/20/2017 12:19 PM : CONTROLLED SUBSTANCE AGREEMENT (below)              Pain Clinic evaluation in the past: No    DIRE Total Score(s):  No flowsheet data found.    Last [facility] website verification:  Done on 8.22.18   [email or web address]         Back muscle spasm 05/09/2017     Priority: Medium     Bilateral chronic knee pain 04/14/2016     Priority: Medium     Both knees.         Hyperlipidemia with target LDL less than 100 07/03/2014     Priority: Medium     Diagnosis updated by automated process. Provider to review and confirm.       Low back pain 07/09/2013     Priority: Medium     Diagnosis updated by automated process. Provider to review and confirm.        Family history of other musculoskeletal diseases(V17.89) 2012     Priority: Medium     Overview:    Brother with Myotonic Dystrophy    Samans Myotonic Dystrophy testing was reported normal by [facility] lab for the DM1 with repeats of 5 and 26, DM2 with repeats of 140 and 140, and CLCN1 with no abnormal DNA sequence variants identified in the select exons analyzed (drawn 3).       Allergic arthritis involving hand 2011     Priority: Medium     Hypothyroidism 2011     Priority: Medium     Problem list name updated by automated process. Provider to review       Insomnia 2011     Priority: Medium     Problem list name updated by automated process. Provider to review       Headache 2011     Priority: Medium     Problem list name updated by automated process. Provider to review       Postherpetic polyneuropathy 2011     Priority: Medium     Dysthymia 2011     Priority: Medium     Anxiety state 2011     Priority: Medium     Problem list name updated by automated process. Provider to review       Hyperlipidemia 2011     Priority: Medium     Problem list name updated by automated process. Provider to review       Migraine 2011     Priority: Medium     Problem list name updated by automated process. Provider to review       Osteoporosis 2011     Priority: Medium     Problem list name updated by automated process. Provider to review       GERD 2011     Priority: Medium      Past Medical History:   Diagnosis Date     Allergic arthritis involving hand 2011     Anemia, Iron Deficiency 2011     Anxiety 2011     Contact dermatitis and other eczema, unspecified c 2011     Depression 2011     Edema 2011     Gastrointestinal mucositis (ulcerative) 2011     GERD 2011     Headache(784.0) 2011     Hypothyroidism 2011     Insomnia, unspecified 2011     Migraine 2011      Nonallopathic lesion of cervical region, not elsewhere classified 10/16/2000     Osteoporosis 01/01/2011     Other and unspecified hyperlipidemia 01/01/2011     Other malaise and fatigue 08/27/2001     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     Past Surgical History:   Procedure Laterality Date     ARTHROPLASTY KNEE Left 4/17/2018    Procedure: ARTHROPLASTY KNEE;  LEFT TOTAL KNEE ARTHROPLASTY S/N JOURNEY II;  Surgeon: Ty Hernandez MD;  Location: [facility]     ARTHROPLASTY KNEE Right 9/4/2018    Procedure: ARTHROPLASTY KNEE;  RIGHT TOTAL KNEE ARTHROPLASTY ;  Surgeon: Ty Hernandez MD;  Location: [facility] OR     D & C       ESOPHAGOSCOPY, GASTROSCOPY, DUODENOSCOPY (EGD), COMBINED N/A 9/11/2017    Procedure: COMBINED ESOPHAGOSCOPY, GASTROSCOPY, DUODENOSCOPY (EGD);  Upper Endoscopy: Removal of Food Impaction;  Surgeon: Lino Zhu DO;  Location: [facility]     ESOPHAGOSCOPY, GASTROSCOPY, DUODENOSCOPY (EGD), COMBINED N/A 11/5/2018    Procedure: UPPER ENDOSCOPY WITH BIOPSY;  Surgeon: Dieudonne Jackson MD;  Location: [facility]     ESOPHAGOSCOPY, GASTROSCOPY, DUODENOSCOPY (EGD), COMBINED N/A 12/21/2018    Procedure: UPPER ENDOSCOPY WITH BALLOON DILATION, BIOPSY;  Surgeon: Dieudonne Jackson MD;  Location: [facility]     ESOPHAGOSCOPY, GASTROSCOPY, DUODENOSCOPY (EGD), COMBINED N/A 1/14/2019    Procedure: UPPER ENDOSCOPY WITH ENDOSCOPIC PLACEMENT OF OG AND GASTRIC LAVAGE;  Surgeon: Dieudonne Jackson MD;  Location: [facility]     STOMACH SURGERY       stomach surgery peritinitis       Current Outpatient Medications   Medication Sig Dispense Refill     acetaminophen (TYLENOL) 325 MG tablet Take 2-3 tablets (650-975 mg) by mouth every 4 hours as needed for other (mild to moderate pain) 100 tablet      Calcium Citrate-Vitamin D (CALCIUM + D PO) Take 2 tablets by mouth daily as needed       CARAFATE 1 GM/10ML suspension TAKE 10MLS BY MOUTH FOUR TIMES DAILY 420 mL 11     cyanocolbalamin (VITAMIN  B-12) 1000 MCG tablet Take 1 tablet by  mouth daily as needed        cyclobenzaprine (FLEXERIL) 10 MG tablet TAKE 1 TABLET BY MOUTH THREE TIMES DAILY AS NEEDED FOR MUSCLE SPASMS 90 tablet 1     dicyclomine (BENTYL) 10 MG capsule Take 1 capsule (10 mg) by mouth 4 times daily (before meals and nightly) 120 capsule 3     escitalopram (LEXAPRO) 20 MG tablet TAKE ONE-HALF TABLET BY MOUTH TWICE A DAY 90 tablet 0     escitalopram (LEXAPRO) 20 MG tablet TAKE ONE-HALF TABLET BY MOUTH TWICE A DAY  1     gabapentin (NEURONTIN) 300 MG capsule TAKE 1 CAPSULE (300 MG) BY MOUTH 3 TIMES DAILY 90 capsule 1     HYDROcodone-acetaminophen (NORCO) 7.5-325 MG per tablet Take 1 tablet by mouth every 6 hours as needed for severe pain 120 tablet 0     levothyroxine (SYNTHROID/LEVOTHROID) 150 MCG tablet TAKE 1 TABLET (150 MCG) BY MOUTH DAILY 90 tablet 1     Multiple Vitamins-Minerals (MULTIVITAMIN OR) Take 1 tablet by mouth daily as needed        multivitamin (OCUVITE) TABS Take 1 tablet by mouth daily as needed        NYAMYC 031277 UNIT/GM POWD APPLY TOPICALLY 2 TIMES DAILY AS NEEDED 60 g 1     pantoprazole (PROTONIX) 40 MG EC tablet TAKE 1 TABLET (40 MG) BY MOUTH DAILY TAKE 30-60 MINUTES BEFORE A MEAL. 30 tablet 5     promethazine (PHENERGAN) 25 MG tablet TAKE 1 TABLET BY MOUTH UP TO THREE TIMES DAILY AS NEEDED FOR NAUSEA/VOMITING. 90 tablet 1     simvastatin (ZOCOR) 40 MG tablet TAKE 1 TABLET BY MOUTH EVERY DAY IN THE EVENING . GENERIC FOR ZOCOR. 90 tablet 0     zolpidem (AMBIEN) 10 MG tablet Take 1 tablet (10 mg) by mouth nightly as needed for sleep Take 1 tablet at night for sleep-may repeat X1. 60 tablet 0     zolpidem (AMBIEN) 10 MG tablet Take 1 tablet (10 mg) by mouth nightly as needed for sleep Take 1 tablet at night for sleep-may repeat X1. 60 tablet 0     OTC products: None, except as noted above    Allergies   Allergen Reactions     Erythromycin Base [Kdc:Yellow Dye+Erythromycin+Brilliant Blue Fcf] Nausea and Vomiting     Penicillins Rash      Latex Allergy:  "NO    Social History     Tobacco Use     Smoking status: Never Smoker     Smokeless tobacco: Never Used     Tobacco comment: no passive exposure   Substance Use Topics     Alcohol use: Yes     Comment: rarely, maybe yearly     History   Drug Use No       REVIEW OF SYSTEMS:   Review of Systems   Constitutional: Positive for fatigue. Negative for fever.   HENT: Negative for congestion, ear pain, postnasal drip and sinus pressure.    Eyes: Negative.    Respiratory: Negative for cough and shortness of breath.    Cardiovascular: Negative for chest pain, palpitations and leg swelling.   Gastrointestinal: Positive for nausea and vomiting. Negative for abdominal pain, constipation and diarrhea.        Hx gastric ulcer with Bilroth I , vagotomy , Cholelithiasis.  Has gastric distention with food impaction.     Endocrine:        Hx Hypothyroidism   Genitourinary: Negative for difficulty urinating.   Musculoskeletal: Positive for arthralgias, back pain, myalgias and neck pain.        Bilateral knee replacements.     Skin: Negative for rash.   Neurological: Positive for headaches. Negative for dizziness, weakness and numbness.        Hx Migraines.  Hx Postherpetic Polyneuropathy    Hematological:        Hx iron deficiency anemia.     Psychiatric/Behavioral: Positive for dysphoric mood. The patient is nervous/anxious.        EXAM:   /60   Pulse 104   Temp 96.8  F (36  C) (Tympanic)   Resp 18   Ht 1.575 m (5' 2\")   Wt 63.5 kg (140 lb)   SpO2 98%   BMI 25.61 kg/m      Physical Exam   Constitutional: She is oriented to person, place, and time.   HENT:   Right Ear: External ear normal.   Left Ear: External ear normal.   Nose: Nose normal.   Mouth/Throat: Oropharynx is clear and moist.   Eyes: Conjunctivae and EOM are normal. Pupils are equal, round, and reactive to light.   Neck: Normal range of motion. Neck supple. No JVD present. No thyromegaly present.   Cardiovascular: Normal rate, regular rhythm, normal heart " sounds and intact distal pulses.   No murmur heard.  Pulmonary/Chest: Effort normal and breath sounds normal.   Abdominal: Soft. Bowel sounds are normal. She exhibits no mass. There is no tenderness.   Not as tender today.    Musculoskeletal: Normal range of motion. She exhibits no edema.   Lymphadenopathy:     She has no cervical adenopathy.   Neurological: She is alert and oriented to person, place, and time. No cranial nerve deficit.   Skin: Skin is warm and dry. Capillary refill takes less than 2 seconds.   Psychiatric: She has a normal mood and affect.   Vitals reviewed.    DIAGNOSTICS:     Results for orders placed or performed in visit on 03/12/19   CBC with platelets and differential   Result Value Ref Range    WBC 9.7 4.0 - 11.0 10e9/L    RBC Count 4.32 3.8 - 5.2 10e12/L    Hemoglobin 11.8 11.7 - 15.7 g/dL    Hematocrit 36.3 35.0 - 47.0 %    MCV 84 78 - 100 fl    MCH 27.3 26.5 - 33.0 pg    MCHC 32.5 31.5 - 36.5 g/dL    RDW 14.7 10.0 - 15.0 %    Platelet Count 359 150 - 450 10e9/L    Diff Method Automated Method     % Neutrophils 39.9 %    % Lymphocytes 37.2 %    % Monocytes 9.8 %    % Eosinophils 12.4 %    % Basophils 0.4 %    % Immature Granulocytes 0.3 %    Nucleated RBCs 0 0 /100    Absolute Neutrophil 3.8 1.6 - 8.3 10e9/L    Absolute Lymphocytes 3.6 0.8 - 5.3 10e9/L    Absolute Monocytes 1.0 0.0 - 1.3 10e9/L    Absolute Eosinophils 1.2 (H) 0.0 - 0.7 10e9/L    Absolute Basophils 0.0 0.0 - 0.2 10e9/L    Abs Immature Granulocytes 0.0 0 - 0.4 10e9/L    Absolute Nucleated RBC 0.0    Comprehensive metabolic panel (BMP + Alb, Alk Phos, ALT, AST, Total. Bili, TP)   Result Value Ref Range    Sodium 138 133 - 144 mmol/L    Potassium 4.0 3.4 - 5.3 mmol/L    Chloride 105 94 - 109 mmol/L    Carbon Dioxide 28 20 - 32 mmol/L    Anion Gap 5 3 - 14 mmol/L    Glucose 89 70 - 99 mg/dL    Urea Nitrogen 12 7 - 30 mg/dL    Creatinine 0.55 0.52 - 1.04 mg/dL    GFR Estimate >90 >60 mL/min/[1.73_m2]    GFR Estimate If Black >90  >60 mL/min/[1.73_m2]    Calcium 8.3 (L) 8.5 - 10.1 mg/dL    Bilirubin Total 0.2 0.2 - 1.3 mg/dL    Albumin 3.5 3.4 - 5.0 g/dL    Protein Total 6.7 (L) 6.8 - 8.8 g/dL    Alkaline Phosphatase 114 40 - 150 U/L    ALT 24 0 - 50 U/L    AST 13 0 - 45 U/L   Iron and iron binding capacity   Result Value Ref Range    Iron 47 35 - 180 ug/dL    Iron Binding Cap 237 (L) 240 - 430 ug/dL    Iron Saturation Index 20 15 - 46 %   TSH with free T4 reflex   Result Value Ref Range    TSH 0.01 (L) 0.40 - 4.00 mU/L   T4 free   Result Value Ref Range    T4 Free 1.29 0.76 - 1.46 ng/dL         Recent Labs   Lab Test 01/11/19  1518 12/04/18  1712  09/19/18  1037   HGB 14.3 13.9   < > 9.1*    388   < > 751*   INR  --   --   --  1.25*    140   < > 138   POTASSIUM 3.4 3.3*   < > 3.4   CR 0.48* 0.47*   < > 0.54    < > = values in this interval not displayed.           EKG Interpretation:      12/6/18  Symptoms at time of EKG: None   Rhythm: Normal sinus   Rate: Normal    Comparison to prior: Unchanged    Clinical Impression: no acute changes    IMPRESSION:     Surgery:  Laparoscopic Billroth II  Vs  Yesenia en Y gastrojejunostomy , and cholecystectomy with Dr. Devlin on  3/27/19 at [facility] .    The proposed surgical procedure is considered INTERMEDIATE risk.    REVISED CARDIAC RISK INDEX  The patient has the following serious cardiovascular risks for perioperative complications such as (MI, PE, VFib and 3  AV Block):  No serious cardiac risks  INTERPRETATION: 0 risks: Class I (very low risk - 0.4% complication rate)    The patient has the following additional risks for perioperative complications:  Nausea and vomiting.    Hypothyroidism      ICD-10-CM    1. Preop general physical exam Z01.818    ASSESSMENT / PLAN:  (Z01.818) Preop general physical exam  (primary encounter diagnosis)  Comment:During Knee surgery patient took pain meds without eating for a week because was nauseated. Needs to take food with pain meds.  Has  aversion to taking certain meds(not pain meds!) Makes her vomit.   Cannot tolerate oral potassium.  K+ looks good-has been eating a banana a day.  Noted Elevated Eosinophils on last 2 CBC's  Did not note eosinophils in biopsy of last EGD.    Hemoglobin   Date Value Ref Range Status   03/12/2019 11.8 11.7 - 15.7 g/dL Final   ]  Potassium   Date Value Ref Range Status   03/12/2019 4.0 3.4 - 5.3 mmol/L Final       Plan: CBC with platelets and differential,         Comprehensive metabolic panel (BMP + Alb, Alk         Phos, ALT, AST, Total. Bili, TP), Iron and iron        binding capacity, T4 free, T4 free       (E03.9) Hypothyroidism, unspecified type  Comment:TSH is low-on Levothyroxine  150mcg  Will reduce to 125mcg and recheck TSH in 1 month   TSH   Date Value Ref Range Status   03/12/2019 0.01 (L) 0.40 - 4.00 mU/L Final       Plan: TSH with free T4 reflex      Take Levothyroxine AM of surgery.             (D50.9) Iron deficiency anemia, unspecified iron deficiency anemia type  Comment:  Hemoglobin   Date Value Ref Range Status   03/12/2019 11.8 11.7 - 15.7 g/dL Final   ]  Iron=47.        (G89.4) Chronic pain syndrome  Comment:Is on Lortab 7.5mg 4 times a day and has been for years for neck pain, migraines, back pain, knee pain.  Postherpetic pain.    Plan: GIven 120 pills on  2/28/19.      (G47.00) Insomnia, unspecified type  Comment: On Ambien 10mg  And absolutely won't sleep if doesn't get Ambien in hospital-Had trouble with last hospitalization.    Plan: Wants Ambien for sleep at night.      (G43.809) Other migraine without status migrainosus, not intractable  Comment: Neck pain and migraines are intertwined.    Plan: Uses Lortab for Migraine pain.     (K21.9) Gastroesophageal reflux disease without esophagitis  Comment:On Protonix 40mg  And Dicyclomine 10mg  4 times a day for GI discomfort.  Patient is prone to nausea and vomiting, and has been vomiting since knee surgery- on Promethazine    Plan: Hold AM of  surgery.      (J34.89) stuffy nose  Comment: When called patient re: TSH  Stated nose was stuffy and running  Plan:  Flonase 1 spray to each nostril BID  Noted eosinophils have been elevated last 2 CBC's.     (H10.9) Conjunctivitis  Comment: Complained about red eye and goopiness when calling about preop labs. Not present at exam  .   Comment: Ciloxin 1-2 drops in both eyes for 7 days.       RECOMMENDATIONS:     Medications: Take Levothyroxine AM of surgery.      APPROVAL GIVEN to proceed with proposed procedure, without further diagnostic evaluation   Thankyou Dr. Devlin for taking care of Her!     Signed Electronically by: Jaden Lee NP    Copy of this evaluation report is provided to requesting physician.    [facility] Preop Guidelines    Revised Cardiac Risk Index

## 2019-03-11 NOTE — PATIENT INSTRUCTIONS
1.  Take   Levothyroxine   AM of surgery.    Before Your Surgery      Call your surgeon if there is any change in your health. This includes signs of a cold or flu (such as a sore throat, runny nose, cough, rash or fever).    Do not smoke, drink alcohol or take over the counter medicine (unless your surgeon or primary care doctor tells you to) for the 24 hours before and after surgery.    If you take prescribed drugs: Follow your doctor s orders about which medicines to take and which to stop until after surgery.    Eating and drinking prior to surgery: follow the instructions from your surgeon    Take a shower or bath the night before surgery. Use the soap your surgeon gave you to gently clean your skin. If you do not have soap from your surgeon, use your regular soap. Do not shave or scrub the surgery site.  Wear clean pajamas and have clean sheets on your bed.

## 2019-03-12 ENCOUNTER — OFFICE VISIT (OUTPATIENT)
Dept: INTERNAL MEDICINE | Facility: OTHER | Age: 69
End: 2019-03-12
Attending: NURSE PRACTITIONER
Payer: COMMERCIAL

## 2019-03-12 VITALS
OXYGEN SATURATION: 98 % | RESPIRATION RATE: 18 BRPM | WEIGHT: 140 LBS | HEIGHT: 62 IN | TEMPERATURE: 96.8 F | HEART RATE: 104 BPM | BODY MASS INDEX: 25.76 KG/M2 | DIASTOLIC BLOOD PRESSURE: 60 MMHG | SYSTOLIC BLOOD PRESSURE: 120 MMHG

## 2019-03-12 DIAGNOSIS — D50.9 IRON DEFICIENCY ANEMIA, UNSPECIFIED IRON DEFICIENCY ANEMIA TYPE: ICD-10-CM

## 2019-03-12 DIAGNOSIS — G43.809 OTHER MIGRAINE WITHOUT STATUS MIGRAINOSUS, NOT INTRACTABLE: ICD-10-CM

## 2019-03-12 DIAGNOSIS — J34.89 STUFFY AND RUNNY NOSE: ICD-10-CM

## 2019-03-12 DIAGNOSIS — Z01.818 PREOP GENERAL PHYSICAL EXAM: Primary | ICD-10-CM

## 2019-03-12 DIAGNOSIS — H10.9 CONJUNCTIVITIS OF BOTH EYES, UNSPECIFIED CONJUNCTIVITIS TYPE: ICD-10-CM

## 2019-03-12 DIAGNOSIS — K21.9 GASTROESOPHAGEAL REFLUX DISEASE WITHOUT ESOPHAGITIS: ICD-10-CM

## 2019-03-12 DIAGNOSIS — E03.9 HYPOTHYROIDISM, UNSPECIFIED TYPE: ICD-10-CM

## 2019-03-12 DIAGNOSIS — G47.00 INSOMNIA, UNSPECIFIED TYPE: ICD-10-CM

## 2019-03-12 DIAGNOSIS — G89.4 CHRONIC PAIN SYNDROME: ICD-10-CM

## 2019-03-12 LAB
ALBUMIN SERPL-MCNC: 3.5 G/DL (ref 3.4–5)
ALP SERPL-CCNC: 114 U/L (ref 40–150)
ALT SERPL W P-5'-P-CCNC: 24 U/L (ref 0–50)
ANION GAP SERPL CALCULATED.3IONS-SCNC: 5 MMOL/L (ref 3–14)
AST SERPL W P-5'-P-CCNC: 13 U/L (ref 0–45)
BASOPHILS # BLD AUTO: 0 10E9/L (ref 0–0.2)
BASOPHILS NFR BLD AUTO: 0.4 %
BILIRUB SERPL-MCNC: 0.2 MG/DL (ref 0.2–1.3)
BUN SERPL-MCNC: 12 MG/DL (ref 7–30)
CALCIUM SERPL-MCNC: 8.3 MG/DL (ref 8.5–10.1)
CHLORIDE SERPL-SCNC: 105 MMOL/L (ref 94–109)
CO2 SERPL-SCNC: 28 MMOL/L (ref 20–32)
CREAT SERPL-MCNC: 0.55 MG/DL (ref 0.52–1.04)
DIFFERENTIAL METHOD BLD: ABNORMAL
EOSINOPHIL # BLD AUTO: 1.2 10E9/L (ref 0–0.7)
EOSINOPHIL NFR BLD AUTO: 12.4 %
ERYTHROCYTE [DISTWIDTH] IN BLOOD BY AUTOMATED COUNT: 14.7 % (ref 10–15)
GFR SERPL CREATININE-BSD FRML MDRD: >90 ML/MIN/{1.73_M2}
GLUCOSE SERPL-MCNC: 89 MG/DL (ref 70–99)
HCT VFR BLD AUTO: 36.3 % (ref 35–47)
HGB BLD-MCNC: 11.8 G/DL (ref 11.7–15.7)
IMM GRANULOCYTES # BLD: 0 10E9/L (ref 0–0.4)
IMM GRANULOCYTES NFR BLD: 0.3 %
IRON SATN MFR SERPL: 20 % (ref 15–46)
IRON SERPL-MCNC: 47 UG/DL (ref 35–180)
LYMPHOCYTES # BLD AUTO: 3.6 10E9/L (ref 0.8–5.3)
LYMPHOCYTES NFR BLD AUTO: 37.2 %
MCH RBC QN AUTO: 27.3 PG (ref 26.5–33)
MCHC RBC AUTO-ENTMCNC: 32.5 G/DL (ref 31.5–36.5)
MCV RBC AUTO: 84 FL (ref 78–100)
MONOCYTES # BLD AUTO: 1 10E9/L (ref 0–1.3)
MONOCYTES NFR BLD AUTO: 9.8 %
NEUTROPHILS # BLD AUTO: 3.8 10E9/L (ref 1.6–8.3)
NEUTROPHILS NFR BLD AUTO: 39.9 %
NRBC # BLD AUTO: 0 10*3/UL
NRBC BLD AUTO-RTO: 0 /100
PLATELET # BLD AUTO: 359 10E9/L (ref 150–450)
POTASSIUM SERPL-SCNC: 4 MMOL/L (ref 3.4–5.3)
PROT SERPL-MCNC: 6.7 G/DL (ref 6.8–8.8)
RBC # BLD AUTO: 4.32 10E12/L (ref 3.8–5.2)
SODIUM SERPL-SCNC: 138 MMOL/L (ref 133–144)
T4 FREE SERPL-MCNC: 1.29 NG/DL (ref 0.76–1.46)
TIBC SERPL-MCNC: 237 UG/DL (ref 240–430)
TSH SERPL DL<=0.005 MIU/L-ACNC: 0.01 MU/L (ref 0.4–4)
WBC # BLD AUTO: 9.7 10E9/L (ref 4–11)

## 2019-03-12 PROCEDURE — 36415 COLL VENOUS BLD VENIPUNCTURE: CPT | Mod: ZL | Performed by: NURSE PRACTITIONER

## 2019-03-12 PROCEDURE — 84443 ASSAY THYROID STIM HORMONE: CPT | Mod: ZL | Performed by: NURSE PRACTITIONER

## 2019-03-12 PROCEDURE — G0463 HOSPITAL OUTPT CLINIC VISIT: HCPCS

## 2019-03-12 PROCEDURE — 84439 ASSAY OF FREE THYROXINE: CPT | Mod: ZL | Performed by: NURSE PRACTITIONER

## 2019-03-12 PROCEDURE — 85025 COMPLETE CBC W/AUTO DIFF WBC: CPT | Mod: ZL | Performed by: NURSE PRACTITIONER

## 2019-03-12 PROCEDURE — 83550 IRON BINDING TEST: CPT | Mod: ZL | Performed by: NURSE PRACTITIONER

## 2019-03-12 PROCEDURE — 99214 OFFICE O/P EST MOD 30 MIN: CPT | Performed by: NURSE PRACTITIONER

## 2019-03-12 PROCEDURE — 83540 ASSAY OF IRON: CPT | Mod: ZL | Performed by: NURSE PRACTITIONER

## 2019-03-12 PROCEDURE — 80053 COMPREHEN METABOLIC PANEL: CPT | Mod: ZL | Performed by: NURSE PRACTITIONER

## 2019-03-12 ASSESSMENT — ENCOUNTER SYMPTOMS
DIARRHEA: 0
FEVER: 0
WEAKNESS: 0
DIFFICULTY URINATING: 0
PALPITATIONS: 0
SINUS PRESSURE: 0
VOMITING: 1
CONSTIPATION: 0
ABDOMINAL PAIN: 0
DIZZINESS: 0
NUMBNESS: 0
ARTHRALGIAS: 1
EYES NEGATIVE: 1
NAUSEA: 1
MYALGIAS: 1
NECK PAIN: 1
FATIGUE: 1

## 2019-03-12 ASSESSMENT — PAIN SCALES - GENERAL: PAINLEVEL: NO PAIN (0)

## 2019-03-12 ASSESSMENT — MIFFLIN-ST. JEOR: SCORE: 1123.29

## 2019-03-12 NOTE — NURSING NOTE
"Chief Complaint   Patient presents with     Pre-Op Exam       Initial /60   Pulse 104   Temp 96.8  F (36  C) (Tympanic)   Resp 18   Ht 1.575 m (5' 2\")   Wt 63.5 kg (140 lb)   SpO2 98%   BMI 25.61 kg/m   Estimated body mass index is 25.61 kg/m  as calculated from the following:    Height as of this encounter: 1.575 m (5' 2\").    Weight as of this encounter: 63.5 kg (140 lb).  Medication Reconciliation: complete    Elly Arredondo LPN  "

## 2019-03-13 ASSESSMENT — ENCOUNTER SYMPTOMS
SHORTNESS OF BREATH: 0
NERVOUS/ANXIOUS: 1
ENDOCRINE COMMENTS: HX HYPOTHYROIDISM
DYSPHORIC MOOD: 1
HEADACHES: 1
COUGH: 0
BACK PAIN: 1

## 2019-03-15 ENCOUNTER — TELEPHONE (OUTPATIENT)
Dept: INTERNAL MEDICINE | Facility: OTHER | Age: 69
End: 2019-03-15

## 2019-03-15 RX ORDER — CIPROFLOXACIN HYDROCHLORIDE 3.5 MG/ML
1-2 SOLUTION/ DROPS TOPICAL 4 TIMES DAILY
Qty: 5 ML | Refills: 0 | Status: SHIPPED | OUTPATIENT
Start: 2019-03-15 | End: 2019-05-07

## 2019-03-15 RX ORDER — FLUTICASONE PROPIONATE 50 MCG
SPRAY, SUSPENSION (ML) NASAL
Qty: 9.9 ML | Refills: 0 | Status: SHIPPED | OUTPATIENT
Start: 2019-03-15 | End: 2019-05-07

## 2019-03-18 ENCOUNTER — TRANSFERRED RECORDS (OUTPATIENT)
Dept: HEALTH INFORMATION MANAGEMENT | Facility: CLINIC | Age: 69
End: 2019-03-18

## 2019-03-27 ENCOUNTER — TRANSFERRED RECORDS (OUTPATIENT)
Dept: HEALTH INFORMATION MANAGEMENT | Facility: CLINIC | Age: 69
End: 2019-03-27

## 2019-04-01 ENCOUNTER — TRANSFERRED RECORDS (OUTPATIENT)
Dept: HEALTH INFORMATION MANAGEMENT | Facility: CLINIC | Age: 69
End: 2019-04-01

## 2019-04-01 DIAGNOSIS — M25.561 ACUTE PAIN OF RIGHT KNEE: ICD-10-CM

## 2019-04-01 DIAGNOSIS — Z01.818 PREOP GENERAL PHYSICAL EXAM: ICD-10-CM

## 2019-04-01 DIAGNOSIS — M54.2 CERVICALGIA: ICD-10-CM

## 2019-04-03 ENCOUNTER — TRANSFERRED RECORDS (OUTPATIENT)
Dept: HEALTH INFORMATION MANAGEMENT | Facility: CLINIC | Age: 69
End: 2019-04-03

## 2019-04-03 RX ORDER — PROMETHAZINE HYDROCHLORIDE 25 MG/1
TABLET ORAL
Qty: 90 TABLET | Refills: 1 | Status: SHIPPED | OUTPATIENT
Start: 2019-04-03 | End: 2019-05-02

## 2019-04-03 NOTE — TELEPHONE ENCOUNTER
Amena  Last visit: 3.12.19  Last refill: 3.4.19 #120    Flexeril  Last refill: 2.5.19 #90 /1 refill    Future appointments:

## 2019-04-04 RX ORDER — CYCLOBENZAPRINE HCL 10 MG
TABLET ORAL
Qty: 90 TABLET | Refills: 1 | Status: SHIPPED | OUTPATIENT
Start: 2019-04-04 | End: 2019-05-25

## 2019-04-04 RX ORDER — HYDROCODONE BITARTRATE AND ACETAMINOPHEN 7.5; 325 MG/1; MG/1
1 TABLET ORAL EVERY 6 HOURS PRN
Qty: 120 TABLET | Refills: 0 | Status: SHIPPED
Start: 2019-04-04 | End: 2019-04-05

## 2019-04-05 RX ORDER — HYDROCODONE BITARTRATE AND ACETAMINOPHEN 7.5; 325 MG/1; MG/1
1 TABLET ORAL EVERY 6 HOURS PRN
Qty: 120 TABLET | Refills: 0 | Status: SHIPPED | OUTPATIENT
Start: 2019-04-05 | End: 2019-04-30

## 2019-04-08 DIAGNOSIS — G47.00 PERSISTENT INSOMNIA: ICD-10-CM

## 2019-04-08 RX ORDER — ZOLPIDEM TARTRATE 10 MG/1
10 TABLET ORAL
Qty: 60 TABLET | Refills: 0 | Status: SHIPPED | OUTPATIENT
Start: 2019-04-08 | End: 2019-05-07

## 2019-04-08 NOTE — TELEPHONE ENCOUNTER
zolpidem (AMBIEN) 10 MG tablet     Last Written Prescription Date:  3/8/19  Last Fill Quantity: 60,   # refills: 0  Last Office Visit: 3/12/19  Future Office visit:       Routing refill request to provider for review/approval because:  Drug not on the [facility], [facility] or [facility] refill protocol or controlled substance

## 2019-04-24 ENCOUNTER — TRANSFERRED RECORDS (OUTPATIENT)
Dept: HEALTH INFORMATION MANAGEMENT | Facility: CLINIC | Age: 69
End: 2019-04-24

## 2019-04-30 DIAGNOSIS — M25.561 ACUTE PAIN OF RIGHT KNEE: ICD-10-CM

## 2019-04-30 NOTE — TELEPHONE ENCOUNTER
HYDROcodone-acetaminophen (NORCO) 7.5-325 MG per tablet  Last Written Prescription Date:  4/5/19  Last Fill Quantity: 120,   # refills: 0  Last Office Visit: 3/12/19  Future Office visit:    Next 5 appointments (look out 90 days)    May 07, 2019  2:00 PM CDT  (Arrive by 1:45 PM)  SHORT with Jaden Lee NP  [facility] ([facility] ) [address]  [place] [address]  [phone]

## 2019-05-02 DIAGNOSIS — Z01.818 PREOP GENERAL PHYSICAL EXAM: ICD-10-CM

## 2019-05-02 RX ORDER — PROMETHAZINE HYDROCHLORIDE 25 MG/1
TABLET ORAL
Qty: 90 TABLET | Refills: 0 | Status: SHIPPED | OUTPATIENT
Start: 2019-05-02 | End: 2019-06-24

## 2019-05-02 RX ORDER — HYDROCODONE BITARTRATE AND ACETAMINOPHEN 7.5; 325 MG/1; MG/1
1 TABLET ORAL EVERY 6 HOURS PRN
Qty: 120 TABLET | Refills: 0 | Status: SHIPPED | OUTPATIENT
Start: 2019-05-04 | End: 2019-05-30

## 2019-05-02 NOTE — TELEPHONE ENCOUNTER
Left message notifying patient that hard copy RX is ready at the [facility]  registration to be picked up.

## 2019-05-07 ENCOUNTER — OFFICE VISIT (OUTPATIENT)
Dept: INTERNAL MEDICINE | Facility: OTHER | Age: 69
End: 2019-05-07
Attending: NURSE PRACTITIONER
Payer: COMMERCIAL

## 2019-05-07 VITALS
OXYGEN SATURATION: 99 % | HEIGHT: 62 IN | BODY MASS INDEX: 21.71 KG/M2 | SYSTOLIC BLOOD PRESSURE: 140 MMHG | RESPIRATION RATE: 18 BRPM | WEIGHT: 118 LBS | HEART RATE: 118 BPM | DIASTOLIC BLOOD PRESSURE: 70 MMHG | TEMPERATURE: 96.5 F

## 2019-05-07 DIAGNOSIS — F50.89 PSYCHOGENIC VOMITING WITH NAUSEA: Primary | ICD-10-CM

## 2019-05-07 DIAGNOSIS — R79.0 LOW MAGNESIUM LEVEL: ICD-10-CM

## 2019-05-07 DIAGNOSIS — D64.9 LOW HEMOGLOBIN: ICD-10-CM

## 2019-05-07 DIAGNOSIS — E87.6 LOW POTASSIUM SYNDROME: ICD-10-CM

## 2019-05-07 DIAGNOSIS — E03.9 HYPOTHYROIDISM, UNSPECIFIED TYPE: ICD-10-CM

## 2019-05-07 DIAGNOSIS — G47.00 PERSISTENT INSOMNIA: ICD-10-CM

## 2019-05-07 LAB
ALBUMIN SERPL-MCNC: 3.2 G/DL (ref 3.4–5)
ALP SERPL-CCNC: 130 U/L (ref 40–150)
ALT SERPL W P-5'-P-CCNC: 24 U/L (ref 0–50)
ANION GAP SERPL CALCULATED.3IONS-SCNC: 4 MMOL/L (ref 3–14)
AST SERPL W P-5'-P-CCNC: 18 U/L (ref 0–45)
BASOPHILS # BLD AUTO: 0 10E9/L (ref 0–0.2)
BASOPHILS NFR BLD AUTO: 0.4 %
BILIRUB SERPL-MCNC: 0.3 MG/DL (ref 0.2–1.3)
BUN SERPL-MCNC: 6 MG/DL (ref 7–30)
CALCIUM SERPL-MCNC: 8.4 MG/DL (ref 8.5–10.1)
CHLORIDE SERPL-SCNC: 107 MMOL/L (ref 94–109)
CO2 SERPL-SCNC: 30 MMOL/L (ref 20–32)
CREAT SERPL-MCNC: 0.53 MG/DL (ref 0.52–1.04)
DIFFERENTIAL METHOD BLD: NORMAL
EOSINOPHIL # BLD AUTO: 0.7 10E9/L (ref 0–0.7)
EOSINOPHIL NFR BLD AUTO: 7 %
ERYTHROCYTE [DISTWIDTH] IN BLOOD BY AUTOMATED COUNT: 14.8 % (ref 10–15)
GFR SERPL CREATININE-BSD FRML MDRD: >90 ML/MIN/{1.73_M2}
GLUCOSE SERPL-MCNC: 96 MG/DL (ref 70–99)
HCT VFR BLD AUTO: 39.5 % (ref 35–47)
HGB BLD-MCNC: 12.9 G/DL (ref 11.7–15.7)
IMM GRANULOCYTES # BLD: 0 10E9/L (ref 0–0.4)
IMM GRANULOCYTES NFR BLD: 0.3 %
LYMPHOCYTES # BLD AUTO: 2.2 10E9/L (ref 0.8–5.3)
LYMPHOCYTES NFR BLD AUTO: 23 %
MAGNESIUM SERPL-MCNC: 2.3 MG/DL (ref 1.6–2.3)
MCH RBC QN AUTO: 27.9 PG (ref 26.5–33)
MCHC RBC AUTO-ENTMCNC: 32.7 G/DL (ref 31.5–36.5)
MCV RBC AUTO: 86 FL (ref 78–100)
MONOCYTES # BLD AUTO: 0.9 10E9/L (ref 0–1.3)
MONOCYTES NFR BLD AUTO: 9.2 %
NEUTROPHILS # BLD AUTO: 5.8 10E9/L (ref 1.6–8.3)
NEUTROPHILS NFR BLD AUTO: 60.1 %
NRBC # BLD AUTO: 0 10*3/UL
NRBC BLD AUTO-RTO: 0 /100
PLATELET # BLD AUTO: 328 10E9/L (ref 150–450)
POTASSIUM SERPL-SCNC: 3.1 MMOL/L (ref 3.4–5.3)
PROT SERPL-MCNC: 6.5 G/DL (ref 6.8–8.8)
RBC # BLD AUTO: 4.62 10E12/L (ref 3.8–5.2)
SODIUM SERPL-SCNC: 141 MMOL/L (ref 133–144)
WBC # BLD AUTO: 9.6 10E9/L (ref 4–11)

## 2019-05-07 PROCEDURE — 80053 COMPREHEN METABOLIC PANEL: CPT | Mod: ZL | Performed by: NURSE PRACTITIONER

## 2019-05-07 PROCEDURE — 83735 ASSAY OF MAGNESIUM: CPT | Mod: ZL | Performed by: NURSE PRACTITIONER

## 2019-05-07 PROCEDURE — G0463 HOSPITAL OUTPT CLINIC VISIT: HCPCS

## 2019-05-07 PROCEDURE — 36415 COLL VENOUS BLD VENIPUNCTURE: CPT | Mod: ZL | Performed by: NURSE PRACTITIONER

## 2019-05-07 PROCEDURE — 85025 COMPLETE CBC W/AUTO DIFF WBC: CPT | Mod: ZL | Performed by: NURSE PRACTITIONER

## 2019-05-07 PROCEDURE — 99214 OFFICE O/P EST MOD 30 MIN: CPT | Performed by: NURSE PRACTITIONER

## 2019-05-07 RX ORDER — ZOLPIDEM TARTRATE 10 MG/1
10 TABLET ORAL
Qty: 60 TABLET | Refills: 0 | Status: SHIPPED | OUTPATIENT
Start: 2019-05-07 | End: 2019-05-30

## 2019-05-07 RX ORDER — LEVOTHYROXINE SODIUM 125 UG/1
125 TABLET ORAL DAILY
Qty: 30 TABLET | Refills: 0 | Status: SHIPPED | OUTPATIENT
Start: 2019-05-07 | End: 2019-05-09

## 2019-05-07 ASSESSMENT — MIFFLIN-ST. JEOR: SCORE: 1023.49

## 2019-05-07 ASSESSMENT — PAIN SCALES - GENERAL: PAINLEVEL: MILD PAIN (2)

## 2019-05-07 NOTE — PROGRESS NOTES
SUBJECTIVE:   Aure Lanier is a 69 year old female who presents to clinic today for the following   health issues:      Nausea, continued weight loss. Had severe swelling in stomach causing stricture after 5 day course of Toradol and Dilaudid to control pain after Knee surgery(OXY and Lortab did not work for first surgery on other knee.  For pain control  )Unfortunately did not take food with Toradol and Dilaudid doses,because just couldn't eat.   and  Was  Vomiting for couple weeks before came into clinic. HAD EGD showing severe swelling, and bezoar.  Of food   Was sent to [facility], Dr. Devlin, to see if could fix the stomach problem . Had  partial gastrectomy, Yesenia en Y gastrojejunostomy, lysis of adhesions, and Cholecystectomy for severe cholelithiasis done    Still has nausea and vomiting A recent EGD showed narrowing at gastric   Outlet and was stretched.  .     Hx of GI bleed and resection many years ago. Has lost a lot of weight since this all started.    .     Nausea and vomiting : Has had this problem with nausea and vomiting since a child. Feels nauseous at the thought of certain foods and textures, States tried hypnosis in past to control this. Cannot take certain liquids, textures. Some she has eructation at the thought of them . Discussed this with patient,  States really feels  Nauseated. Discussed she probably does feel nausea, but may be partially psychological, tried antinausea liquid (for children) in office... Had to hold nose, and gagged when put in mouth,.   Was put on Bentyl for constant nausea and vomiting after 2nd knee surgery. Discussed to day if interfering with contractions of somach. States Dr. Devlin did not tell her to stop this as did Protonix and Carafate.        Low potassium: Has experienced low potassium, and cannot tolerate oral potassium Has had IV potassium bumps.      GERD: Was on Carafate, but had a hard time getting it down(Nausea at thought of pill)  Is off  both Protonix and Carafte at this time.      Chronic Pain: Has been on Lortab for years for neck pain.   On FLexeril also    Also has Hx migraines.    Can get her lortab down and is quite  On time getting her pain meds.  We had discussed at 1st Knee operation that she needs to start thinking of coming off some of the meds.      Insomnia:  Uses Ambien, and is quite persistent today mentioning it several times to get her refill    Depression: On Lexapro.    Hypothyroidism: On Levothyroxinel   Hyperlipidemia: On Simvastatin       Reviewed  and updated as needed this visit by clinical staff  Tobacco  Allergies  Meds  Med Hx  Surg Hx  Fam Hx  Soc Hx        Reviewed and updated as needed this visit by Provider         Patient Active Problem List   Diagnosis     Allergic arthritis involving hand     Hypothyroidism     Insomnia     Headache     Postherpetic polyneuropathy     Dysthymia     Anxiety state     Hyperlipidemia     Migraine     Osteoporosis     GERD     Low back pain     Hyperlipidemia with target LDL less than 100     Bilateral chronic knee pain     Back muscle spasm     Chronic pain syndrome     Status post total left knee replacement     S/P total knee arthroplasty, right     Status post total right knee replacement     Family history of other musculoskeletal diseases(V17.89)     Hypokalemia     Anemia, iron deficiency     Past Surgical History:   Procedure Laterality Date     ARTHROPLASTY KNEE Left 4/17/2018    Procedure: ARTHROPLASTY KNEE;  LEFT TOTAL KNEE ARTHROPLASTY S/N JOURNEY II;  Surgeon: Ty Hernandez MD;  [facility]     ARTHROPLASTY KNEE Right 9/4/2018    Procedure: ARTHROPLASTY KNEE;  RIGHT TOTAL KNEE ARTHROPLASTY ;  Surgeon: Ty Hernandez MD;  Location: [facility]     D & C       ESOPHAGOSCOPY, GASTROSCOPY, DUODENOSCOPY (EGD), COMBINED N/A 9/11/2017    Procedure: COMBINED ESOPHAGOSCOPY, GASTROSCOPY, DUODENOSCOPY (EGD);  Upper Endoscopy: Removal of Food Impaction;  Surgeon: Lino Zhu  D, DO;  Location: [facility]     ESOPHAGOSCOPY, GASTROSCOPY, DUODENOSCOPY (EGD), COMBINED N/A 11/5/2018    Procedure: UPPER ENDOSCOPY WITH BIOPSY;  Surgeon: Dieudonne Jackson MD;  [facility]     ESOPHAGOSCOPY, GASTROSCOPY, DUODENOSCOPY (EGD), COMBINED N/A 12/21/2018    Procedure: UPPER ENDOSCOPY WITH BALLOON DILATION, BIOPSY;  Surgeon: Dieudonne Jackson MD;  [facility]     ESOPHAGOSCOPY, GASTROSCOPY, DUODENOSCOPY (EGD), COMBINED N/A 1/14/2019    Procedure: UPPER ENDOSCOPY WITH ENDOSCOPIC PLACEMENT OF OG AND GASTRIC LAVAGE;  Surgeon: Dieudonne Jackson MD;  [facility]     STOMACH SURGERY       stomach surgery peritinitis         Social History     Tobacco Use     Smoking status: Never Smoker     Smokeless tobacco: Never Used     Tobacco comment: no passive exposure   Substance Use Topics     Alcohol use: Yes     Comment: rarely, maybe yearly     Family History   Problem Relation Age of Onset     Cerebrovascular Disease Mother         CVA     Hypertension Mother      Other - See Comments Father 40        mining accident; cause of death     Other - See Comments Brother         muscle dystrophy     Cancer Daughter 34        skin cancer     Melanoma Daughter          Current Outpatient Medications   Medication Sig Dispense Refill     acetaminophen (TYLENOL) 325 MG tablet Take 2-3 tablets (650-975 mg) by mouth every 4 hours as needed for other (mild to moderate pain) 100 tablet      CARAFATE 1 GM/10ML suspension TAKE 10MLS BY MOUTH FOUR TIMES DAILY 420 mL 11     ciprofloxacin (CILOXAN) 0.3 % ophthalmic solution Place 1-2 drops into both eyes 4 times daily For 7 days 5 mL 0     cyanocolbalamin (VITAMIN  B-12) 1000 MCG tablet Take 1 tablet by mouth daily as needed        cyclobenzaprine (FLEXERIL) 10 MG tablet TAKE 1 TABLET BY MOUTH THREE TIMES DAILY AS NEEDED FOR MUSCLE SPASMS 90 tablet 1     dicyclomine (BENTYL) 10 MG capsule Take 1 capsule (10 mg) by mouth 4 times daily (before meals and nightly) 120  capsule 3     escitalopram (LEXAPRO) 20 MG tablet TAKE ONE-HALF TABLET BY MOUTH TWICE A DAY 90 tablet 0     fluticasone (FLONASE) 50 MCG/ACT nasal spray Spray to each  Nostril 2 times a day 9.9 mL 0     gabapentin (NEURONTIN) 300 MG capsule TAKE 1 CAPSULE (300 MG) BY MOUTH 3 TIMES DAILY 90 capsule 1     HYDROcodone-acetaminophen (NORCO) 7.5-325 MG per tablet Take 1 tablet by mouth every 6 hours as needed for severe pain 120 tablet 0     levothyroxine (SYNTHROID/LEVOTHROID) 150 MCG tablet TAKE 1 TABLET (150 MCG) BY MOUTH DAILY 90 tablet 1     multivitamin (OCUVITE) TABS Take 1 tablet by mouth daily as needed        NYAMYC 377633 UNIT/GM POWD APPLY TOPICALLY 2 TIMES DAILY AS NEEDED 60 g 1     pantoprazole (PROTONIX) 40 MG EC tablet TAKE 1 TABLET (40 MG) BY MOUTH DAILY TAKE 30-60 MINUTES BEFORE A MEAL. 30 tablet 5     promethazine (PHENERGAN) 25 MG tablet TAKE 1 TABLET BY MOUTH UP TO THREE TIMES DAILY AS NEEDED FOR NAUSEA/VOMITING. 90 tablet 0     simvastatin (ZOCOR) 40 MG tablet TAKE 1 TABLET BY MOUTH EVERY DAY IN THE EVENING . GENERIC FOR ZOCOR. 90 tablet 0     zolpidem (AMBIEN) 10 MG tablet Take 1 tablet (10 mg) by mouth nightly as needed for sleep Take 1 tablet at night for sleep-may repeat X1. 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  Stated Dr. Devlin stated maybe her stomach may not be kameron well, but did not say to stop  Bentyl .   Does not want me to talk with him  . States  Felt uncomfortable  Talking   about possibility.       Plan: Offered Marinol for nausea control Declined at this time.  Has Zofran and Phenergan   Eat small meals every hour while awake, cannot lose more  weight or could die.  Try protein drinks,  Need to make self eat.      (G47.00) Persistent insomnia  Comment: Ambien refilled.    Plan: zolpidem (AMBIEN) 10 MG tablet      (E87.6) Low potassium syndrome  Comment: Once again is low. Will instruct to eat banana  a day.  Can't take Potassium oral Recheck in 1 week.    Potassium   Date Value Ref Range Status   05/07/2019 3.1 (L) 3.4 - 5.3 mmol/L Final       Plan: Comprehensive metabolic panel (BMP + Alb, Alk         Phos, ALT, AST, Total. Bili, TP)      (R79.0) Low magnesium level  Comment:   Magnesium   Date Value Ref Range Status   05/07/2019 2.3 1.6 - 2.3 mg/dL Final     Plan: Magnesium           (D64.9) Low hemoglobin  Comment: Resolved.    Hemoglobin   Date Value Ref Range Status   05/07/2019 12.9 11.7 - 15.7 g/dL Final   ]    Plan: CBC with platelets and differential      (E03.9) Hypothyroidism, unspecified type  Comment  Will decrease to 88mcg a day and recheck 1 month. Will call patient    TSH   Date Value Ref Range Status   03/12/2019 0.01 (L) 0.40 - 4.00 mU/L Final       Plan: levothyroxine (SYNTHROID/LEVOTHROID) 88 MCG         tablet

## 2019-05-07 NOTE — NURSING NOTE
"Chief Complaint   Patient presents with     Nausea     continued weight loss       Initial /70   Pulse 118   Temp 96.5  F (35.8  C) (Tympanic)   Resp 18   Ht 1.575 m (5' 2\")   Wt 53.5 kg (118 lb)   SpO2 99%   BMI 21.58 kg/m   Estimated body mass index is 21.58 kg/m  as calculated from the following:    Height as of this encounter: 1.575 m (5' 2\").    Weight as of this encounter: 53.5 kg (118 lb).  Medication Reconciliation: complete    Elly Arredondo LPN  "

## 2019-05-09 RX ORDER — LEVOTHYROXINE SODIUM 88 UG/1
88 TABLET ORAL DAILY
Qty: 30 TABLET | Refills: 0 | Status: SHIPPED | OUTPATIENT
Start: 2019-05-09 | End: 2019-05-30

## 2019-05-14 DIAGNOSIS — Z01.818 PREOP GENERAL PHYSICAL EXAM: ICD-10-CM

## 2019-05-15 RX ORDER — DICYCLOMINE HYDROCHLORIDE 10 MG/1
CAPSULE ORAL
Qty: 120 CAPSULE | Refills: 7 | Status: SHIPPED | OUTPATIENT
Start: 2019-05-15 | End: 2019-05-30

## 2019-05-24 ENCOUNTER — TRANSFERRED RECORDS (OUTPATIENT)
Dept: HEALTH INFORMATION MANAGEMENT | Facility: CLINIC | Age: 69
End: 2019-05-24

## 2019-05-28 DIAGNOSIS — R11.0 NAUSEA: ICD-10-CM

## 2019-05-28 DIAGNOSIS — M25.561 ACUTE PAIN OF RIGHT KNEE: ICD-10-CM

## 2019-05-30 ENCOUNTER — OFFICE VISIT (OUTPATIENT)
Dept: INTERNAL MEDICINE | Facility: OTHER | Age: 69
End: 2019-05-30
Attending: NURSE PRACTITIONER
Payer: COMMERCIAL

## 2019-05-30 VITALS
SYSTOLIC BLOOD PRESSURE: 120 MMHG | DIASTOLIC BLOOD PRESSURE: 60 MMHG | HEIGHT: 62 IN | TEMPERATURE: 97.5 F | HEART RATE: 96 BPM | OXYGEN SATURATION: 100 % | BODY MASS INDEX: 21.35 KG/M2 | WEIGHT: 116 LBS | RESPIRATION RATE: 18 BRPM

## 2019-05-30 DIAGNOSIS — R11.2 NAUSEA AND VOMITING, INTRACTABILITY OF VOMITING NOT SPECIFIED, UNSPECIFIED VOMITING TYPE: ICD-10-CM

## 2019-05-30 DIAGNOSIS — E87.6 LOW POTASSIUM SYNDROME: ICD-10-CM

## 2019-05-30 DIAGNOSIS — G47.00 PERSISTENT INSOMNIA: ICD-10-CM

## 2019-05-30 DIAGNOSIS — E03.9 HYPOTHYROIDISM, UNSPECIFIED TYPE: ICD-10-CM

## 2019-05-30 DIAGNOSIS — E87.6 HYPOKALEMIA: Primary | ICD-10-CM

## 2019-05-30 DIAGNOSIS — G89.4 CHRONIC PAIN SYNDROME: ICD-10-CM

## 2019-05-30 PROBLEM — Z98.890: Status: ACTIVE | Noted: 2019-02-01
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
## 2019-05-30 PROBLEM — T18.9XXA PHYTOBEZOAR: Status: ACTIVE | Noted: 2019-03-28

## 2019-05-30 PROBLEM — K31.1 GASTRIC OUTLET OBSTRUCTION: Status: ACTIVE | Noted: 2019-02-01

## 2019-05-30 LAB
ALBUMIN SERPL-MCNC: 3.4 G/DL (ref 3.4–5)
ALP SERPL-CCNC: 119 U/L (ref 40–150)
ALT SERPL W P-5'-P-CCNC: 36 U/L (ref 0–50)
ANION GAP SERPL CALCULATED.3IONS-SCNC: 9 MMOL/L (ref 3–14)
AST SERPL W P-5'-P-CCNC: 27 U/L (ref 0–45)
BILIRUB SERPL-MCNC: 0.4 MG/DL (ref 0.2–1.3)
BUN SERPL-MCNC: 6 MG/DL (ref 7–30)
CALCIUM SERPL-MCNC: 8.1 MG/DL (ref 8.5–10.1)
CHLORIDE SERPL-SCNC: 107 MMOL/L (ref 94–109)
CO2 SERPL-SCNC: 28 MMOL/L (ref 20–32)
CREAT SERPL-MCNC: 0.47 MG/DL (ref 0.52–1.04)
GFR SERPL CREATININE-BSD FRML MDRD: >90 ML/MIN/{1.73_M2}
GLUCOSE SERPL-MCNC: 100 MG/DL (ref 70–99)
POTASSIUM SERPL-SCNC: 3 MMOL/L (ref 3.4–5.3)
PROT SERPL-MCNC: 6.9 G/DL (ref 6.8–8.8)
SODIUM SERPL-SCNC: 144 MMOL/L (ref 133–144)
TSH SERPL DL<=0.005 MIU/L-ACNC: 0.43 MU/L (ref 0.4–4)

## 2019-05-30 PROCEDURE — 99214 OFFICE O/P EST MOD 30 MIN: CPT | Performed by: NURSE PRACTITIONER

## 2019-05-30 PROCEDURE — 80053 COMPREHEN METABOLIC PANEL: CPT | Mod: ZL | Performed by: NURSE PRACTITIONER

## 2019-05-30 PROCEDURE — 84443 ASSAY THYROID STIM HORMONE: CPT | Mod: ZL | Performed by: NURSE PRACTITIONER

## 2019-05-30 PROCEDURE — 36415 COLL VENOUS BLD VENIPUNCTURE: CPT | Mod: ZL | Performed by: NURSE PRACTITIONER

## 2019-05-30 PROCEDURE — G0463 HOSPITAL OUTPT CLINIC VISIT: HCPCS

## 2019-05-30 RX ORDER — POTASSIUM CHLORIDE 7.45 MG/ML
10 INJECTION INTRAVENOUS
Status: CANCELLED
Start: 2019-05-30

## 2019-05-30 RX ORDER — ZOLPIDEM TARTRATE 10 MG/1
10 TABLET ORAL
Qty: 60 TABLET | Refills: 0 | Status: SHIPPED | OUTPATIENT
Start: 2019-05-30 | End: 2019-06-14

## 2019-05-30 RX ORDER — ESCITALOPRAM OXALATE 20 MG/1
TABLET ORAL
Qty: 90 TABLET | Refills: 3 | COMMUNITY
Start: 2019-05-30 | End: 2019-06-14

## 2019-05-30 RX ORDER — GABAPENTIN 300 MG/1
300 CAPSULE ORAL 3 TIMES DAILY
Qty: 90 CAPSULE | Refills: 3 | COMMUNITY
Start: 2019-05-30 | End: 2019-12-02

## 2019-05-30 RX ORDER — DICYCLOMINE HYDROCHLORIDE 10 MG/1
CAPSULE ORAL
Qty: 120 CAPSULE | Refills: 7 | COMMUNITY
Start: 2019-05-30 | End: 2020-02-26 | Stop reason: ALTCHOICE

## 2019-05-30 RX ORDER — LEVOTHYROXINE SODIUM 88 UG/1
88 TABLET ORAL DAILY
Qty: 90 TABLET | Refills: 3 | COMMUNITY
Start: 2019-05-30 | End: 2019-06-17

## 2019-05-30 RX ORDER — CYCLOBENZAPRINE HCL 10 MG
TABLET ORAL
Qty: 90 TABLET | Refills: 3 | COMMUNITY
Start: 2019-05-30 | End: 2020-01-15

## 2019-05-30 RX ORDER — SIMVASTATIN 40 MG
TABLET ORAL
Qty: 90 TABLET | Refills: 3 | COMMUNITY
Start: 2019-05-30 | End: 2019-12-02

## 2019-05-30 RX ORDER — ONDANSETRON 4 MG/1
TABLET, FILM COATED ORAL
Qty: 48 TABLET | Refills: 11 | Status: SHIPPED | OUTPATIENT
Start: 2019-05-30 | End: 2019-06-14

## 2019-05-30 RX ORDER — HYDROCODONE BITARTRATE AND ACETAMINOPHEN 7.5; 325 MG/1; MG/1
1 TABLET ORAL EVERY 6 HOURS PRN
Qty: 120 TABLET | Refills: 0 | Status: SHIPPED | OUTPATIENT
Start: 2019-06-03 | End: 2019-05-30

## 2019-05-30 RX ORDER — HYDROCODONE BITARTRATE AND ACETAMINOPHEN 7.5; 325 MG/1; MG/1
1 TABLET ORAL EVERY 6 HOURS PRN
Qty: 120 TABLET | Refills: 0 | Status: SHIPPED | OUTPATIENT
Start: 2019-07-04 | End: 2019-06-14

## 2019-05-30 ASSESSMENT — ENCOUNTER SYMPTOMS
DIARRHEA: 0
DYSPHORIC MOOD: 0
FATIGUE: 1
ABDOMINAL PAIN: 1
NECK PAIN: 1
COUGH: 0
DIZZINESS: 0
SHORTNESS OF BREATH: 0
VOMITING: 1
NAUSEA: 1
ACTIVITY CHANGE: 1
CONSTIPATION: 0
HEADACHES: 1
MYALGIAS: 1
APPETITE CHANGE: 1
FEVER: 0
DIFFICULTY URINATING: 0
ARTHRALGIAS: 1
PALPITATIONS: 0

## 2019-05-30 ASSESSMENT — MIFFLIN-ST. JEOR: SCORE: 1014.42

## 2019-05-30 ASSESSMENT — PAIN SCALES - GENERAL: PAINLEVEL: MILD PAIN (3)

## 2019-05-30 NOTE — PROGRESS NOTES
Subjective     Aure Lanier is a 69 year old female who presents to clinic today for the following health issues:    HPI   nausea      Duration: long time    Description (location/character/radiation): nausea and vomiting, more  Intense after second knee surgery (Dr. Hernandez)    Dr. Devlin  And Manuel  thought irritation to her stomach from  Toradol   she took for 5 days with her Dilaudid.  Had swelling, and Phytobezoar.  Gave her this because pain control was poor 1st knee surgery  And 5 day course of Toradol and Dilaudid worked.    Unfortunately did not eat with Toradol, (apparently was vomiting post surgery)  causing inflammation and swelling.   She had this same combo with prior knee surgery.without problem.   Had revision of stomach with Dr. Devlin.        Intensity:  3/10    Accompanying signs and symptoms: headache    History (similar episodes/previous evaluation):patient states has had problems with nausea and vomiting since was a teenager.  Has problem when eating certain foods, certain textures.  Hx Bilroth 1 from stomach ulcers.      Precipitating or alleviating factors: see records Does not want to discuss may be partially psychological      Therapies tried and outcome      Recent followup Endoscopy  Uses Zofran,  Phenergan, and Compazine at different  Times for control of nausea.  Discussed with patient this may be put to one med by next provider.  (different meds given by different surgeons. )       Gastric stricture  Had dilation last Friday with Dr. Bedolla. Told patient stricture was more open than prior and suggested every 2 week dilations for a while. She is set up for next dilation.   Patient is on  Bentyl which she feels helps.         Weight loss: States has been eating more, but still vomits, though not since last Friday. (patients vomiting story changes)   Has lost 2 more lbs.      Hypokalemia: Cannot and will not take Oral Potassium  Makes her vomit and hurts her stomach.  Pain  meds do not irritate stomach per patient.      Hypothyroidism: On Levothyroxine.      Postherpetic polyneuropathy: Has been on gabapentin      Hyperlipidemia: On Simvastatin  .     Patient Active Problem List   Diagnosis     Allergic arthritis involving hand     Hypothyroidism     Insomnia     Headache     Postherpetic polyneuropathy     Dysthymia     Anxiety state     Hyperlipidemia     Migraine     Osteoporosis     GERD     Low back pain     Hyperlipidemia with target LDL less than 100     Bilateral chronic knee pain     Back muscle spasm     Chronic pain syndrome     Status post total left knee replacement     S/P total knee arthroplasty, right     Status post total right knee replacement     Family history of other musculoskeletal diseases(V17.89)     Hypokalemia     Anemia, iron deficiency     Past Surgical History:   Procedure Laterality Date     ARTHROPLASTY KNEE Left 4/17/2018    Procedure: ARTHROPLASTY KNEE;  LEFT TOTAL KNEE ARTHROPLASTY S/N JOURNEY II;  Surgeon: Ty Hernandez MD;  [facility]     ARTHROPLASTY KNEE Right 9/4/2018    Procedure: ARTHROPLASTY KNEE;  RIGHT TOTAL KNEE ARTHROPLASTY ;  Surgeon: Ty Hernandez MD;  [facility]     CHOLECYSTECTOMY  03/2019    and lysis adhesions.       D & C       ESOPHAGOSCOPY, GASTROSCOPY, DUODENOSCOPY (EGD), COMBINED N/A 9/11/2017    Procedure: COMBINED ESOPHAGOSCOPY, GASTROSCOPY, DUODENOSCOPY (EGD);  Upper Endoscopy: Removal of Food Impaction;  Surgeon: Lino Zhu DO;  [facility]     ESOPHAGOSCOPY, GASTROSCOPY, DUODENOSCOPY (EGD), COMBINED N/A 11/5/2018    Procedure: UPPER ENDOSCOPY WITH BIOPSY;  Surgeon: Dieudnone Jackson MD;  [facility]     ESOPHAGOSCOPY, GASTROSCOPY, DUODENOSCOPY (EGD), COMBINED N/A 12/21/2018    Procedure: UPPER ENDOSCOPY WITH BALLOON DILATION, BIOPSY;  Surgeon: Dieudonne Jackson MD;  [facility]     ESOPHAGOSCOPY, GASTROSCOPY, DUODENOSCOPY (EGD), COMBINED N/A 1/14/2019    Procedure: UPPER ENDOSCOPY  WITH ENDOSCOPIC PLACEMENT OF OG AND GASTRIC LAVAGE;  Surgeon: Dieudonne Jackson MD;  Location: [facility]     partial gastrectomy  03/2019     right total knee replacement  01/2019     MARAH EN Y BOWEL  03/2019     STOMACH SURGERY       stomach surgery peritinitis         Social History     Tobacco Use     Smoking status: Never Smoker     Smokeless tobacco: Never Used     Tobacco comment: no passive exposure   Substance Use Topics     Alcohol use: Yes     Comment: rarely, maybe yearly     Family History   Problem Relation Age of Onset     Cerebrovascular Disease Mother         CVA     Hypertension Mother      Other - See Comments Father 40        mining accident; cause of death     Other - See Comments Brother         muscle dystrophy     Cancer Daughter 34        skin cancer     Melanoma Daughter          Current Outpatient Medications   Medication Sig Dispense Refill     acetaminophen (TYLENOL) 325 MG tablet Take 2-3 tablets (650-975 mg) by mouth every 4 hours as needed for other (mild to moderate pain) 100 tablet      cyanocolbalamin (VITAMIN  B-12) 1000 MCG tablet Take 1 tablet by mouth daily as needed        cyclobenzaprine (FLEXERIL) 10 MG tablet TAKE 1 TABLET BY MOUTH THREE TIMES DAILY AS NEEDED FOR MUSCLE SPASMS 90 tablet 1     dicyclomine (BENTYL) 10 MG capsule TAKE 1 CAPSULE BY MOUTH FOUR TIMES DAILY (BEFORE MEALS AND NIGHTLY) 120 capsule 7     escitalopram (LEXAPRO) 20 MG tablet TAKE ONE-HALF TABLET BY MOUTH TWICE A DAY 90 tablet 0     gabapentin (NEURONTIN) 300 MG capsule TAKE 1 CAPSULE (300 MG) BY MOUTH 3 TIMES DAILY 90 capsule 1     [START ON 6/3/2019] HYDROcodone-acetaminophen (NORCO) 7.5-325 MG per tablet Take 1 tablet by mouth every 6 hours as needed for severe pain 120 tablet 0     levothyroxine (SYNTHROID/LEVOTHROID) 88 MCG tablet Take 1 tablet (88 mcg) by mouth daily 30 tablet 0     multivitamin (OCUVITE) TABS Take 1 tablet by mouth daily as needed        NYAMYC 325208 UNIT/GM POWD APPLY TOPICALLY 2  "TIMES DAILY AS NEEDED 60 g 1     ondansetron (ZOFRAN) 4 MG tablet TAKE ONE TABLET THREE TIMES A DAY BY MOUTH 48 tablet 11     promethazine (PHENERGAN) 25 MG tablet TAKE 1 TABLET BY MOUTH UP TO THREE TIMES DAILY AS NEEDED FOR NAUSEA/VOMITING. 90 tablet 0     simvastatin (ZOCOR) 40 MG tablet TAKE 1 TABLET BY MOUTH EVERY DAY IN THE EVENING . GENERIC FOR ZOCOR. 90 tablet 0     zolpidem (AMBIEN) 10 MG tablet Take 1 tablet (10 mg) by mouth nightly as needed for sleep Take 1 tablet at night for sleep-may repeat X1. 60 tablet 0     Allergies   Allergen Reactions     Erythromycin Base [Kdc:Yellow Dye+Erythromycin+Brilliant Blue Fcf] Nausea and Vomiting     Penicillins Rash         Reviewed and updated as needed this visit by Provider       Review of Systems   Constitutional: Positive for activity change, appetite change and fatigue. Negative for fever.        Has lost  A lot of weight from nausea.  And vomiting.     HENT: Negative.    Respiratory: Negative for cough and shortness of breath.    Cardiovascular: Negative for chest pain, palpitations and leg swelling.   Gastrointestinal: Positive for abdominal pain, nausea and vomiting. Negative for constipation and diarrhea.        Has burning.   epigastric pain  Recent Yesenia en y procedure with Dr. Devlin for stricture.  Hx GI ulcer with Billroth 1  Many years ago.     Genitourinary: Negative for difficulty urinating.   Musculoskeletal: Positive for arthralgias, myalgias and neck pain.        Muscles in legs sore.  On Chronic pain meds from neck pain.     Skin: Negative for rash.   Neurological: Positive for headaches. Negative for dizziness.   Hematological:        Problems with low potassium , and cannot tolerate oral potassium.  Makes her vomit.     Psychiatric/Behavioral: Negative for dysphoric mood.     /60   Pulse 96   Temp 97.5  F (36.4  C) (Tympanic)   Resp 18   Ht 1.575 m (5' 2\")   Wt 52.6 kg (116 lb)   SpO2 100%   BMI 21.22 kg/m    Physical Exam "   Constitutional: She is oriented to person, place, and time. No distress.   Has lost a lot of weight.  Very nice woman.     HENT:   Right Ear: External ear normal.   Left Ear: External ear normal.   Nose: Nose normal.   Mouth/Throat: Oropharynx is clear and moist.   Eyes: Pupils are equal, round, and reactive to light. Conjunctivae and EOM are normal.   Neck: Normal range of motion. Neck supple. No JVD present. No thyromegaly present.   Cardiovascular: Normal rate, regular rhythm, normal heart sounds and intact distal pulses.   No murmur heard.  Pulmonary/Chest: Effort normal and breath sounds normal.   Abdominal: Soft. Bowel sounds are normal. She exhibits no mass. There is tenderness.   Musculoskeletal: Normal range of motion. She exhibits no edema.   Lymphadenopathy:     She has no cervical adenopathy.   Neurological: She is alert and oriented to person, place, and time. No cranial nerve deficit.   Skin: Skin is warm and dry. Capillary refill takes less than 2 seconds. No rash noted.   Psychiatric: She has a normal mood and affect.   Vitals reviewed.    Results for orders placed or performed in visit on 05/30/19   Comprehensive metabolic panel (BMP + Alb, Alk Phos, ALT, AST, Total. Bili, TP)   Result Value Ref Range    Sodium 144 133 - 144 mmol/L    Potassium 3.0 (L) 3.4 - 5.3 mmol/L    Chloride 107 94 - 109 mmol/L    Carbon Dioxide 28 20 - 32 mmol/L    Anion Gap 9 3 - 14 mmol/L    Glucose 100 (H) 70 - 99 mg/dL    Urea Nitrogen 6 (L) 7 - 30 mg/dL    Creatinine 0.47 (L) 0.52 - 1.04 mg/dL    GFR Estimate >90 >60 mL/min/[1.73_m2]    GFR Estimate If Black >90 >60 mL/min/[1.73_m2]    Calcium 8.1 (L) 8.5 - 10.1 mg/dL    Bilirubin Total 0.4 0.2 - 1.3 mg/dL    Albumin 3.4 3.4 - 5.0 g/dL    Protein Total 6.9 6.8 - 8.8 g/dL    Alkaline Phosphatase 119 40 - 150 U/L    ALT 36 0 - 50 U/L    AST 27 0 - 45 U/L   TSH with free T4 reflex   Result Value Ref Range    TSH 0.43 0.40 - 4.00 mU/L     Lab Results   Component Value Date     WBC 9.6 05/07/2019     Lab Results   Component Value Date    RBC 4.62 05/07/2019     Lab Results   Component Value Date    HGB 12.9 05/07/2019     Lab Results   Component Value Date    HCT 39.5 05/07/2019     No components found for: MCT  Lab Results   Component Value Date    MCV 86 05/07/2019     Lab Results   Component Value Date    MCH 27.9 05/07/2019     Lab Results   Component Value Date    MCHC 32.7 05/07/2019     Lab Results   Component Value Date    RDW 14.8 05/07/2019     Lab Results   Component Value Date     05/07/2019     ASSESSMENT / PLAN:  (E87.6) Hypokalemia  (primary encounter diagnosis)  Comment:Cannot tolerate oral  Potassium. Will set up for IV infusion of potassium since  K+ has been 3.0 for 2 checks.  30meq in 10meq doses IV.    Potassium   Date Value Ref Range Status   05/30/2019 3.0 (L) 3.4 - 5.3 mmol/L Final       Plan: Potassium           (G47.00) Persistent insomnia  Comment: Wants Ambien script  -Script given    Plan: zolpidem (AMBIEN) 10 MG tablet       (R11.2) Nausea and vomiting, intractability of vomiting not specified, unspecified vomiting type  Comment:Discussed needs to eat like patient who had weight loss surgery  States does desire to eat.   Eat small amounts every hour.  She cannot lose anymore weight without affecting her health  . Patient has had problems for years with retching when eats or smells certain foods.  Needs to overcome this and eat, as has been part of the problem.    Plan: Eat small amounts every hour.      (G89.4) Chronic pain syndrome  Comment:PAtient very prompt on pain med requests.   On Lortab 7.5  Up to 4 times a day  (for years. )    Plan: Script given.

## 2019-05-30 NOTE — NURSING NOTE
"Chief Complaint   Patient presents with     Nausea       Initial /60   Pulse 96   Temp 97.5  F (36.4  C) (Tympanic)   Resp 18   Ht 1.575 m (5' 2\")   Wt 52.6 kg (116 lb)   SpO2 100%   BMI 21.22 kg/m   Estimated body mass index is 21.22 kg/m  as calculated from the following:    Height as of this encounter: 1.575 m (5' 2\").    Weight as of this encounter: 52.6 kg (116 lb).  Medication Reconciliation: complete    Elly Arredondo LPN  "

## 2019-05-30 NOTE — TELEPHONE ENCOUNTER
Patient notified prescription is ready to be picked up at the [facility], Registration.

## 2019-05-30 NOTE — PROGRESS NOTES
Jaden Lee, NP, here with orders for patient to have potassium 10 meq x3, to be done same day and on schedule as soon as able. Theraply plan placed after consulting pharmacy. [facility] notified to schedule tomorrow at 1030, if patient able.

## 2019-05-30 NOTE — TELEPHONE ENCOUNTER
Not on current med list    ondansetron (ZOFRAN) 4 MG tablet (Discontinued)      Last Written Prescription Date:  10/26/18-1/11/18  Last Fill Quantity: 48,   # refills: 11  Last Office Visit: 5/7/19  Future Office visit:    Next 5 appointments (look out 90 days)    May 30, 2019  3:00 PM CDT  (Arrive by 2:45 PM)  SHORT with Jaden Lee NP  [facility] [address] [facility] [address]  [phone]           Routing refill request to provider for review/approval because:  Drug not on the [facility] refill protocol or controlled substance      HYDROcodone-acetaminophen (NORCO) 7.5-325 MG per tablet      Last Written Prescription Date:  5/4/19  Last Fill Quantity: 120,   # refills: 0  Last Office Visit: 5/7/19  Future Office visit:    Next 5 appointments (look out 90 days)    May 30, 2019  3:00 PM CDT  (Arrive by 2:45 PM)  SHORT with Jaden Lee NP  [facility] [address]  [facility] [address]  [phone]           Routing refill request to provider for review/approval because:  Drug not on the [facility] refill protocol or controlled substance

## 2019-06-04 ENCOUNTER — INFUSION THERAPY VISIT (OUTPATIENT)
Dept: INFUSION THERAPY | Facility: OTHER | Age: 69
End: 2019-06-04
Attending: NURSE PRACTITIONER
Payer: COMMERCIAL

## 2019-06-04 VITALS
BODY MASS INDEX: 21.33 KG/M2 | SYSTOLIC BLOOD PRESSURE: 119 MMHG | WEIGHT: 115.9 LBS | DIASTOLIC BLOOD PRESSURE: 64 MMHG | HEIGHT: 62 IN | TEMPERATURE: 97.1 F | RESPIRATION RATE: 16 BRPM | OXYGEN SATURATION: 98 %

## 2019-06-04 DIAGNOSIS — E87.6 HYPOKALEMIA: Primary | ICD-10-CM

## 2019-06-04 PROCEDURE — 96365 THER/PROPH/DIAG IV INF INIT: CPT

## 2019-06-04 PROCEDURE — 25000128 H RX IP 250 OP 636: Performed by: NURSE PRACTITIONER

## 2019-06-04 PROCEDURE — 96366 THER/PROPH/DIAG IV INF ADDON: CPT

## 2019-06-04 RX ORDER — POTASSIUM CHLORIDE 7.45 MG/ML
10 INJECTION INTRAVENOUS
Status: CANCELLED
Start: 2019-06-04

## 2019-06-04 RX ORDER — POTASSIUM CHLORIDE 7.45 MG/ML
10 INJECTION INTRAVENOUS
Status: DISCONTINUED | OUTPATIENT
Start: 2019-06-04 | End: 2019-06-04

## 2019-06-04 RX ORDER — POTASSIUM CL/LIDO/0.9 % NACL 10MEQ/0.1L
10 INTRAVENOUS SOLUTION, PIGGYBACK (ML) INTRAVENOUS
Status: COMPLETED | OUTPATIENT
Start: 2019-06-04 | End: 2019-06-04

## 2019-06-04 RX ORDER — POTASSIUM CL/LIDO/0.9 % NACL 10MEQ/0.1L
10 INTRAVENOUS SOLUTION, PIGGYBACK (ML) INTRAVENOUS
Status: CANCELLED
Start: 2019-06-04

## 2019-06-04 RX ADMIN — Medication 10 MEQ: at 15:20

## 2019-06-04 RX ADMIN — SODIUM CHLORIDE 250 ML: 9 INJECTION, SOLUTION INTRAVENOUS at 13:25

## 2019-06-04 RX ADMIN — Medication 10 MEQ: at 12:59

## 2019-06-04 RX ADMIN — Medication 10 MEQ: at 14:07

## 2019-06-04 ASSESSMENT — MIFFLIN-ST. JEOR: SCORE: 1014.1

## 2019-06-04 NOTE — PATIENT INSTRUCTIONS
Patient Education     Potassium  Does this test have other names?  Serum potassium, serum electrolytes, K  What is this test?  This is a blood test to measure the amount of potassium in your blood. Potassium is one of several important minerals in your body called electrolytes. Ninety percent of your potassium is inside your cells, but a small amount circulates in your blood. You normally get potassium from your diet. Your body needs a constant level of potassium for normal nerve conduction, muscle contraction, heart function, and fluid balance. Your kidneys remove potassium through your urine.   Why do I need this test?  You may need this test if you are having routine blood test to check your level of electrolytes. You may also need this test if your healthcare provider suspects that your potassium is too high or too low. It's important to have your potassium level checked if you have diabetes, if you have a disease that affects your kidneys, adrenal glands, or digestive system, or if you are on medicines, such as diuretics, steroids, or digitalis.  A potassium level that is too high is called hyperkalemia. Symptoms of hyperkalemia include:    Irregular heartbeat    Nausea    Fatigue    Tingling or numbness    Weakness or paralysis  A potassium level that is too low is called hypokalemia. Symptoms of hypokalemia include:    Irregular heartbeat    Nausea    Muscle weakness    Cramps    Constipation   What other tests might I have along with this test?  You may have your potassium checked along with other electrolytes, such as sodium. You may also have an electrocardiogram, or ECG, to check your heart rhythm. An irregular heart rhythm is a dangerous sign if caused by an abnormal potassium level.   What do my test results mean?  Test results may vary depending on your age, gender, health history, the method used for the test, and other things. Your test results may not mean you have a problem. Ask your healthcare  provider what your test results mean for you.   Potassium is measured in milliequivalents per liter (mEq/L). Normal results are about:    3.5 to 5.2 mEq/L for adults    3.4 to 4.7 mEq/L for children ages 1 to 18 years old  Low blood potassium may be caused by:    Loss of potassium due to diarrhea, sweating, or vomiting    Not getting enough potassium in your diet. This is sometimes seen in alcoholism.    Loss of potassium from a severe burn or draining wound    Diseases, such as cystic fibrosis, primary aldosteronism, or alcoholism    Medicines, such as diuretics or antibiotics    Getting IV fluids without enough potassium  High blood potassium may be caused by:    Kidney disease or kidney failure    Trauma, such as burns, accidents, or surgery    Uncontrolled diabetes    Diseases, such as systemic lupus erythematosus, sickle cell, or Woodson disease   How is this test done?  The test is done with a blood sample. A needle is used to draw blood from a vein in your arm or hand.   Does this test pose any risks?  Having a blood test with a needle carries some risks. These include bleeding, infection, bruising, and feeling lightheaded. When the needle pricks your arm or hand, you may feel a slight sting or pain. Afterward, the site may be sore.   What might affect my test results?  Several medicines can affect your potassium level. These include penicillin, glucose, diuretics such as furosemide or hydrochlorothiazide, and nonsteroidal anti-inflammatory medicines. Eating a lot of licorice can decrease potassium levels.  How do I get ready for this test?  You don't need to prepare for this test. Tell your healthcare provider if you are taking any medicine, including over-the-counter NSAIDs. Be sure your healthcare provider knows about all medicines, herbs, vitamins, and supplements you are taking. This includes medicines that don't need a prescription and any illicit drugs you may use.     6920-5431 [employer],  [facility]. [address] [place] [id number]. All rights reserved. This information is not intended as a substitute for professional medical care. Always follow your healthcare professional's instructions.

## 2019-06-04 NOTE — PROGRESS NOTES
24 gauge angio cath inserted into left hand.  Immediate blood return noted.  IV secured with sterile, transparent dressing and tape.  Patient tolerated well, denies pain or discomfort at this time.  Flushes easily without resistance, no signs or symptoms of infiltration or infection.   Patient denies questions or concerns regarding infusion and/or medication(s) being administered.

## 2019-06-04 NOTE — PROGRESS NOTES
Patient is a 70 y/o female here accompanied by self today for infusion of potassium 30mEq per order of Jaden Lee.  Patient meets parameters for today's infusion. Patient identified with two identifiers, order verified, and verbal consent for today's infusion obtained from patient.      IV pump verified with dose, drug, and rate of administration with MAR.  Infusion administered per protocol.  Patient tolerated infusion well, no signs or symptoms of adverse reaction noted.  Patient denies pain nor discomfort.     IV removed, catheter intact.  Site clean, dry and intact.  No signs or symptoms of infiltration or infection. Covered with a sterile bandage, slight pressure applied for 30 seconds.  Pt instructed to leave bandage intact for a minimum of one hour, and to call with questions or concerns. Copy of appointments, discharge instructions, and after visit summary (AVS) provided to patient.  Patient states understanding, discharged ambulatory.

## 2019-06-06 ENCOUNTER — TELEPHONE (OUTPATIENT)
Dept: INTERNAL MEDICINE | Facility: OTHER | Age: 69
End: 2019-06-06

## 2019-06-06 NOTE — TELEPHONE ENCOUNTER
The patient called stating that she would like to speak with Jaden.  She struggled to speak. Her words were long and drawled out, there were a lot of pauses with her struggling to find the right words. She said that she had received a letter from a Dr. Terry Bedolla and wanted to ask you questions.

## 2019-06-10 ENCOUNTER — TRANSFERRED RECORDS (OUTPATIENT)
Dept: HEALTH INFORMATION MANAGEMENT | Facility: CLINIC | Age: 69
End: 2019-06-10

## 2019-06-14 ENCOUNTER — OFFICE VISIT (OUTPATIENT)
Dept: INTERNAL MEDICINE | Facility: OTHER | Age: 69
End: 2019-06-14
Attending: NURSE PRACTITIONER
Payer: COMMERCIAL

## 2019-06-14 VITALS
DIASTOLIC BLOOD PRESSURE: 60 MMHG | WEIGHT: 116 LBS | SYSTOLIC BLOOD PRESSURE: 100 MMHG | BODY MASS INDEX: 21.35 KG/M2 | RESPIRATION RATE: 18 BRPM | OXYGEN SATURATION: 99 % | HEART RATE: 107 BPM | HEIGHT: 62 IN | TEMPERATURE: 97.6 F

## 2019-06-14 DIAGNOSIS — G47.00 PERSISTENT INSOMNIA: ICD-10-CM

## 2019-06-14 DIAGNOSIS — E87.6 HYPOKALEMIA: Primary | ICD-10-CM

## 2019-06-14 DIAGNOSIS — M54.2 CERVICAL PAIN: ICD-10-CM

## 2019-06-14 DIAGNOSIS — R11.0 NAUSEA: ICD-10-CM

## 2019-06-14 LAB
ALBUMIN SERPL-MCNC: 3.2 G/DL (ref 3.4–5)
ALP SERPL-CCNC: 115 U/L (ref 40–150)
ALT SERPL W P-5'-P-CCNC: 26 U/L (ref 0–50)
ANION GAP SERPL CALCULATED.3IONS-SCNC: 5 MMOL/L (ref 3–14)
AST SERPL W P-5'-P-CCNC: 16 U/L (ref 0–45)
BILIRUB SERPL-MCNC: 0.3 MG/DL (ref 0.2–1.3)
BUN SERPL-MCNC: 10 MG/DL (ref 7–30)
CALCIUM SERPL-MCNC: 8.2 MG/DL (ref 8.5–10.1)
CHLORIDE SERPL-SCNC: 107 MMOL/L (ref 94–109)
CO2 SERPL-SCNC: 29 MMOL/L (ref 20–32)
CREAT SERPL-MCNC: 0.49 MG/DL (ref 0.52–1.04)
GFR SERPL CREATININE-BSD FRML MDRD: >90 ML/MIN/{1.73_M2}
GLUCOSE SERPL-MCNC: 88 MG/DL (ref 70–99)
POTASSIUM SERPL-SCNC: 3.2 MMOL/L (ref 3.4–5.3)
PROT SERPL-MCNC: 6.6 G/DL (ref 6.8–8.8)
SODIUM SERPL-SCNC: 141 MMOL/L (ref 133–144)

## 2019-06-14 PROCEDURE — 99214 OFFICE O/P EST MOD 30 MIN: CPT | Performed by: NURSE PRACTITIONER

## 2019-06-14 PROCEDURE — G0463 HOSPITAL OUTPT CLINIC VISIT: HCPCS

## 2019-06-14 PROCEDURE — 80053 COMPREHEN METABOLIC PANEL: CPT | Mod: ZL | Performed by: NURSE PRACTITIONER

## 2019-06-14 PROCEDURE — 36415 COLL VENOUS BLD VENIPUNCTURE: CPT | Mod: ZL | Performed by: NURSE PRACTITIONER

## 2019-06-14 RX ORDER — ZOLPIDEM TARTRATE 10 MG/1
10 TABLET ORAL
Qty: 60 TABLET | Refills: 1 | COMMUNITY
Start: 2019-06-14 | End: 2019-07-23

## 2019-06-14 RX ORDER — ACETAMINOPHEN 325 MG/1
650-975 TABLET ORAL EVERY 4 HOURS PRN
Qty: 100 TABLET | Refills: 0 | Status: SHIPPED | OUTPATIENT
Start: 2019-06-14

## 2019-06-14 RX ORDER — ONDANSETRON 4 MG/1
8 TABLET, FILM COATED ORAL EVERY 8 HOURS PRN
Qty: 60 TABLET | Refills: 6 | Status: SHIPPED | OUTPATIENT
Start: 2019-06-14 | End: 2019-08-31

## 2019-06-14 RX ORDER — HYDROCODONE BITARTRATE AND ACETAMINOPHEN 7.5; 325 MG/1; MG/1
1 TABLET ORAL EVERY 6 HOURS PRN
Qty: 120 TABLET | Refills: 0 | Status: SHIPPED | OUTPATIENT
Start: 2019-06-30 | End: 2019-07-23

## 2019-06-14 ASSESSMENT — ENCOUNTER SYMPTOMS
FEVER: 0
COUGH: 0
CONSTIPATION: 0
DYSPHORIC MOOD: 0
DIFFICULTY URINATING: 0
DIZZINESS: 0
SHORTNESS OF BREATH: 0
DIARRHEA: 0
VOMITING: 0
NERVOUS/ANXIOUS: 1
FATIGUE: 0
ABDOMINAL PAIN: 1
ARTHRALGIAS: 1
PALPITATIONS: 0
HEADACHES: 0
NECK PAIN: 1
NAUSEA: 0

## 2019-06-14 ASSESSMENT — MIFFLIN-ST. JEOR: SCORE: 1014.42

## 2019-06-14 ASSESSMENT — PAIN SCALES - GENERAL: PAINLEVEL: MILD PAIN (2)

## 2019-06-14 NOTE — NURSING NOTE
"Chief Complaint   Patient presents with     Nausea     Lab Result Notice     very concerned with Potassium levels not increasing with infusion       Initial /60   Pulse 107   Temp 97.6  F (36.4  C) (Tympanic)   Resp 18   Ht 1.575 m (5' 2\")   Wt 52.6 kg (116 lb)   SpO2 99%   BMI 21.22 kg/m   Estimated body mass index is 21.22 kg/m  as calculated from the following:    Height as of this encounter: 1.575 m (5' 2\").    Weight as of this encounter: 52.6 kg (116 lb).  Medication Reconciliation: complete   Elly Arredondo          "

## 2019-06-14 NOTE — PATIENT INSTRUCTIONS
1. Will check  Potassium    2. Continue to eat small meals all day.   3.  Increase Zofran to 8 mg(2 pills) for nausea  Every 8 hours as needed.

## 2019-06-14 NOTE — PROGRESS NOTES
Subjective     Aure Lanier is a 69 year old female who presents to clinic today for the following health issues:    HPI   Nausea.:  Follow up on nausea and very concerned with Potassium levels not increasing with infusion. Has problems taking oral Potassium  vomits, and upsets her stomach  . Has problems with IV's as vessels are fragile.  Offered port placement.  Has been not been vomiting as much except   For Wednesday  Patient thinks she gained weight , but has actually stayed the same ,  but  not lost weight.  States eats 2 eggs with toast in AM,  And a banana a day.  Discussed psychologically her reactions to foods have intensified.      EGD: Had Gastro jejunal Anastomosis Dilation  .  Will be seeing Dr. Bedolla .  Will dilate again in 2 weeks.       Hypokalemia: Has problems keeping Potassium  Level  up, cannot tolerate oral potassium, IV Infusions have been hard on her-her veins are not healthy/  Has trouble eating the foods high in potassium  LAst potassium check in [place] was 3.0  This was after 30meq infusion IV.  Discussed it will not go up if she is vomiting a lot. Takes Phenergan and Zofan throughout the day for nausea.      Patient Active Problem List   Diagnosis     Allergic arthritis involving hand     Hypothyroidism     Insomnia     Headache     Postherpetic polyneuropathy     Dysthymia     Anxiety state     Hyperlipidemia     Migraine     Osteoporosis     GERD     Low back pain     Hyperlipidemia with target LDL less than 100     Bilateral chronic knee pain     Back muscle spasm     Chronic pain syndrome     Status post total left knee replacement     S/P total knee arthroplasty, right     Status post total right knee replacement     Family history of other musculoskeletal diseases(V17.89)     Hypokalemia     Anemia, iron deficiency     History of [place] I operation     Phytobezoar     Gastric outlet obstruction     Past Surgical History:   Procedure Laterality Date     ARTHROPLASTY KNEE Left  4/17/2018    Procedure: ARTHROPLASTY KNEE;  LEFT TOTAL KNEE ARTHROPLASTY S/N JOURNEY II;  Surgeon: Ty Hernandez MD;  Location: [facility]     ARTHROPLASTY KNEE Right 9/4/2018    Procedure: ARTHROPLASTY KNEE;  RIGHT TOTAL KNEE ARTHROPLASTY ;  Surgeon: Ty Hernandez MD;  Location: [facility]     CHOLECYSTECTOMY  03/2019    and lysis adhesions.       D & C       ESOPHAGOSCOPY, GASTROSCOPY, DUODENOSCOPY (EGD), COMBINED N/A 9/11/2017    Procedure: COMBINED ESOPHAGOSCOPY, GASTROSCOPY, DUODENOSCOPY (EGD);  Upper Endoscopy: Removal of Food Impaction;  Surgeon: Lino Zhu DO;  Location: [facility]     ESOPHAGOSCOPY, GASTROSCOPY, DUODENOSCOPY (EGD), COMBINED N/A 11/5/2018    Procedure: UPPER ENDOSCOPY WITH BIOPSY;  Surgeon: Dieudonne Jackson MD;  Location: [facility]     ESOPHAGOSCOPY, GASTROSCOPY, DUODENOSCOPY (EGD), COMBINED N/A 12/21/2018    Procedure: UPPER ENDOSCOPY WITH BALLOON DILATION, BIOPSY;  Surgeon: Dieudonne Jackson MD;  Location: [facility]     ESOPHAGOSCOPY, GASTROSCOPY, DUODENOSCOPY (EGD), COMBINED N/A 1/14/2019    Procedure: UPPER ENDOSCOPY WITH ENDOSCOPIC PLACEMENT OF OG AND GASTRIC LAVAGE;  Surgeon: Dieudonne Jackson MD;  Location: [facility]     partial gastrectomy  03/2019     right total knee replacement  01/2019     MARAH EN Y BOWEL  03/2019     STOMACH SURGERY       stomach surgery peritinitis         Social History     Tobacco Use     Smoking status: Never Smoker     Smokeless tobacco: Never Used     Tobacco comment: no passive exposure   Substance Use Topics     Alcohol use: Yes     Comment: rarely, maybe yearly     Family History   Problem Relation Age of Onset     Cerebrovascular Disease Mother         CVA     Hypertension Mother      Other - See Comments Father 40        mining accident; cause of death     Other - See Comments Brother         muscle dystrophy     Cancer Daughter 34        skin cancer     Melanoma Daughter          Current Outpatient Medications   Medication Sig Dispense Refill      acetaminophen (TYLENOL) 325 MG tablet Take 2-3 tablets (650-975 mg) by mouth every 4 hours as needed for other (mild to moderate pain) 100 tablet      cyanocolbalamin (VITAMIN  B-12) 1000 MCG tablet Take 1 tablet by mouth daily as needed        cyclobenzaprine (FLEXERIL) 10 MG tablet TAKE 1 TABLET BY MOUTH THREE TIMES DAILY AS NEEDED FOR MUSCLE SPASMS 90 tablet 3     dicyclomine (BENTYL) 10 MG capsule TAKE 1 CAPSULE BY MOUTH FOUR TIMES DAILY (BEFORE MEALS AND NIGHTLY) 120 capsule 7     escitalopram (LEXAPRO) 20 MG tablet TAKE ONE-HALF TABLET BY MOUTH TWICE A DAY 90 tablet 0     gabapentin (NEURONTIN) 300 MG capsule Take 1 capsule (300 mg) by mouth 3 times daily 90 capsule 3     [START ON 7/4/2019] HYDROcodone-acetaminophen (NORCO) 7.5-325 MG per tablet Take 1 tablet by mouth every 6 hours as needed for severe pain 120 tablet 0     levothyroxine (SYNTHROID/LEVOTHROID) 88 MCG tablet Take 1 tablet (88 mcg) by mouth daily 90 tablet 3     multivitamin (OCUVITE) TABS Take 1 tablet by mouth daily as needed        NYAMYC [id number] UNIT/GM POWD APPLY TOPICALLY 2 TIMES DAILY AS NEEDED 60 g 1     ondansetron (ZOFRAN) 4 MG tablet TAKE ONE TABLET THREE TIMES A DAY BY MOUTH 48 tablet 11     promethazine (PHENERGAN) 25 MG tablet TAKE 1 TABLET BY MOUTH UP TO THREE TIMES DAILY AS NEEDED FOR NAUSEA/VOMITING. 90 tablet 0     simvastatin (ZOCOR) 40 MG tablet TAKE 1 TABLET BY MOUTH EVERY DAY IN THE EVENING . GENERIC FOR ZOCOR. 90 tablet 3     zolpidem (AMBIEN) 10 MG tablet Take 1 tablet (10 mg) by mouth nightly as needed for sleep Take 1 tablet at night for sleep-may repeat X1. 60 tablet 0     Allergies   Allergen Reactions     Erythromycin Base [Kdc:Yellow Dye+Erythromycin+Brilliant Blue Fcf] Nausea and Vomiting     Penicillins Rash         Reviewed and updated as needed this visit by Provider       Review of Systems   Constitutional: Negative for fatigue and fever.   HENT: Negative.    Respiratory: Negative for cough and shortness of  "breath.    Cardiovascular: Negative for chest pain, palpitations and leg swelling.   Gastrointestinal: Positive for abdominal pain. Negative for constipation, diarrhea, nausea and vomiting.        Has nausea, states less vomiting , has very sensitive taste buds, a lot of foods make her nauseated, even thinking     Genitourinary: Negative for difficulty urinating.   Musculoskeletal: Positive for arthralgias and neck pain.   Skin: Negative for rash.   Neurological: Negative for dizziness and headaches.   Psychiatric/Behavioral: Negative for dysphoric mood. The patient is nervous/anxious.         Patient states   anxious over change in Providers.     /60   Pulse 107   Temp 97.6  F (36.4  C) (Tympanic)   Resp 18   Ht 1.575 m (5' 2\")   Wt 52.6 kg (116 lb)   SpO2 99%   BMI 21.22 kg/m      Physical Exam   Constitutional: She is oriented to person, place, and time.   THin looking.     Eyes: Pupils are equal, round, and reactive to light. Conjunctivae and EOM are normal.   Neck: Normal range of motion. Neck supple. No thyromegaly present.   Cardiovascular: Normal rate, regular rhythm, normal heart sounds and intact distal pulses.   No murmur heard.  Pulmonary/Chest: Effort normal and breath sounds normal.   Musculoskeletal: Normal range of motion.   Lymphadenopathy:     She has no cervical adenopathy.   Neurological: She is alert and oriented to person, place, and time.   Skin: Skin is warm and dry.   Psychiatric: She has a normal mood and affect.   Expresses anxiety.       Results for orders placed or performed in visit on 06/14/19   Comprehensive metabolic panel (BMP + Alb, Alk Phos, ALT, AST, Total. Bili, TP)   Result Value Ref Range    Sodium 141 133 - 144 mmol/L    Potassium 3.2 (L) 3.4 - 5.3 mmol/L    Chloride 107 94 - 109 mmol/L    Carbon Dioxide 29 20 - 32 mmol/L    Anion Gap 5 3 - 14 mmol/L    Glucose 88 70 - 99 mg/dL    Urea Nitrogen 10 7 - 30 mg/dL    Creatinine 0.49 (L) 0.52 - 1.04 mg/dL    GFR " Estimate >90 >60 mL/min/[1.73_m2]    GFR Estimate If Black >90 >60 mL/min/[1.73_m2]    Calcium 8.2 (L) 8.5 - 10.1 mg/dL    Bilirubin Total 0.3 0.2 - 1.3 mg/dL    Albumin 3.2 (L) 3.4 - 5.0 g/dL    Protein Total 6.6 (L) 6.8 - 8.8 g/dL    Alkaline Phosphatase 115 40 - 150 U/L    ALT 26 0 - 50 U/L    AST 16 0 - 45 U/L     ASSESSMENT / PLAN:  (E87.6) Hypokalemia  (primary encounter diagnosis)  Comment: Will eat 2 bananas a day  Recheck next week.    Potassium   Date Value Ref Range Status   06/14/2019 3.2 (L) 3.4 - 5.3 mmol/L Final       Plan: Comprehensive metabolic panel (BMP + Alb, Alk         Phos, ALT, AST, Total. Bili, TP)          (R11.0) Nausea  Comment:  Increased Zofran to 8mg  3 times a day as needed for nausea.    Plan: ondansetron (ZOFRAN) 4 MG tablet         (M54.2) Cervical pain  Comment:Renewed Lortab 7.5/325mg  Script.    Plan: HYDROcodone-acetaminophen (NORCO) 7.5-325 MG         per tablet          (G47.00) Persistent insomnia  Comment: Takes 2 Ambien a night.  Does not sleep without them.     Plan: zolpidem (AMBIEN) 10 MG tablet

## 2019-06-16 DIAGNOSIS — E03.9 HYPOTHYROIDISM, UNSPECIFIED TYPE: ICD-10-CM

## 2019-06-17 RX ORDER — LEVOTHYROXINE SODIUM 88 UG/1
88 TABLET ORAL DAILY
Qty: 30 TABLET | Refills: 0 | OUTPATIENT
Start: 2019-06-17

## 2019-06-17 RX ORDER — LEVOTHYROXINE SODIUM 88 UG/1
88 TABLET ORAL DAILY
Qty: 90 TABLET | Refills: 3 | Status: SHIPPED | OUTPATIENT
Start: 2019-06-17 | End: 2020-02-27

## 2019-06-21 DIAGNOSIS — E03.9 HYPOTHYROIDISM, UNSPECIFIED TYPE: ICD-10-CM

## 2019-06-21 DIAGNOSIS — E87.6 LOW POTASSIUM SYNDROME: ICD-10-CM

## 2019-06-21 DIAGNOSIS — E87.6 HYPOKALEMIA: ICD-10-CM

## 2019-06-21 LAB
ALBUMIN SERPL-MCNC: 3.6 G/DL (ref 3.4–5)
ALP SERPL-CCNC: 118 U/L (ref 40–150)
ALT SERPL W P-5'-P-CCNC: 51 U/L (ref 0–50)
ANION GAP SERPL CALCULATED.3IONS-SCNC: 5 MMOL/L (ref 3–14)
AST SERPL W P-5'-P-CCNC: 33 U/L (ref 0–45)
BILIRUB SERPL-MCNC: 0.4 MG/DL (ref 0.2–1.3)
BUN SERPL-MCNC: 8 MG/DL (ref 7–30)
CALCIUM SERPL-MCNC: 8.7 MG/DL (ref 8.5–10.1)
CHLORIDE SERPL-SCNC: 109 MMOL/L (ref 94–109)
CO2 SERPL-SCNC: 29 MMOL/L (ref 20–32)
CREAT SERPL-MCNC: 0.49 MG/DL (ref 0.52–1.04)
GFR SERPL CREATININE-BSD FRML MDRD: >90 ML/MIN/{1.73_M2}
GLUCOSE SERPL-MCNC: 93 MG/DL (ref 70–99)
POTASSIUM SERPL-SCNC: 3 MMOL/L (ref 3.4–5.3)
PROT SERPL-MCNC: 6.9 G/DL (ref 6.8–8.8)
SODIUM SERPL-SCNC: 143 MMOL/L (ref 133–144)
T4 FREE SERPL-MCNC: 1.39 NG/DL (ref 0.76–1.46)
TSH SERPL DL<=0.005 MIU/L-ACNC: 0.09 MU/L (ref 0.4–4)

## 2019-06-21 PROCEDURE — 84439 ASSAY OF FREE THYROXINE: CPT | Mod: ZL | Performed by: NURSE PRACTITIONER

## 2019-06-21 PROCEDURE — 84443 ASSAY THYROID STIM HORMONE: CPT | Mod: ZL | Performed by: NURSE PRACTITIONER

## 2019-06-21 PROCEDURE — 80053 COMPREHEN METABOLIC PANEL: CPT | Mod: ZL | Performed by: NURSE PRACTITIONER

## 2019-06-21 PROCEDURE — 36415 COLL VENOUS BLD VENIPUNCTURE: CPT | Mod: ZL | Performed by: NURSE PRACTITIONER

## 2019-06-24 DIAGNOSIS — Z01.818 PREOP GENERAL PHYSICAL EXAM: ICD-10-CM

## 2019-06-24 RX ORDER — POTASSIUM CL/LIDO/0.9 % NACL 10MEQ/0.1L
10 INTRAVENOUS SOLUTION, PIGGYBACK (ML) INTRAVENOUS
Status: CANCELLED
Start: 2019-06-24

## 2019-06-25 ENCOUNTER — INFUSION THERAPY VISIT (OUTPATIENT)
Dept: INFUSION THERAPY | Facility: OTHER | Age: 69
End: 2019-06-25
Attending: NURSE PRACTITIONER
Payer: COMMERCIAL

## 2019-06-25 VITALS
HEIGHT: 62 IN | SYSTOLIC BLOOD PRESSURE: 132 MMHG | TEMPERATURE: 97.9 F | BODY MASS INDEX: 21.79 KG/M2 | HEART RATE: 102 BPM | WEIGHT: 118.4 LBS | DIASTOLIC BLOOD PRESSURE: 62 MMHG | RESPIRATION RATE: 18 BRPM | OXYGEN SATURATION: 98 %

## 2019-06-25 DIAGNOSIS — E87.6 HYPOKALEMIA: Primary | ICD-10-CM

## 2019-06-25 PROCEDURE — 96365 THER/PROPH/DIAG IV INF INIT: CPT

## 2019-06-25 PROCEDURE — 25000128 H RX IP 250 OP 636: Performed by: NURSE PRACTITIONER

## 2019-06-25 PROCEDURE — 96366 THER/PROPH/DIAG IV INF ADDON: CPT

## 2019-06-25 RX ORDER — PROMETHAZINE HYDROCHLORIDE 25 MG/1
TABLET ORAL
Qty: 90 TABLET | Refills: 0 | Status: SHIPPED | OUTPATIENT
Start: 2019-06-25 | End: 2019-07-22

## 2019-06-25 RX ORDER — POTASSIUM CL/LIDO/0.9 % NACL 10MEQ/0.1L
10 INTRAVENOUS SOLUTION, PIGGYBACK (ML) INTRAVENOUS
Status: CANCELLED
Start: 2019-06-25

## 2019-06-25 RX ORDER — POTASSIUM CL/LIDO/0.9 % NACL 10MEQ/0.1L
10 INTRAVENOUS SOLUTION, PIGGYBACK (ML) INTRAVENOUS
Status: COMPLETED | OUTPATIENT
Start: 2019-06-25 | End: 2019-06-25

## 2019-06-25 RX ADMIN — Medication 10 MEQ: at 11:12

## 2019-06-25 RX ADMIN — Medication 10 MEQ: at 14:35

## 2019-06-25 RX ADMIN — Medication 10 MEQ: at 12:20

## 2019-06-25 RX ADMIN — Medication 10 MEQ: at 13:31

## 2019-06-25 ASSESSMENT — MIFFLIN-ST. JEOR: SCORE: 1025.44

## 2019-06-25 NOTE — PROGRESS NOTES
Patient is a 68 y/o female here accompanied by self today for infusion of IV potassium per order of Jaden Lee.  Patient meets parameters for today's infusion. Patient identified with two identifiers, order verified, and verbal consent for today's infusion obtained from patient.      Recent lab values: 6/21/19 Potassium 3.0  Patient meets order parameters for today's treatment.     24 gauge angio cath inserted into Right AC.  Immediate blood return noted.  IV secured with sterile, transparent dressing and tape.  Patient tolerated well, denies pain or discomfort at this time.  Flushes easily without resistance, no signs or symptoms of infiltration or infection.   Patient denies questions or concerns regarding infusion and/or medication(s) being administered.      1112, 1220, 1331, 1435  IV pump verified with Potassium 10mEq dose, drug, and rate of administration. Infusion administered per protocol.  Patient tolerated infusion well, no signs or symptoms of adverse reaction noted.  Patient denies pain nor discomfort.     IV removed, catheter intact.  Site clean, dry and intact.  No signs or symptoms of infiltration or infection.  Covered with a sterile bandage, slight pressure applied for 30 seconds.  Pt instructed to leave bandage intact for a minimum of one hour, and to call with questions or concerns.  Patient declines copy of appointments, discharge instructions, and after visit summary (AVS).  Patient states understanding, discharged ambulatory.

## 2019-06-25 NOTE — PATIENT INSTRUCTIONS
Patient Education     Patient Education    Potassium Acetate Solution for injection    Potassium Bicarbonate Effervescent tablet    Potassium Bicarbonate, Potassium Chloride Effervescent tablet    Potassium Chloride Effervescent tablet    Potassium Chloride Oral capsule, extended-release    Potassium Chloride Oral solution    Potassium Chloride Oral syrup    Potassium Chloride Oral tablet    Potassium Chloride Oral tablet, extended-release    Potassium Chloride Oral tablet, Micro-dispersible    Potassium Chloride Oral tablet, Wax matrix    Potassium Chloride Solution for injection    Potassium Chloride, Dextrose Solution for injection    Potassium Gluconate Oral solution    Potassium Gluconate Oral tablet  Potassium Chloride, Dextrose Solution for injection  What is this medicine?  POTASSIUM (bao TASS i um) is a natural salt that is important for the heart, muscles, and nerves. Too much or too little potassium in the body can cause serious problems. This medicine is used to treat low potassium.  This medicine may be used for other purposes; ask your health care provider or pharmacist if you have questions.  What should I tell my health care provider before I take this medicine?  They need to know if you have any of these conditions:    adrenal disease    heart disease    high levels of potassium in the blood    kidney disease    liver disease    an unusual or allergic reaction to potassium, other medicines, foods, dyes, or preservatives    pregnant or trying to get pregnant    breast-feeding  How should I use this medicine?  This medicine is for infusion into a vein. It is given by a health care professional in a hospital or clinic setting.  Talk to your pediatrician regarding the use of this medicine in children. Special care may be needed.  Overdosage: If you think you have taken too much of this medicine contact a poison control center or emergency room at once.  NOTE: This medicine is only for you. Do not share  this medicine with others.  What if I miss a dose?  This does not apply.  What may interact with this medicine?  Do not take this medicine with any of the following medications:    eplerenone    sodium polystyrene sulfonate  This medicine may also interact with the following medications:    heart medicines    medicines for cold or allergies    medicines for Parkinson's disease    medicines for the stomach like metoclopramide, dicyclomine, glycopyrrolate    NSAIDs, medicines for pain and inflammation, like ibuprofen or naproxen    other potassium supplements    salt substitutes    some diuretics  This list may not describe all possible interactions. Give your health care provider a list of all the medicines, herbs, non-prescription drugs, or dietary supplements you use. Also tell them if you smoke, drink alcohol, or use illegal drugs. Some items may interact with your medicine.  What should I watch for while using this medicine?  Your condition will be monitored carefully while you are receiving this medicine.  What side effects may I notice from receiving this medicine?  Side effects that you should report to your doctor or health care professional as soon as possible:    allergic reactions like skin rash, itching or hives, swelling of the face, lips, or tongue    breathing problems    confusion    fast, irregular heartbeat    feeling faint or lightheaded, falls    low blood pressure    numbness or tingling in hands or feet    unusually weak or tired    weakness, heaviness of legs  Side effects that usually do not require medical attention (report to your doctor or health care professional if they continue or are bothersome):    nausea, vomiting    stomach upset  This list may not describe all possible side effects. Call your doctor for medical advice about side effects. You may report side effects to FDA at 5-934-FDA-0324.  Where should I keep my medicine?  This drug is given in a hospital or clinic and will not be  stored at home.  NOTE:This sheet is a summary. It may not cover all possible information. If you have questions about this medicine, talk to your doctor, pharmacist, or health care provider. Copyright  2016 Gold Standard

## 2019-07-03 ENCOUNTER — OFFICE VISIT (OUTPATIENT)
Dept: CHIROPRACTIC MEDICINE | Facility: OTHER | Age: 69
End: 2019-07-03
Attending: CHIROPRACTOR
Payer: COMMERCIAL

## 2019-07-03 ENCOUNTER — TELEPHONE (OUTPATIENT)
Dept: INTERNAL MEDICINE | Facility: OTHER | Age: 69
End: 2019-07-03

## 2019-07-03 DIAGNOSIS — M99.03 SEGMENTAL AND SOMATIC DYSFUNCTION OF LUMBAR REGION: ICD-10-CM

## 2019-07-03 DIAGNOSIS — M99.02 SEGMENTAL AND SOMATIC DYSFUNCTION OF THORACIC REGION: ICD-10-CM

## 2019-07-03 DIAGNOSIS — M54.2 CERVICALGIA: ICD-10-CM

## 2019-07-03 DIAGNOSIS — M99.01 SEGMENTAL AND SOMATIC DYSFUNCTION OF CERVICAL REGION: Primary | ICD-10-CM

## 2019-07-03 PROCEDURE — 98941 CHIROPRACT MANJ 3-4 REGIONS: CPT | Mod: AT | Performed by: CHIROPRACTOR

## 2019-07-03 NOTE — TELEPHONE ENCOUNTER
Aure is scheduled to establish care with Dr Cruz 9/5 and feels she need to see him a lot sooner please.  She is having stomach problems, had surgery in March.  She has lost 75 lbs since Sept and really need to continue care a lot sooner than Sept if possible with Dr Cruz.  Please call Aure at [phone].  Thank you  I offer Dr Barnett because pt was wanting internal med to get in sooner, but delined.

## 2019-07-05 ENCOUNTER — TRANSFERRED RECORDS (OUTPATIENT)
Dept: HEALTH INFORMATION MANAGEMENT | Facility: CLINIC | Age: 69
End: 2019-07-05

## 2019-07-08 DIAGNOSIS — B02.23 POSTHERPETIC POLYNEUROPATHY: ICD-10-CM

## 2019-07-08 RX ORDER — GABAPENTIN 300 MG/1
300 CAPSULE ORAL 3 TIMES DAILY
Qty: 90 CAPSULE | Refills: 1 | Status: SHIPPED | OUTPATIENT
Start: 2019-07-08 | End: 2019-08-24

## 2019-07-08 NOTE — TELEPHONE ENCOUNTER
gabapentin (NEURONTIN) 300 MG capsule      Last Written Prescription Date:  5-30-19  Last Fill Quantity: 90,   # refills: 3  Last Office Visit: 6-14-19  Future Office visit:    Next 5 appointments (look out 90 days)    Jul 09, 2019 12:30 PM CDT  Return Visit with Noah Bloom DC  [facility] ([facility]) [address] of [facility] [address]  [phone]   Jul 18, 2019  3:40 PM CDT  (Arrive by 3:25 PM)  [facility] Quinn [facility] DO  [facility] [facility] [address]  [phone]           Routing refill request to provider for review/approval because:  Drug not on the [facility], [facility] or  Health refill protocol or controlled substance

## 2019-07-09 ENCOUNTER — TRANSFERRED RECORDS (OUTPATIENT)
Dept: HEALTH INFORMATION MANAGEMENT | Facility: CLINIC | Age: 69
End: 2019-07-09

## 2019-07-09 ENCOUNTER — OFFICE VISIT (OUTPATIENT)
Dept: CHIROPRACTIC MEDICINE | Facility: OTHER | Age: 69
End: 2019-07-09
Attending: CHIROPRACTOR
Payer: COMMERCIAL

## 2019-07-09 DIAGNOSIS — M99.01 SEGMENTAL AND SOMATIC DYSFUNCTION OF CERVICAL REGION: Primary | ICD-10-CM

## 2019-07-09 DIAGNOSIS — M99.03 SEGMENTAL AND SOMATIC DYSFUNCTION OF LUMBAR REGION: ICD-10-CM

## 2019-07-09 DIAGNOSIS — M99.02 SEGMENTAL AND SOMATIC DYSFUNCTION OF THORACIC REGION: ICD-10-CM

## 2019-07-09 DIAGNOSIS — M54.2 CERVICALGIA: ICD-10-CM

## 2019-07-09 PROCEDURE — 98941 CHIROPRACT MANJ 3-4 REGIONS: CPT | Mod: AT | Performed by: CHIROPRACTOR

## 2019-07-10 NOTE — PROGRESS NOTES
Subjective Finding:    Chief compalint: Patient presents with:  Neck Pain  Back Pain  , Pain Scale: 4/10, Intensity: sharp, Duration: 1 weeks, Radiating: right leg.    Date of injury:       Activities that the pain restricts:   Home/household/hobbies/social activities: yes.  Work duties: yes.  Sleep: yes.  Makes symptoms better: rest.  Makes symptoms worse: activity, lumbar extension, lumbar flexion, cervical extension and cervical flexion.  Have you seen anyone else for the symptoms? no.  Work related: yes.  Automobile related injury: no.    Objective and Assessment:    Posture Analysis:   High shoulder: .  Head tilt: .  High iliac crest: right.  Head carriage: forward.  Thoracic Kyphosis: neutral.  Lumbar Lordosis: forward.    Lumbar Range of Motion: flexion decreased, extension decreased, left lateral flexion decreased and right lateral flexion decreased.  Cervical Range of Motion: extension decreased.  Thoracic Range of Motion: extension decreased.  Extremity Range of Motion: .    Palpation:   Quad lumb: bilateral, referred pain: yes  Lev scapulae: sharp pain, referred pain: no    Segmental dysfunction pre-treatment and treatment area: C1, C6, C7, T4, L4, L5 and PSIS Right.    Assessment post-treatment:  Cervical: ROM increased.  Thoracic: ROM increased.  Lumbar: ROM increased and pain and tenderness decreased.    Comments:    Adjust bilateral knees with some relief    Complicating Factors: .    Plan / Procedure:    Treatment plan: PRN.  Instructed patient: ice 20 minutes every other hour as needed and stretch as instructed at visit.  Short term goals: reduce pain and increase ROM.  Long term goals: restore normal function.  Prognosis: excellent.

## 2019-07-11 ENCOUNTER — OFFICE VISIT (OUTPATIENT)
Dept: CHIROPRACTIC MEDICINE | Facility: OTHER | Age: 69
End: 2019-07-11
Attending: CHIROPRACTOR
Payer: COMMERCIAL

## 2019-07-11 DIAGNOSIS — M99.01 SEGMENTAL AND SOMATIC DYSFUNCTION OF CERVICAL REGION: Primary | ICD-10-CM

## 2019-07-11 DIAGNOSIS — M54.2 CERVICALGIA: ICD-10-CM

## 2019-07-11 DIAGNOSIS — M99.02 SEGMENTAL AND SOMATIC DYSFUNCTION OF THORACIC REGION: ICD-10-CM

## 2019-07-11 PROCEDURE — 98940 CHIROPRACT MANJ 1-2 REGIONS: CPT | Mod: AT | Performed by: CHIROPRACTOR

## 2019-07-11 NOTE — PROGRESS NOTES
Subjective Finding:    Chief compalint: Patient presents with:  Neck Pain  Back Pain  , Pain Scale: 4/10, Intensity: sharp, Duration: 1 weeks, Radiating: right leg.    Date of injury:       Activities that the pain restricts:   Home/household/hobbies/social activities: yes.  Work duties: yes.  Sleep: yes.  Makes symptoms better: rest.  Makes symptoms worse: activity, lumbar extension, lumbar flexion, cervical extension and cervical flexion.  Have you seen anyone else for the symptoms? no.  Work related: yes.  Automobile related injury: no.    Objective and Assessment:    Posture Analysis:   High shoulder: .  Head tilt: .  High iliac crest: right.  Head carriage: forward.  Thoracic Kyphosis: neutral.  Lumbar Lordosis: forward.    Lumbar Range of Motion: flexion decreased, extension decreased, left lateral flexion decreased and right lateral flexion decreased.  Cervical Range of Motion: extension decreased.  Thoracic Range of Motion: extension decreased.  Extremity Range of Motion: .    Palpation:   Quad lumb: bilateral, referred pain: yes  Lev scapulae: sharp pain, referred pain: no    Segmental dysfunction pre-treatment and treatment area: C1, C6, C7, T4, L4, L5 and PSIS Right.    Assessment post-treatment:  Cervical: ROM increased.  Thoracic: ROM increased.  Lumbar: ROM increased and pain and tenderness decreased.    Comments:        Complicating Factors: .    Plan / Procedure:    Treatment plan: PRN.  Instructed patient: ice 20 minutes every other hour as needed and stretch as instructed at visit.  Short term goals: reduce pain and increase ROM.  Long term goals: restore normal function.  Prognosis: excellent.

## 2019-07-12 NOTE — PROGRESS NOTES
Subjective Finding:    Chief compalint: Patient presents with:  Neck Pain  , Pain Scale: 4/10, Intensity: sharp, Duration: 1 weeks, Radiating: right leg.    Date of injury:       Activities that the pain restricts:   Home/household/hobbies/social activities: yes.  Work duties: yes.  Sleep: yes.  Makes symptoms better: rest.  Makes symptoms worse: activity, lumbar extension, lumbar flexion, cervical extension and cervical flexion.  Have you seen anyone else for the symptoms? no.  Work related: yes.  Automobile related injury: no.    Objective and Assessment:    Posture Analysis:   High shoulder: .  Head tilt: .  High iliac crest: right.  Head carriage: forward.  Thoracic Kyphosis: neutral.  Lumbar Lordosis: forward.    Lumbar Range of Motion: flexion decreased, extension decreased, left lateral flexion decreased and right lateral flexion decreased.  Cervical Range of Motion: extension decreased.  Thoracic Range of Motion: extension decreased.  Extremity Range of Motion: .    Palpation:   Quad lumb: bilateral, referred pain: yes  Lev scapulae: sharp pain, referred pain: no    Segmental dysfunction pre-treatment and treatment area: C1, C6, C7, T4, L4, L5 and PSIS Right.    Assessment post-treatment:  Cervical: ROM increased.  Thoracic: ROM increased.  Lumbar: ROM increased and pain and tenderness decreased.    Comments:        Complicating Factors: .    Plan / Procedure:    Treatment plan: PRN.  Instructed patient: ice 20 minutes every other hour as needed and stretch as instructed at visit.  Short term goals: reduce pain and increase ROM.  Long term goals: restore normal function.  Prognosis: excellent.

## 2019-07-22 DIAGNOSIS — Z01.818 PREOP GENERAL PHYSICAL EXAM: ICD-10-CM

## 2019-07-22 DIAGNOSIS — M54.2 CERVICALGIA: ICD-10-CM

## 2019-07-22 RX ORDER — PROMETHAZINE HYDROCHLORIDE 25 MG/1
TABLET ORAL
Qty: 90 TABLET | Refills: 0 | Status: SHIPPED | OUTPATIENT
Start: 2019-07-22 | End: 2019-08-17

## 2019-07-22 RX ORDER — CYCLOBENZAPRINE HCL 10 MG
TABLET ORAL
Qty: 90 TABLET | Refills: 1 | Status: SHIPPED | OUTPATIENT
Start: 2019-07-22 | End: 2019-09-13

## 2019-07-22 NOTE — TELEPHONE ENCOUNTER
cyclobenzaprine      Last Written Prescription Date:  6/25/2019  Last Fill Quantity: 90,   # refills: 0  Last Office Visit: 6/14/2019  Future Office visit:    Next 5 appointments (look out 90 days)    Jul 23, 2019 11:40 AM CDT  (Arrive by 11:20 AM)  Office Visit with Quinn Cruz DO  [facility] ([facility] ) [address]  [place] [address]  [phone]           Routing refill request to provider for review/approval because:  Drug not on the [facility], [facility] or [facility] refill protocol or controlled substance

## 2019-07-23 ENCOUNTER — OFFICE VISIT (OUTPATIENT)
Dept: INTERNAL MEDICINE | Facility: OTHER | Age: 69
End: 2019-07-23
Attending: INTERNAL MEDICINE
Payer: COMMERCIAL

## 2019-07-23 VITALS
OXYGEN SATURATION: 98 % | DIASTOLIC BLOOD PRESSURE: 80 MMHG | HEART RATE: 93 BPM | TEMPERATURE: 97.4 F | WEIGHT: 114.6 LBS | SYSTOLIC BLOOD PRESSURE: 142 MMHG | BODY MASS INDEX: 20.96 KG/M2

## 2019-07-23 DIAGNOSIS — Z78.0 POST-MENOPAUSE: ICD-10-CM

## 2019-07-23 DIAGNOSIS — M85.80 OSTEOPENIA, UNSPECIFIED LOCATION: ICD-10-CM

## 2019-07-23 DIAGNOSIS — M81.0 AGE-RELATED OSTEOPOROSIS WITHOUT CURRENT PATHOLOGICAL FRACTURE: ICD-10-CM

## 2019-07-23 DIAGNOSIS — M54.2 CERVICAL PAIN: ICD-10-CM

## 2019-07-23 DIAGNOSIS — E87.6 HYPOKALEMIA: ICD-10-CM

## 2019-07-23 DIAGNOSIS — D50.9 IRON DEFICIENCY ANEMIA, UNSPECIFIED IRON DEFICIENCY ANEMIA TYPE: ICD-10-CM

## 2019-07-23 DIAGNOSIS — M25.561 ARTHRALGIA OF BOTH KNEES: ICD-10-CM

## 2019-07-23 DIAGNOSIS — G47.00 PERSISTENT INSOMNIA: ICD-10-CM

## 2019-07-23 DIAGNOSIS — Z98.84 STATUS POST GASTRIC BYPASS FOR OBESITY: ICD-10-CM

## 2019-07-23 DIAGNOSIS — M25.562 ARTHRALGIA OF BOTH KNEES: ICD-10-CM

## 2019-07-23 DIAGNOSIS — K31.1 GASTRIC OUTLET OBSTRUCTION: Primary | ICD-10-CM

## 2019-07-23 DIAGNOSIS — E03.8 OTHER SPECIFIED HYPOTHYROIDISM: ICD-10-CM

## 2019-07-23 DIAGNOSIS — M89.9 DISORDER OF BONE: ICD-10-CM

## 2019-07-23 PROCEDURE — G0463 HOSPITAL OUTPT CLINIC VISIT: HCPCS

## 2019-07-23 PROCEDURE — 99214 OFFICE O/P EST MOD 30 MIN: CPT | Performed by: INTERNAL MEDICINE

## 2019-07-23 RX ORDER — ZOLPIDEM TARTRATE 10 MG/1
10 TABLET ORAL
Qty: 60 TABLET | Refills: 0 | Status: SHIPPED | OUTPATIENT
Start: 2019-07-31 | End: 2019-08-30

## 2019-07-23 RX ORDER — HYDROCODONE BITARTRATE AND ACETAMINOPHEN 7.5; 325 MG/1; MG/1
1 TABLET ORAL EVERY 6 HOURS PRN
Qty: 120 TABLET | Refills: 0 | Status: SHIPPED | OUTPATIENT
Start: 2019-08-01 | End: 2019-08-30

## 2019-07-23 ASSESSMENT — PAIN SCALES - GENERAL: PAINLEVEL: NO PAIN (1)

## 2019-07-23 NOTE — NURSING NOTE
"Chief Complaint   Patient presents with     Abdominal Pain       Initial /80 (BP Location: Right arm, Patient Position: Chair, Cuff Size: Adult Regular)   Pulse 93   Temp 97.4  F (36.3  C) (Tympanic)   Wt 52 kg (114 lb 9.6 oz)   SpO2 98%   BMI 20.96 kg/m   Estimated body mass index is 20.96 kg/m  as calculated from the following:    Height as of 6/25/19: 1.575 m (5' 2.01\").    Weight as of this encounter: 52 kg (114 lb 9.6 oz).  Medication Reconciliation: complete       Riya Alvarenga LPN      "

## 2019-07-23 NOTE — PROGRESS NOTES
Subjective     Aure Lanier is a 69 year old female who presents to clinic today for the following health issues:  Establish care:  Chronic upper GI problems, hypokalemia, hypothyroidism, iron deficiency anemia    HPI   Abdominal Pain      Duration: Ongoing issue for for almost one year - progressively worsening.  She was on Toradol for knee replacement last year.    Description (location/character/radiation): Mid-abdomen pain, fatigue, nausea and vomiting, unable to keep food down, loss of weight        Associated flank pain: None    Intensity:  mild    Accompanying signs and symptoms:        Fever/Chills: no        Gas/Bloating: YES       Nausea/vomitting: YES       Diarrhea: YES       Dysuria or Hematuria: no     History (previous similar pain/trauma/previous testing): None    Precipitating or alleviating factors:       Pain worse with eating/BM/urination: Eating, BM       Pain relieved by BM: YES    Therapies tried and outcome: Norco / tylenol - helps     Hx of gastric outlet obstruction       She had a Rou -en -Y gastric bypass surgery 1980 at [facility].  03/27/2019:  Stomach revision surgery and removal of adhesions and cholecystectomy.    Esophagogastroduodenoscopy dilatation 4/24/19  Esophagogastroduodenoscopy dilatation 5/24/19  Esophagogastroduodenoscopy dilalation  6/10/19  Esophagogastroduodenoscopy dilatation 7/5/19  Next :  Esophagogastroduodenoscopy diagnostic 8/7/19      Wt Readings from Last 5 Encounters:   07/23/19 52 kg (114 lb 9.6 oz)   06/25/19 53.7 kg (118 lb 6.4 oz)   06/14/19 52.6 kg (116 lb)   06/04/19 52.6 kg (115 lb 14.4 oz)   05/30/19 52.6 kg (116 lb)     (most recent scopes)     Does see gastric surgeon - Dr. Wheatley     Hypokalemia:  She has intolerance of potassium pills and has a short response to potassium infusions.    Patient Active Problem List   Diagnosis     Allergic arthritis involving hand     Hypothyroidism     Insomnia     Headache     Postherpetic  polyneuropathy     Dysthymia     Anxiety state     Hyperlipidemia     Migraine     Osteoporosis     GERD     Low back pain     Hyperlipidemia with target LDL less than 100     Bilateral chronic knee pain     Back muscle spasm     Chronic, continuous use of opioids     Status post total left knee replacement     S/P total knee arthroplasty, right     Status post total right knee replacement     Family history of other musculoskeletal diseases(V17.89)     Hypokalemia     Anemia, iron deficiency     History of Billroth I operation     Phytobezoar     Gastric outlet obstruction     Past Surgical History:   Procedure Laterality Date     ARTHROPLASTY KNEE Left 4/17/2018    Procedure: ARTHROPLASTY KNEE;  LEFT TOTAL KNEE ARTHROPLASTY S/N JOURNEY II;  Surgeon: Ty Hernandez MD;  Location: [facility]     ARTHROPLASTY KNEE Right 9/4/2018    Procedure: ARTHROPLASTY KNEE;  RIGHT TOTAL KNEE ARTHROPLASTY ;  Surgeon: Ty Hernandez MD;  Location: [facility]     CHOLECYSTECTOMY  03/27/2019    and lysis adhesions.       D & C       ENDOSCOPY       ESOPHAGOSCOPY, GASTROSCOPY, DUODENOSCOPY (EGD), COMBINED N/A 9/11/2017    Procedure: COMBINED ESOPHAGOSCOPY, GASTROSCOPY, DUODENOSCOPY (EGD);  Upper Endoscopy: Removal of Food Impaction;  Surgeon: Lino Zhu DO;  Location: [facility]     ESOPHAGOSCOPY, GASTROSCOPY, DUODENOSCOPY (EGD), COMBINED N/A 11/5/2018    Procedure: UPPER ENDOSCOPY WITH BIOPSY;  Surgeon: Dieudonne Jackson MD;  Location: [facility]     ESOPHAGOSCOPY, GASTROSCOPY, DUODENOSCOPY (EGD), COMBINED N/A 12/21/2018    Procedure: UPPER ENDOSCOPY WITH BALLOON DILATION, BIOPSY;  Surgeon: Dieudonne Jackson MD;  Location: [facility]     ESOPHAGOSCOPY, GASTROSCOPY, DUODENOSCOPY (EGD), COMBINED N/A 1/14/2019    Procedure: UPPER ENDOSCOPY WITH ENDOSCOPIC PLACEMENT OF OG AND GASTRIC LAVAGE;  Surgeon: Dieudonne Jackson MD;  Location: [facility]     partial gastrectomy  03/2019     right total knee replacement  01/2019     MARAH EN Y BOWEL  1980     Due to severely ulcerated stomach body.     STOMACH SURGERY  03/27/2019    Restructuring of stomach and Colecuystectomy.     stomach surgery peritinitis         Social History     Tobacco Use     Smoking status: Never Smoker     Smokeless tobacco: Never Used     Tobacco comment: no passive exposure   Substance Use Topics     Alcohol use: Yes     Comment: rarely, maybe yearly     Family History   Problem Relation Age of Onset     Cerebrovascular Disease Mother         CVA     Hypertension Mother      Other - See Comments Father 40        mining accident; cause of death     Other - See Comments Brother         muscle dystrophy     Cancer Daughter 34        skin cancer     Melanoma Daughter            -------------------------------------  Reviewed and updated as needed this visit by Provider         Review of Systems   ROS COMP: CONSTITUTIONAL: NEGATIVE for fever, chills, change in weight  INTEGUMENTARY/SKIN: NEGATIVE for worrisome rashes, moles or lesions  ENT/MOUTH: NEGATIVE for ear, mouth and throat problems  RESP: NEGATIVE for significant cough or SOB  CV: NEGATIVE for chest pain, palpitations or peripheral edema  GI: POSITIVE for constipation and as in the HPI  and NEGATIVE for diarrhea, hematemesis, hematochezia and melena  : NEGATIVE for frequency, dysuria, or hematuria  MUSCULOSKELETAL: NEGATIVE for significant arthralgias or myalgia  NEURO: NEGATIVE for weakness, dizziness or paresthesias  ENDOCRINE: NEGATIVE for temperature intolerance, skin/hair changes  PSYCHIATRIC: NEGATIVE for changes in mood or affect      Objective    /80 (BP Location: Right arm, Patient Position: Chair, Cuff Size: Adult Regular)   Pulse 93   Temp 97.4  F (36.3  C) (Tympanic)   Wt 52 kg (114 lb 9.6 oz)   SpO2 98%   BMI 20.96 kg/m    Body mass index is 20.96 kg/m .  Physical Exam   GENERAL: healthy, alert and no distress  EYES: conjunctivae and sclerae normal  NECK: no adenopathy, no asymmetry, masses, or scars and thyroid  normal to palpation  RESP: lungs clear to auscultation - no rales, rhonchi or wheezes  CV: regular rate and rhythm, normal S1 S2, no S3 or S4, no murmur, click or rub, no peripheral edema and peripheral pulses strong  ABDOMEN: soft, nontender, no hepatosplenomegaly, no masses and bowel sounds normal  ABDOMEN: no bruits heard  MS: no gross musculoskeletal defects noted, no edema  PSYCH: mentation appears normal, affect normal/bright  PSYCH: anxious    Diagnostic Test Results:  Labs reviewed in Epic  Patient failed to go to lab, so her labs were entered for the future labs.          Imagin/2/19  1:33 PM [id number] [facility] DEXA    PACS Images      Show images for DX Hip/Pelvis/Spine   Study Result     PROCEDURE: DX HIP/PELVIS/SPINE 2019 1:33 PM     HISTORY: Osteopenia, unspecified location; Post-menopause     COMPARISONS:      TECHNIQUE: DEXA bone mineral density study of the left hip and lumbar  spine     FINDINGS: Bone mineral density in the left hip measured 0.561 g/sq cm  with a T score -3.1. There is been a 28.6% decline from . Bone  mineral density in the femoral neck measured 0.515 g/sq cm with a T  score -3.0. Bone mineral density in the lumbar spine L1-L4 measured  0.719 g/sq cm with a T score of -3.0. There is been a 9.2% decline  from                                                                         IMPRESSION: Patient is osteoporotic and fracture risk is high. There  has been a significant decline in bone mineral density from      NEGRITA ZULETA MD     Recent Labs   Lab Test 19  1532 19  1234    143   POTASSIUM 3.2* 3.0*   CHLORIDE 109 109   CO2 30 29   ANIONGAP 5 5   GLC 94 93   BUN 18 8   CR 0.52 0.49*   SHAYY 8.4* 8.7       Lab Results   Component Value Date    WBC 9.6 2019     Lab Results   Component Value Date    RBC 4.62 2019     Lab Results   Component Value Date    HGB 12.9 2019     Lab Results   Component Value Date    HCT 39.5  05/07/2019     No components found for: MCT  Lab Results   Component Value Date    MCV 86 05/07/2019     Lab Results   Component Value Date    MCH 27.9 05/07/2019     Lab Results   Component Value Date    MCHC 32.7 05/07/2019     Lab Results   Component Value Date    RDW 14.8 05/07/2019     Lab Results   Component Value Date     05/07/2019 7/24/19  3:32 PM [id number]    Component Value Flag Ref Range Units Status Collected Lab   Iron 42   35 - 180 ug/dL Final 07/24/2019  3:32 PM [facility]   Iron Binding Cap 222  Low   240 - 430 ug/dL Final 07/24/2019  3:32 PM [facility]   Iron Saturation Index 19   15 - 46 % Final 07/24/2019  3:32 PM      Ferritin (Order #[id number]) on 7/24/19   Exam Information     Exam Date Exam Time Accession # Results    7/24/19  3:32 PM [id number]    Component Value Flag Ref Range Units Status Collected Lab   Ferritin 106   8 - 252 ng/mL Final 07/24/2019  3:32 PM [facility]     Vitamin D Deficiency (Order #[id number]) on 7/24/19   Exam Information     Exam Date Exam Time Accession # Results    7/24/19  3:32 PM [id number]    Component Value Flag Ref Range Units Status Collected Lab   Vitamin D Deficiency screening 33   20 - 75 ug/L Final 07/24/2019  3:32 PM 51   Comment:   Season, race, dietary intake, and treatment affect the concentration of   25-hydroxy-Vitamin D. Values may decrease during winter months and increase   during summer months. Values 20-29 ug/L may indicate Vitamin D insufficiency   and values <20 ug/L may indicate Vitamin D deficiency.   Vitamin D determination is routinely performed by an immunoassay specific for   25 hydroxyvitamin D3.  If an individual is on vitamin D2 (ergocalciferol)   supplementation, please specify 25 OH vitamin D2 and D3 level determination by    LCMSMS test [id number].        3/11/19 11:05 AM [id number] [facility] INTERVENTIONAL RAD    PACS Images      Show images for XR Upper GI without KUB   Study Result     PROCEDURE: XR UPPER GI WITHOUT  JOSUÉ 3/11/2019 11:05 AM     HISTORY: GASTRIC OUTLET OBSTRUCTION, HX OF ANTRECTOMY AND VAGOTOMY.  EVALUATE FOR RETAINED FOOD. HAS BEEN ON LIQUID DIET SINCE 2/1/19;  Obstructed, gastric outlet     COMPARISONS: None.     TECHNIQUE: Upper GI     FINDINGS: Esophageal mucosa and motility are normal. There is been a  partial gastrectomy with a gastroduodenostomy. There is a large amount  of residual material within the stomach. The patient has not eaten  solid food for several weeks. This may represent a bezoar. The  duodenum and proximal jejunum are normal.                                                                        IMPRESSION: Status post partial gastrectomy with gastroduodenostomy. A  large amount of residual material is present within the stomach  possibly a bezoar     NEGRITA ZULETA MD         ASSESSMENT /PLAN:  (K31.1) Gastric outlet obstruction  (primary encounter diagnosis)  Comment: She should take this up with her gastric physician.  I will assure that we give her medications that will not promote gastric irritation=.  Plan:   As below    (D50.9) Iron deficiency anemia, unspecified iron deficiency anemia type  Comment: Her iron levels are good.  She has normal hemoglobin and normal MCV  Plan:   continue to monitor levels every 6 -12 months    (E87.6) Hypokalemia  Comment: Her potassium is low and may be causing some muscular pains.  She has a chronic low potassium..  She does not tolerate potassium pills.  Plan:   Aldactone 25 mg BID    (E03.8) Other specified hypothyroidism  Comment: The patient's thyroid hormone levels are normal indicating a therapeutic medication level.  Plan:   She will continue synthroid 88 mcg daily.    (M85.80) Osteopenia, unspecified location  Comment: She is now osteoporotic and she is going to need infusion therapy due to ongoing abdominal pian dn gastric bypass surgery.  Plan:   RECLAST infusions    (Z78.0) Post-menopause  Comment: As noted above.  Plan:   RECLAST  infusions    (M54.2) Cervical pain  Comment:  She has facet jont changes which are likely causing her pain.  Plan:   She will continue HYDROcodone-acetaminophen (NORCO) 7.5-325 MG per tablet    (M25.561,  M25.562) Arthralgia of both knees  Comment: Aure has chronic knee pain despite s/p total knee replacement.  Plan:   She will continue HYDROcodone-acetaminophen (NORCO) 7.5-325 MG every 6 hours for knee pain.    (Z98.84) Status post gastric bypass for obesity  Comment: Her vitamin D level is reasonable, but could be better given her osteoporosis   Plan:     (M89.9) Disorder of bone   Comment: Osteoporosis   Plan:   RECLAST and optimize Calcium and vitamin D            Follow up with Provider - 3 months for abdominal pain and hypokalemia       Quinn Cruz, DO

## 2019-07-23 NOTE — TELEPHONE ENCOUNTER
zolpidem (AMBIEN) 10 MG tablet      Last Written Prescription Date:  historical  Last Fill Quantity: -,   # refills: -  Last Office Visit: 6/14/19  Future Office visit:    Next 5 appointments (look out 90 days)    Jul 23, 2019 11:40 AM CDT  (Arrive by 11:20 AM)  Office Visit with Quinn Cruz DO  [facility] ([facility] ) [address]  [place] [address]  [phone]           Routing refill request to provider for review/approval because:  Drug not on the [facility], [facility] or  Health refill protocol or controlled substance  Historical med, please advise

## 2019-07-24 DIAGNOSIS — Z98.84 STATUS POST GASTRIC BYPASS FOR OBESITY: ICD-10-CM

## 2019-07-24 DIAGNOSIS — D50.9 IRON DEFICIENCY ANEMIA, UNSPECIFIED IRON DEFICIENCY ANEMIA TYPE: ICD-10-CM

## 2019-07-24 DIAGNOSIS — M89.9 DISORDER OF BONE: ICD-10-CM

## 2019-07-24 DIAGNOSIS — E87.6 HYPOKALEMIA: ICD-10-CM

## 2019-07-24 LAB
ANION GAP SERPL CALCULATED.3IONS-SCNC: 5 MMOL/L (ref 3–14)
BUN SERPL-MCNC: 18 MG/DL (ref 7–30)
CALCIUM SERPL-MCNC: 8.4 MG/DL (ref 8.5–10.1)
CHLORIDE SERPL-SCNC: 109 MMOL/L (ref 94–109)
CO2 SERPL-SCNC: 30 MMOL/L (ref 20–32)
CREAT SERPL-MCNC: 0.52 MG/DL (ref 0.52–1.04)
FERRITIN SERPL-MCNC: 106 NG/ML (ref 8–252)
GFR SERPL CREATININE-BSD FRML MDRD: >90 ML/MIN/{1.73_M2}
GLUCOSE SERPL-MCNC: 94 MG/DL (ref 70–99)
IRON SATN MFR SERPL: 19 % (ref 15–46)
IRON SERPL-MCNC: 42 UG/DL (ref 35–180)
POTASSIUM SERPL-SCNC: 3.2 MMOL/L (ref 3.4–5.3)
SODIUM SERPL-SCNC: 144 MMOL/L (ref 133–144)
TIBC SERPL-MCNC: 222 UG/DL (ref 240–430)

## 2019-07-24 PROCEDURE — 83540 ASSAY OF IRON: CPT | Mod: ZL | Performed by: INTERNAL MEDICINE

## 2019-07-24 PROCEDURE — 36415 COLL VENOUS BLD VENIPUNCTURE: CPT | Mod: ZL | Performed by: INTERNAL MEDICINE

## 2019-07-24 PROCEDURE — 99000 SPECIMEN HANDLING OFFICE-LAB: CPT | Performed by: INTERNAL MEDICINE

## 2019-07-24 PROCEDURE — 82306 VITAMIN D 25 HYDROXY: CPT | Mod: ZL | Performed by: INTERNAL MEDICINE

## 2019-07-24 PROCEDURE — 82728 ASSAY OF FERRITIN: CPT | Mod: ZL | Performed by: INTERNAL MEDICINE

## 2019-07-24 PROCEDURE — 80048 BASIC METABOLIC PNL TOTAL CA: CPT | Mod: ZL | Performed by: INTERNAL MEDICINE

## 2019-07-24 PROCEDURE — 83550 IRON BINDING TEST: CPT | Mod: ZL | Performed by: INTERNAL MEDICINE

## 2019-07-26 LAB — DEPRECATED CALCIDIOL+CALCIFEROL SERPL-MC: 33 UG/L (ref 20–75)

## 2019-08-02 ENCOUNTER — HOSPITAL ENCOUNTER (OUTPATIENT)
Dept: BONE DENSITY | Facility: HOSPITAL | Age: 69
Discharge: HOME | End: 2019-08-02
Attending: INTERNAL MEDICINE | Admitting: INTERNAL MEDICINE
Payer: COMMERCIAL

## 2019-08-02 DIAGNOSIS — Z78.0 POST-MENOPAUSE: ICD-10-CM

## 2019-08-02 DIAGNOSIS — M85.80 OSTEOPENIA, UNSPECIFIED LOCATION: ICD-10-CM

## 2019-08-02 PROCEDURE — 77080 DXA BONE DENSITY AXIAL: CPT | Mod: TC

## 2019-08-13 DIAGNOSIS — E78.2 MIXED HYPERLIPIDEMIA: ICD-10-CM

## 2019-08-14 NOTE — TELEPHONE ENCOUNTER
Simvastatin 40mg      Last Written Prescription Date:  5/30/2019  Last Fill Quantity: 90,   # refills: 3  Last Office Visit: 7/23/2019  Future Office visit:    Next 5 appointments (look out 90 days)    Oct 23, 2019  1:20 PM CDT  (Arrive by 1:00 PM)  SHORT with Quinn Cruz DO  [facility] ([facility] ) [address]  [place] [address]  [phone]           Routing refill request to provider for review/approval because:  Patient's last lipid profile done 6/01/2017. Please advise.

## 2019-08-16 RX ORDER — SIMVASTATIN 40 MG
TABLET ORAL
Qty: 90 TABLET | Refills: 0 | Status: SHIPPED | OUTPATIENT
Start: 2019-08-16 | End: 2020-12-09

## 2019-08-17 DIAGNOSIS — Z01.818 PREOP GENERAL PHYSICAL EXAM: ICD-10-CM

## 2019-08-20 ENCOUNTER — TELEPHONE (OUTPATIENT)
Dept: INFUSION THERAPY | Facility: OTHER | Age: 69
End: 2019-08-20

## 2019-08-20 RX ORDER — SPIRONOLACTONE 25 MG/1
25 TABLET ORAL 2 TIMES DAILY
Qty: 60 TABLET | Refills: 2 | Status: SHIPPED | OUTPATIENT
Start: 2019-08-20 | End: 2019-10-23

## 2019-08-20 RX ORDER — ZOLEDRONIC ACID 5 MG/100ML
5 INJECTION, SOLUTION INTRAVENOUS ONCE
Status: CANCELLED
Start: 2019-08-20

## 2019-08-20 RX ORDER — PROMETHAZINE HYDROCHLORIDE 25 MG/1
TABLET ORAL
Qty: 90 TABLET | Refills: 0 | Status: SHIPPED | OUTPATIENT
Start: 2019-08-20 | End: 2019-09-13

## 2019-08-24 DIAGNOSIS — B02.23 POSTHERPETIC POLYNEUROPATHY: ICD-10-CM

## 2019-08-26 ENCOUNTER — INFUSION THERAPY VISIT (OUTPATIENT)
Dept: INFUSION THERAPY | Facility: OTHER | Age: 69
End: 2019-08-26
Attending: INTERNAL MEDICINE
Payer: COMMERCIAL

## 2019-08-26 VITALS
HEIGHT: 62 IN | SYSTOLIC BLOOD PRESSURE: 103 MMHG | BODY MASS INDEX: 20.48 KG/M2 | HEART RATE: 101 BPM | WEIGHT: 111.3 LBS | TEMPERATURE: 98.3 F | RESPIRATION RATE: 16 BRPM | OXYGEN SATURATION: 96 % | DIASTOLIC BLOOD PRESSURE: 73 MMHG

## 2019-08-26 DIAGNOSIS — M81.0 AGE-RELATED OSTEOPOROSIS WITHOUT CURRENT PATHOLOGICAL FRACTURE: Primary | ICD-10-CM

## 2019-08-26 LAB
CALCIUM SERPL-MCNC: 8.7 MG/DL (ref 8.5–10.1)
CREAT SERPL-MCNC: 0.6 MG/DL (ref 0.52–1.04)
GFR SERPL CREATININE-BSD FRML MDRD: >90 ML/MIN/{1.73_M2}

## 2019-08-26 PROCEDURE — 96365 THER/PROPH/DIAG IV INF INIT: CPT

## 2019-08-26 PROCEDURE — 25000128 H RX IP 250 OP 636: Performed by: INTERNAL MEDICINE

## 2019-08-26 PROCEDURE — 82310 ASSAY OF CALCIUM: CPT | Mod: ZL | Performed by: INTERNAL MEDICINE

## 2019-08-26 PROCEDURE — 82565 ASSAY OF CREATININE: CPT | Mod: ZL | Performed by: INTERNAL MEDICINE

## 2019-08-26 PROCEDURE — 36415 COLL VENOUS BLD VENIPUNCTURE: CPT | Mod: ZL | Performed by: INTERNAL MEDICINE

## 2019-08-26 RX ORDER — ZOLEDRONIC ACID 5 MG/100ML
5 INJECTION, SOLUTION INTRAVENOUS ONCE
Status: CANCELLED
Start: 2019-08-26

## 2019-08-26 RX ORDER — ZOLEDRONIC ACID 5 MG/100ML
5 INJECTION, SOLUTION INTRAVENOUS ONCE
Status: COMPLETED | OUTPATIENT
Start: 2019-08-26 | End: 2019-08-26

## 2019-08-26 RX ADMIN — ZOLEDRONIC ACID 5 MG: 5 INJECTION, SOLUTION INTRAVENOUS at 14:45

## 2019-08-26 RX ADMIN — SODIUM CHLORIDE 250 ML: 9 INJECTION, SOLUTION INTRAVENOUS at 14:45

## 2019-08-26 ASSESSMENT — PAIN SCALES - GENERAL: PAINLEVEL: MODERATE PAIN (4)

## 2019-08-26 ASSESSMENT — MIFFLIN-ST. JEOR: SCORE: 993.26

## 2019-08-26 NOTE — PROGRESS NOTES
IV pump verified with dose, drug, and rate of administration.  Infusion administered per protocol.  Patient tolerated infusion well;, no signs or symptoms of adverse reaction noted.  Patient denies pain nor discomfort.     IV removed, catheter intact.  Site clean, dry and intact.  No signs or symptoms of infiltration or infection.  Covered with a sterile bandage, slight pressure applied for 30 seconds.  Pt instructed to leave bandage intact for a minimum of one hour, and to call with questions or concerns.  Copy of appointments, discharge instructions, and after visit summary (AVS) provided to patient.  Patient states understanding, discharged ambulatory.

## 2019-08-26 NOTE — PROGRESS NOTES
Patient is a 69 year old female here accompanied by self today for infusion of Reclast per order of Dr. Cruz.  Patient meets parameters for today's infusion. Patient identified with two identifiers, order verified, and verbal consent for today's infusion obtained from patient.      Recent lab values:  Creatinine: 0.60, Calcium: 8.7 CrCl: 72.5ml/min.   Patient meets order parameters for today's treatment.     24 gauge angio cath inserted into right antecubital .  Immediate blood return noted.  IV secured with sterile, transparent dressing and tape.  Patient tolerated well, denies pain or discomfort at this time.  Flushes easily without resistance, no signs or symptoms of infiltration or infection.   Patient denies questions or concerns regarding infusion and/or medication(s) being administered.

## 2019-08-27 RX ORDER — GABAPENTIN 300 MG/1
CAPSULE ORAL
Qty: 90 CAPSULE | Refills: 1 | Status: SHIPPED | OUTPATIENT
Start: 2019-08-27 | End: 2019-10-19

## 2019-08-27 NOTE — TELEPHONE ENCOUNTER
gabapentin (NEURONTIN) 300 MG capsule      Last Written Prescription Date:  7-8-19  Last Fill Quantity: 90,   # refills: 1  Last Office Visit: 7-23-19  Future Office visit:    Next 5 appointments (look out 90 days)    Oct 23, 2019  1:20 PM CDT  (Arrive by 1:00 PM)  SHORT with Quinn Cruz DO  [facility] ([facility] ) [address]  [place] [address]  [phone]           Routing refill request to provider for review/approval because:  Drug not on the [facility], P or [facility] refill protocol or controlled substance

## 2019-08-28 DIAGNOSIS — M25.562 ARTHRALGIA OF BOTH KNEES: ICD-10-CM

## 2019-08-28 DIAGNOSIS — M54.2 CERVICAL PAIN: ICD-10-CM

## 2019-08-28 DIAGNOSIS — M25.561 ARTHRALGIA OF BOTH KNEES: ICD-10-CM

## 2019-08-28 DIAGNOSIS — G47.00 PERSISTENT INSOMNIA: ICD-10-CM

## 2019-08-28 NOTE — TELEPHONE ENCOUNTER
Ambien, Norco      Last Written Prescription Date:  7.31.19, 8.1.19  Last Fill Quantity: #60, #120,   # refills: 0  Last Office Visit: 7.23.19  Future Office visit:    Next 5 appointments (look out 90 days)    Oct 23, 2019  1:20 PM CDT  (Arrive by 1:00 PM)  SHORT with Quinn Cruz DO  [facility] ([facility] ) [address]  [place] [address]  [phone]           Routing refill request to provider for review/approval because:  Drug not on the [facility], [facility] or  Health refill protocol or controlled substance

## 2019-08-29 ENCOUNTER — OFFICE VISIT (OUTPATIENT)
Dept: CHIROPRACTIC MEDICINE | Facility: OTHER | Age: 69
End: 2019-08-29
Attending: CHIROPRACTOR
Payer: COMMERCIAL

## 2019-08-29 DIAGNOSIS — M99.02 SEGMENTAL AND SOMATIC DYSFUNCTION OF THORACIC REGION: ICD-10-CM

## 2019-08-29 DIAGNOSIS — M99.03 SEGMENTAL AND SOMATIC DYSFUNCTION OF LUMBAR REGION: ICD-10-CM

## 2019-08-29 DIAGNOSIS — M99.01 SEGMENTAL AND SOMATIC DYSFUNCTION OF CERVICAL REGION: Primary | ICD-10-CM

## 2019-08-29 DIAGNOSIS — M54.2 CERVICALGIA: ICD-10-CM

## 2019-08-29 PROCEDURE — 98941 CHIROPRACT MANJ 3-4 REGIONS: CPT | Mod: AT | Performed by: CHIROPRACTOR

## 2019-08-30 ENCOUNTER — OFFICE VISIT (OUTPATIENT)
Dept: CHIROPRACTIC MEDICINE | Facility: OTHER | Age: 69
End: 2019-08-30
Attending: CHIROPRACTOR
Payer: COMMERCIAL

## 2019-08-30 DIAGNOSIS — M99.01 SEGMENTAL AND SOMATIC DYSFUNCTION OF CERVICAL REGION: Primary | ICD-10-CM

## 2019-08-30 DIAGNOSIS — M54.2 CERVICALGIA: ICD-10-CM

## 2019-08-30 DIAGNOSIS — M99.03 SEGMENTAL AND SOMATIC DYSFUNCTION OF LUMBAR REGION: ICD-10-CM

## 2019-08-30 DIAGNOSIS — M99.02 SEGMENTAL AND SOMATIC DYSFUNCTION OF THORACIC REGION: ICD-10-CM

## 2019-08-30 PROCEDURE — 98941 CHIROPRACT MANJ 3-4 REGIONS: CPT | Mod: AT | Performed by: CHIROPRACTOR

## 2019-08-30 RX ORDER — HYDROCODONE BITARTRATE AND ACETAMINOPHEN 7.5; 325 MG/1; MG/1
1 TABLET ORAL EVERY 6 HOURS PRN
Qty: 120 TABLET | Refills: 0 | Status: SHIPPED | OUTPATIENT
Start: 2019-08-30 | End: 2019-09-27

## 2019-08-30 RX ORDER — ZOLPIDEM TARTRATE 10 MG/1
10 TABLET ORAL
Qty: 60 TABLET | Refills: 0 | Status: SHIPPED | OUTPATIENT
Start: 2019-08-30 | End: 2019-09-27

## 2019-08-30 NOTE — PROGRESS NOTES
Subjective Finding:    Chief compalint: Patient presents with:  Back Pain  Neck Pain  , Pain Scale: 4/10, Intensity: sharp, Duration: 1 weeks, Radiating: right leg.    Date of injury:       Activities that the pain restricts:   Home/household/hobbies/social activities: yes.  Work duties: yes.  Sleep: yes.  Makes symptoms better: rest.  Makes symptoms worse: activity, lumbar extension, lumbar flexion, cervical extension and cervical flexion.  Have you seen anyone else for the symptoms? no.  Work related: yes.  Automobile related injury: no.    Objective and Assessment:    Posture Analysis:   High shoulder: .  Head tilt: .  High iliac crest: right.  Head carriage: forward.  Thoracic Kyphosis: neutral.  Lumbar Lordosis: forward.    Lumbar Range of Motion: flexion decreased, extension decreased, left lateral flexion decreased and right lateral flexion decreased.  Cervical Range of Motion: extension decreased.  Thoracic Range of Motion: extension decreased.  Extremity Range of Motion: .    Palpation:   Quad lumb: bilateral, referred pain: yes  Lev scapulae: sharp pain, referred pain: no    Segmental dysfunction pre-treatment and treatment area: C1, C6, C7, T4, L4, L5 and PSIS Right.    Assessment post-treatment:  Cervical: ROM increased.  Thoracic: ROM increased.  Lumbar: ROM increased and pain and tenderness decreased.    Comments:        Complicating Factors: .    Plan / Procedure:    Treatment plan: PRN.  Instructed patient: ice 20 minutes every other hour as needed and stretch as instructed at visit.  Short term goals: reduce pain and increase ROM.  Long term goals: restore normal function.  Prognosis: excellent.

## 2019-08-31 DIAGNOSIS — F34.1 DYSTHYMIA: ICD-10-CM

## 2019-08-31 DIAGNOSIS — R11.0 NAUSEA: ICD-10-CM

## 2019-09-03 RX ORDER — ESCITALOPRAM OXALATE 20 MG/1
TABLET ORAL
Qty: 90 TABLET | Refills: 0 | Status: SHIPPED | OUTPATIENT
Start: 2019-09-03 | End: 2019-11-20

## 2019-09-03 RX ORDER — ONDANSETRON 4 MG/1
8 TABLET, FILM COATED ORAL EVERY 8 HOURS PRN
Qty: 60 TABLET | Refills: 6 | Status: SHIPPED | OUTPATIENT
Start: 2019-09-03 | End: 2019-11-12

## 2019-09-13 DIAGNOSIS — Z01.818 PREOP GENERAL PHYSICAL EXAM: ICD-10-CM

## 2019-09-13 DIAGNOSIS — M54.2 CERVICALGIA: ICD-10-CM

## 2019-09-16 ENCOUNTER — OFFICE VISIT (OUTPATIENT)
Dept: CHIROPRACTIC MEDICINE | Facility: OTHER | Age: 69
End: 2019-09-16
Attending: CHIROPRACTOR
Payer: COMMERCIAL

## 2019-09-16 DIAGNOSIS — M99.02 SEGMENTAL AND SOMATIC DYSFUNCTION OF THORACIC REGION: ICD-10-CM

## 2019-09-16 DIAGNOSIS — M99.03 SEGMENTAL AND SOMATIC DYSFUNCTION OF LUMBAR REGION: ICD-10-CM

## 2019-09-16 DIAGNOSIS — M99.01 SEGMENTAL AND SOMATIC DYSFUNCTION OF CERVICAL REGION: Primary | ICD-10-CM

## 2019-09-16 DIAGNOSIS — M54.2 CERVICALGIA: ICD-10-CM

## 2019-09-16 PROCEDURE — 98941 CHIROPRACT MANJ 3-4 REGIONS: CPT | Mod: AT | Performed by: CHIROPRACTOR

## 2019-09-16 RX ORDER — PROMETHAZINE HYDROCHLORIDE 25 MG/1
TABLET ORAL
Qty: 90 TABLET | Refills: 0 | Status: SHIPPED | OUTPATIENT
Start: 2019-09-16 | End: 2019-10-10

## 2019-09-16 RX ORDER — CYCLOBENZAPRINE HCL 10 MG
TABLET ORAL
Qty: 90 TABLET | Refills: 1 | Status: SHIPPED | OUTPATIENT
Start: 2019-09-16 | End: 2019-11-05

## 2019-09-16 NOTE — TELEPHONE ENCOUNTER
Cyclobenzaprine 10mg      Last Written Prescription Date:  5/30/2019  Last Fill Quantity: 90,   # refills: 3  Last Office Visit: 7/23/2019  Future Office visit:    Next 5 appointments (look out 90 days)    Oct 23, 2019  1:20 PM CDT  (Arrive by 1:00 PM)  SHORT with Quinn Cruz DO  [facility] [place] ([facility] ) [address]  [place] [address]  [phone]           Routing refill request to provider for review/approval because:  Drug not on the [facility], [facility] or [facility] refill protocol or controlled substance

## 2019-09-17 ENCOUNTER — OFFICE VISIT (OUTPATIENT)
Dept: CHIROPRACTIC MEDICINE | Facility: OTHER | Age: 69
End: 2019-09-17
Attending: CHIROPRACTOR
Payer: COMMERCIAL

## 2019-09-17 DIAGNOSIS — M99.03 SEGMENTAL AND SOMATIC DYSFUNCTION OF LUMBAR REGION: ICD-10-CM

## 2019-09-17 DIAGNOSIS — M99.02 SEGMENTAL AND SOMATIC DYSFUNCTION OF THORACIC REGION: ICD-10-CM

## 2019-09-17 DIAGNOSIS — M99.01 SEGMENTAL AND SOMATIC DYSFUNCTION OF CERVICAL REGION: Primary | ICD-10-CM

## 2019-09-17 DIAGNOSIS — M54.2 CERVICALGIA: ICD-10-CM

## 2019-09-17 PROCEDURE — 98941 CHIROPRACT MANJ 3-4 REGIONS: CPT | Mod: AT | Performed by: CHIROPRACTOR

## 2019-09-18 NOTE — PROGRESS NOTES
Subjective Finding:    Chief compalint: Patient presents with:  Back Pain: upper back pain  Neck Pain  , Pain Scale: 4/10, Intensity: sharp, Duration: 1 weeks, Radiating: right leg.    Date of injury:       Activities that the pain restricts:   Home/household/hobbies/social activities: yes.  Work duties: yes.  Sleep: yes.  Makes symptoms better: rest.  Makes symptoms worse: activity, lumbar extension, lumbar flexion, cervical extension and cervical flexion.  Have you seen anyone else for the symptoms? no.  Work related: yes.  Automobile related injury: no.    Objective and Assessment:    Posture Analysis:   High shoulder: .  Head tilt: .  High iliac crest: right.  Head carriage: forward.  Thoracic Kyphosis: neutral.  Lumbar Lordosis: forward.    Lumbar Range of Motion: flexion decreased, extension decreased, left lateral flexion decreased and right lateral flexion decreased.  Cervical Range of Motion: extension decreased.  Thoracic Range of Motion: extension decreased.  Extremity Range of Motion: .    Palpation:   Quad lumb: bilateral, referred pain: yes  Lev scapulae: sharp pain, referred pain: no    Segmental dysfunction pre-treatment and treatment area: C1, C6, C7, T4, L4, L5 and PSIS Right.    Assessment post-treatment:  Cervical: ROM increased.  Thoracic: ROM increased.  Lumbar: ROM increased and pain and tenderness decreased.    Comments:        Complicating Factors: .    Plan / Procedure:    Treatment plan: PRN.  Instructed patient: ice 20 minutes every other hour as needed and stretch as instructed at visit.  Short term goals: reduce pain and increase ROM.  Long term goals: restore normal function.  Prognosis: excellent.

## 2019-09-23 ENCOUNTER — TRANSFERRED RECORDS (OUTPATIENT)
Dept: HEALTH INFORMATION MANAGEMENT | Facility: CLINIC | Age: 69
End: 2019-09-23

## 2019-09-24 DIAGNOSIS — M54.2 CERVICAL PAIN: ICD-10-CM

## 2019-09-24 DIAGNOSIS — M25.561 ARTHRALGIA OF BOTH KNEES: ICD-10-CM

## 2019-09-24 DIAGNOSIS — G47.00 PERSISTENT INSOMNIA: ICD-10-CM

## 2019-09-24 DIAGNOSIS — M25.562 ARTHRALGIA OF BOTH KNEES: ICD-10-CM

## 2019-09-24 NOTE — TELEPHONE ENCOUNTER
zolpidem (AMBIEN) 10 MG tablet   Last Written Prescription Date:  8/30/19  Last Fill Quantity: 60,   # refills: 0  Last Office Visit: 7/23/19  Future Office visit:    Next 5 appointments (look out 90 days)    Oct 23, 2019  1:20 PM CDT  (Arrive by 1:00 PM)  SHORT with Quinn Cruz DO  [facility] [place] ([facility] ) [address]  [place] [address]  [phone]       norco       Last Written Prescription Date:  8/30/19  Last Fill Quantity: 120,   # refills: 0  Last Office Visit: 7/23/19  Future Office visit:    Next 5 appointments (look out 90 days)    Oct 23, 2019  1:20 PM CDT  (Arrive by 1:00 PM)  SHORT with Quinn Cruz DO  [facility] [place] ([facility] ) [address]  [place] [address]  [phone]

## 2019-09-27 RX ORDER — HYDROCODONE BITARTRATE AND ACETAMINOPHEN 7.5; 325 MG/1; MG/1
1 TABLET ORAL EVERY 6 HOURS PRN
Qty: 120 TABLET | Refills: 0 | Status: SHIPPED | OUTPATIENT
Start: 2019-09-27 | End: 2019-10-23

## 2019-09-27 RX ORDER — ZOLPIDEM TARTRATE 10 MG/1
10 TABLET ORAL
Qty: 60 TABLET | Refills: 0 | Status: SHIPPED | OUTPATIENT
Start: 2019-09-27 | End: 2019-10-23

## 2019-10-03 DIAGNOSIS — M81.0 OSTEOPOROSIS: Primary | ICD-10-CM

## 2019-10-03 NOTE — PROGRESS NOTES
A user error has taken place: encounter opened in error, closed for administrative reasons.    *Provider requested writer assist with setting up Reclast infusion.  Infusion RN reported provider ordered this in August 2019.  She is not due for this infusion again until August 2020.  Will update provider.    Lauren Pipkin, BS-RN   CHF and General Care Coordinator  [phone] Option # 1

## 2019-10-03 NOTE — PROGRESS NOTES
Received call from Lissy in care coordination updating that MD wants reclast given. After review of recent infusions, noted patient just received reclast in August of this year, so is not due until August of 2020. Return call to Lissy updating.

## 2019-10-10 DIAGNOSIS — Z01.818 PREOP GENERAL PHYSICAL EXAM: ICD-10-CM

## 2019-10-11 RX ORDER — PROMETHAZINE HYDROCHLORIDE 25 MG/1
TABLET ORAL
Qty: 90 TABLET | Refills: 0 | Status: SHIPPED | OUTPATIENT
Start: 2019-10-11 | End: 2019-11-05

## 2019-10-11 NOTE — TELEPHONE ENCOUNTER
Phenergan  Last Written Prescription Date: 9/16/19  Last Fill Quantity: 90 # of Refills: 0  Last Office Visit: 7/23/19

## 2019-10-17 NOTE — TELEPHONE ENCOUNTER
norco      Last Written Prescription Date: 12/20/16  Last Fill Quantity: 120,  # refills: 0   Last Office Visit with G, [facility] or [facility] prescribing provider: 11/14/16                                                  No

## 2019-10-17 NOTE — PROGRESS NOTES
Subjective     Aure Lanier is a 69 year old female who presents to clinic today for the following health issues:    HPI   Abdominal Pain      Duration: ongoing issue     Description (location/character/radiation): mid upper abdomen        Associated flank pain: None    Intensity:  moderate    Accompanying signs and symptoms:        Fever/Chills: no        Gas/Bloating: no        Nausea/vomitting: YES       Diarrhea: no        Dysuria or Hematuria: no     History (previous similar pain/trauma/previous testing): yes    Precipitating or alleviating factors:       Pain worse with eating/BM/urination: yes       Pain relieved by BM: no     Therapies tried and outcome: yes    LMP:  not applicable      She does have gastric outlet obstruction.  She was assessed for dilation in September 2019 which did not require dilation per Dr. Angel Crow with [facility].  She is s/p cholecystectomy in March 2019.    She has an abdominal CT ordered for tomorrow 10/24/19.  -------------------------------------    Patient Active Problem List   Diagnosis     Allergic arthritis involving hand     Hypothyroidism     Insomnia     Headache     Postherpetic polyneuropathy     Dysthymia     Anxiety state     Hyperlipidemia     Migraine     Osteoporosis     GERD     Low back pain     Hyperlipidemia with target LDL less than 100     Bilateral chronic knee pain     Back muscle spasm     Chronic, continuous use of opioids     Status post total left knee replacement     S/P total knee arthroplasty, right     Status post total right knee replacement     Family history of other musculoskeletal diseases(V17.89)     Hypokalemia     Anemia, iron deficiency     History of Billroth I operation     Phytobezoar     Gastric outlet obstruction     Past Surgical History:   Procedure Laterality Date     ARTHROPLASTY KNEE Left 4/17/2018    Procedure: ARTHROPLASTY KNEE;  LEFT TOTAL KNEE ARTHROPLASTY S/N JOURNEY II;  Surgeon: Ty Hernandez MD;   [facility]: [facility]     ARTHROPLASTY KNEE Right 9/4/2018    Procedure: ARTHROPLASTY KNEE;  RIGHT TOTAL KNEE ARTHROPLASTY ;  Surgeon: Ty Hernandez MD;  Location: [facility]     CHOLECYSTECTOMY  03/27/2019    and lysis adhesions.       D & C       ENDOSCOPY       ESOPHAGOSCOPY, GASTROSCOPY, DUODENOSCOPY (EGD), COMBINED N/A 9/11/2017    Procedure: COMBINED ESOPHAGOSCOPY, GASTROSCOPY, DUODENOSCOPY (EGD);  Upper Endoscopy: Removal of Food Impaction;  Surgeon: Lino Zhu DO;  Location: [facility]     ESOPHAGOSCOPY, GASTROSCOPY, DUODENOSCOPY (EGD), COMBINED N/A 11/5/2018    Procedure: UPPER ENDOSCOPY WITH BIOPSY;  Surgeon: Dieudonne Jackson MD;  Location: [facility]     ESOPHAGOSCOPY, GASTROSCOPY, DUODENOSCOPY (EGD), COMBINED N/A 12/21/2018    Procedure: UPPER ENDOSCOPY WITH BALLOON DILATION, BIOPSY;  Surgeon: Dieudonne Jackson MD;  Location: [facility]     ESOPHAGOSCOPY, GASTROSCOPY, DUODENOSCOPY (EGD), COMBINED N/A 1/14/2019    Procedure: UPPER ENDOSCOPY WITH ENDOSCOPIC PLACEMENT OF OG AND GASTRIC LAVAGE;  Surgeon: Dieudonne Jackson MD;  Location: [facility]     partial gastrectomy  03/2019     right total knee replacement  01/2019     MARAH EN Y BOWEL  1980    Due to severely ulcerated stomach body.     STOMACH SURGERY  03/27/2019    Restructuring of stomach and Colecuystectomy.     stomach surgery peritinitis         Social History     Tobacco Use     Smoking status: Never Smoker     Smokeless tobacco: Never Used     Tobacco comment: no passive exposure   Substance Use Topics     Alcohol use: Yes     Comment: rarely, maybe yearly     Family History   Problem Relation Age of Onset     Cerebrovascular Disease Mother         CVA     Hypertension Mother      Other - See Comments Father 40        mining accident; cause of death     Other - See Comments Brother         muscle dystrophy     Cancer Daughter 34        skin cancer     Melanoma Daughter            -------------------------------------  Reviewed and updated as needed this  visit by Provider         Review of Systems   ROS COMP: CONSTITUTIONAL: NEGATIVE for fever, chills, change in weight  CONSTITUTIONAL:POSITIVE  for anorexia  EYES: NEGATIVE for vision changes or irritation  ENT/MOUTH: NEGATIVE for ear, mouth and throat problems  RESP: NEGATIVE for significant cough or SOB  CV: NEGATIVE for chest pain, palpitations or peripheral edema  GI: POSITIVE for abdominal pain epigastric and poor appetite and NEGATIVE for constipation, diarrhea, gas or bloating, heartburn or reflux, hematemesis, hematochezia, melena and vomiting  : NEGATIVE for frequency, dysuria, or hematuria  MUSCULOSKELETAL: NEGATIVE for significant arthralgias or myalgia  NEURO: NEGATIVE for weakness, dizziness or paresthesias  PSYCHIATRIC: NEGATIVE for changes in mood or affect      Objective    /80 (BP Location: Left arm, Patient Position: Chair, Cuff Size: Adult Small)   Pulse 111   Temp 98  F (36.7  C) (Tympanic)   Wt 52.2 kg (115 lb)   SpO2 98%   BMI 21.03 kg/m    Body mass index is 21.03 kg/m .  Physical Exam   GENERAL: healthy, alert and no distress  GENERAL: Gaunt   NECK: no adenopathy, no asymmetry, masses, or scars and thyroid normal to palpation  RESP: lungs clear to auscultation - no rales, rhonchi or wheezes  CV: regular rate and rhythm, normal S1 S2, no S3 or S4, no murmur, click or rub, no peripheral edema and peripheral pulses strong  ABDOMEN: soft, nontender, no hepatosplenomegaly, no masses and bowel sounds normal  ABDOMEN: tenderness epigastric  MS: no gross musculoskeletal defects noted, no edema  PSYCH: anxious  LYMPH: no cervical adenopathy    Diagnostic Test Results:  Labs reviewed in [facility]      Lab Results   Component Value Date    WBC 9.5 10/23/2019     Lab Results   Component Value Date    RBC 4.58 10/23/2019     Lab Results   Component Value Date    HGB 13.2 10/23/2019     Lab Results   Component Value Date    HCT 39.3 10/23/2019     No components found for: MCT  Lab Results    Component Value Date    MCV 86 10/23/2019     Lab Results   Component Value Date    MCH 28.8 10/23/2019     Lab Results   Component Value Date    MCHC 33.6 10/23/2019     Lab Results   Component Value Date    RDW 14.7 10/23/2019     Lab Results   Component Value Date     10/23/2019           Results for orders placed or performed in visit on 10/23/19   CBC with platelets     Status: None   Result Value Ref Range    WBC 9.5 4.0 - 11.0 10e9/L    RBC Count 4.58 3.8 - 5.2 10e12/L    Hemoglobin 13.2 11.7 - 15.7 g/dL    Hematocrit 39.3 35.0 - 47.0 %    MCV 86 78 - 100 fl    MCH 28.8 26.5 - 33.0 pg    MCHC 33.6 31.5 - 36.5 g/dL    RDW 14.7 10.0 - 15.0 %    Platelet Count 428 150 - 450 10e9/L   Ferritin     Status: None   Result Value Ref Range    Ferritin 148 8 - 252 ng/mL   Iron and iron binding capacity     Status: None   Result Value Ref Range    Iron 48 35 - 180 ug/dL    Iron Binding Cap 266 240 - 430 ug/dL    Iron Saturation Index 18 15 - 46 %   Comprehensive metabolic panel     Status: Abnormal   Result Value Ref Range    Sodium 137 133 - 144 mmol/L    Potassium 3.5 3.4 - 5.3 mmol/L    Chloride 106 94 - 109 mmol/L    Carbon Dioxide 24 20 - 32 mmol/L    Anion Gap 7 3 - 14 mmol/L    Glucose 110 (H) 70 - 99 mg/dL    Urea Nitrogen 16 7 - 30 mg/dL    Creatinine 0.73 0.52 - 1.04 mg/dL    GFR Estimate 85 >60 mL/min/[1.73_m2]    GFR Estimate If Black >90 >60 mL/min/[1.73_m2]    Calcium 9.2 8.5 - 10.1 mg/dL    Bilirubin Total 0.4 0.2 - 1.3 mg/dL    Albumin 4.2 3.4 - 5.0 g/dL    Protein Total 8.3 6.8 - 8.8 g/dL    Alkaline Phosphatase 148 40 - 150 U/L    ALT 62 (H) 0 - 50 U/L    AST 37 0 - 45 U/L   Vitamin B6     Status: Abnormal   Result Value Ref Range    Vitamin B6 312.6 (H) 20.0 - 125.0 nmol/L   Vitamin B12     Status: None   Result Value Ref Range    Vitamin B12 556 193 - 986 pg/mL   Vitamin B1 whole blood     Status: None   Result Value Ref Range    Vitamin B1 Whole Blood Level 143 70 - 180 nmol/L   Vitamin B2      Status: None   Result Value Ref Range    Vitamin B2 3 1 - 19 mcg/L   Vitamin B3     Status: None   Result Value Ref Range    Niacin (Vitamin B3) 2.27 0.50 - 8.45 ug/mL         10/31/19  2:07 PM [id number] [facility] CT SCAN    PACS Images      Show images for CT Abdomen Pelvis w Contrast   Study Result     EXAMINATION: CT ABDOMEN PELVIS W CONTRAST, 10/31/2019 2:07 PM     TECHNIQUE:  Helical CT images from the lung bases through the  symphysis pubis were obtained  with IV contrast. Contrast dose: Isovue  300 100ml Given     COMPARISON: April 2019     HISTORY: Epigastric abdominal pain     FINDINGS:     There is dependent atelectasis at the lung bases.     The liver is free of masses or biliary ductal enlargement. Gallbladder  has been removed     The the spleen and pancreas appear normal.     The adrenal glands are normal.     The right and left kidneys are free of masses or hydronephrosis.     The periaortic lymph nodes are normal in caliber.     No intraperitoneal masses or inflammatory changes are noted.  Postoperative changes are seen in the upper abdomen.     In the pelvis the bladder and rectum appear normal.     Degenerative changes are seen in the lower thoracic spine.                                                                      IMPRESSION: No intra-abdominal masses or inflammatory changes are  noted.      NEGRITA ZULETA MD             ASSESSMENT /PLAN:  (R11.0) Nausea  (primary encounter diagnosis)  Comment: Unknown cause.  Plan:   She will continue Zofran and PHENERGAN.    (K31.1) Gastric outlet obstruction  Comment: Stable.  Plan:   She will follow with Dr. Cloud.      (K21.9) Gastroesophageal reflux disease without esophagitis  Comment: Stable.  Plan:   Follow clinically.  She does see a GI physician.    (E87.6) Hypokalemia  Comment: Stable.  Plan:   No supplementation is needed.    (Z98.84) Hx of gastric bypass  Comment: Micronutrient levels are normal  Plan:   She will need annual labs to assure  that she is absorbing well.    (D64.9) Anemia, unspecified type  Comment: Her iron and Ferritin levels are normal.  Her current hemoglobin is normal.  Plan:   Monitor biannually due to a history of gastric bypass.    (H10.023) Other mucopurulent conjunctivitis of both eyes  Comment:   Plan:   ciprofloxacin (CILOXAN) 0.3 % ophthalmic  Solution, 2 drops to both eyes TID.    (G47.00) Persistent insomnia  Comment:   Plan:  zolpidem (AMBIEN) 10 MG tablet    (M54.2) Cervical pain  Comment: Likely secondary to cervical DDD with facet DDD.  She should have new  imaging done is her pain worsens  Plan:   Continue HYDROcodone-acetaminophen (NORCO) 7.5-325 MG per tablet every 6 hours PRN.    (M25.561,  M25.562) Arthralgia of both knees  Comment: She is s/p TNR bilaterally in 04/2018 and 09/04/2019  Plan:    HYDROcodone-acetaminophen (NORCO) 7.5-325 MG per tablet every 6 hours PRN          Follow up with Provider - 6 weeks for abdominal pain.          Quinn Cruz DO, DO

## 2019-10-19 DIAGNOSIS — B02.23 POSTHERPETIC POLYNEUROPATHY: ICD-10-CM

## 2019-10-22 NOTE — TELEPHONE ENCOUNTER
Gabapentin       Last Written Prescription Date:  5/30/2019  Last Fill Quantity: 90,   # refills: 3  Last Office Visit: 7/23/2019  Future Office visit:    Next 5 appointments (look out 90 days)    Oct 23, 2019  1:20 PM CDT  (Arrive by 1:00 PM)  SHORT with Quinn Cruz DO  [facility] [place] ([facility] ) [address]  [place] [address]  [phone]           Routing refill request to provider for review/approval because:  Drug not on the [facility], [facility] or [facility] refill protocol or controlled substance

## 2019-10-23 ENCOUNTER — OFFICE VISIT (OUTPATIENT)
Dept: INTERNAL MEDICINE | Facility: OTHER | Age: 69
End: 2019-10-23
Attending: INTERNAL MEDICINE
Payer: COMMERCIAL

## 2019-10-23 VITALS
SYSTOLIC BLOOD PRESSURE: 118 MMHG | BODY MASS INDEX: 21.03 KG/M2 | DIASTOLIC BLOOD PRESSURE: 80 MMHG | WEIGHT: 115 LBS | HEART RATE: 111 BPM | OXYGEN SATURATION: 98 % | TEMPERATURE: 98 F

## 2019-10-23 DIAGNOSIS — K21.9 GASTROESOPHAGEAL REFLUX DISEASE WITHOUT ESOPHAGITIS: ICD-10-CM

## 2019-10-23 DIAGNOSIS — Z98.84 HX OF GASTRIC BYPASS: ICD-10-CM

## 2019-10-23 DIAGNOSIS — H10.023 OTHER MUCOPURULENT CONJUNCTIVITIS OF BOTH EYES: ICD-10-CM

## 2019-10-23 DIAGNOSIS — D64.9 ANEMIA, UNSPECIFIED TYPE: ICD-10-CM

## 2019-10-23 DIAGNOSIS — E87.6 HYPOKALEMIA: ICD-10-CM

## 2019-10-23 DIAGNOSIS — K31.1 GASTRIC OUTLET OBSTRUCTION: ICD-10-CM

## 2019-10-23 DIAGNOSIS — M54.2 CERVICAL PAIN: ICD-10-CM

## 2019-10-23 DIAGNOSIS — M25.562 ARTHRALGIA OF BOTH KNEES: ICD-10-CM

## 2019-10-23 DIAGNOSIS — G47.00 PERSISTENT INSOMNIA: ICD-10-CM

## 2019-10-23 DIAGNOSIS — M25.561 ARTHRALGIA OF BOTH KNEES: ICD-10-CM

## 2019-10-23 DIAGNOSIS — R11.0 NAUSEA: Primary | ICD-10-CM

## 2019-10-23 LAB
ALBUMIN SERPL-MCNC: 4.2 G/DL (ref 3.4–5)
ALP SERPL-CCNC: 148 U/L (ref 40–150)
ALT SERPL W P-5'-P-CCNC: 62 U/L (ref 0–50)
ANION GAP SERPL CALCULATED.3IONS-SCNC: 7 MMOL/L (ref 3–14)
AST SERPL W P-5'-P-CCNC: 37 U/L (ref 0–45)
BILIRUB SERPL-MCNC: 0.4 MG/DL (ref 0.2–1.3)
BUN SERPL-MCNC: 16 MG/DL (ref 7–30)
CALCIUM SERPL-MCNC: 9.2 MG/DL (ref 8.5–10.1)
CHLORIDE SERPL-SCNC: 106 MMOL/L (ref 94–109)
CO2 SERPL-SCNC: 24 MMOL/L (ref 20–32)
CREAT SERPL-MCNC: 0.73 MG/DL (ref 0.52–1.04)
ERYTHROCYTE [DISTWIDTH] IN BLOOD BY AUTOMATED COUNT: 14.7 % (ref 10–15)
FERRITIN SERPL-MCNC: 148 NG/ML (ref 8–252)
GFR SERPL CREATININE-BSD FRML MDRD: 85 ML/MIN/{1.73_M2}
GLUCOSE SERPL-MCNC: 110 MG/DL (ref 70–99)
HCT VFR BLD AUTO: 39.3 % (ref 35–47)
HGB BLD-MCNC: 13.2 G/DL (ref 11.7–15.7)
IRON SATN MFR SERPL: 18 % (ref 15–46)
IRON SERPL-MCNC: 48 UG/DL (ref 35–180)
MCH RBC QN AUTO: 28.8 PG (ref 26.5–33)
MCHC RBC AUTO-ENTMCNC: 33.6 G/DL (ref 31.5–36.5)
MCV RBC AUTO: 86 FL (ref 78–100)
PLATELET # BLD AUTO: 428 10E9/L (ref 150–450)
POTASSIUM SERPL-SCNC: 3.5 MMOL/L (ref 3.4–5.3)
PROT SERPL-MCNC: 8.3 G/DL (ref 6.8–8.8)
RBC # BLD AUTO: 4.58 10E12/L (ref 3.8–5.2)
SODIUM SERPL-SCNC: 137 MMOL/L (ref 133–144)
TIBC SERPL-MCNC: 266 UG/DL (ref 240–430)
WBC # BLD AUTO: 9.5 10E9/L (ref 4–11)

## 2019-10-23 PROCEDURE — 36415 COLL VENOUS BLD VENIPUNCTURE: CPT | Mod: ZL | Performed by: INTERNAL MEDICINE

## 2019-10-23 PROCEDURE — 84425 ASSAY OF VITAMIN B-1: CPT | Mod: ZL | Performed by: INTERNAL MEDICINE

## 2019-10-23 PROCEDURE — 84207 ASSAY OF VITAMIN B-6: CPT | Mod: ZL | Performed by: INTERNAL MEDICINE

## 2019-10-23 PROCEDURE — G0463 HOSPITAL OUTPT CLINIC VISIT: HCPCS

## 2019-10-23 PROCEDURE — 82607 VITAMIN B-12: CPT | Mod: ZL | Performed by: INTERNAL MEDICINE

## 2019-10-23 PROCEDURE — 83550 IRON BINDING TEST: CPT | Mod: ZL | Performed by: INTERNAL MEDICINE

## 2019-10-23 PROCEDURE — 99214 OFFICE O/P EST MOD 30 MIN: CPT | Performed by: INTERNAL MEDICINE

## 2019-10-23 PROCEDURE — 82728 ASSAY OF FERRITIN: CPT | Mod: ZL | Performed by: INTERNAL MEDICINE

## 2019-10-23 PROCEDURE — 85027 COMPLETE CBC AUTOMATED: CPT | Mod: ZL | Performed by: INTERNAL MEDICINE

## 2019-10-23 PROCEDURE — 84252 ASSAY OF VITAMIN B-2: CPT | Mod: ZL | Performed by: INTERNAL MEDICINE

## 2019-10-23 PROCEDURE — 80053 COMPREHEN METABOLIC PANEL: CPT | Mod: ZL | Performed by: INTERNAL MEDICINE

## 2019-10-23 PROCEDURE — 83540 ASSAY OF IRON: CPT | Mod: ZL | Performed by: INTERNAL MEDICINE

## 2019-10-23 PROCEDURE — 99000 SPECIMEN HANDLING OFFICE-LAB: CPT | Performed by: INTERNAL MEDICINE

## 2019-10-23 PROCEDURE — 84591 ASSAY OF NOS VITAMIN: CPT | Mod: ZL,59 | Performed by: INTERNAL MEDICINE

## 2019-10-23 RX ORDER — CIPROFLOXACIN HYDROCHLORIDE 3.5 MG/ML
2 SOLUTION/ DROPS TOPICAL 3 TIMES DAILY PRN
Qty: 10 ML | Refills: 2 | Status: SHIPPED | OUTPATIENT
Start: 2019-10-23 | End: 2021-02-03

## 2019-10-23 RX ORDER — ZOLPIDEM TARTRATE 10 MG/1
10 TABLET ORAL
Qty: 60 TABLET | Refills: 0 | Status: SHIPPED | OUTPATIENT
Start: 2019-10-23 | End: 2019-11-17

## 2019-10-23 RX ORDER — HYDROCODONE BITARTRATE AND ACETAMINOPHEN 7.5; 325 MG/1; MG/1
1 TABLET ORAL EVERY 6 HOURS PRN
Qty: 120 TABLET | Refills: 0 | Status: SHIPPED | OUTPATIENT
Start: 2019-10-23 | End: 2019-11-17

## 2019-10-23 RX ORDER — GABAPENTIN 300 MG/1
CAPSULE ORAL
Qty: 90 CAPSULE | Refills: 1 | Status: SHIPPED | OUTPATIENT
Start: 2019-10-23 | End: 2019-12-13

## 2019-10-23 ASSESSMENT — PAIN SCALES - GENERAL: PAINLEVEL: NO PAIN (1)

## 2019-10-23 NOTE — NURSING NOTE
"Chief Complaint   Patient presents with     Abdominal Pain       Initial /80 (BP Location: Left arm, Patient Position: Chair, Cuff Size: Adult Small)   Pulse 111   Temp 98  F (36.7  C) (Tympanic)   Wt 52.2 kg (115 lb)   SpO2 98%   BMI 21.03 kg/m   Estimated body mass index is 21.03 kg/m  as calculated from the following:    Height as of 8/26/19: 1.575 m (5' 2.01\").    Weight as of this encounter: 52.2 kg (115 lb).  Medication Reconciliation: complete  Jeb Wen LPN  "

## 2019-10-24 LAB — VIT B12 SERPL-MCNC: 556 PG/ML (ref 193–986)

## 2019-10-27 LAB — VIT B6 SERPL-MCNC: 312.6 NMOL/L (ref 20–125)

## 2019-10-28 LAB — VIT B1 BLD-MCNC: 143 NMOL/L (ref 70–180)

## 2019-10-29 LAB — VIT B2 SERPL-MCNC: 3 MCG/L (ref 1–19)

## 2019-10-31 ENCOUNTER — HOSPITAL ENCOUNTER (OUTPATIENT)
Dept: CT IMAGING | Facility: HOSPITAL | Age: 69
Discharge: HOME | End: 2019-10-31
Attending: SURGERY | Admitting: SURGERY
Payer: COMMERCIAL

## 2019-10-31 DIAGNOSIS — R10.13 EPIGASTRIC ABDOMINAL PAIN: ICD-10-CM

## 2019-10-31 PROCEDURE — 25500064 ZZH RX 255 OP 636: Performed by: RADIOLOGY

## 2019-10-31 PROCEDURE — 74177 CT ABD & PELVIS W/CONTRAST: CPT | Mod: TC

## 2019-10-31 RX ORDER — IOPAMIDOL 612 MG/ML
100 INJECTION, SOLUTION INTRAVASCULAR ONCE
Status: COMPLETED | OUTPATIENT
Start: 2019-10-31 | End: 2019-10-31

## 2019-10-31 RX ADMIN — DIATRIZOATE MEGLUMINE AND DIATRIZOATE SODIUM 15 ML: 660; 100 SOLUTION ORAL; RECTAL at 13:46

## 2019-10-31 RX ADMIN — IOPAMIDOL 100 ML: 612 INJECTION, SOLUTION INTRAVENOUS at 13:47

## 2019-11-01 LAB — NIACIN SERPL-MCNC: 2.27 UG/ML (ref 0.5–8.45)

## 2019-11-05 DIAGNOSIS — M54.2 CERVICALGIA: ICD-10-CM

## 2019-11-05 DIAGNOSIS — Z01.818 PREOP GENERAL PHYSICAL EXAM: ICD-10-CM

## 2019-11-05 RX ORDER — PROMETHAZINE HYDROCHLORIDE 25 MG/1
TABLET ORAL
Qty: 90 TABLET | Refills: 0 | Status: SHIPPED | OUTPATIENT
Start: 2019-11-05 | End: 2019-11-28

## 2019-11-05 NOTE — TELEPHONE ENCOUNTER
Flexeril  - early request  Last Written Prescription Date:  9/16/19  Last Fill Quantity: 90,   # refills: 1  Last Office Visit: 10/23/19  Future Office visit:    Next 5 appointments (look out 90 days)    Dec 02, 2019  2:00 PM CST  (Arrive by 1:40 PM)  SHORT with Quinn Cruz DO  [facility] [place] ([facility] ) [address]  [place] [address]  [phone]           Routing refill request to provider for review/approval because:  Drug not on the [facility], [facility] or [facility] refill protocol or controlled substance

## 2019-11-06 RX ORDER — CYCLOBENZAPRINE HCL 10 MG
TABLET ORAL
Qty: 90 TABLET | Refills: 1 | Status: SHIPPED | OUTPATIENT
Start: 2019-11-06 | End: 2019-12-02

## 2019-11-13 ENCOUNTER — TRANSFERRED RECORDS (OUTPATIENT)
Dept: HEALTH INFORMATION MANAGEMENT | Facility: CLINIC | Age: 69
End: 2019-11-13

## 2019-11-17 DIAGNOSIS — M54.2 CERVICAL PAIN: ICD-10-CM

## 2019-11-17 DIAGNOSIS — M25.562 ARTHRALGIA OF BOTH KNEES: ICD-10-CM

## 2019-11-17 DIAGNOSIS — G47.00 PERSISTENT INSOMNIA: ICD-10-CM

## 2019-11-17 DIAGNOSIS — M25.561 ARTHRALGIA OF BOTH KNEES: ICD-10-CM

## 2019-11-19 RX ORDER — HYDROCODONE BITARTRATE AND ACETAMINOPHEN 7.5; 325 MG/1; MG/1
1 TABLET ORAL EVERY 6 HOURS PRN
Qty: 120 TABLET | Refills: 0 | Status: SHIPPED | OUTPATIENT
Start: 2019-11-19 | End: 2019-12-13

## 2019-11-19 RX ORDER — ZOLPIDEM TARTRATE 10 MG/1
TABLET ORAL
Qty: 60 TABLET | Refills: 0 | Status: SHIPPED | OUTPATIENT
Start: 2019-11-19 | End: 2019-12-13

## 2019-11-19 NOTE — TELEPHONE ENCOUNTER
ambien      Last Written Prescription Date:  10/23/19  Last Fill Quantity: 60,   # refills: 0  Last Office Visit: 10/23/19  Future Office visit:    Next 5 appointments (look out 90 days)    Dec 02, 2019  2:00 PM CST  (Arrive by 1:40 PM)  SHORT with Quinn Cruz DO  [facility] [place] ([facility] [place] ) [address]  [place] [address]  [phone]           Routing refill request to provider for review/approval because:  Drug not on the [facility], [facility] or [facility] refill protocol or controlled substance    Controlled Substance Refill Request for hydrocodone   Problem List Complete:    Yes    Last Written Prescription Date:  10/23/19  Last Fill Quantity: 120,   # refills: 0    THE MOST RECENT OFFICE VISIT MUST BE WITHIN THE PAST 3 MONTHS. AT LEAST ONE FACE TO FACE VISIT MUST OCCUR EVERY 6 MONTHS. ADDITIONAL VISITS CAN BE VIRTUAL.  (THIS STATEMENT SHOULD BE DELETED.)    Last Office Visit with [facility] primary care provider: 10/23/19    Future Office visit:   Next 5 appointments (look out 90 days)    Dec 02, 2019  2:00 PM CST  (Arrive by 1:40 PM)  SHORT with Quinn Cruz DO  [facility] [place] ([facility] [place] ) [address]  [place] [address]  [phone]          Controlled substance agreement:   Encounter-Level CSA - 07/11/2017:    Controlled Substance Agreement - Scan on 7/20/2017 12:19 PM: CONTROLLED SUBSTANCE AGREEMENT     Patient-Level CSA:    There are no patient-level csa.         Last Urine Drug Screen:   Pain Drug SCR UR W RPTD Meds   Date Value Ref Range Status   07/11/2017   Final    FINAL  (Note)  ====================================================================  TOXASSURE COMP DRUG ANALYSIS,UR  ====================================================================  Test                             Result       Flag       Units  Drug Present and Declared for Prescription Verification   Hydrocodone                    560           EXPECTED   ng/mg creat   Hydromorphone                  340          EXPECTED   ng/mg creat   Norhydrocodone                 >2732        EXPECTED   ng/mg creat    Sources of hydrocodone include scheduled prescription    medications. Hydromorphone and norhydrocodone are expected    metabolites of hydrocodone. Hydromorphone is also available as a    scheduled prescription medication.     Gabapentin                     PRESENT      EXPECTED   Zolpidem                       PRESENT      EXPECTED   Acetaminophen                  PRESENT      EXPECTED    Drug Present not Declared for Prescription Verification   Cyclobenzaprine                PRESENT      UNEXPECTED   Citalopram                     PRESENT      UNEXPECTED   Desmethylcitalopram            PRESENT      UNEXPECTED    Desmethylcitalopram is an expected metabolite of citalopram or    the enantiomeric form, escitalopram.     Diphenhydramine                PRESENT      UNEXPECTED   Doxylamine                     PRESENT      UNEXPECTED   Bupivacaine                    PRESENT      UNEXPECTED   Dextromethorphan               PRESENT      UNEXPECTED   Dextrorphan/Levorphanol        PRESENT      UNEXPECTED    Dextrorphan is an expected metabolite of dextromethorphan, an    over-the-counter or prescription cough suppressant. Dextrorphan    cannot be distinguished from the scheduled prescription    medication levorphanol by the method used for analysis.    ====================================================================  Test                      Result    Flag   Units      Ref Range   Creatinine              183              mg/dL      >=20  ====================================================================  Declared Medications:  The flagging and interpretation on this report are based on the  following declared medications.  Unexpected results may arise from  inaccuracies in the declared medications.    **Note: The testing scope of this panel includes  these medications:    Gabapentin  Hydrocodone (Norco)    **Note: The testing scope of this panel does not include small to  moderate amounts of these reported medications:    Acetaminophen (Norco)  Zolpidem (Ambien)  ====================================================================  For clinical consultation, please call [phone].  ====================================================================  Analysis performed by [employer] [place] [address]     , No results found for: COMDAT, No results found for: THC13, PCP13, COC13, MAMP13, OPI13, AMP13, BZO13, TCA13, MTD13, BAR13, OXY13, PPX13, BUP13     Processing:  sent to pharmacy    [email or web address]   checked in past 3 months?

## 2019-11-20 DIAGNOSIS — F34.1 DYSTHYMIA: ICD-10-CM

## 2019-11-22 RX ORDER — ESCITALOPRAM OXALATE 20 MG/1
TABLET ORAL
Qty: 90 TABLET | Refills: 0 | Status: SHIPPED | OUTPATIENT
Start: 2019-11-22 | End: 2020-02-10

## 2019-11-22 NOTE — TELEPHONE ENCOUNTER
Lexapro      Last Written Prescription Date:  9/03/2019  Last Fill Quantity: 90,   # refills: 0  Last Office Visit: 10/23/2019  Future Office visit:    Next 5 appointments (look out 90 days)    Nov 25, 2019  2:00 PM CST  [facility] Bloom DC  [facility] [address] [place] [address]  [place] [address]  [phone]   Dec 02, 2019  2:00 PM CST  (Arrive by 1:40 PM)  SHORT with Quinn Cruz DO  [facility] [place] ([facility] [place] ) [address]  [place] [address]  [phone]

## 2019-11-25 ENCOUNTER — OFFICE VISIT (OUTPATIENT)
Dept: CHIROPRACTIC MEDICINE | Facility: OTHER | Age: 69
End: 2019-11-25
Attending: CHIROPRACTOR
Payer: COMMERCIAL

## 2019-11-25 DIAGNOSIS — M99.03 SEGMENTAL AND SOMATIC DYSFUNCTION OF LUMBAR REGION: ICD-10-CM

## 2019-11-25 DIAGNOSIS — M54.2 CERVICALGIA: ICD-10-CM

## 2019-11-25 DIAGNOSIS — M99.01 SEGMENTAL AND SOMATIC DYSFUNCTION OF CERVICAL REGION: Primary | ICD-10-CM

## 2019-11-25 DIAGNOSIS — M99.02 SEGMENTAL AND SOMATIC DYSFUNCTION OF THORACIC REGION: ICD-10-CM

## 2019-11-25 PROCEDURE — 98941 CHIROPRACT MANJ 3-4 REGIONS: CPT | Mod: AT | Performed by: CHIROPRACTOR

## 2019-11-28 DIAGNOSIS — R11.0 NAUSEA: ICD-10-CM

## 2019-11-28 DIAGNOSIS — Z01.818 PREOP GENERAL PHYSICAL EXAM: ICD-10-CM

## 2019-11-29 NOTE — PROGRESS NOTES
Subjective     Aure Lanier is a 69 year old female who presents to clinic today for the following health issues:    HPI   Abdominal Pain      Duration: ongoing issue     Description (location/character/radiation): stomach area       Associated flank pain: yes     Intensity:  moderate    Accompanying signs and symptoms:        Fever/Chills: no        Gas/Bloating: YES       Nausea/vomitting: YES       Diarrhea: no        Dysuria or Hematuria: no     History (previous similar pain/trauma/previous testing): yes     Precipitating or alleviating factors:       Pain worse with eating/BM/urination: no       Pain relieved by BM: no     Therapies tried and outcome: ballooning stomach connection and it has helped     LMP:  not applicable      She denies changing her eating.  She has gained some weight.  She has had endoscopies for her aclasia with th most recent showing an open esophagus and no ulcers.  She continues to have epigastric pains which is a burning pain.  She denies any pain related to her eating.  She eats 3 small meals per day which is usually a bun with meat, crackers and fruit.  She continues to have nausea.    Wt Readings from Last 5 Encounters:   12/02/19 58.5 kg (129 lb)   10/23/19 52.2 kg (115 lb)   08/26/19 50.5 kg (111 lb 4.8 oz)   07/23/19 52 kg (114 lb 9.6 oz)   06/25/19 53.7 kg (118 lb 6.4 oz)     Extreme Headaches:  She has had extreme piercing headaches since her 20s for which she has been taking Norco for relief.  She is reluctant to changing this regimen despite being given information that this may be causing rebound headaches.      -------------------------------------    Patient Active Problem List   Diagnosis     Allergic arthritis involving hand     Hypothyroidism     Insomnia     Headache     Postherpetic polyneuropathy     Dysthymia     Anxiety state     Hyperlipidemia     Migraine     Osteoporosis     GERD     Low back pain     Hyperlipidemia with target LDL less than 100      Bilateral chronic knee pain     Back muscle spasm     Chronic, continuous use of opioids     Status post total left knee replacement     S/P total knee arthroplasty, right     Status post total right knee replacement     Family history of other musculoskeletal diseases(V17.89)     Hypokalemia     Anemia, iron deficiency     History of Billroth I operation     Phytobezoar     Gastric outlet obstruction     Past Surgical History:   Procedure Laterality Date     ARTHROPLASTY KNEE Left 4/17/2018    Procedure: ARTHROPLASTY KNEE;  LEFT TOTAL KNEE ARTHROPLASTY S/N JOURNEY II;  Surgeon: Ty Hernandez MD;  [facility]     ARTHROPLASTY KNEE Right 9/4/2018    Procedure: ARTHROPLASTY KNEE;  RIGHT TOTAL KNEE ARTHROPLASTY ;  Surgeon: Ty Hernandez MD;  [facility]     CHOLECYSTECTOMY  03/27/2019    and lysis adhesions.       D & C       ENDOSCOPY       ESOPHAGOSCOPY, GASTROSCOPY, DUODENOSCOPY (EGD), COMBINED N/A 9/11/2017    Procedure: COMBINED ESOPHAGOSCOPY, GASTROSCOPY, DUODENOSCOPY (EGD);  Upper Endoscopy: Removal of Food Impaction;  Surgeon: Lino Zhu DO;  [facility]     ESOPHAGOSCOPY, GASTROSCOPY, DUODENOSCOPY (EGD), COMBINED N/A 11/5/2018    Procedure: UPPER ENDOSCOPY WITH BIOPSY;  Surgeon: Dieudonne Jackson MD;  [facility]     ESOPHAGOSCOPY, GASTROSCOPY, DUODENOSCOPY (EGD), COMBINED N/A 12/21/2018    Procedure: UPPER ENDOSCOPY WITH BALLOON DILATION, BIOPSY;  Surgeon: Dieudonne Jackson MD;  [facility]     ESOPHAGOSCOPY, GASTROSCOPY, DUODENOSCOPY (EGD), COMBINED N/A 1/14/2019    Procedure: UPPER ENDOSCOPY WITH ENDOSCOPIC PLACEMENT OF OG AND GASTRIC LAVAGE;  Surgeon: Dieudonne Jackson MD;  [facility]     partial gastrectomy  03/2019     right total knee replacement  01/2019     MARAH EN Y BOWEL  1980    Due to severely ulcerated stomach body.     STOMACH SURGERY  03/27/2019    Restructuring of stomach and Colecuystectomy.     stomach surgery peritinitis         Social History      Tobacco Use     Smoking status: Never Smoker     Smokeless tobacco: Never Used     Tobacco comment: no passive exposure   Substance Use Topics     Alcohol use: Yes     Comment: rarely, maybe yearly     Family History   Problem Relation Age of Onset     Cerebrovascular Disease Mother         CVA     Hypertension Mother      Other - See Comments Father 40        mining accident; cause of death     Other - See Comments Brother         muscle dystrophy     Cancer Daughter 34        skin cancer     Melanoma Daughter            -------------------------------------  Reviewed and updated as needed this visit by Provider         Review of Systems   ROS COMP: Constitutional, HEENT, cardiovascular, pulmonary, gi and gu systems are negative, except as otherwise noted.      Objective    /80 (BP Location: Right arm, Patient Position: Chair, Cuff Size: Adult Regular)   Pulse 89   Temp 98.2  F (36.8  C) (Tympanic)   Wt 58.5 kg (129 lb)   SpO2 98%   BMI 23.59 kg/m    Body mass index is 23.59 kg/m .  Physical Exam   GENERAL: healthy, alert and no distress  EYES: Eyes grossly normal to inspection and conjunctivae and sclerae normal  NECK: no adenopathy, no asymmetry, masses, or scars and thyroid normal to palpation  RESP: lungs clear to auscultation - no rales, rhonchi or wheezes  CV: regular rate and rhythm, normal S1 S2, no S3 or S4, no murmur, click or rub, no peripheral edema and peripheral pulses strong  ABDOMEN: tenderness RLQ and LLQ, no organomegaly or masses, bowel sounds normal and no bruits heard  MS: no gross musculoskeletal defects noted, no edema  PSYCH: mentation appears normal, affect normal/bright    Diagnostic Test Results:  Labs reviewed in Epic  Results for orders placed or performed in visit on 12/02/19   Comprehensive metabolic panel     Status: Abnormal   Result Value Ref Range    Sodium 139 133 - 144 mmol/L    Potassium 3.8 3.4 - 5.3 mmol/L    Chloride 105 94 - 109 mmol/L    Carbon Dioxide 30 20  - 32 mmol/L    Anion Gap 4 3 - 14 mmol/L    Glucose 107 (H) 70 - 99 mg/dL    Urea Nitrogen 14 7 - 30 mg/dL    Creatinine 0.64 0.52 - 1.04 mg/dL    GFR Estimate >90 >60 mL/min/[1.73_m2]    GFR Estimate If Black >90 >60 mL/min/[1.73_m2]    Calcium 8.7 8.5 - 10.1 mg/dL    Bilirubin Total 0.2 0.2 - 1.3 mg/dL    Albumin 3.9 3.4 - 5.0 g/dL    Protein Total 7.5 6.8 - 8.8 g/dL    Alkaline Phosphatase 92 40 - 150 U/L    ALT 57 (H) 0 - 50 U/L    AST 25 0 - 45 U/L   Lipase     Status: None   Result Value Ref Range    Lipase 74 73 - 393 U/L   Amylase     Status: None   Result Value Ref Range    Amylase 48 30 - 110 U/L   CRP inflammation     Status: None   Result Value Ref Range    CRP Inflammation <2.9 0.0 - 8.0 mg/L   Bilirubin direct     Status: None   Result Value Ref Range    Bilirubin Direct <0.1 0.0 - 0.2 mg/dL         12/3/18  2:53 PM [id number] [facility] ULTRASOUND    PACS Images      Show images for US Abdomen Limited   Study Result     PROCEDURES: US ABDOMEN LIMITED     HISTORY: liver lesions on CT; Liver lesion     TECHNIQUE: Grayscale ultrasound of the upper abdomen was performed.     COMPARISON: CT November 21, 2018      FINDINGS:     MEASUREMENTS:   Liver length:  14.8 cm.   Common duct diameter:  1 cm.     LIVER: The low-density masses seen on recent CT scan are echogenic on  the ultrasound examination. These are not cysts. Is possibly represent  hemangiomas although the ultrasound appearance is nonspecific.     GALLBLADDER: There is intense acoustic shadowing at the site of  gallbladder. Shadowing is consistent with a gallbladder packed with  stones.     ABDOMINAL AORTA AND IVC: The abdominal aorta is normally tapering. The  inferior vena cava is patent.     PANCREAS: The pancreas appears normal in the body portion of the head  and tail were not visualized due to bowel gas.     Right KIDNEY: The right kidney is free of masses or hydronephrosis.                                                                          IMPRESSION:      Low-density masses seen on recent CT scan are echogenic. The  appearance by ultrasound is nonspecific. These are not cysts. It is  possible they represent small hemangiomas.  2. Gallbladder appears packed with stones and there is some biliary  ductal enlargement with the common bile duct measuring 1 cm.     NEGRITA ZULETA MD     10/31/19  2:07 PM [id number] [facility] CT SCAN    PACS Images      Show images for CT Abdomen Pelvis w Contrast   Study Result     EXAMINATION: CT ABDOMEN PELVIS W CONTRAST, 10/31/2019 2:07 PM     TECHNIQUE:  Helical CT images from the lung bases through the  symphysis pubis were obtained  with IV contrast. Contrast dose: Isovue  300 100ml Given     COMPARISON: April 2019     HISTORY: Epigastric abdominal pain     FINDINGS:     There is dependent atelectasis at the lung bases.     The liver is free of masses or biliary ductal enlargement. Gallbladder  has been removed     The the spleen and pancreas appear normal.     The adrenal glands are normal.     The right and left kidneys are free of masses or hydronephrosis.     The periaortic lymph nodes are normal in caliber.     No intraperitoneal masses or inflammatory changes are noted.  Postoperative changes are seen in the upper abdomen.     In the pelvis the bladder and rectum appear normal.     Degenerative changes are seen in the lower thoracic spine.                                                                      IMPRESSION: No intra-abdominal masses or inflammatory changes are  noted.      NEGRITA ZULETA MD         ASSESSMENT /PLAN:  (R10.13) Epigastric pain  (primary encounter diagnosis)  Comment: This just may be due to her gallbladder.  Her pancreatic enzymes and her other labs show only a slight elevation in her ALT.  Plan:   Start metoclopramide (REGLAN) 5 MG tablet.  Consider a HIDA scan and GGT.    (K31.84) Nondiabetic gastroparesis  Comment: This is likely IBS along with a component from narcotic use.  Plan:    metoclopramide (REGLAN) 5 MG tablet TID and Phenergan     (R51) Headache disorder  Comment: Recurrent.  Plan:   Consider weaning off narcotics.        Follow up with Provider - 2 months for abdominal pain.        Quinn Cruz DO, DO

## 2019-12-01 DIAGNOSIS — M54.2 CERVICALGIA: ICD-10-CM

## 2019-12-02 ENCOUNTER — OFFICE VISIT (OUTPATIENT)
Dept: INTERNAL MEDICINE | Facility: OTHER | Age: 69
End: 2019-12-02
Attending: INTERNAL MEDICINE
Payer: COMMERCIAL

## 2019-12-02 VITALS
WEIGHT: 129 LBS | OXYGEN SATURATION: 98 % | BODY MASS INDEX: 23.59 KG/M2 | HEART RATE: 89 BPM | TEMPERATURE: 98.2 F | DIASTOLIC BLOOD PRESSURE: 80 MMHG | SYSTOLIC BLOOD PRESSURE: 130 MMHG

## 2019-12-02 DIAGNOSIS — R10.13 EPIGASTRIC PAIN: Primary | ICD-10-CM

## 2019-12-02 DIAGNOSIS — K31.84 NONDIABETIC GASTROPARESIS: ICD-10-CM

## 2019-12-02 DIAGNOSIS — R51.9 HEADACHE DISORDER: ICD-10-CM

## 2019-12-02 LAB
ALBUMIN SERPL-MCNC: 3.9 G/DL (ref 3.4–5)
ALP SERPL-CCNC: 92 U/L (ref 40–150)
ALT SERPL W P-5'-P-CCNC: 57 U/L (ref 0–50)
AMYLASE SERPL-CCNC: 48 U/L (ref 30–110)
ANION GAP SERPL CALCULATED.3IONS-SCNC: 4 MMOL/L (ref 3–14)
AST SERPL W P-5'-P-CCNC: 25 U/L (ref 0–45)
BILIRUB DIRECT SERPL-MCNC: <0.1 MG/DL (ref 0–0.2)
BILIRUB SERPL-MCNC: 0.2 MG/DL (ref 0.2–1.3)
BUN SERPL-MCNC: 14 MG/DL (ref 7–30)
CALCIUM SERPL-MCNC: 8.7 MG/DL (ref 8.5–10.1)
CHLORIDE SERPL-SCNC: 105 MMOL/L (ref 94–109)
CO2 SERPL-SCNC: 30 MMOL/L (ref 20–32)
CREAT SERPL-MCNC: 0.64 MG/DL (ref 0.52–1.04)
CRP SERPL-MCNC: <2.9 MG/L (ref 0–8)
GFR SERPL CREATININE-BSD FRML MDRD: >90 ML/MIN/{1.73_M2}
GLUCOSE SERPL-MCNC: 107 MG/DL (ref 70–99)
LIPASE SERPL-CCNC: 74 U/L (ref 73–393)
POTASSIUM SERPL-SCNC: 3.8 MMOL/L (ref 3.4–5.3)
PROT SERPL-MCNC: 7.5 G/DL (ref 6.8–8.8)
SODIUM SERPL-SCNC: 139 MMOL/L (ref 133–144)

## 2019-12-02 PROCEDURE — 82150 ASSAY OF AMYLASE: CPT | Mod: ZL | Performed by: INTERNAL MEDICINE

## 2019-12-02 PROCEDURE — 99214 OFFICE O/P EST MOD 30 MIN: CPT | Performed by: INTERNAL MEDICINE

## 2019-12-02 PROCEDURE — G0463 HOSPITAL OUTPT CLINIC VISIT: HCPCS

## 2019-12-02 PROCEDURE — 83690 ASSAY OF LIPASE: CPT | Mod: ZL | Performed by: INTERNAL MEDICINE

## 2019-12-02 PROCEDURE — 86140 C-REACTIVE PROTEIN: CPT | Mod: ZL | Performed by: INTERNAL MEDICINE

## 2019-12-02 PROCEDURE — 80053 COMPREHEN METABOLIC PANEL: CPT | Mod: ZL | Performed by: INTERNAL MEDICINE

## 2019-12-02 PROCEDURE — 36415 COLL VENOUS BLD VENIPUNCTURE: CPT | Mod: ZL | Performed by: INTERNAL MEDICINE

## 2019-12-02 PROCEDURE — 82248 BILIRUBIN DIRECT: CPT | Mod: ZL | Performed by: INTERNAL MEDICINE

## 2019-12-02 RX ORDER — METOCLOPRAMIDE 5 MG/1
5 TABLET ORAL
Qty: 90 TABLET | Refills: 1 | Status: SHIPPED | OUTPATIENT
Start: 2019-12-02 | End: 2020-03-18

## 2019-12-02 ASSESSMENT — PAIN SCALES - GENERAL: PAINLEVEL: MILD PAIN (2)

## 2019-12-02 NOTE — NURSING NOTE
"Chief Complaint   Patient presents with     Abdominal Pain       Initial /80 (BP Location: Right arm, Patient Position: Chair, Cuff Size: Adult Regular)   Pulse 89   Temp 98.2  F (36.8  C) (Tympanic)   Wt 58.5 kg (129 lb)   SpO2 98%   BMI 23.59 kg/m   Estimated body mass index is 23.59 kg/m  as calculated from the following:    Height as of 8/26/19: 1.575 m (5' 2.01\").    Weight as of this encounter: 58.5 kg (129 lb).  Medication Reconciliation: complete  Jeb Wen LPN  "

## 2019-12-03 ENCOUNTER — TELEPHONE (OUTPATIENT)
Dept: PEDIATRICS | Facility: OTHER | Age: 69
End: 2019-12-03

## 2019-12-03 DIAGNOSIS — Z12.31 ENCOUNTER FOR SCREENING MAMMOGRAM FOR BREAST CANCER: Primary | ICD-10-CM

## 2019-12-03 RX ORDER — PROMETHAZINE HYDROCHLORIDE 25 MG/1
TABLET ORAL
Qty: 90 TABLET | Refills: 0 | Status: SHIPPED | OUTPATIENT
Start: 2019-12-03 | End: 2020-01-07

## 2019-12-03 RX ORDER — ONDANSETRON 4 MG/1
8 TABLET, FILM COATED ORAL EVERY 8 HOURS PRN
Qty: 60 TABLET | Refills: 0 | Status: SHIPPED | OUTPATIENT
Start: 2019-12-03 | End: 2019-12-08

## 2019-12-05 RX ORDER — CYCLOBENZAPRINE HCL 10 MG
TABLET ORAL
Qty: 90 TABLET | Refills: 1 | Status: SHIPPED | OUTPATIENT
Start: 2019-12-05 | End: 2020-04-29

## 2019-12-05 NOTE — TELEPHONE ENCOUNTER
cyclobenzaprine (FLEXERIL) 10 MG tablet      Last Written Prescription Date:  historical  Last Fill Quantity: -,   # refills: -  Last Office Visit: 12/2/19  Future Office visit:    Next 5 appointments (look out 90 days)    Lewis 15, 2020  3:00 PM CST  (Arrive by 2:45 PM)  SHORT with Quinn Cruz DO  [facility] [place] ([facility] [place] ) [address]  [place] [address]  [phone]           Routing refill request to provider for review/approval because:  Drug not on the [facility], P or [facility] refill protocol or controlled substance. Historical.

## 2019-12-08 DIAGNOSIS — Z01.818 PREOP GENERAL PHYSICAL EXAM: ICD-10-CM

## 2019-12-08 DIAGNOSIS — R11.0 NAUSEA: ICD-10-CM

## 2019-12-10 ENCOUNTER — ANCILLARY PROCEDURE (OUTPATIENT)
Dept: MAMMOGRAPHY | Facility: OTHER | Age: 69
End: 2019-12-10
Attending: INTERNAL MEDICINE
Payer: COMMERCIAL

## 2019-12-10 DIAGNOSIS — Z12.31 ENCOUNTER FOR SCREENING MAMMOGRAM FOR BREAST CANCER: ICD-10-CM

## 2019-12-10 PROCEDURE — 77067 SCR MAMMO BI INCL CAD: CPT | Mod: TC

## 2019-12-10 NOTE — TELEPHONE ENCOUNTER
luisito      Last Written Prescription Date:  12/3/19  Last Fill Quantity: 60,   # refills: 0  Last Office Visit: 12/2/19  Future Office visit:    Next 5 appointments (look out 90 days)    Lewis 15, 2020  3:00 PM CST  (Arrive by 2:45 PM)  SHORT with Quinn Cruz DO  [facility] [place] ([facility] [place] ) [address]  [place] [address]  [phone]           Routing refill request to provider for review/approval because:  Drug not on the [facility], [facility] or [facility] refill protocol or controlled substance

## 2019-12-11 RX ORDER — ONDANSETRON 4 MG/1
8 TABLET, FILM COATED ORAL EVERY 8 HOURS PRN
Qty: 60 TABLET | Refills: 0 | Status: SHIPPED | OUTPATIENT
Start: 2019-12-11 | End: 2019-12-17

## 2019-12-11 RX ORDER — DICYCLOMINE HYDROCHLORIDE 10 MG/1
CAPSULE ORAL
Qty: 120 CAPSULE | Refills: 3 | Status: SHIPPED | OUTPATIENT
Start: 2019-12-11 | End: 2020-02-26

## 2019-12-13 DIAGNOSIS — M54.2 CERVICAL PAIN: ICD-10-CM

## 2019-12-13 DIAGNOSIS — M25.561 ARTHRALGIA OF BOTH KNEES: ICD-10-CM

## 2019-12-13 DIAGNOSIS — B02.23 POSTHERPETIC POLYNEUROPATHY: ICD-10-CM

## 2019-12-13 DIAGNOSIS — M25.562 ARTHRALGIA OF BOTH KNEES: ICD-10-CM

## 2019-12-13 DIAGNOSIS — G47.00 PERSISTENT INSOMNIA: ICD-10-CM

## 2019-12-17 DIAGNOSIS — R11.0 NAUSEA: ICD-10-CM

## 2019-12-17 RX ORDER — HYDROCODONE BITARTRATE AND ACETAMINOPHEN 7.5; 325 MG/1; MG/1
1 TABLET ORAL EVERY 6 HOURS PRN
Qty: 120 TABLET | Refills: 0 | Status: SHIPPED | OUTPATIENT
Start: 2019-12-17 | End: 2020-01-15

## 2019-12-17 RX ORDER — ZOLPIDEM TARTRATE 10 MG/1
TABLET ORAL
Qty: 60 TABLET | Refills: 0 | Status: SHIPPED | OUTPATIENT
Start: 2019-12-17 | End: 2020-01-15

## 2019-12-17 RX ORDER — ONDANSETRON 4 MG/1
8 TABLET, FILM COATED ORAL EVERY 8 HOURS PRN
Qty: 60 TABLET | Refills: 0 | Status: SHIPPED | OUTPATIENT
Start: 2019-12-17 | End: 2019-12-31

## 2019-12-17 RX ORDER — GABAPENTIN 300 MG/1
CAPSULE ORAL
Qty: 90 CAPSULE | Refills: 1 | Status: SHIPPED | OUTPATIENT
Start: 2019-12-17 | End: 2020-02-07

## 2019-12-17 NOTE — TELEPHONE ENCOUNTER
luisito      Last Written Prescription Date:  12/11/19  Last Fill Quantity: 60,   # refills: 0  Last Office Visit: 12/2/19  Future Office visit:    Next 5 appointments (look out 90 days)    Lewis 15, 2020  3:00 PM CST  (Arrive by 2:45 PM)  SHORT with Quinn Cruz DO  [facility] [place] ([facility] [place] ) [address]  [place] [address]  [phone]           Routing refill request to provider for review/approval because:  Drug not on the [facility], [facility] or [facility] refill protocol or controlled substance

## 2019-12-17 NOTE — TELEPHONE ENCOUNTER
Patient is leaving out of town tomorrow morning and would like if she could  her prescriptions today. Please advise, thank you.    HYDROcodone-acetaminophen (NORCO) 7.5-325 MG per tablet      Last Written Prescription Date:  11/19/19  Last Fill Quantity: 120,   # refills: 0  Last Office Visit: 12/02/19  Future Office visit:    Next 5 appointments (look out 90 days)    Lewis 15, 2020  3:00 PM CST  (Arrive by 2:45 PM)  SHORT with Quinn Cruz DO  [facility] [place] ([facility] [place] ) [address]  [place] [address]  [phone]          zolpidem (AMBIEN) 10 MG tablet  Last Written Prescription Date:  11/19/19  Last Fill Quantity: 60,   # refills: 0  Last Office Visit: 12/02/19  Future Office visit:    Next 5 appointments (look out 90 days)    Lewis 15, 2020  3:00 PM CST  (Arrive by 2:45 PM)  SHORT with Quinn Cruz DO  [facility] [place] ([facility] [place] ) [address]  [place] [address]  [phone]         gabapentin (NEURONTIN) 300 MG capsule      Last Written Prescription Date:  10/23/19  Last Fill Quantity: 90,   # refills: 1  Last Office Visit: 12/02/19  Future Office visit:    Next 5 appointments (look out 90 days)    Lewis 15, 2020  3:00 PM CST  (Arrive by 2:45 PM)  SHORT with Quinn Cruz DO  [facility] [place] ([facility] [place] ) [address]  [place] [address]  [phone]           Routing refill request to provider for review/approval because:  Drug not on the G, P or [facility] refill protocol or controlled substance

## 2019-12-29 DIAGNOSIS — R11.0 NAUSEA: ICD-10-CM

## 2019-12-30 ENCOUNTER — OFFICE VISIT (OUTPATIENT)
Dept: CHIROPRACTIC MEDICINE | Facility: OTHER | Age: 69
End: 2019-12-30
Attending: CHIROPRACTOR
Payer: COMMERCIAL

## 2019-12-30 DIAGNOSIS — M99.03 SEGMENTAL AND SOMATIC DYSFUNCTION OF LUMBAR REGION: Primary | ICD-10-CM

## 2019-12-30 DIAGNOSIS — M99.01 SEGMENTAL AND SOMATIC DYSFUNCTION OF CERVICAL REGION: ICD-10-CM

## 2019-12-30 DIAGNOSIS — M99.02 SEGMENTAL AND SOMATIC DYSFUNCTION OF THORACIC REGION: ICD-10-CM

## 2019-12-30 DIAGNOSIS — M54.50 ACUTE BILATERAL LOW BACK PAIN WITHOUT SCIATICA: ICD-10-CM

## 2019-12-30 PROCEDURE — 98941 CHIROPRACT MANJ 3-4 REGIONS: CPT | Mod: AT | Performed by: CHIROPRACTOR

## 2019-12-31 RX ORDER — ONDANSETRON 4 MG/1
8 TABLET, FILM COATED ORAL EVERY 8 HOURS PRN
Qty: 60 TABLET | Refills: 0 | Status: SHIPPED | OUTPATIENT
Start: 2019-12-31 | End: 2020-01-06

## 2019-12-31 NOTE — TELEPHONE ENCOUNTER
zofran      Last Written Prescription Date:  12/17/19  Last Fill Quantity: 60,   # refills: 0  Last Office Visit: 12/2/19  Future Office visit:    Next 5 appointments (look out 90 days)    Jan 02, 2020  3:40 PM CST  Return Visit with Noah Bloom DC  [facility] [facility] ([facility]) [address] [place] [address]  [phone]   Lewis 15, 2020  3:00 PM CST  (Arrive by 2:45 PM)  SHORT with Quinn Cruz DO  [facility] ([facility] [place] ) [address]  [facility] [address]  [phone]           Routing refill request to provider for review/approval because:  Drug not on the [facility], [facility] or [facility] refill protocol or controlled substance

## 2020-01-03 DIAGNOSIS — Z01.818 PREOP GENERAL PHYSICAL EXAM: ICD-10-CM

## 2020-01-06 ENCOUNTER — OFFICE VISIT (OUTPATIENT)
Dept: CHIROPRACTIC MEDICINE | Facility: OTHER | Age: 70
End: 2020-01-06
Attending: CHIROPRACTOR
Payer: COMMERCIAL

## 2020-01-06 DIAGNOSIS — M99.03 SEGMENTAL AND SOMATIC DYSFUNCTION OF LUMBAR REGION: Primary | ICD-10-CM

## 2020-01-06 DIAGNOSIS — M54.50 ACUTE RIGHT-SIDED LOW BACK PAIN WITHOUT SCIATICA: ICD-10-CM

## 2020-01-06 DIAGNOSIS — M99.02 SEGMENTAL AND SOMATIC DYSFUNCTION OF THORACIC REGION: ICD-10-CM

## 2020-01-06 DIAGNOSIS — M99.01 SEGMENTAL AND SOMATIC DYSFUNCTION OF CERVICAL REGION: ICD-10-CM

## 2020-01-06 PROCEDURE — 98941 CHIROPRACT MANJ 3-4 REGIONS: CPT | Mod: AT | Performed by: CHIROPRACTOR

## 2020-01-07 PROBLEM — Z98.0 STATUS POST BYPASS GASTROJEJUNOSTOMY: Status: ACTIVE | Noted: 2019-09-23

## 2020-01-07 PROBLEM — K13.0 ANGULAR CHEILITIS: Status: ACTIVE | Noted: 2019-09-23

## 2020-01-07 PROBLEM — R10.13 EPIGASTRIC ABDOMINAL PAIN: Status: ACTIVE | Noted: 2019-09-23

## 2020-01-07 RX ORDER — PROMETHAZINE HYDROCHLORIDE 25 MG/1
TABLET ORAL
Qty: 90 TABLET | Refills: 0 | Status: SHIPPED | OUTPATIENT
Start: 2020-01-07 | End: 2020-01-31

## 2020-01-07 NOTE — PROGRESS NOTES
Subjective     Aure Lanier is a 69 year old female who presents to clinic today for the following health issues:    HPI   Chronic Pain Follow-Up       Type / Location of Pain: abdominal pain   Analgesia/pain control:       Recent changes:  improved      Overall control: Tolerable with discomfort  Activity level/function:      Daily activities:  Can do most things most days, with some rest    Work:  not applicable  Adverse effects:  No  Adherance    Taking medication as directed?  Yes    Participating in other treatments: not applicable  Risk Factors:    Sleep:  Good    Mood/anxiety:  controlled    Recent family or social stressors:  none noted    Other aggravating factors: eating        She has had a tough time evaluating her abdominal pain as she has had viral syndrome over the past several days with fatigue and diarrhea.  She does feel like Reglan had been helping with her abdominal symptoms.  She did stop the medications and now is restarting the medications.  She was unable to eat for several days.  She has been eating better for the past 4 days.  She did lose weight over the past two weeks.      Additional pain, includes left lateral hip pain.  She reports horrendous pain.    PHQ-9 SCORE 4/9/2018 8/27/2018 9/21/2018   PHQ-9 Total Score - - -   PHQ-9 Total Score 0 2 3     AURELIA-7 SCORE 4/9/2018 8/27/2018 9/21/2018   Total Score 0 2 1     Encounter-Level CSA - 07/11/2017:    Controlled Substance Agreement - Scan on 7/20/2017 12:19 PM: CONTROLLED SUBSTANCE AGREEMENT     Patient-Level CSA:    There are no patient-level csa.           -------------------------------------    Patient Active Problem List   Diagnosis     Allergic arthritis involving hand     Hypothyroidism     Insomnia     Headache     Postherpetic polyneuropathy     Dysthymia     Anxiety state     Hyperlipidemia     Migraine     Osteoporosis     GERD     Low back pain     Hyperlipidemia with target LDL less than 100     Bilateral chronic knee pain      Back muscle spasm     Chronic, continuous use of opioids     Status post total left knee replacement     S/P total knee arthroplasty, right     Status post total right knee replacement     Family history of other musculoskeletal diseases(V17.89)     Hypokalemia     Anemia, iron deficiency     History of Billroth I operation     Phytobezoar     Gastric outlet obstruction     Angular cheilitis     Status post bypass gastrojejunostomy     Epigastric abdominal pain     Past Surgical History:   Procedure Laterality Date     ARTHROPLASTY KNEE Left 4/17/2018    Procedure: ARTHROPLASTY KNEE;  LEFT TOTAL KNEE ARTHROPLASTY S/N JOURNEY II;  Surgeon: Ty Hernandez MD;  [facility]     ARTHROPLASTY KNEE Right 9/4/2018    Procedure: ARTHROPLASTY KNEE;  RIGHT TOTAL KNEE ARTHROPLASTY ;  Surgeon: Ty Hernandez MD;  [facility]     CHOLECYSTECTOMY  03/27/2019    and lysis adhesions.       D & C       ENDOSCOPY       ESOPHAGOSCOPY, GASTROSCOPY, DUODENOSCOPY (EGD), COMBINED N/A 9/11/2017    Procedure: COMBINED ESOPHAGOSCOPY, GASTROSCOPY, DUODENOSCOPY (EGD);  Upper Endoscopy: Removal of Food Impaction;  Surgeon: Lino Zhu DO;  [facility]     ESOPHAGOSCOPY, GASTROSCOPY, DUODENOSCOPY (EGD), COMBINED N/A 11/5/2018    Procedure: UPPER ENDOSCOPY WITH BIOPSY;  Surgeon: Dieudonne Jackson MD;  [facility]     ESOPHAGOSCOPY, GASTROSCOPY, DUODENOSCOPY (EGD), COMBINED N/A 12/21/2018    Procedure: UPPER ENDOSCOPY WITH BALLOON DILATION, BIOPSY;  Surgeon: Dieudonne Jackson MD;  [facility]     ESOPHAGOSCOPY, GASTROSCOPY, DUODENOSCOPY (EGD), COMBINED N/A 1/14/2019    Procedure: UPPER ENDOSCOPY WITH ENDOSCOPIC PLACEMENT OF OG AND GASTRIC LAVAGE;  Surgeon: Dieudonne Jackson MD;  [facility]: [facility]     partial gastrectomy  03/2019     right total knee replacement  01/2019     MARAH EN Y BOWEL  1980    Due to severely ulcerated stomach body.     STOMACH SURGERY  03/27/2019    Restructuring of stomach and  Colecuystectomy.     stomach surgery peritinitis         Social History     Tobacco Use     Smoking status: Never Smoker     Smokeless tobacco: Never Used     Tobacco comment: no passive exposure   Substance Use Topics     Alcohol use: Yes     Comment: rarely, maybe yearly     Family History   Problem Relation Age of Onset     Cerebrovascular Disease Mother         CVA     Hypertension Mother      Other - See Comments Father 40        mining accident; cause of death     Other - See Comments Brother         muscle dystrophy     Cancer Daughter 34        skin cancer     Melanoma Daughter            -------------------------------------  Reviewed and updated as needed this visit by Provider         Review of Systems   ROS COMP: Constitutional, HEENT, cardiovascular, pulmonary, gi and gu systems are negative, except as otherwise noted.      Objective    /62 (BP Location: Left arm, Patient Position: Chair, Cuff Size: Adult Regular)   Pulse 95   Temp 96.9  F (36.1  C) (Tympanic)   Wt 55.3 kg (122 lb)   SpO2 98%   BMI 22.31 kg/m    Body mass index is 22.31 kg/m .  Physical Exam   GENERAL: healthy, alert and no distress  NECK: no adenopathy, no asymmetry, masses, or scars and thyroid normal to palpation  RESP: lungs clear to auscultation - no rales, rhonchi or wheezes  CV: regular rate and rhythm, normal S1 S2, no S3 or S4, no murmur, click or rub, no peripheral edema and peripheral pulses strong  ABDOMEN: soft, nontender, no hepatosplenomegaly, no masses and bowel sounds normal  ABDOMEN: no bruits heard  MS: no gross musculoskeletal defects noted, no edema  MS: tenderness over the left greater trochanter   SKIN: no suspicious lesions or rashes and keratoses - seborrheic over the left abdomen and right mid back     Diagnostic Test Results:  Labs reviewed in Epic  none         ASSESSMENT /PLAN:      (R11.0) Nausea  Comment: Aure has nausea which I feel is secondary to opioid use and slowed peristalsis.  Plan:    She will continue Reglan and follow closely.    (M54.2) Cervical pain  Comment:   She has cervical DDD  Plan:   She will continue HYDROcodone-acetaminophen (NORCO) 7.5-325 MG  per tablet every 6 hours PRN and cyclobenzaprine (FLEXERIL) 10 MG  Tablet TID    (M25.561,  M25.562) Arthralgia of both knees  Comment:   Plan:   HYDROcodone-acetaminophen (NORCO) 7.5-325 MG  per tablet as above    (G47.00) Persistent insomnia  Comment:   Plan:   zolpidem (AMBIEN) 10 MG tablet    (M70.62) Greater trochanteric bursitis of left hip  Comment:   Plan:  IR Joint Injection Major Left            Follow up with Provider - 6 weeks for nausea          Quinn Cruz DO, DO

## 2020-01-07 NOTE — TELEPHONE ENCOUNTER
Phenergan      Last Written Prescription Date:  12/3/19  Last Fill Quantity: 90 ,   # refills: 0  Last Office Visit: 12/02/19  Future Office visit:    Next 5 appointments (look out 90 days)    Lewis 15, 2020  3:00 PM CST  (Arrive by 2:45 PM)  SHORT with Quinn Cruz DO  [facility] [place] ([facility] [place] ) [address]  [place] [address]  [phone]           Routing refill request to provider for review/approval because:

## 2020-01-07 NOTE — TELEPHONE ENCOUNTER
Patient has been taking PRN medication daily.  Associated dx is preop physical exam. Please advise.

## 2020-01-10 ENCOUNTER — OFFICE VISIT (OUTPATIENT)
Dept: CHIROPRACTIC MEDICINE | Facility: OTHER | Age: 70
End: 2020-01-10
Attending: CHIROPRACTOR
Payer: COMMERCIAL

## 2020-01-10 DIAGNOSIS — M99.03 SEGMENTAL AND SOMATIC DYSFUNCTION OF LUMBAR REGION: Primary | ICD-10-CM

## 2020-01-10 DIAGNOSIS — M99.01 SEGMENTAL AND SOMATIC DYSFUNCTION OF CERVICAL REGION: ICD-10-CM

## 2020-01-10 DIAGNOSIS — M99.02 SEGMENTAL AND SOMATIC DYSFUNCTION OF THORACIC REGION: ICD-10-CM

## 2020-01-10 DIAGNOSIS — M54.50 ACUTE RIGHT-SIDED LOW BACK PAIN WITHOUT SCIATICA: ICD-10-CM

## 2020-01-10 PROCEDURE — 98941 CHIROPRACT MANJ 3-4 REGIONS: CPT | Mod: AT | Performed by: CHIROPRACTOR

## 2020-01-13 ENCOUNTER — OFFICE VISIT (OUTPATIENT)
Dept: CHIROPRACTIC MEDICINE | Facility: OTHER | Age: 70
End: 2020-01-13
Attending: CHIROPRACTOR
Payer: COMMERCIAL

## 2020-01-13 DIAGNOSIS — M99.03 SEGMENTAL AND SOMATIC DYSFUNCTION OF LUMBAR REGION: Primary | ICD-10-CM

## 2020-01-13 DIAGNOSIS — M54.50 ACUTE BILATERAL LOW BACK PAIN WITHOUT SCIATICA: ICD-10-CM

## 2020-01-13 DIAGNOSIS — M99.02 SEGMENTAL AND SOMATIC DYSFUNCTION OF THORACIC REGION: ICD-10-CM

## 2020-01-13 DIAGNOSIS — M99.01 SEGMENTAL AND SOMATIC DYSFUNCTION OF CERVICAL REGION: ICD-10-CM

## 2020-01-13 PROCEDURE — 98941 CHIROPRACT MANJ 3-4 REGIONS: CPT | Mod: AT | Performed by: CHIROPRACTOR

## 2020-01-15 ENCOUNTER — OFFICE VISIT (OUTPATIENT)
Dept: INTERNAL MEDICINE | Facility: OTHER | Age: 70
End: 2020-01-15
Attending: INTERNAL MEDICINE
Payer: COMMERCIAL

## 2020-01-15 VITALS
HEART RATE: 95 BPM | DIASTOLIC BLOOD PRESSURE: 62 MMHG | WEIGHT: 122 LBS | SYSTOLIC BLOOD PRESSURE: 106 MMHG | OXYGEN SATURATION: 98 % | BODY MASS INDEX: 22.31 KG/M2 | TEMPERATURE: 96.9 F

## 2020-01-15 DIAGNOSIS — G47.00 PERSISTENT INSOMNIA: ICD-10-CM

## 2020-01-15 DIAGNOSIS — M25.562 ARTHRALGIA OF BOTH KNEES: ICD-10-CM

## 2020-01-15 DIAGNOSIS — M70.62 GREATER TROCHANTERIC BURSITIS OF LEFT HIP: ICD-10-CM

## 2020-01-15 DIAGNOSIS — M25.561 ARTHRALGIA OF BOTH KNEES: ICD-10-CM

## 2020-01-15 DIAGNOSIS — R11.0 NAUSEA: ICD-10-CM

## 2020-01-15 DIAGNOSIS — M54.2 CERVICAL PAIN: Primary | ICD-10-CM

## 2020-01-15 PROCEDURE — G0463 HOSPITAL OUTPT CLINIC VISIT: HCPCS

## 2020-01-15 PROCEDURE — 99214 OFFICE O/P EST MOD 30 MIN: CPT | Performed by: INTERNAL MEDICINE

## 2020-01-15 RX ORDER — CYCLOBENZAPRINE HCL 10 MG
TABLET ORAL
Qty: 90 TABLET | Refills: 3 | Status: SHIPPED | OUTPATIENT
Start: 2020-01-15 | End: 2020-02-26

## 2020-01-15 RX ORDER — HYDROCODONE BITARTRATE AND ACETAMINOPHEN 7.5; 325 MG/1; MG/1
1 TABLET ORAL EVERY 6 HOURS PRN
Qty: 120 TABLET | Refills: 0 | Status: SHIPPED | OUTPATIENT
Start: 2020-01-15 | End: 2020-02-10

## 2020-01-15 RX ORDER — ACYCLOVIR 50 MG/G
CREAM TOPICAL 3 TIMES DAILY PRN
Qty: 5 G | Refills: 3 | Status: SHIPPED | OUTPATIENT
Start: 2020-01-15 | End: 2021-12-28

## 2020-01-15 RX ORDER — ZOLPIDEM TARTRATE 10 MG/1
TABLET ORAL
Qty: 60 TABLET | Refills: 0 | Status: SHIPPED | OUTPATIENT
Start: 2020-01-15 | End: 2020-02-10

## 2020-01-15 ASSESSMENT — PAIN SCALES - GENERAL: PAINLEVEL: MILD PAIN (2)

## 2020-01-15 NOTE — NURSING NOTE
"Chief Complaint   Patient presents with     Recheck Medication       Initial /62 (BP Location: Left arm, Patient Position: Chair, Cuff Size: Adult Regular)   Pulse 95   Temp 96.9  F (36.1  C) (Tympanic)   Wt 55.3 kg (122 lb)   SpO2 98%   BMI 22.31 kg/m   Estimated body mass index is 22.31 kg/m  as calculated from the following:    Height as of 8/26/19: 1.575 m (5' 2.01\").    Weight as of this encounter: 55.3 kg (122 lb).  Medication Reconciliation: complete  Jeb Wen LPN  "

## 2020-01-21 ENCOUNTER — TELEPHONE (OUTPATIENT)
Dept: INTERVENTIONAL RADIOLOGY/VASCULAR | Facility: HOSPITAL | Age: 70
End: 2020-01-21

## 2020-01-30 DIAGNOSIS — Z01.818 PREOP GENERAL PHYSICAL EXAM: ICD-10-CM

## 2020-01-31 RX ORDER — PROMETHAZINE HYDROCHLORIDE 25 MG/1
TABLET ORAL
Qty: 90 TABLET | Refills: 0 | Status: SHIPPED | OUTPATIENT
Start: 2020-01-31 | End: 2020-02-25

## 2020-01-31 NOTE — TELEPHONE ENCOUNTER
phenergan      Last Written Prescription Date:  1/7/20  Last Fill Quantity: 90,   # refills: 0  Last Office Visit: 1/15/20  Future Office visit:    Next 5 appointments (look out 90 days)    Feb 26, 2020  3:00 PM CST  (Arrive by 2:45 PM)  SHORT with Quinn Cruz DO  [facility] [place] ([facility] [place] ) [address]  [place] [address]  [phone]           Routing refill request to provider for review/approval because:  Drug not on the [facility], [facility] or [facility] refill protocol or controlled substance

## 2020-02-06 DIAGNOSIS — B02.23 POSTHERPETIC POLYNEUROPATHY: ICD-10-CM

## 2020-02-07 RX ORDER — GABAPENTIN 300 MG/1
CAPSULE ORAL
Qty: 90 CAPSULE | Refills: 1 | Status: SHIPPED | OUTPATIENT
Start: 2020-02-07 | End: 2020-03-26

## 2020-02-07 NOTE — TELEPHONE ENCOUNTER
gabapentin      Last Written Prescription Date:  12/17/19  Last Fill Quantity: 90,   # refills: 1  Last Office Visit: 12/2/19  Future Office visit:    Next 5 appointments (look out 90 days)    Feb 26, 2020  3:00 PM CST  (Arrive by 2:45 PM)  SHORT with Quinn Cruz DO  [facility] [place] ([facility] [place] ) [address]  [place] [address]  [phone]           Routing refill request to provider for review/approval because:  Drug not on the [facility], [facility] or [facility] refill protocol or controlled substance

## 2020-02-10 ENCOUNTER — TELEPHONE (OUTPATIENT)
Dept: PEDIATRICS | Facility: OTHER | Age: 70
End: 2020-02-10

## 2020-02-10 NOTE — TELEPHONE ENCOUNTER
"PA NOT NEEDED - Received PA request from Dipesh Islas for Gabapentin 300MG capsules. Submitted request through [facility] and received immediate response stating: \"Test claim rejects for early refill and does not indicate a prior authorization is needed for the requested medication/quantity under the patient's Part D prescription benefit.\" Pharmacy advised. Documentation scanned to Epic.  "

## 2020-02-19 NOTE — PROGRESS NOTES
Subjective     Aure Lanier is a 69 year old female who presents to clinic today for the following health issues:    HPI   Chronic Pain Follow-Up    Where in your body do you have pain? Abdominal pain  How has your pain affected your ability to work? Not applicable  Which of these pain treatments have you tried since your last clinic visit? Nothing new, on medication  How well are you sleeping? Good, with ambien  How has your mood been since your last visit? Better  Have you had a significant life event? No  Other aggravating factors: none  Taking medication as directed? Yes    PHQ-9 SCORE 4/9/2018 8/27/2018 9/21/2018   PHQ-9 Total Score - - -   PHQ-9 Total Score 0 2 3     AURELIA-7 SCORE 4/9/2018 8/27/2018 9/21/2018   Total Score 0 2 1     No flowsheet data found.  Encounter-Level CSA - 07/11/2017:    Controlled Substance Agreement - Scan on 7/20/2017 12:19 PM: CONTROLLED SUBSTANCE AGREEMENT     Patient-Level CSA:    There are no patient-level csa.      She is eating better.  Her nausea is much better.  She is having nausea daily but this is mild.  Her nausea is random and not associated with her eating.  She is taking her Reglan 2-3 times per may.  She is moving her bowel every 2-3 days with semi large and soft stool.  She denies blood in her stools.  She will be having all of her teeth pulled next week.  She will be getting dentures.    Wt Readings from Last 5 Encounters:   02/26/20 61.5 kg (135 lb 8 oz)   01/15/20 55.3 kg (122 lb)   12/02/19 58.5 kg (129 lb)   10/23/19 52.2 kg (115 lb)   08/26/19 50.5 kg (111 lb 4.8 oz)     Cervicalgia:  She repots that her pain is controlled with her ants spasm medications and Norco.  She has 8/10 pain without her pain medication and 4-5 /10 pain without the pain medication.      Insomnia;  She initiates sleep with Ambien and wakes after 3-4 hours and she takes a second dose.      -------------------------------------    Patient Active Problem List   Diagnosis     Allergic  arthritis involving hand     Hypothyroidism     Insomnia     Headache     Postherpetic polyneuropathy     Dysthymia     Anxiety state     Hyperlipidemia     Migraine     Osteoporosis     GERD     Low back pain     Hyperlipidemia with target LDL less than 100     Bilateral chronic knee pain     Back muscle spasm     Chronic, continuous use of opioids     Status post total left knee replacement     S/P total knee arthroplasty, right     Status post total right knee replacement     Family history of other musculoskeletal diseases(V17.89)     Hypokalemia     Anemia, iron deficiency     History of Billroth I operation     Phytobezoar     Gastric outlet obstruction     Angular cheilitis     Status post bypass gastrojejunostomy     Epigastric abdominal pain     Past Surgical History:   Procedure Laterality Date     ARTHROPLASTY KNEE Left 4/17/2018    Procedure: ARTHROPLASTY KNEE;  LEFT TOTAL KNEE ARTHROPLASTY S/N JOURNEY II;  Surgeon: Ty Hernandez MD;  [facility]: [facility]     ARTHROPLASTY KNEE Right 9/4/2018    Procedure: ARTHROPLASTY KNEE;  RIGHT TOTAL KNEE ARTHROPLASTY ;  Surgeon: Ty Hernandez MD;  Location: [facility]     CHOLECYSTECTOMY  03/27/2019    and lysis adhesions.       D & C       ENDOSCOPY       ESOPHAGOSCOPY, GASTROSCOPY, DUODENOSCOPY (EGD), COMBINED N/A 9/11/2017    Procedure: COMBINED ESOPHAGOSCOPY, GASTROSCOPY, DUODENOSCOPY (EGD);  Upper Endoscopy: Removal of Food Impaction;  Surgeon: Lino Zhu DO;  Location: [facility]     ESOPHAGOSCOPY, GASTROSCOPY, DUODENOSCOPY (EGD), COMBINED N/A 11/5/2018    Procedure: UPPER ENDOSCOPY WITH BIOPSY;  Surgeon: Dieudonne Jackson MD;  [facility]: [facility]     ESOPHAGOSCOPY, GASTROSCOPY, DUODENOSCOPY (EGD), COMBINED N/A 12/21/2018    Procedure: UPPER ENDOSCOPY WITH BALLOON DILATION, BIOPSY;  Surgeon: Dieudonne Jackson MD;  [facility]     ESOPHAGOSCOPY, GASTROSCOPY, DUODENOSCOPY (EGD), COMBINED N/A 1/14/2019    Procedure: UPPER ENDOSCOPY WITH ENDOSCOPIC PLACEMENT OF  "OG AND GASTRIC LAVAGE;  Surgeon: Dieudonne Jackson MD;  Location: [facility]     partial gastrectomy  03/2019     right total knee replacement  01/2019     MARAH EN Y BOWEL  1980    Due to severely ulcerated stomach body.     STOMACH SURGERY  03/27/2019    Restructuring of stomach and Colecuystectomy.     stomach surgery peritinitis         Social History     Tobacco Use     Smoking status: Never Smoker     Smokeless tobacco: Never Used     Tobacco comment: no passive exposure   Substance Use Topics     Alcohol use: Yes     Comment: rarely, maybe yearly     Family History   Problem Relation Age of Onset     Cerebrovascular Disease Mother         CVA     Hypertension Mother      Other - See Comments Father 40        mining accident; cause of death     Other - See Comments Brother         muscle dystrophy     Cancer Daughter 34        skin cancer     Melanoma Daughter            -------------------------------------  Reviewed and updated as needed this visit by Provider         Review of Systems   ROS COMP: Constitutional, HEENT, cardiovascular, pulmonary, gi and gu systems are negative, except as otherwise noted.      Objective    /70 (BP Location: Right arm, Patient Position: Sitting, Cuff Size: Adult Regular)   Pulse 109   Temp 97.3  F (36.3  C) (Tympanic)   Resp 18   Ht 1.575 m (5' 2\")   Wt 60.8 kg (134 lb)   SpO2 98%   BMI 24.51 kg/m    Body mass index is 24.51 kg/m .  Physical Exam   GENERAL: healthy, alert and no distress  EYES: Eyes grossly normal to inspection and conjunctivae and sclerae normal  NECK: no adenopathy, no asymmetry, masses, or scars and thyroid normal to palpation  RESP: lungs clear to auscultation - no rales, rhonchi or wheezes  CV: regular rate and rhythm, normal S1 S2, no S3 or S4, no murmur, click or rub, no peripheral edema and peripheral pulses strong  CV: regular rates and rhythm, normal S1 S2, no S3 or S4, grade 3/6 KARMEN murmur heard best over peripheral pulses strong and no " peripheral edema  ABDOMEN: soft, nontender, no hepatosplenomegaly, no masses and bowel sounds normal  ABDOMEN: no bruits heard  MS: no gross musculoskeletal defects noted, no edema  Kyphosis of the upper thoracic spine.  PSYCH: mentation appears normal, affect normal/bright    Diagnostic Test Results:  Labs reviewed in Epic  Results for orders placed or performed in visit on 02/26/20   Pain Drug Scr UR W Rptd Meds     Status: None   Result Value Ref Range    Pain Drug SCR UR W RPTD Meds FINAL    TSH     Status: Abnormal   Result Value Ref Range    TSH 0.19 (L) 0.40 - 4.00 mU/L   T4 free     Status: None   Result Value Ref Range    T4 Free 0.93 0.76 - 1.46 ng/dL   Vitamin D Deficiency     Status: None   Result Value Ref Range    Vitamin D Deficiency screening 36 20 - 75 ug/L             ASSESSMENT /PLAN:    (R11.0) Nausea  (primary encounter diagnosis)  Comment:She has opioid induced gastroparesis which has improved with Reglan  Plan:   Continue Reglan low dose TID and reduce opioid use to one per day for three days every eight weeks.  She will continue Zofran.    (R10.84) Abdominal pain, generalized  Comment: Secondary to gastroparesis.  This has improved as have her bowel movement frequency.  Plan:   Continue Reglan 2-3 times per day.    (M54.2) Cervical pain  Comment: Stable on opioids  Plan:   She will continue Hydrocodone 7.5/325 every 6 hours PRN    (E03.9) Acquired hypothyroidism  Comment: Her TSH is suppressed indicating that her synthroid dose is too high   Plan:   Reduce synthroid to 75 mcg from 88 mcg.    (F11.90) Chronic, continuous use of opioids  Comment:   Her drug screen is consistent with the medications that she is taking.  Plan:   Check annual :Pain Drug Scr UR W Rptd Meds    (E55.9) Vitamin D deficiency  Comment: Her vitamin D level is 36 with goal of 60 or greater.  Plan:  Vitamin D3 4000 international unit(s) daily          Patient Instructions   In the beginning of June and every 8 weeks,  reduce your Norco tablets to one per day for three day in order to de inhibit your bowel movements.        Follow up with Provider -   3 months for chronic pain and abdominal issues           Quinn Cruz, DO, DO

## 2020-02-23 DIAGNOSIS — Z01.818 PREOP GENERAL PHYSICAL EXAM: ICD-10-CM

## 2020-02-24 NOTE — TELEPHONE ENCOUNTER
promethazine      Last Written Prescription Date:  1/31/20  Last Fill Quantity: 90,   # refills: 0  Last Office Visit: 1/15/20  Future Office visit:    Next 5 appointments (look out 90 days)    Feb 26, 2020  3:00 PM CST  (Arrive by 2:45 PM)  SHORT with Quinn Cruz DO  [facility] [place] ([facility] [place] ) [address]  [place] [address]  [phone]

## 2020-02-25 DIAGNOSIS — R11.0 NAUSEA: ICD-10-CM

## 2020-02-25 RX ORDER — PROMETHAZINE HYDROCHLORIDE 25 MG/1
TABLET ORAL
Qty: 90 TABLET | Refills: 0 | Status: SHIPPED | OUTPATIENT
Start: 2020-02-25 | End: 2020-03-23

## 2020-02-26 ENCOUNTER — OFFICE VISIT (OUTPATIENT)
Dept: INTERNAL MEDICINE | Facility: OTHER | Age: 70
End: 2020-02-26
Attending: INTERNAL MEDICINE
Payer: COMMERCIAL

## 2020-02-26 VITALS
HEART RATE: 109 BPM | TEMPERATURE: 97.3 F | DIASTOLIC BLOOD PRESSURE: 70 MMHG | WEIGHT: 135.5 LBS | SYSTOLIC BLOOD PRESSURE: 126 MMHG | RESPIRATION RATE: 18 BRPM | OXYGEN SATURATION: 98 % | BODY MASS INDEX: 24.93 KG/M2 | HEIGHT: 62 IN

## 2020-02-26 DIAGNOSIS — E03.9 ACQUIRED HYPOTHYROIDISM: ICD-10-CM

## 2020-02-26 DIAGNOSIS — M54.2 CERVICAL PAIN: ICD-10-CM

## 2020-02-26 DIAGNOSIS — F11.90 CHRONIC, CONTINUOUS USE OF OPIOIDS: ICD-10-CM

## 2020-02-26 DIAGNOSIS — E03.9 HYPOTHYROIDISM, UNSPECIFIED TYPE: ICD-10-CM

## 2020-02-26 DIAGNOSIS — R11.0 NAUSEA: Primary | ICD-10-CM

## 2020-02-26 DIAGNOSIS — E55.9 VITAMIN D DEFICIENCY: ICD-10-CM

## 2020-02-26 DIAGNOSIS — R10.84 ABDOMINAL PAIN, GENERALIZED: ICD-10-CM

## 2020-02-26 LAB
T4 FREE SERPL-MCNC: 0.93 NG/DL (ref 0.76–1.46)
TSH SERPL DL<=0.005 MIU/L-ACNC: 0.19 MU/L (ref 0.4–4)

## 2020-02-26 PROCEDURE — 99214 OFFICE O/P EST MOD 30 MIN: CPT | Performed by: INTERNAL MEDICINE

## 2020-02-26 PROCEDURE — 36415 COLL VENOUS BLD VENIPUNCTURE: CPT | Mod: ZL | Performed by: INTERNAL MEDICINE

## 2020-02-26 PROCEDURE — 84443 ASSAY THYROID STIM HORMONE: CPT | Mod: ZL | Performed by: INTERNAL MEDICINE

## 2020-02-26 PROCEDURE — 82306 VITAMIN D 25 HYDROXY: CPT | Mod: ZL | Performed by: INTERNAL MEDICINE

## 2020-02-26 PROCEDURE — 84439 ASSAY OF FREE THYROXINE: CPT | Mod: ZL | Performed by: INTERNAL MEDICINE

## 2020-02-26 PROCEDURE — G0463 HOSPITAL OUTPT CLINIC VISIT: HCPCS

## 2020-02-26 PROCEDURE — 99000 SPECIMEN HANDLING OFFICE-LAB: CPT | Performed by: INTERNAL MEDICINE

## 2020-02-26 PROCEDURE — 80307 DRUG TEST PRSMV CHEM ANLYZR: CPT | Mod: ZL | Performed by: INTERNAL MEDICINE

## 2020-02-26 RX ORDER — ONDANSETRON 4 MG/1
TABLET, FILM COATED ORAL
Qty: 60 TABLET | Refills: 0 | Status: SHIPPED | OUTPATIENT
Start: 2020-02-26 | End: 2020-03-10

## 2020-02-26 ASSESSMENT — MIFFLIN-ST. JEOR: SCORE: 1102.87

## 2020-02-26 ASSESSMENT — PAIN SCALES - GENERAL: PAINLEVEL: NO PAIN (0)

## 2020-02-26 NOTE — PATIENT INSTRUCTIONS
In the beginning of June and every 8 weeks, reduce your Norco tablets to one per day for three day in order to de inhibit your bowel movements.

## 2020-02-26 NOTE — NURSING NOTE
"Chief Complaint   Patient presents with     chronic pain       Initial /70 (BP Location: Right arm, Patient Position: Sitting, Cuff Size: Adult Regular)   Pulse 109   Temp 97.3  F (36.3  C) (Tympanic)   Resp 18   Ht 1.575 m (5' 2\")   Wt 60.8 kg (134 lb)   SpO2 98%   BMI 24.51 kg/m   Estimated body mass index is 24.51 kg/m  as calculated from the following:    Height as of this encounter: 1.575 m (5' 2\").    Weight as of this encounter: 60.8 kg (134 lb).  Medication Reconciliation: complete  Vanita Santo LPN  "

## 2020-02-27 ENCOUNTER — TELEPHONE (OUTPATIENT)
Dept: PEDIATRICS | Facility: OTHER | Age: 70
End: 2020-02-27

## 2020-02-27 RX ORDER — LEVOTHYROXINE SODIUM 75 UG/1
75 TABLET ORAL DAILY
Qty: 90 TABLET | Refills: 3 | Status: SHIPPED | OUTPATIENT
Start: 2020-02-27 | End: 2021-06-08

## 2020-02-28 LAB — DEPRECATED CALCIDIOL+CALCIFEROL SERPL-MC: 36 UG/L (ref 20–75)

## 2020-03-02 DIAGNOSIS — R11.0 NAUSEA: ICD-10-CM

## 2020-03-02 LAB — PAIN DRUG SCR UR W RPTD MEDS: NORMAL

## 2020-03-03 RX ORDER — ONDANSETRON 4 MG/1
TABLET, FILM COATED ORAL
Qty: 60 TABLET | Refills: 0 | OUTPATIENT
Start: 2020-03-03

## 2020-03-05 DIAGNOSIS — M25.562 ARTHRALGIA OF BOTH KNEES: ICD-10-CM

## 2020-03-05 DIAGNOSIS — R11.0 NAUSEA: ICD-10-CM

## 2020-03-05 DIAGNOSIS — G47.00 PERSISTENT INSOMNIA: ICD-10-CM

## 2020-03-05 DIAGNOSIS — M54.2 CERVICAL PAIN: ICD-10-CM

## 2020-03-05 DIAGNOSIS — M25.561 ARTHRALGIA OF BOTH KNEES: ICD-10-CM

## 2020-03-05 RX ORDER — CHOLECALCIFEROL (VITAMIN D3) 50 MCG
4000 TABLET ORAL DAILY
Qty: 180 TABLET | Refills: 3 | Status: SHIPPED | OUTPATIENT
Start: 2020-03-05 | End: 2021-04-05

## 2020-03-06 DIAGNOSIS — M25.561 ARTHRALGIA OF BOTH KNEES: ICD-10-CM

## 2020-03-06 DIAGNOSIS — M25.562 ARTHRALGIA OF BOTH KNEES: ICD-10-CM

## 2020-03-06 DIAGNOSIS — G47.00 PERSISTENT INSOMNIA: ICD-10-CM

## 2020-03-06 DIAGNOSIS — M54.2 CERVICAL PAIN: ICD-10-CM

## 2020-03-06 RX ORDER — ZOLPIDEM TARTRATE 10 MG/1
TABLET ORAL
Qty: 60 TABLET | Refills: 0 | Status: SHIPPED | OUTPATIENT
Start: 2020-03-06 | End: 2020-04-03

## 2020-03-06 RX ORDER — HYDROCODONE BITARTRATE AND ACETAMINOPHEN 7.5; 325 MG/1; MG/1
1 TABLET ORAL EVERY 6 HOURS PRN
Qty: 120 TABLET | Refills: 0 | Status: SHIPPED | OUTPATIENT
Start: 2020-03-06 | End: 2020-04-03

## 2020-03-06 NOTE — TELEPHONE ENCOUNTER
Norco    Last Written Prescription Date:    Last Fill Quantity: 120,   # refills: 0  Last Office Visit: 2.26.2020    Future Office visit:    Next 5 appointments (look out 90 days)    May 27, 2020  3:40 PM CDT  (Arrive by 3:25 PM)  SHORT with Quinn Cruz DO  [facility] [place] ([facility] [place] ) [address]  [place] [address]  [phone]           Routing refill request to provider for review/approval because:  Drug not on the [facility], [facility] or [facility] refill protocol or controlled substance    Ambien     Last Written Prescription Date:  2.10.2020  Last Fill Quantity: 60,   # refills: 0  Last Office Visit: 2.26.2020  Future Office visit:    Next 5 appointments (look out 90 days)    May 27, 2020  3:40 PM CDT  (Arrive by 3:25 PM)  SHORT with Quinn Cruz DO  [facility] [place] ([facility] [place] ) [address]  [place] [address]  [phone]           Routing refill request to provider for review/approval because:  Drug not on the [facility], [facility] or [facility] refill protocol or controlled substance      She is leaving on a trip this Sunday per patient.      Wen Baker RN

## 2020-03-10 RX ORDER — ZOLPIDEM TARTRATE 10 MG/1
TABLET ORAL
Qty: 60 TABLET | Refills: 0 | OUTPATIENT
Start: 2020-03-10

## 2020-03-10 RX ORDER — ONDANSETRON 4 MG/1
TABLET, FILM COATED ORAL
Qty: 60 TABLET | Refills: 4 | Status: SHIPPED | OUTPATIENT
Start: 2020-03-10 | End: 2020-05-11

## 2020-03-10 RX ORDER — HYDROCODONE BITARTRATE AND ACETAMINOPHEN 7.5; 325 MG/1; MG/1
1 TABLET ORAL EVERY 6 HOURS PRN
Qty: 120 TABLET | Refills: 0 | OUTPATIENT
Start: 2020-03-10

## 2020-03-10 NOTE — TELEPHONE ENCOUNTER
hydrocodone      Last Written Prescription Date:  3/6/20  Last Fill Quantity: 120,   # refills: 0  Last Office Visit: 2/26/20  Future Office visit:    Next 5 appointments (look out 90 days)    May 27, 2020  3:40 PM CDT  (Arrive by 3:25 PM)  SHORT with Quinn Cruz DO  [facility] [place] ([facility] [place] ) [address]  [place] [address]  [phone]           ambien      Last Written Prescription Date:  3/6/20  Last Fill Quantity: 60,   # refills: 0  Last Office Visit: 2/26/20  Future Office visit:    Next 5 appointments (look out 90 days)    May 27, 2020  3:40 PM CDT  (Arrive by 3:25 PM)  SHORT with Quinn Cruz DO  [facility] [place] ([facility] [place] ) [address]  [place] [address]  [phone]           zolfran      Last Written Prescription Date:  2/26/20  Last Fill Quantity: 60,   # refills: 0  Last Office Visit: 2/26/20  Future Office visit:    Next 5 appointments (look out 90 days)    May 27, 2020  3:40 PM CDT  (Arrive by 3:25 PM)  SHORT with Quinn Cruz DO  [facility] [place] ([facility] [place] ) [address]  [place] [address]  [phone]

## 2020-03-16 DIAGNOSIS — K31.84 NONDIABETIC GASTROPARESIS: ICD-10-CM

## 2020-03-16 DIAGNOSIS — R10.13 EPIGASTRIC PAIN: ICD-10-CM

## 2020-03-16 DIAGNOSIS — Z01.818 PREOP GENERAL PHYSICAL EXAM: ICD-10-CM

## 2020-03-17 NOTE — TELEPHONE ENCOUNTER
reglan      Last Written Prescription Date:  12/2/19  Last Fill Quantity: 90,   # refills: 1  Last Office Visit: 2/26/20  Future Office visit:    Next 5 appointments (look out 90 days)    May 27, 2020  3:40 PM CDT  (Arrive by 3:25 PM)  SHORT with Quinn Cruz DO  [facility] [place] ([facility] [place] ) [address]  [place] [address]  [phone]           bentyl      Last Written Prescription Date:  Not on medication list  Last Fill Quantity: ,   # refills:   Last Office Visit: 2/26/20  Future Office visit:    Next 5 appointments (look out 90 days)    May 27, 2020  3:40 PM CDT  (Arrive by 3:25 PM)  [facility] [place] [facility] [place] [facility] [address]  [place] [address]  [phone]

## 2020-03-18 RX ORDER — DICYCLOMINE HYDROCHLORIDE 10 MG/1
CAPSULE ORAL
Qty: 120 CAPSULE | Refills: 3 | Status: SHIPPED | OUTPATIENT
Start: 2020-03-18 | End: 2020-06-19

## 2020-03-18 RX ORDER — METOCLOPRAMIDE 5 MG/1
5 TABLET ORAL
Qty: 90 TABLET | Refills: 1 | Status: SHIPPED | OUTPATIENT
Start: 2020-03-18 | End: 2020-05-11

## 2020-03-26 DIAGNOSIS — B02.23 POSTHERPETIC POLYNEUROPATHY: ICD-10-CM

## 2020-03-26 RX ORDER — GABAPENTIN 300 MG/1
CAPSULE ORAL
Qty: 90 CAPSULE | Refills: 1 | Status: SHIPPED | OUTPATIENT
Start: 2020-03-26 | End: 2020-05-11

## 2020-03-26 NOTE — TELEPHONE ENCOUNTER
Gabapentin      Last Written Prescription Date:  2/7/20  Last Fill Quantity: 90,   # refills: 1  Last Office Visit: 2/26/20  Future Office visit:    Next 5 appointments (look out 90 days)    May 27, 2020  3:40 PM CDT  (Arrive by 3:25 PM)  SHORT with Quinn Cruz DO  [facility] [place] ([facility] [place] ) [address]  [place] [address]  [phone]           Routing refill request to provider for review/approval because:  Drug not on the [facility], [facility] or [facility] refill protocol or controlled substance

## 2020-04-03 DIAGNOSIS — M25.562 ARTHRALGIA OF BOTH KNEES: ICD-10-CM

## 2020-04-03 DIAGNOSIS — M54.2 CERVICAL PAIN: ICD-10-CM

## 2020-04-03 DIAGNOSIS — G47.00 PERSISTENT INSOMNIA: ICD-10-CM

## 2020-04-03 DIAGNOSIS — M25.561 ARTHRALGIA OF BOTH KNEES: ICD-10-CM

## 2020-04-03 RX ORDER — HYDROCODONE BITARTRATE AND ACETAMINOPHEN 7.5; 325 MG/1; MG/1
1 TABLET ORAL EVERY 6 HOURS PRN
Qty: 120 TABLET | Refills: 0 | Status: SHIPPED | OUTPATIENT
Start: 2020-04-03 | End: 2020-04-27

## 2020-04-03 RX ORDER — ZOLPIDEM TARTRATE 10 MG/1
TABLET ORAL
Qty: 90 TABLET | Refills: 0 | Status: SHIPPED | OUTPATIENT
Start: 2020-04-03 | End: 2020-05-12

## 2020-04-03 NOTE — TELEPHONE ENCOUNTER
hydrocodone      Last Written Prescription Date:  03/06/2020  Last Fill Quantity: 120,   # refills: 0  Last Office Visit: 02/26/2020    ambien      Last Written Prescription Date:  03/06/2020  Last Fill Quantity: 60,   # refills: 0  Last Office Visit: 02/26/2020

## 2020-04-07 DIAGNOSIS — F34.1 DYSTHYMIA: ICD-10-CM

## 2020-04-07 NOTE — TELEPHONE ENCOUNTER
CHAPITO from patient reporting an increase in anxiety with the current pandemic and the patient had her teeth pulled this month, dentist office closed, unable to get in to get the dentures now.     Patient reporting she has increased her Lexapro from 1/2 tab to 1 tab twice daily to help get through the anxiety experiencing.     In need of a refill now only 2 left.     Patient also inquiring if the directions be changed as well so the insurance company will cover.     Patient requesting a return call with a update as the pharmacy is unable to fill at this time due to the insurance company denying refill with the current directions of 1/ 2 tablet twice daily.     Please advice.

## 2020-04-08 RX ORDER — ESCITALOPRAM OXALATE 20 MG/1
TABLET ORAL
Qty: 90 TABLET | Refills: 2 | Status: SHIPPED | OUTPATIENT
Start: 2020-04-08 | End: 2020-04-09

## 2020-04-09 DIAGNOSIS — F34.1 DYSTHYMIA: ICD-10-CM

## 2020-04-09 RX ORDER — ESCITALOPRAM OXALATE 20 MG/1
TABLET ORAL
Qty: 90 TABLET | Refills: 2 | Status: SHIPPED | OUTPATIENT
Start: 2020-04-09 | End: 2020-04-17

## 2020-04-09 NOTE — TELEPHONE ENCOUNTER
Patient called stating she is taking one tablet BID at this time and would like the prescription to change to that so she can reorder her refills when she runs out.  The current script is take half a tablet BID    escitalopram (LEXAPRO) 20 MG tablet       Last Written Prescription Date:  4/8/20  Last Fill Quantity: 90,   # refills: 2  Last Office Visit: 2/26/20  Future Office visit:    Next 5 appointments (look out 90 days)    May 27, 2020  3:40 PM CDT  (Arrive by 3:25 PM)  SHORT with Quinn Cruz,   [facility] [place] ([facility] [place] ) [address]  [place] [address]  [phone]           Routing refill request to provider for review/approval because:  Drug not on the [facility], [facility] or [facility] refill protocol or controlled substance

## 2020-04-17 DIAGNOSIS — F34.1 DYSTHYMIA: ICD-10-CM

## 2020-04-17 RX ORDER — ESCITALOPRAM OXALATE 20 MG/1
TABLET ORAL
Qty: 90 TABLET | Refills: 2 | Status: SHIPPED | OUTPATIENT
Start: 2020-04-17 | End: 2021-01-11

## 2020-04-17 NOTE — TELEPHONE ENCOUNTER
Patient called stating she would like to increase her lexapro to one tablet 20mg BID.  Patient would like directions changed on the script so she doesn't run out.          escitalopram (LEXAPRO) 20 MG tablet Last Written Prescription Date:  4/9/20  Last Fill Quantity: 90,   # refills: 2  Last Office Visit: 2/26/20  Future Office visit:    Next 5 appointments (look out 90 days)    May 27, 2020  3:40 PM CDT  (Arrive by 3:25 PM)  SHORT with Quinn Cruz DO  [facility] [place] ([facility] [place] ) [address]  [place] [address]  [phone]           Routing refill request to provider for review/approval because:  Drug not on the [facility], [facility] or [facility] refill protocol or controlled substance

## 2020-04-27 DIAGNOSIS — M54.2 CERVICAL PAIN: ICD-10-CM

## 2020-04-27 DIAGNOSIS — M25.562 ARTHRALGIA OF BOTH KNEES: ICD-10-CM

## 2020-04-27 DIAGNOSIS — M25.561 ARTHRALGIA OF BOTH KNEES: ICD-10-CM

## 2020-04-27 RX ORDER — HYDROCODONE BITARTRATE AND ACETAMINOPHEN 7.5; 325 MG/1; MG/1
1 TABLET ORAL EVERY 6 HOURS PRN
Qty: 120 TABLET | Refills: 0 | Status: SHIPPED | OUTPATIENT
Start: 2020-04-27 | End: 2020-06-02

## 2020-04-27 NOTE — TELEPHONE ENCOUNTER
Hydrocodone-apap      Last Written Prescription Date:  4/3/20  Last Fill Quantity: 120,   # refills: 0  Last Office Visit: 2/26/20  Future Office visit:    Next 5 appointments (look out 90 days)    May 27, 2020  3:40 PM CDT  Telephone Visit with Quinn Cruz DO  [facility] [place] ([facility] [place] ) [address]  [place] [address]  [phone]           Routing refill request to provider for review/approval because:  Drug not on the [facility], [facility] or [facility] refill protocol or controlled substance

## 2020-04-29 DIAGNOSIS — M54.2 CERVICALGIA: ICD-10-CM

## 2020-04-29 RX ORDER — CYCLOBENZAPRINE HCL 10 MG
TABLET ORAL
Qty: 90 TABLET | Refills: 1 | Status: SHIPPED | OUTPATIENT
Start: 2020-04-29 | End: 2020-06-15

## 2020-04-29 NOTE — TELEPHONE ENCOUNTER
cyclobenzaprine (FLEXERIL) 10 MG tablet         Last Written Prescription Date:  12/5/19  Last Fill Quantity: 90,   # refills: 1  Last Office Visit: 2/26/20  Future Office visit:    Next 5 appointments (look out 90 days)    May 27, 2020  3:40 PM CDT  Telephone Visit with Quinn Cruz DO  [facility] [place] ([facility] [place] ) [address]  [place] [address]  [phone]           Routing refill request to provider for review/approval because:    Drug not on the [facility], [facility] or [facility] refill protocol or controlled substance

## 2020-05-12 DIAGNOSIS — G47.00 PERSISTENT INSOMNIA: ICD-10-CM

## 2020-05-12 RX ORDER — ZOLPIDEM TARTRATE 10 MG/1
TABLET ORAL
Qty: 90 TABLET | Refills: 0 | Status: SHIPPED | OUTPATIENT
Start: 2020-05-12 | End: 2020-06-08

## 2020-05-12 NOTE — TELEPHONE ENCOUNTER
zolpidem (AMBIEN) 10 MG tablet      Last Written Prescription Date:  4/3/20  Last Fill Quantity: 90,   # refills: 0  Last Office Visit: 2/26/20  Future Office visit:    Next 5 appointments (look out 90 days)    May 27, 2020  3:40 PM CDT  Telephone Visit with Quinn Cruz DO  [facility] [place] ([facility] [place] ) [address]  [place] [address]  [phone]           Routing refill request to provider for review/approval because:  Drug not on the [facility], [facility] or [facility] refill protocol or controlled substance

## 2020-05-19 DIAGNOSIS — M54.2 CERVICALGIA: ICD-10-CM

## 2020-05-19 RX ORDER — CYCLOBENZAPRINE HCL 10 MG
TABLET ORAL
Qty: 90 TABLET | Refills: 1 | OUTPATIENT
Start: 2020-05-19

## 2020-05-19 NOTE — TELEPHONE ENCOUNTER
cyclobenzaprine      Last Written Prescription Date:  04/29/2020  Last Fill Quantity: 90,   # refills: 1  Last Office Visit: 02/26/2020  Future Office visit:    Next 5 appointments (look out 90 days)    May 27, 2020  3:40 PM CDT  Telephone Visit with Quinn Cruz DO  [facility] [place] ([facility] [place] ) [address]  [place] [address]  [phone]

## 2020-05-21 DIAGNOSIS — M54.2 CERVICALGIA: ICD-10-CM

## 2020-05-21 NOTE — TELEPHONE ENCOUNTER
cyclobenzaprine      Last Written Prescription Date:  04/29/2020  Last Fill Quantity: 90,   # refills: 1  Last Office Visit: 02/26/2020  Future Office visit:    Next 5 appointments (look out 90 days)    May 27, 2020  3:40 PM CDT  Telephone Visit with Quinn Cruz DO  [facility] [place] ([facility] [place] ) [address]  [place] [address]  [phone]

## 2020-05-22 RX ORDER — CYCLOBENZAPRINE HCL 10 MG
TABLET ORAL
Qty: 90 TABLET | Refills: 1 | OUTPATIENT
Start: 2020-05-22

## 2020-05-25 DIAGNOSIS — Z00.00 HEALTH CARE MAINTENANCE: ICD-10-CM

## 2020-05-27 ENCOUNTER — VIRTUAL VISIT (OUTPATIENT)
Dept: INTERNAL MEDICINE | Facility: OTHER | Age: 70
End: 2020-05-27
Attending: INTERNAL MEDICINE
Payer: COMMERCIAL

## 2020-05-27 DIAGNOSIS — R10.13 EPIGASTRIC PAIN: ICD-10-CM

## 2020-05-27 DIAGNOSIS — F34.1 DYSTHYMIA: ICD-10-CM

## 2020-05-27 DIAGNOSIS — K31.84 NONDIABETIC GASTROPARESIS: Primary | ICD-10-CM

## 2020-05-27 DIAGNOSIS — G47.00 PERSISTENT INSOMNIA: ICD-10-CM

## 2020-05-27 PROCEDURE — 99214 OFFICE O/P EST MOD 30 MIN: CPT | Mod: TEL | Performed by: INTERNAL MEDICINE

## 2020-05-27 RX ORDER — ARIPIPRAZOLE 2 MG/1
2 TABLET ORAL DAILY
Qty: 90 TABLET | Refills: 1 | Status: SHIPPED | OUTPATIENT
Start: 2020-05-27 | End: 2020-11-24

## 2020-05-27 ASSESSMENT — PAIN SCALES - GENERAL: PAINLEVEL: MILD PAIN (3)

## 2020-05-27 NOTE — NURSING NOTE
"No chief complaint on file.      Initial There were no vitals taken for this visit. Estimated body mass index is 24.78 kg/m  as calculated from the following:    Height as of 2/26/20: 1.575 m (5' 2\").    Weight as of 2/26/20: 61.5 kg (135 lb 8 oz).  Medication Reconciliation: complete  Hay Hyatt LPN    "

## 2020-05-27 NOTE — PROGRESS NOTES
"Aure Lanier is a 70 year old female who is being evaluated via a billable telephone visit.      The patient has been notified of following:     \"This telephone visit will be conducted via a call between you and your physician/provider. We have found that certain health care needs can be provided without the need for a physical exam.  This service lets us provide the care you need with a short phone conversation.  If a prescription is necessary we can send it directly to your pharmacy.  If lab work is needed we can place an order for that and you can then stop by our lab to have the test done at a later time.    Telephone visits are billed at different rates depending on your insurance coverage. During this emergency period, for some insurers they may be billed the same as an in-person visit.  Please reach out to your insurance provider with any questions.    If during the course of the call the physician/provider feels a telephone visit is not appropriate, you will not be charged for this service.\"    Patient has given verbal consent for Telephone visit?  Yes    What phone number would you like to be contacted at? [phone]    How would you like to obtain your AVS? Mail a copy    Subjective     Aure Lanier is a 70 year old female who presents via phone visit today for the following health issues:    HPI  Abdominal blockage follow up      Duration: Surgery done a year ago    Description (location/character/radiation): n/a    Intensity:  moderate    Accompanying signs and symptoms: none    History (similar episodes/previous evaluation): None    Precipitating or alleviating factors: None    Therapies tried and outcome: None     She has a history of gastric bypass surgery.  She has most recently been placed on Reglan.  She has daily bowel movements are formed to loose.  She denies any blood in her bowel movements.  When she gets her abdominal pain it is sharp in the upper abdomen.   The Reglan has improved " her bowel movement frequency.  She is also on Bentyl which has reduced her pain.      Depression and Anxiety Follow-Up    How are you doing with your depression since your last visit? Worsened due to COVID-19     How are you doing with your anxiety since your last visit?  Worsened     Are you having other symptoms that might be associated with depression or anxiety? No    Have you had a significant life event?  None    Do you have any concerns with your use of alcohol or other drugs? No    Social History     Tobacco Use     Smoking status: Never Smoker     Smokeless tobacco: Never Used     Tobacco comment: no passive exposure   Substance Use Topics     Alcohol use: Yes     Comment: rarely, maybe yearly     Drug use: No     PHQ 4/9/2018 8/27/2018 9/21/2018   PHQ-9 Total Score 0 2 3   Q9: Thoughts of better off dead/self-harm past 2 weeks Not at all Not at all Not at all     AURELIA-7 SCORE 4/9/2018 8/27/2018 9/21/2018   Total Score 0 2 1           Suicide Assessment Five-step Evaluation and Treatment (SAFE-T)      Patient Active Problem List   Diagnosis     Allergic arthritis involving hand     Hypothyroidism     Insomnia     Headache     Postherpetic polyneuropathy     Dysthymia     Anxiety state     Hyperlipidemia     Migraine     Osteoporosis     GERD     Low back pain     Hyperlipidemia with target LDL less than 100     Bilateral chronic knee pain     Back muscle spasm     Chronic, continuous use of opioids     Status post total left knee replacement     S/P total knee arthroplasty, right     Status post total right knee replacement     Family history of other musculoskeletal diseases(V17.89)     Hypokalemia     Anemia, iron deficiency     History of Billroth I operation     Phytobezoar     Gastric outlet obstruction     Angular cheilitis     Status post bypass gastrojejunostomy     Epigastric abdominal pain     Past Surgical History:   Procedure Laterality Date     ARTHROPLASTY KNEE Left 4/17/2018    Procedure:  ARTHROPLASTY KNEE;  LEFT TOTAL KNEE ARTHROPLASTY S/N JOURNEY II;  Surgeon: Ty Hernandez MD;  [facility]     ARTHROPLASTY KNEE Right 9/4/2018    Procedure: ARTHROPLASTY KNEE;  RIGHT TOTAL KNEE ARTHROPLASTY ;  Surgeon: Ty Hernandez MD;  [facility]     CHOLECYSTECTOMY  03/27/2019    and lysis adhesions.       D & C       ENDOSCOPY       ESOPHAGOSCOPY, GASTROSCOPY, DUODENOSCOPY (EGD), COMBINED N/A 9/11/2017    Procedure: COMBINED ESOPHAGOSCOPY, GASTROSCOPY, DUODENOSCOPY (EGD);  Upper Endoscopy: Removal of Food Impaction;  Surgeon: Lino Zhu DO;  Location: [facility]     ESOPHAGOSCOPY, GASTROSCOPY, DUODENOSCOPY (EGD), COMBINED N/A 11/5/2018    Procedure: UPPER ENDOSCOPY WITH BIOPSY;  Surgeon: Dieudonne Jackson MD;  [facility]     ESOPHAGOSCOPY, GASTROSCOPY, DUODENOSCOPY (EGD), COMBINED N/A 12/21/2018    Procedure: UPPER ENDOSCOPY WITH BALLOON DILATION, BIOPSY;  Surgeon: Dieudonne Jackson MD;  [facility]     ESOPHAGOSCOPY, GASTROSCOPY, DUODENOSCOPY (EGD), COMBINED N/A 1/14/2019    Procedure: UPPER ENDOSCOPY WITH ENDOSCOPIC PLACEMENT OF OG AND GASTRIC LAVAGE;  Surgeon: Dieudonne Jackson MD;  Location: [facility]     partial gastrectomy  03/2019     right total knee replacement  01/2019     MARAH EN Y BOWEL  1980    Due to severely ulcerated stomach body.     STOMACH SURGERY  03/27/2019    Restructuring of stomach and Colecuystectomy.     stomach surgery peritinitis         Social History     Tobacco Use     Smoking status: Never Smoker     Smokeless tobacco: Never Used     Tobacco comment: no passive exposure   Substance Use Topics     Alcohol use: Yes     Comment: rarely, maybe yearly     Family History   Problem Relation Age of Onset     Cerebrovascular Disease Mother         CVA     Hypertension Mother      Other - See Comments Father 40        mining accident; cause of death     Other - See Comments Brother         muscle dystrophy     Cancer Daughter 34        skin cancer     Melanoma  Daughter          Current Outpatient Medications   Medication Sig Dispense Refill     acetaminophen (TYLENOL) 325 MG tablet Take 2-3 tablets (650-975 mg) by mouth every 4 hours as needed for other (mild to moderate pain) Limit total to 3800mg in 24 hours. 100 tablet 0     calcium carb-cholecalciferol (CALCIUM PLUS VITAMIN D3) 600-500 MG-UNIT CAPS Take 1 capsule by mouth 2 times daily 180 capsule 3     ciprofloxacin (CILOXAN) 0.3 % ophthalmic solution Place 2 drops into both eyes 3 times daily as needed (for conjucntivitis) 10 mL 2     cyanocolbalamin (VITAMIN  B-12) 1000 MCG tablet Take 1 tablet by mouth daily as needed        cyclobenzaprine (FLEXERIL) 10 MG tablet TAKE 1 TABLET BY MOUTH THREE TIMES DAILY AS NEEDED FOR MUSCLE SPASMS 90 tablet 1     dicyclomine (BENTYL) 10 MG capsule TAKE 1 CAPSULE BY MOUTH FOUR TIMES DAILY (BEFORE MEALS AND NIGHTLY) 120 capsule 3     escitalopram (LEXAPRO) 20 MG tablet TAKE ONE-HALF TABLET BY MOUTH TWICE A DAY 90 tablet 2     gabapentin (NEURONTIN) 300 MG capsule TAKE 1 CAPSULE BY MOUTH THREE TIMES DAILY 90 capsule 3     HYDROcodone-acetaminophen (NORCO) 7.5-325 MG per tablet TAKE 1 TABLET BY MOUTH EVERY 6 HOURS AS NEEDED FOR SEVERE PAIN 120 tablet 0     levothyroxine (SYNTHROID/LEVOTHROID) 75 MCG tablet Take 1 tablet (75 mcg) by mouth daily 90 tablet 3     metoclopramide (REGLAN) 5 MG tablet TAKE 1 TABLET (5 MG) BY MOUTH 3 TIMES DAILY (BEFORE MEALS) 90 tablet 1     multivitamin (OCUVITE) TABS Take 1 tablet by mouth daily as needed        ondansetron (ZOFRAN) 4 MG tablet TAKE 2 TABLETS (8MG) BY MOUTH EVERY 8 HOURS AS NEEDED FOR NAUSEA 60 tablet 4     promethazine (PHENERGAN) 25 MG tablet TAKE 1 TABLET BY MOUTH UP TO THREE TIMES DAILY AS NEEDED FOR NAUSEA/VOMITING. 90 tablet 0     simvastatin (ZOCOR) 40 MG tablet TAKE 1 TABLET BY MOUTH DAILY IN THE EVENING . GENERIC FOR ZOCOR. 90 tablet 0     vitamin D3 (CHOLECALCIFEROL) 2000 units (50 mcg) tablet Take 2 tablets (4,000 Units) by  mouth daily 180 tablet 3     zolpidem (AMBIEN) 10 MG tablet TAKE 1 TABLET BY MOUTH NIGHTLY AS NEEDED FOR SLEEP MAY REPEAT ONCE. 90 tablet 0     acyclovir (ZOVIRAX) 5 % external cream Apply topically 3 times daily as needed (oral herpes like lesion) (Patient not taking: Reported on 5/27/2020) 5 g 3       Reviewed and updated as needed this visit by Provider         Review of Systems   Constitutional, HEENT, cardiovascular, pulmonary, gi and gu systems are negative, except as otherwise noted.       Objective   Reported vitals:  There were no vitals taken for this visit.     PSYCH: Alert and oriented times 3; coherent speech, normal   rate and volume, able to articulate logical thoughts, able   to abstract reason, no tangential thoughts, no hallucinations   or delusions  Her affect is normal  RESP: No cough, no audible wheezing, able to talk in full sentences  Remainder of exam unable to be completed due to telephone visits    Diagnostic Test Results:  Labs reviewed in Epic  none         Assessment/Plan:  (K31.84) Nondiabetic gastroparesis  (primary encounter diagnosis)  Comment:   Plan:   She will continue Reglan and Bentyl.    (R10.13) Epigastric pain  Comment: Improved with Reglan.  Plan:   She will continue Reglan 5 mg TID with reduced dosing starting later this month.    (G47.00) Persistent insomnia  Comment: Stable   Plan:   She will continue Ambien 10 mg nightly     (F34.1) Dysthymia  Comment: She feels incomplete control of her depression on Lexapro 20 mg daily   Plan:   Add ARIPiprazole (ABILIFY) 2 MG tablet ( low dose) and continue Lexapro 20 mg.        Follow up with Provider - 3 weeks for depression              No follow-ups on file.      Service Time  Start time : 1610  Stop Time: 1635    Phone call duration:  25 minutes    Quinn Cruz DO, DO

## 2020-05-28 ENCOUNTER — TELEPHONE (OUTPATIENT)
Dept: PEDIATRICS | Facility: OTHER | Age: 70
End: 2020-05-28

## 2020-05-28 RX ORDER — FOLIC ACID 1 MG/1
TABLET ORAL
Qty: 90 TABLET | Refills: 1 | Status: SHIPPED | OUTPATIENT
Start: 2020-05-28 | End: 2021-08-09

## 2020-05-28 NOTE — TELEPHONE ENCOUNTER
Called pt 5/28/20 LM abt appt scheduled Friday 6/19/20 10am arrive 9:45am Dr Cruz for med review  Mame Prescott

## 2020-05-28 NOTE — TELEPHONE ENCOUNTER
Folic acid      Last Written Prescription Date:  Not on list  Last Fill Quantity: -,   # refills: -  Last Office Visit: 5/27/20 virtual  Future Office visit:       Routing refill request to provider for review/approval because:  Drug not active on patient's medication list    The original prescription was discontinued on 11/3/2018 by Amanda Weiner RN. Renewing this prescription may not be appropriate.

## 2020-05-28 NOTE — TELEPHONE ENCOUNTER
Joi from [facility] calling and there is contradiction warning with use of Abilify and Reglan. High warning. Can cause EPS symptoms.    Please advise.    Call back pharmacist at [phone]    Wen Baker RN

## 2020-05-31 DIAGNOSIS — M54.2 CERVICAL PAIN: ICD-10-CM

## 2020-05-31 DIAGNOSIS — M25.561 ARTHRALGIA OF BOTH KNEES: ICD-10-CM

## 2020-05-31 DIAGNOSIS — M25.562 ARTHRALGIA OF BOTH KNEES: ICD-10-CM

## 2020-06-01 DIAGNOSIS — R21 RASH: ICD-10-CM

## 2020-06-02 RX ORDER — NYSTATIN 100000 [USP'U]/G
POWDER TOPICAL
Qty: 60 G | Refills: 1 | Status: SHIPPED | OUTPATIENT
Start: 2020-06-02 | End: 2021-06-29

## 2020-06-02 RX ORDER — HYDROCODONE BITARTRATE AND ACETAMINOPHEN 7.5; 325 MG/1; MG/1
1 TABLET ORAL EVERY 6 HOURS PRN
Qty: 120 TABLET | Refills: 0 | Status: SHIPPED | OUTPATIENT
Start: 2020-06-02 | End: 2020-06-30

## 2020-06-02 NOTE — TELEPHONE ENCOUNTER
Nystatin powder    Last Written Prescription Date:  Not on list  Last Fill Quantity: -,   # refills: -  Last Office Visit: 5/27/20 virtual  Future Office visit:    Next 5 appointments (look out 90 days)    Jun 19, 2020  4:20 PM CDT  (Arrive by 4:05 PM)  SHORT with Quinn Cruz DO  [facility] [place] [facility] [place] [facility] [address]  [place] [address]  [phone]           Routing refill request to provider for review/approval because:  Drug not active on patient's medication list    The original prescription was discontinued on 10/23/2019 by Jeb Wen LPN for the following reason: Therapy completed. Renewing this prescription may not be appropriate.

## 2020-06-02 NOTE — TELEPHONE ENCOUNTER
hydrocodone      Last Written Prescription Date:  4/27/20  Last Fill Quantity: 120,   # refills: 0  Last Office Visit: 5/27/20  Future Office visit:    Next 5 appointments (look out 90 days)    Jun 19, 2020  4:20 PM CDT  (Arrive by 4:05 PM)  SHORT with Quinn Cruz DO  [facility] [place] ([facility] [place] ) [address]  [place] [address]  [phone]           Routing refill request to provider for review/approval because:  Drug not on the [facility], [facility] or [facility] refill protocol or controlled substance

## 2020-06-07 DIAGNOSIS — Z01.818 PREOP GENERAL PHYSICAL EXAM: ICD-10-CM

## 2020-06-07 DIAGNOSIS — G47.00 PERSISTENT INSOMNIA: ICD-10-CM

## 2020-06-08 RX ORDER — PROMETHAZINE HYDROCHLORIDE 25 MG/1
TABLET ORAL
Qty: 90 TABLET | Refills: 0 | Status: SHIPPED | OUTPATIENT
Start: 2020-06-08 | End: 2020-07-06

## 2020-06-08 NOTE — TELEPHONE ENCOUNTER
Zolpidem 10mg      Last Written Prescription Date:  5/12/2020  Last Fill Quantity: 90,   # refills: 0  Last Office Visit: 5/27/2020 virtual  Future Office visit:    Next 5 appointments (look out 90 days)    Jun 19, 2020  4:20 PM CDT  (Arrive by 4:05 PM)  SHORT with Quinn Cruz DO  [facility] [place] ([facility] [place] ) [address]  [place] [address]  [phone]           Routing refill request to provider for review/approval because:  Drug not on the [facility], [facility] or [facility] refill protocol or controlled substance

## 2020-06-09 RX ORDER — ZOLPIDEM TARTRATE 10 MG/1
TABLET ORAL
Qty: 90 TABLET | Refills: 0 | Status: SHIPPED | OUTPATIENT
Start: 2020-06-09 | End: 2020-07-16

## 2020-06-15 DIAGNOSIS — M54.2 CERVICALGIA: ICD-10-CM

## 2020-06-15 NOTE — TELEPHONE ENCOUNTER
cyclobenzaprine (FLEXERIL) 10 MG tablet         Last Written Prescription Date:  4/29/20  Last Fill Quantity: 90,   # refills: 1  Last Office Visit: 5/27/20 Virtual Visit   Future Office visit:       Routing refill request to provider for review/approval because:    Drug not on the [facility], [facility] or [facility] refill protocol or controlled substance

## 2020-06-16 RX ORDER — CYCLOBENZAPRINE HCL 10 MG
TABLET ORAL
Qty: 90 TABLET | Refills: 2 | Status: SHIPPED | OUTPATIENT
Start: 2020-06-16 | End: 2020-09-03

## 2020-06-19 ENCOUNTER — VIRTUAL VISIT (OUTPATIENT)
Dept: PEDIATRICS | Facility: OTHER | Age: 70
End: 2020-06-19
Attending: INTERNAL MEDICINE
Payer: COMMERCIAL

## 2020-06-19 DIAGNOSIS — R60.0 BILATERAL LEG EDEMA: ICD-10-CM

## 2020-06-19 DIAGNOSIS — F34.1 DYSTHYMIA: Primary | ICD-10-CM

## 2020-06-19 PROCEDURE — 99213 OFFICE O/P EST LOW 20 MIN: CPT | Mod: TEL | Performed by: INTERNAL MEDICINE

## 2020-06-19 ASSESSMENT — ANXIETY QUESTIONNAIRES
3. WORRYING TOO MUCH ABOUT DIFFERENT THINGS: NOT AT ALL
GAD7 TOTAL SCORE: 1
6. BECOMING EASILY ANNOYED OR IRRITABLE: NOT AT ALL
2. NOT BEING ABLE TO STOP OR CONTROL WORRYING: NOT AT ALL
5. BEING SO RESTLESS THAT IT IS HARD TO SIT STILL: NOT AT ALL
1. FEELING NERVOUS, ANXIOUS, OR ON EDGE: SEVERAL DAYS
7. FEELING AFRAID AS IF SOMETHING AWFUL MIGHT HAPPEN: NOT AT ALL

## 2020-06-19 ASSESSMENT — PATIENT HEALTH QUESTIONNAIRE - PHQ9
5. POOR APPETITE OR OVEREATING: NOT AT ALL
SUM OF ALL RESPONSES TO PHQ QUESTIONS 1-9: 3

## 2020-06-19 ASSESSMENT — PAIN SCALES - GENERAL: PAINLEVEL: NO PAIN (0)

## 2020-06-19 NOTE — PROGRESS NOTES
"Aure Lanier is a 70 year old female who is being evaluated via a billable telephone visit.      The patient has been notified of following:     \"This telephone visit will be conducted via a call between you and your physician/provider. We have found that certain health care needs can be provided without the need for a physical exam.  This service lets us provide the care you need with a short phone conversation.  If a prescription is necessary we can send it directly to your pharmacy.  If lab work is needed we can place an order for that and you can then stop by our lab to have the test done at a later time.    Telephone visits are billed at different rates depending on your insurance coverage. During this emergency period, for some insurers they may be billed the same as an in-person visit.  Please reach out to your insurance provider with any questions.    If during the course of the call the physician/provider feels a telephone visit is not appropriate, you will not be charged for this service.\"    Patient has given verbal consent for Telephone visit?  Yes    What phone number would you like to be contacted at? [phone]    How would you like to obtain your AVS? MyChart    Subjective     Aure Lanier is a 70 year old female who presents via phone visit today for the following health issues:    HPI  Anxiety Follow-Up    How are you doing with your anxiety since your last visit? Improved     Are you having other symptoms that might be associated with anxiety? No    Have you had a significant life event? No     Are you feeling depressed? No    Do you have any concerns with your use of alcohol or other drugs? No    Social History     Tobacco Use     Smoking status: Never Smoker     Smokeless tobacco: Never Used     Tobacco comment: no passive exposure   Substance Use Topics     Alcohol use: Yes     Comment: rarely, maybe yearly     Drug use: No     AURELIA-7 SCORE 8/27/2018 9/21/2018 6/19/2020   Total Score 2 1 1 "     PHQ 8/27/2018 9/21/2018 6/19/2020   PHQ-9 Total Score 2 3 3   Q9: Thoughts of better off dead/self-harm past 2 weeks Not at all Not at all Not at all     Last PHQ-9 6/19/2020   1.  Little interest or pleasure in doing things 2   2.  Feeling down, depressed, or hopeless 1   3.  Trouble falling or staying asleep, or sleeping too much 0   4.  Feeling tired or having little energy 0   5.  Poor appetite or overeating 0   6.  Feeling bad about yourself 0   7.  Trouble concentrating 0   8.  Moving slowly or restless 0   Q9: Thoughts of better off dead/self-harm past 2 weeks 0   PHQ-9 Total Score 3   Difficulty at work, home, or with people -     AURELIA-7  6/19/2020   1. Feeling nervous, anxious, or on edge 1   2. Not being able to stop or control worrying 0   3. Worrying too much about different things 0   4. Trouble relaxing 0   5. Being so restless that it is hard to sit still 0   6. Becoming easily annoyed or irritable 0   7. Feeling afraid, as if something awful might happen 0   AURELIA-7 Total Score 1   If you checked any problems, how difficult have they made it for you to do your work, take care of things at home, or get along with other people? -         She has been feeling better and also has starting exercising in her sister's pool.  She feels that her legs and feet are more puffy which started within the past week.  She is eating well, but that has not changed.  She does eat some salty foods.  She denies any chest pains, shortness of breath, cough or wheezing.  She has no dizziness.    -------------------------------------    Patient Active Problem List   Diagnosis     Allergic arthritis involving hand     Hypothyroidism     Insomnia     Headache     Postherpetic polyneuropathy     Dysthymia     Anxiety state     Hyperlipidemia     Migraine     Osteoporosis     GERD     Low back pain     Hyperlipidemia with target LDL less than 100     Bilateral chronic knee pain     Back muscle spasm     Chronic, continuous use  of opioids     Status post total left knee replacement     S/P total knee arthroplasty, right     Status post total right knee replacement     Family history of other musculoskeletal diseases(V17.89)     Hypokalemia     Anemia, iron deficiency     History of Billroth I operation     Phytobezoar     Gastric outlet obstruction     Angular cheilitis     Status post bypass gastrojejunostomy     Epigastric abdominal pain     Past Surgical History:   Procedure Laterality Date     ARTHROPLASTY KNEE Left 4/17/2018    Procedure: ARTHROPLASTY KNEE;  LEFT TOTAL KNEE ARTHROPLASTY S/N JOURNEY II;  Surgeon: Ty Hernandez MD;  [facility]     ARTHROPLASTY KNEE Right 9/4/2018    Procedure: ARTHROPLASTY KNEE;  RIGHT TOTAL KNEE ARTHROPLASTY ;  Surgeon: Ty Hernandez MD;  [facility]     CHOLECYSTECTOMY  03/27/2019    and lysis adhesions.       D & C       ENDOSCOPY       ESOPHAGOSCOPY, GASTROSCOPY, DUODENOSCOPY (EGD), COMBINED N/A 9/11/2017    Procedure: COMBINED ESOPHAGOSCOPY, GASTROSCOPY, DUODENOSCOPY (EGD);  Upper Endoscopy: Removal of Food Impaction;  Surgeon: Lino Zhu DO;  [facility]: [facility]     ESOPHAGOSCOPY, GASTROSCOPY, DUODENOSCOPY (EGD), COMBINED N/A 11/5/2018    Procedure: UPPER ENDOSCOPY WITH BIOPSY;  Surgeon: Dieudonne Jackson MD;  [facility]     ESOPHAGOSCOPY, GASTROSCOPY, DUODENOSCOPY (EGD), COMBINED N/A 12/21/2018    Procedure: UPPER ENDOSCOPY WITH BALLOON DILATION, BIOPSY;  Surgeon: Dieudonne Jackson MD;  [facility]     ESOPHAGOSCOPY, GASTROSCOPY, DUODENOSCOPY (EGD), COMBINED N/A 1/14/2019    Procedure: UPPER ENDOSCOPY WITH ENDOSCOPIC PLACEMENT OF OG AND GASTRIC LAVAGE;  Surgeon: Dieudonne Jackson MD;  Location: [facility]     partial gastrectomy  03/2019     right total knee replacement  01/2019     MARAH EN Y BOWEL  1980    Due to severely ulcerated stomach body.     STOMACH SURGERY  03/27/2019    Restructuring of stomach and Colecuystectomy.     stomach surgery peritinitis          Social History     Tobacco Use     Smoking status: Never Smoker     Smokeless tobacco: Never Used     Tobacco comment: no passive exposure   Substance Use Topics     Alcohol use: Yes     Comment: rarely, maybe yearly     Family History   Problem Relation Age of Onset     Cerebrovascular Disease Mother         CVA     Hypertension Mother      Other - See Comments Father 40        mining accident; cause of death     Other - See Comments Brother         muscle dystrophy     Cancer Daughter 34        skin cancer     Melanoma Daughter          Current Outpatient Medications   Medication Sig Dispense Refill     acetaminophen (TYLENOL) 325 MG tablet Take 2-3 tablets (650-975 mg) by mouth every 4 hours as needed for other (mild to moderate pain) Limit total to 3800mg in 24 hours. 100 tablet 0     acyclovir (ZOVIRAX) 5 % external cream Apply topically 3 times daily as needed (oral herpes like lesion) 5 g 3     ARIPiprazole (ABILIFY) 2 MG tablet Take 1 tablet (2 mg) by mouth daily 90 tablet 1     calcium carb-cholecalciferol (CALCIUM PLUS VITAMIN D3) 600-500 MG-UNIT CAPS Take 1 capsule by mouth 2 times daily 180 capsule 3     ciprofloxacin (CILOXAN) 0.3 % ophthalmic solution Place 2 drops into both eyes 3 times daily as needed (for conjucntivitis) 10 mL 2     cyanocolbalamin (VITAMIN  B-12) 1000 MCG tablet Take 1 tablet by mouth daily as needed        cyclobenzaprine (FLEXERIL) 10 MG tablet TAKE 1 TABLET BY MOUTH THREE TIMES DAILY AS NEEDED FOR MUSCLE SPASMS 90 tablet 2     dicyclomine (BENTYL) 10 MG capsule TAKE 1 CAPSULE BY MOUTH FOUR TIMES DAILY (BEFORE MEALS AND NIGHTLY) 120 capsule 3     escitalopram (LEXAPRO) 20 MG tablet TAKE ONE-HALF TABLET BY MOUTH TWICE A DAY 90 tablet 2     folic acid (FOLVITE) 1 MG tablet TAKE ONE TABLET DAILY BY MOUTH 90 tablet 1     gabapentin (NEURONTIN) 300 MG capsule TAKE 1 CAPSULE BY MOUTH THREE TIMES DAILY 90 capsule 3     HYDROcodone-acetaminophen (NORCO) 7.5-325 MG per tablet Take 1  tablet by mouth every 6 hours as needed for severe pain 120 tablet 0     levothyroxine (SYNTHROID/LEVOTHROID) 75 MCG tablet Take 1 tablet (75 mcg) by mouth daily 90 tablet 3     metoclopramide (REGLAN) 5 MG tablet TAKE 1 TABLET (5 MG) BY MOUTH 3 TIMES DAILY (BEFORE MEALS) 90 tablet 1     multivitamin (OCUVITE) TABS Take 1 tablet by mouth daily as needed        NYAMYC 423151 UNIT/GM external powder APPLY TOPICALLY 2 TIMES DAILY AS NEEDED. 60 g 1     ondansetron (ZOFRAN) 4 MG tablet TAKE 2 TABLETS (8MG) BY MOUTH EVERY 8 HOURS AS NEEDED FOR NAUSEA 60 tablet 4     promethazine (PHENERGAN) 25 MG tablet TAKE 1 TABLET BY MOUTH UP TO THREE TIMES DAILY AS NEEDED FOR NAUSEA/VOMITING. 90 tablet 0     simvastatin (ZOCOR) 40 MG tablet TAKE 1 TABLET BY MOUTH DAILY IN THE EVENING . GENERIC FOR ZOCOR. 90 tablet 0     vitamin D3 (CHOLECALCIFEROL) 2000 units (50 mcg) tablet Take 2 tablets (4,000 Units) by mouth daily 180 tablet 3     zolpidem (AMBIEN) 10 MG tablet TAKE 1 TABLET BY MOUTH NIGHTLY AS NEEDED FOR SLEEP MAY REPEAT ONCE. 90 tablet 0       Reviewed and updated as needed this visit by Provider         Review of Systems   Constitutional, HEENT, cardiovascular, pulmonary, gi and gu systems are negative, except as otherwise noted.       Objective   Reported vitals:  There were no vitals taken for this visit.   PSYCH: Alert and oriented times 3; coherent speech, normal   rate and volume, able to articulate logical thoughts, able   to abstract reason, no tangential thoughts, no hallucinations   or delusions  Her affect is normal and pleasant  RESP: No cough, no audible wheezing, able to talk in full sentences  Remainder of exam unable to be completed due to telephone visits    Diagnostic Test Results:  Labs reviewed in Epic  none         Assessment/Plan:  (F34.1) Dysthymia  (primary encounter diagnosis)  Comment: She has improved symptoms.  Plan:   She will continue Abilify 2 mg and Lexapro 10 mg BID    (R60.0) Bilateral leg  edema  Comment: Medicine induced VS humidity induced vasodilation.  Plan:   Reassurance given.  I will touch base with her next week to see if her leg edema has improved with cooler weather.      No follow-ups on file.        Service Time  Start time : 1638  Stop Time: 1651    Phone call duration:  13 minutes    Quinn Cruz DO, DO

## 2020-06-20 ASSESSMENT — ANXIETY QUESTIONNAIRES: GAD7 TOTAL SCORE: 1

## 2020-06-26 ENCOUNTER — TELEPHONE (OUTPATIENT)
Dept: PEDIATRICS | Facility: OTHER | Age: 70
End: 2020-06-26

## 2020-06-26 NOTE — TELEPHONE ENCOUNTER
Have her cut down on her gabapentin to once per day for the next three days and call us back with a report on her edema.

## 2020-06-26 NOTE — TELEPHONE ENCOUNTER
"Call from patient with an update from 6/19/20 Virtual Visit.     Patient reporting the swelling as \"a tiny bit better today\".   Feet remain swollen, ankle swelling has decreased from last night. When press on top of foot skin stays indented for approximately 20- 25 seconds per patients.     Patient notified Dr. Cruz will be provided with an update.   "

## 2020-06-28 DIAGNOSIS — M25.562 ARTHRALGIA OF BOTH KNEES: ICD-10-CM

## 2020-06-28 DIAGNOSIS — M54.2 CERVICAL PAIN: ICD-10-CM

## 2020-06-28 DIAGNOSIS — M25.561 ARTHRALGIA OF BOTH KNEES: ICD-10-CM

## 2020-06-30 RX ORDER — HYDROCODONE BITARTRATE AND ACETAMINOPHEN 7.5; 325 MG/1; MG/1
1 TABLET ORAL EVERY 6 HOURS PRN
Qty: 120 TABLET | Refills: 0 | Status: SHIPPED | OUTPATIENT
Start: 2020-06-30 | End: 2020-07-22

## 2020-06-30 NOTE — TELEPHONE ENCOUNTER
hydrocodone      Last Written Prescription Date:  6/2/20  Last Fill Quantity: 120,   # refills: 0  Last Office Visit: 6/19/20  Future Office visit:    Next 5 appointments (look out 90 days)    Jul 02, 2020 11:30 AM CDT  Return Visit with Noah Bloom DC  [facility] ([facility]) [address] [place] [id number]  [phone]           Routing refill request to provider for review/approval because:  Drug not on the [facility], [facility] or [facility] refill protocol or controlled substance

## 2020-07-02 ENCOUNTER — OFFICE VISIT (OUTPATIENT)
Dept: CHIROPRACTIC MEDICINE | Facility: OTHER | Age: 70
End: 2020-07-02
Attending: CHIROPRACTOR
Payer: COMMERCIAL

## 2020-07-02 DIAGNOSIS — M99.03 SEGMENTAL AND SOMATIC DYSFUNCTION OF LUMBAR REGION: Primary | ICD-10-CM

## 2020-07-02 DIAGNOSIS — M54.50 ACUTE BILATERAL LOW BACK PAIN WITHOUT SCIATICA: ICD-10-CM

## 2020-07-02 DIAGNOSIS — M99.01 SEGMENTAL AND SOMATIC DYSFUNCTION OF CERVICAL REGION: ICD-10-CM

## 2020-07-02 DIAGNOSIS — Z01.818 PREOP GENERAL PHYSICAL EXAM: ICD-10-CM

## 2020-07-02 DIAGNOSIS — M99.02 SEGMENTAL AND SOMATIC DYSFUNCTION OF THORACIC REGION: ICD-10-CM

## 2020-07-02 PROCEDURE — 98941 CHIROPRACT MANJ 3-4 REGIONS: CPT | Mod: AT | Performed by: CHIROPRACTOR

## 2020-07-06 RX ORDER — PROMETHAZINE HYDROCHLORIDE 25 MG/1
TABLET ORAL
Qty: 90 TABLET | Refills: 0 | Status: SHIPPED | OUTPATIENT
Start: 2020-07-06 | End: 2020-08-04

## 2020-07-12 DIAGNOSIS — R11.0 NAUSEA: ICD-10-CM

## 2020-07-14 RX ORDER — ONDANSETRON 4 MG/1
TABLET, FILM COATED ORAL
Qty: 60 TABLET | Refills: 4 | Status: SHIPPED | OUTPATIENT
Start: 2020-07-14 | End: 2020-09-25

## 2020-07-14 NOTE — TELEPHONE ENCOUNTER
zofran      Last Written Prescription Date:  5/11/20  Last Fill Quantity: 60,   # refills: 4  Last Office Visit: 6/19/20  Future Office visit:       Routing refill request to provider for review/approval because:  Drug not on the [facility], [facility] or [facility] refill protocol or controlled substance

## 2020-07-15 DIAGNOSIS — G47.00 PERSISTENT INSOMNIA: ICD-10-CM

## 2020-07-16 NOTE — TELEPHONE ENCOUNTER
ambien      Last Written Prescription Date:  6/9/20  Last Fill Quantity: 90,   # refills: 0  Last Office Visit: 6/19/20  Future Office visit:       Routing refill request to provider for review/approval because:  Drug not on the [facility], [facility] or [facility] refill protocol or controlled substance

## 2020-07-17 RX ORDER — ZOLPIDEM TARTRATE 10 MG/1
TABLET ORAL
Qty: 90 TABLET | Refills: 0 | Status: SHIPPED | OUTPATIENT
Start: 2020-07-17 | End: 2020-08-24

## 2020-07-21 DIAGNOSIS — M54.2 CERVICAL PAIN: ICD-10-CM

## 2020-07-21 DIAGNOSIS — M25.561 ARTHRALGIA OF BOTH KNEES: ICD-10-CM

## 2020-07-21 DIAGNOSIS — M25.562 ARTHRALGIA OF BOTH KNEES: ICD-10-CM

## 2020-07-22 RX ORDER — HYDROCODONE BITARTRATE AND ACETAMINOPHEN 7.5; 325 MG/1; MG/1
1 TABLET ORAL EVERY 6 HOURS PRN
Qty: 120 TABLET | Refills: 0 | Status: SHIPPED | OUTPATIENT
Start: 2020-07-22 | End: 2020-08-26

## 2020-07-22 NOTE — TELEPHONE ENCOUNTER
hydrocodone      Last Written Prescription Date:  6/30/20  Last Fill Quantity: 120,   # refills: 0  Last Office Visit: 6/19/20  Future Office visit:       Routing refill request to provider for review/approval because:  Drug not on the [facility], [facility] or [facility] refill protocol or controlled substance

## 2020-08-03 DIAGNOSIS — Z01.818 PREOP GENERAL PHYSICAL EXAM: ICD-10-CM

## 2020-08-04 RX ORDER — PROMETHAZINE HYDROCHLORIDE 25 MG/1
TABLET ORAL
Qty: 90 TABLET | Refills: 0 | Status: SHIPPED | OUTPATIENT
Start: 2020-08-04 | End: 2020-09-03

## 2020-08-11 DIAGNOSIS — K31.84 NONDIABETIC GASTROPARESIS: ICD-10-CM

## 2020-08-11 DIAGNOSIS — R10.13 EPIGASTRIC PAIN: ICD-10-CM

## 2020-08-12 RX ORDER — METOCLOPRAMIDE 5 MG/1
5 TABLET ORAL
Qty: 90 TABLET | Refills: 1 | Status: SHIPPED | OUTPATIENT
Start: 2020-08-12 | End: 2020-11-02

## 2020-08-12 NOTE — TELEPHONE ENCOUNTER
Metoclopramide      Last Written Prescription Date:  5-  Last Fill Quantity: 90,   # refills: 1  Last Office Visit: 6-  Future Office visit:

## 2020-08-15 DIAGNOSIS — B02.23 POSTHERPETIC POLYNEUROPATHY: ICD-10-CM

## 2020-08-17 RX ORDER — GABAPENTIN 300 MG/1
CAPSULE ORAL
Qty: 90 CAPSULE | Refills: 3 | Status: SHIPPED | OUTPATIENT
Start: 2020-08-17 | End: 2020-11-30

## 2020-08-24 ENCOUNTER — TELEPHONE (OUTPATIENT)
Dept: INFUSION THERAPY | Facility: OTHER | Age: 70
End: 2020-08-24

## 2020-08-24 DIAGNOSIS — G47.00 PERSISTENT INSOMNIA: ICD-10-CM

## 2020-08-24 RX ORDER — ZOLPIDEM TARTRATE 10 MG/1
TABLET ORAL
Qty: 90 TABLET | Refills: 1 | Status: SHIPPED | OUTPATIENT
Start: 2020-08-24 | End: 2020-11-18

## 2020-08-24 NOTE — TELEPHONE ENCOUNTER
Our records indicate that Aure is due for her next reclast infusion. She had her last reclast infusion 8/26/2019. Would you like patient to receive the reclast infusion?

## 2020-08-24 NOTE — TELEPHONE ENCOUNTER
ambien      Last Written Prescription Date:  7-  Last Fill Quantity: 90,   # refills: 0  Last Office Visit: 6-  Future Office visit:       Routing refill request to provider for review/approval because:

## 2020-08-26 DIAGNOSIS — M25.562 ARTHRALGIA OF BOTH KNEES: ICD-10-CM

## 2020-08-26 DIAGNOSIS — M25.561 ARTHRALGIA OF BOTH KNEES: ICD-10-CM

## 2020-08-26 DIAGNOSIS — M54.2 CERVICAL PAIN: ICD-10-CM

## 2020-08-26 RX ORDER — HYDROCODONE BITARTRATE AND ACETAMINOPHEN 7.5; 325 MG/1; MG/1
1 TABLET ORAL EVERY 6 HOURS PRN
Qty: 120 TABLET | Refills: 0 | Status: SHIPPED | OUTPATIENT
Start: 2020-08-26 | End: 2020-09-22

## 2020-08-26 RX ORDER — HEPARIN SODIUM,PORCINE 10 UNIT/ML
5 VIAL (ML) INTRAVENOUS
Status: CANCELLED | OUTPATIENT
Start: 2020-08-26

## 2020-08-26 RX ORDER — ZOLEDRONIC ACID 5 MG/100ML
5 INJECTION, SOLUTION INTRAVENOUS ONCE
Status: CANCELLED
Start: 2020-08-26

## 2020-08-26 RX ORDER — HEPARIN SODIUM (PORCINE) LOCK FLUSH IV SOLN 100 UNIT/ML 100 UNIT/ML
5 SOLUTION INTRAVENOUS
Status: CANCELLED | OUTPATIENT
Start: 2020-08-26

## 2020-08-26 NOTE — TELEPHONE ENCOUNTER
hydrocodone      Last Written Prescription Date:  7/22/20  Last Fill Quantity: 120,   # refills: 0  Last Office Visit: 6/19/20  Future Office visit:       Routing refill request to provider for review/approval because:  Drug not on the [facility], [facility] or [facility] refill protocol or controlled substance

## 2020-08-27 NOTE — TELEPHONE ENCOUNTER
Please call patient to schedule for reclast infusion . Level 2 updated reclast therapy plan signed by Dr Cruz.

## 2020-09-01 DIAGNOSIS — Z01.818 PREOP GENERAL PHYSICAL EXAM: ICD-10-CM

## 2020-09-01 DIAGNOSIS — M54.2 CERVICALGIA: ICD-10-CM

## 2020-09-02 NOTE — TELEPHONE ENCOUNTER
Phenergan       Last Written Prescription Date:  84/2020  Last Fill Quantity: 90,   # refills: 0    Flexeril       Last Written Prescription Date:  6/16/2020  Last Fill Quantity: 90,   # refills: 2  Last Office Visit: 7/2/2020  Future Office visit:

## 2020-09-03 RX ORDER — PROMETHAZINE HYDROCHLORIDE 25 MG/1
TABLET ORAL
Qty: 90 TABLET | Refills: 0 | Status: SHIPPED | OUTPATIENT
Start: 2020-09-03 | End: 2020-10-06

## 2020-09-03 RX ORDER — CYCLOBENZAPRINE HCL 10 MG
TABLET ORAL
Qty: 90 TABLET | Refills: 2 | Status: SHIPPED | OUTPATIENT
Start: 2020-09-03 | End: 2020-11-23

## 2020-09-17 ENCOUNTER — INFUSION THERAPY VISIT (OUTPATIENT)
Dept: INFUSION THERAPY | Facility: OTHER | Age: 70
End: 2020-09-17
Attending: INTERNAL MEDICINE
Payer: COMMERCIAL

## 2020-09-17 VITALS
OXYGEN SATURATION: 97 % | BODY MASS INDEX: 28.84 KG/M2 | RESPIRATION RATE: 18 BRPM | HEIGHT: 62 IN | WEIGHT: 156.75 LBS | DIASTOLIC BLOOD PRESSURE: 69 MMHG | SYSTOLIC BLOOD PRESSURE: 125 MMHG | HEART RATE: 88 BPM | TEMPERATURE: 97.9 F

## 2020-09-17 DIAGNOSIS — M81.0 AGE-RELATED OSTEOPOROSIS WITHOUT CURRENT PATHOLOGICAL FRACTURE: Primary | ICD-10-CM

## 2020-09-17 LAB
CALCIUM SERPL-MCNC: 8.5 MG/DL (ref 8.5–10.1)
CREAT SERPL-MCNC: 0.52 MG/DL (ref 0.52–1.04)
GFR SERPL CREATININE-BSD FRML MDRD: >90 ML/MIN/{1.73_M2}

## 2020-09-17 PROCEDURE — 82565 ASSAY OF CREATININE: CPT | Mod: ZL | Performed by: INTERNAL MEDICINE

## 2020-09-17 PROCEDURE — 25000128 H RX IP 250 OP 636: Performed by: INTERNAL MEDICINE

## 2020-09-17 PROCEDURE — 82310 ASSAY OF CALCIUM: CPT | Mod: ZL | Performed by: INTERNAL MEDICINE

## 2020-09-17 PROCEDURE — 36415 COLL VENOUS BLD VENIPUNCTURE: CPT | Mod: ZL | Performed by: INTERNAL MEDICINE

## 2020-09-17 PROCEDURE — 25800030 ZZH RX IP 258 OP 636: Performed by: INTERNAL MEDICINE

## 2020-09-17 PROCEDURE — 96365 THER/PROPH/DIAG IV INF INIT: CPT

## 2020-09-17 RX ORDER — HEPARIN SODIUM,PORCINE 10 UNIT/ML
5 VIAL (ML) INTRAVENOUS
Status: CANCELLED | OUTPATIENT
Start: 2020-09-17

## 2020-09-17 RX ORDER — ZOLEDRONIC ACID 5 MG/100ML
5 INJECTION, SOLUTION INTRAVENOUS ONCE
Status: COMPLETED | OUTPATIENT
Start: 2020-09-17 | End: 2020-09-17

## 2020-09-17 RX ORDER — HEPARIN SODIUM (PORCINE) LOCK FLUSH IV SOLN 100 UNIT/ML 100 UNIT/ML
5 SOLUTION INTRAVENOUS
Status: CANCELLED | OUTPATIENT
Start: 2020-09-17

## 2020-09-17 RX ORDER — ZOLEDRONIC ACID 5 MG/100ML
5 INJECTION, SOLUTION INTRAVENOUS ONCE
Status: CANCELLED
Start: 2020-09-17

## 2020-09-17 RX ADMIN — SODIUM CHLORIDE 250 ML: 9 INJECTION, SOLUTION INTRAVENOUS at 14:29

## 2020-09-17 RX ADMIN — ZOLEDRONIC ACID 5 MG: 5 INJECTION, SOLUTION INTRAVENOUS at 14:47

## 2020-09-17 ASSESSMENT — MIFFLIN-ST. JEOR: SCORE: 1194.38

## 2020-09-17 NOTE — PROGRESS NOTES
Patient is a 70 year old female here today for infusion of reclast per order of Dr Cruz.  Patient identified with two identifiers, order verified, and verbal consent for today's infusion obtained from patient.      Today's lab values: Calcium: 8.5, Creatinine: 0.52    Patient meets order parameters for today's treatment.     24 gauge angio cath inserted into Right AC.  Immediate blood return noted. IV secured with sterile, transparent dressing and tape.  Patient tolerated well, denies pain or discomfort at this time. Flushes easily without resistance, no signs or symptoms of infiltration or infection. Patient denies questions or concerns regarding infusion and/or medication(s) being administered.    1447 IV pump verified with reclast dose, drug, and rate of administration.  Infusion administered per protocol.  Patient tolerated infusion well, no signs or symptoms of adverse reaction noted.  Patient denies pain nor discomfort.     IV removed, catheter intact.  Site clean, dry and intact.  No signs or symptoms of infiltration or infection. Covered with a sterile bandage, slight pressure applied for 30 seconds.  Pt instructed to leave bandage intact for a minimum of one hour, and to call with questions or concerns. Copy of appointments, discharge instructions, and after visit summary (AVS) provided to patient.  Patient states understanding, discharged.

## 2020-09-17 NOTE — PATIENT INSTRUCTIONS
Patient Education     Zoledronic Acid injection (Paget's Disease, Osteoporosis)  Brand Names: Reclast, Zometa  What is this medicine?  ZOLEDRONIC ACID (SEVERO le marbellan ik AS id) lowers the amount of calcium loss from bone. It is used to treat Paget's disease and osteoporosis in women.  How should I use this medicine?  This medicine is for infusion into a vein. It is given by a health care professional in a hospital or clinic setting.  Talk to your pediatrician regarding the use of this medicine in children. This medicine is not approved for use in children.  What side effects may I notice from receiving this medicine?  Side effects that you should report to your doctor or health care professional as soon as possible:    allergic reactions like skin rash, itching or hives, swelling of the face, lips, or tongue    anxiety, confusion, or depression    breathing problems    changes in vision    eye pain    feeling faint or lightheaded, falls    jaw pain, especially after dental work    mouth sores    muscle cramps, stiffness, or weakness    redness, blistering, peeling or loosening of the skin, including inside the mouth    trouble passing urine or change in the amount of urine  Side effects that usually do not require medical attention (report to your doctor or health care professional if they continue or are bothersome):    bone, joint, or muscle pain    constipation    diarrhea    fever    hair loss    irritation at site where injected    loss of appetite    nausea, vomiting    stomach upset    trouble sleeping    trouble swallowing    weak or tired  What may interact with this medicine?    certain antibiotics given by injection    NSAIDs, medicines for pain and inflammation, like ibuprofen or naproxen    some diuretics like bumetanide, furosemide    teriparatide    What if I miss a dose?  It is important not to miss your dose. Call your doctor or health care professional if you are unable to keep an appointment.  Where  should I keep my medicine?  This drug is given in a hospital or clinic and will not be stored at home.  What should I tell my health care provider before I take this medicine?  They need to know if you have any of these conditions:    aspirin-sensitive asthma    cancer, especially if you are receiving medicines used to treat cancer    dental disease or wear dentures    infection    kidney disease    low levels of calcium in the blood    past surgery on the parathyroid gland or intestines    receiving corticosteroids like dexamethasone or prednisone    an unusual or allergic reaction to zoledronic acid, other medicines, foods, dyes, or preservatives    pregnant or trying to get pregnant    breast-feeding  What should I watch for while using this medicine?  Visit your doctor or health care professional for regular checkups. It may be some time before you see the benefit from this medicine. Do not stop taking your medicine unless your doctor tells you to. Your doctor may order blood tests or other tests to see how you are doing.  Women should inform their doctor if they wish to become pregnant or think they might be pregnant. There is a potential for serious side effects to an unborn child. Talk to your health care professional or pharmacist for more information.  You should make sure that you get enough calcium and vitamin D while you are taking this medicine. Discuss the foods you eat and the vitamins you take with your health care professional.  Some people who take this medicine have severe bone, joint, and/or muscle pain. This medicine may also increase your risk for jaw problems or a broken thigh bone. Tell your doctor right away if you have severe pain in your jaw, bones, joints, or muscles. Tell your doctor if you have any pain that does not go away or that gets worse.  Tell your dentist and dental surgeon that you are taking this medicine. You should not have major dental surgery while on this medicine. See  your dentist to have a dental exam and fix any dental problems before starting this medicine. Take good care of your teeth while on this medicine. Make sure you see your dentist for regular follow-up appointments.  NOTE:This sheet is a summary. It may not cover all possible information. If you have questions about this medicine, talk to your doctor, pharmacist, or health care provider. Copyright  2019 [employer]

## 2020-09-21 DIAGNOSIS — M54.2 CERVICAL PAIN: ICD-10-CM

## 2020-09-21 DIAGNOSIS — M25.562 ARTHRALGIA OF BOTH KNEES: ICD-10-CM

## 2020-09-21 DIAGNOSIS — M25.561 ARTHRALGIA OF BOTH KNEES: ICD-10-CM

## 2020-09-22 RX ORDER — HYDROCODONE BITARTRATE AND ACETAMINOPHEN 7.5; 325 MG/1; MG/1
1 TABLET ORAL EVERY 6 HOURS PRN
Qty: 120 TABLET | Refills: 0 | Status: SHIPPED | OUTPATIENT
Start: 2020-09-22 | End: 2020-10-20

## 2020-09-22 NOTE — TELEPHONE ENCOUNTER
Controlled Substance Refill Request for HYDROCODONE/APAP 7.5-325MG TAB     Problem List Complete:    Yes    Last Written Prescription Date:  8/26/20  Last Fill Quantity: 120,   # refills: 0    THE MOST RECENT OFFICE VISIT MUST BE WITHIN THE PAST 3 MONTHS. AT LEAST ONE FACE TO FACE VISIT MUST OCCUR EVERY 6 MONTHS. ADDITIONAL VISITS CAN BE VIRTUAL.  (THIS STATEMENT SHOULD BE DELETED.)    Last Office Visit with [facility] primary care provider: 6/19/20    Future Office visit:     Controlled substance agreement:   Encounter-Level CSA - 07/11/2017:    Controlled Substance Agreement - Scan on 7/20/2017 12:19 PM: CONTROLLED SUBSTANCE AGREEMENT     Patient-Level CSA:    There are no patient-level csa.         Last Urine Drug Screen:   Pain Drug SCR UR W RPTD Meds   Date Value Ref Range Status   02/26/2020 FINAL  Final     Comment:     (Note)  ====================================================================  [facility] COMP DRUG ANALYSIS,UR  ====================================================================  Test                             Result       Flag       Units        Drug Present and Declared for Prescription Verification   Hydrocodone                    1831         EXPECTED   ng/mg creat   Hydromorphone                  1057         EXPECTED   ng/mg creat   Dihydrocodeine                 497          EXPECTED   ng/mg creat   Norhydrocodone                 6889         EXPECTED   ng/mg creat    Sources of hydrocodone include scheduled prescription    medications. Hydromorphone, dihydrocodeine and norhydrocodone are    expected metabolites of hydrocodone. Hydromorphone and    dihydrocodeine are also available as scheduled prescription    medications.   Zolpidem                       PRESENT      EXPECTED                 Zolpidem Acid                  PRESENT      EXPECTED                  Zolpidem acid is an expected metabolite of zolpidem.   Acetaminophen                  PRESENT      EXPECTED                Drug  Present not Declared for Prescription Verification   Gabapentin                     PRESENT      UNEXPECTED               Cyclobenzaprine                PRESENT      UNEXPECTED               Desmethylcyclobenzaprine       PRESENT      UNEXPECTED                Desmethylcyclobenzaprine is an expected metabolite of    cyclobenzaprine.   Citalopram                     PRESENT      UNEXPECTED               Desmethylcitalopram            PRESENT      UNEXPECTED                Desmethylcitalopram is an expected metabolite of citalopram or    the enantiomeric form, escitalopram.  ====================================================================  Test                      Result    Flag   Units      Ref Range        Creatinine              70               mg/dL      >=20            ====================================================================  Declared Medications:  The flagging and interpretation on this report are based on the  following declared medications.  Unexpected results may arise from  inaccuracies in the declared medications.  **Note: The testing scope of this panel includes these medications:  Hydrocodone (Norco)  **Note: The testing scope of this panel does not include small to  moderate amounts of these reported medications:  Acetaminophen (Norco)  Zolpidem  ====================================================================  For clinical consultation, please call [phone].  ====================================================================  Analysis performed by [employer] [place] [address]     , No results found for: COMDAT, No results found for: THC13, PCP13, COC13, MAMP13, OPI13, AMP13, BZO13, TCA13, MTD13, BAR13, OXY13, PPX13, BUP13     Processing:  Rx to be electronically transmitted to pharmacy by provider     [email or web address]   checked in past 3 months?

## 2020-09-23 ENCOUNTER — TRANSFERRED RECORDS (OUTPATIENT)
Dept: HEALTH INFORMATION MANAGEMENT | Facility: CLINIC | Age: 70
End: 2020-09-23

## 2020-09-24 DIAGNOSIS — R11.0 NAUSEA: ICD-10-CM

## 2020-09-24 NOTE — TELEPHONE ENCOUNTER
zofran      Last Written Prescription Date:  7-  Last Fill Quantity: 60,   # refills: 4  Last Office Visit: 6-  Future Office visit:

## 2020-09-25 RX ORDER — ONDANSETRON 4 MG/1
TABLET, FILM COATED ORAL
Qty: 60 TABLET | Refills: 4 | Status: SHIPPED | OUTPATIENT
Start: 2020-09-25 | End: 2020-11-24

## 2020-09-28 ENCOUNTER — OFFICE VISIT (OUTPATIENT)
Dept: FAMILY MEDICINE | Facility: OTHER | Age: 70
End: 2020-09-28
Attending: FAMILY MEDICINE
Payer: COMMERCIAL

## 2020-09-28 ENCOUNTER — NURSE TRIAGE (OUTPATIENT)
Dept: PEDIATRICS | Facility: OTHER | Age: 70
End: 2020-09-28

## 2020-09-28 VITALS
HEART RATE: 92 BPM | RESPIRATION RATE: 18 BRPM | DIASTOLIC BLOOD PRESSURE: 70 MMHG | HEIGHT: 62 IN | OXYGEN SATURATION: 98 % | SYSTOLIC BLOOD PRESSURE: 124 MMHG | WEIGHT: 152 LBS | TEMPERATURE: 97.5 F | BODY MASS INDEX: 27.97 KG/M2

## 2020-09-28 DIAGNOSIS — M79.10 MYALGIA: Primary | ICD-10-CM

## 2020-09-28 PROCEDURE — U0003 INFECTIOUS AGENT DETECTION BY NUCLEIC ACID (DNA OR RNA); SEVERE ACUTE RESPIRATORY SYNDROME CORONAVIRUS 2 (SARS-COV-2) (CORONAVIRUS DISEASE [COVID-19]), AMPLIFIED PROBE TECHNIQUE, MAKING USE OF HIGH THROUGHPUT TECHNOLOGIES AS DESCRIBED BY CMS-2020-01-R: HCPCS | Mod: ZL | Performed by: FAMILY MEDICINE

## 2020-09-28 PROCEDURE — G0463 HOSPITAL OUTPT CLINIC VISIT: HCPCS

## 2020-09-28 PROCEDURE — 99213 OFFICE O/P EST LOW 20 MIN: CPT | Performed by: FAMILY MEDICINE

## 2020-09-28 ASSESSMENT — PAIN SCALES - GENERAL: PAINLEVEL: SEVERE PAIN (7)

## 2020-09-28 ASSESSMENT — MIFFLIN-ST. JEOR: SCORE: 1172.72

## 2020-09-28 NOTE — NURSING NOTE
"Chief Complaint   Patient presents with     covid symptoms       Initial /70 (BP Location: Left arm, Patient Position: Sitting, Cuff Size: Adult Regular)   Pulse 92   Temp 97.5  F (36.4  C) (Tympanic)   Resp 18   Ht 1.575 m (5' 2\")   Wt 68.9 kg (152 lb)   SpO2 98%   BMI 27.80 kg/m   Estimated body mass index is 27.8 kg/m  as calculated from the following:    Height as of this encounter: 1.575 m (5' 2\").    Weight as of this encounter: 68.9 kg (152 lb).  Medication Reconciliation: complete  Vanita Santo LPN  "

## 2020-09-28 NOTE — PROGRESS NOTES
"Subjective     Aure Lanier is a 70 year old female who presents to clinic today for the following health issues:    HPI       Concern - headache and nausea  Onset: 2 days  Description: see above  Intensity: moderate  Progression of Symptoms:  worsening  Accompanying Signs & Symptoms: body aches and weakness, feverish on Saturday  Previous history of similar problem: history of migraines  Precipitating factors:        Worsened by: nothing  Alleviating factors:        Improved by: nothing  Therapies tried and outcome: Tylenol and norco, helps some    She saw her grandchildren last week and their father tested positive for Covid. She started having symptoms on Saturday September 26, myalgias, low grade fever, headache, fatigue. She has been in bed. She has been able to eat and isn't having respiratory difficulty.     Review of Systems   Constitutional, HEENT, cardiovascular, pulmonary, gi and gu systems are negative, except as otherwise noted.      Objective    /70 (BP Location: Left arm, Patient Position: Sitting, Cuff Size: Adult Regular)   Pulse 92   Temp 97.5  F (36.4  C) (Tympanic)   Resp 18   Ht 1.575 m (5' 2\")   Wt 68.9 kg (152 lb)   SpO2 98%   BMI 27.80 kg/m    Body mass index is 27.8 kg/m .  Physical Exam   GENERAL: healthy, alert and no distress  NECK: no adenopathy, no asymmetry, masses, or scars and thyroid normal to palpation  RESP: lungs clear to auscultation - no rales, rhonchi or wheezes  CV: regular rate and rhythm, normal S1 S2, no S3 or S4, no murmur, click or rub, no peripheral edema and peripheral pulses strong  MS: no gross musculoskeletal defects noted, no edema  NEURO: Normal strength and tone, mentation intact and speech normal  PSYCH: mentation appears normal and fatigued            Assessment & Plan     Myalgia  Probable Covid  Testing is done.   She is asked to quarantine from now until test results are back in 3 - 4 days. She lives alone and family is quarantining or not " "close. [employer] may deliver groceries or asked if friends could drop some off for her. Advised to follow up if she is getting worse     - Symptomatic COVID-19 Virus (Coronavirus) by PCR     BMI:   Estimated body mass index is 27.8 kg/m  as calculated from the following:    Height as of this encounter: 1.575 m (5' 2\").    Weight as of this encounter: 68.9 kg (152 lb).               Return if symptoms worsen or fail to improve.    Dayanna Whitaker MD  [facility]    "

## 2020-09-28 NOTE — TELEPHONE ENCOUNTER
"  Reason for Disposition    [1] Fever returns after gone for over 24 hours AND [2] symptoms worse or not improved    Answer Assessment - Initial Assessment Questions  1. COVID-19 DIAGNOSIS: \"Who made your Coronavirus (COVID-19) diagnosis?\" \"Was it confirmed by a positive lab test?\" If not diagnosed by a HCP, ask \"Are there lots of cases (community spread) where you live?\" (See [facility] health department website, if unsure)      unknown  2. ONSET: \"When did the COVID-19 symptoms start?\"       saturday  3. WORST SYMPTOM: \"What is your worst symptom?\" (e.g., cough, fever, shortness of breath, muscle aches)      Fever, headache  4. COUGH: \"Do you have a cough?\" If so, ask: \"How bad is the cough?\"        no  5. FEVER: \"Do you have a fever?\" If so, ask: \"What is your temperature, how was it measured, and when did it start?\"      fever  6. RESPIRATORY STATUS: \"Describe your breathing?\" (e.g., shortness of breath, wheezing, unable to speak)       wheezing  7. BETTER-SAME-WORSE: \"Are you getting better, staying the same or getting worse compared to yesterday?\"  If getting worse, ask, \"In what way?\"      worse  8. HIGH RISK DISEASE: \"Do you have any chronic medical problems?\" (e.g., asthma, heart or lung disease, weak immune system, etc.)      no  9. PREGNANCY: \"Is there any chance you are pregnant?\" \"When was your last menstrual period?\"      no  10. OTHER SYMPTOMS: \"Do you have any other symptoms?\"  (e.g., chills, fatigue, headache, loss of smell or taste, muscle pain, sore throat)        Fatigued, headache, vomitting    Protocols used: CORONAVIRUS (COVID-19) DIAGNOSED OR [id number] 8.4.20    "

## 2020-09-30 LAB
SARS-COV-2 RNA SPEC QL NAA+PROBE: NOT DETECTED
SPECIMEN SOURCE: NORMAL

## 2020-10-01 ENCOUNTER — TELEPHONE (OUTPATIENT)
Dept: PEDIATRICS | Facility: OTHER | Age: 70
End: 2020-10-01

## 2020-10-01 NOTE — TELEPHONE ENCOUNTER
"Pt updated on 9.28.2020  Covid 19 not detected /negative. She is wondering if this could be a false positive?    She states she feels weak and nauseated.Bad headache.She does feel better than yesterday.Asked her what meant by \"weak\".She states strength is not normal.Denies dizziness.She states she feels better than yesterday.Asked if she is drinking enough fluids and she said she is now.She was vomiting on Sat and Sunday, so not those days.No SOB.She denies that she needs to go to UC. She states she is in the up swing but taking a while.If it is not Covid she is curious what this is? A flu strain? Asked again if it is really negative.Confirmed it say not detected.    Please advise.    Call back at [phone]      Advised if s/s increase or worrisome go to ED/UC.She verbalized understanding.    Wen Baker RN    "

## 2020-10-06 DIAGNOSIS — Z01.818 PREOP GENERAL PHYSICAL EXAM: ICD-10-CM

## 2020-10-06 RX ORDER — PROMETHAZINE HYDROCHLORIDE 25 MG/1
TABLET ORAL
Qty: 90 TABLET | Refills: 0 | Status: SHIPPED | OUTPATIENT
Start: 2020-10-06 | End: 2020-11-04

## 2020-10-06 NOTE — TELEPHONE ENCOUNTER
Promethazine 25mg   Last Written Prescription Date:  9/3/20  Last Fill Quantity: 90 tablet,   # refills: 0  Last Office Visit: 9/28/20  Future Office visit:

## 2020-10-19 DIAGNOSIS — M25.561 ARTHRALGIA OF BOTH KNEES: ICD-10-CM

## 2020-10-19 DIAGNOSIS — M54.2 CERVICAL PAIN: ICD-10-CM

## 2020-10-19 DIAGNOSIS — M25.562 ARTHRALGIA OF BOTH KNEES: ICD-10-CM

## 2020-10-20 RX ORDER — HYDROCODONE BITARTRATE AND ACETAMINOPHEN 7.5; 325 MG/1; MG/1
TABLET ORAL
Qty: 120 TABLET | Refills: 0 | Status: SHIPPED | OUTPATIENT
Start: 2020-10-24 | End: 2020-11-23

## 2020-10-20 NOTE — TELEPHONE ENCOUNTER
norco      Last Written Prescription Date:  9-  Last Fill Quantity: 120,   # refills: 0  Last Office Visit: 6-  Future Office visit:

## 2020-10-20 NOTE — TELEPHONE ENCOUNTER
RX is dated 10.24.20.   left adductor single shot block procedure complete. Pt tolerated well with no signs of complications. VSS and will continue to monitor.

## 2020-10-21 ENCOUNTER — OFFICE VISIT (OUTPATIENT)
Dept: FAMILY MEDICINE | Facility: OTHER | Age: 70
End: 2020-10-21
Attending: NURSE PRACTITIONER
Payer: COMMERCIAL

## 2020-10-21 VITALS
WEIGHT: 164 LBS | TEMPERATURE: 98.3 F | HEART RATE: 104 BPM | OXYGEN SATURATION: 98 % | BODY MASS INDEX: 30 KG/M2 | DIASTOLIC BLOOD PRESSURE: 82 MMHG | SYSTOLIC BLOOD PRESSURE: 120 MMHG

## 2020-10-21 DIAGNOSIS — M25.561 ARTHRALGIA OF BOTH KNEES: ICD-10-CM

## 2020-10-21 DIAGNOSIS — F34.1 DYSTHYMIA: ICD-10-CM

## 2020-10-21 DIAGNOSIS — D50.9 IRON DEFICIENCY ANEMIA, UNSPECIFIED IRON DEFICIENCY ANEMIA TYPE: ICD-10-CM

## 2020-10-21 DIAGNOSIS — H53.2 DOUBLE VISION: ICD-10-CM

## 2020-10-21 DIAGNOSIS — F11.90 CHRONIC, CONTINUOUS USE OF OPIOIDS: ICD-10-CM

## 2020-10-21 DIAGNOSIS — F41.1 ANXIETY STATE: ICD-10-CM

## 2020-10-21 DIAGNOSIS — G47.00 INSOMNIA, UNSPECIFIED TYPE: ICD-10-CM

## 2020-10-21 DIAGNOSIS — E03.8 OTHER SPECIFIED HYPOTHYROIDISM: ICD-10-CM

## 2020-10-21 DIAGNOSIS — R10.13 EPIGASTRIC ABDOMINAL PAIN: ICD-10-CM

## 2020-10-21 DIAGNOSIS — B02.23 POSTHERPETIC POLYNEUROPATHY: ICD-10-CM

## 2020-10-21 DIAGNOSIS — Z76.89 ENCOUNTER TO ESTABLISH CARE: Primary | ICD-10-CM

## 2020-10-21 DIAGNOSIS — M54.2 CERVICAL PAIN: ICD-10-CM

## 2020-10-21 DIAGNOSIS — M25.562 ARTHRALGIA OF BOTH KNEES: ICD-10-CM

## 2020-10-21 DIAGNOSIS — E78.5 HYPERLIPIDEMIA WITH TARGET LDL LESS THAN 100: ICD-10-CM

## 2020-10-21 DIAGNOSIS — G43.809 OTHER MIGRAINE WITHOUT STATUS MIGRAINOSUS, NOT INTRACTABLE: ICD-10-CM

## 2020-10-21 DIAGNOSIS — G44.209 TENSION HEADACHE: ICD-10-CM

## 2020-10-21 LAB
ALBUMIN SERPL-MCNC: 4 G/DL (ref 3.4–5)
ALP SERPL-CCNC: 114 U/L (ref 40–150)
ALT SERPL W P-5'-P-CCNC: 40 U/L (ref 0–50)
ANION GAP SERPL CALCULATED.3IONS-SCNC: 6 MMOL/L (ref 3–14)
AST SERPL W P-5'-P-CCNC: 19 U/L (ref 0–45)
BASOPHILS # BLD AUTO: 0.1 10E9/L (ref 0–0.2)
BASOPHILS NFR BLD AUTO: 0.8 %
BILIRUB SERPL-MCNC: 0.2 MG/DL (ref 0.2–1.3)
BUN SERPL-MCNC: 12 MG/DL (ref 7–30)
CALCIUM SERPL-MCNC: 8.4 MG/DL (ref 8.5–10.1)
CHLORIDE SERPL-SCNC: 106 MMOL/L (ref 94–109)
CHOLEST SERPL-MCNC: 192 MG/DL
CO2 SERPL-SCNC: 27 MMOL/L (ref 20–32)
CREAT SERPL-MCNC: 0.6 MG/DL (ref 0.52–1.04)
DIFFERENTIAL METHOD BLD: NORMAL
EOSINOPHIL # BLD AUTO: 0.3 10E9/L (ref 0–0.7)
EOSINOPHIL NFR BLD AUTO: 3.5 %
ERYTHROCYTE [DISTWIDTH] IN BLOOD BY AUTOMATED COUNT: 14.5 % (ref 10–15)
GFR SERPL CREATININE-BSD FRML MDRD: >90 ML/MIN/{1.73_M2}
GLUCOSE SERPL-MCNC: 91 MG/DL (ref 70–99)
HCT VFR BLD AUTO: 40.8 % (ref 35–47)
HDLC SERPL-MCNC: 74 MG/DL
HGB BLD-MCNC: 13.1 G/DL (ref 11.7–15.7)
IMM GRANULOCYTES # BLD: 0.1 10E9/L (ref 0–0.4)
IMM GRANULOCYTES NFR BLD: 1.3 %
LDLC SERPL CALC-MCNC: 87 MG/DL
LYMPHOCYTES # BLD AUTO: 3.1 10E9/L (ref 0.8–5.3)
LYMPHOCYTES NFR BLD AUTO: 34.5 %
MCH RBC QN AUTO: 29.1 PG (ref 26.5–33)
MCHC RBC AUTO-ENTMCNC: 32.1 G/DL (ref 31.5–36.5)
MCV RBC AUTO: 91 FL (ref 78–100)
MONOCYTES # BLD AUTO: 0.9 10E9/L (ref 0–1.3)
MONOCYTES NFR BLD AUTO: 10.1 %
NEUTROPHILS # BLD AUTO: 4.5 10E9/L (ref 1.6–8.3)
NEUTROPHILS NFR BLD AUTO: 49.8 %
NONHDLC SERPL-MCNC: 118 MG/DL
NRBC # BLD AUTO: 0 10*3/UL
NRBC BLD AUTO-RTO: 0 /100
PLATELET # BLD AUTO: 441 10E9/L (ref 150–450)
POTASSIUM SERPL-SCNC: 4.2 MMOL/L (ref 3.4–5.3)
PROT SERPL-MCNC: 7.8 G/DL (ref 6.8–8.8)
RBC # BLD AUTO: 4.5 10E12/L (ref 3.8–5.2)
SODIUM SERPL-SCNC: 139 MMOL/L (ref 133–144)
TRIGL SERPL-MCNC: 157 MG/DL
TSH SERPL DL<=0.005 MIU/L-ACNC: 2.57 MU/L (ref 0.4–4)
WBC # BLD AUTO: 9 10E9/L (ref 4–11)

## 2020-10-21 PROCEDURE — 85025 COMPLETE CBC W/AUTO DIFF WBC: CPT | Mod: ZL | Performed by: NURSE PRACTITIONER

## 2020-10-21 PROCEDURE — 80061 LIPID PANEL: CPT | Mod: ZL | Performed by: NURSE PRACTITIONER

## 2020-10-21 PROCEDURE — 84443 ASSAY THYROID STIM HORMONE: CPT | Mod: ZL | Performed by: NURSE PRACTITIONER

## 2020-10-21 PROCEDURE — 99214 OFFICE O/P EST MOD 30 MIN: CPT | Performed by: NURSE PRACTITIONER

## 2020-10-21 PROCEDURE — G0463 HOSPITAL OUTPT CLINIC VISIT: HCPCS | Mod: 25

## 2020-10-21 PROCEDURE — 36415 COLL VENOUS BLD VENIPUNCTURE: CPT | Mod: ZL | Performed by: NURSE PRACTITIONER

## 2020-10-21 PROCEDURE — G0463 HOSPITAL OUTPT CLINIC VISIT: HCPCS

## 2020-10-21 PROCEDURE — 80053 COMPREHEN METABOLIC PANEL: CPT | Mod: ZL | Performed by: NURSE PRACTITIONER

## 2020-10-21 RX ORDER — HYDROCODONE BITARTRATE AND ACETAMINOPHEN 7.5; 325 MG/1; MG/1
TABLET ORAL
Qty: 120 TABLET | Refills: 0 | Status: CANCELLED | OUTPATIENT
Start: 2020-10-24

## 2020-10-21 ASSESSMENT — ANXIETY QUESTIONNAIRES
5. BEING SO RESTLESS THAT IT IS HARD TO SIT STILL: NOT AT ALL
3. WORRYING TOO MUCH ABOUT DIFFERENT THINGS: NOT AT ALL
2. NOT BEING ABLE TO STOP OR CONTROL WORRYING: NOT AT ALL
6. BECOMING EASILY ANNOYED OR IRRITABLE: NOT AT ALL
1. FEELING NERVOUS, ANXIOUS, OR ON EDGE: MORE THAN HALF THE DAYS
7. FEELING AFRAID AS IF SOMETHING AWFUL MIGHT HAPPEN: NOT AT ALL
4. TROUBLE RELAXING: NOT AT ALL
GAD7 TOTAL SCORE: 2

## 2020-10-21 ASSESSMENT — PAIN SCALES - GENERAL: PAINLEVEL: MODERATE PAIN (4)

## 2020-10-21 ASSESSMENT — PATIENT HEALTH QUESTIONNAIRE - PHQ9: SUM OF ALL RESPONSES TO PHQ QUESTIONS 1-9: 0

## 2020-10-21 NOTE — NURSING NOTE
"Chief Complaint   Patient presents with     Establish Care       Initial /82   Pulse 104   Temp 98.3  F (36.8  C) (Tympanic)   Wt 74.4 kg (164 lb)   SpO2 98%   BMI 30.00 kg/m   Estimated body mass index is 30 kg/m  as calculated from the following:    Height as of 9/28/20: 1.575 m (5' 2\").    Weight as of this encounter: 74.4 kg (164 lb).  Medication Reconciliation: complete  Deedee England LPN  "

## 2020-10-21 NOTE — PROGRESS NOTES
Subjective     Aure Lanier is a 70 year old female who presents to clinic today for the following health issues:    HPI         New Patient/Transfer of Care Dr. Cruz    Due for colon screening just [facility]    Alcohol use- no alcohol     Smoker does not smoke  Drug use - no drug use  Chronic pain agreement through Dr Cruz    Postherpetic polyneuropathy  She is taking Gabapentin 300mg three times daily    Anemia, iron deficiency   Taking folic acid daily  No iron supplements at this time  Normal CBC today    Insomnia   Past reports Ambien works about 3-4 hours per night  she will take a dose and repeat dose for a maximum of 20 mg at night  Ambien 10 mg - consider decreasing dose for maximum of 5 mg daily   Planning on working with MTM for medication management and medication reduction  With using Ambien, opiates, flexeril and other medications    Nondiabetic gastroparesis/Epigastric pain  GI in the past Dr Claus LINO for EGD and esophageal dysphagia   use folic acid 1 mg daily after stomach surgery   Opioid induced gastroparesis improved with  Treatment:   Reglan   Bentyl  Minimal epigastric pain while using Reglan and Bentyl         Hyperlipidemia Follow-Up      Are you regularly taking any medication or supplement to lower your cholesterol?   Yes- simvastatin     Are you having muscle aches or other side effects that you think could be caused by your cholesterol lowering medication?  No     Medication    Simvastatin only taking half tablet daily  -no muscle aches - wondering if she should increase to a full tablet daily did not eat today but drank punch     Depression and Anxiety Follow-Up    How are you doing with your depression since your last visit? Worsened lonely since covid    How are you doing with your anxiety since your last visit?  Worsened lonely since covid     Are you having other symptoms that might be associated with depression or anxiety? No    Have  you had a significant life event? No     Do you have any concerns with your use of alcohol or other drugs? No         After starting Abilify she did notice increase energy and exercising.  She should have some CBC, fasting glucose and lipid labs not that she is taking this medication.  Monitor weight and blood pressure every 3 months.  Medication can cause weight gain.  The dose she is on is to augment her SSRI    Social History     Tobacco Use     Smoking status: Never Smoker     Smokeless tobacco: Never Used     Tobacco comment: no passive exposure   Substance Use Topics     Alcohol use: Yes     Comment: rarely, maybe yearly     Drug use: No     PHQ 9/21/2018 6/19/2020 10/21/2020   PHQ-9 Total Score 3 3 0   Q9: Thoughts of better off dead/self-harm past 2 weeks Not at all Not at all Not at all     AURELIA-7 SCORE 9/21/2018 6/19/2020 10/21/2020   Total Score 1 1 2     Last PHQ-9 10/21/2020   1.  Little interest or pleasure in doing things 0   2.  Feeling down, depressed, or hopeless 0   3.  Trouble falling or staying asleep, or sleeping too much 0   4.  Feeling tired or having little energy 0   5.  Poor appetite or overeating 0   6.  Feeling bad about yourself 0   7.  Trouble concentrating 0   8.  Moving slowly or restless 0   Q9: Thoughts of better off dead/self-harm past 2 weeks 0   PHQ-9 Total Score 0   Difficulty at work, home, or with people -     AURELIA-7  10/21/2020   1. Feeling nervous, anxious, or on edge 2   2. Not being able to stop or control worrying 0   3. Worrying too much about different things 0   4. Trouble relaxing 0   5. Being so restless that it is hard to sit still 0   6. Becoming easily annoyed or irritable 0   7. Feeling afraid, as if something awful might happen 0   AURELIA-7 Total Score 2   If you checked any problems, how difficult have they made it for you to do your work, take care of things at home, or get along with other people? -       Suicide Assessment Five-step Evaluation and Treatment  (SAFE-T)      Hypothyroidism Follow-up      Since last visit, patient describes the following symptoms: weight gain of unknown lbs, constipation, anxiety and depression *states she was sick and lost a lot of weight and now is gaining it back- also states Dr. Cruz placed her on reglan so she can not be constipated   Lab Results   Component Value Date    TSH 0.19 02/26/2020     Due for lab recheck     Chronic Pain Follow-Up  Neck and abdomen     Where in your body do you have pain? Neck and shoulder pain   How has your pain affected your ability to work? Pain worsens playing the piano/organ   Which of these pain treatments have you tried since your last clinic visit? Chiropractor, Cold, Heat, Pain Clinic, Physical Therapy, Psychologist, Relaxation techniques / Biofeedback and Stretching  How well are you sleeping? Has insomnia -states not from the pain just is not able to sleep   How has your mood been since your last visit? About the same  Have you had a significant life event? Other: was concerned for COVID   Other aggravating factors: prolonged sitting and pain is always there  Taking medication as directed? Yes   Flexeril 3 times daily for neck pain    norco 4 times a day (2 tablets at a time) for neck and knee pain     PHQ-9 SCORE 9/21/2018 6/19/2020 10/21/2020   PHQ-9 Total Score - - -   PHQ-9 Total Score 3 3 0     AURELIA-7 SCORE 9/21/2018 6/19/2020 10/21/2020   Total Score 1 1 2     No flowsheet data found.  Encounter-Level CSA - 07/11/2017:    Controlled Substance Agreement - Scan on 7/20/2017 12:19 PM: CONTROLLED SUBSTANCE AGREEMENT     Patient-Level CSA:    There are no patient-level csa.           Review of Systems   CONSTITUTIONAL:NEGATIVE for fever, chills, change in weight  INTEGUMENTARY/SKIN: NEGATIVE for worrisome rashes, moles or lesions  EYES: due for eye exam occasionally will see double   ENT/MOUTH: NEGATIVE for ear, mouth and throat problems  RESP:NEGATIVE for significant cough or SOB  CV: NEGATIVE  for chest pain, palpitations or peripheral edema  GI: abdominal pain intermittent epigastric area, constipation, nausea and vomiting  : denies dysuria   MUSCULOSKELETAL: neck and shoulder, bilateral knees at times   NEURO: Chronic headaches daily since age 22  ENDOCRINE: Hx thyroid disease  PSYCHIATRIC: HX anxiety and HX depression      Objective    /82   Pulse 104   Temp 98.3  F (36.8  C) (Tympanic)   Wt 74.4 kg (164 lb)   SpO2 98%   BMI 30.00 kg/m    Body mass index is 30 kg/m .  Physical Exam   GENERAL: alert, no distress and fatigued  EYES: Eyes grossly normal to inspection, PERRL and conjunctivae and sclerae normal  NECK: no adenopathy, no asymmetry, masses, or scars and thyroid normal to palpation  RESP: lungs clear to auscultation - no rales, rhonchi or wheezes  CV: regular rate and rhythm, normal S1 S2, no S3 or S4, no murmur, click or rub, no peripheral edema and peripheral pulses strong  ABDOMEN: soft, nontender, no hepatosplenomegaly, no masses and bowel sounds normal  MS: no gross musculoskeletal defects noted, no edema  SKIN: no suspicious lesions or rashes  NEURO: Normal strength and tone, sensory exam grossly normal, mentation intact and cranial nerves 2-12 intact  PSYCH: mentation appears normal flat affect     Results for orders placed or performed in visit on 10/21/20 (from the past 24 hour(s))   CBC with platelets and differential   Result Value Ref Range    WBC 9.0 4.0 - 11.0 10e9/L    RBC Count 4.50 3.8 - 5.2 10e12/L    Hemoglobin 13.1 11.7 - 15.7 g/dL    Hematocrit 40.8 35.0 - 47.0 %    MCV 91 78 - 100 fl    MCH 29.1 26.5 - 33.0 pg    MCHC 32.1 31.5 - 36.5 g/dL    RDW 14.5 10.0 - 15.0 %    Platelet Count 441 150 - 450 10e9/L    Diff Method Automated Method     % Neutrophils 49.8 %    % Lymphocytes 34.5 %    % Monocytes 10.1 %    % Eosinophils 3.5 %    % Basophils 0.8 %    % Immature Granulocytes 1.3 %    Nucleated RBCs 0 0 /100    Absolute Neutrophil 4.5 1.6 - 8.3 10e9/L     Absolute Lymphocytes 3.1 0.8 - 5.3 10e9/L    Absolute Monocytes 0.9 0.0 - 1.3 10e9/L    Absolute Eosinophils 0.3 0.0 - 0.7 10e9/L    Absolute Basophils 0.1 0.0 - 0.2 10e9/L    Abs Immature Granulocytes 0.1 0 - 0.4 10e9/L    Absolute Nucleated RBC 0.0    Comprehensive metabolic panel (BMP + Alb, Alk Phos, ALT, AST, Total. Bili, TP)   Result Value Ref Range    Sodium 139 133 - 144 mmol/L    Potassium 4.2 3.4 - 5.3 mmol/L    Chloride 106 94 - 109 mmol/L    Carbon Dioxide 27 20 - 32 mmol/L    Anion Gap 6 3 - 14 mmol/L    Glucose 91 70 - 99 mg/dL    Urea Nitrogen 12 7 - 30 mg/dL    Creatinine 0.60 0.52 - 1.04 mg/dL    GFR Estimate >90 >60 mL/min/[1.73_m2]    GFR Estimate If Black >90 >60 mL/min/[1.73_m2]    Calcium 8.4 (L) 8.5 - 10.1 mg/dL    Bilirubin Total 0.2 0.2 - 1.3 mg/dL    Albumin 4.0 3.4 - 5.0 g/dL    Protein Total 7.8 6.8 - 8.8 g/dL    Alkaline Phosphatase 114 40 - 150 U/L    ALT 40 0 - 50 U/L    AST 19 0 - 45 U/L   Lipid Profile (Chol, Trig, HDL, LDL calc)   Result Value Ref Range    Cholesterol 192 <200 mg/dL    Triglycerides 157 (H) <150 mg/dL    HDL Cholesterol 74 >49 mg/dL    LDL Cholesterol Calculated 87 <100 mg/dL    Non HDL Cholesterol 118 <130 mg/dL   TSH with free T4 reflex   Result Value Ref Range    TSH 2.57 0.40 - 4.00 mU/L           Assessment & Plan     Encounter to establish care  Plan to work with MTM to help with decreasing opiates, chronic use of muscle relaxants, and Ambien and to review medications.     Postherpetic polyneuropathy  Continue with Gabapentin.  Plan to reassess need   She is difficulty to determine actual need for continued Gabapentin   She denies any pain today except her chronic headache  Would like to consider weaning off if not needed  - MED THERAPY MANAGE REFERRAL    Other migraine without status migrainosus, not intractable  She is using norco for her chronic headache.  Discussed narcotics are not usually used for headache.  Offered neurology referral she declined.   Possible headache from chronic neck and shoulder.  She would benefit from physical therapy, referral to neurology and or pain management referral   Possible rebound headaches due to chronic narcotic use   - Comprehensive metabolic panel (BMP + Alb, Alk Phos, ALT, AST, Total. Bili, TP)    Epigastric abdominal pain  Pain decreased and regular bowel movements with [place] use  - MED THERAPY MANAGE REFERRAL    Chronic, continuous use of opioids  Would like to try and wean off opiates and consider alternative treatments for her chronic headaches  - MED THERAPY MANAGE REFERRAL    Other specified hypothyroidism  Normal TSH continue current treatment   - TSH with free T4 reflex    Hyperlipidemia with target LDL less than 100  Continue current plan of care  - Lipid Profile (Chol, Trig, HDL, LDL calc)    Iron deficiency anemia, unspecified iron deficiency anemia type  Normal CBC  Continue with folic acid   - MED THERAPY MANAGE REFERRAL  - CBC with platelets and differential    Dysthymia  Low dose Abilify was added earlier this year to help work with Lexapro. She did have some improvement.  With Pandemic her anxiety and depression have increased   - MED THERAPY MANAGE REFERRAL  - Comprehensive metabolic panel (BMP + Alb, Alk Phos, ALT, AST, Total. Bili, TP)    Insomnia, unspecified type  She is using high doses of Ambien to help her sleep. She states she has needed it for years.  Would like to find a substitute for high dose Ambien.  - MED THERAPY MANAGE REFERRAL    Anxiety state  As above  - MED THERAPY MANAGE REFERRAL    Cervical pain  As above  - MED THERAPY MANAGE REFERRAL    Arthralgia of both knees  Recently seen by Dr Hernandez for injection into hip for pain due to bursitis   As above     Tension headache  As above     Double vision  Complaining of recent double vision and decreased vision at night  - EYE ADULT REFERRAL; Future         10/23/2020  Updated.  Declined MTM referral because she would be private pay due to  insurance  Discussed weaning with opiate specialist.  Next fill plan to decrease dose of Norco 7.5/325 to 5/325 (continue with the same amount as we are working an her weaning)  Possible rebound headaches from chronic opiate use. Possible would benefit from MRI and IR consult for her neck pain, which maybe causing her chronic headaches.     Plan on looking at decreasing Ambien and flexeril also in the future.  This will be a long slow weaning process.     See Patient Instructions    Return in about 4 weeks (around 11/18/2020) for chronic disease management.    ASHLEIGH Abdullahi CNP  [facility]

## 2020-10-21 NOTE — PATIENT INSTRUCTIONS
[facility]  [address] [place]  Phone number [phone]        Scheduling appointments   [phone] or  [phone]      Nurse Deedee SOTELO [facility]-BC  Family Nurse Practitioner

## 2020-10-22 ASSESSMENT — ANXIETY QUESTIONNAIRES: GAD7 TOTAL SCORE: 2

## 2020-10-23 ENCOUNTER — TELEPHONE (OUTPATIENT)
Dept: FAMILY MEDICINE | Facility: OTHER | Age: 70
End: 2020-10-23

## 2020-10-23 NOTE — TELEPHONE ENCOUNTER
MTM referral from: [facility] visit (referral by provider)    MTM referral outreach attempt #1 on October 23, 2020 at 1:53 PM      Outcome: Patient is not interested at this time because due to cost. Due to insurance, patient falls under private pay. will route to MTM Pharmacist/Provider as an FYI. Thank you for the referral.     Cristian Real, MTM coordinator

## 2020-10-23 NOTE — TELEPHONE ENCOUNTER
Thank you for the update.  Do you have any suggestions for weaning opiates and her Ambien?    Jojo SOTELO FNP-BC  Family Nurse Practitioner

## 2020-10-30 DIAGNOSIS — R10.13 EPIGASTRIC PAIN: ICD-10-CM

## 2020-10-30 DIAGNOSIS — K31.84 NONDIABETIC GASTROPARESIS: ICD-10-CM

## 2020-11-02 DIAGNOSIS — Z01.818 PREOP GENERAL PHYSICAL EXAM: ICD-10-CM

## 2020-11-02 RX ORDER — METOCLOPRAMIDE 5 MG/1
5 TABLET ORAL
Qty: 90 TABLET | Refills: 1 | Status: SHIPPED | OUTPATIENT
Start: 2020-11-02 | End: 2021-02-28

## 2020-11-02 NOTE — TELEPHONE ENCOUNTER
reglan      Last Written Prescription Date:  08/12/20  Last Fill Quantity: 90,   # refills: 1  Last Office Visit: 10/21/20  Future Office visit:    Next 5 appointments (look out 90 days)    Nov 24, 2020  3:00 PM  (Arrive by 2:45 PM)  Office Visit with ASHLEIGH Coreas CNP  [facility] ([facility] [place] ) [address]  [place] [address]  [phone]

## 2020-11-04 ENCOUNTER — VIRTUAL VISIT (OUTPATIENT)
Dept: FAMILY MEDICINE | Facility: OTHER | Age: 70
End: 2020-11-04
Attending: NURSE PRACTITIONER
Payer: COMMERCIAL

## 2020-11-04 ENCOUNTER — TELEPHONE (OUTPATIENT)
Dept: FAMILY MEDICINE | Facility: OTHER | Age: 70
End: 2020-11-04

## 2020-11-04 DIAGNOSIS — R11.0 NAUSEA: ICD-10-CM

## 2020-11-04 DIAGNOSIS — F11.90 CHRONIC, CONTINUOUS USE OF OPIOIDS: Primary | ICD-10-CM

## 2020-11-04 PROCEDURE — 99213 OFFICE O/P EST LOW 20 MIN: CPT | Mod: TEL | Performed by: NURSE PRACTITIONER

## 2020-11-04 RX ORDER — PROMETHAZINE HYDROCHLORIDE 25 MG/1
TABLET ORAL
Qty: 90 TABLET | Refills: 0 | Status: SHIPPED | OUTPATIENT
Start: 2020-11-04 | End: 2020-12-07

## 2020-11-04 NOTE — PROGRESS NOTES
"Aure Lanier is a 70 year old female who is being evaluated via a billable telephone visit.      The patient has been notified of following:     \"This telephone visit will be conducted via a call between you and your physician/provider. We have found that certain health care needs can be provided without the need for a physical exam.  This service lets us provide the care you need with a short phone conversation.  If a prescription is necessary we can send it directly to your pharmacy.  If lab work is needed we can place an order for that and you can then stop by our lab to have the test done at a later time.    Telephone visits are billed at different rates depending on your insurance coverage. During this emergency period, for some insurers they may be billed the same as an in-person visit.  Please reach out to your insurance provider with any questions.    If during the course of the call the physician/provider feels a telephone visit is not appropriate, you will not be charged for this service.\"    Patient has given verbal consent for Telephone visit?  Yes    What phone number would you like to be contacted at? [phone]    How would you like to obtain your AVS? Declined     Subjective     Aure Lanier is a 70 year old female who presents via phone visit today for the following health issues:    HPI        Medication review:  For constipation and nausea with slow gut   She is requesting a refill of her phenergan today.  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 She is reluctant to stop any medication           Review of Systems   CONSTITUTIONAL: NEGATIVE for fever, chills, change in weight  RESP: NEGATIVE for significant cough or SOB  CV: NEGATIVE for chest pain, palpitations or peripheral edema  NEURO: chronic daily headache   PSYCHIATRIC: HX anxiety and HX depression       Objective          Vitals:  No vitals were obtained today due to virtual visit.    alert and no distress  PSYCH: Alert and oriented times 3; coherent speech, normal   rate and volume, able to articulate logical thoughts, able   to abstract reason, no tangential thoughts, no hallucinations   or delusions  Her affect is anxious  RESP: No cough, no audible wheezing, able to talk in full sentences  Remainder of exam unable to be completed due to telephone visits            Assessment/Plan:    Assessment & Plan     Chronic, continuous use of opioids  Long discussion about chronic opioid use and rebound headache  She declines see neurology - state she did in the past. She states she attempted to wean off before with out any changes in her headaches  It is unclear if the opioids help her headaches at all, but she is very reluctant to stop.   She continues to have daily headaches even with 4 norco a day   Plan to wean off opioids.  At this time she is taking 4 tablets per day  Start by decreasing to 3 tablets per day  And will discuss at next appointment   She did agree to try to wean     Nausea  Reviewed GI medications   She is very reluctant to make any changes.  She has a long history of abdominal problems/discomfort after her gastric bypass surgery. She has had endoscopy - possible not absorbing medication correctly after gastric bypass maybe contributing to her symptom  She would benefit from GI follow up or follow up with her surgeon, but she is declining that at this time.    Did discuss some of the medication she is on puts her at an elevated risk for tardive dyskinesia.  She  states she is aware of the risk.    She is strongly encouraged to follow up with specialty. She states she cannot afford to travel to see other provider.           Spoke with pharmacist she can just stop Bentyl without weaning which may be causing some of her side effects. Bentyl side effects constipation and nausea   She does not have insurance for MTM and declined paying     Discussed plan to start weaning off some of her medication - she is very reluctant and does not want too   Unknown what is really helping and what is causing additional side effects  Concerns for abilify, phenergan, and reglan causing TD symptoms     No follow-ups on file.    ASHLEIGH Abdullahi CNP  [facility]    Phone call duration:  18 minutes

## 2020-11-04 NOTE — TELEPHONE ENCOUNTER
Left message for patient to call back- Jojo is getting refill requests for patient and would like to discuss some medications. Would like to get a telephone visit with her or in person today (11/4/20) if possible. Deedee England LPN

## 2020-11-15 DIAGNOSIS — G47.00 PERSISTENT INSOMNIA: ICD-10-CM

## 2020-11-18 RX ORDER — ZOLPIDEM TARTRATE 10 MG/1
TABLET ORAL
Qty: 30 TABLET | Refills: 0 | Status: SHIPPED | OUTPATIENT
Start: 2020-11-18 | End: 2020-12-21

## 2020-11-18 NOTE — TELEPHONE ENCOUNTER
ambien      Last Written Prescription Date:  8/24/2020  Last Fill Quantity: 90,   # refills: 1  Last Office Visit: 10/21/2020  Future Office visit:    Next 5 appointments (look out 90 days)    Nov 24, 2020  3:00 PM  (Arrive by 2:45 PM)  Office Visit with ASHLEIGH Coreas CNP  [facility] ([facility] [place] ) [address]  [place] [address]  [phone]

## 2020-11-18 NOTE — TELEPHONE ENCOUNTER
She should not be taking more than 1 tablet per night.    Jojo SOTELO FNP-BC  Family Nurse Practitioner

## 2020-11-18 NOTE — TELEPHONE ENCOUNTER
I am not comfortable giving her more then 1 per night.  I did let her know this in the past.  She is welcome to establish care with another provider     Jojo SOTELO FNP-BC  Family Nurse Practitioner

## 2020-11-18 NOTE — TELEPHONE ENCOUNTER
Pt called back and was informed of below. She has appt to establish with Dr Milton on 12/20. Pt wondering if Dr Milton would refill Ambien for 20mg nightly. Please advise. Thank you!

## 2020-11-18 NOTE — TELEPHONE ENCOUNTER
Pt called regarding ambien. Was informed per note below that she should not be taking more than one tablet per night. Pt states that if she only takes one tab, she only sleeps for 3 hours. Pt states that if Jojo will not refill this med for two tabs per night, she will switch to a different provider. Pt requesting that refill be sent to a different provider. Advised that a covering provider would not be appropriate to send refill request to as PCP has already signed off on med. Pt requesting that Jojo call her. Please advise. Thank you!

## 2020-11-21 DIAGNOSIS — M54.2 CERVICALGIA: ICD-10-CM

## 2020-11-21 DIAGNOSIS — M54.2 CERVICAL PAIN: ICD-10-CM

## 2020-11-21 DIAGNOSIS — M25.561 ARTHRALGIA OF BOTH KNEES: ICD-10-CM

## 2020-11-21 DIAGNOSIS — M25.562 ARTHRALGIA OF BOTH KNEES: ICD-10-CM

## 2020-11-23 ENCOUNTER — TRANSFERRED RECORDS (OUTPATIENT)
Dept: HEALTH INFORMATION MANAGEMENT | Facility: CLINIC | Age: 70
End: 2020-11-23

## 2020-11-23 RX ORDER — HYDROCODONE BITARTRATE AND ACETAMINOPHEN 7.5; 325 MG/1; MG/1
TABLET ORAL
Qty: 120 TABLET | Refills: 0 | Status: SHIPPED | OUTPATIENT
Start: 2020-11-23 | End: 2020-12-21

## 2020-11-23 RX ORDER — CYCLOBENZAPRINE HCL 10 MG
TABLET ORAL
Qty: 90 TABLET | Refills: 2 | Status: SHIPPED | OUTPATIENT
Start: 2020-11-23 | End: 2021-02-15

## 2020-11-23 NOTE — TELEPHONE ENCOUNTER
HYDROcodone-acetaminophen (NORCO) 7.5-325 MG per tablet  Last Written Prescription Date:  10/24/2020  Last Fill Quantity: 120,   # refills: 0  Last Office Visit: 11/04/2020  Future Office visit:          cyclobenzaprine (FLEXERIL) 10 MG tablet  Last Written Prescription Date:  09/03/2020  Last Fill Quantity: 90,   # refills: 2  Last Office Visit:   Future Office visit:    Next 5 appointments (look out 90 days)    Nov 24, 2020  3:00 PM  (Arrive by 2:45 PM)  Office Visit with ASHLEIGH Coreas CNP  [facility] ([facility] ) [address]  [place] [address]  [phone]           Routing refill request to provider for review/approval because:  Drug not on the [facility], [facility] or [facility] refill protocol or controlled substance

## 2020-11-24 ENCOUNTER — VIRTUAL VISIT (OUTPATIENT)
Dept: FAMILY MEDICINE | Facility: OTHER | Age: 70
End: 2020-11-24
Attending: NURSE PRACTITIONER
Payer: COMMERCIAL

## 2020-11-24 VITALS — WEIGHT: 165 LBS | BODY MASS INDEX: 30.36 KG/M2 | HEIGHT: 62 IN

## 2020-11-24 DIAGNOSIS — G89.29 CHRONIC NONINTRACTABLE HEADACHE, UNSPECIFIED HEADACHE TYPE: ICD-10-CM

## 2020-11-24 DIAGNOSIS — G47.01 INSOMNIA DUE TO MEDICAL CONDITION: ICD-10-CM

## 2020-11-24 DIAGNOSIS — F11.90 CHRONIC, CONTINUOUS USE OF OPIOIDS: Primary | ICD-10-CM

## 2020-11-24 DIAGNOSIS — R51.9 CHRONIC NONINTRACTABLE HEADACHE, UNSPECIFIED HEADACHE TYPE: ICD-10-CM

## 2020-11-24 DIAGNOSIS — E03.8 OTHER SPECIFIED HYPOTHYROIDISM: ICD-10-CM

## 2020-11-24 DIAGNOSIS — E78.2 MIXED HYPERLIPIDEMIA: ICD-10-CM

## 2020-11-24 PROCEDURE — 99214 OFFICE O/P EST MOD 30 MIN: CPT | Mod: TEL | Performed by: NURSE PRACTITIONER

## 2020-11-24 ASSESSMENT — ANXIETY QUESTIONNAIRES
4. TROUBLE RELAXING: NOT AT ALL
5. BEING SO RESTLESS THAT IT IS HARD TO SIT STILL: NOT AT ALL
GAD7 TOTAL SCORE: 4
7. FEELING AFRAID AS IF SOMETHING AWFUL MIGHT HAPPEN: NOT AT ALL
1. FEELING NERVOUS, ANXIOUS, OR ON EDGE: MORE THAN HALF THE DAYS
IF YOU CHECKED OFF ANY PROBLEMS ON THIS QUESTIONNAIRE, HOW DIFFICULT HAVE THESE PROBLEMS MADE IT FOR YOU TO DO YOUR WORK, TAKE CARE OF THINGS AT HOME, OR GET ALONG WITH OTHER PEOPLE: SOMEWHAT DIFFICULT
3. WORRYING TOO MUCH ABOUT DIFFERENT THINGS: SEVERAL DAYS
2. NOT BEING ABLE TO STOP OR CONTROL WORRYING: SEVERAL DAYS
6. BECOMING EASILY ANNOYED OR IRRITABLE: NOT AT ALL

## 2020-11-24 ASSESSMENT — MIFFLIN-ST. JEOR: SCORE: 1231.69

## 2020-11-24 ASSESSMENT — PATIENT HEALTH QUESTIONNAIRE - PHQ9: SUM OF ALL RESPONSES TO PHQ QUESTIONS 1-9: 7

## 2020-11-24 ASSESSMENT — PAIN SCALES - GENERAL: PAINLEVEL: MODERATE PAIN (5)

## 2020-11-24 NOTE — PROGRESS NOTES
"Aure Lanier is a 70 year old female who is being evaluated via a billable telephone visit.      The patient has been notified of following:     \"This telephone visit will be conducted via a call between you and your physician/provider. We have found that certain health care needs can be provided without the need for a physical exam.  This service lets us provide the care you need with a short phone conversation.  If a prescription is necessary we can send it directly to your pharmacy.  If lab work is needed we can place an order for that and you can then stop by our lab to have the test done at a later time.    Telephone visits are billed at different rates depending on your insurance coverage. During this emergency period, for some insurers they may be billed the same as an in-person visit.  Please reach out to your insurance provider with any questions.    If during the course of the call the physician/provider feels a telephone visit is not appropriate, you will not be charged for this service.\"    Patient has given verbal consent for Telephone visit?  Yes    What phone number would you like to be contacted at? [phone]    How would you like to obtain your AVS? Mail a copy    Subjective     Aure Lanier is a 70 year old female who presents via phone visit today for the following health issues:    HPI     Hyperlipidemia Follow-Up      Are you regularly taking any medication or supplement to lower your cholesterol?   Yes- Simvastatin     Are you having muscle aches or other side effects that you think could be caused by your cholesterol lowering medication?  No     Denies any side effects     She states she is only taking 20 mg a day  She takes 1/2 tablet    Anxiety Follow-Up    How are you doing with your anxiety since your last visit? Improved     Are you having other symptoms that might be associated with anxiety? No    Have you had a significant life event? OTHER: covid and having hard time with " dentures feels some self concern for looks      Are you feeling depressed? No    Do you have any concerns with your use of alcohol or other drugs? No     She did stop the Abilify - she stopped and does not notify any adverse effects      Social History     Tobacco Use     Smoking status: Never Smoker     Smokeless tobacco: Never Used     Tobacco comment: no passive exposure   Substance Use Topics     Alcohol use: Yes     Comment: rarely, maybe yearly     Drug use: No     AURELIA-7 SCORE 6/19/2020 10/21/2020 11/24/2020   Total Score 1 2 4     PHQ 6/19/2020 10/21/2020 11/24/2020   PHQ-9 Total Score 3 0 7   Q9: Thoughts of better off dead/self-harm past 2 weeks Not at all Not at all Not at all     Last PHQ-9 11/24/2020   1.  Little interest or pleasure in doing things 1   2.  Feeling down, depressed, or hopeless 1   3.  Trouble falling or staying asleep, or sleeping too much 3   4.  Feeling tired or having little energy 2   5.  Poor appetite or overeating 0   6.  Feeling bad about yourself 0   7.  Trouble concentrating 0   8.  Moving slowly or restless 0   Q9: Thoughts of better off dead/self-harm past 2 weeks 0   PHQ-9 Total Score 7   Difficulty at work, home, or with people Not difficult at all     AURELIA-7  11/24/2020   1. Feeling nervous, anxious, or on edge 2   2. Not being able to stop or control worrying 1   3. Worrying too much about different things 1   4. Trouble relaxing 0   5. Being so restless that it is hard to sit still 0   6. Becoming easily annoyed or irritable 0   7. Feeling afraid, as if something awful might happen 0   AURELIA-7 Total Score 4   If you checked any problems, how difficult have they made it for you to do your work, take care of things at home, or get along with other people? Somewhat difficult       Hypothyroidism Follow-up      Since last visit, patient describes the following symptoms: Weight stable, no hair loss, no skin changes, no constipation, no loose stools    Chronic Pain  Follow-Up    Where in your body do you have pain? Headache   How has your pain affected your ability to work? Unable to work  Which of these pain treatments have you tried since your last clinic visit? Other: none  How well are you sleeping? Fair  How has your mood been since your last visit? Better  Have you had a significant life event? Other: covid and not seeing family   Other aggravating factors: lack of sleep for headache   Taking medication as directed? Yes  Offered physical therapy - but she does not feel this would be a good solution   Has had for knee in the past   Offered imaging and ir radiation - she has concerns for how much it would cost she has a 20% co-pay     PHQ-9 SCORE 6/19/2020 10/21/2020 11/24/2020   PHQ-9 Total Score - - -   PHQ-9 Total Score 3 0 7     AURELIA-7 SCORE 6/19/2020 10/21/2020 11/24/2020   Total Score 1 2 4     No flowsheet data found.  Encounter-Level CSA - 07/11/2017:    Controlled Substance Agreement - Scan on 7/20/2017 12:19 PM: CONTROLLED SUBSTANCE AGREEMENT     Patient-Level CSA:    There are no patient-level csa.         How many servings of fruits and vegetables do you eat daily?  0-1    On average, how many sweetened beverages do you drink each day (Examples: soda, juice, sweet tea, etc.  Do NOT count diet or artificially sweetened beverages)?   3    How many days per week do you exercise enough to make your heart beat faster? 4    How many minutes a day do you exercise enough to make your heart beat faster? 30 - 60    How many days per week do you miss taking your medication? 0    Insomnia  Onset/Duration: many years   Description:   Frequency of insomnia:  nightly  Time to fall asleep (sleep latency): 1 hour  Middle of night awakening:  YES- sometimes  Early morning awakening:  YES- chase medication wears off   Progression of Symptoms:  same  Accompanying Signs & Symptoms:  Daytime sleepiness/napping: no  Excessive snoring/apnea: no  Restless legs: YES- occasionally   Waking  to urinate: YES  Chronic pain:  YES  Depression symptoms (if yes, do PHQ9): no  Anxiety symptoms (if yes, do AURELIA-7): YES  History:  Prior Insomnia: YES  New stressful situation: no  Precipitating factors:   Caffeine intake: no  OTC decongestants: no  Any new medications: no  Alleviating factors:  Self medicating (alcohol, etc.):  no  Stress-reduction (exercise, yoga, meditation etc): YES- exercise bike   Therapies tried and outcome: Ambien -  effective           Review of Systems   CONSTITUTIONAL: NEGATIVE for fever, chills, change in weight  INTEGUMENTARY/SKIN: she had a little rash around hair line, but this is clearing   EYES: eye exam yesterday - occasional double vision - developing cataracts   ENT/MOUTH: NEGATIVE for ear, mouth and throat problems  RESP:NEGATIVE for significant cough or SOB  CV: NEGATIVE for chest pain, palpitations or peripheral edema  GI: NEGATIVE for nausea, abdominal pain, heartburn, or change in bowel habits - working well today  : denies dysuria   MUSCULOSKELETAL: neck stiff into her head   NEURO: chronic headaches   ENDOCRINE: NEGATIVE for temperature intolerance, skin/hair changes and Hx thyroid disease  PSYCHIATRIC:  HX anxiety and HX depression       Objective   Vitals - Patient Reported  Pain Score: Moderate Pain (5)  Pain Loc: Head      Vitals:  No vitals were obtained today due to virtual visit.    alert and no distress  PSYCH: Alert and oriented times 3; coherent speech, normal   rate and volume, able to articulate logical thoughts, able   to abstract reason, no tangential thoughts, no hallucinations   or delusions  Her affect is normal and pleasant  RESP: No cough, no audible wheezing, able to talk in full sentences  Remainder of exam unable to be completed due to telephone visits    No results found for any visits on 11/24/20.  Reviewed labs         Assessment/Plan:    Assessment & Plan     Chronic, continuous use of opioids  Continue for now  Discussed possible rebound  headaches due to chronic opiate use  She does not want to try any alternative treatments, any new imaging or referrals.  She states she has a 20% co-pay and cannot afford.  She did agree to try some pain cream prior to bed at night and prior to playing the organ at [facility]     Chronic nonintractable headache, unspecified headache type  Continue opiates for now - to be addressed by provider she is establishing care with - we did discussed weaning from medication - I did cut back on her Ambien so  No further changes at this time   Discussed possible rebound headaches due to chronic opiate use  She does not want to try any alternative treatments, any new imaging or referrals.  She states she has a 20% co-pay and cannot afford.  She did agree to try some pain cream prior to bed at night and prior to playing the organ at [facility]     Mixed hyperlipidemia  Continue with 1/2 tablet of zocor     Other specified hypothyroidism  Normal tsh continue current plan of care     Insomnia due to medical condition  Trazodone 50 mg at night and try using Ambien PRN  See below          She does not have insurance MTM referral, but I did have pharmacy review medications   Discussed with Aure to try trazodone for sleep (starting low) and can use Ambien as a PRN.  She did agree to this, but it will depend on insurance and cost of medication.  No change in opiates at this time - can be addressed at a later time   For weaning off zolpidem I'd suggest changing from 10 mg every night + additional 10 mg prn to just 10 mg prn. In addition may benefit from adding trazodone nightly and using zolpidem prn.     As far as opioid tapering I generally suggest using the strategy proposed on UpToDate. The goal is to reduce the total opioid dose by 10% each month. So in Aure's case go from hydrocodone 7.5 mg QID to 7.5 mg TID + 5 mg once daily. Continue this strategy each month. So month 2 would be 7.5 mg BID + 5 mg BID, and so on. It's a relatively  slow taper but prevents the need to have to increase her dose again.     Keyon Beavers, [facility] on 11/12/2020 at 12:03 PM       See Patient Instructions    No follow-ups on file.    ASHLEIGH Abdullahi [facility] [place]    Phone call duration:  25 minutes  Call started 2:49  Call ended 3:14

## 2020-11-24 NOTE — NURSING NOTE
"Chief Complaint   Patient presents with     Lipids     Anxiety     Thyroid Problem     Pain       Initial Ht 1.575 m (5' 2\")   Wt 74.8 kg (165 lb)   BMI 30.18 kg/m   Estimated body mass index is 30.18 kg/m  as calculated from the following:    Height as of this encounter: 1.575 m (5' 2\").    Weight as of this encounter: 74.8 kg (165 lb).  Medication Reconciliation: complete  Pamela M. Lechevalier, LPN    "

## 2020-11-25 RX ORDER — TRAZODONE HYDROCHLORIDE 50 MG/1
50 TABLET, FILM COATED ORAL AT BEDTIME
Qty: 30 TABLET | Refills: 1 | Status: SHIPPED | OUTPATIENT
Start: 2020-11-25 | End: 2021-04-13

## 2020-11-25 ASSESSMENT — ANXIETY QUESTIONNAIRES: GAD7 TOTAL SCORE: 4

## 2020-12-07 DIAGNOSIS — Z01.818 PREOP GENERAL PHYSICAL EXAM: ICD-10-CM

## 2020-12-07 RX ORDER — PROMETHAZINE HYDROCHLORIDE 25 MG/1
TABLET ORAL
Qty: 90 TABLET | Refills: 0 | Status: SHIPPED | OUTPATIENT
Start: 2020-12-07 | End: 2021-01-07

## 2020-12-07 NOTE — TELEPHONE ENCOUNTER
promethazine (PHENERGAN) 25 MG tablet     Last Written Prescription Date:  11/4/20  Last Fill Quantity: 90,   # refills: 0  Last Office Visit: 11/24/20  Future Office visit:   12/21/20 -- establish care with Dr. Milton

## 2020-12-09 DIAGNOSIS — E78.2 MIXED HYPERLIPIDEMIA: ICD-10-CM

## 2020-12-09 RX ORDER — SIMVASTATIN 40 MG
TABLET ORAL
Qty: 90 TABLET | Refills: 0 | Status: SHIPPED | OUTPATIENT
Start: 2020-12-09 | End: 2021-10-20

## 2020-12-09 NOTE — TELEPHONE ENCOUNTER
Simvastatin  Last Written Prescription Date: 8/16/19  Last Fill Quantity: 90 # of Refills: 0  Last Office Visit: 11/24/20

## 2020-12-21 ENCOUNTER — OFFICE VISIT (OUTPATIENT)
Dept: INTERNAL MEDICINE | Facility: OTHER | Age: 70
End: 2020-12-21
Attending: NURSE PRACTITIONER
Payer: COMMERCIAL

## 2020-12-21 VITALS
TEMPERATURE: 98 F | HEART RATE: 99 BPM | OXYGEN SATURATION: 97 % | WEIGHT: 168 LBS | DIASTOLIC BLOOD PRESSURE: 78 MMHG | BODY MASS INDEX: 30.73 KG/M2 | SYSTOLIC BLOOD PRESSURE: 136 MMHG

## 2020-12-21 DIAGNOSIS — M25.562 ARTHRALGIA OF BOTH KNEES: ICD-10-CM

## 2020-12-21 DIAGNOSIS — G47.00 PERSISTENT INSOMNIA: ICD-10-CM

## 2020-12-21 DIAGNOSIS — M54.2 CERVICAL PAIN: ICD-10-CM

## 2020-12-21 DIAGNOSIS — M25.561 ARTHRALGIA OF BOTH KNEES: ICD-10-CM

## 2020-12-21 PROCEDURE — G0463 HOSPITAL OUTPT CLINIC VISIT: HCPCS

## 2020-12-21 PROCEDURE — 99204 OFFICE O/P NEW MOD 45 MIN: CPT | Performed by: INTERNAL MEDICINE

## 2020-12-21 RX ORDER — HYDROCODONE BITARTRATE AND ACETAMINOPHEN 7.5; 325 MG/1; MG/1
TABLET ORAL
Qty: 90 TABLET | Refills: 0 | Status: SHIPPED | OUTPATIENT
Start: 2020-12-21 | End: 2021-01-18

## 2020-12-21 RX ORDER — ZOLPIDEM TARTRATE 10 MG/1
TABLET ORAL
Qty: 30 TABLET | Refills: 0 | Status: SHIPPED | OUTPATIENT
Start: 2020-12-21 | End: 2021-01-18

## 2020-12-21 RX ORDER — TRAZODONE HYDROCHLORIDE 150 MG/1
150 TABLET ORAL AT BEDTIME
Qty: 30 TABLET | Refills: 1 | Status: SHIPPED | OUTPATIENT
Start: 2020-12-21 | End: 2021-02-15

## 2020-12-21 ASSESSMENT — PAIN SCALES - GENERAL: PAINLEVEL: SEVERE PAIN (6)

## 2020-12-21 NOTE — NURSING NOTE
"Chief Complaint   Patient presents with     Establish Care       Initial /78 (BP Location: Left arm, Patient Position: Chair, Cuff Size: Adult Regular)   Pulse 99   Temp 98  F (36.7  C) (Tympanic)   Wt 76.2 kg (168 lb)   SpO2 97%   BMI 30.73 kg/m   Estimated body mass index is 30.73 kg/m  as calculated from the following:    Height as of 11/24/20: 1.575 m (5' 2\").    Weight as of this encounter: 76.2 kg (168 lb).  Medication Reconciliation: complete  JESUSITA DELGADO LPN  " Left detailed message regarding clobetasol ointment as below per Dr. George message. Rx sent to preferred pharmacy. Asked Lu to call with any questions or concerns.

## 2020-12-21 NOTE — PROGRESS NOTES
Subjective     Aure Lanier is a 70 year old female who presents to clinic today for the following health issues:    HPI         New Patient/Transfer of Care    Insomnia  Onset/Duration: Follow up  Description:   Frequency of insomnia:  nightly  Time to fall asleep (sleep latency): unable to fall sleep without medication  Middle of night awakening:  YES  Early morning awakening:  YES  Progression of Symptoms:  same  Accompanying Signs & Symptoms:  Daytime sleepiness/napping: no  Excessive snoring/apnea: no  Restless legs: no  Waking to urinate: no  Chronic pain:  YES  Depression symptoms (if yes, do PHQ9): anxious due to covid  Anxiety symptoms (if yes, do AURELIA-7): YES  History:  Prior Insomnia: YES  New stressful situation: no  Precipitating factors:   Caffeine intake: no  OTC decongestants: no  Any new medications: no  Alleviating factors:  Self medicating (alcohol, etc.):  no  Stress-reduction (exercise, yoga, meditation etc): YES  Therapies tried and outcome: Trazodone- not effective. Would like to discuss getting back on Ambien.     Aure Lanier presents today to establish care.  She has  a history of chronic headaches and takes hydrocodone for these.  She was seen by another provider who recommended she taper her pain medications and also stated she is likely having rebound headaches from her opioids.  I informed Aure I agreed with this and I would be happy to be her provider but would require her to reduce her pain medications.   In addition she requested Ambien 10 mg #60 per month. She states she likes to use 2 pills per night.  I also told her I would not be willing to prescribe 20 mg nightly and I am surprised insurance would even cover that.  She states the Trazodone did not work but I informed her she is on a small dose and we could consider increasing this.                Review of Systems   Constitutional, HEENT, cardiovascular, pulmonary, GI, , musculoskeletal, neuro, skin, endocrine and  psych systems are negative, except as otherwise noted.      Objective    /78 (BP Location: Left arm, Patient Position: Chair, Cuff Size: Adult Regular)   Pulse 99   Temp 98  F (36.7  C) (Tympanic)   Wt 76.2 kg (168 lb)   SpO2 97%   BMI 30.73 kg/m    Body mass index is 30.73 kg/m .  Physical Exam   GENERAL: Alert and no distress  EYES: Eyes grossly normal to inspection, PERRL and conjunctivae and sclerae normal  HENT: ear canals and TM's normal, nose and mouth without ulcers or lesions  NECK: no adenopathy, no asymmetry, masses, or scars and thyroid normal to palpation  RESP: lungs clear to auscultation - no rales, rhonchi or wheezes  CV: regular rate and rhythm, normal S1 S2, no S3 or S4, no murmur, click or rub, no peripheral edema and peripheral pulses strong  ABDOMEN: soft, nontender, no hepatosplenomegaly, no masses and bowel sounds normal  MS: no gross musculoskeletal defects noted, no edema  SKIN: no suspicious lesions or rashes  NEURO: Normal strength and tone, mentation intact and speech normal  PSYCH: Anxious    No results found for this or any previous visit (from the past 24 hour(s)).  No results found for any visits on 12/21/20.  Office Visit on 10/21/2020   Component Date Value Ref Range Status     WBC 10/21/2020 9.0  4.0 - 11.0 10e9/L Final     RBC Count 10/21/2020 4.50  3.8 - 5.2 10e12/L Final     Hemoglobin 10/21/2020 13.1  11.7 - 15.7 g/dL Final     Hematocrit 10/21/2020 40.8  35.0 - 47.0 % Final     MCV 10/21/2020 91  78 - 100 fl Final     MCH 10/21/2020 29.1  26.5 - 33.0 pg Final     MCHC 10/21/2020 32.1  31.5 - 36.5 g/dL Final     RDW 10/21/2020 14.5  10.0 - 15.0 % Final     Platelet Count 10/21/2020 441  150 - 450 10e9/L Final     Diff Method 10/21/2020 Automated Method   Final     % Neutrophils 10/21/2020 49.8  % Final     % Lymphocytes 10/21/2020 34.5  % Final     % Monocytes 10/21/2020 10.1  % Final     % Eosinophils 10/21/2020 3.5  % Final     % Basophils 10/21/2020 0.8  % Final      % Immature Granulocytes 10/21/2020 1.3  % Final     Nucleated RBCs 10/21/2020 0  0 /100 Final     Absolute Neutrophil 10/21/2020 4.5  1.6 - 8.3 10e9/L Final     Absolute Lymphocytes 10/21/2020 3.1  0.8 - 5.3 10e9/L Final     Absolute Monocytes 10/21/2020 0.9  0.0 - 1.3 10e9/L Final     Absolute Eosinophils 10/21/2020 0.3  0.0 - 0.7 10e9/L Final     Absolute Basophils 10/21/2020 0.1  0.0 - 0.2 10e9/L Final     Abs Immature Granulocytes 10/21/2020 0.1  0 - 0.4 10e9/L Final     Absolute Nucleated RBC 10/21/2020 0.0   Final     Sodium 10/21/2020 139  133 - 144 mmol/L Final     Potassium 10/21/2020 4.2  3.4 - 5.3 mmol/L Final     Chloride 10/21/2020 106  94 - 109 mmol/L Final     Carbon Dioxide 10/21/2020 27  20 - 32 mmol/L Final     Anion Gap 10/21/2020 6  3 - 14 mmol/L Final     Glucose 10/21/2020 91  70 - 99 mg/dL Final    Non Fasting     Urea Nitrogen 10/21/2020 12  7 - 30 mg/dL Final     Creatinine 10/21/2020 0.60  0.52 - 1.04 mg/dL Final     GFR Estimate 10/21/2020 >90  >60 mL/min/[1.73_m2] Final    Comment: Non  GFR Calc  Starting 12/18/2018, serum creatinine based estimated GFR (eGFR) will be   calculated using the Chronic Kidney Disease Epidemiology Collaboration   (CKD-EPI) equation.       GFR Estimate If Black 10/21/2020 >90  >60 mL/min/[1.73_m2] Final    Comment:  GFR Calc  Starting 12/18/2018, serum creatinine based estimated GFR (eGFR) will be   calculated using the Chronic Kidney Disease Epidemiology Collaboration   (CKD-EPI) equation.       Calcium 10/21/2020 8.4* 8.5 - 10.1 mg/dL Final     Bilirubin Total 10/21/2020 0.2  0.2 - 1.3 mg/dL Final     Albumin 10/21/2020 4.0  3.4 - 5.0 g/dL Final     Protein Total 10/21/2020 7.8  6.8 - 8.8 g/dL Final     Alkaline Phosphatase 10/21/2020 114  40 - 150 U/L Final     ALT 10/21/2020 40  0 - 50 U/L Final     AST 10/21/2020 19  0 - 45 U/L Final     Cholesterol 10/21/2020 192  <200 mg/dL Final     Triglycerides 10/21/2020 157* <150  mg/dL Final    Comment: Borderline high:  150-199 mg/dl  High:             200-499 mg/dl  Very high:       >499 mg/dl  Non Fasting       HDL Cholesterol 10/21/2020 74  >49 mg/dL Final     LDL Cholesterol Calculated 10/21/2020 87  <100 mg/dL Final    Desirable:       <100 mg/dl     Non HDL Cholesterol 10/21/2020 118  <130 mg/dL Final     TSH 10/21/2020 2.57  0.40 - 4.00 mU/L Final           Assessment & Plan   Problem List Items Addressed This Visit     None      Visit Diagnoses     Persistent insomnia        Relevant Medications    traZODone (DESYREL) 150 MG tablet    zolpidem (AMBIEN) 10 MG tablet    Cervical pain        Relevant Medications    HYDROcodone-acetaminophen (NORCO) 7.5-325 MG per tablet  #90    Arthralgia of both knees        Relevant Medications    HYDROcodone-acetaminophen (NORCO) 7.5-325 MG per tablet  #90         Patient received only 90# Norco today and will be reduced to 60# next refill.  I declined Ambien 20 mg nightly.        Balta Milton,   [facility]

## 2020-12-29 DIAGNOSIS — B02.23 POSTHERPETIC POLYNEUROPATHY: ICD-10-CM

## 2020-12-29 NOTE — TELEPHONE ENCOUNTER
Gabapentin       Last Written Prescription Date:  11/30/2020  Last Fill Quantity: 90,   # refills: 0  Last Office Visit: 12/21/2020  Future Office visit:       Routing refill request to provider for review/approval

## 2020-12-30 RX ORDER — GABAPENTIN 300 MG/1
CAPSULE ORAL
Qty: 90 CAPSULE | Refills: 0 | Status: SHIPPED | OUTPATIENT
Start: 2020-12-30 | End: 2021-01-26

## 2021-01-01 NOTE — TELEPHONE ENCOUNTER
Last visit 3/21/17.  Last signed 5/9/17 #120, 0 R.  Thanks  
Prescription picked up by patient ID Verified  
Pt notified that the written RX is ready at the [facility]  registration to be picked up.     
Statement Selected

## 2021-01-06 DIAGNOSIS — Z01.818 PREOP GENERAL PHYSICAL EXAM: ICD-10-CM

## 2021-01-07 RX ORDER — PROMETHAZINE HYDROCHLORIDE 25 MG/1
TABLET ORAL
Qty: 90 TABLET | Refills: 0 | Status: SHIPPED | OUTPATIENT
Start: 2021-01-07 | End: 2021-02-04

## 2021-01-08 ENCOUNTER — TELEPHONE (OUTPATIENT)
Dept: FAMILY MEDICINE | Facility: OTHER | Age: 71
End: 2021-01-08

## 2021-01-08 NOTE — TELEPHONE ENCOUNTER
Prior Authorization Retail Medication Request  Pending sale to [facility] KEY# [id number]    Medication/Dose: PROMETHAZINE HCI 25MG TABLETS  ICD code (if different than what is on RX):    Previously Tried and Failed:    Rationale:      Insurance Name:    Insurance ID:        Pharmacy Information (if different than what is on RX)  Name:    Phone:

## 2021-01-12 NOTE — TELEPHONE ENCOUNTER
Central Prior Authorization Team   Phone: [phone]      Prior Authorization Approval    Authorization Effective Date: 1/1/2021  Authorization Expiration Date: 12/31/2021  Medication: PROMETHAZINE HCI 25MG TABLETS - APPROVED  Approved Dose/Quantity: 90 FOR 30  Reference #:     Insurance Company: [employer] - Phone [phone] Fax [phone]  Expected CoPay:       CoPay Card Available:      Foundation Assistance Needed:    Which Pharmacy is filling the prescription (Not needed for infusion/clinic administered): [id number] - [place], [place] - [address]  Pharmacy Notified: Yes  Patient Notified: Yes (**Instructed pharmacy to notify patient when script is ready to /ship.**)

## 2021-01-12 NOTE — TELEPHONE ENCOUNTER
Central Prior Authorization Team   Phone: [phone]      PA Initiation    Medication: PROMETHAZINE HCI 25MG TABLETS - INITIATED  Insurance Company: [employer] - Phone [phone] Fax [phone]  Pharmacy Filling the Rx: [id number] [place] - [address]  Filling Pharmacy Phone: [phone]  Filling Pharmacy Fax: [phone]  Start Date: 1/12/2021

## 2021-01-17 DIAGNOSIS — M54.2 CERVICAL PAIN: ICD-10-CM

## 2021-01-17 DIAGNOSIS — M25.561 ARTHRALGIA OF BOTH KNEES: ICD-10-CM

## 2021-01-17 DIAGNOSIS — M25.562 ARTHRALGIA OF BOTH KNEES: ICD-10-CM

## 2021-01-17 DIAGNOSIS — G47.00 PERSISTENT INSOMNIA: ICD-10-CM

## 2021-01-18 RX ORDER — ZOLPIDEM TARTRATE 10 MG/1
TABLET ORAL
Qty: 30 TABLET | Refills: 0 | Status: SHIPPED | OUTPATIENT
Start: 2021-01-18 | End: 2021-02-15

## 2021-01-18 RX ORDER — HYDROCODONE BITARTRATE AND ACETAMINOPHEN 7.5; 325 MG/1; MG/1
TABLET ORAL
Qty: 90 TABLET | Refills: 0 | Status: SHIPPED | OUTPATIENT
Start: 2021-01-18 | End: 2021-02-15

## 2021-01-23 DIAGNOSIS — B02.23 POSTHERPETIC POLYNEUROPATHY: ICD-10-CM

## 2021-01-25 NOTE — TELEPHONE ENCOUNTER
Neurontin 300 mg      Last Written Prescription Date:  12/30/20  Last Fill Quantity: 90,   # refills: 0  Last Office Visit: 12/21/20  Future Office visit:       Routing refill request to provider for review/approval because:

## 2021-01-26 RX ORDER — GABAPENTIN 300 MG/1
CAPSULE ORAL
Qty: 90 CAPSULE | Refills: 0 | Status: SHIPPED | OUTPATIENT
Start: 2021-01-26 | End: 2021-02-22

## 2021-02-02 DIAGNOSIS — H10.023 OTHER MUCOPURULENT CONJUNCTIVITIS OF BOTH EYES: ICD-10-CM

## 2021-02-03 DIAGNOSIS — Z01.818 PREOP GENERAL PHYSICAL EXAM: ICD-10-CM

## 2021-02-03 RX ORDER — CIPROFLOXACIN HYDROCHLORIDE 3.5 MG/ML
SOLUTION/ DROPS TOPICAL
Qty: 10 ML | Refills: 2 | Status: SHIPPED | OUTPATIENT
Start: 2021-02-03 | End: 2022-10-06

## 2021-02-03 NOTE — TELEPHONE ENCOUNTER
ciprofloxacin      Last Written Prescription Date:  10/23/19  Last Fill Quantity: 10 ml,   # refills: 2  Last Office Visit: 12/21/2020  Future Office visit:

## 2021-02-04 RX ORDER — PROMETHAZINE HYDROCHLORIDE 25 MG/1
TABLET ORAL
Qty: 90 TABLET | Refills: 0 | Status: SHIPPED | OUTPATIENT
Start: 2021-02-04 | End: 2021-02-28

## 2021-02-09 ENCOUNTER — TRANSFERRED RECORDS (OUTPATIENT)
Dept: HEALTH INFORMATION MANAGEMENT | Facility: CLINIC | Age: 71
End: 2021-02-09

## 2021-02-09 DIAGNOSIS — K21.00 GASTROESOPHAGEAL REFLUX DISEASE WITH ESOPHAGITIS WITHOUT HEMORRHAGE: Primary | ICD-10-CM

## 2021-02-09 DIAGNOSIS — K21.00 GASTROESOPHAGEAL REFLUX DISEASE WITH ESOPHAGITIS: ICD-10-CM

## 2021-02-09 RX ORDER — PANTOPRAZOLE SODIUM 40 MG/1
TABLET, DELAYED RELEASE ORAL
Qty: 30 TABLET | Refills: 5 | Status: SHIPPED | OUTPATIENT
Start: 2021-02-09 | End: 2021-02-09

## 2021-02-09 NOTE — TELEPHONE ENCOUNTER
Pt called back, states that she got her meds mixed up. Is no longer taking protonix. She meant to request refill of promethazine. Protonix discontinued per pt request.

## 2021-02-09 NOTE — TELEPHONE ENCOUNTER
Pantoprazole (Protonix)        Last Written Prescription Date:  unknown  Last Fill Quantity:    # refills:   Last Office Visit: 12/21/20  Future Office visit:       Routing refill request to provider for review/approval because:    Drug not active on patient's medication list

## 2021-02-13 DIAGNOSIS — M25.561 ARTHRALGIA OF BOTH KNEES: ICD-10-CM

## 2021-02-13 DIAGNOSIS — M54.2 CERVICALGIA: ICD-10-CM

## 2021-02-13 DIAGNOSIS — M54.2 CERVICAL PAIN: ICD-10-CM

## 2021-02-13 DIAGNOSIS — M25.562 ARTHRALGIA OF BOTH KNEES: ICD-10-CM

## 2021-02-13 DIAGNOSIS — G47.00 PERSISTENT INSOMNIA: ICD-10-CM

## 2021-02-14 NOTE — TELEPHONE ENCOUNTER
Norco       Last Written Prescription Date:  1/18/2021  Last Fill Quantity: 90,   # refills: 0    Ambien       Last Written Prescription Date:  1/18/2021  Last Fill Quantity: 30,   # refills: 0    Desyrel       Last Written Prescription Date:  12/21/2020  Last Fill Quantity: 30,   # refills: 1    Flexeril       Last Written Prescription Date:  11/23/2020  Last Fill Quantity: 90,   # refills: 2  Last Office Visit: 12/21/2020  Future Office visit:

## 2021-02-15 RX ORDER — HYDROCODONE BITARTRATE AND ACETAMINOPHEN 7.5; 325 MG/1; MG/1
TABLET ORAL
Qty: 90 TABLET | Refills: 0 | Status: SHIPPED | OUTPATIENT
Start: 2021-02-17 | End: 2021-03-10

## 2021-02-15 RX ORDER — ZOLPIDEM TARTRATE 10 MG/1
TABLET ORAL
Qty: 30 TABLET | Refills: 0 | Status: SHIPPED | OUTPATIENT
Start: 2021-02-15 | End: 2021-03-10

## 2021-02-15 RX ORDER — CYCLOBENZAPRINE HCL 10 MG
TABLET ORAL
Qty: 90 TABLET | Refills: 2 | Status: SHIPPED | OUTPATIENT
Start: 2021-02-15 | End: 2021-04-29

## 2021-02-15 RX ORDER — TRAZODONE HYDROCHLORIDE 150 MG/1
150 TABLET ORAL AT BEDTIME
Qty: 30 TABLET | Refills: 1 | Status: SHIPPED | OUTPATIENT
Start: 2021-02-15 | End: 2021-04-13

## 2021-02-21 DIAGNOSIS — B02.23 POSTHERPETIC POLYNEUROPATHY: ICD-10-CM

## 2021-02-22 RX ORDER — GABAPENTIN 300 MG/1
CAPSULE ORAL
Qty: 90 CAPSULE | Refills: 0 | Status: SHIPPED | OUTPATIENT
Start: 2021-02-22 | End: 2021-03-22

## 2021-02-22 NOTE — TELEPHONE ENCOUNTER
gabapentin (NEURONTIN) 300 MG capsule    Last Written Prescription Date:  1/26/21  Last Fill Quantity: 90,   # refills: 0  Last Office Visit: 12/21/20  Future Office visit:       Routing refill request to provider for review/approval

## 2021-02-27 DIAGNOSIS — K31.84 NONDIABETIC GASTROPARESIS: ICD-10-CM

## 2021-02-27 DIAGNOSIS — R10.13 EPIGASTRIC PAIN: ICD-10-CM

## 2021-02-28 DIAGNOSIS — Z01.818 PREOP GENERAL PHYSICAL EXAM: ICD-10-CM

## 2021-02-28 RX ORDER — PROMETHAZINE HYDROCHLORIDE 25 MG/1
TABLET ORAL
Qty: 90 TABLET | Refills: 0 | Status: SHIPPED | OUTPATIENT
Start: 2021-02-28 | End: 2021-03-30

## 2021-02-28 RX ORDER — METOCLOPRAMIDE 5 MG/1
5 TABLET ORAL
Qty: 90 TABLET | Refills: 1 | Status: SHIPPED | OUTPATIENT
Start: 2021-02-28 | End: 2021-05-11

## 2021-02-28 NOTE — TELEPHONE ENCOUNTER
Reglan       Last Written Prescription Date:  11/2/2020  Last Fill Quantity: 90,   # refills: 1  Last Office Visit: 11/24/2020  Future Office visit:

## 2021-02-28 NOTE — TELEPHONE ENCOUNTER
Phenergan       Last Written Prescription Date:  2/4/2021  Last Fill Quantity: 90,   # refills: 0  Last Office Visit: 12/21/2020  Future Office visit:

## 2021-03-02 DIAGNOSIS — R11.0 NAUSEA: ICD-10-CM

## 2021-03-03 RX ORDER — ONDANSETRON 4 MG/1
TABLET, FILM COATED ORAL
Qty: 60 TABLET | Refills: 4 | Status: SHIPPED | OUTPATIENT
Start: 2021-03-03 | End: 2021-06-04

## 2021-03-03 NOTE — TELEPHONE ENCOUNTER
luisito      Last Written Prescription Date:  No longer on med list  Last Fill Quantity: 0,   # refills: 0  Last Office Visit: 12/21/20  Future Office visit:       Routing refill request to provider for review/approval because:  Drug not on the [facility], [facility] or [facility] refill protocol or controlled substance

## 2021-03-09 DIAGNOSIS — K59.03 DRUG-INDUCED CONSTIPATION: ICD-10-CM

## 2021-03-09 RX ORDER — DICYCLOMINE HYDROCHLORIDE 10 MG/1
CAPSULE ORAL
Qty: 120 CAPSULE | Refills: 3 | Status: SHIPPED | OUTPATIENT
Start: 2021-03-09 | End: 2021-07-01

## 2021-03-09 NOTE — TELEPHONE ENCOUNTER
Not on med list    dicyclomine (BENTYL) 10 MG capsule (Discontinued)      Last Written Prescription Date:  10/29/20-11/24/20  Last Fill Quantity: 120,   # refills: 3  Last Office Visit: 11/24/20  Future Office visit:       Routing refill request to provider for review/approval because:  Drug not on the [facility], [facility] or [facility] refill protocol or controlled substance

## 2021-03-10 DIAGNOSIS — G47.00 PERSISTENT INSOMNIA: ICD-10-CM

## 2021-03-10 DIAGNOSIS — M54.2 CERVICAL PAIN: ICD-10-CM

## 2021-03-10 DIAGNOSIS — M25.561 ARTHRALGIA OF BOTH KNEES: ICD-10-CM

## 2021-03-10 DIAGNOSIS — M25.562 ARTHRALGIA OF BOTH KNEES: ICD-10-CM

## 2021-03-10 RX ORDER — HYDROCODONE BITARTRATE AND ACETAMINOPHEN 7.5; 325 MG/1; MG/1
1 TABLET ORAL EVERY 6 HOURS PRN
Qty: 60 TABLET | Refills: 0 | Status: SHIPPED | OUTPATIENT
Start: 2021-03-17 | End: 2021-04-13

## 2021-03-10 RX ORDER — ZOLPIDEM TARTRATE 10 MG/1
TABLET ORAL
Qty: 30 TABLET | Refills: 0 | Status: SHIPPED | OUTPATIENT
Start: 2021-03-17 | End: 2021-04-13

## 2021-03-10 NOTE — TELEPHONE ENCOUNTER
Per last note on 12.21.20 - reduce Norco to #60.    #60 Norco pended - both RXs dated 3.17.21.    Ambien, Norco      Last Written Prescription Date:  2.15.21, 2.17.21  Last Fill Quantity: #30, #90,   # refills: 0  Last Office Visit: 12.21.20  Future Office visit:       Routing refill request to provider for review/approval because:  Drug not on the G, P or [facility] refill protocol or controlled substance

## 2021-03-20 DIAGNOSIS — B02.23 POSTHERPETIC POLYNEUROPATHY: ICD-10-CM

## 2021-03-22 RX ORDER — GABAPENTIN 300 MG/1
CAPSULE ORAL
Qty: 90 CAPSULE | Refills: 0 | Status: SHIPPED | OUTPATIENT
Start: 2021-03-22 | End: 2021-04-19

## 2021-03-22 NOTE — TELEPHONE ENCOUNTER
gabapentin      Last Written Prescription Date:  02/22/2021  Last Fill Quantity: 90,   # refills: 0  Last Office Visit: 12/21/2020  Future Office visit:

## 2021-03-29 DIAGNOSIS — Z01.818 PREOP GENERAL PHYSICAL EXAM: ICD-10-CM

## 2021-03-30 RX ORDER — PROMETHAZINE HYDROCHLORIDE 25 MG/1
TABLET ORAL
Qty: 90 TABLET | Refills: 0 | Status: SHIPPED | OUTPATIENT
Start: 2021-03-30 | End: 2021-04-29

## 2021-03-30 NOTE — TELEPHONE ENCOUNTER
Promethazine 25 mg      Last Written Prescription Date:  2-  Last Fill Quantity: 90,   # refills: 0  Last Office Visit: 12-

## 2021-04-03 DIAGNOSIS — E55.9 VITAMIN D DEFICIENCY: ICD-10-CM

## 2021-04-05 RX ORDER — CHOLECALCIFEROL (VITAMIN D3) 50 MCG
TABLET ORAL
Qty: 180 TABLET | Refills: 3 | Status: SHIPPED | OUTPATIENT
Start: 2021-04-05 | End: 2022-04-25

## 2021-04-08 NOTE — PROGRESS NOTES
{PROVIDER CHARTING PREFERENCE:046568}    Enzo Looney is a 71 year old who presents for the following health issues     HPI     Hyperlipidemia Follow-Up    Are you regularly taking any medication or supplement to lower your cholesterol?   Yes- simvastatin    Are you having muscle aches or other side effects that you think could be caused by your cholesterol lowering medication?  No        Hypothyroidism Follow-up    Since last visit, patient describes the following symptoms: Weight stable, no hair loss, no skin changes, no constipation, no loose stools      Chronic Pain Follow-Up  Where in your body do you have pain? Headaches-constant, sternum pain,   How has your pain affected your ability to work? 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 Other: none  How well are you sleeping? Poor  How has your mood been since your last visit? About the same  Have you had a significant life event? No  Other aggravating factors: none  Taking medication as directed? Yes    PHQ-9 SCORE 10/21/2020 11/24/2020 4/13/2021   PHQ-9 Total Score - - -   PHQ-9 Total Score 0 7 15     AURELIA-7 SCORE 10/21/2020 11/24/2020 4/13/2021   Total Score 2 4 2     No flowsheet data found.  Encounter-Level CSA - 07/11/2017:    Controlled Substance Agreement - Scan on 7/20/2017 12:19 PM: CONTROLLED SUBSTANCE AGREEMENT     Patient-Level CSA:    There are no patient-level csa.           Depression Followup    How are you doing with your depression since your last visit? Worsened due to insomnia    Are you having other symptoms that might be associated with depression? Yes:  headaches, insomnia    Have you had a significant life event?  No     Are you feeling anxious or having panic attacks?   No    Do you have any concerns with your use of alcohol or other drugs? No    Social History     Tobacco Use     Smoking  status: Never Smoker     Smokeless tobacco: Never Used     Tobacco comment: no passive exposure   Substance Use Topics     Alcohol use: Yes     Comment: rarely, maybe yearly     Drug use: No     PHQ 10/21/2020 11/24/2020 4/13/2021   PHQ-9 Total Score 0 7 15   Q9: Thoughts of better off dead/self-harm past 2 weeks Not at all Not at all Not at all     AURELIA-7 SCORE 10/21/2020 11/24/2020 4/13/2021   Total Score 2 4 2     Last PHQ-9 4/13/2021   1.  Little interest or pleasure in doing things 2   2.  Feeling down, depressed, or hopeless 2   3.  Trouble falling or staying asleep, or sleeping too much 3   4.  Feeling tired or having little energy 3   5.  Poor appetite or overeating 2   6.  Feeling bad about yourself 3   7.  Trouble concentrating 0   8.  Moving slowly or restless 0   Q9: Thoughts of better off dead/self-harm past 2 weeks 0   PHQ-9 Total Score 15   Difficulty at work, home, or with people -     AURELIA-7  4/13/2021   1. Feeling nervous, anxious, or on edge 1   2. Not being able to stop or control worrying 0   3. Worrying too much about different things 1   4. Trouble relaxing 0   5. Being so restless that it is hard to sit still 0   6. Becoming easily annoyed or irritable 0   7. Feeling afraid, as if something awful might happen 0   AURELIA-7 Total Score 2   If you checked any problems, how difficult have they made it for you to do your work, take care of things at home, or get along with other people? -       Suicide Assessment Five-step Evaluation and Treatment (SAFE-T)  {Provider  Link to Depression Care Package SmartSet :070550}        Review of Systems   {ROS COMP (Optional):610433}      Objective    There were no vitals taken for this visit.  There is no height or weight on file to calculate BMI.  Physical Exam   {Exam List (Optional):288664}    {Diagnostic Test Results (Optional):550294}    {AMBULATORY ATTESTATION (Optional):476898}

## 2021-04-13 ENCOUNTER — VIRTUAL VISIT (OUTPATIENT)
Dept: INTERNAL MEDICINE | Facility: OTHER | Age: 71
End: 2021-04-13
Attending: INTERNAL MEDICINE
Payer: COMMERCIAL

## 2021-04-13 DIAGNOSIS — M25.562 ARTHRALGIA OF BOTH KNEES: ICD-10-CM

## 2021-04-13 DIAGNOSIS — G43.009 MIGRAINE WITHOUT AURA AND WITHOUT STATUS MIGRAINOSUS, NOT INTRACTABLE: Primary | ICD-10-CM

## 2021-04-13 DIAGNOSIS — M25.561 ARTHRALGIA OF BOTH KNEES: ICD-10-CM

## 2021-04-13 DIAGNOSIS — M54.2 CERVICAL PAIN: ICD-10-CM

## 2021-04-13 DIAGNOSIS — G47.00 PERSISTENT INSOMNIA: ICD-10-CM

## 2021-04-13 PROCEDURE — 99214 OFFICE O/P EST MOD 30 MIN: CPT | Mod: 95 | Performed by: INTERNAL MEDICINE

## 2021-04-13 PROCEDURE — G0463 HOSPITAL OUTPT CLINIC VISIT: HCPCS

## 2021-04-13 RX ORDER — PANTOPRAZOLE SODIUM 40 MG/1
TABLET, DELAYED RELEASE ORAL
COMMUNITY
Start: 2021-04-09 | End: 2021-11-15

## 2021-04-13 RX ORDER — TOPIRAMATE 50 MG/1
50 TABLET, FILM COATED ORAL 2 TIMES DAILY
Qty: 60 TABLET | Refills: 1 | Status: SHIPPED | OUTPATIENT
Start: 2021-04-13 | End: 2021-05-11

## 2021-04-13 RX ORDER — HYDROCODONE BITARTRATE AND ACETAMINOPHEN 7.5; 325 MG/1; MG/1
1 TABLET ORAL EVERY 6 HOURS PRN
Qty: 60 TABLET | Refills: 0 | Status: SHIPPED | OUTPATIENT
Start: 2021-04-17 | End: 2021-05-11

## 2021-04-13 RX ORDER — TRAZODONE HYDROCHLORIDE 150 MG/1
150 TABLET ORAL AT BEDTIME
Qty: 30 TABLET | Refills: 1 | Status: SHIPPED | OUTPATIENT
Start: 2021-04-13 | End: 2021-06-08

## 2021-04-13 RX ORDER — ZOLPIDEM TARTRATE 10 MG/1
10 TABLET ORAL
Qty: 30 TABLET | Refills: 0 | Status: SHIPPED | OUTPATIENT
Start: 2021-04-17 | End: 2021-05-11

## 2021-04-13 ASSESSMENT — ANXIETY QUESTIONNAIRES
2. NOT BEING ABLE TO STOP OR CONTROL WORRYING: NOT AT ALL
GAD7 TOTAL SCORE: 2
6. BECOMING EASILY ANNOYED OR IRRITABLE: NOT AT ALL
7. FEELING AFRAID AS IF SOMETHING AWFUL MIGHT HAPPEN: NOT AT ALL
1. FEELING NERVOUS, ANXIOUS, OR ON EDGE: SEVERAL DAYS
3. WORRYING TOO MUCH ABOUT DIFFERENT THINGS: SEVERAL DAYS
4. TROUBLE RELAXING: NOT AT ALL
5. BEING SO RESTLESS THAT IT IS HARD TO SIT STILL: NOT AT ALL

## 2021-04-13 ASSESSMENT — PATIENT HEALTH QUESTIONNAIRE - PHQ9: SUM OF ALL RESPONSES TO PHQ QUESTIONS 1-9: 15

## 2021-04-13 NOTE — LETTER
Opioid / Opioid Plus Controlled Substance Agreement    This is an agreement between you and your provider about the safe and appropriate use of controlled substance/opioids prescribed by your care team. Controlled substances are medicines that can cause physical and mental dependence (abuse).    There are strict laws about having and using these medicines. We here at [facility] are committing to working with you in your efforts to get better. To support you in this work, we ll help you schedule regular office appointments for medicine refills. If we must cancel or change your appointment for any reason, we ll make sure you have enough medicine to last until your next appointment.     As a Provider, I will:    Listen carefully to your concerns and treat you with respect.     Recommend a treatment plan that I believe is in your best interest. This plan may involve therapies other than opioid pain medication.     Talk with you often about the possible benefits, and the risk of harm of any medicine that we prescribe for you.     Provide a plan on how to taper (discontinue or go off) using this medicine if the decision is made to stop its use.    As a Patient, I understand that opioid(s):     Are a controlled substance prescribed by my care team to help me function or work and manage my condition(s).     Are strong medicines and can cause serious side effects such as:    Drowsiness, which can seriously affect my driving ability    A lower breathing rate, enough to cause death    Harm to my thinking ability     Depression     Abuse of and addiction to this medicine    Need to be taken exactly as prescribed. Combining opioids with certain medicines or chemicals (such as illegal drugs, sedatives, sleeping pills, and benzodiazepines) can be dangerous or even fatal. If I stop opioids suddenly, I may have severe withdrawal symptoms.    Do not work for all types of pain nor for all patients. If they re not helpful, I may  be asked to stop them.    {Benzo / Stimulant (Optional):497470}    The risks, benefits and side effects of these medicine(s) were explained to me. I agree that:  1. I will take part in other treatments as advised by my care team. This may be psychiatry or counseling, physical therapy, behavioral therapy, group treatment or a referral to a specialist.     2. I will keep all my appointments. I understand that this is part of the monitoring of opioids. My care team may require an office visit for EVERY opioid/controlled substance refill. If I miss appointments or don t follow instructions, my care team may stop my medicine.    3. I will take my medicines as prescribed. I will not change the dose or schedule unless my care team tells me to. There will be no refills if I run out early.     4. I may be asked to come to the clinic and complete a urine drug test or complete a pill count at any time. If I don t give a urine sample or participate in a pill count, the care team may stop my medicine.    5. I will only receive prescriptions from this clinic for chronic pain. If I am treated by another provider for acute pain issues, I will tell them that I am taking opioid pain medication for chronic pain and that I have a treatment agreement with this provider. I will inform my [facility] care team within one business day if I am given a prescription for any pain medication by another healthcare provider. My [facility] care team can contact other providers and pharmacists about my use of any medicines.    6. It is up to me to make sure that I don t run out of my medicines on weekends or holidays. If my care team is willing to refill my opioid prescription without a visit, I must request refills only during office hours. Refills may take up to 3 business days to process. I will use one pharmacy to fill all my opioid and other controlled substance prescriptions. I will notify the clinic about any changes to my  insurance or medication availability.    7. I am responsible for my prescriptions. If the medicine/prescription is lost, stolen or destroyed, it will not be replaced. I also agree not to share controlled substance medicines with anyone.    8. I am aware I should not use any illegal or recreational drugs. I agree not to drink alcohol unless my care team says I can.       9. If I enroll in the [place] [facility] Cannabis program, I will tell my care team prior to my next refill.     10. I will tell my care team right away if I become pregnant, have a new medical problem treated outside of my regular clinic, or have a change in my medications.    11. I understand that this medicine can affect my thinking, judgment and reaction time. Alcohol and drugs affect the brain and body, which can affect the safety of my driving. Being under the influence of alcohol or drugs can affect my decision-making, behaviors, personal safety, and the safety of others. Driving while impaired (DWI) can occur if a person is driving, operating, or in physical control of a car, motorcycle, boat, snowmobile, ATV, motorbike, off-road vehicle, or any other motor vehicle ([place] Statute 169A.20). I understand the risk if I choose to drive or operate any vehicle or machinery.    I understand that if I do not follow any of the conditions above, my prescriptions or treatment may be stopped or changed.          Opioids  What You Need to Know    What are opioids?   Opioids are pain medicines that must be prescribed by a doctor. They are also known as narcotics.     Examples are:   1. morphine (MS Contin, Yanet)  2. oxycodone (Oxycontin)  3. oxycodone and acetaminophen (Percocet)  4. hydrocodone and acetaminophen (Vicodin, Norco)   5. fentanyl patch (Duragesic)   6. hydromorphone (Dilaudid)   7. methadone  8. codeine (Tylenol #3)     What do opioids do well?   Opioids are best for severe short-term pain such as after a surgery or injury. They may work well  for cancer pain. They may help some people with long-lasting (chronic) pain.     What do opioids NOT do well?   Opioids never get rid of pain entirely, and they don t work well for most patients with chronic pain. Opioids don t reduce swelling, one of the causes of pain.                                    Other ways to manage chronic pain and improve function include:       Treat the health problem that may be causing pain    Anti-inflammation medicines, which reduce swelling and tenderness, such as ibuprofen (Advil, Motrin) or naproxen (Aleve)    Acetaminophen (Tylenol)    Antidepressants and anti-seizure medicines, especially for nerve pain    Topical treatments such as patches or creams    Injections or nerve blocks    Chiropractic or osteopathic treatment    Acupuncture, massage, deep breathing, meditation, visual imagery, aromatherapy    Use heat or ice at the pain site    Physical therapy     Exercise    Stop smoking    Take part in therapy       Risks and side effects     Talk to your doctor before you start or decide to keep taking opioids. Possible side effects include:      Lowering your breathing rate enough to cause death    Overdose, including death, especially if taking higher than prescribed doses    Worse depression symptoms; less pleasure in things you usually enjoy    Feeling tired or sluggish    Slower thoughts or cloudy thinking    Being more sensitive to pain over time; pain is harder to control    Trouble sleeping or restless sleep    Changes in hormone levels (for example, less testosterone)    Changes in sex drive or ability to have sex    Constipation    Unsafe driving    Itching and sweating    Dizziness    Nausea, throwing up and dry mouth    What else should I know about opioids?    Opioids may lead to dependence, tolerance, or addiction.      Dependence means that if you stop or reduce the medicine too quickly, you will have withdrawal symptoms. These include loose poop (diarrhea),  jitters, flu-like symptoms, nervousness and tremors. Dependence is not the same as addiction.                       Tolerance means needing higher doses over time to get the same effect. This may increase the chance of serious side effects.      Addiction is when people improperly use a substance that harms their body, their mind or their relations with others. Use of opiates can cause a relapse of addiction if you have a history of drug or alcohol abuse.      People who have used opioids for a long time may have a lower quality of life, worse depression, higher levels of pain and more visits to doctors.    You can overdose on opioids. Take these steps to lower your risk of overdose:    1. Recognize the signs:  Signs of overdose include decrease or loss of consciousness (blackout), slowed breathing, trouble waking up and blue lips. If someone is worried about overdose, they should call 911.    2. Talk to your doctor about Narcan (naloxone).   If you are at risk for overdose, you may be given a prescription for Narcan. This medicine very quickly reverses the effects of opioids.   If you overdose, a friend or family member can give you Narcan while waiting for the ambulance. They need to know the signs of overdose and how to give Narcan.     3. Don't use alcohol or street drugs.   Taking them with opioids can cause death.    4. Do not take any of these medicines unless your doctor says it s OK. Taking these with opioids can cause death:    Benzodiazepines, such as lorazepam (Ativan), alprazolam (Xanax) or diazepam (Valium)    Muscle relaxers, such as cyclobenzaprine (Flexeril)    Sleeping pills like zolpidem (Ambien)     Other opioids      How to keep you and other people safe while taking opioids:    1. Never share your opioids with others.  Opioid medicines are regulated by the Drug Enforcement Agency ([place]). Selling or sharing medications is a criminal act.    2. Be sure to store opioids in a secure place, locked up  if possible. Young children can easily swallow them and overdose.    3. When you are traveling with your medicines, keep them in the original bottles. If you use a pill box, be sure you also carry a copy of your medicine list from your clinic or pharmacy.    4. Safe disposal of opioids    Most pharmacies have places to get rid of medicine, called disposal kiosks. Medicine disposal options are also available in every [facility]. Search your county and  medication disposal  to find more options. You can find more details at:  [email or web address]-[email or web address]-[email or web address]-medications     I agree that my provider, clinic care team, and pharmacy may work with any city, state or federal law enforcement agency that investigates the misuse, sale, or other diversion of my controlled medicine. I will allow my provider to discuss my care with, or share a copy of, this agreement with any other treating provider, pharmacy or emergency room where I receive care.    I have read this agreement and have asked questions about anything I did not understand.    _______________________________________________________  Patient Signature - Aure Lanier _____________________                   Date     _______________________________________________________  Provider Signature - Balta Milton, DO   _____________________                   Date     _______________________________________________________  Witness Signature (required if provider not present while patient signing)   _____________________                   Date

## 2021-04-13 NOTE — PROGRESS NOTES
"Aure is a 71 year old who is being evaluated via a billable telephone visit.      What phone number would you like to be contacted at?   How would you like to obtain your AVS? Mail a copy    Assessment & Plan   Problem List Items Addressed This Visit        Nervous and Auditory    Migraine - Primary    Relevant Medications    HYDROcodone-acetaminophen (NORCO) 7.5-325 MG per tablet (Start on 4/17/2021)    traZODone (DESYREL) 150 MG tablet    topiramate (TOPAMAX) 50 MG tablet    Other Relevant Orders    NEUROLOGY ADULT REFERRAL      Other Visit Diagnoses     Cervical pain        Relevant Medications    HYDROcodone-acetaminophen (NORCO) 7.5-325 MG per tablet (Start on 4/17/2021)    Arthralgia of both knees        Relevant Medications    HYDROcodone-acetaminophen (NORCO) 7.5-325 MG per tablet (Start on 4/17/2021)    Persistent insomnia        Relevant Medications    zolpidem (AMBIEN) 10 MG tablet (Start on 4/17/2021)    traZODone (DESYREL) 150 MG tablet             25 minutes spent on the date of the encounter doing chart review, review of test results, interpretation of tests, patient visit and documentation        BMI:   Estimated body mass index is 30.73 kg/m  as calculated from the following:    Height as of 11/24/20: 1.575 m (5' 2\").    Weight as of 12/21/20: 76.2 kg (168 lb).       Balta Milton, [facility]   Aure is a 71 year old who presents for the following health issues       Aure was contacted via phone today.  She has a history of Hypothyroidism and Hyperlipidemia with her most recent labs on October 2020.  She also has chronic pain, migraines and insomnia.  She was previously on higher dose narcotics and I have weaned her down to 60# a month.  In addition she was previously on Ambien 10 mg x2 a night which exceeds the maximum recommended dose by 300%.    She perseverates on the fact that this taper has impacted her pain, migraines, and worsened her " insomnia. I again informed her I was not willing to escalate her pain medications or Ambien.  We did discuss Topamax today.   When we discussed other options she was unaware of what else she may have tried and she is unaware last time she saw someone in sleep medicine or Neurology.     Hyperlipidemia Follow-Up    Are you regularly taking any medication or supplement to lower your cholesterol?   Yes- simvastatin    Are you having muscle aches or other side effects that you think could be caused by your cholesterol lowering medication?  No        Hypothyroidism Follow-up    Since last visit, patient describes the following symptoms: Weight stable, no hair loss, no skin changes, no constipation, no loose stools  Last TSH 2.57    Chronic Pain Follow-Up  Where in your body do you have pain? Headaches-constant, sternum pain,   How has your pain affected your ability to work? 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 Other: none  How well are you sleeping? Poor  How has your mood been since your last visit? About the same  Have you had a significant life event? No  Other aggravating factors: none  Taking medication as directed? Yes    PHQ-9 SCORE 10/21/2020 11/24/2020 4/13/2021   PHQ-9 Total Score - - -   PHQ-9 Total Score 0 7 15     AURELIA-7 SCORE 10/21/2020 11/24/2020 4/13/2021   Total Score 2 4 2     No flowsheet data found.  Encounter-Level CSA - 07/11/2017:    Controlled Substance Agreement - Scan on 7/20/2017 12:19 PM: CONTROLLED SUBSTANCE AGREEMENT     Patient-Level CSA:    There are no patient-level csa.           Depression Followup    How are you doing with your depression since your last visit? Worsened due to insomnia    Are you having other symptoms that might be associated with depression? Yes:  headaches, insomnia    Have you had a significant life event?  No     Are you  feeling anxious or having panic attacks?   No    Do you have any concerns with your use of alcohol or other drugs? No    Social History     Tobacco Use     Smoking status: Never Smoker     Smokeless tobacco: Never Used     Tobacco comment: no passive exposure   Substance Use Topics     Alcohol use: Yes     Comment: rarely, maybe yearly     Drug use: No     PHQ 10/21/2020 11/24/2020 4/13/2021   PHQ-9 Total Score 0 7 15   Q9: Thoughts of better off dead/self-harm past 2 weeks Not at all Not at all Not at all     AURELIA-7 SCORE 10/21/2020 11/24/2020 4/13/2021   Total Score 2 4 2     Last PHQ-9 4/13/2021   1.  Little interest or pleasure in doing things 2   2.  Feeling down, depressed, or hopeless 2   3.  Trouble falling or staying asleep, or sleeping too much 3   4.  Feeling tired or having little energy 3   5.  Poor appetite or overeating 2   6.  Feeling bad about yourself 3   7.  Trouble concentrating 0   8.  Moving slowly or restless 0   Q9: Thoughts of better off dead/self-harm past 2 weeks 0   PHQ-9 Total Score 15   Difficulty at work, home, or with people -     AURELIA-7  4/13/2021   1. Feeling nervous, anxious, or on edge 1   2. Not being able to stop or control worrying 0   3. Worrying too much about different things 1   4. Trouble relaxing 0   5. Being so restless that it is hard to sit still 0   6. Becoming easily annoyed or irritable 0   7. Feeling afraid, as if something awful might happen 0   AURELIA-7 Total Score 2   If you checked any problems, how difficult have they made it for you to do your work, take care of things at home, or get along with other people? -       Suicide Assessment Five-step Evaluation and Treatment (SAFE-T)          HPI           Review of Systems   Constitutional, HEENT, cardiovascular, pulmonary, gi and gu systems are negative, except as otherwise noted.      Objective    Vitals - Patient Reported  Pain Score: Moderate Pain (5)        Physical Exam   alert and no distress  PSYCH: Alert and  oriented times 3; coherent speech, normal   rate and volume, able to articulate logical thoughts, able   to abstract reason, no tangential thoughts, no hallucinations   or delusions  Her affect is anxious  RESP: No cough, no audible wheezing, able to talk in full sentences  Remainder of exam unable to be completed due to telephone visits    Office Visit on 10/21/2020   Component Date Value Ref Range Status     WBC 10/21/2020 9.0  4.0 - 11.0 10e9/L Final     RBC Count 10/21/2020 4.50  3.8 - 5.2 10e12/L Final     Hemoglobin 10/21/2020 13.1  11.7 - 15.7 g/dL Final     Hematocrit 10/21/2020 40.8  35.0 - 47.0 % Final     MCV 10/21/2020 91  78 - 100 fl Final     MCH 10/21/2020 29.1  26.5 - 33.0 pg Final     MCHC 10/21/2020 32.1  31.5 - 36.5 g/dL Final     RDW 10/21/2020 14.5  10.0 - 15.0 % Final     Platelet Count 10/21/2020 441  150 - 450 10e9/L Final     Diff Method 10/21/2020 Automated Method   Final     % Neutrophils 10/21/2020 49.8  % Final     % Lymphocytes 10/21/2020 34.5  % Final     % Monocytes 10/21/2020 10.1  % Final     % Eosinophils 10/21/2020 3.5  % Final     % Basophils 10/21/2020 0.8  % Final     % Immature Granulocytes 10/21/2020 1.3  % Final     Nucleated RBCs 10/21/2020 0  0 /100 Final     Absolute Neutrophil 10/21/2020 4.5  1.6 - 8.3 10e9/L Final     Absolute Lymphocytes 10/21/2020 3.1  0.8 - 5.3 10e9/L Final     Absolute Monocytes 10/21/2020 0.9  0.0 - 1.3 10e9/L Final     Absolute Eosinophils 10/21/2020 0.3  0.0 - 0.7 10e9/L Final     Absolute Basophils 10/21/2020 0.1  0.0 - 0.2 10e9/L Final     Abs Immature Granulocytes 10/21/2020 0.1  0 - 0.4 10e9/L Final     Absolute Nucleated RBC 10/21/2020 0.0   Final     Sodium 10/21/2020 139  133 - 144 mmol/L Final     Potassium 10/21/2020 4.2  3.4 - 5.3 mmol/L Final     Chloride 10/21/2020 106  94 - 109 mmol/L Final     Carbon Dioxide 10/21/2020 27  20 - 32 mmol/L Final     Anion Gap 10/21/2020 6  3 - 14 mmol/L Final     Glucose 10/21/2020 91  70 - 99 mg/dL  Final    Non Fasting     Urea Nitrogen 10/21/2020 12  7 - 30 mg/dL Final     Creatinine 10/21/2020 0.60  0.52 - 1.04 mg/dL Final     GFR Estimate 10/21/2020 >90  >60 mL/min/[1.73_m2] Final    Comment: Non  GFR Calc  Starting 12/18/2018, serum creatinine based estimated GFR (eGFR) will be   calculated using the Chronic Kidney Disease Epidemiology Collaboration   (CKD-EPI) equation.       GFR Estimate If Black 10/21/2020 >90  >60 mL/min/[1.73_m2] Final    Comment:  GFR Calc  Starting 12/18/2018, serum creatinine based estimated GFR (eGFR) will be   calculated using the Chronic Kidney Disease Epidemiology Collaboration   (CKD-EPI) equation.       Calcium 10/21/2020 8.4* 8.5 - 10.1 mg/dL Final     Bilirubin Total 10/21/2020 0.2  0.2 - 1.3 mg/dL Final     Albumin 10/21/2020 4.0  3.4 - 5.0 g/dL Final     Protein Total 10/21/2020 7.8  6.8 - 8.8 g/dL Final     Alkaline Phosphatase 10/21/2020 114  40 - 150 U/L Final     ALT 10/21/2020 40  0 - 50 U/L Final     AST 10/21/2020 19  0 - 45 U/L Final     Cholesterol 10/21/2020 192  <200 mg/dL Final     Triglycerides 10/21/2020 157* <150 mg/dL Final    Comment: Borderline high:  150-199 mg/dl  High:             200-499 mg/dl  Very high:       >499 mg/dl  Non Fasting       HDL Cholesterol 10/21/2020 74  >49 mg/dL Final     LDL Cholesterol Calculated 10/21/2020 87  <100 mg/dL Final    Desirable:       <100 mg/dl     Non HDL Cholesterol 10/21/2020 118  <130 mg/dL Final     TSH 10/21/2020 2.57  0.40 - 4.00 mU/L Final     No results found for any visits on 04/13/21.  No results found for this or any previous visit (from the past 24 hour(s)).            Phone call duration: 15 minutes

## 2021-04-14 ASSESSMENT — ANXIETY QUESTIONNAIRES: GAD7 TOTAL SCORE: 2

## 2021-04-18 DIAGNOSIS — B02.23 POSTHERPETIC POLYNEUROPATHY: ICD-10-CM

## 2021-04-19 RX ORDER — GABAPENTIN 300 MG/1
CAPSULE ORAL
Qty: 90 CAPSULE | Refills: 0 | Status: SHIPPED | OUTPATIENT
Start: 2021-04-19 | End: 2021-05-11

## 2021-04-19 NOTE — TELEPHONE ENCOUNTER
GABAPENTIN      Last Written Prescription Date:  3/22/2021  Last Fill Quantity: 90,   # refills: 0  Last Office Visit: 12/21/2020

## 2021-04-29 DIAGNOSIS — M54.2 CERVICALGIA: ICD-10-CM

## 2021-04-29 DIAGNOSIS — Z01.818 PREOP GENERAL PHYSICAL EXAM: ICD-10-CM

## 2021-04-29 RX ORDER — PROMETHAZINE HYDROCHLORIDE 25 MG/1
TABLET ORAL
Qty: 90 TABLET | Refills: 0 | Status: SHIPPED | OUTPATIENT
Start: 2021-04-29 | End: 2021-05-25

## 2021-04-29 RX ORDER — CYCLOBENZAPRINE HCL 10 MG
TABLET ORAL
Qty: 90 TABLET | Refills: 2 | Status: SHIPPED | OUTPATIENT
Start: 2021-04-29 | End: 2021-07-15

## 2021-05-05 ENCOUNTER — TRANSFERRED RECORDS (OUTPATIENT)
Dept: HEALTH INFORMATION MANAGEMENT | Facility: CLINIC | Age: 71
End: 2021-05-05

## 2021-05-10 ENCOUNTER — HOSPITAL ENCOUNTER (EMERGENCY)
Facility: HOSPITAL | Age: 71
Discharge: HOME | End: 2021-05-10
Attending: NURSE PRACTITIONER | Admitting: NURSE PRACTITIONER
Payer: COMMERCIAL

## 2021-05-10 VITALS
OXYGEN SATURATION: 98 % | HEART RATE: 100 BPM | SYSTOLIC BLOOD PRESSURE: 154 MMHG | RESPIRATION RATE: 16 BRPM | DIASTOLIC BLOOD PRESSURE: 86 MMHG | TEMPERATURE: 97.8 F

## 2021-05-10 DIAGNOSIS — B37.0 OROPHARYNGEAL CANDIDIASIS: Primary | ICD-10-CM

## 2021-05-10 PROCEDURE — 99213 OFFICE O/P EST LOW 20 MIN: CPT | Performed by: NURSE PRACTITIONER

## 2021-05-10 PROCEDURE — G0463 HOSPITAL OUTPT CLINIC VISIT: HCPCS

## 2021-05-10 RX ORDER — NYSTATIN 100000/ML
500000 SUSPENSION, ORAL (FINAL DOSE FORM) ORAL 4 TIMES DAILY
Qty: 280 ML | Refills: 0 | Status: SHIPPED | OUTPATIENT
Start: 2021-05-10 | End: 2021-05-24

## 2021-05-10 ASSESSMENT — ENCOUNTER SYMPTOMS
FACIAL SWELLING: 0
NAUSEA: 0
FEVER: 0
PSYCHIATRIC NEGATIVE: 1
VOMITING: 0
DIARRHEA: 0
TROUBLE SWALLOWING: 0
SHORTNESS OF BREATH: 0
SORE THROAT: 0
CHILLS: 0

## 2021-05-10 NOTE — DISCHARGE INSTRUCTIONS
Nystatin Suspension as ordered.    The dentures must be removed before going to bed, brushed vigorously, and then soaked in a solution of  over-the-counter denture care products.    Follow up with primary care provider or return to urgent care/ED with any worsening in condition or additional  concerns.

## 2021-05-10 NOTE — ED TRIAGE NOTES
"Pt states for 3 weeks her mouth has felt sore \"raw\". Wears dentures but no in mouth at this time. Small white patch noted under top lip.  "

## 2021-05-10 NOTE — ED PROVIDER NOTES
History     Chief Complaint   Patient presents with     Oral Swelling     HPI  Aure Lanier is a 71 year old female who presents to urgent care (ambulatory) for complaints of soreness to roof of mouth and gums feel painful and swollen.  Has full upper and lower dentures.  Patient has had dentures for the last 18 months and states they fit well.  Denies any recent weight loss or weight gain. Denies fever, chills, nausea, vomiting, diarrhea, SOB or chest pain.  No difficulty swallowing.  No trismus.  Denies recent illness.  No DM.  No other concerns.     Allergies:  Allergies   Allergen Reactions     Erythromycin Base [Kdc:Yellow Dye+Erythromycin+Brilliant Blue Fcf] Nausea and Vomiting     Erythromycin Nausea and Vomiting     Penicillins Rash       Problem List:    Patient Active Problem List    Diagnosis Date Noted     Angular cheilitis 09/23/2019     Priority: Medium     Status post bypass gastrojejunostomy 09/23/2019     Priority: Medium     Epigastric abdominal pain 09/23/2019     Priority: Medium     Phytobezoar 03/28/2019     Priority: Medium     History of Billroth I operation 02/01/2019     Priority: Medium     Gastric outlet obstruction 02/01/2019     Priority: Medium     Hypokalemia 11/08/2018     Priority: Medium     Entered in error         Anemia, iron deficiency 11/08/2018     Priority: Medium     S/P total knee arthroplasty, right 09/04/2018     Priority: Medium     Status post total right knee replacement 09/04/2018     Priority: Medium     Status post total left knee replacement 04/17/2018     Priority: Medium     Chronic, continuous use of opioids 07/13/2017     Priority: Medium     Patient is followed by Jaden Lee NP for ongoing prescription of pain medication.  All refills should only be approved by this provider, or covering partner.    Medication(s): Norco 7.5/325mg.   Maximum quantity per month: #120  Clinic visit frequency required: Q 3 months     Controlled substance  agreement:  Encounter-Level CSA - 2017:          Controlled Substance Agreement - Scan on 2017 12:19 PM : CONTROLLED SUBSTANCE AGREEMENT (below)              Pain Clinic evaluation in the past: No    DIRE Total Score(s):  No flowsheet data found.    Last [facility] website verification:  Done on 18   [email or web address]         Back muscle spasm 2017     Priority: Medium     Bilateral chronic knee pain 2016     Priority: Medium     Both knees.         Hyperlipidemia with target LDL less than 100 2014     Priority: Medium     Diagnosis updated by automated process. Provider to review and confirm.       Low back pain 2013     Priority: Medium     Diagnosis updated by automated process. Provider to review and confirm.       Family history of other musculoskeletal diseases(V17.89) 2012     Priority: Medium     Overview:    Brother with Myotonic Dystrophy    Aure's Myotonic Dystrophy testing was reported normal by [facility] lab for the DM1 with repeats of 5 and 26, DM2 with repeats of 140 and 140, and CLCN1 with no abnormal DNA sequence variants identified in the select exons analyzed (drawn 3-20-12).       Allergic arthritis involving hand 2011     Priority: Medium     Hypothyroidism 2011     Priority: Medium     Problem list name updated by automated process. Provider to review       Insomnia 2011     Priority: Medium     Problem list name updated by automated process. Provider to review       Headache 2011     Priority: Medium     Problem list name updated by automated process. Provider to review       Postherpetic polyneuropathy 2011     Priority: Medium     Dysthymia 2011     Priority: Medium     Anxiety state 2011     Priority: Medium     Problem list name updated by automated process. Provider to review       Hyperlipidemia 2011     Priority: Medium     Problem list name updated by automated  process. Provider to review       Migraine 01/01/2011     Priority: Medium     Problem list name updated by automated process. Provider to review       Osteoporosis 01/01/2011     Priority: Medium     Problem list name updated by automated process. Provider to review       GERD 01/01/2011     Priority: Medium        Past Medical History:    Past Medical History:   Diagnosis Date     Allergic arthritis involving hand 01/01/2011     Anemia, Iron Deficiency 01/01/2011     Anxiety 01/01/2011     Contact dermatitis and other eczema, unspecified c 01/01/2011     Depression 01/01/2011     Edema 01/01/2011     Gastrointestinal mucositis (ulcerative) 01/01/2011     GERD 01/01/2011     Headache(784.0) 01/01/2011     Hypothyroidism 01/01/2011     Insomnia, unspecified 01/01/2011     Migraine 01/01/2011     Nausea and vomiting      Nonallopathic lesion of cervical region, not elsewhere classified 10/16/2000     Osteoporosis 01/01/2011     Other and unspecified hyperlipidemia 01/01/2011     Other malaise and fatigue 08/27/2001     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       D & C       ENDOSCOPY       ESOPHAGOSCOPY, GASTROSCOPY, DUODENOSCOPY (EGD), COMBINED N/A 9/11/2017    Procedure: COMBINED ESOPHAGOSCOPY, GASTROSCOPY, DUODENOSCOPY (EGD);  Upper Endoscopy: Removal of Food Impaction;  Surgeon: Lino Zhu DO;  Location: [facility]     ESOPHAGOSCOPY, GASTROSCOPY, DUODENOSCOPY (EGD), COMBINED N/A 11/5/2018    Procedure: UPPER ENDOSCOPY WITH BIOPSY;  Surgeon: Dieudonne Jackson MD;   Location: [facility]     ESOPHAGOSCOPY, GASTROSCOPY, DUODENOSCOPY (EGD), COMBINED N/A 12/21/2018    Procedure: UPPER ENDOSCOPY WITH BALLOON DILATION, BIOPSY;  Surgeon: Dieudonne Jackson MD;  Location: [facility]     ESOPHAGOSCOPY, GASTROSCOPY, DUODENOSCOPY (EGD), COMBINED N/A 1/14/2019    Procedure: UPPER ENDOSCOPY WITH ENDOSCOPIC PLACEMENT OF OG AND GASTRIC LAVAGE;  Surgeon: Dieudonne Jackson MD;  Location: [facility]     partial gastrectomy  03/2019     right total knee replacement  01/2019     MARAH EN Y BOWEL  1980    Due to severely ulcerated stomach body.     STOMACH SURGERY  03/27/2019    Restructuring of stomach and Colecuystectomy.     stomach surgery peritinitis         Family History:    Family History   Problem Relation Age of Onset     Cerebrovascular Disease Mother         CVA     Hypertension Mother      Other - See Comments Father 40        mining accident; cause of death     Other - See Comments Brother         muscle dystrophy     Cancer Daughter 34        skin cancer     Melanoma Daughter        Social History:  Marital Status:  Legally  [3]  Social History     Tobacco Use     Smoking status: Never Smoker     Smokeless tobacco: Never Used     Tobacco comment: no passive exposure   Substance Use Topics     Alcohol use: Yes     Comment: rarely, maybe yearly     Drug use: No        Medications:    acetaminophen (TYLENOL) 325 MG tablet  calcium carb-cholecalciferol (CALCIUM PLUS VITAMIN D3) 600-500 MG-UNIT CAPS  Cholecalciferol (VITAMIN D3) 50 MCG (2000 UT) TABS  cyanocolbalamin (VITAMIN  B-12) 1000 MCG tablet  cyclobenzaprine (FLEXERIL) 10 MG tablet  dicyclomine (BENTYL) 10 MG capsule  escitalopram (LEXAPRO) 20 MG tablet  folic acid (FOLVITE) 1 MG tablet  gabapentin (NEURONTIN) 300 MG capsule  HYDROcodone-acetaminophen (NORCO) 7.5-325 MG per tablet  levothyroxine (SYNTHROID/LEVOTHROID) 75 MCG tablet  metoclopramide (REGLAN) 5 MG tablet  Multiple Vitamins-Minerals (PRESERVISION AREDS 2+MULTI VIT  PO)  NYAMYC 443525 UNIT/GM external powder  nystatin (MYCOSTATIN) 961790 UNIT/ML suspension  ondansetron (ZOFRAN) 4 MG tablet  pantoprazole (PROTONIX) 40 MG EC tablet  promethazine (PHENERGAN) 25 MG tablet  simvastatin (ZOCOR) 40 MG tablet  topiramate (TOPAMAX) 50 MG tablet  traZODone (DESYREL) 150 MG tablet  zolpidem (AMBIEN) 10 MG tablet  acyclovir (ZOVIRAX) 5 % external cream  ciprofloxacin (CILOXAN) 0.3 % ophthalmic solution      Review of Systems   Constitutional: Negative for chills and fever.   HENT: Positive for mouth sores (redness). Negative for congestion, drooling, facial swelling, sore throat and trouble swallowing.    Respiratory: Negative for shortness of breath.    Cardiovascular: Negative for chest pain.   Gastrointestinal: Negative for diarrhea, nausea and vomiting.   Psychiatric/Behavioral: Negative.      Physical Exam   BP: 154/86  Pulse: 100  Temp: 97.8  F (36.6  C)  Resp: 16  SpO2: 98 %    Physical Exam  Vitals signs and nursing note reviewed.   Constitutional:       General: She is not in acute distress.     Appearance: Normal appearance. She is not ill-appearing.   HENT:      Mouth/Throat:      Lips: Pink.      Mouth: Mucous membranes are moist. No oral lesions.      Tongue: No lesions. Tongue does not deviate from midline.      Pharynx: Uvula midline.      Comments: Oropharyngeal candidiasis.  Full upper and lower dentures.    Cardiovascular:      Rate and Rhythm: Normal rate and regular rhythm.      Pulses: Normal pulses.      Heart sounds: Normal heart sounds.   Pulmonary:      Effort: Pulmonary effort is normal.      Breath sounds: Normal breath sounds.   Neurological:      Mental Status: She is alert.   Psychiatric:         Mood and Affect: Mood normal.       ED Course     No results found for this or any previous visit (from the past 24 hour(s)).    Medications - No data to display    Assessments & Plan (with Medical Decision Making)     I have reviewed the nursing notes.    I have  reviewed the findings, diagnosis, plan and need for follow up with the patient.  (B37.0) Oropharyngeal candidiasis  (primary encounter diagnosis)  Plan:   Nystatin Suspension as ordered.    The dentures must be removed before going to bed, brushed vigorously, and then soaked in a solution of  over-the-counter denture care products.    Follow up with primary care provider or return to urgent care/ED with any worsening in condition or additional  concerns.      New Prescriptions    NYSTATIN (MYCOSTATIN) 963218 UNIT/ML SUSPENSION    Take 5 mLs (500,000 Units) by mouth 4 times daily for 14 days     Final diagnoses:   Oropharyngeal candidiasis     5/10/2021   [facility]     Linette Byers NP  05/13/21 [id number]

## 2021-05-10 NOTE — ED TRIAGE NOTES
Pt presents with having a sore roof of her mouth and swelling inside of her upper lip along with a couple of white spots for 7 days.

## 2021-05-11 ENCOUNTER — TELEPHONE (OUTPATIENT)
Dept: INTERNAL MEDICINE | Facility: OTHER | Age: 71
End: 2021-05-11

## 2021-05-11 DIAGNOSIS — M54.2 CERVICAL PAIN: ICD-10-CM

## 2021-05-11 DIAGNOSIS — B02.23 POSTHERPETIC POLYNEUROPATHY: ICD-10-CM

## 2021-05-11 DIAGNOSIS — M25.561 ARTHRALGIA OF BOTH KNEES: ICD-10-CM

## 2021-05-11 DIAGNOSIS — K31.84 NONDIABETIC GASTROPARESIS: ICD-10-CM

## 2021-05-11 DIAGNOSIS — G47.00 PERSISTENT INSOMNIA: ICD-10-CM

## 2021-05-11 DIAGNOSIS — G43.009 MIGRAINE WITHOUT AURA AND WITHOUT STATUS MIGRAINOSUS, NOT INTRACTABLE: ICD-10-CM

## 2021-05-11 DIAGNOSIS — M25.562 ARTHRALGIA OF BOTH KNEES: ICD-10-CM

## 2021-05-11 DIAGNOSIS — R10.13 EPIGASTRIC PAIN: ICD-10-CM

## 2021-05-11 RX ORDER — GABAPENTIN 300 MG/1
CAPSULE ORAL
Qty: 90 CAPSULE | Refills: 0 | Status: SHIPPED | OUTPATIENT
Start: 2021-05-11 | End: 2021-06-14

## 2021-05-11 RX ORDER — TOPIRAMATE 50 MG/1
75 TABLET, FILM COATED ORAL 2 TIMES DAILY
Qty: 60 TABLET | Refills: 1 | Status: SHIPPED | OUTPATIENT
Start: 2021-05-11 | End: 2021-07-06

## 2021-05-11 RX ORDER — ZOLPIDEM TARTRATE 10 MG/1
10 TABLET ORAL
Qty: 30 TABLET | Refills: 0 | Status: SHIPPED | OUTPATIENT
Start: 2021-05-11 | End: 2021-06-08

## 2021-05-11 RX ORDER — HYDROCODONE BITARTRATE AND ACETAMINOPHEN 7.5; 325 MG/1; MG/1
1 TABLET ORAL EVERY 6 HOURS PRN
Qty: 60 TABLET | Refills: 0 | Status: SHIPPED | OUTPATIENT
Start: 2021-05-16 | End: 2021-06-14

## 2021-05-11 RX ORDER — METOCLOPRAMIDE 5 MG/1
5 TABLET ORAL
Qty: 90 TABLET | Refills: 1 | Status: SHIPPED | OUTPATIENT
Start: 2021-05-11 | End: 2021-07-26

## 2021-05-11 NOTE — TELEPHONE ENCOUNTER
gabapentin (NEURONTIN) 300 MG capsule      Last Written Prescription Date:  4/19/21  Last Fill Quantity: 90,   # refills: 0    HYDROcodone-acetaminophen (NORCO) 7.5-325 MG per tablet      Last Written Prescription Date:  4/17/21  Last Fill Quantity: 60,   # refills: 0    Last Office Visit: 4/13/21 virtual  Future Office visit:       Routing refill request to provider for review/approval because:  Drug not on the [facility], P or [facility] refill protocol or controlled substance    Going out of [place] for [place] and needs refills prior to 5/16

## 2021-05-11 NOTE — TELEPHONE ENCOUNTER
Pt called, reports that she was started on topamax 50mg BID for migraines. Pt notes that med is helping a bit but is wondering if she can increase dose to 75mg BID. Please advise. Thank you!

## 2021-05-11 NOTE — TELEPHONE ENCOUNTER
Ambien      Last Written Prescription Date:  4/17/21  Last Fill Quantity: 30,   # refills: 0  Last Office Visit: 4/13/21  Future Office visit:       Routing refill request to provider for review/approval because:

## 2021-05-25 DIAGNOSIS — Z01.818 PREOP GENERAL PHYSICAL EXAM: ICD-10-CM

## 2021-05-25 RX ORDER — PROMETHAZINE HYDROCHLORIDE 25 MG/1
TABLET ORAL
Qty: 90 TABLET | Refills: 0 | Status: SHIPPED | OUTPATIENT
Start: 2021-05-25 | End: 2021-06-22

## 2021-05-25 NOTE — TELEPHONE ENCOUNTER
Promethazine 25 mg      Last Written Prescription Date:  4/29/21  Last Fill Quantity: 90,   # refills: 0  Last Office Visit: 4/13/20  Future Office visit:       Routing refill request to provider for review/approval because:  Drug not on the [facility], P or [facility] refill protocol or controlled substance

## 2021-06-01 NOTE — PLAN OF CARE
"Problem: Patient Care Overview  Goal: Plan of Care/Patient Progress Review  Outcome: No Change  Patient continues to c/o right knee pain, she's rating pain 5-7/10. Scheduled Tylenol PO continues. She continues to request Dilaudid PO every 3 hours along with Toradol IV every 6 hours because this is what worked best with her last knee surgery. Continuing to ask patient if the pain is still better then her previous sugery and she keeps replying \"yes.\" Patient did ask if she could have something more but she was informed she'd have to wait. She has been somewhat sedated this shift, which could also be her being tired because she has not slept much this stay. Patient hasn't napped or dozed this shift despite encouragement to try to. VSS, afebrile. HR continues to be tachycardic at times, like during increased activity. Right knee remains ace wrapped. Aquacell has small to moderate drainage at base of dressing, drainage remains unchanged this shift. Patient denies numbness to tingling. CMS + RLE. 2 + edema noted in RLE. Patient reported that she didn't like the way her knee felt sitting up in the chair on a pillow so she's been in bed this shift. She has ambulated to the bathroom and once in the hallways with walker, belt and assist of 1. Tolerated very well. Cryo cuff remains in place. Oral intake adequate, she does need encouragement to increase oral fluid intake. She's voided twice this shift. No BM. Alarms in place. Call light within reach. IV SL. ASA BID continues.    Face to face report given with opportunity to observe patient.    Report given to Earnestine Quigley   9/5/2018  11:09 PM      " No

## 2021-06-03 DIAGNOSIS — R11.0 NAUSEA: ICD-10-CM

## 2021-06-04 RX ORDER — ONDANSETRON 4 MG/1
TABLET, FILM COATED ORAL
Qty: 60 TABLET | Refills: 1 | Status: SHIPPED | OUTPATIENT
Start: 2021-06-04 | End: 2021-07-07

## 2021-06-04 NOTE — TELEPHONE ENCOUNTER
Ondansetron 4 mg      Last Written Prescription Date:  3/03/21  Last Fill Quantity: 60,   # refills: 4  Last Office Visit: 4/13/21  Future Office visit:       Routing refill request to provider for review/approval because:  Drug not on the [facility], [facility] or [facility] refill protocol or controlled substance

## 2021-06-05 DIAGNOSIS — E03.9 HYPOTHYROIDISM, UNSPECIFIED TYPE: ICD-10-CM

## 2021-06-05 DIAGNOSIS — F34.1 DYSTHYMIA: ICD-10-CM

## 2021-06-07 RX ORDER — ESCITALOPRAM OXALATE 20 MG/1
TABLET ORAL
Qty: 60 TABLET | Refills: 0 | Status: SHIPPED | OUTPATIENT
Start: 2021-06-07 | End: 2021-08-04

## 2021-06-07 NOTE — TELEPHONE ENCOUNTER
levothyroxine      Last Written Prescription Date:  02/27/2020  Last Fill Quantity: 90,   # refills: 3  Last Office Visit: 04/13/2021  Future Office visit:

## 2021-06-07 NOTE — TELEPHONE ENCOUNTER
escitalopram 20 mg      Last Written Prescription Date:  4/05/21  Last Fill Quantity: 60,   # refills: 0  Last Office Visit: 4/13/21  Future Office visit:       Routing refill request to provider for review/approval because:  Drug not on the [facility], [facility] or [facility] refill protocol or controlled substance

## 2021-06-08 DIAGNOSIS — G47.00 PERSISTENT INSOMNIA: ICD-10-CM

## 2021-06-08 RX ORDER — TRAZODONE HYDROCHLORIDE 150 MG/1
150 TABLET ORAL AT BEDTIME
Qty: 30 TABLET | Refills: 1 | Status: SHIPPED | OUTPATIENT
Start: 2021-06-08 | End: 2021-07-27

## 2021-06-08 RX ORDER — LEVOTHYROXINE SODIUM 75 UG/1
75 TABLET ORAL DAILY
Qty: 90 TABLET | Refills: 3 | Status: SHIPPED | OUTPATIENT
Start: 2021-06-08 | End: 2022-08-02

## 2021-06-08 RX ORDER — ZOLPIDEM TARTRATE 10 MG/1
10 TABLET ORAL
Qty: 30 TABLET | Refills: 0 | Status: SHIPPED | OUTPATIENT
Start: 2021-06-08 | End: 2021-07-01

## 2021-06-08 NOTE — TELEPHONE ENCOUNTER
Desyrel 150mg      Last Written Prescription Date:  4/13/21  Last Fill Quantity: 30,   # refills: 1  Last Office Visit: 4/13/21  Future Office visit:       Routing refill request to provider for review/approval because:    Ambien 10mg      Last Written Prescription Date:  5/11/21  Last Fill Quantity: 30,   # refills: 0  Last Office Visit: 4/13/21  Future Office visit:       Routing refill request to provider for review/approval because:

## 2021-06-12 DIAGNOSIS — B02.23 POSTHERPETIC POLYNEUROPATHY: ICD-10-CM

## 2021-06-13 DIAGNOSIS — M25.561 ARTHRALGIA OF BOTH KNEES: ICD-10-CM

## 2021-06-13 DIAGNOSIS — M54.2 CERVICAL PAIN: ICD-10-CM

## 2021-06-13 DIAGNOSIS — M25.562 ARTHRALGIA OF BOTH KNEES: ICD-10-CM

## 2021-06-14 RX ORDER — GABAPENTIN 300 MG/1
CAPSULE ORAL
Qty: 90 CAPSULE | Refills: 0 | Status: SHIPPED | OUTPATIENT
Start: 2021-06-14 | End: 2021-07-09

## 2021-06-14 NOTE — TELEPHONE ENCOUNTER
Norco      Last Written Prescription Date:  5/16/21  Last Fill Quantity: 60,   # refills: 0  Last Office Visit: 4/13/21  Future Office visit:       Routing refill request to provider for review/approval because:

## 2021-06-14 NOTE — TELEPHONE ENCOUNTER
NEURONTIN      Last Written Prescription Date:  5-  Last Fill Quantity: 90,   # refills: 0  Last Office Visit: 4-  Future Office visit:       Routing refill request to provider for review/approval because:  Drug not on the [facility], [facility] or [facility] refill protocol or controlled substance

## 2021-06-15 RX ORDER — HYDROCODONE BITARTRATE AND ACETAMINOPHEN 7.5; 325 MG/1; MG/1
1 TABLET ORAL EVERY 6 HOURS PRN
Qty: 60 TABLET | Refills: 0 | Status: SHIPPED | OUTPATIENT
Start: 2021-06-15 | End: 2021-07-09

## 2021-06-21 DIAGNOSIS — Z01.818 PREOP GENERAL PHYSICAL EXAM: ICD-10-CM

## 2021-06-22 RX ORDER — PROMETHAZINE HYDROCHLORIDE 25 MG/1
TABLET ORAL
Qty: 90 TABLET | Refills: 0 | Status: SHIPPED | OUTPATIENT
Start: 2021-06-22 | End: 2021-07-21

## 2021-06-22 NOTE — TELEPHONE ENCOUNTER
promethazine (PHENERGAN) 25 MG tablet    Last Written Prescription Date:  5/25/21  Last Fill Quantity: 90,   # refills: 0  Last Office Visit: 4/13/21  Future Office visit:       Routing refill request to provider for review/approval

## 2021-06-24 DIAGNOSIS — Z01.818 PREOP GENERAL PHYSICAL EXAM: ICD-10-CM

## 2021-06-24 RX ORDER — PROMETHAZINE HYDROCHLORIDE 25 MG/1
TABLET ORAL
Qty: 90 TABLET | Refills: 0 | OUTPATIENT
Start: 2021-06-24

## 2021-06-24 NOTE — TELEPHONE ENCOUNTER
Promethazine 25 mg      Last Written Prescription Date:  6/22/21  Last Fill Quantity: 90,   # refills: 0  Last Office Visit: 4/13/21  Future Office visit:       Routing refill request to provider for review/approval because:  Drug not on the [facility], [facility] or [facility] refill protocol or controlled substance

## 2021-06-28 DIAGNOSIS — R21 RASH: ICD-10-CM

## 2021-06-29 RX ORDER — NYSTATIN 100000 [USP'U]/G
POWDER TOPICAL
Qty: 60 G | Refills: 1 | Status: SHIPPED | OUTPATIENT
Start: 2021-06-29 | End: 2022-03-04

## 2021-06-29 NOTE — TELEPHONE ENCOUNTER
NYAMYC [id number] UNIT/GM external powder      Last Written Prescription Date:  6/2/2020  Last Fill Quantity: 60 g,   # refills: 1  Last Office Visit: 4/13/2021  Future Office visit:

## 2021-07-01 DIAGNOSIS — K59.03 DRUG-INDUCED CONSTIPATION: ICD-10-CM

## 2021-07-01 DIAGNOSIS — G47.00 PERSISTENT INSOMNIA: ICD-10-CM

## 2021-07-01 RX ORDER — DICYCLOMINE HYDROCHLORIDE 10 MG/1
CAPSULE ORAL
Qty: 120 CAPSULE | Refills: 3 | Status: SHIPPED | OUTPATIENT
Start: 2021-07-01 | End: 2021-10-20

## 2021-07-01 RX ORDER — ZOLPIDEM TARTRATE 10 MG/1
10 TABLET ORAL
Qty: 30 TABLET | Refills: 0 | Status: SHIPPED | OUTPATIENT
Start: 2021-07-01 | End: 2021-07-27

## 2021-07-01 NOTE — TELEPHONE ENCOUNTER
Zolpidem 10 mg      Last Written Prescription Date:  6/08/21  Last Fill Quantity: 30,   # refills: 0  Last Office Visit: 4/13/21  Future Office visit:       Routing refill request to provider for review/approval because:  Drug not on the [facility], [facility] or [facility] refill protocol or controlled substance

## 2021-07-02 DIAGNOSIS — G43.009 MIGRAINE WITHOUT AURA AND WITHOUT STATUS MIGRAINOSUS, NOT INTRACTABLE: ICD-10-CM

## 2021-07-02 NOTE — TELEPHONE ENCOUNTER
Topamax 50mg      Last Written Prescription Date:  5/11/21  Last Fill Quantity: 60,   # refills: 1  Last Office Visit: 4/13/21  Future Office visit:       Routing refill request to provider for review/approval because:

## 2021-07-06 DIAGNOSIS — R11.0 NAUSEA: ICD-10-CM

## 2021-07-06 RX ORDER — TOPIRAMATE 50 MG/1
TABLET, FILM COATED ORAL
Qty: 60 TABLET | Refills: 1 | Status: SHIPPED | OUTPATIENT
Start: 2021-07-06 | End: 2021-09-01

## 2021-07-07 RX ORDER — ONDANSETRON 4 MG/1
TABLET, FILM COATED ORAL
Qty: 60 TABLET | Refills: 1 | Status: SHIPPED | OUTPATIENT
Start: 2021-07-07 | End: 2021-08-13

## 2021-07-07 NOTE — TELEPHONE ENCOUNTER
ondansetron (ZOFRAN) 4 MG tablet      Last Written Prescription Date:  6/4/2021  Last Fill Quantity: 60,   # refills: 1  Last Office Visit: 4/13/2021  Future Office visit:

## 2021-07-08 DIAGNOSIS — M25.561 ARTHRALGIA OF BOTH KNEES: ICD-10-CM

## 2021-07-08 DIAGNOSIS — B02.23 POSTHERPETIC POLYNEUROPATHY: ICD-10-CM

## 2021-07-08 DIAGNOSIS — M25.562 ARTHRALGIA OF BOTH KNEES: ICD-10-CM

## 2021-07-08 DIAGNOSIS — M54.2 CERVICAL PAIN: ICD-10-CM

## 2021-07-08 NOTE — TELEPHONE ENCOUNTER
HYDROcodone-acetaminophen (NORCO) 7.5-325 MG per tablet     Last Written Prescription Date:  6/15/21  Last Fill Quantity: 60,   # refills: 0  Last Office Visit: 4/13/21  Future Office visit:       gabapentin (NEURONTIN) 300 MG capsule     Last Written Prescription Date:  6/14/21  Last Fill Quantity: 90,   # refills: 0  Last Office Visit: 4/13/21  Future Office visit:       Routing refill request to provider for review/approval

## 2021-07-09 RX ORDER — HYDROCODONE BITARTRATE AND ACETAMINOPHEN 7.5; 325 MG/1; MG/1
1 TABLET ORAL EVERY 6 HOURS PRN
Qty: 60 TABLET | Refills: 0 | Status: SHIPPED | OUTPATIENT
Start: 2021-07-09 | End: 2021-08-04

## 2021-07-09 RX ORDER — GABAPENTIN 300 MG/1
CAPSULE ORAL
Qty: 90 CAPSULE | Refills: 0 | Status: SHIPPED | OUTPATIENT
Start: 2021-07-09 | End: 2021-08-04

## 2021-07-14 DIAGNOSIS — M54.2 CERVICALGIA: ICD-10-CM

## 2021-07-15 RX ORDER — CYCLOBENZAPRINE HCL 10 MG
TABLET ORAL
Qty: 90 TABLET | Refills: 2 | Status: SHIPPED | OUTPATIENT
Start: 2021-07-15 | End: 2021-09-28

## 2021-07-15 NOTE — TELEPHONE ENCOUNTER
FLEXERIL      Last Written Prescription Date:  4/29/2021  Last Fill Quantity: 90,   # refills: 2  Last Office Visit: 4/13/2021  Future Office visit:       Routing refill request to provider for review/approval because:

## 2021-07-20 DIAGNOSIS — Z01.818 PREOP GENERAL PHYSICAL EXAM: ICD-10-CM

## 2021-07-21 RX ORDER — PROMETHAZINE HYDROCHLORIDE 25 MG/1
TABLET ORAL
Qty: 90 TABLET | Refills: 1 | Status: SHIPPED | OUTPATIENT
Start: 2021-07-21 | End: 2021-09-22

## 2021-07-26 DIAGNOSIS — G47.00 PERSISTENT INSOMNIA: ICD-10-CM

## 2021-07-27 NOTE — TELEPHONE ENCOUNTER
Ambien       Last Written Prescription Date:  7/1/2021  Last Fill Quantity: 30,   # refills: 0  Last Office Visit: 4/13/2021  Future Office visit:    Next 5 appointments (look out 90 days)    Aug 04, 2021  4:00 PM  (Arrive by 3:45 PM)  SHORT with Balta Milton DO  [facility] ([facility] ) [address]  [place] [id number] [phone]

## 2021-07-29 DIAGNOSIS — G47.00 PERSISTENT INSOMNIA: ICD-10-CM

## 2021-07-29 RX ORDER — ZOLPIDEM TARTRATE 10 MG/1
10 TABLET ORAL
Qty: 30 TABLET | Refills: 0 | Status: SHIPPED | OUTPATIENT
Start: 2021-07-29 | End: 2021-08-26

## 2021-07-29 RX ORDER — ZOLPIDEM TARTRATE 10 MG/1
10 TABLET ORAL
Qty: 30 TABLET | Refills: 0 | Status: SHIPPED | OUTPATIENT
Start: 2021-07-29 | End: 2021-08-04

## 2021-07-29 NOTE — TELEPHONE ENCOUNTER
Patient has an appointment on 8/4 for a med check.    Calling for a refill      zolpidem (AMBIEN) 10 MG tablet      Last Written Prescription Date:  7/1/21  Last Fill Quantity: 30,   # refills: 0  Last Office Visit: 4/13/21  Future Office visit:    Next 5 appointments (look out 90 days)    Aug 04, 2021  4:00 PM  (Arrive by 3:45 PM)  SHORT with Balta Milton DO  [facility] ([facility] ) [address]  [place] [id number] [phone]           Routing refill request to provider for review/approval because:  Drug not on the [facility], [facility] or [facility] refill protocol or controlled substance

## 2021-08-04 ENCOUNTER — VIRTUAL VISIT (OUTPATIENT)
Dept: INTERNAL MEDICINE | Facility: OTHER | Age: 71
End: 2021-08-04
Attending: INTERNAL MEDICINE
Payer: COMMERCIAL

## 2021-08-04 DIAGNOSIS — F34.1 DYSTHYMIA: ICD-10-CM

## 2021-08-04 DIAGNOSIS — M54.2 CERVICAL PAIN: ICD-10-CM

## 2021-08-04 DIAGNOSIS — M25.562 ARTHRALGIA OF BOTH KNEES: ICD-10-CM

## 2021-08-04 DIAGNOSIS — B02.23 POSTHERPETIC POLYNEUROPATHY: ICD-10-CM

## 2021-08-04 DIAGNOSIS — E78.5 HYPERLIPIDEMIA LDL GOAL <130: Primary | ICD-10-CM

## 2021-08-04 DIAGNOSIS — E03.9 ACQUIRED HYPOTHYROIDISM: ICD-10-CM

## 2021-08-04 DIAGNOSIS — M25.561 ARTHRALGIA OF BOTH KNEES: ICD-10-CM

## 2021-08-04 PROCEDURE — 99214 OFFICE O/P EST MOD 30 MIN: CPT | Mod: TEL | Performed by: INTERNAL MEDICINE

## 2021-08-04 RX ORDER — ESCITALOPRAM OXALATE 20 MG/1
TABLET ORAL
Qty: 60 TABLET | Refills: 0 | Status: SHIPPED | OUTPATIENT
Start: 2021-08-04 | End: 2021-10-04

## 2021-08-04 RX ORDER — HYDROCODONE BITARTRATE AND ACETAMINOPHEN 7.5; 325 MG/1; MG/1
1 TABLET ORAL EVERY 6 HOURS PRN
Qty: 60 TABLET | Refills: 0 | Status: SHIPPED | OUTPATIENT
Start: 2021-08-06 | End: 2021-09-01

## 2021-08-04 RX ORDER — GABAPENTIN 300 MG/1
300 CAPSULE ORAL 3 TIMES DAILY
Qty: 90 CAPSULE | Refills: 0 | Status: SHIPPED | OUTPATIENT
Start: 2021-08-04 | End: 2021-09-01

## 2021-08-04 NOTE — PROGRESS NOTES
"Aure is a 71 year old who is being evaluated via a billable telephone visit.      What phone number would you like to be contacted at?   How would you like to obtain your AVS? Mail a copy    Assessment & Plan   Problem List Items Addressed This Visit        Nervous and Auditory    Postherpetic polyneuropathy    Relevant Medications    escitalopram (LEXAPRO) 20 MG tablet    gabapentin (NEURONTIN) 300 MG capsule       Endocrine    Hypothyroidism    Relevant Orders    TSH    T4, free       Behavioral    Dysthymia    Relevant Medications    escitalopram (LEXAPRO) 20 MG tablet      Other Visit Diagnoses     Hyperlipidemia LDL goal <130    -  Primary    Relevant Orders    Comprehensive metabolic panel (BMP + Alb, Alk Phos, ALT, AST, Total. Bili, TP)    Lipid Profile (Chol, Trig, HDL, LDL calc)    CBC with platelets and differential    Cervical pain        Relevant Medications    HYDROcodone-acetaminophen (NORCO) 7.5-325 MG per tablet (Start on 8/6/2021)    Arthralgia of both knees        Relevant Medications    HYDROcodone-acetaminophen (NORCO) 7.5-325 MG per tablet (Start on 8/6/2021)             15 minutes spent on the date of the encounter doing chart review, review of test results, interpretation of tests, patient visit and documentation        BMI:   Estimated body mass index is 30.73 kg/m  as calculated from the following:    Height as of 11/24/20: 1.575 m (5' 2\").    Weight as of 12/21/20: 76.2 kg (168 lb).       No follow-ups on file.    Balta Milton, [facility] - [place] [facility]    Subjective   Aure is a 71 year old who presents for the following health issues     HPI   Aure was contacted today via phone for follow up.  She continues to have Migraines, but these are improved.  She is due for refills but is also due for labs.  She otherwise has no significant complaints today.   Fasting labs were ordered today.      Hyperlipidemia Follow-Up      Are you regularly taking any medication or " supplement to lower your cholesterol?   Yes- Simvastatin    Are you having muscle aches or other side effects that you think could be caused by your cholesterol lowering medication?  No    Migraine     Since your last clinic visit, how have your headaches changed?  No change    How often are you getting headaches or migraines? Daily     Are you able to do normal daily activities when you have a migraine? Yes    Are you taking rescue/relief medications? (Select all that apply) Other: norco    How helpful is your rescue/relief medication?  I get total relief    Are you taking any medications to prevent migraines? (Select all that apply)  Topamax    In the past 4 weeks, how often have you gone to urgent care or the emergency room because of your headaches?  0    Hypothyroidism Follow-up      Since last visit, patient describes the following symptoms: Weight stable, no hair loss, no skin changes, no constipation, no loose stools    Chronic Pain Follow-Up    Where in your body do you have pain? neck  Which of these pain treatments have you tried since your last clinic visit? Other: none  How well are you sleeping? Poor  How has your mood been since your last visit? Better  Have you had a significant life event? No  Other aggravating factors: none  Taking medication as directed? Yes    PHQ-9 SCORE 10/21/2020 11/24/2020 4/13/2021   PHQ-9 Total Score - - -   PHQ-9 Total Score 0 7 15     AURELIA-7 SCORE 10/21/2020 11/24/2020 4/13/2021   Total Score 2 4 2     No flowsheet data found.  Encounter-Level CSA - 07/11/2017:    Controlled Substance Agreement - Scan on 7/20/2017 12:19 PM: CONTROLLED SUBSTANCE AGREEMENT     Patient-Level CSA:    There are no patient-level csa.             Review of Systems   Constitutional, HEENT, cardiovascular, pulmonary, gi and gu systems are negative, except as otherwise noted.      Objective           Vitals:  No vitals were obtained today due to virtual visit.    Physical Exam   alert and no  distress  PSYCH: Alert and oriented times 3; coherent speech, normal   rate and volume, able to articulate logical thoughts, able   to abstract reason, no tangential thoughts, no hallucinations   or delusions  Her affect is normal  RESP: No cough, no audible wheezing, able to talk in full sentences  Remainder of exam unable to be completed due to telephone visits    Office Visit on 10/21/2020   Component Date Value Ref Range Status     WBC 10/21/2020 9.0  4.0 - 11.0 10e9/L Final     RBC Count 10/21/2020 4.50  3.8 - 5.2 10e12/L Final     Hemoglobin 10/21/2020 13.1  11.7 - 15.7 g/dL Final     Hematocrit 10/21/2020 40.8  35.0 - 47.0 % Final     MCV 10/21/2020 91  78 - 100 fl Final     MCH 10/21/2020 29.1  26.5 - 33.0 pg Final     MCHC 10/21/2020 32.1  31.5 - 36.5 g/dL Final     RDW 10/21/2020 14.5  10.0 - 15.0 % Final     Platelet Count 10/21/2020 441  150 - 450 10e9/L Final     Diff Method 10/21/2020 Automated Method   Final     % Neutrophils 10/21/2020 49.8  % Final     % Lymphocytes 10/21/2020 34.5  % Final     % Monocytes 10/21/2020 10.1  % Final     % Eosinophils 10/21/2020 3.5  % Final     % Basophils 10/21/2020 0.8  % Final     % Immature Granulocytes 10/21/2020 1.3  % Final     Nucleated RBCs 10/21/2020 0  0 /100 Final     Absolute Neutrophil 10/21/2020 4.5  1.6 - 8.3 10e9/L Final     Absolute Lymphocytes 10/21/2020 3.1  0.8 - 5.3 10e9/L Final     Absolute Monocytes 10/21/2020 0.9  0.0 - 1.3 10e9/L Final     Absolute Eosinophils 10/21/2020 0.3  0.0 - 0.7 10e9/L Final     Absolute Basophils 10/21/2020 0.1  0.0 - 0.2 10e9/L Final     Abs Immature Granulocytes 10/21/2020 0.1  0 - 0.4 10e9/L Final     Absolute Nucleated RBC 10/21/2020 0.0   Final     Sodium 10/21/2020 139  133 - 144 mmol/L Final     Potassium 10/21/2020 4.2  3.4 - 5.3 mmol/L Final     Chloride 10/21/2020 106  94 - 109 mmol/L Final     Carbon Dioxide 10/21/2020 27  20 - 32 mmol/L Final     Anion Gap 10/21/2020 6  3 - 14 mmol/L Final     Glucose  10/21/2020 91  70 - 99 mg/dL Final    Non Fasting     Urea Nitrogen 10/21/2020 12  7 - 30 mg/dL Final     Creatinine 10/21/2020 0.60  0.52 - 1.04 mg/dL Final     GFR Estimate 10/21/2020 >90  >60 mL/min/[1.73_m2] Final    Comment: Non  GFR Calc  Starting 12/18/2018, serum creatinine based estimated GFR (eGFR) will be   calculated using the Chronic Kidney Disease Epidemiology Collaboration   (CKD-EPI) equation.       GFR Estimate If Black 10/21/2020 >90  >60 mL/min/[1.73_m2] Final    Comment:  GFR Calc  Starting 12/18/2018, serum creatinine based estimated GFR (eGFR) will be   calculated using the Chronic Kidney Disease Epidemiology Collaboration   (CKD-EPI) equation.       Calcium 10/21/2020 8.4* 8.5 - 10.1 mg/dL Final     Bilirubin Total 10/21/2020 0.2  0.2 - 1.3 mg/dL Final     Albumin 10/21/2020 4.0  3.4 - 5.0 g/dL Final     Protein Total 10/21/2020 7.8  6.8 - 8.8 g/dL Final     Alkaline Phosphatase 10/21/2020 114  40 - 150 U/L Final     ALT 10/21/2020 40  0 - 50 U/L Final     AST 10/21/2020 19  0 - 45 U/L Final     Cholesterol 10/21/2020 192  <200 mg/dL Final     Triglycerides 10/21/2020 157* <150 mg/dL Final    Comment: Borderline high:  150-199 mg/dl  High:             200-499 mg/dl  Very high:       >499 mg/dl  Non Fasting       HDL Cholesterol 10/21/2020 74  >49 mg/dL Final     LDL Cholesterol Calculated 10/21/2020 87  <100 mg/dL Final    Desirable:       <100 mg/dl     Non HDL Cholesterol 10/21/2020 118  <130 mg/dL Final     TSH 10/21/2020 2.57  0.40 - 4.00 mU/L Final     No results found for any visits on 08/04/21.  No results found for this or any previous visit (from the past 24 hour(s)).            Phone call duration: 8 minutes

## 2021-08-09 DIAGNOSIS — Z00.00 HEALTH CARE MAINTENANCE: ICD-10-CM

## 2021-08-09 RX ORDER — FOLIC ACID 1 MG/1
TABLET ORAL
Qty: 90 TABLET | Refills: 1 | Status: SHIPPED | OUTPATIENT
Start: 2021-08-09 | End: 2022-04-20

## 2021-08-12 DIAGNOSIS — R11.0 NAUSEA: ICD-10-CM

## 2021-08-12 NOTE — TELEPHONE ENCOUNTER
Zofran      Last Written Prescription Date:  7/7/21  Last Fill Quantity: 60,   # refills: 1  Last Office Visit: 8/4/21  Future Office visit:       Routing refill request to provider for review/approval because:

## 2021-08-13 ENCOUNTER — LAB (OUTPATIENT)
Dept: LAB | Facility: OTHER | Age: 71
End: 2021-08-13
Payer: COMMERCIAL

## 2021-08-13 DIAGNOSIS — E78.5 HYPERLIPIDEMIA LDL GOAL <130: ICD-10-CM

## 2021-08-13 DIAGNOSIS — E03.9 ACQUIRED HYPOTHYROIDISM: ICD-10-CM

## 2021-08-13 LAB
ALBUMIN SERPL-MCNC: 3.4 G/DL (ref 3.4–5)
ALP SERPL-CCNC: 93 U/L (ref 40–150)
ALT SERPL W P-5'-P-CCNC: 29 U/L (ref 0–50)
ANION GAP SERPL CALCULATED.3IONS-SCNC: 5 MMOL/L (ref 3–14)
AST SERPL W P-5'-P-CCNC: 18 U/L (ref 0–45)
BASOPHILS # BLD AUTO: 0 10E3/UL (ref 0–0.2)
BASOPHILS NFR BLD AUTO: 1 %
BILIRUB SERPL-MCNC: 0.3 MG/DL (ref 0.2–1.3)
BUN SERPL-MCNC: 10 MG/DL (ref 7–30)
CALCIUM SERPL-MCNC: 8.4 MG/DL (ref 8.5–10.1)
CHLORIDE BLD-SCNC: 110 MMOL/L (ref 94–109)
CHOLEST SERPL-MCNC: 147 MG/DL
CO2 SERPL-SCNC: 27 MMOL/L (ref 20–32)
CREAT SERPL-MCNC: 0.7 MG/DL (ref 0.52–1.04)
EOSINOPHIL # BLD AUTO: 0.6 10E3/UL (ref 0–0.7)
EOSINOPHIL NFR BLD AUTO: 9 %
ERYTHROCYTE [DISTWIDTH] IN BLOOD BY AUTOMATED COUNT: 13.7 % (ref 10–15)
FASTING STATUS PATIENT QL REPORTED: YES
GFR SERPL CREATININE-BSD FRML MDRD: 89 ML/MIN/1.73M2
GLUCOSE BLD-MCNC: 93 MG/DL (ref 70–99)
HCT VFR BLD AUTO: 37.6 % (ref 35–47)
HDLC SERPL-MCNC: 61 MG/DL
HGB BLD-MCNC: 11.8 G/DL (ref 11.7–15.7)
IMM GRANULOCYTES # BLD: 0 10E3/UL
IMM GRANULOCYTES NFR BLD: 0 %
LDLC SERPL CALC-MCNC: 58 MG/DL
LYMPHOCYTES # BLD AUTO: 2.3 10E3/UL (ref 0.8–5.3)
LYMPHOCYTES NFR BLD AUTO: 38 %
MCH RBC QN AUTO: 28.3 PG (ref 26.5–33)
MCHC RBC AUTO-ENTMCNC: 31.4 G/DL (ref 31.5–36.5)
MCV RBC AUTO: 90 FL (ref 78–100)
MONOCYTES # BLD AUTO: 0.6 10E3/UL (ref 0–1.3)
MONOCYTES NFR BLD AUTO: 9 %
NEUTROPHILS # BLD AUTO: 2.7 10E3/UL (ref 1.6–8.3)
NEUTROPHILS NFR BLD AUTO: 43 %
NONHDLC SERPL-MCNC: 86 MG/DL
NRBC # BLD AUTO: 0 10E3/UL
NRBC BLD AUTO-RTO: 0 /100
PLATELET # BLD AUTO: 338 10E3/UL (ref 150–450)
POTASSIUM BLD-SCNC: 3.7 MMOL/L (ref 3.4–5.3)
PROT SERPL-MCNC: 6.9 G/DL (ref 6.8–8.8)
RBC # BLD AUTO: 4.17 10E6/UL (ref 3.8–5.2)
SODIUM SERPL-SCNC: 142 MMOL/L (ref 133–144)
T4 FREE SERPL-MCNC: 0.87 NG/DL (ref 0.76–1.46)
TRIGL SERPL-MCNC: 141 MG/DL
TSH SERPL DL<=0.005 MIU/L-ACNC: 0.46 MU/L (ref 0.4–4)
WBC # BLD AUTO: 6.2 10E3/UL (ref 4–11)

## 2021-08-13 PROCEDURE — 84443 ASSAY THYROID STIM HORMONE: CPT | Mod: ZL

## 2021-08-13 PROCEDURE — 80053 COMPREHEN METABOLIC PANEL: CPT | Mod: ZL

## 2021-08-13 PROCEDURE — 84439 ASSAY OF FREE THYROXINE: CPT | Mod: ZL

## 2021-08-13 PROCEDURE — 85025 COMPLETE CBC W/AUTO DIFF WBC: CPT | Mod: ZL

## 2021-08-13 PROCEDURE — 80061 LIPID PANEL: CPT | Mod: ZL

## 2021-08-13 RX ORDER — ONDANSETRON 4 MG/1
TABLET, FILM COATED ORAL
Qty: 60 TABLET | Refills: 1 | Status: SHIPPED | OUTPATIENT
Start: 2021-08-13 | End: 2022-10-06

## 2021-08-20 DIAGNOSIS — R10.13 EPIGASTRIC PAIN: ICD-10-CM

## 2021-08-20 DIAGNOSIS — K31.84 NONDIABETIC GASTROPARESIS: ICD-10-CM

## 2021-08-20 RX ORDER — METOCLOPRAMIDE 5 MG/1
5 TABLET ORAL
Qty: 30 TABLET | Refills: 0 | Status: SHIPPED | OUTPATIENT
Start: 2021-08-20 | End: 2021-09-17

## 2021-08-25 DIAGNOSIS — G47.00 PERSISTENT INSOMNIA: ICD-10-CM

## 2021-08-26 RX ORDER — ZOLPIDEM TARTRATE 10 MG/1
TABLET ORAL
Qty: 30 TABLET | Refills: 0 | Status: SHIPPED | OUTPATIENT
Start: 2021-08-26 | End: 2021-09-22

## 2021-08-26 NOTE — TELEPHONE ENCOUNTER
ambien      Last Written Prescription Date:  7/29/21  Last Fill Quantity: 30,   # refills: 0  Last Office Visit: 12/21/20  Future Office visit:

## 2021-08-31 DIAGNOSIS — G43.009 MIGRAINE WITHOUT AURA AND WITHOUT STATUS MIGRAINOSUS, NOT INTRACTABLE: ICD-10-CM

## 2021-08-31 DIAGNOSIS — M54.2 CERVICAL PAIN: ICD-10-CM

## 2021-08-31 DIAGNOSIS — M25.562 ARTHRALGIA OF BOTH KNEES: ICD-10-CM

## 2021-08-31 DIAGNOSIS — M25.561 ARTHRALGIA OF BOTH KNEES: ICD-10-CM

## 2021-08-31 DIAGNOSIS — B02.23 POSTHERPETIC POLYNEUROPATHY: ICD-10-CM

## 2021-09-01 RX ORDER — TOPIRAMATE 50 MG/1
TABLET, FILM COATED ORAL
Qty: 60 TABLET | Refills: 0 | Status: SHIPPED | OUTPATIENT
Start: 2021-09-01 | End: 2021-09-30

## 2021-09-01 RX ORDER — GABAPENTIN 300 MG/1
300 CAPSULE ORAL 3 TIMES DAILY
Qty: 90 CAPSULE | Refills: 0 | Status: SHIPPED | OUTPATIENT
Start: 2021-09-01 | End: 2021-09-28

## 2021-09-01 RX ORDER — HYDROCODONE BITARTRATE AND ACETAMINOPHEN 7.5; 325 MG/1; MG/1
TABLET ORAL
Qty: 60 TABLET | Refills: 0 | Status: SHIPPED | OUTPATIENT
Start: 2021-09-01 | End: 2021-09-28

## 2021-09-01 NOTE — TELEPHONE ENCOUNTER
Norco       Last Written Prescription Date:  8/6/2021  Last Fill Quantity: 60,   # refills: 0    Gabapentin       Last Written Prescription Date:  84/2021  Last Fill Quantity: 90,   # refills: 0    Topamax       Last Written Prescription Date:  7/6/2021  Last Fill Quantity: 60,   # refills: 1  Last Office Visit: 8/4/2021  Future Office visit:

## 2021-09-17 DIAGNOSIS — R10.13 EPIGASTRIC PAIN: ICD-10-CM

## 2021-09-17 DIAGNOSIS — K31.84 NONDIABETIC GASTROPARESIS: ICD-10-CM

## 2021-09-17 RX ORDER — METOCLOPRAMIDE 5 MG/1
5 TABLET ORAL
Qty: 270 TABLET | Refills: 3 | Status: SHIPPED | OUTPATIENT
Start: 2021-09-17 | End: 2022-09-19

## 2021-09-21 DIAGNOSIS — Z01.818 PREOP GENERAL PHYSICAL EXAM: ICD-10-CM

## 2021-09-22 DIAGNOSIS — G47.00 PERSISTENT INSOMNIA: ICD-10-CM

## 2021-09-22 RX ORDER — ZOLPIDEM TARTRATE 10 MG/1
TABLET ORAL
Qty: 30 TABLET | Refills: 0 | Status: SHIPPED | OUTPATIENT
Start: 2021-09-26 | End: 2021-10-21

## 2021-09-22 RX ORDER — PROMETHAZINE HYDROCHLORIDE 25 MG/1
TABLET ORAL
Qty: 90 TABLET | Refills: 0 | Status: SHIPPED | OUTPATIENT
Start: 2021-09-22 | End: 2021-10-20

## 2021-09-22 NOTE — TELEPHONE ENCOUNTER
zolpidem (AMBIEN) 10 MG tablet      Last Written Prescription Date:  8-26-21  Last Fill Quantity: 30,   # refills: 0  Last Office Visit: 8-4-21  Future Office visit:       Routing refill request to provider for review/approval because:  Drug not on the [facility], [facility] or [facility] refill protocol or controlled substance

## 2021-09-25 DIAGNOSIS — B02.23 POSTHERPETIC POLYNEUROPATHY: ICD-10-CM

## 2021-09-25 DIAGNOSIS — M25.561 ARTHRALGIA OF BOTH KNEES: ICD-10-CM

## 2021-09-25 DIAGNOSIS — M54.2 CERVICALGIA: ICD-10-CM

## 2021-09-25 DIAGNOSIS — M54.2 CERVICAL PAIN: ICD-10-CM

## 2021-09-25 DIAGNOSIS — M25.562 ARTHRALGIA OF BOTH KNEES: ICD-10-CM

## 2021-09-27 NOTE — TELEPHONE ENCOUNTER
HYDROCODONE/APAP 7.5-325MG TAB    Last Written Prescription Date:  9/1/2021  Last Fill Quantity: 60,   # refills: 0  Last Office Visit: 8/4/2021  Future Office visit:       Routing refill request to provider for review/approval because:          Gabapentin 300 mg Capsule    Last Written Prescription Date:  9/1/2021  Last Fill Quantity: 90,   # refills: 0  Last Office Visit: 8/4/21  Future Office visit:       Routing refill request to provider for review/approval because:        Cyclobenzaprine 10 mg tablet  Last Written Prescription Date:  7/15/2021  Last Fill Quantity: 90,   # refills: 2  Last Office Visit: 8/4/2021  Future Office visit:       Routing refill request to provider for review/approval because:

## 2021-09-28 DIAGNOSIS — G43.009 MIGRAINE WITHOUT AURA AND WITHOUT STATUS MIGRAINOSUS, NOT INTRACTABLE: ICD-10-CM

## 2021-09-28 RX ORDER — HYDROCODONE BITARTRATE AND ACETAMINOPHEN 7.5; 325 MG/1; MG/1
TABLET ORAL
Qty: 60 TABLET | Refills: 0 | Status: SHIPPED | OUTPATIENT
Start: 2021-09-28 | End: 2021-10-25

## 2021-09-28 RX ORDER — GABAPENTIN 300 MG/1
300 CAPSULE ORAL 3 TIMES DAILY
Qty: 90 CAPSULE | Refills: 0 | Status: SHIPPED | OUTPATIENT
Start: 2021-09-28 | End: 2021-10-25

## 2021-09-28 RX ORDER — CYCLOBENZAPRINE HCL 10 MG
TABLET ORAL
Qty: 90 TABLET | Refills: 1 | Status: SHIPPED | OUTPATIENT
Start: 2021-09-28 | End: 2021-11-23

## 2021-09-29 NOTE — TELEPHONE ENCOUNTER
topamax      Last Written Prescription Date:  9/1/21  Last Fill Quantity: 60,   # refills: 0  Last Office Visit: 8/4/21  Future Office visit:

## 2021-09-30 RX ORDER — TOPIRAMATE 50 MG/1
TABLET, FILM COATED ORAL
Qty: 60 TABLET | Refills: 0 | Status: SHIPPED | OUTPATIENT
Start: 2021-09-30 | End: 2021-10-29

## 2021-10-02 DIAGNOSIS — F34.1 DYSTHYMIA: ICD-10-CM

## 2021-10-04 RX ORDER — ESCITALOPRAM OXALATE 20 MG/1
TABLET ORAL
Qty: 60 TABLET | Refills: 0 | Status: SHIPPED | OUTPATIENT
Start: 2021-10-04 | End: 2021-12-09

## 2021-10-04 NOTE — TELEPHONE ENCOUNTER
lexapro      Last Written Prescription Date:  8/4/21  Last Fill Quantity: 60,   # refills: 0  Last Office Visit: 8/4/21  Future Office visit:

## 2021-10-12 ENCOUNTER — TRANSFERRED RECORDS (OUTPATIENT)
Dept: HEALTH INFORMATION MANAGEMENT | Facility: CLINIC | Age: 71
End: 2021-10-12

## 2021-10-19 DIAGNOSIS — G47.00 PERSISTENT INSOMNIA: ICD-10-CM

## 2021-10-19 DIAGNOSIS — Z01.818 PREOP GENERAL PHYSICAL EXAM: ICD-10-CM

## 2021-10-19 DIAGNOSIS — K59.03 DRUG-INDUCED CONSTIPATION: ICD-10-CM

## 2021-10-19 DIAGNOSIS — E78.2 MIXED HYPERLIPIDEMIA: ICD-10-CM

## 2021-10-19 NOTE — TELEPHONE ENCOUNTER
Zocor      Last Written Prescription Date:  12/09/20  Last Fill Quantity: 90,   # refills: 0  Last Office Visit: 08/04/21  Future Office visit:

## 2021-10-20 RX ORDER — SIMVASTATIN 40 MG
TABLET ORAL
Qty: 90 TABLET | Refills: 0 | Status: SHIPPED | OUTPATIENT
Start: 2021-10-20 | End: 2022-08-02

## 2021-10-20 RX ORDER — PROMETHAZINE HYDROCHLORIDE 25 MG/1
TABLET ORAL
Qty: 90 TABLET | Refills: 0 | Status: SHIPPED | OUTPATIENT
Start: 2021-10-20 | End: 2021-11-18

## 2021-10-20 RX ORDER — DICYCLOMINE HYDROCHLORIDE 10 MG/1
CAPSULE ORAL
Qty: 120 CAPSULE | Refills: 2 | Status: SHIPPED | OUTPATIENT
Start: 2021-10-20 | End: 2022-01-12

## 2021-10-20 NOTE — TELEPHONE ENCOUNTER
ZOLPIDEM 10MG TABLET        Last Written Prescription Date:  9/26/21  Last Fill Quantity: 30,   # refills: 0  Last Office Visit: 8/4/21  Future Office visit:       Routing refill request to provider for review/approval because:    Drug not on the [facility], [facility] or [facility] refill protocol or controlled substance

## 2021-10-21 RX ORDER — ZOLPIDEM TARTRATE 10 MG/1
TABLET ORAL
Qty: 30 TABLET | Refills: 0 | Status: SHIPPED | OUTPATIENT
Start: 2021-10-21 | End: 2021-11-18

## 2021-10-24 DIAGNOSIS — M25.562 ARTHRALGIA OF BOTH KNEES: ICD-10-CM

## 2021-10-24 DIAGNOSIS — M25.561 ARTHRALGIA OF BOTH KNEES: ICD-10-CM

## 2021-10-24 DIAGNOSIS — B02.23 POSTHERPETIC POLYNEUROPATHY: ICD-10-CM

## 2021-10-24 DIAGNOSIS — M54.2 CERVICAL PAIN: ICD-10-CM

## 2021-10-25 RX ORDER — HYDROCODONE BITARTRATE AND ACETAMINOPHEN 7.5; 325 MG/1; MG/1
TABLET ORAL
Qty: 60 TABLET | Refills: 0 | Status: SHIPPED | OUTPATIENT
Start: 2021-10-25 | End: 2021-11-23

## 2021-10-25 RX ORDER — GABAPENTIN 300 MG/1
300 CAPSULE ORAL 3 TIMES DAILY
Qty: 90 CAPSULE | Refills: 0 | Status: SHIPPED | OUTPATIENT
Start: 2021-10-25 | End: 2021-11-23

## 2021-10-25 NOTE — TELEPHONE ENCOUNTER
Gabapentin       Last Written Prescription Date:  9/28/2021  Last Fill Quantity: 90,   # refills: 0    Norco       Last Written Prescription Date:  9/28/2021  Last Fill Quantity: 60,   # refills: 0  Last Office Visit: 8/4/2021  Future Office visit:

## 2021-10-28 DIAGNOSIS — G43.009 MIGRAINE WITHOUT AURA AND WITHOUT STATUS MIGRAINOSUS, NOT INTRACTABLE: ICD-10-CM

## 2021-10-29 RX ORDER — TOPIRAMATE 50 MG/1
TABLET, FILM COATED ORAL
Qty: 90 TABLET | Refills: 0 | Status: SHIPPED | OUTPATIENT
Start: 2021-10-29 | End: 2021-12-28

## 2021-10-29 NOTE — TELEPHONE ENCOUNTER
Topamax      Last Written Prescription Date:  9/30/21  Last Fill Quantity: 60,   # refills: 0  Last Office Visit: 8/4/21  Future Office visit:       Routing refill request to provider for review/approval because:

## 2021-11-14 DIAGNOSIS — K21.00 GASTROESOPHAGEAL REFLUX DISEASE WITH ESOPHAGITIS, UNSPECIFIED WHETHER HEMORRHAGE: Primary | ICD-10-CM

## 2021-11-15 RX ORDER — PANTOPRAZOLE SODIUM 40 MG/1
TABLET, DELAYED RELEASE ORAL
Qty: 30 TABLET | Refills: 4 | Status: SHIPPED | OUTPATIENT
Start: 2021-11-15 | End: 2022-06-22

## 2021-11-15 NOTE — TELEPHONE ENCOUNTER
Protonix       Last Written Prescription Date:  4/9/2021  Last Fill Quantity: 30,   # refills: 4  Last Office Visit: 8/4/2021  Future Office visit:

## 2021-11-17 DIAGNOSIS — Z01.818 PREOP GENERAL PHYSICAL EXAM: ICD-10-CM

## 2021-11-17 DIAGNOSIS — G47.00 PERSISTENT INSOMNIA: ICD-10-CM

## 2021-11-18 RX ORDER — PROMETHAZINE HYDROCHLORIDE 25 MG/1
TABLET ORAL
Qty: 90 TABLET | Refills: 0 | Status: SHIPPED | OUTPATIENT
Start: 2021-11-18 | End: 2021-12-14

## 2021-11-18 RX ORDER — ZOLPIDEM TARTRATE 10 MG/1
TABLET ORAL
Qty: 30 TABLET | Refills: 0 | Status: SHIPPED | OUTPATIENT
Start: 2021-11-18 | End: 2021-12-15

## 2021-11-18 NOTE — TELEPHONE ENCOUNTER
ZOLPIDEM 10MG TABLET        Last Written Prescription Date:  10/21/21  Last Fill Quantity: 30,   # refills: 0  Last Office Visit: 8/4/21  Future Office visit:       Routing refill request to provider for review/approval because:    Drug not on the [facility], [facility] or [facility] refill protocol or controlled substance

## 2021-11-26 ENCOUNTER — TELEPHONE (OUTPATIENT)
Dept: INTERNAL MEDICINE | Facility: OTHER | Age: 71
End: 2021-11-26
Payer: COMMERCIAL

## 2021-11-26 NOTE — TELEPHONE ENCOUNTER
2:12 PM    Reason for Call: OVERBOOK    Patient is having the following symptoms: Med check     The patient is requesting an appointment for sometime next week with Dr. Milton.    Was an appointment offered for this call? No  -- I told patient she was already on the schedule for 12/08 and she said she is needing to be seen sooner if at all possible  If yes : Appointment type              Date    Preferred method for responding to this message: Telephone Call  What is your phone number ?[phone]    If we cannot reach you directly, may we leave a detailed response at the number you provided? Yes    Can this message wait until your PCP/provider returns, if unavailable today? YES    Sherrill Etienne

## 2021-11-30 ENCOUNTER — TELEPHONE (OUTPATIENT)
Dept: INTERNAL MEDICINE | Facility: OTHER | Age: 71
End: 2021-11-30
Payer: COMMERCIAL

## 2021-11-30 DIAGNOSIS — M25.561 ARTHRALGIA OF BOTH KNEES: ICD-10-CM

## 2021-11-30 DIAGNOSIS — M25.562 ARTHRALGIA OF BOTH KNEES: ICD-10-CM

## 2021-11-30 DIAGNOSIS — B02.23 POSTHERPETIC POLYNEUROPATHY: ICD-10-CM

## 2021-11-30 DIAGNOSIS — M54.2 CERVICAL PAIN: ICD-10-CM

## 2021-11-30 DIAGNOSIS — M54.2 CERVICALGIA: ICD-10-CM

## 2021-11-30 NOTE — TELEPHONE ENCOUNTER
Patient calling and requesting refill for medications. She was given 30 tablets. This was only a 10 day supply for medications. Patient requesting refill and was scheduled for appointment today, but PCP is working in hospital. Patient is rescheduled for 12/08. Please advise, thank you.    cyclobenzaprine (FLEXERIL) 10 MG tablet      Last Written Prescription Date:  11/23/21  Last Fill Quantity: 10,   # refills: 0  Last Office Visit: 08/04/21 virtual visit  Future Office visit:       Routing refill request to provider for review/approval because:  Drug not on the [facility], P or [facility] refill protocol or controlled substance    gabapentin (NEURONTIN) 300 MG capsule      Last Written Prescription Date:  11/23/21  Last Fill Quantity: 30,   # refills: 0    HYDROcodone-acetaminophen (NORCO) 7.5-325 MG per tablet      Last Written Prescription Date:  11/23/21  Last Fill Quantity: 30,   # refills: 0

## 2021-11-30 NOTE — TELEPHONE ENCOUNTER
9:36 AM    Reason for Call: OVERBOOK    Patient would like an appt for a med review.    The patient is requesting an appointment for ASAP with Dr. Milton.    Was an appointment offered for this call? Yes  If yes : Appointment type short               Date 12/09/2021    Preferred method for responding to this message: Telephone Call  What is your phone number ?    If we cannot reach you directly, may we leave a detailed response at the number you provided? Yes    Can this message wait until your PCP/provider returns, if unavailable today? YES    Grace Escobar

## 2021-12-01 ENCOUNTER — TRANSFERRED RECORDS (OUTPATIENT)
Dept: HEALTH INFORMATION MANAGEMENT | Facility: CLINIC | Age: 71
End: 2021-12-01

## 2021-12-01 LAB — HEMOCCULT STL QL IA: NEGATIVE

## 2021-12-01 RX ORDER — CYCLOBENZAPRINE HCL 10 MG
TABLET ORAL
Qty: 90 TABLET | Refills: 0 | Status: SHIPPED | OUTPATIENT
Start: 2021-12-01 | End: 2022-01-03

## 2021-12-01 RX ORDER — HYDROCODONE BITARTRATE AND ACETAMINOPHEN 7.5; 325 MG/1; MG/1
1 TABLET ORAL EVERY 6 HOURS PRN
Qty: 60 TABLET | Refills: 0 | Status: SHIPPED | OUTPATIENT
Start: 2021-12-01 | End: 2022-01-03

## 2021-12-01 RX ORDER — GABAPENTIN 300 MG/1
300 CAPSULE ORAL 3 TIMES DAILY
Qty: 90 CAPSULE | Refills: 0 | Status: SHIPPED | OUTPATIENT
Start: 2021-12-01 | End: 2022-01-03

## 2021-12-07 ENCOUNTER — OFFICE VISIT (OUTPATIENT)
Dept: INTERNAL MEDICINE | Facility: OTHER | Age: 71
End: 2021-12-07
Attending: INTERNAL MEDICINE
Payer: COMMERCIAL

## 2021-12-07 VITALS
SYSTOLIC BLOOD PRESSURE: 122 MMHG | BODY MASS INDEX: 29.63 KG/M2 | OXYGEN SATURATION: 99 % | WEIGHT: 162 LBS | DIASTOLIC BLOOD PRESSURE: 82 MMHG | HEART RATE: 108 BPM | TEMPERATURE: 98.2 F

## 2021-12-07 DIAGNOSIS — Z12.31 ENCOUNTER FOR SCREENING MAMMOGRAM FOR BREAST CANCER: Primary | ICD-10-CM

## 2021-12-07 DIAGNOSIS — M81.0 SENILE OSTEOPOROSIS: ICD-10-CM

## 2021-12-07 DIAGNOSIS — Z79.899 CONTROLLED SUBSTANCE AGREEMENT SIGNED: ICD-10-CM

## 2021-12-07 DIAGNOSIS — F34.1 DYSTHYMIA: ICD-10-CM

## 2021-12-07 PROCEDURE — G0463 HOSPITAL OUTPT CLINIC VISIT: HCPCS

## 2021-12-07 PROCEDURE — 99214 OFFICE O/P EST MOD 30 MIN: CPT | Performed by: INTERNAL MEDICINE

## 2021-12-07 ASSESSMENT — ANXIETY QUESTIONNAIRES
7. FEELING AFRAID AS IF SOMETHING AWFUL MIGHT HAPPEN: NOT AT ALL
6. BECOMING EASILY ANNOYED OR IRRITABLE: NOT AT ALL
4. TROUBLE RELAXING: NOT AT ALL
2. NOT BEING ABLE TO STOP OR CONTROL WORRYING: NOT AT ALL
5. BEING SO RESTLESS THAT IT IS HARD TO SIT STILL: NOT AT ALL
3. WORRYING TOO MUCH ABOUT DIFFERENT THINGS: NOT AT ALL
GAD7 TOTAL SCORE: 0
1. FEELING NERVOUS, ANXIOUS, OR ON EDGE: NOT AT ALL

## 2021-12-07 ASSESSMENT — PAIN SCALES - GENERAL: PAINLEVEL: MODERATE PAIN (4)

## 2021-12-07 NOTE — LETTER
Opioid / Opioid Plus Controlled Substance Agreement    This is an agreement between you and your provider about the safe and appropriate use of controlled substance/opioids prescribed by your care team. Controlled substances are medicines that can cause physical and mental dependence (abuse).    There are strict laws about having and using these medicines. We here at [facility] are committing to working with you in your efforts to get better. To support you in this work, we ll help you schedule regular office appointments for medicine refills. If we must cancel or change your appointment for any reason, we ll make sure you have enough medicine to last until your next appointment.     As a Provider, I will:    Listen carefully to your concerns and treat you with respect.     Recommend a treatment plan that I believe is in your best interest. This plan may involve therapies other than opioid pain medication.     Talk with you often about the possible benefits, and the risk of harm of any medicine that we prescribe for you.     Provide a plan on how to taper (discontinue or go off) using this medicine if the decision is made to stop its use.    As a Patient, I understand that opioid(s):     Are a controlled substance prescribed by my care team to help me function or work and manage my condition(s).     Are strong medicines and can cause serious side effects such as:    Drowsiness, which can seriously affect my driving ability    A lower breathing rate, enough to cause death    Harm to my thinking ability     Depression     Abuse of and addiction to this medicine    Need to be taken exactly as prescribed. Combining opioids with certain medicines or chemicals (such as illegal drugs, sedatives, sleeping pills, and benzodiazepines) can be dangerous or even fatal. If I stop opioids suddenly, I may have severe withdrawal symptoms.    Do not work for all types of pain nor for all patients. If they re not helpful, I may  be asked to stop them.        The risks, benefits and side effects of these medicine(s) were explained to me. I agree that:  1. I will take part in other treatments as advised by my care team. This may be psychiatry or counseling, physical therapy, behavioral therapy, group treatment or a referral to a specialist.     2. I will keep all my appointments. I understand that this is part of the monitoring of opioids. My care team may require an office visit for EVERY opioid/controlled substance refill. If I miss appointments or don t follow instructions, my care team may stop my medicine.    3. I will take my medicines as prescribed. I will not change the dose or schedule unless my care team tells me to. There will be no refills if I run out early.     4. I may be asked to come to the clinic and complete a urine drug test or complete a pill count at any time. If I don t give a urine sample or participate in a pill count, the care team may stop my medicine.    5. I will only receive prescriptions from this clinic for chronic pain. If I am treated by another provider for acute pain issues, I will tell them that I am taking opioid pain medication for chronic pain and that I have a treatment agreement with this provider. I will inform my [facility] care team within one business day if I am given a prescription for any pain medication by another healthcare provider. My [facility] care team can contact other providers and pharmacists about my use of any medicines.    6. It is up to me to make sure that I don t run out of my medicines on weekends or holidays. If my care team is willing to refill my opioid prescription without a visit, I must request refills only during office hours. Refills may take up to 3 business days to process. I will use one pharmacy to fill all my opioid and other controlled substance prescriptions. I will notify the clinic about any changes to my insurance or medication  availability.    7. I am responsible for my prescriptions. If the medicine/prescription is lost, stolen or destroyed, it will not be replaced. I also agree not to share controlled substance medicines with anyone.    8. I am aware I should not use any illegal or recreational drugs. I agree not to drink alcohol unless my care team says I can.       9. If I enroll in the [place] [facility] program, I will tell my care team prior to my next refill.     10. I will tell my care team right away if I become pregnant, have a new medical problem treated outside of my regular clinic, or have a change in my medications.    11. I understand that this medicine can affect my thinking, judgment and reaction time. Alcohol and drugs affect the brain and body, which can affect the safety of my driving. Being under the influence of alcohol or drugs can affect my decision-making, behaviors, personal safety, and the safety of others. Driving while impaired (DWI) can occur if a person is driving, operating, or in physical control of a car, motorcycle, boat, snowmobile, ATV, motorbike, off-road vehicle, or any other motor vehicle ([place] Statute 169A.20). I understand the risk if I choose to drive or operate any vehicle or machinery.    I understand that if I do not follow any of the conditions above, my prescriptions or treatment may be stopped or changed.          Opioids  What You Need to Know    What are opioids?   Opioids are pain medicines that must be prescribed by a doctor. They are also known as narcotics.     Examples are:   1. morphine (MS Contin, Yanet)  2. oxycodone (Oxycontin)  3. oxycodone and acetaminophen (Percocet)  4. hydrocodone and acetaminophen (Vicodin, Norco)   5. fentanyl patch (Duragesic)   6. hydromorphone (Dilaudid)   7. methadone  8. codeine (Tylenol #3)     What do opioids do well?   Opioids are best for severe short-term pain such as after a surgery or injury. They may work well for cancer pain. They may  help some people with long-lasting (chronic) pain.     What do opioids NOT do well?   Opioids never get rid of pain entirely, and they don t work well for most patients with chronic pain. Opioids don t reduce swelling, one of the causes of pain.                                    Other ways to manage chronic pain and improve function include:       Treat the health problem that may be causing pain    Anti-inflammation medicines, which reduce swelling and tenderness, such as ibuprofen (Advil, Motrin) or naproxen (Aleve)    Acetaminophen (Tylenol)    Antidepressants and anti-seizure medicines, especially for nerve pain    Topical treatments such as patches or creams    Injections or nerve blocks    Chiropractic or osteopathic treatment    Acupuncture, massage, deep breathing, meditation, visual imagery, aromatherapy    Use heat or ice at the pain site    Physical therapy     Exercise    Stop smoking    Take part in therapy       Risks and side effects     Talk to your doctor before you start or decide to keep taking opioids. Possible side effects include:      Lowering your breathing rate enough to cause death    Overdose, including death, especially if taking higher than prescribed doses    Worse depression symptoms; less pleasure in things you usually enjoy    Feeling tired or sluggish    Slower thoughts or cloudy thinking    Being more sensitive to pain over time; pain is harder to control    Trouble sleeping or restless sleep    Changes in hormone levels (for example, less testosterone)    Changes in sex drive or ability to have sex    Constipation    Unsafe driving    Itching and sweating    Dizziness    Nausea, throwing up and dry mouth    What else should I know about opioids?    Opioids may lead to dependence, tolerance, or addiction.      Dependence means that if you stop or reduce the medicine too quickly, you will have withdrawal symptoms. These include loose poop (diarrhea), jitters, flu-like symptoms,  nervousness and tremors. Dependence is not the same as addiction.                       Tolerance means needing higher doses over time to get the same effect. This may increase the chance of serious side effects.      Addiction is when people improperly use a substance that harms their body, their mind or their relations with others. Use of opiates can cause a relapse of addiction if you have a history of drug or alcohol abuse.      People who have used opioids for a long time may have a lower quality of life, worse depression, higher levels of pain and more visits to doctors.    You can overdose on opioids. Take these steps to lower your risk of overdose:    1. Recognize the signs:  Signs of overdose include decrease or loss of consciousness (blackout), slowed breathing, trouble waking up and blue lips. If someone is worried about overdose, they should call 911.    2. Talk to your doctor about Narcan (naloxone).   If you are at risk for overdose, you may be given a prescription for Narcan. This medicine very quickly reverses the effects of opioids.   If you overdose, a friend or family member can give you Narcan while waiting for the ambulance. They need to know the signs of overdose and how to give Narcan.     3. Don't use alcohol or street drugs.   Taking them with opioids can cause death.    4. Do not take any of these medicines unless your doctor says it s OK. Taking these with opioids can cause death:    Benzodiazepines, such as lorazepam (Ativan), alprazolam (Xanax) or diazepam (Valium)    Muscle relaxers, such as cyclobenzaprine (Flexeril)    Sleeping pills like zolpidem (Ambien)     Other opioids      How to keep you and other people safe while taking opioids:    1. Never share your opioids with others.  Opioid medicines are regulated by the Drug Enforcement Agency ([place]). Selling or sharing medications is a criminal act.    2. Be sure to store opioids in a secure place, locked up if possible. Young children  can easily swallow them and overdose.    3. When you are traveling with your medicines, keep them in the original bottles. If you use a pill box, be sure you also carry a copy of your medicine list from your clinic or pharmacy.    4. Safe disposal of opioids    Most pharmacies have places to get rid of medicine, called disposal kiosks. Medicine disposal options are also available in every [facility]. Search your county and  medication disposal  to find more options. You can find more details at:  [email or web address]-[email or web address]-medications     I agree that my provider, clinic care team, and pharmacy may work with any city, state or federal law enforcement agency that investigates the misuse, sale, or other diversion of my controlled medicine. I will allow my provider to discuss my care with, or share a copy of, this agreement with any other treating provider, pharmacy or emergency room where I receive care.    I have read this agreement and have asked questions about anything I did not understand.    _______________________________________________________  Patient Signature - Aure Lanier _____________________                   Date     _______________________________________________________  Provider Signature - Balta Milton, DO   _____________________                   Date     _______________________________________________________  Witness Signature (required if provider not present while patient signing)   _____________________                   Date

## 2021-12-07 NOTE — PROGRESS NOTES
Assessment & Plan   Problem List Items Addressed This Visit     None      Visit Diagnoses     Encounter for screening mammogram for breast cancer    -  Primary    Relevant Orders    MA SCREENING DIGITAL BILATERAL ([place])    Controlled substance agreement signed        Relevant Orders    Drug Confirmation Panel Urine with Creat    Senile osteoporosis        Relevant Orders    DX Hip/Pelvis/Spine             25 minutes spent on the date of the encounter doing chart review, review of test results, interpretation of tests, patient visit, documentation and reviewing and signing controlled substance agreement.            No follow-ups on file.    Balta Milton, [facility] [place] [facility]    Subjective   Aure is a 71 year old who presents for the following health issues     HPI       Aure presents today for follow up.  She is due for a Urine tox and an updated controlled substance agreement.  In addition she is due for a mammogram and a Dexa scan.  She did send in her cologaurd recently. She also reports a fall and a hematoma on the right posterior scalp.  Denies any LOC.      Hyperlipidemia Follow-Up      Are you regularly taking any medication or supplement to lower your cholesterol?   Yes- Zocor    Are you having muscle aches or other side effects that you think could be caused by your cholesterol lowering medication?  No    Anxiety Follow-Up    How are you doing with your anxiety since your last visit? Stable    Are you having other symptoms that might be associated with anxiety? No    Have you had a significant life event? Grief or Loss     Are you feeling depressed? Yes:  -    Do you have any concerns with your use of alcohol or other drugs? No    Social History     Tobacco Use     Smoking status: Never Smoker     Smokeless tobacco: Never Used     Tobacco comment: no passive exposure   Substance Use Topics     Alcohol use: Yes     Comment: rarely, maybe yearly     Drug use: No     AURELIA-7  SCORE 11/24/2020 4/13/2021 12/7/2021   Total Score 4 2 0     PHQ 11/24/2020 4/13/2021 12/7/2021   PHQ-9 Total Score 7 15 5   Q9: Thoughts of better off dead/self-harm past 2 weeks Not at all Not at all Not at all     Last PHQ-9 12/7/2021   1.  Little interest or pleasure in doing things 1   2.  Feeling down, depressed, or hopeless 1   3.  Trouble falling or staying asleep, or sleeping too much 1   4.  Feeling tired or having little energy 1   5.  Poor appetite or overeating 1   6.  Feeling bad about yourself 0   7.  Trouble concentrating 0   8.  Moving slowly or restless 0   Q9: Thoughts of better off dead/self-harm past 2 weeks 0   PHQ-9 Total Score 5   Difficulty at work, home, or with people -     AURELIA-7  12/7/2021   1. Feeling nervous, anxious, or on edge 0   2. Not being able to stop or control worrying 0   3. Worrying too much about different things 0   4. Trouble relaxing 0   5. Being so restless that it is hard to sit still 0   6. Becoming easily annoyed or irritable 0   7. Feeling afraid, as if something awful might happen 0   AURELIA-7 Total Score 0   If you checked any problems, how difficult have they made it for you to do your work, take care of things at home, or get along with other people? -       Hypothyroidism Follow-up      Since last visit, patient describes the following symptoms: Weight stable, no hair loss, no skin changes, no constipation, no loose stools    Pain History:  When did you first notice your pain? - More than 6 weeks   Have you seen this provider for your pain in the past?   Yes   Where in your body do you have pain? head  Are you seeing anyone else for your pain? No    PHQ-9 SCORE 11/24/2020 4/13/2021 12/7/2021   PHQ-9 Total Score - - -   PHQ-9 Total Score 7 15 5       AURELIA-7 SCORE 11/24/2020 4/13/2021 12/7/2021   Total Score 4 2 0         No flowsheet data found.  Low Risk (0-3)  Moderate Risk (4-7)  High Risk (>8)    Chronic Pain Follow Up:    Location of pain: recent fall- injury to  head. Also history of chronic headaches   Analgesia/pain control:    - Recent changes:  fall    - Overall control: Tolerable with discomfort    - Current treatments: gabapentin, flexeril, norco   Adherence:     - Do you ever take more pain medicine than prescribed? No    - When did you take your last dose of pain medicine?  Last night   Adverse effects: No   PDMP Review     None        Last CSA Agreement:   CSA -- Patient Level:    CSA: None found at the patient level.       Last UDS: 3/2/2020                Review of Systems   Constitutional, HEENT, cardiovascular, pulmonary, gi and gu systems are negative, except as otherwise noted.      Objective    Pulse 108   Temp 98.2  F (36.8  C) (Tympanic)   Wt 73.5 kg (162 lb)   SpO2 99%   BMI 29.63 kg/m    Body mass index is 29.63 kg/m .  Physical Exam   GENERAL:  alert and no distress  HENT: Large hematoma on right posterior scalp with bruising extending into right neck.    RESP: lungs clear to auscultation - no rales, rhonchi or wheezes  CV: regular rate and rhythm, normal S1 S2, no S3 or S4, no murmur, click or rub, no peripheral edema and peripheral pulses strong  MS: no gross musculoskeletal defects noted, no edema  SKIN: no suspicious lesions or rashes  NEURO: Normal strength and tone, mentation intact and speech normal  PSYCH: mentation appears normal, affect normal/bright    Lab on 08/13/2021   Component Date Value Ref Range Status     Sodium 08/13/2021 142  133 - 144 mmol/L Final     Potassium 08/13/2021 3.7  3.4 - 5.3 mmol/L Final     Chloride 08/13/2021 110* 94 - 109 mmol/L Final     Carbon Dioxide (CO2) 08/13/2021 27  20 - 32 mmol/L Final     Anion Gap 08/13/2021 5  3 - 14 mmol/L Final     Urea Nitrogen 08/13/2021 10  7 - 30 mg/dL Final     Creatinine 08/13/2021 0.70  0.52 - 1.04 mg/dL Final     Calcium 08/13/2021 8.4* 8.5 - 10.1 mg/dL Final     Glucose 08/13/2021 93  70 - 99 mg/dL Final     Alkaline Phosphatase 08/13/2021 93  40 - 150 U/L Final     AST  08/13/2021 18  0 - 45 U/L Final     ALT 08/13/2021 29  0 - 50 U/L Final     Protein Total 08/13/2021 6.9  6.8 - 8.8 g/dL Final     Albumin 08/13/2021 3.4  3.4 - 5.0 g/dL Final     Bilirubin Total 08/13/2021 0.3  0.2 - 1.3 mg/dL Final     GFR Estimate 08/13/2021 89  >60 mL/min/1.73m2 Final    As of July 11, 2021, eGFR is calculated by the CKD-EPI creatinine equation, without race adjustment. eGFR can be influenced by muscle mass, exercise, and diet. The reported eGFR is an estimation only and is only applicable if the renal function is stable.     Cholesterol 08/13/2021 147  <200 mg/dL Final    Age 0-19 years  Desirable: <170 mg/dL  Borderline high:  170-199 mg/dl  High:            >199 mg/dl    Age 20 years and older  Desirable: <200 mg/dL     Triglycerides 08/13/2021 141  <150 mg/dL Final    0-9 years:  Normal:    Less than 75 mg/dL  Borderline high:  75-99 mg/dL  High:             Greater than or equal to 100 mg/dL    0-19 years:  Normal:    Less than 90 mg/dL  Borderline high:   mg/dL  High:             Greater than or equal to 130 mg/dL    20 years and older:  Normal:    Less than 150 mg/dL  Borderline high:  150-199 mg/dL  High:             200-499 mg/dL  Very high:   Greater than or equal to 500 mg/dL     Direct Measure HDL 08/13/2021 61  >=50 mg/dL Final    0-19 years:       Greater than or equal to 45 mg/dL   Low: Less than 40 mg/dL   Borderline low: 40-44 mg/dL     20 years and older:   Female: Greater than or equal to 50 mg/dL   Male:   Greater than or equal to 40 mg/dL          LDL Cholesterol Calculated 08/13/2021 58  <=100 mg/dL Final    Age 0-19 years:  Desirable: 0-110 mg/dL   Borderline high: 110-129 mg/dL   High: >= 130 mg/dL    Age 20 years and older:  Desirable: <100mg/dL  Above desirable: 100-129 mg/dL   Borderline high: 130-159 mg/dL   High: 160-189 mg/dL   Very high: >= 190 mg/dL     Non HDL Cholesterol 08/13/2021 86  <130 mg/dL Final    0-19 years:  Desirable:          Less than 120  mg/dL  Borderline high:   120-144 mg/dL  High:                   Greater than or equal to 145 mg/dL    20 years and older:  Desirable:          130 mg/dL  Above Desirable: 130-159 mg/dL  Borderline high:   160-189 mg/dL  High:               190-219 mg/dL  Very high:     Greater than or equal to 220 mg/dL     Patient Fasting > 8hrs? 08/13/2021 Yes   Final     TSH 08/13/2021 0.46  0.40 - 4.00 mU/L Final     Free T4 08/13/2021 0.87  0.76 - 1.46 ng/dL Final     WBC Count 08/13/2021 6.2  4.0 - 11.0 10e3/uL Final     RBC Count 08/13/2021 4.17  3.80 - 5.20 10e6/uL Final     Hemoglobin 08/13/2021 11.8  11.7 - 15.7 g/dL Final     Hematocrit 08/13/2021 37.6  35.0 - 47.0 % Final     MCV 08/13/2021 90  78 - 100 fL Final     MCH 08/13/2021 28.3  26.5 - 33.0 pg Final     MCHC 08/13/2021 31.4* 31.5 - 36.5 g/dL Final     RDW 08/13/2021 13.7  10.0 - 15.0 % Final     Platelet Count 08/13/2021 338  150 - 450 10e3/uL Final     % Neutrophils 08/13/2021 43  % Final     % Lymphocytes 08/13/2021 38  % Final     % Monocytes 08/13/2021 9  % Final     % Eosinophils 08/13/2021 9  % Final     % Basophils 08/13/2021 1  % Final     % Immature Granulocytes 08/13/2021 0  % Final     NRBCs per 100 WBC 08/13/2021 0  <1 /100 Final     Absolute Neutrophils 08/13/2021 2.7  1.6 - 8.3 10e3/uL Final     Absolute Lymphocytes 08/13/2021 2.3  0.8 - 5.3 10e3/uL Final     Absolute Monocytes 08/13/2021 0.6  0.0 - 1.3 10e3/uL Final     Absolute Eosinophils 08/13/2021 0.6  0.0 - 0.7 10e3/uL Final     Absolute Basophils 08/13/2021 0.0  0.0 - 0.2 10e3/uL Final     Absolute Immature Granulocytes 08/13/2021 0.0  <=0.0 10e3/uL Final     Absolute NRBCs 08/13/2021 0.0  10e3/uL Final     No results found for any visits on 12/07/21.  No results found for this or any previous visit (from the past 24 hour(s)).

## 2021-12-07 NOTE — NURSING NOTE
"Chief Complaint   Patient presents with     Lipids     Musculoskeletal Problem     Anxiety       Initial /82 (BP Location: Left arm, Patient Position: Chair, Cuff Size: Adult Regular)   Pulse 108   Temp 98.2  F (36.8  C) (Tympanic)   Wt 73.5 kg (162 lb)   SpO2 99%   BMI 29.63 kg/m   Estimated body mass index is 29.63 kg/m  as calculated from the following:    Height as of 11/24/20: 1.575 m (5' 2\").    Weight as of this encounter: 73.5 kg (162 lb).  Medication Reconciliation: complete  JESUSITA DELGADO LPN    "

## 2021-12-08 ASSESSMENT — PATIENT HEALTH QUESTIONNAIRE - PHQ9: SUM OF ALL RESPONSES TO PHQ QUESTIONS 1-9: 5

## 2021-12-08 ASSESSMENT — ANXIETY QUESTIONNAIRES: GAD7 TOTAL SCORE: 0

## 2021-12-09 ENCOUNTER — LAB (OUTPATIENT)
Dept: LAB | Facility: OTHER | Age: 71
End: 2021-12-09
Payer: COMMERCIAL

## 2021-12-09 DIAGNOSIS — Z79.899 CONTROLLED SUBSTANCE AGREEMENT SIGNED: ICD-10-CM

## 2021-12-09 LAB — CREAT UR-MCNC: 42 MG/DL

## 2021-12-09 PROCEDURE — 80307 DRUG TEST PRSMV CHEM ANLYZR: CPT | Mod: ZL

## 2021-12-09 RX ORDER — ESCITALOPRAM OXALATE 20 MG/1
TABLET ORAL
Qty: 60 TABLET | Refills: 0 | Status: SHIPPED | OUTPATIENT
Start: 2021-12-09 | End: 2022-02-09

## 2021-12-09 NOTE — TELEPHONE ENCOUNTER
lexapro      Last Written Prescription Date:  10/4/21  Last Fill Quantity: 60,   # refills: 0  Last Office Visit: 12/7/21  Future Office visit:

## 2021-12-10 ENCOUNTER — TELEPHONE (OUTPATIENT)
Dept: INTERNAL MEDICINE | Facility: OTHER | Age: 71
End: 2021-12-10
Payer: COMMERCIAL

## 2021-12-10 NOTE — TELEPHONE ENCOUNTER
Received PA from Dipesh Islas for escitalopram (LEXAPRO) 20 MG tablet. NOT EPA ENABLED. Completed and faxed insurance form to [employer]. Waiting on response.

## 2021-12-13 DIAGNOSIS — G47.00 PERSISTENT INSOMNIA: ICD-10-CM

## 2021-12-13 DIAGNOSIS — Z01.818 PREOP GENERAL PHYSICAL EXAM: ICD-10-CM

## 2021-12-13 NOTE — TELEPHONE ENCOUNTER
[facility] Esc[facility] Oxalate. 12/11/2021.Forms scanned to Epic.

## 2021-12-14 DIAGNOSIS — G47.00 PERSISTENT INSOMNIA: ICD-10-CM

## 2021-12-14 LAB
DHC UR CFM-MCNC: 800 NG/ML
DHC/CREAT UR: 1905 NG/MG {CREAT}
GABAPENTIN UR QL CFM: PRESENT
HYDROCODONE UR CFM-MCNC: 540 NG/ML
HYDROCODONE/CREAT UR: 1286 NG/MG {CREAT}
HYDROMORPHONE UR CFM-MCNC: 340 NG/ML
HYDROMORPHONE/CREAT UR: 810 NG/MG {CREAT}

## 2021-12-14 RX ORDER — PROMETHAZINE HYDROCHLORIDE 25 MG/1
TABLET ORAL
Qty: 90 TABLET | Refills: 0 | Status: SHIPPED | OUTPATIENT
Start: 2021-12-14 | End: 2022-01-12

## 2021-12-14 RX ORDER — TRAZODONE HYDROCHLORIDE 150 MG/1
150 TABLET ORAL AT BEDTIME
Qty: 30 TABLET | Refills: 3 | Status: SHIPPED | OUTPATIENT
Start: 2021-12-14 | End: 2022-04-05

## 2021-12-14 NOTE — TELEPHONE ENCOUNTER
PHENERGAN      Last Written Prescription Date:  11/18/2021  Last Fill Quantity: 90,   # refills: 0  Last Office Visit: 12/7/2021  Future Office visit:       Routing refill request to provider for review/approval because:    [email or web address]      Last Written Prescription Date:  7/27/2021  Last Fill Quantity: 30,   # refills: 4

## 2021-12-15 RX ORDER — ZOLPIDEM TARTRATE 10 MG/1
TABLET ORAL
Qty: 30 TABLET | Refills: 0 | Status: SHIPPED | OUTPATIENT
Start: 2021-12-15 | End: 2022-01-17

## 2021-12-23 DIAGNOSIS — M54.2 CERVICAL PAIN: ICD-10-CM

## 2021-12-23 DIAGNOSIS — M54.2 CERVICALGIA: ICD-10-CM

## 2021-12-23 DIAGNOSIS — B02.23 POSTHERPETIC POLYNEUROPATHY: ICD-10-CM

## 2021-12-23 DIAGNOSIS — M25.561 ARTHRALGIA OF BOTH KNEES: ICD-10-CM

## 2021-12-23 DIAGNOSIS — M25.562 ARTHRALGIA OF BOTH KNEES: ICD-10-CM

## 2021-12-23 RX ORDER — CYCLOBENZAPRINE HCL 10 MG
TABLET ORAL
Qty: 90 TABLET | Refills: 0 | OUTPATIENT
Start: 2021-12-23

## 2021-12-23 RX ORDER — GABAPENTIN 300 MG/1
300 CAPSULE ORAL 3 TIMES DAILY
Qty: 90 CAPSULE | Refills: 0 | OUTPATIENT
Start: 2021-12-23

## 2021-12-23 RX ORDER — HYDROCODONE BITARTRATE AND ACETAMINOPHEN 7.5; 325 MG/1; MG/1
TABLET ORAL
Qty: 60 TABLET | Refills: 0 | OUTPATIENT
Start: 2021-12-23

## 2021-12-23 NOTE — TELEPHONE ENCOUNTER
Flexeril      Last Written Prescription Date:  12/1/21  Last Fill Quantity: 90,   # refills: 0  Last Office Visit: 12/7/21  Future Office visit:       Routing refill request to provider for review/approval because:      Gabapentin      Last Written Prescription Date:  12/1/21  Last Fill Quantity: 90,   # refills: 0  Last Office Visit: 12/7/21  Future Office visit:       Routing refill request to provider for review/approval because:      Norco      Last Written Prescription Date:  12/1/21  Last Fill Quantity: 60,   # refills: 0  Last Office Visit: 12/7/21  Future Office visit:       Routing refill request to provider for review/approval because:

## 2021-12-27 DIAGNOSIS — G43.009 MIGRAINE WITHOUT AURA AND WITHOUT STATUS MIGRAINOSUS, NOT INTRACTABLE: ICD-10-CM

## 2021-12-27 DIAGNOSIS — B00.1 COLD SORE: Primary | ICD-10-CM

## 2021-12-28 ENCOUNTER — OFFICE VISIT (OUTPATIENT)
Dept: CHIROPRACTIC MEDICINE | Facility: OTHER | Age: 71
End: 2021-12-28
Attending: CHIROPRACTOR
Payer: COMMERCIAL

## 2021-12-28 DIAGNOSIS — M99.03 SEGMENTAL AND SOMATIC DYSFUNCTION OF LUMBAR REGION: ICD-10-CM

## 2021-12-28 DIAGNOSIS — M99.01 SEGMENTAL AND SOMATIC DYSFUNCTION OF CERVICAL REGION: ICD-10-CM

## 2021-12-28 DIAGNOSIS — M99.02 SEGMENTAL AND SOMATIC DYSFUNCTION OF THORACIC REGION: Primary | ICD-10-CM

## 2021-12-28 DIAGNOSIS — M54.2 CERVICALGIA: ICD-10-CM

## 2021-12-28 PROCEDURE — 98941 CHIROPRACT MANJ 3-4 REGIONS: CPT | Mod: AT | Performed by: CHIROPRACTOR

## 2021-12-28 RX ORDER — TOPIRAMATE 50 MG/1
75 TABLET, FILM COATED ORAL 2 TIMES DAILY
Qty: 90 TABLET | Refills: 2 | Status: SHIPPED | OUTPATIENT
Start: 2021-12-28 | End: 2022-04-20

## 2021-12-28 RX ORDER — ACYCLOVIR 50 MG/G
CREAM TOPICAL 3 TIMES DAILY PRN
Qty: 5 G | Refills: 0 | Status: SHIPPED | OUTPATIENT
Start: 2021-12-28 | End: 2024-09-03

## 2021-12-28 NOTE — TELEPHONE ENCOUNTER
Topamax  Last Written Prescription Date: 10/29/21  Last Fill Quantity: 90 # of Refills: 0  Last Office Visit: 12/7/21

## 2021-12-28 NOTE — TELEPHONE ENCOUNTER
Acyclovir      Last Written Prescription Date:  1/15/20  Last Fill Quantity: 5 g,   # refills: 3  Last Office Visit: 12/7/21  Future Office visit:    Next 5 appointments (look out 90 days)    Dec 28, 2021  2:00 PM  Return Visit with Noah Bloom DC  [facility] [place] ([facility] [place] ) [address] [place] [address]  [place] [address]  [phone]           Routing refill request to provider for review/approval because:

## 2021-12-30 ENCOUNTER — OFFICE VISIT (OUTPATIENT)
Dept: CHIROPRACTIC MEDICINE | Facility: OTHER | Age: 71
End: 2021-12-30
Attending: CHIROPRACTOR
Payer: COMMERCIAL

## 2021-12-30 DIAGNOSIS — M99.01 SEGMENTAL AND SOMATIC DYSFUNCTION OF CERVICAL REGION: ICD-10-CM

## 2021-12-30 DIAGNOSIS — M99.02 SEGMENTAL AND SOMATIC DYSFUNCTION OF THORACIC REGION: ICD-10-CM

## 2021-12-30 DIAGNOSIS — M99.03 SEGMENTAL AND SOMATIC DYSFUNCTION OF LUMBAR REGION: Primary | ICD-10-CM

## 2021-12-30 DIAGNOSIS — M54.50 ACUTE BILATERAL LOW BACK PAIN WITHOUT SCIATICA: ICD-10-CM

## 2021-12-30 PROCEDURE — 98941 CHIROPRACT MANJ 3-4 REGIONS: CPT | Mod: AT | Performed by: CHIROPRACTOR

## 2022-01-03 RX ORDER — GABAPENTIN 300 MG/1
300 CAPSULE ORAL 3 TIMES DAILY
Qty: 90 CAPSULE | Refills: 0 | Status: SHIPPED | OUTPATIENT
Start: 2022-01-03 | End: 2022-02-01

## 2022-01-03 RX ORDER — HYDROCODONE BITARTRATE AND ACETAMINOPHEN 7.5; 325 MG/1; MG/1
1 TABLET ORAL EVERY 6 HOURS PRN
Qty: 60 TABLET | Refills: 0 | Status: SHIPPED | OUTPATIENT
Start: 2022-01-03 | End: 2022-02-01

## 2022-01-03 RX ORDER — CYCLOBENZAPRINE HCL 10 MG
TABLET ORAL
Qty: 90 TABLET | Refills: 0 | Status: SHIPPED | OUTPATIENT
Start: 2022-01-03 | End: 2022-02-01

## 2022-01-03 NOTE — TELEPHONE ENCOUNTER
Pt calling back on the below refills. She is hoping these need to get filled today definitely.Needs these she states.    Wen Baker RN

## 2022-01-11 DIAGNOSIS — K59.03 DRUG-INDUCED CONSTIPATION: ICD-10-CM

## 2022-01-11 DIAGNOSIS — Z01.818 PREOP GENERAL PHYSICAL EXAM: ICD-10-CM

## 2022-01-12 RX ORDER — PROMETHAZINE HYDROCHLORIDE 25 MG/1
TABLET ORAL
Qty: 90 TABLET | Refills: 0 | Status: SHIPPED | OUTPATIENT
Start: 2022-01-12 | End: 2022-02-11

## 2022-01-12 RX ORDER — DICYCLOMINE HYDROCHLORIDE 10 MG/1
CAPSULE ORAL
Qty: 120 CAPSULE | Refills: 1 | Status: SHIPPED | OUTPATIENT
Start: 2022-01-12 | End: 2022-03-28

## 2022-01-14 ENCOUNTER — TELEPHONE (OUTPATIENT)
Dept: INTERNAL MEDICINE | Facility: OTHER | Age: 72
End: 2022-01-14
Payer: COMMERCIAL

## 2022-01-14 DIAGNOSIS — G47.00 PERSISTENT INSOMNIA: ICD-10-CM

## 2022-01-14 NOTE — TELEPHONE ENCOUNTER
Received APPROVAL from [facility] for Promethazine HCl 25MG tablets. Effective 01/01/2022-12/31/2022. Forms scanned to [facility].

## 2022-01-14 NOTE — TELEPHONE ENCOUNTER
Received PA from Dipesh Islas for Promethazine HCl 25MG tablets. Submitted on CMM. Waiting for response.

## 2022-01-17 ENCOUNTER — TELEPHONE (OUTPATIENT)
Dept: INTERNAL MEDICINE | Facility: OTHER | Age: 72
End: 2022-01-17
Payer: COMMERCIAL

## 2022-01-17 RX ORDER — ZOLPIDEM TARTRATE 10 MG/1
TABLET ORAL
Qty: 30 TABLET | Refills: 0 | Status: SHIPPED | OUTPATIENT
Start: 2022-01-17 | End: 2022-02-15

## 2022-01-17 NOTE — TELEPHONE ENCOUNTER
Ambien      Last Written Prescription Date:  12.18.21  Last Fill Quantity: #30,   # refills: 0  Last Office Visit: 12.7.21  Future Office visit:       Routing refill request to provider for review/approval because:  Drug not on the [facility], P or [facility] refill protocol or controlled substance

## 2022-01-17 NOTE — TELEPHONE ENCOUNTER
Received PA from [facility] for Zolpidem Tartrate 10MG tablets. Submitted on CMM. Waiting for response.

## 2022-01-18 NOTE — TELEPHONE ENCOUNTER
Received APPROVAL from [facility] for Zolpidem Tartrate 10MG tablets. Effective 01/01/2022-12/31/2022. Forms scanned to [employer].

## 2022-01-25 NOTE — PROGRESS NOTES
Call from Jaden Lee noting patient needs to be put on for potassium infusion, 40meq, as soon as patient is able to get in. Plan placed, with lidocaine as this was patient request last time she was here, and note to [facility] to please put on the schedule as above.    POST INJECTION EVALUATION, no signs of new infection, tear, RD, VF, EOM, CNS, Vascular or other problems or side effect from previous injection(s).

## 2022-01-30 DIAGNOSIS — M54.2 CERVICALGIA: ICD-10-CM

## 2022-01-30 DIAGNOSIS — M25.561 ARTHRALGIA OF BOTH KNEES: ICD-10-CM

## 2022-01-30 DIAGNOSIS — M25.562 ARTHRALGIA OF BOTH KNEES: ICD-10-CM

## 2022-01-30 DIAGNOSIS — B02.23 POSTHERPETIC POLYNEUROPATHY: ICD-10-CM

## 2022-01-30 DIAGNOSIS — M54.2 CERVICAL PAIN: ICD-10-CM

## 2022-02-01 RX ORDER — GABAPENTIN 300 MG/1
300 CAPSULE ORAL 3 TIMES DAILY
Qty: 90 CAPSULE | Refills: 0 | Status: SHIPPED | OUTPATIENT
Start: 2022-02-01 | End: 2022-03-01

## 2022-02-01 RX ORDER — CYCLOBENZAPRINE HCL 10 MG
TABLET ORAL
Qty: 90 TABLET | Refills: 0 | Status: SHIPPED | OUTPATIENT
Start: 2022-02-01 | End: 2022-02-17

## 2022-02-01 RX ORDER — HYDROCODONE BITARTRATE AND ACETAMINOPHEN 7.5; 325 MG/1; MG/1
TABLET ORAL
Qty: 60 TABLET | Refills: 0 | Status: SHIPPED | OUTPATIENT
Start: 2022-02-01 | End: 2022-03-01

## 2022-02-01 NOTE — TELEPHONE ENCOUNTER
Flexeril, Gabapentin, Norco      Last Written Prescription Date:  1.3.22  Last Fill Quantity: #90, #90, #60,   # refills: 0  Last Office Visit: 12.7.21  Future Office visit:       Routing refill request to provider for review/approval because:  Drug not on the [facility], [facility] or [facility] refill protocol or controlled substance

## 2022-02-07 DIAGNOSIS — F34.1 DYSTHYMIA: ICD-10-CM

## 2022-02-09 RX ORDER — ESCITALOPRAM OXALATE 20 MG/1
TABLET ORAL
Qty: 60 TABLET | Refills: 0 | Status: SHIPPED | OUTPATIENT
Start: 2022-02-09 | End: 2022-04-06

## 2022-02-09 NOTE — TELEPHONE ENCOUNTER
escitalopram (LEXAPRO) 20 MG tablet      Last Written Prescription Date:  12/9/2021  Last Fill Quantity: 60,   # refills: 0  Last Office Visit: 12/7/2021  Future Office visit:

## 2022-02-10 DIAGNOSIS — Z01.818 PREOP GENERAL PHYSICAL EXAM: ICD-10-CM

## 2022-02-11 RX ORDER — PROMETHAZINE HYDROCHLORIDE 25 MG/1
TABLET ORAL
Qty: 90 TABLET | Refills: 0 | Status: SHIPPED | OUTPATIENT
Start: 2022-02-11 | End: 2022-03-09

## 2022-02-14 DIAGNOSIS — G47.00 PERSISTENT INSOMNIA: ICD-10-CM

## 2022-02-15 RX ORDER — ZOLPIDEM TARTRATE 10 MG/1
TABLET ORAL
Qty: 30 TABLET | Refills: 0 | Status: SHIPPED | OUTPATIENT
Start: 2022-02-15 | End: 2022-03-14

## 2022-02-15 NOTE — TELEPHONE ENCOUNTER
ambien      Last Written Prescription Date:  1/17/22  Last Fill Quantity: 30,   # refills: 0  Last Office Visit: 12/7/21  Future Office visit:

## 2022-02-17 DIAGNOSIS — M54.2 CERVICALGIA: ICD-10-CM

## 2022-02-17 PROBLEM — F11.90 CHRONIC, CONTINUOUS USE OF OPIOIDS: Status: ACTIVE | Noted: 2017-07-13

## 2022-02-17 RX ORDER — CYCLOBENZAPRINE HCL 10 MG
TABLET ORAL
Qty: 90 TABLET | Refills: 0 | Status: SHIPPED | OUTPATIENT
Start: 2022-02-17 | End: 2022-03-16

## 2022-02-17 NOTE — TELEPHONE ENCOUNTER
Flexeril      Last Written Prescription Date:  02/01/2022  Last Fill Quantity: 90,   # refills: 0  Last Office Visit: 12/7/2021

## 2022-02-27 DIAGNOSIS — B02.23 POSTHERPETIC POLYNEUROPATHY: ICD-10-CM

## 2022-02-27 DIAGNOSIS — M25.561 ARTHRALGIA OF BOTH KNEES: ICD-10-CM

## 2022-02-27 DIAGNOSIS — M54.2 CERVICAL PAIN: ICD-10-CM

## 2022-02-27 DIAGNOSIS — M25.562 ARTHRALGIA OF BOTH KNEES: ICD-10-CM

## 2022-02-27 NOTE — TELEPHONE ENCOUNTER
NEURONTIN      Last Written Prescription Date:  2-1-2022  Last Fill Quantity: 90,   # refills: 0  Last Office Visit: 12-7-2021  Future Office visit:       Routing refill request to provider for review/approval because:  Drug not on the [facility], [facility] or [facility] refill protocol or controlled substance        NORCO      Last Written Prescription Date:  2-1-2022  Last Fill Quantity: 60,   # refills: 0  Last Office Visit: 12-7-2021  Future Office visit:       Routing refill request to provider for review/approval because:  Drug not on the [facility], [facility] or [facility] refill protocol or controlled substance

## 2022-03-01 RX ORDER — HYDROCODONE BITARTRATE AND ACETAMINOPHEN 7.5; 325 MG/1; MG/1
TABLET ORAL
Qty: 60 TABLET | Refills: 0 | Status: SHIPPED | OUTPATIENT
Start: 2022-03-01 | End: 2022-03-28

## 2022-03-01 RX ORDER — GABAPENTIN 300 MG/1
300 CAPSULE ORAL 3 TIMES DAILY
Qty: 90 CAPSULE | Refills: 0 | Status: SHIPPED | OUTPATIENT
Start: 2022-03-01 | End: 2022-03-28

## 2022-03-03 DIAGNOSIS — R21 RASH: ICD-10-CM

## 2022-03-04 RX ORDER — NYSTATIN 100000 [USP'U]/G
POWDER TOPICAL
Qty: 60 G | Refills: 3 | Status: SHIPPED | OUTPATIENT
Start: 2022-03-04 | End: 2023-01-12

## 2022-03-04 NOTE — TELEPHONE ENCOUNTER
Nystatin       Last Written Prescription Date:  6-29-21  Last Fill Quantity: 60g,   # refills: 1  Last Office Visit: 12-7-21  Future Office visit:

## 2022-03-08 DIAGNOSIS — Z01.818 PREOP GENERAL PHYSICAL EXAM: ICD-10-CM

## 2022-03-09 RX ORDER — PROMETHAZINE HYDROCHLORIDE 25 MG/1
TABLET ORAL
Qty: 90 TABLET | Refills: 0 | Status: SHIPPED | OUTPATIENT
Start: 2022-03-09 | End: 2022-04-06

## 2022-03-09 NOTE — TELEPHONE ENCOUNTER
Phenergan      Last Written Prescription Date:  2/11/22  Last Fill Quantity: 90,   # refills: 0  Last Office Visit: 12/7/21  Future Office visit:       Routing refill request to provider for review/approval because:

## 2022-03-12 DIAGNOSIS — G47.00 PERSISTENT INSOMNIA: ICD-10-CM

## 2022-03-14 RX ORDER — ZOLPIDEM TARTRATE 10 MG/1
TABLET ORAL
Qty: 30 TABLET | Refills: 0 | Status: SHIPPED | OUTPATIENT
Start: 2022-03-14 | End: 2022-04-11

## 2022-03-14 NOTE — TELEPHONE ENCOUNTER
Not on protocol    zolpidem (AMBIEN) 10 MG tablet     Last Written Prescription Date:  2/15/20  Last Fill Quantity: 30,   # refills: 0  Last Office Visit: 12/7/21  Future Office visit:       Routing refill request to provider for review/approval because:  Drug not on the [facility], [facility] or [facility] refill protocol or controlled substance

## 2022-03-16 DIAGNOSIS — M54.2 CERVICALGIA: ICD-10-CM

## 2022-03-16 RX ORDER — CYCLOBENZAPRINE HCL 10 MG
TABLET ORAL
Qty: 90 TABLET | Refills: 0 | Status: SHIPPED | OUTPATIENT
Start: 2022-03-16 | End: 2022-04-11

## 2022-03-16 NOTE — TELEPHONE ENCOUNTER
FLEXERIL      Last Written Prescription Date:  2-  Last Fill Quantity: 90,   # refills: 0  Last Office Visit: 12-7-2021  Future Office visit:       Routing refill request to provider for review/approval because:  Drug not on the [facility], P or [facility] refill protocol or controlled substance

## 2022-03-25 DIAGNOSIS — K59.03 DRUG-INDUCED CONSTIPATION: ICD-10-CM

## 2022-03-27 NOTE — TELEPHONE ENCOUNTER
Avis       Last Written Prescription Date:  1-12-22  Last Fill Quantity: 120,   # refills: 1  Last Office Visit: 12-7-21  Future Office visit:

## 2022-03-28 DIAGNOSIS — M25.562 ARTHRALGIA OF BOTH KNEES: ICD-10-CM

## 2022-03-28 DIAGNOSIS — M25.561 ARTHRALGIA OF BOTH KNEES: ICD-10-CM

## 2022-03-28 DIAGNOSIS — M54.2 CERVICAL PAIN: ICD-10-CM

## 2022-03-28 DIAGNOSIS — B02.23 POSTHERPETIC POLYNEUROPATHY: ICD-10-CM

## 2022-03-28 RX ORDER — HYDROCODONE BITARTRATE AND ACETAMINOPHEN 7.5; 325 MG/1; MG/1
TABLET ORAL
Qty: 60 TABLET | Refills: 0 | Status: SHIPPED | OUTPATIENT
Start: 2022-03-28 | End: 2022-04-27

## 2022-03-28 RX ORDER — DICYCLOMINE HYDROCHLORIDE 10 MG/1
CAPSULE ORAL
Qty: 120 CAPSULE | Refills: 1 | Status: SHIPPED | OUTPATIENT
Start: 2022-03-28 | End: 2022-10-06

## 2022-03-28 RX ORDER — GABAPENTIN 300 MG/1
300 CAPSULE ORAL 3 TIMES DAILY
Qty: 90 CAPSULE | Refills: 0 | Status: SHIPPED | OUTPATIENT
Start: 2022-03-28 | End: 2022-04-27

## 2022-03-28 NOTE — TELEPHONE ENCOUNTER
gabapentin      Last Written Prescription Date:  3/1/22  Last Fill Quantity: 90,   # refills: 0  Last Office Visit: 12/7/21  Future Office visit:       norco      Last Written Prescription Date:  3/1/22  Last Fill Quantity: 60,   # refills: 0  Last Office Visit: 12/7/21  Future Office visit:

## 2022-04-02 DIAGNOSIS — G47.00 PERSISTENT INSOMNIA: ICD-10-CM

## 2022-04-04 NOTE — TELEPHONE ENCOUNTER
traZODone      Last Written Prescription Date:  12/14/2021  Last Fill Quantity: 30,   # refills: 0  Last Office Visit: 12/7/2021  Future Office visit:       Routing refill request to provider for review/approval because:

## 2022-04-05 DIAGNOSIS — Z01.818 PREOP GENERAL PHYSICAL EXAM: ICD-10-CM

## 2022-04-05 DIAGNOSIS — F34.1 DYSTHYMIA: ICD-10-CM

## 2022-04-05 RX ORDER — TRAZODONE HYDROCHLORIDE 150 MG/1
150 TABLET ORAL AT BEDTIME
Qty: 30 TABLET | Refills: 2 | Status: SHIPPED | OUTPATIENT
Start: 2022-04-05 | End: 2022-06-24

## 2022-04-06 RX ORDER — PROMETHAZINE HYDROCHLORIDE 25 MG/1
TABLET ORAL
Qty: 90 TABLET | Refills: 1 | Status: SHIPPED | OUTPATIENT
Start: 2022-04-06 | End: 2022-05-27

## 2022-04-06 RX ORDER — ESCITALOPRAM OXALATE 20 MG/1
TABLET ORAL
Qty: 60 TABLET | Refills: 0 | Status: SHIPPED | OUTPATIENT
Start: 2022-04-06 | End: 2022-06-06

## 2022-04-06 NOTE — TELEPHONE ENCOUNTER
Escitalopram      Last Written Prescription Date:  2/9/22  Last Fill Quantity: 60,   # refills: 0  Last Office Visit: 12/7/21  Future Office visit:

## 2022-04-09 DIAGNOSIS — G47.00 PERSISTENT INSOMNIA: ICD-10-CM

## 2022-04-09 DIAGNOSIS — M54.2 CERVICALGIA: ICD-10-CM

## 2022-04-11 RX ORDER — ZOLPIDEM TARTRATE 10 MG/1
TABLET ORAL
Qty: 30 TABLET | Refills: 0 | Status: SHIPPED | OUTPATIENT
Start: 2022-04-11 | End: 2022-05-09

## 2022-04-11 RX ORDER — CYCLOBENZAPRINE HCL 10 MG
TABLET ORAL
Qty: 90 TABLET | Refills: 0 | Status: SHIPPED | OUTPATIENT
Start: 2022-04-11 | End: 2022-05-09

## 2022-04-11 NOTE — TELEPHONE ENCOUNTER
zolpidem (AMBIEN) 10 MG tablet      Last Written Prescription Date:  3/14/2022  Last Fill Quantity: 30,   # refills: 0  Last Office Visit: 12/07/2021  Future Office visit:         cyclobenzaprine (FLEXERIL) 10 MG tablet      Last Written Prescription Date:  3/16/2022  Last Fill Quantity: 90,   # refills: 0  Last Office Visit: 12/07/2021  Future Office visit:

## 2022-04-19 DIAGNOSIS — Z00.00 HEALTH CARE MAINTENANCE: ICD-10-CM

## 2022-04-19 DIAGNOSIS — G43.009 MIGRAINE WITHOUT AURA AND WITHOUT STATUS MIGRAINOSUS, NOT INTRACTABLE: ICD-10-CM

## 2022-04-20 RX ORDER — TOPIRAMATE 50 MG/1
TABLET, FILM COATED ORAL
Qty: 90 TABLET | Refills: 2 | Status: SHIPPED | OUTPATIENT
Start: 2022-04-20 | End: 2022-06-24

## 2022-04-20 RX ORDER — FOLIC ACID 1 MG/1
TABLET ORAL
Qty: 90 TABLET | Refills: 1 | Status: SHIPPED | OUTPATIENT
Start: 2022-04-20 | End: 2023-01-06

## 2022-04-20 NOTE — TELEPHONE ENCOUNTER
topamax      Last Written Prescription Date:  12/28/21`  Last Fill Quantity: 90,   # refills: 2  Last Office Visit: 12/7/21  Future Office visit:       Folic acid      Last Written Prescription Date:  8/9/21  Last Fill Quantity: 90,   # refills: 1  Last Office Visit: 12/7/21  Future Office visit:

## 2022-04-23 DIAGNOSIS — M25.562 ARTHRALGIA OF BOTH KNEES: ICD-10-CM

## 2022-04-23 DIAGNOSIS — B02.23 POSTHERPETIC POLYNEUROPATHY: ICD-10-CM

## 2022-04-23 DIAGNOSIS — M54.2 CERVICAL PAIN: ICD-10-CM

## 2022-04-23 DIAGNOSIS — E55.9 VITAMIN D DEFICIENCY: ICD-10-CM

## 2022-04-23 DIAGNOSIS — M25.561 ARTHRALGIA OF BOTH KNEES: ICD-10-CM

## 2022-04-25 RX ORDER — CHOLECALCIFEROL (VITAMIN D3) 50 MCG
TABLET ORAL
Qty: 180 TABLET | Refills: 3 | Status: SHIPPED | OUTPATIENT
Start: 2022-04-25 | End: 2023-10-12

## 2022-04-25 NOTE — TELEPHONE ENCOUNTER
Vit D 50 MCG      Last Written Prescription Date:  4-5-2021  Last Fill Quantity: 180,   # refills: 3  Last Office Visit: 12-7-2021

## 2022-04-26 NOTE — TELEPHONE ENCOUNTER
NORCO      Last Written Prescription Date:  3-  Last Fill Quantity: 60,   # refills: 0  Last Office Visit: 12-7-2021  Future Office visit:       Routing refill request to provider for review/approval because:  Drug not on the [facility], [facility] or [facility] refill protocol or controlled substance    GABEPENTIN      Last Written Prescription Date:  3-  Last Fill Quantity: 90,   # refills: 0  Last Office Visit: 12-7-2021  Future Office visit:       Routing refill request to provider for review/approval because:  Drug not on the [facility], [facility] or [facility] refill protocol or controlled substance

## 2022-04-27 RX ORDER — GABAPENTIN 300 MG/1
300 CAPSULE ORAL 3 TIMES DAILY
Qty: 90 CAPSULE | Refills: 0 | Status: SHIPPED | OUTPATIENT
Start: 2022-04-27 | End: 2022-05-24

## 2022-04-27 RX ORDER — HYDROCODONE BITARTRATE AND ACETAMINOPHEN 7.5; 325 MG/1; MG/1
TABLET ORAL
Qty: 60 TABLET | Refills: 0 | Status: SHIPPED | OUTPATIENT
Start: 2022-04-27 | End: 2022-05-24

## 2022-05-07 DIAGNOSIS — G47.00 PERSISTENT INSOMNIA: ICD-10-CM

## 2022-05-07 DIAGNOSIS — M54.2 CERVICALGIA: ICD-10-CM

## 2022-05-09 RX ORDER — ZOLPIDEM TARTRATE 10 MG/1
TABLET ORAL
Qty: 30 TABLET | Refills: 0 | Status: SHIPPED | OUTPATIENT
Start: 2022-05-09 | End: 2022-06-06

## 2022-05-09 RX ORDER — CYCLOBENZAPRINE HCL 10 MG
TABLET ORAL
Qty: 90 TABLET | Refills: 0 | Status: SHIPPED | OUTPATIENT
Start: 2022-05-09 | End: 2022-06-06

## 2022-05-09 NOTE — TELEPHONE ENCOUNTER
Ambien, Flexeril      Last Written Prescription Date:  4.11.22  Last Fill Quantity: #30, #90,   # refills: 0  Last Office Visit: 12.7.21  Future Office visit:       Routing refill request to provider for review/approval because:  Drug not on the [facility], [facility] or [facility] refill protocol or controlled substance

## 2022-05-23 DIAGNOSIS — M54.2 CERVICAL PAIN: ICD-10-CM

## 2022-05-23 DIAGNOSIS — B02.23 POSTHERPETIC POLYNEUROPATHY: ICD-10-CM

## 2022-05-23 DIAGNOSIS — M25.561 ARTHRALGIA OF BOTH KNEES: ICD-10-CM

## 2022-05-23 DIAGNOSIS — M25.562 ARTHRALGIA OF BOTH KNEES: ICD-10-CM

## 2022-05-24 RX ORDER — HYDROCODONE BITARTRATE AND ACETAMINOPHEN 7.5; 325 MG/1; MG/1
TABLET ORAL
Qty: 60 TABLET | Refills: 0 | Status: SHIPPED | OUTPATIENT
Start: 2022-05-24 | End: 2022-06-22

## 2022-05-24 RX ORDER — GABAPENTIN 300 MG/1
300 CAPSULE ORAL 3 TIMES DAILY
Qty: 90 CAPSULE | Refills: 0 | Status: SHIPPED | OUTPATIENT
Start: 2022-05-24 | End: 2022-06-22

## 2022-05-24 NOTE — TELEPHONE ENCOUNTER
Gabapentin       Last Written Prescription Date:  4/27/22  Last Fill Quantity: 90,   # refills: 0  Last Office Visit: 12/7/21  Future Office visit:       Routing refill request to provider for review/approval because:    norco       Last Written Prescription Date:  4/27/22  Last Fill Quantity: 60,   # refills: 0  Last Office Visit: 12/7/21  Future Office visit:       Routing refill request to provider for review/approval because:

## 2022-05-25 DIAGNOSIS — Z01.818 PREOP GENERAL PHYSICAL EXAM: ICD-10-CM

## 2022-05-26 ENCOUNTER — HOSPITAL ENCOUNTER (OUTPATIENT)
Dept: BONE DENSITY | Facility: HOSPITAL | Age: 72
Discharge: HOME | End: 2022-05-26
Attending: INTERNAL MEDICINE
Payer: COMMERCIAL

## 2022-05-26 ENCOUNTER — ANCILLARY PROCEDURE (OUTPATIENT)
Dept: MAMMOGRAPHY | Facility: OTHER | Age: 72
End: 2022-05-26
Attending: INTERNAL MEDICINE
Payer: COMMERCIAL

## 2022-05-26 ENCOUNTER — TELEPHONE (OUTPATIENT)
Dept: MAMMOGRAPHY | Facility: OTHER | Age: 72
End: 2022-05-26
Payer: COMMERCIAL

## 2022-05-26 DIAGNOSIS — Z12.31 ENCOUNTER FOR SCREENING MAMMOGRAM FOR BREAST CANCER: ICD-10-CM

## 2022-05-26 DIAGNOSIS — M81.0 SENILE OSTEOPOROSIS: ICD-10-CM

## 2022-05-26 PROCEDURE — 77080 DXA BONE DENSITY AXIAL: CPT

## 2022-05-26 PROCEDURE — 77067 SCR MAMMO BI INCL CAD: CPT | Mod: TC

## 2022-05-26 NOTE — TELEPHONE ENCOUNTER
Phenergan 25 mg      Last Written Prescription Date:  4/06/22  Last Fill Quantity: 90,   # refills: 1  Last Office Visit: 12/07/21  Future Office visit:       Routing refill request to provider for review/approval because:  Drug not on the [facility], [facility] or [facility] refill protocol or controlled substance

## 2022-05-26 NOTE — RESULT ENCOUNTER NOTE
Pt notified of results. Patient has had reclast in the past. Would like to try this again.  Linn Echols

## 2022-05-27 RX ORDER — PROMETHAZINE HYDROCHLORIDE 25 MG/1
TABLET ORAL
Qty: 90 TABLET | Refills: 1 | Status: SHIPPED | OUTPATIENT
Start: 2022-05-27 | End: 2022-07-20

## 2022-06-02 ENCOUNTER — TELEPHONE (OUTPATIENT)
Dept: INTERNAL MEDICINE | Facility: OTHER | Age: 72
End: 2022-06-02
Payer: COMMERCIAL

## 2022-06-02 DIAGNOSIS — M81.0 SENILE OSTEOPOROSIS: Primary | ICD-10-CM

## 2022-06-02 RX ORDER — ZOLEDRONIC ACID 5 MG/100ML
5 INJECTION, SOLUTION INTRAVENOUS ONCE
Qty: 100 ML | Refills: 0 | Status: SHIPPED | OUTPATIENT
Start: 2022-06-02 | End: 2022-06-02

## 2022-06-02 NOTE — TELEPHONE ENCOUNTER
Patient notified of dexa scan results, would like to try reclast infusion again.    Please advise   JESUSITA DELGADO LPN

## 2022-06-03 DIAGNOSIS — F34.1 DYSTHYMIA: ICD-10-CM

## 2022-06-03 NOTE — TELEPHONE ENCOUNTER
I'm guessing this was supposed to be set up for an infusion through [place]?  Will have to wait for Dr. Milton next week.

## 2022-06-03 NOTE — TELEPHONE ENCOUNTER
Pt of Dr[facility]Bastianelli Hsieh [facility] and [facility] received a RX for Reclast  that is for intravenous.    They are wondering if this was meant to be sent there?     Or if they are to supply the medication and if so where would it be shipped to?  A clinic or could pt pick it up and bring to the clinic?      Please advise.  Did update PCP is out until Monday.    Call back [phone]    Wen Baker RN

## 2022-06-05 DIAGNOSIS — M54.2 CERVICALGIA: ICD-10-CM

## 2022-06-05 DIAGNOSIS — G47.00 PERSISTENT INSOMNIA: ICD-10-CM

## 2022-06-06 ENCOUNTER — TELEPHONE (OUTPATIENT)
Dept: INFUSION THERAPY | Facility: OTHER | Age: 72
End: 2022-06-06
Payer: COMMERCIAL

## 2022-06-06 DIAGNOSIS — M81.0 AGE-RELATED OSTEOPOROSIS WITHOUT CURRENT PATHOLOGICAL FRACTURE: Primary | ICD-10-CM

## 2022-06-06 RX ORDER — ESCITALOPRAM OXALATE 20 MG/1
TABLET ORAL
Qty: 60 TABLET | Refills: 0 | Status: SHIPPED | OUTPATIENT
Start: 2022-06-06 | End: 2022-07-25

## 2022-06-06 RX ORDER — DIPHENHYDRAMINE HYDROCHLORIDE 50 MG/ML
50 INJECTION INTRAMUSCULAR; INTRAVENOUS
Status: CANCELLED
Start: 2022-06-06

## 2022-06-06 RX ORDER — NALOXONE HYDROCHLORIDE 0.4 MG/ML
0.2 INJECTION, SOLUTION INTRAMUSCULAR; INTRAVENOUS; SUBCUTANEOUS
Status: CANCELLED | OUTPATIENT
Start: 2022-06-06

## 2022-06-06 RX ORDER — HEPARIN SODIUM,PORCINE 10 UNIT/ML
5 VIAL (ML) INTRAVENOUS
Status: CANCELLED | OUTPATIENT
Start: 2022-06-06

## 2022-06-06 RX ORDER — EPINEPHRINE 1 MG/ML
0.3 INJECTION, SOLUTION, CONCENTRATE INTRAVENOUS EVERY 5 MIN PRN
Status: CANCELLED | OUTPATIENT
Start: 2022-06-06

## 2022-06-06 RX ORDER — ZOLPIDEM TARTRATE 10 MG/1
TABLET ORAL
Qty: 30 TABLET | Refills: 0 | Status: SHIPPED | OUTPATIENT
Start: 2022-06-06 | End: 2022-07-01

## 2022-06-06 RX ORDER — MEPERIDINE HYDROCHLORIDE 25 MG/ML
25 INJECTION INTRAMUSCULAR; INTRAVENOUS; SUBCUTANEOUS EVERY 30 MIN PRN
Status: CANCELLED | OUTPATIENT
Start: 2022-06-06

## 2022-06-06 RX ORDER — HEPARIN SODIUM (PORCINE) LOCK FLUSH IV SOLN 100 UNIT/ML 100 UNIT/ML
5 SOLUTION INTRAVENOUS
Status: CANCELLED | OUTPATIENT
Start: 2022-06-06

## 2022-06-06 RX ORDER — ALBUTEROL SULFATE 0.83 MG/ML
2.5 SOLUTION RESPIRATORY (INHALATION)
Status: CANCELLED | OUTPATIENT
Start: 2022-06-06

## 2022-06-06 RX ORDER — CYCLOBENZAPRINE HCL 10 MG
TABLET ORAL
Qty: 90 TABLET | Refills: 0 | Status: SHIPPED | OUTPATIENT
Start: 2022-06-06 | End: 2022-07-01

## 2022-06-06 RX ORDER — ALBUTEROL SULFATE 90 UG/1
1-2 AEROSOL, METERED RESPIRATORY (INHALATION)
Status: CANCELLED
Start: 2022-06-06

## 2022-06-06 RX ORDER — ZOLEDRONIC ACID 5 MG/100ML
5 INJECTION, SOLUTION INTRAVENOUS ONCE
Status: CANCELLED
Start: 2022-06-06 | End: 2022-06-06

## 2022-06-06 NOTE — TELEPHONE ENCOUNTER
lexapro      Last Written Prescription Date:  4/6/22  Last Fill Quantity: 60,   # refills: 0  Last Office Visit: 12/7/21  Future Office visit:

## 2022-06-06 NOTE — TELEPHONE ENCOUNTER
Message received from Oncology nurse that patient needs reclast.  No current plan is in place, and should be directed to Infusion, not Oncology.  Routed to Provider pool to place plan.

## 2022-06-06 NOTE — TELEPHONE ENCOUNTER
flexeril      Last Written Prescription Date:  5/9/22  Last Fill Quantity: 90,   # refills: 0  Last Office Visit: 12/7/21  Future Office visit:       Routing refill request to provider for review/approval because:  Drug not on the [facility], P or [facility] refill protocol or controlled substance  ambien 5/9/22 #30

## 2022-06-07 ENCOUNTER — TELEPHONE (OUTPATIENT)
Dept: INTERNAL MEDICINE | Facility: OTHER | Age: 72
End: 2022-06-07
Payer: COMMERCIAL

## 2022-06-07 NOTE — TELEPHONE ENCOUNTER
Received PA from Dipesh Islas for Cyclobenzaprine HCl 10MG tablets. Submitted on CMM. Waiting for a response.

## 2022-06-08 ENCOUNTER — INFUSION THERAPY VISIT (OUTPATIENT)
Dept: INFUSION THERAPY | Facility: OTHER | Age: 72
End: 2022-06-08
Attending: INTERNAL MEDICINE
Payer: COMMERCIAL

## 2022-06-08 VITALS
TEMPERATURE: 98.3 F | WEIGHT: 176.81 LBS | BODY MASS INDEX: 32.54 KG/M2 | HEART RATE: 81 BPM | DIASTOLIC BLOOD PRESSURE: 75 MMHG | SYSTOLIC BLOOD PRESSURE: 118 MMHG | OXYGEN SATURATION: 94 % | HEIGHT: 62 IN | RESPIRATION RATE: 16 BRPM

## 2022-06-08 DIAGNOSIS — M81.0 AGE-RELATED OSTEOPOROSIS WITHOUT CURRENT PATHOLOGICAL FRACTURE: Primary | ICD-10-CM

## 2022-06-08 LAB
CALCIUM SERPL-MCNC: 8.6 MG/DL (ref 8.5–10.1)
CREAT SERPL-MCNC: 0.56 MG/DL (ref 0.52–1.04)
GFR SERPL CREATININE-BSD FRML MDRD: >90 ML/MIN/1.73M2

## 2022-06-08 PROCEDURE — 82310 ASSAY OF CALCIUM: CPT | Mod: ZL | Performed by: INTERNAL MEDICINE

## 2022-06-08 PROCEDURE — 82565 ASSAY OF CREATININE: CPT | Mod: ZL | Performed by: INTERNAL MEDICINE

## 2022-06-08 PROCEDURE — 258N000003 HC RX IP 258 OP 636: Performed by: INTERNAL MEDICINE

## 2022-06-08 PROCEDURE — 96365 THER/PROPH/DIAG IV INF INIT: CPT

## 2022-06-08 PROCEDURE — 36415 COLL VENOUS BLD VENIPUNCTURE: CPT | Mod: ZL | Performed by: INTERNAL MEDICINE

## 2022-06-08 PROCEDURE — 250N000011 HC RX IP 250 OP 636: Performed by: INTERNAL MEDICINE

## 2022-06-08 RX ORDER — ALBUTEROL SULFATE 90 UG/1
1-2 AEROSOL, METERED RESPIRATORY (INHALATION)
Status: CANCELLED
Start: 2022-06-08

## 2022-06-08 RX ORDER — HEPARIN SODIUM,PORCINE 10 UNIT/ML
5 VIAL (ML) INTRAVENOUS
Status: CANCELLED | OUTPATIENT
Start: 2022-06-08

## 2022-06-08 RX ORDER — HEPARIN SODIUM (PORCINE) LOCK FLUSH IV SOLN 100 UNIT/ML 100 UNIT/ML
5 SOLUTION INTRAVENOUS
Status: CANCELLED | OUTPATIENT
Start: 2022-06-08

## 2022-06-08 RX ORDER — DIPHENHYDRAMINE HYDROCHLORIDE 50 MG/ML
50 INJECTION INTRAMUSCULAR; INTRAVENOUS
Status: CANCELLED
Start: 2022-06-08

## 2022-06-08 RX ORDER — MEPERIDINE HYDROCHLORIDE 25 MG/ML
25 INJECTION INTRAMUSCULAR; INTRAVENOUS; SUBCUTANEOUS EVERY 30 MIN PRN
Status: CANCELLED | OUTPATIENT
Start: 2022-06-08

## 2022-06-08 RX ORDER — ZOLEDRONIC ACID 5 MG/100ML
5 INJECTION, SOLUTION INTRAVENOUS ONCE
Status: COMPLETED | OUTPATIENT
Start: 2022-06-08 | End: 2022-06-08

## 2022-06-08 RX ORDER — EPINEPHRINE 1 MG/ML
0.3 INJECTION, SOLUTION, CONCENTRATE INTRAVENOUS EVERY 5 MIN PRN
Status: CANCELLED | OUTPATIENT
Start: 2022-06-08

## 2022-06-08 RX ORDER — METHYLPREDNISOLONE SODIUM SUCCINATE 125 MG/2ML
125 INJECTION, POWDER, LYOPHILIZED, FOR SOLUTION INTRAMUSCULAR; INTRAVENOUS
Status: CANCELLED
Start: 2022-06-08

## 2022-06-08 RX ORDER — NALOXONE HYDROCHLORIDE 0.4 MG/ML
0.2 INJECTION, SOLUTION INTRAMUSCULAR; INTRAVENOUS; SUBCUTANEOUS
Status: CANCELLED | OUTPATIENT
Start: 2022-06-08

## 2022-06-08 RX ORDER — ZOLEDRONIC ACID 5 MG/100ML
5 INJECTION, SOLUTION INTRAVENOUS ONCE
Status: CANCELLED
Start: 2022-06-08 | End: 2022-06-08

## 2022-06-08 RX ORDER — ALBUTEROL SULFATE 0.83 MG/ML
2.5 SOLUTION RESPIRATORY (INHALATION)
Status: CANCELLED | OUTPATIENT
Start: 2022-06-08

## 2022-06-08 RX ADMIN — SODIUM CHLORIDE 250 ML: 9 INJECTION, SOLUTION INTRAVENOUS at 14:26

## 2022-06-08 RX ADMIN — ZOLEDRONIC ACID 5 MG: 0.05 INJECTION, SOLUTION INTRAVENOUS at 14:27

## 2022-06-08 NOTE — PROGRESS NOTES
24 gauge angio cath inserted into RT ARM.  Immediate blood return noted.  IV secured with sterile, transparent dressing and tape.  Patient tolerated well, denies pain or discomfort at this time.  Flushes easily without resistance, no signs or symptoms of infiltration or infection.  3mL blood wasted and blood removed for ordered labs. Flushed with 3mL normal saline to clear line. Patient denies questions or concerns regarding infusion and/or medication(s) being administered.

## 2022-06-08 NOTE — TELEPHONE ENCOUNTER
[facility] 10MG [facility]. Effective 01/01/2022-09/05/2022. Forms scanned to [facility].

## 2022-06-08 NOTE — PROGRESS NOTES
Patient is 72 years old, here today for infusion of Reclast.      Patient identified with two identifiers, order verified, and verbal consent for today's infusion obtained from patient.      Component      Latest Ref Rng & Units 6/8/2022   Creatinine      0.52 - 1.04 mg/dL 0.56   GFR Estimate      >60 mL/min/1.73m2 >90   Calcium      8.5 - 10.1 mg/dL 8.6       Patient meets order parameters for today's treatment.    IV pump verified with dose, drug, and rate of administration.  Infusion administered per protocol.  Patient tolerated infusion well, no signs or symptoms of adverse reaction noted.  Patient denies pain nor discomfort.     IV removed, catheter intact.  Site clean, dry and intact.  No signs or symptoms of infiltration or infection.  Covered with a sterile bandage, slight pressure applied for 30 seconds.  Pt instructed to leave bandage intact for a minimum of one hour, and to call with questions or concerns. Patient states understanding, discharged.

## 2022-06-08 NOTE — TELEPHONE ENCOUNTER
[facility] calling and got order for Reclast.    Are they to supply this medication or is the infusion center going to supply?    And if they are supplying medication where does it need to get sent?    Please call back to update pharmacy at  [phone]    Wen Baker RN

## 2022-06-19 DIAGNOSIS — B02.23 POSTHERPETIC POLYNEUROPATHY: ICD-10-CM

## 2022-06-19 DIAGNOSIS — M25.562 ARTHRALGIA OF BOTH KNEES: ICD-10-CM

## 2022-06-19 DIAGNOSIS — M25.561 ARTHRALGIA OF BOTH KNEES: ICD-10-CM

## 2022-06-19 DIAGNOSIS — K21.00 GASTROESOPHAGEAL REFLUX DISEASE WITH ESOPHAGITIS, UNSPECIFIED WHETHER HEMORRHAGE: ICD-10-CM

## 2022-06-19 DIAGNOSIS — M54.2 CERVICAL PAIN: ICD-10-CM

## 2022-06-22 DIAGNOSIS — G47.00 PERSISTENT INSOMNIA: ICD-10-CM

## 2022-06-22 RX ORDER — PANTOPRAZOLE SODIUM 40 MG/1
TABLET, DELAYED RELEASE ORAL
Qty: 30 TABLET | Refills: 4 | Status: SHIPPED | OUTPATIENT
Start: 2022-06-22 | End: 2022-12-05

## 2022-06-22 RX ORDER — HYDROCODONE BITARTRATE AND ACETAMINOPHEN 7.5; 325 MG/1; MG/1
TABLET ORAL
Qty: 60 TABLET | Refills: 0 | Status: SHIPPED | OUTPATIENT
Start: 2022-06-22 | End: 2022-07-20

## 2022-06-22 RX ORDER — GABAPENTIN 300 MG/1
CAPSULE ORAL
Qty: 90 CAPSULE | Refills: 0 | Status: SHIPPED | OUTPATIENT
Start: 2022-06-22 | End: 2022-07-11

## 2022-06-22 NOTE — TELEPHONE ENCOUNTER
gabapentin (NEURONTIN) 300 MG capsule      Last Written Prescription Date:  5/24/22  Last Fill Quantity: 90,   # refills: 0  Last Office Visit: 12/7/21  Future Office visit:       Routing refill request to provider for review/approval because:  Drug not on the [facility], [facility] or [facility] refill protocol or controlled substance      HYDROcodone-acetaminophen (NORCO) 7.5-325 MG per tablet      Last Written Prescription Date:  5/24/22  Last Fill Quantity: 60,   # refills: 0  Last Office Visit: 12/7/21  Future Office visit:       Routing refill request to provider for review/approval because:  Drug not on the [facility], [facility] or [facility] refill protocol or controlled substance      pantoprazole (PROTONIX) 40 MG EC tablet      Last Written Prescription Date:  11/15/21  Last Fill Quantity: 30,   # refills: 4  Last Office Visit: 12/7/21  Future Office visit:       Routing refill request to provider for review/approval because:  Drug not on the [facility], [facility] or [facility] refill protocol or controlled substance

## 2022-06-23 DIAGNOSIS — G43.009 MIGRAINE WITHOUT AURA AND WITHOUT STATUS MIGRAINOSUS, NOT INTRACTABLE: ICD-10-CM

## 2022-06-24 RX ORDER — TOPIRAMATE 50 MG/1
TABLET, FILM COATED ORAL
Qty: 90 TABLET | Refills: 2 | Status: SHIPPED | OUTPATIENT
Start: 2022-06-24 | End: 2022-12-08

## 2022-06-24 RX ORDER — TRAZODONE HYDROCHLORIDE 150 MG/1
150 TABLET ORAL AT BEDTIME
Qty: 30 TABLET | Refills: 3 | Status: SHIPPED | OUTPATIENT
Start: 2022-06-24 | End: 2022-10-05

## 2022-06-24 NOTE — TELEPHONE ENCOUNTER
topiramate (TOPAMAX) 50 MG tablet      Last Written Prescription Date:  4-20-22  Last Fill Quantity: 90,   # refills: 2  Last Office Visit: 12-7-21  Future Office visit:       Routing refill request to provider for review/approval because:   Review Authorizing provider's last note.

## 2022-06-29 DIAGNOSIS — M54.2 CERVICALGIA: ICD-10-CM

## 2022-06-29 DIAGNOSIS — G47.00 PERSISTENT INSOMNIA: ICD-10-CM

## 2022-07-01 NOTE — TELEPHONE ENCOUNTER
Ambien, Flexeril -both dated 7.5.22      Last Written Prescription Date:  6.6.22  Last Fill Quantity: #30, #90,   # refills: 0  Last Office Visit: 12.7.21  Future Office visit:       Routing refill request to provider for review/approval because:  Drug not on the [facility], [facility] or [facility] refill protocol or controlled substance

## 2022-07-05 RX ORDER — ZOLPIDEM TARTRATE 10 MG/1
TABLET ORAL
Qty: 30 TABLET | Refills: 0 | Status: SHIPPED | OUTPATIENT
Start: 2022-07-05 | End: 2022-08-02

## 2022-07-05 RX ORDER — CYCLOBENZAPRINE HCL 10 MG
TABLET ORAL
Qty: 90 TABLET | Refills: 0 | Status: SHIPPED | OUTPATIENT
Start: 2022-07-05 | End: 2022-08-02

## 2022-07-08 DIAGNOSIS — B02.23 POSTHERPETIC POLYNEUROPATHY: ICD-10-CM

## 2022-07-11 RX ORDER — GABAPENTIN 300 MG/1
CAPSULE ORAL
Qty: 90 CAPSULE | Refills: 0 | Status: SHIPPED | OUTPATIENT
Start: 2022-07-11 | End: 2022-07-20

## 2022-07-11 NOTE — TELEPHONE ENCOUNTER
Gabapentin   Last Written Prescription Date:  6/22/22  Last Fill Quantity: 90,   # refills: 0  Last Office Visit: 12/7/21  Future Office visit:       Routing refill request to provider for review/approval because:

## 2022-07-18 DIAGNOSIS — B02.23 POSTHERPETIC POLYNEUROPATHY: ICD-10-CM

## 2022-07-18 DIAGNOSIS — M25.561 ARTHRALGIA OF BOTH KNEES: ICD-10-CM

## 2022-07-18 DIAGNOSIS — M54.2 CERVICAL PAIN: ICD-10-CM

## 2022-07-18 DIAGNOSIS — M25.562 ARTHRALGIA OF BOTH KNEES: ICD-10-CM

## 2022-07-19 DIAGNOSIS — Z01.818 PREOP GENERAL PHYSICAL EXAM: ICD-10-CM

## 2022-07-20 RX ORDER — GABAPENTIN 300 MG/1
CAPSULE ORAL
Qty: 90 CAPSULE | Refills: 0 | Status: SHIPPED | OUTPATIENT
Start: 2022-07-20 | End: 2022-08-12

## 2022-07-20 RX ORDER — PROMETHAZINE HYDROCHLORIDE 25 MG/1
TABLET ORAL
Qty: 90 TABLET | Refills: 1 | Status: SHIPPED | OUTPATIENT
Start: 2022-07-20 | End: 2022-09-13

## 2022-07-20 RX ORDER — HYDROCODONE BITARTRATE AND ACETAMINOPHEN 7.5; 325 MG/1; MG/1
TABLET ORAL
Qty: 60 TABLET | Refills: 0 | Status: SHIPPED | OUTPATIENT
Start: 2022-07-20 | End: 2022-08-16

## 2022-07-20 NOTE — TELEPHONE ENCOUNTER
Gabapentin      Last Written Prescription Date:  7/11/22  Last Fill Quantity: 90,   # refills: 0  Last Office Visit: 12/7/21  Future Office visit:       Norco      Last Written Prescription Date:  6/22/22  Last Fill Quantity: 60,   # refills: 0  Last Office Visit: 12/7/21  Future Office visit:

## 2022-07-23 DIAGNOSIS — F34.1 DYSTHYMIA: ICD-10-CM

## 2022-07-25 RX ORDER — ESCITALOPRAM OXALATE 20 MG/1
TABLET ORAL
Qty: 60 TABLET | Refills: 0 | Status: SHIPPED | OUTPATIENT
Start: 2022-07-25 | End: 2022-09-22

## 2022-07-25 NOTE — TELEPHONE ENCOUNTER
Lexapro       Last Written Prescription Date:  6/6/22  Last Fill Quantity: 60,   # refills: 0  Last Office Visit: 12/7/21  Future Office visit:

## 2022-08-01 DIAGNOSIS — E03.9 HYPOTHYROIDISM, UNSPECIFIED TYPE: ICD-10-CM

## 2022-08-01 DIAGNOSIS — M54.2 CERVICALGIA: ICD-10-CM

## 2022-08-01 DIAGNOSIS — E78.2 MIXED HYPERLIPIDEMIA: ICD-10-CM

## 2022-08-01 DIAGNOSIS — G47.00 PERSISTENT INSOMNIA: ICD-10-CM

## 2022-08-02 RX ORDER — ZOLPIDEM TARTRATE 10 MG/1
TABLET ORAL
Qty: 30 TABLET | Refills: 0 | Status: SHIPPED | OUTPATIENT
Start: 2022-08-02 | End: 2022-08-30

## 2022-08-02 RX ORDER — SIMVASTATIN 40 MG
TABLET ORAL
Qty: 90 TABLET | Refills: 0 | Status: SHIPPED | OUTPATIENT
Start: 2022-08-02

## 2022-08-02 RX ORDER — LEVOTHYROXINE SODIUM 75 UG/1
75 TABLET ORAL DAILY
Qty: 90 TABLET | Refills: 3 | Status: SHIPPED | OUTPATIENT
Start: 2022-08-02 | End: 2023-08-28

## 2022-08-02 RX ORDER — CYCLOBENZAPRINE HCL 10 MG
TABLET ORAL
Qty: 90 TABLET | Refills: 0 | Status: SHIPPED | OUTPATIENT
Start: 2022-08-02 | End: 2022-08-30

## 2022-08-02 NOTE — TELEPHONE ENCOUNTER
zocor       Last Written Prescription Date:  10-20-20  Last Fill Quantity: 90,   # refills: 0  Last Office Visit: 12-7-21  Future Office visit:       Levothyroxine       Last Written Prescription Date:  6-8-21  Last Fill Quantity: 90,   # refills: 3

## 2022-08-02 NOTE — TELEPHONE ENCOUNTER
Ambien       Last Written Prescription Date:  7-5-22  Last Fill Quantity: 30,   # refills: 0  Last Office Visit: 12-7-21  Future Office visit:       Flexeril       Last Written Prescription Date:  7-5-22  Last Fill Quantity: 90,   # refills: 0

## 2022-08-11 DIAGNOSIS — B02.23 POSTHERPETIC POLYNEUROPATHY: ICD-10-CM

## 2022-08-12 RX ORDER — GABAPENTIN 300 MG/1
CAPSULE ORAL
Qty: 90 CAPSULE | Refills: 0 | Status: SHIPPED | OUTPATIENT
Start: 2022-08-12 | End: 2022-09-22

## 2022-08-12 NOTE — TELEPHONE ENCOUNTER
Gabapentin       Last Written Prescription Date:  7/20/22  Last Fill Quantity: 90,   # refills: 0  Last Office Visit: 12/7/21  Future Office visit:

## 2022-08-15 DIAGNOSIS — M54.2 CERVICAL PAIN: ICD-10-CM

## 2022-08-15 DIAGNOSIS — M25.562 ARTHRALGIA OF BOTH KNEES: ICD-10-CM

## 2022-08-15 DIAGNOSIS — M25.561 ARTHRALGIA OF BOTH KNEES: ICD-10-CM

## 2022-08-16 RX ORDER — HYDROCODONE BITARTRATE AND ACETAMINOPHEN 7.5; 325 MG/1; MG/1
TABLET ORAL
Qty: 60 TABLET | Refills: 0 | Status: SHIPPED | OUTPATIENT
Start: 2022-08-16 | End: 2022-09-14

## 2022-08-27 DIAGNOSIS — M54.2 CERVICALGIA: ICD-10-CM

## 2022-08-29 DIAGNOSIS — G47.00 PERSISTENT INSOMNIA: ICD-10-CM

## 2022-08-30 RX ORDER — CYCLOBENZAPRINE HCL 10 MG
TABLET ORAL
Qty: 90 TABLET | Refills: 0 | Status: SHIPPED | OUTPATIENT
Start: 2022-08-30 | End: 2022-09-29

## 2022-08-30 RX ORDER — ZOLPIDEM TARTRATE 10 MG/1
TABLET ORAL
Qty: 30 TABLET | Refills: 0 | Status: SHIPPED | OUTPATIENT
Start: 2022-08-30 | End: 2022-09-29

## 2022-08-30 NOTE — TELEPHONE ENCOUNTER
Ambien      Last Written Prescription Date:  8.2.2022  Last Fill Quantity: 30,   # refills: 0  Last Office Visit:12.7.2021   Future Office visit:       Routing refill request to provider for review/approval because:  Drug not on the [facility], [facility] or [facility] refill protocol or controlled substance    Wne Baker RN

## 2022-08-30 NOTE — TELEPHONE ENCOUNTER
CYCLOBENZAPRINE 10MG TABLET  Last Written Prescription Date:  8/2/2022  Last Fill Quantity: 90,   # refills: 0  Last Office Visit: 12/7/2021  Future Office visit:       Routing refill request to provider for review/approval because:

## 2022-09-14 DIAGNOSIS — M25.562 ARTHRALGIA OF BOTH KNEES: ICD-10-CM

## 2022-09-14 DIAGNOSIS — M25.561 ARTHRALGIA OF BOTH KNEES: ICD-10-CM

## 2022-09-14 DIAGNOSIS — M54.2 CERVICAL PAIN: ICD-10-CM

## 2022-09-14 RX ORDER — HYDROCODONE BITARTRATE AND ACETAMINOPHEN 7.5; 325 MG/1; MG/1
1 TABLET ORAL EVERY 6 HOURS PRN
Qty: 60 TABLET | Refills: 0 | Status: SHIPPED | OUTPATIENT
Start: 2022-09-14 | End: 2022-10-07

## 2022-09-18 DIAGNOSIS — K31.84 NONDIABETIC GASTROPARESIS: ICD-10-CM

## 2022-09-18 DIAGNOSIS — R10.13 EPIGASTRIC PAIN: ICD-10-CM

## 2022-09-19 RX ORDER — METOCLOPRAMIDE 5 MG/1
TABLET ORAL
Qty: 270 TABLET | Refills: 3 | Status: SHIPPED | OUTPATIENT
Start: 2022-09-19 | End: 2024-02-15

## 2022-09-19 NOTE — TELEPHONE ENCOUNTER
metoclopramide (REGLAN) 5 MG tablet      Last Written Prescription Date:  9/17/2022  Last Fill Quantity: 270,   # refills: 3  Last Office Visit: 12/7/2021  Future Office visit:

## 2022-09-20 ENCOUNTER — OFFICE VISIT (OUTPATIENT)
Dept: CHIROPRACTIC MEDICINE | Facility: OTHER | Age: 72
End: 2022-09-20
Attending: CHIROPRACTOR
Payer: COMMERCIAL

## 2022-09-20 DIAGNOSIS — M99.02 SEGMENTAL AND SOMATIC DYSFUNCTION OF THORACIC REGION: ICD-10-CM

## 2022-09-20 DIAGNOSIS — M99.03 SEGMENTAL AND SOMATIC DYSFUNCTION OF LUMBAR REGION: Primary | ICD-10-CM

## 2022-09-20 DIAGNOSIS — M99.01 SEGMENTAL AND SOMATIC DYSFUNCTION OF CERVICAL REGION: ICD-10-CM

## 2022-09-20 DIAGNOSIS — M54.50 ACUTE BILATERAL LOW BACK PAIN WITHOUT SCIATICA: ICD-10-CM

## 2022-09-20 PROCEDURE — 98941 CHIROPRACT MANJ 3-4 REGIONS: CPT | Mod: AT | Performed by: CHIROPRACTOR

## 2022-09-21 ENCOUNTER — OFFICE VISIT (OUTPATIENT)
Dept: CHIROPRACTIC MEDICINE | Facility: OTHER | Age: 72
End: 2022-09-21
Attending: CHIROPRACTOR
Payer: COMMERCIAL

## 2022-09-21 DIAGNOSIS — M99.02 SEGMENTAL AND SOMATIC DYSFUNCTION OF THORACIC REGION: Primary | ICD-10-CM

## 2022-09-21 DIAGNOSIS — F34.1 DYSTHYMIA: ICD-10-CM

## 2022-09-21 DIAGNOSIS — M99.03 SEGMENTAL AND SOMATIC DYSFUNCTION OF LUMBAR REGION: ICD-10-CM

## 2022-09-21 DIAGNOSIS — M54.50 ACUTE LEFT-SIDED LOW BACK PAIN WITHOUT SCIATICA: ICD-10-CM

## 2022-09-21 DIAGNOSIS — B02.23 POSTHERPETIC POLYNEUROPATHY: ICD-10-CM

## 2022-09-21 PROCEDURE — 98940 CHIROPRACT MANJ 1-2 REGIONS: CPT | Mod: AT | Performed by: CHIROPRACTOR

## 2022-09-21 NOTE — TELEPHONE ENCOUNTER
Gabapentin 300mg      Last Written Prescription Date:  8/12/22  Last Fill Quantity: 90,   # refills: 0      lexapro 20 mg      Last Written Prescription Date:  7/25/22  Last Fill Quantity: 60,   # refills: 0        Last Office Visit: 12/7/21  Future Office visit:    Next 5 appointments (look out 90 days)    Sep 22, 2022  1:20 PM  Return Visit with Noah Bloom DC  [facility] [place] ([facility] [place]  [place] ) [address] [place] [address]  [place] [address]  [phone]           Routing refill request to provider for review/approval because:  Drug not on the [facility], [facility] or [facility] refill protocol or controlled substance

## 2022-09-22 ENCOUNTER — OFFICE VISIT (OUTPATIENT)
Dept: CHIROPRACTIC MEDICINE | Facility: OTHER | Age: 72
End: 2022-09-22
Attending: CHIROPRACTOR
Payer: COMMERCIAL

## 2022-09-22 DIAGNOSIS — M99.02 SEGMENTAL AND SOMATIC DYSFUNCTION OF THORACIC REGION: Primary | ICD-10-CM

## 2022-09-22 DIAGNOSIS — M54.50 ACUTE LEFT-SIDED LOW BACK PAIN WITHOUT SCIATICA: ICD-10-CM

## 2022-09-22 DIAGNOSIS — M99.03 SEGMENTAL AND SOMATIC DYSFUNCTION OF LUMBAR REGION: ICD-10-CM

## 2022-09-22 PROCEDURE — 98940 CHIROPRACT MANJ 1-2 REGIONS: CPT | Mod: AT | Performed by: CHIROPRACTOR

## 2022-09-22 RX ORDER — GABAPENTIN 300 MG/1
CAPSULE ORAL
Qty: 90 CAPSULE | Refills: 0 | Status: SHIPPED | OUTPATIENT
Start: 2022-09-22 | End: 2022-10-03

## 2022-09-22 RX ORDER — ESCITALOPRAM OXALATE 20 MG/1
TABLET ORAL
Qty: 60 TABLET | Refills: 0 | Status: SHIPPED | OUTPATIENT
Start: 2022-09-22 | End: 2022-10-03

## 2022-09-26 ENCOUNTER — OFFICE VISIT (OUTPATIENT)
Dept: CHIROPRACTIC MEDICINE | Facility: OTHER | Age: 72
End: 2022-09-26
Attending: CHIROPRACTOR
Payer: COMMERCIAL

## 2022-09-26 DIAGNOSIS — M54.50 ACUTE LEFT-SIDED LOW BACK PAIN WITHOUT SCIATICA: ICD-10-CM

## 2022-09-26 DIAGNOSIS — M99.02 SEGMENTAL AND SOMATIC DYSFUNCTION OF THORACIC REGION: ICD-10-CM

## 2022-09-26 DIAGNOSIS — M54.2 CERVICALGIA: ICD-10-CM

## 2022-09-26 DIAGNOSIS — G47.00 PERSISTENT INSOMNIA: ICD-10-CM

## 2022-09-26 DIAGNOSIS — M99.03 SEGMENTAL AND SOMATIC DYSFUNCTION OF LUMBAR REGION: Primary | ICD-10-CM

## 2022-09-26 PROCEDURE — 98940 CHIROPRACT MANJ 1-2 REGIONS: CPT | Mod: AT | Performed by: CHIROPRACTOR

## 2022-09-26 NOTE — PROGRESS NOTES
Subjective Finding:    Chief compalint: Patient presents with:  Back Pain  , Pain Scale: 4/10, Intensity: sharp, Duration: 1 weeks, Radiating: right leg.    Date of injury:       Activities that the pain restricts:   Home/household/hobbies/social activities: yes.  Work duties: yes.  Sleep: yes.  Makes symptoms better: rest.  Makes symptoms worse: activity, lumbar extension, lumbar flexion, cervical extension and cervical flexion.  Have you seen anyone else for the symptoms? no.  Work related: yes.  Automobile related injury: no.    Objective and Assessment:    Posture Analysis:   High shoulder: .  Head tilt: .  High iliac crest: right.  Head carriage: forward.  Thoracic Kyphosis: neutral.  Lumbar Lordosis: forward.    Lumbar Range of Motion: flexion decreased, extension decreased, left lateral flexion decreased and right lateral flexion decreased.  Cervical Range of Motion: extension decreased.  Thoracic Range of Motion: extension decreased.  Extremity Range of Motion: .    Palpation:   Quad lumb: bilateral, referred pain: yes  Lev scapulae: sharp pain, referred pain: no    Segmental dysfunction pre-treatment and treatment area: C1, C6, C7, T4, L4, L5 and PSIS Right.    Assessment post-treatment:  Cervical: ROM increased.  Thoracic: ROM increased.  Lumbar: ROM increased and pain and tenderness decreased.    Comments:        Complicating Factors: .    Plan / Procedure:    Treatment plan: PRN.  Instructed patient: ice 20 minutes every other hour as needed and stretch as instructed at visit.  Short term goals: reduce pain and increase ROM.  Long term goals: restore normal function.  Prognosis: excellent.

## 2022-09-27 ENCOUNTER — OFFICE VISIT (OUTPATIENT)
Dept: CHIROPRACTIC MEDICINE | Facility: OTHER | Age: 72
End: 2022-09-27
Attending: CHIROPRACTOR
Payer: COMMERCIAL

## 2022-09-27 DIAGNOSIS — M99.02 SEGMENTAL AND SOMATIC DYSFUNCTION OF THORACIC REGION: Primary | ICD-10-CM

## 2022-09-27 DIAGNOSIS — M99.01 SEGMENTAL AND SOMATIC DYSFUNCTION OF CERVICAL REGION: ICD-10-CM

## 2022-09-27 DIAGNOSIS — M99.03 SEGMENTAL AND SOMATIC DYSFUNCTION OF LUMBAR REGION: ICD-10-CM

## 2022-09-27 DIAGNOSIS — M54.50 ACUTE LEFT-SIDED LOW BACK PAIN WITHOUT SCIATICA: ICD-10-CM

## 2022-09-27 PROCEDURE — 98941 CHIROPRACT MANJ 3-4 REGIONS: CPT | Mod: AT | Performed by: CHIROPRACTOR

## 2022-09-27 NOTE — PROGRESS NOTES
Subjective Finding:    Chief compalint: Patient presents with:  Back Pain: Left rib pain  , Pain Scale: 4/10, Intensity: sharp, Duration: 1 weeks, Radiating: right leg.    Date of injury:       Activities that the pain restricts:   Home/household/hobbies/social activities: yes.  Work duties: yes.  Sleep: yes.  Makes symptoms better: rest.  Makes symptoms worse: activity, lumbar extension, lumbar flexion, cervical extension and cervical flexion.  Have you seen anyone else for the symptoms? no.  Work related: yes.  Automobile related injury: no.    Objective and Assessment:    Posture Analysis:   High shoulder: .  Head tilt: .  High iliac crest: right.  Head carriage: forward.  Thoracic Kyphosis: neutral.  Lumbar Lordosis: forward.    Lumbar Range of Motion: flexion decreased, extension decreased, left lateral flexion decreased and right lateral flexion decreased.  Cervical Range of Motion: extension decreased.  Thoracic Range of Motion: extension decreased.  Extremity Range of Motion: .    Palpation:   Quad lumb: bilateral, referred pain: yes  Lev scapulae: sharp pain, referred pain: no    Segmental dysfunction pre-treatment and treatment area: T456  L3.    Assessment post-treatment:  Cervical: ROM increased.  Thoracic: ROM increased.  Lumbar: ROM increased and pain and tenderness decreased.    Comments:        Complicating Factors: .    Plan / Procedure:    Treatment plan: PRN.  Instructed patient: ice 20 minutes every other hour as needed and stretch as instructed at visit.  Short term goals: reduce pain and increase ROM.  Long term goals: restore normal function.  Prognosis: excellent.

## 2022-09-27 NOTE — PROGRESS NOTES
Subjective Finding:    Chief compalint: Patient presents with:  Back Pain: Left side mid t spine pain    , Pain Scale: 4/10, Intensity: sharp, Duration: 1 weeks, Radiating: right leg.    Date of injury:       Activities that the pain restricts:   Home/household/hobbies/social activities: yes.  Work duties: yes.  Sleep: yes.  Makes symptoms better: rest.  Makes symptoms worse: activity, lumbar extension, lumbar flexion, cervical extension and cervical flexion.  Have you seen anyone else for the symptoms? no.  Work related: yes.  Automobile related injury: no.    Objective and Assessment:    Posture Analysis:   High shoulder: .  Head tilt: .  High iliac crest: right.  Head carriage: forward.  Thoracic Kyphosis: neutral.  Lumbar Lordosis: forward.    Lumbar Range of Motion: flexion decreased, extension decreased, left lateral flexion decreased and right lateral flexion decreased.  Cervical Range of Motion: extension decreased.  Thoracic Range of Motion: extension decreased.  Extremity Range of Motion: .    Palpation:   Quad lumb: bilateral, referred pain: yes  Lev scapulae: sharp pain, referred pain: no    Segmental dysfunction pre-treatment and treatment area: T456  L3.    Assessment post-treatment:  Cervical: ROM increased.  Thoracic: ROM increased.  Lumbar: ROM increased and pain and tenderness decreased.    Comments:        Complicating Factors: .    Plan / Procedure:    Treatment plan: PRN.  Instructed patient: ice 20 minutes every other hour as needed and stretch as instructed at visit.  Short term goals: reduce pain and increase ROM.  Long term goals: restore normal function.  Prognosis: excellent.

## 2022-09-27 NOTE — PROGRESS NOTES
Subjective Finding:    Chief compalint: Patient presents with:  Back Pain  , Pain Scale: 4/10, Intensity: sharp, Duration: 1 weeks, Radiating: right leg.    Date of injury:       Activities that the pain restricts:   Home/household/hobbies/social activities: yes.  Work duties: yes.  Sleep: yes.  Makes symptoms better: rest.  Makes symptoms worse: activity, lumbar extension, lumbar flexion, cervical extension and cervical flexion.  Have you seen anyone else for the symptoms? no.  Work related: yes.  Automobile related injury: no.    Objective and Assessment:    Posture Analysis:   High shoulder: .  Head tilt: .  High iliac crest: right.  Head carriage: forward.  Thoracic Kyphosis: neutral.  Lumbar Lordosis: forward.    Lumbar Range of Motion: flexion decreased, extension decreased, left lateral flexion decreased and right lateral flexion decreased.  Cervical Range of Motion: extension decreased.  Thoracic Range of Motion: extension decreased.  Extremity Range of Motion: .    Palpation:   Quad lumb: bilateral, referred pain: yes  Lev scapulae: sharp pain, referred pain: no    Segmental dysfunction pre-treatment and treatment area: T456  L3.    Assessment post-treatment:  Cervical: ROM increased.  Thoracic: ROM increased.  Lumbar: ROM increased and pain and tenderness decreased.    Comments:        Complicating Factors: .    Plan / Procedure:    Treatment plan: PRN.  Instructed patient: ice 20 minutes every other hour as needed and stretch as instructed at visit.  Short term goals: reduce pain and increase ROM.  Long term goals: restore normal function.  Prognosis: excellent.

## 2022-09-28 ENCOUNTER — APPOINTMENT (OUTPATIENT)
Dept: GENERAL RADIOLOGY | Facility: HOSPITAL | Age: 72
End: 2022-09-28
Attending: PHYSICIAN ASSISTANT
Payer: COMMERCIAL

## 2022-09-28 ENCOUNTER — NURSE TRIAGE (OUTPATIENT)
Dept: INTERNAL MEDICINE | Facility: OTHER | Age: 72
End: 2022-09-28

## 2022-09-28 ENCOUNTER — HOSPITAL ENCOUNTER (EMERGENCY)
Facility: HOSPITAL | Age: 72
Discharge: HOME | End: 2022-09-28
Attending: PHYSICIAN ASSISTANT | Admitting: PHYSICIAN ASSISTANT
Payer: COMMERCIAL

## 2022-09-28 VITALS
SYSTOLIC BLOOD PRESSURE: 176 MMHG | DIASTOLIC BLOOD PRESSURE: 96 MMHG | TEMPERATURE: 97.5 F | RESPIRATION RATE: 16 BRPM | OXYGEN SATURATION: 99 % | HEART RATE: 87 BPM

## 2022-09-28 DIAGNOSIS — R07.89 CHEST WALL PAIN: ICD-10-CM

## 2022-09-28 DIAGNOSIS — F34.1 DYSTHYMIA: ICD-10-CM

## 2022-09-28 DIAGNOSIS — B02.23 POSTHERPETIC POLYNEUROPATHY: ICD-10-CM

## 2022-09-28 DIAGNOSIS — R07.89 ATYPICAL CHEST PAIN: ICD-10-CM

## 2022-09-28 LAB
ALBUMIN SERPL-MCNC: 3.6 G/DL (ref 3.4–5)
ALP SERPL-CCNC: 93 U/L (ref 40–150)
ALT SERPL W P-5'-P-CCNC: 44 U/L (ref 0–50)
ANION GAP SERPL CALCULATED.3IONS-SCNC: 4 MMOL/L (ref 3–14)
AST SERPL W P-5'-P-CCNC: 22 U/L (ref 0–45)
BILIRUB SERPL-MCNC: 0.2 MG/DL (ref 0.2–1.3)
BUN SERPL-MCNC: 9 MG/DL (ref 7–30)
CALCIUM SERPL-MCNC: 8.4 MG/DL (ref 8.5–10.1)
CHLORIDE BLD-SCNC: 109 MMOL/L (ref 94–109)
CO2 SERPL-SCNC: 26 MMOL/L (ref 20–32)
CREAT SERPL-MCNC: 0.49 MG/DL (ref 0.52–1.04)
D DIMER PPP FEU-MCNC: 0.35 UG/ML FEU (ref 0–0.5)
ERYTHROCYTE [DISTWIDTH] IN BLOOD BY AUTOMATED COUNT: 14.6 % (ref 10–15)
GFR SERPL CREATININE-BSD FRML MDRD: >90 ML/MIN/1.73M2
GLUCOSE BLD-MCNC: 97 MG/DL (ref 70–99)
HCT VFR BLD AUTO: 40.6 % (ref 35–47)
HGB BLD-MCNC: 12.9 G/DL (ref 11.7–15.7)
LACTATE SERPL-SCNC: 1.7 MMOL/L (ref 0.7–2)
MAGNESIUM SERPL-MCNC: 2 MG/DL (ref 1.6–2.3)
MCH RBC QN AUTO: 28.5 PG (ref 26.5–33)
MCHC RBC AUTO-ENTMCNC: 31.8 G/DL (ref 31.5–36.5)
MCV RBC AUTO: 90 FL (ref 78–100)
PLATELET # BLD AUTO: 297 10E3/UL (ref 150–450)
POTASSIUM BLD-SCNC: 3.8 MMOL/L (ref 3.4–5.3)
PROT SERPL-MCNC: 6.9 G/DL (ref 6.8–8.8)
RBC # BLD AUTO: 4.52 10E6/UL (ref 3.8–5.2)
SODIUM SERPL-SCNC: 139 MMOL/L (ref 133–144)
TROPONIN I SERPL HS-MCNC: 7 NG/L
WBC # BLD AUTO: 8.2 10E3/UL (ref 4–11)

## 2022-09-28 PROCEDURE — 36415 COLL VENOUS BLD VENIPUNCTURE: CPT | Performed by: STUDENT IN AN ORGANIZED HEALTH CARE EDUCATION/TRAINING PROGRAM

## 2022-09-28 PROCEDURE — 250N000012 HC RX MED GY IP 250 OP 636 PS 637: Performed by: PHYSICIAN ASSISTANT

## 2022-09-28 PROCEDURE — 96372 THER/PROPH/DIAG INJ SC/IM: CPT | Performed by: PHYSICIAN ASSISTANT

## 2022-09-28 PROCEDURE — 84484 ASSAY OF TROPONIN QUANT: CPT | Performed by: STUDENT IN AN ORGANIZED HEALTH CARE EDUCATION/TRAINING PROGRAM

## 2022-09-28 PROCEDURE — 99285 EMERGENCY DEPT VISIT HI MDM: CPT | Mod: 25

## 2022-09-28 PROCEDURE — 83735 ASSAY OF MAGNESIUM: CPT | Performed by: PHYSICIAN ASSISTANT

## 2022-09-28 PROCEDURE — 85014 HEMATOCRIT: CPT | Performed by: STUDENT IN AN ORGANIZED HEALTH CARE EDUCATION/TRAINING PROGRAM

## 2022-09-28 PROCEDURE — 71046 X-RAY EXAM CHEST 2 VIEWS: CPT

## 2022-09-28 PROCEDURE — 250N000013 HC RX MED GY IP 250 OP 250 PS 637: Performed by: PHYSICIAN ASSISTANT

## 2022-09-28 PROCEDURE — 93010 ELECTROCARDIOGRAM REPORT: CPT | Performed by: INTERNAL MEDICINE

## 2022-09-28 PROCEDURE — 250N000011 HC RX IP 250 OP 636: Performed by: PHYSICIAN ASSISTANT

## 2022-09-28 PROCEDURE — 83605 ASSAY OF LACTIC ACID: CPT | Performed by: PHYSICIAN ASSISTANT

## 2022-09-28 PROCEDURE — 87040 BLOOD CULTURE FOR BACTERIA: CPT | Performed by: PHYSICIAN ASSISTANT

## 2022-09-28 PROCEDURE — 99284 EMERGENCY DEPT VISIT MOD MDM: CPT | Performed by: PHYSICIAN ASSISTANT

## 2022-09-28 PROCEDURE — 85379 FIBRIN DEGRADATION QUANT: CPT | Performed by: STUDENT IN AN ORGANIZED HEALTH CARE EDUCATION/TRAINING PROGRAM

## 2022-09-28 PROCEDURE — 80053 COMPREHEN METABOLIC PANEL: CPT | Performed by: PHYSICIAN ASSISTANT

## 2022-09-28 PROCEDURE — 93005 ELECTROCARDIOGRAM TRACING: CPT

## 2022-09-28 RX ORDER — LIDOCAINE 4 G/G
1 PATCH TOPICAL ONCE
Status: DISCONTINUED | OUTPATIENT
Start: 2022-09-28 | End: 2022-09-28 | Stop reason: HOSPADM

## 2022-09-28 RX ORDER — PREDNISONE 20 MG/1
TABLET ORAL
Qty: 15 TABLET | Refills: 0 | Status: SHIPPED | OUTPATIENT
Start: 2022-09-28

## 2022-09-28 RX ORDER — FAMOTIDINE 20 MG/1
40 TABLET, FILM COATED ORAL ONCE
Status: COMPLETED | OUTPATIENT
Start: 2022-09-28 | End: 2022-09-28

## 2022-09-28 RX ORDER — PREDNISONE 20 MG/1
40 TABLET ORAL ONCE
Status: COMPLETED | OUTPATIENT
Start: 2022-09-28 | End: 2022-09-28

## 2022-09-28 RX ORDER — LIDOCAINE 50 MG/G
1 PATCH TOPICAL EVERY 24 HOURS
Qty: 10 PATCH | Refills: 0 | Status: SHIPPED | OUTPATIENT
Start: 2022-09-28 | End: 2022-10-06

## 2022-09-28 RX ORDER — KETOROLAC TROMETHAMINE 30 MG/ML
30 INJECTION, SOLUTION INTRAMUSCULAR; INTRAVENOUS ONCE
Status: COMPLETED | OUTPATIENT
Start: 2022-09-28 | End: 2022-09-28

## 2022-09-28 RX ADMIN — Medication 1 PATCH: at 18:38

## 2022-09-28 RX ADMIN — KETOROLAC TROMETHAMINE 30 MG: 30 INJECTION, SOLUTION INTRAMUSCULAR at 18:25

## 2022-09-28 RX ADMIN — PREDNISONE 40 MG: 20 TABLET ORAL at 18:25

## 2022-09-28 RX ADMIN — FAMOTIDINE 40 MG: 20 TABLET, FILM COATED ORAL at 18:03

## 2022-09-28 ASSESSMENT — ENCOUNTER SYMPTOMS
FEVER: 0
FLANK PAIN: 0
SORE THROAT: 0
TREMORS: 0
WHEEZING: 0
ABDOMINAL PAIN: 0
NAUSEA: 0
NECK PAIN: 0
EYE PAIN: 0
STRIDOR: 0
AGITATION: 0
SEIZURES: 0
BACK PAIN: 1
FACIAL SWELLING: 0
VOMITING: 0

## 2022-09-28 NOTE — PROGRESS NOTES
Subjective Finding:    Chief compalint: Patient presents with:  Back Pain  , Pain Scale: 4/10, Intensity: sharp, Duration: 1 weeks, Radiating: right leg.    Date of injury:       Activities that the pain restricts:   Home/household/hobbies/social activities: yes.  Work duties: yes.  Sleep: yes.  Makes symptoms better: rest.  Makes symptoms worse: activity, lumbar extension, lumbar flexion, cervical extension and cervical flexion.  Have you seen anyone else for the symptoms? no.  Work related: yes.  Automobile related injury: no.    Objective and Assessment:    Posture Analysis:   High shoulder: .  Head tilt: .  High iliac crest: right.  Head carriage: forward.  Thoracic Kyphosis: neutral.  Lumbar Lordosis: forward.    Lumbar Range of Motion: flexion decreased, extension decreased, left lateral flexion decreased and right lateral flexion decreased.  Cervical Range of Motion: extension decreased.  Thoracic Range of Motion: extension decreased.  Extremity Range of Motion: .    Palpation:   Quad lumb: bilateral, referred pain: yes  Lev scapulae: sharp pain, referred pain: no    Segmental dysfunction pre-treatment and treatment area: T456  L3.  C67    Assessment post-treatment:  Cervical: ROM increased.  Thoracic: ROM increased.  Lumbar: ROM increased and pain and tenderness decreased.    Comments:        Complicating Factors: .    Plan / Procedure:    Treatment plan: PRN.  Instructed patient: ice 20 minutes every other hour as needed and stretch as instructed at visit.  Short term goals: reduce pain and increase ROM.  Long term goals: restore normal function.  Prognosis: excellent.

## 2022-09-28 NOTE — ED PROVIDER NOTES
"  History     Chief Complaint   Patient presents with     Chest Pain     HPI  Aure Lanier is a 72 year old female who reports having some mid back pain that radiates under her left breast area.  She called her clinic and was informed to come to the ER the patient states she has had \"twisted ribs\" in the past and she is unclear if this is what she has.  She has seen chiropractor for this and her pain has not gotten any better.  She reports her pain has been constant over the past week or so.  Rates it as a 6/10.    Allergies:  Allergies   Allergen Reactions     Erythromycin Base [Kdc:Yellow Dye+Erythromycin+Brilliant Blue Fcf] Nausea and Vomiting     Erythromycin Nausea and Vomiting     Penicillins Rash       Problem List:    Patient Active Problem List    Diagnosis Date Noted     Angular cheilitis 09/23/2019     Priority: Medium     Status post bypass gastrojejunostomy 09/23/2019     Priority: Medium     Epigastric abdominal pain 09/23/2019     Priority: Medium     Phytobezoar 03/28/2019     Priority: Medium     History of [place]th I operation 02/01/2019     Priority: Medium     Gastric outlet obstruction 02/01/2019     Priority: Medium     Hypokalemia 11/08/2018     Priority: Medium     Entered in error         Anemia, iron deficiency 11/08/2018     Priority: Medium     S/P total knee arthroplasty, right 09/04/2018     Priority: Medium     Status post total right knee replacement 09/04/2018     Priority: Medium     Status post total left knee replacement 04/17/2018     Priority: Medium     Chronic, continuous use of opioids 07/13/2017     Priority: Medium     Patient is followed by Jaden Lee NP for ongoing prescription of pain medication.  All refills should only be approved by this provider, or covering partner.    Medication(s): Norco 7.5/325mg.   Maximum quantity per month: #120  Clinic visit frequency required: Q 3 months     Controlled substance agreement:  Encounter-Level CSA - 07/11/2017:          " Controlled Substance Agreement - Scan on 2017 12:19 PM : CONTROLLED SUBSTANCE AGREEMENT (below)              Pain Clinic evaluation in the past: No    DIRE Total Score(s):  No flowsheet data found.    Last [facility] website verification:  Done on 18   [email or web address]         Back muscle spasm 2017     Priority: Medium     Bilateral chronic knee pain 2016     Priority: Medium     Both knees.         Hyperlipidemia with target LDL less than 100 2014     Priority: Medium     Diagnosis updated by automated process. Provider to review and confirm.       Low back pain 2013     Priority: Medium     Diagnosis updated by automated process. Provider to review and confirm.       Family history of other musculoskeletal diseases(V17.89) 2012     Priority: Medium     Overview:    Brother with Myotonic Dystrophy    Aure's Myotonic Dystrophy testing was reported normal by [facility] lab for the DM1 with repeats of 5 and 26, DM2 with repeats of 140 and 140, and CLCN1 with no abnormal DNA sequence variants identified in the select exons analyzed (drawn 3-20-12).       Allergic arthritis involving hand 2011     Priority: Medium     Hypothyroidism 2011     Priority: Medium     Problem list name updated by automated process. Provider to review       Insomnia 2011     Priority: Medium     Problem list name updated by automated process. Provider to review       Headache 2011     Priority: Medium     Problem list name updated by automated process. Provider to review       Postherpetic polyneuropathy 2011     Priority: Medium     Dysthymia 2011     Priority: Medium     Anxiety state 2011     Priority: Medium     Problem list name updated by automated process. Provider to review       Hyperlipidemia 2011     Priority: Medium     Problem list name updated by automated process. Provider to review       Migraine 2011      Priority: Medium     Problem list name updated by automated process. Provider to review       Osteoporosis 01/01/2011     Priority: Medium     Problem list name updated by automated process. Provider to review       GERD 01/01/2011     Priority: Medium        Past Medical History:    Past Medical History:   Diagnosis Date     Allergic arthritis involving hand 01/01/2011     Anemia, Iron Deficiency 01/01/2011     Anxiety 01/01/2011     Contact dermatitis and other eczema, unspecified c 01/01/2011     Depression 01/01/2011     Edema 01/01/2011     Gastrointestinal mucositis (ulcerative) 01/01/2011     GERD 01/01/2011     Headache(784.0) 01/01/2011     Hypothyroidism 01/01/2011     Insomnia, unspecified 01/01/2011     Migraine 01/01/2011     Nausea and vomiting      Nonallopathic lesion of cervical region, not elsewhere classified 10/16/2000     Osteoporosis 01/01/2011     Other and unspecified hyperlipidemia 01/01/2011     Other malaise and fatigue 08/27/2001     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       D & C       ENDOSCOPY       ESOPHAGOSCOPY, GASTROSCOPY, DUODENOSCOPY (EGD), COMBINED N/A 9/11/2017    Procedure: COMBINED ESOPHAGOSCOPY, GASTROSCOPY, DUODENOSCOPY (EGD);  Upper Endoscopy: Removal of Food Impaction;  Surgeon: Lino Zhu DO;  Location: [facility] OR     ESOPHAGOSCOPY, GASTROSCOPY, DUODENOSCOPY (EGD), COMBINED N/A 11/5/2018    Procedure: UPPER ENDOSCOPY WITH BIOPSY;  Surgeon: Dieudonne Jackson MD;  Location: [facility]     ESOPHAGOSCOPY, GASTROSCOPY,  DUODENOSCOPY (EGD), COMBINED N/A 12/21/2018    Procedure: UPPER ENDOSCOPY WITH BALLOON DILATION, BIOPSY;  Surgeon: Dieudonne Jackson MD;  Location: [facility]     ESOPHAGOSCOPY, GASTROSCOPY, DUODENOSCOPY (EGD), COMBINED N/A 1/14/2019    Procedure: UPPER ENDOSCOPY WITH ENDOSCOPIC PLACEMENT OF OG AND GASTRIC LAVAGE;  Surgeon: Dieudonne Jackson MD;  Location: [facility]     partial gastrectomy  03/2019     right total knee replacement  01/2019     MARAH EN Y BOWEL  1980    Due to severely ulcerated stomach body.     STOMACH SURGERY  03/27/2019    Restructuring of stomach and Colecuystectomy.     stomach surgery peritinitis         Family History:    Family History   Problem Relation Age of Onset     Cerebrovascular Disease Mother         CVA     Hypertension Mother      Other - See Comments Father 40        mining accident; cause of death     Other - See Comments Brother         muscle dystrophy     Cancer Daughter 34        skin cancer     Melanoma Daughter        Social History:  Marital Status:  Legally  [3]  Social History     Tobacco Use     Smoking status: Never Smoker     Smokeless tobacco: Never Used     Tobacco comment: no passive exposure   Substance Use Topics     Alcohol use: Yes     Comment: rarely, maybe yearly     Drug use: No        Medications:    acetaminophen (TYLENOL) 325 MG tablet  acyclovir (ZOVIRAX) 5 % external cream  calcium carb-cholecalciferol (CALCIUM PLUS VITAMIN D3) 600-500 MG-UNIT CAPS  ciprofloxacin (CILOXAN) 0.3 % ophthalmic solution  cyanocolbalamin (VITAMIN  B-12) 1000 MCG tablet  cyclobenzaprine (FLEXERIL) 10 MG tablet  dicyclomine (BENTYL) 10 MG capsule  escitalopram (LEXAPRO) 20 MG tablet  folic acid (FOLVITE) 1 MG tablet  gabapentin (NEURONTIN) 300 MG capsule  HYDROcodone-acetaminophen (NORCO) 7.5-325 MG per tablet  levothyroxine (SYNTHROID/LEVOTHROID) 75 MCG tablet  metoclopramide (REGLAN) 5 MG tablet  Multiple Vitamins-Minerals (PRESERVISION AREDS 2+MULTI VIT PO)  nystatin  (MYCOSTATIN) 793261 UNIT/GM external powder  ondansetron (ZOFRAN) 4 MG tablet  pantoprazole (PROTONIX) 40 MG EC tablet  promethazine (PHENERGAN) 25 MG tablet  simvastatin (ZOCOR) 40 MG tablet  topiramate (TOPAMAX) 50 MG tablet  traZODone (DESYREL) 150 MG tablet  VITAMIN D3 50 MCG (2000 UT) tablet  zoledronic Acid (RECLAST) 5 MG/100ML SOLN infusion  zolpidem (AMBIEN) 10 MG tablet          Review of Systems   Constitutional: Negative for fever.   HENT: Negative for facial swelling and sore throat.    Eyes: Negative for pain.   Respiratory: Negative for wheezing and stridor.    Cardiovascular: Positive for chest pain.        Left-sided thoracic pain that radiates to the left anterior chest area.   Gastrointestinal: Negative for abdominal pain, nausea and vomiting.   Genitourinary: Negative for flank pain.   Musculoskeletal: Positive for back pain. Negative for neck pain.   Skin: Negative for pallor.   Neurological: Negative for tremors and seizures.   Psychiatric/Behavioral: Negative for agitation.   All other systems reviewed and are negative.      Physical Exam   BP: 170/95  Pulse: 105  Temp: 97.5  F (36.4  C)  Resp: 18  SpO2: 97 %      Physical Exam  Vitals and nursing note reviewed.   Constitutional:       General: She is not in acute distress.     Appearance: Normal appearance. She is not ill-appearing or toxic-appearing.   HENT:      Head: Normocephalic.      Nose: Nose normal.   Eyes:      General: No scleral icterus.     Extraocular Movements: Extraocular movements intact.   Neck:      Trachea: No tracheal deviation.   Cardiovascular:      Rate and Rhythm: Normal rate.   Pulmonary:      Effort: Pulmonary effort is normal. No respiratory distress.      Breath sounds: No stridor.      Comments: Lung sounds are clear.  SaO2 is 97% on room air.  Patient does not appear to be in any respiratory distress.  No tachypnea.  She has some palpable thoracic pain to around the T9--T10 area with radiation to her anterior  chest wall area.  Musculoskeletal:         General: Normal range of motion.      Cervical back: Normal range of motion.   Skin:     General: Skin is warm and dry.      Coloration: Skin is not jaundiced or pale.   Neurological:      General: No focal deficit present.      Mental Status: She is alert and oriented to person, place, and time.   Psychiatric:         Attention and Perception: Attention normal.         Mood and Affect: Mood normal.         ED Course     EKG shows normal sinus rhythm with a heart rate of 97.    ED Course as of 09/28/22 1841   Wed Sep 28, 2022   1719 Basic metabolic panel   1838 RBC Count: 4.52   1838 Albumin: 3.6       Results for orders placed or performed during the hospital encounter of 09/28/22 (from the past 24 hour(s))   Troponin I   Result Value Ref Range    Troponin I High Sensitivity 7 <54 ng/L   CBC with platelets   Result Value Ref Range    WBC Count 8.2 4.0 - 11.0 10e3/uL    RBC Count 4.52 3.80 - 5.20 10e6/uL    Hemoglobin 12.9 11.7 - 15.7 g/dL    Hematocrit 40.6 35.0 - 47.0 %    MCV 90 78 - 100 fL    MCH 28.5 26.5 - 33.0 pg    MCHC 31.8 31.5 - 36.5 g/dL    RDW 14.6 10.0 - 15.0 %    Platelet Count 297 150 - 450 10e3/uL   D dimer quantitative   Result Value Ref Range    D-Dimer Quantitative 0.35 0.00 - 0.50 ug/mL FEU    Narrative    This D-dimer assay is intended for use in conjunction with a clinical pretest probability assessment model to exclude pulmonary embolism (PE) and deep venous thrombosis (DVT) in outpatients suspected of PE or DVT. The cut-off value is 0.50 ug/mL FEU.   Comprehensive metabolic panel   Result Value Ref Range    Sodium 139 133 - 144 mmol/L    Potassium 3.8 3.4 - 5.3 mmol/L    Chloride 109 94 - 109 mmol/L    Carbon Dioxide (CO2) 26 20 - 32 mmol/L    Anion Gap 4 3 - 14 mmol/L    Urea Nitrogen 9 7 - 30 mg/dL    Creatinine 0.49 (L) 0.52 - 1.04 mg/dL    Calcium 8.4 (L) 8.5 - 10.1 mg/dL    Glucose 97 70 - 99 mg/dL    Alkaline Phosphatase 93 40 - 150 U/L    AST  "22 0 - 45 U/L    ALT 44 0 - 50 U/L    Protein Total 6.9 6.8 - 8.8 g/dL    Albumin 3.6 3.4 - 5.0 g/dL    Bilirubin Total 0.2 0.2 - 1.3 mg/dL    GFR Estimate >90 >60 mL/min/1.73m2   Magnesium   Result Value Ref Range    Magnesium 2.0 1.6 - 2.3 mg/dL   Lactic acid whole blood   Result Value Ref Range    Lactic Acid 1.7 0.7 - 2.0 mmol/L   XR Chest 2 Views    Narrative    XR CHEST 2 VIEWS    HISTORY: 72 years Female chest pain    COMPARISON: 20/1/2017    TECHNIQUE: 2 views of the chest were obtained.    FINDINGS: Two views of the chest were obtained. Heart size and  pulmonary vascularity are within normal limits, lungs are clear on  both views. No consolidating air space opacities are present.          Impression    IMPRESSION: Clear chest.    DANNY CALLOWAY MD         SYSTEM ID:  [id number]       Medications   Lidocaine (LIDOCARE) 4 % Patch 1 patch (has no administration in time range)   famotidine (PEPCID) tablet 40 mg (40 mg Oral Given 9/28/22 1803)   ketorolac (TORADOL) injection 30 mg (30 mg Intramuscular Given 9/28/22 1825)   predniSONE (DELTASONE) tablet 40 mg (40 mg Oral Given 9/28/22 1825)       Assessments & Plan (with Medical Decision Making)     I have reviewed the nursing notes.    I have reviewed the findings, diagnosis, plan and need for follow up with the patient.      New Prescriptions    LIDOCAINE (LIDODERM) 5 % PATCH    Place 1 patch onto the skin every 24 hours for 10 days    PREDNISONE (DELTASONE) 20 MG TABLET    Take two tablets (= 40mg) each day for 5 (five) days, then one tablet (20 mg) each day x 5 days until finished       Final diagnoses:   Atypical chest pain   Chest wall pain     Aure Lanier is a 72 year old female who reports having some mid back pain that radiates under her left breast area.  She called her clinic and was informed to come to the ER the patient states she has had \"twisted ribs\" in the past and she is unclear if this is what she has.  She has seen chiropractor for " this and her pain has not gotten any better.  She reports her pain has been constant over the past week or so.  Rates it as a 6/10.  EKG shows normal sinus rhythm with a heart rate of 97.  Troponin is normal.  D-dimer is normal.  Magnesium is normal.  Lactic acid is normal.  CBC shows normal white blood cells no left shift.  CMP is unremarkable.  Blood cultures are pending.  Chest x-ray is unremarkable.  It appears that the patient may have a Lidoderm patch was applied to this area.  She was given Toradol IM as well as oral prednisone.  Her chiropractor had recommended she try a tapered dose of prednisone.  Dislocated rib.  I discussed ice and heat therapy.  Close follow-up with her primary care provider for additional pain management since she is currently on a controlled pain management agreement.  Rx for prednisone and Lidoderm patches.  Follow-up sooner if there is any other concerns problems or questions.   I explained my diagnostic considerations and recommendations and the patient voiced an understanding and was in agreement with the treatment plan. All questions were answered to the best of my ability.  We discussed potential side effects of any prescribed or recommended therapies, as well as expectations for response to treatments.    9/28/2022   [facility]         Angel Judge PA-C  09/28/22 1903

## 2022-09-28 NOTE — TELEPHONE ENCOUNTER
"S: patient called with symptom of back pain that radiates to the left side of the chest.   B: patient states pain has been present for about one week. Patient has been to the chiropractor with little relief  A: patient rates pain as severe     : denies any shortness of breath, SOB, nausea, sweating  R: Per protocol patient advised to be evaluated in ED.      : Patient verbalized understanding.    Reason for Disposition    Chest pain lasting longer than 5 minutes and ANY of the following:* Over 44 years old* Over 30 years old and at least one cardiac risk factor (e.g., diabetes mellitus, high blood pressure, high cholesterol, smoker, or strong family history of heart disease)* History of heart disease (i.e., angina, heart attack, heart failure, bypass surgery, takes nitroglycerin)* Pain is crushing, pressure-like, or heavy    SEVERE chest pain    Additional Information    Negative: SEVERE difficulty breathing (e.g., struggling for each breath, speaks in single words)    Negative: Passed out (i.e., fainted, collapsed and was not responding)    Negative: Difficult to awaken or acting confused (e.g., disoriented, slurred speech)    Negative: Shock suspected (e.g., cold/pale/clammy skin, too weak to stand, low BP, rapid pulse)    Answer Assessment - Initial Assessment Questions  1. LOCATION: \"Where does it hurt?\"        Left side of the back and wraps to front  2. RADIATION: \"Does the pain go anywhere else?\" (e.g., into neck, jaw, arms, back)      From the back to the front  3. ONSET: \"When did the chest pain begin?\" (Minutes, hours or days)       About a week ago  4. PATTERN \"Does the pain come and go, or has it been constant since it started?\"  \"Does it get worse with exertion?\"       constant  5. DURATION: \"How long does it last\" (e.g., seconds, minutes, hours)      days  6. SEVERITY: \"How bad is the pain?\"  (e.g., Scale 1-10; mild, moderate, or severe)     - MILD (1-3): doesn't interfere with normal activities      - " "MODERATE (4-7): interferes with normal activities or awakens from sleep     - SEVERE (8-10): excruciating pain, unable to do any normal activities        Moderate- severe - currently severe  7. CARDIAC RISK FACTORS: \"Do you have any history of heart problems or risk factors for heart disease?\" (e.g., angina, prior heart attack; diabetes, high blood pressure, high cholesterol, smoker, or strong family history of heart disease)      Family history of heart issure  8. PULMONARY RISK FACTORS: \"Do you have any history of lung disease?\"  (e.g., blood clots in lung, asthma, emphysema, birth control pills)      no  9. CAUSE: \"What do you think is causing the chest pain?\"      Unsure- patient thought it was ribs out of place but no improvement with chiropractor  10. OTHER SYMPTOMS: \"Do you have any other symptoms?\" (e.g., dizziness, nausea, vomiting, sweating, fever, difficulty breathing, cough)        no  11. PREGNANCY: \"Is there any chance you are pregnant?\" \"When was your last menstrual period?\"        no    Protocols used: CHEST PAIN-A-[place]      "

## 2022-09-28 NOTE — ED TRIAGE NOTES
"Pt presents with pain under left breast that radiates to her back. Pt called her clinic and was told to come to the ER. Pt states she also has had \"twisted ribs\" and has seen chiropractor but it has not gotten better. Pain has been constant for one week.     Keyon Cotto, MSN, RN on 9/28/2022 at 2:56 PM        "

## 2022-09-29 ENCOUNTER — NURSE TRIAGE (OUTPATIENT)
Dept: INTERNAL MEDICINE | Facility: OTHER | Age: 72
End: 2022-09-29

## 2022-09-29 DIAGNOSIS — M54.2 CERVICALGIA: ICD-10-CM

## 2022-09-29 DIAGNOSIS — G47.00 PERSISTENT INSOMNIA: ICD-10-CM

## 2022-09-29 RX ORDER — ZOLPIDEM TARTRATE 10 MG/1
TABLET ORAL
Qty: 30 TABLET | Refills: 0 | Status: SHIPPED | OUTPATIENT
Start: 2022-09-29 | End: 2022-10-27

## 2022-09-29 RX ORDER — CYCLOBENZAPRINE HCL 10 MG
TABLET ORAL
Qty: 90 TABLET | Refills: 0 | Status: SHIPPED | OUTPATIENT
Start: 2022-09-29 | End: 2022-10-27

## 2022-09-29 NOTE — TELEPHONE ENCOUNTER
Pt of     Pt calling and was in ED yesterday for chest pain. No new or worsening s/s but pain has not improved at all.Sharp under left shoulder blade and wraps to left chest. No trouble breathing. She will  her Prednisone today.Pain is extreme at times where it take her breath away but is always there.Wakes her up.Been present over one week.    Scheduled next available with PCP on 10.6.2022.    She is wondering if she should just wait for the steroid to kick in? She has not done ice or heat. She has taken Tylenol. She states the hydrocodone did help but she is out of this until it can be refilled again.The Lidocaine patch was burning skin.So not using this and cannot reach area.    Call back [phone]    Advised if s/s worsen,worrisome or new need to go to ED.    Please advise.      Wen Baker RN

## 2022-09-29 NOTE — TELEPHONE ENCOUNTER
Ambien     Last Written Prescription Date:8.30.2022    Last Fill Quantity: 30,   # refills: 0  Last Office Visit: 12.7.2021  Future Office visit:    Next 5 appointments (look out 90 days)    Oct 06, 2022  8:00 AM  (Arrive by 7:45 AM)  SHORT with Balta Milton DO  [facility] ([facility] ) [address]  [place] [address]  [phone]           Routing refill request to provider for review/approval because:  Drug not on the [facility], [facility] or [facility] refill protocol or controlled substance      Flexeril     Last Written Prescription Date:8.30.2022    Last Fill Quantity: 90,   # refills: 0  Last Office Visit:   Future Office visit:    Next 5 appointments (look out 90 days)    Oct 06, 2022  8:00 AM  (Arrive by 7:45 AM)  SHORT with Balta Milton DO  [facility] ([facility] ) [address]  [place] [address]  [phone]           Routing refill request to provider for review/approval because:  Drug not on the [facility], [facility] or [facility] refill protocol or controlled substance    Wen Baker RN

## 2022-09-29 NOTE — TELEPHONE ENCOUNTER
Pt updated on below.She is wondering if she has internal shingles? She states she had them before and this feel like it.Does have a rash like blisters that she let them know yesterday between her breast. Small area but the blisters are getting bigger and brighter.She is wondering if someone could call in an antiviral or something? Advised if s/s are worsen and worrisome go back to ED/UC and she verbalized understanding.    Wen Baker RN     Albendazole Counseling:  I discussed with the patient the risks of albendazole including but not limited to cytopenia, kidney damage, nausea/vomiting and severe allergy.  The patient understands that this medication is being used in an off-label manner.

## 2022-09-30 ENCOUNTER — TELEPHONE (OUTPATIENT)
Dept: FAMILY MEDICINE | Facility: OTHER | Age: 72
End: 2022-09-30

## 2022-09-30 RX ORDER — CYCLOBENZAPRINE HCL 10 MG
TABLET ORAL
Qty: 90 TABLET | Refills: 0 | OUTPATIENT
Start: 2022-09-30

## 2022-09-30 RX ORDER — ZOLPIDEM TARTRATE 10 MG/1
TABLET ORAL
Qty: 30 TABLET | Refills: 0 | OUTPATIENT
Start: 2022-09-30

## 2022-09-30 NOTE — TELEPHONE ENCOUNTER
Received APPROVAL from [facility] for Cyclobenzaprine HCl 10MG tablets. Effective 01/01/2022-12/29/2022. Forms scanned to [facility].

## 2022-10-03 ENCOUNTER — OFFICE VISIT (OUTPATIENT)
Dept: CHIROPRACTIC MEDICINE | Facility: OTHER | Age: 72
End: 2022-10-03
Attending: CHIROPRACTOR
Payer: COMMERCIAL

## 2022-10-03 DIAGNOSIS — M99.02 SEGMENTAL AND SOMATIC DYSFUNCTION OF THORACIC REGION: Primary | ICD-10-CM

## 2022-10-03 DIAGNOSIS — G47.00 PERSISTENT INSOMNIA: ICD-10-CM

## 2022-10-03 DIAGNOSIS — M54.50 ACUTE BILATERAL LOW BACK PAIN WITHOUT SCIATICA: ICD-10-CM

## 2022-10-03 DIAGNOSIS — M99.03 SEGMENTAL AND SOMATIC DYSFUNCTION OF LUMBAR REGION: ICD-10-CM

## 2022-10-03 PROCEDURE — 98940 CHIROPRACT MANJ 1-2 REGIONS: CPT | Mod: AT | Performed by: CHIROPRACTOR

## 2022-10-03 RX ORDER — ESCITALOPRAM OXALATE 20 MG/1
TABLET ORAL
Qty: 60 TABLET | Refills: 0 | Status: SHIPPED | OUTPATIENT
Start: 2022-10-03 | End: 2023-02-01

## 2022-10-03 RX ORDER — GABAPENTIN 300 MG/1
CAPSULE ORAL
Qty: 90 CAPSULE | Refills: 0 | Status: SHIPPED | OUTPATIENT
Start: 2022-10-03 | End: 2022-11-21

## 2022-10-04 LAB — BACTERIA BLD CULT: NO GROWTH

## 2022-10-04 NOTE — TELEPHONE ENCOUNTER
traZODone      Last Written Prescription Date:  6/24/22  Last Fill Quantity: 30,   # refills: 3  Last Office Visit: 10/3/22  Future Office visit:    Next 5 appointments (look out 90 days)    Oct 06, 2022  8:00 AM  (Arrive by 7:45 AM)  SHORT with Balta Milton DO  [facility] ([facility] ) [address]  [place] [id number] [phone]           Routing refill request to provider for review/approval because:

## 2022-10-05 RX ORDER — TRAZODONE HYDROCHLORIDE 150 MG/1
150 TABLET ORAL AT BEDTIME
Qty: 30 TABLET | Refills: 2 | Status: SHIPPED | OUTPATIENT
Start: 2022-10-05 | End: 2022-12-19

## 2022-10-06 ENCOUNTER — OFFICE VISIT (OUTPATIENT)
Dept: INTERNAL MEDICINE | Facility: OTHER | Age: 72
End: 2022-10-06
Attending: INTERNAL MEDICINE
Payer: COMMERCIAL

## 2022-10-06 ENCOUNTER — ANCILLARY PROCEDURE (OUTPATIENT)
Dept: GENERAL RADIOLOGY | Facility: OTHER | Age: 72
End: 2022-10-06
Attending: INTERNAL MEDICINE
Payer: COMMERCIAL

## 2022-10-06 VITALS
SYSTOLIC BLOOD PRESSURE: 156 MMHG | TEMPERATURE: 97.3 F | OXYGEN SATURATION: 98 % | WEIGHT: 185 LBS | HEART RATE: 93 BPM | BODY MASS INDEX: 33.83 KG/M2 | DIASTOLIC BLOOD PRESSURE: 84 MMHG

## 2022-10-06 DIAGNOSIS — M54.6 BILATERAL THORACIC BACK PAIN, UNSPECIFIED CHRONICITY: ICD-10-CM

## 2022-10-06 DIAGNOSIS — M54.6 BILATERAL THORACIC BACK PAIN, UNSPECIFIED CHRONICITY: Primary | ICD-10-CM

## 2022-10-06 PROCEDURE — 72070 X-RAY EXAM THORAC SPINE 2VWS: CPT | Mod: TC,FY

## 2022-10-06 PROCEDURE — G0463 HOSPITAL OUTPT CLINIC VISIT: HCPCS | Performed by: INTERNAL MEDICINE

## 2022-10-06 PROCEDURE — 99214 OFFICE O/P EST MOD 30 MIN: CPT | Performed by: INTERNAL MEDICINE

## 2022-10-06 ASSESSMENT — PAIN SCALES - GENERAL: PAINLEVEL: SEVERE PAIN (7)

## 2022-10-06 NOTE — PROGRESS NOTES
"  Assessment & Plan   Problem List Items Addressed This Visit    None     Visit Diagnoses     Bilateral thoracic back pain, unspecified chronicity    -  Primary    Relevant Orders    XR Thoracic Spine 2 Views (Clinic Performed)    MR Thoracic Spine w/o Contrast         We will proceed with an MRI of thoracic spine to assess for any compression and/or impingement.  Consider IR and/or PT depending on results.      30 minutes spent on the date of the encounter doing chart review, review of test results, interpretation of tests, patient visit and documentation        BMI:   Estimated body mass index is 33.83 kg/m  as calculated from the following:    Height as of 6/8/22: 1.575 m (5' 2.01\").    Weight as of this encounter: 83.9 kg (185 lb).         No follow-ups on file.    Balta Milton, [facility]    Enzo Looney is a 72 year old, presenting for the following health issues:  ER F/U      CHAI Looney presents today for follow up from the ER for chest pain on 9/28/22. During her ER evaluation she did have routine labs which were all normal and her troponin was also negative.  EKG/CXR were normal and EKG was NSR.  Pain was on the left side into the left chest under left breast.  Now she reports bilateral back and chest side wall and and chest pain.  She has been seen by a Chiropractor also but pain persists.  She was given Prednisone which does not help.  Pain starts in mid thoracic region and wraps around bilaterally.  No trauma recently.           ED/UC Followup:    Facility:  [facility]   Date of visit: 9/28/22  Reason for visit: Chest Wall Pain  Current Status: chest wall pain continues, posterior-bilateral radiates under bilateral breasts.   Rash below center of breasts. Chest Wall/Mid Back Pain started at the same time rash developed.         Review of Systems   Constitutional, HEENT, cardiovascular, pulmonary, gi and gu systems are negative, except as " otherwise noted.      Objective    BP (!) 156/84 (BP Location: Right arm, Patient Position: Sitting, Cuff Size: Adult Regular)   Pulse 93   Temp 97.3  F (36.3  C)   Wt 83.9 kg (185 lb)   SpO2 98%   BMI 33.83 kg/m    Body mass index is 33.83 kg/m .  Physical Exam   GENERAL: alert and no distress  RESP: lungs clear to auscultation - no rales, rhonchi or wheezes  CV: regular rate and rhythm, normal S1 S2, no S3 or S4, no murmur, click or rub, no peripheral edema and peripheral pulses strong  MS: Mild pain with palpation along mid thoracic region    SKIN:  Small rash noted mid chest at Xyphoid  NEURO: Normal strength and tone, mentation intact and speech normal  PSYCH: mentation appears normal, affect normal/bright    Admission on 09/28/2022, Discharged on 09/28/2022   Component Date Value Ref Range Status     Troponin I High Sensitivity 09/28/2022 7  <54 ng/L Final    This Troponin-I result was obtained using a Siemens Dimension Vista High Sensitivity Troponin-I assay (TNIH). Effective 11/23/21, nine labs/sites in the [facility] switched from a Siemens [place] Contemporary Troponin I assay (CTNI) to a Siemens [place] High-Sensitivity Troponin I assay (TNIH).     WBC Count 09/28/2022 8.2  4.0 - 11.0 10e3/uL Final     RBC Count 09/28/2022 4.52  3.80 - 5.20 10e6/uL Final     Hemoglobin 09/28/2022 12.9  11.7 - 15.7 g/dL Final     Hematocrit 09/28/2022 40.6  35.0 - 47.0 % Final     MCV 09/28/2022 90  78 - 100 fL Final     MCH 09/28/2022 28.5  26.5 - 33.0 pg Final     MCHC 09/28/2022 31.8  31.5 - 36.5 g/dL Final     RDW 09/28/2022 14.6  10.0 - 15.0 % Final     Platelet Count 09/28/2022 297  150 - 450 10e3/uL Final     D-Dimer Quantitative 09/28/2022 0.35  0.00 - 0.50 ug/mL FEU Final     Sodium 09/28/2022 139  133 - 144 mmol/L Final     Potassium 09/28/2022 3.8  3.4 - 5.3 mmol/L Final     Chloride 09/28/2022 109  94 - 109 mmol/L Final     Carbon Dioxide (CO2) 09/28/2022 26  20 - 32 mmol/L Final     Anion Gap  09/28/2022 4  3 - 14 mmol/L Final     Urea Nitrogen 09/28/2022 9  7 - 30 mg/dL Final     Creatinine 09/28/2022 0.49 (A) 0.52 - 1.04 mg/dL Final     Calcium 09/28/2022 8.4 (A) 8.5 - 10.1 mg/dL Final     Glucose 09/28/2022 97  70 - 99 mg/dL Final     Alkaline Phosphatase 09/28/2022 93  40 - 150 U/L Final     AST 09/28/2022 22  0 - 45 U/L Final     ALT 09/28/2022 44  0 - 50 U/L Final     Protein Total 09/28/2022 6.9  6.8 - 8.8 g/dL Final     Albumin 09/28/2022 3.6  3.4 - 5.0 g/dL Final     Bilirubin Total 09/28/2022 0.2  0.2 - 1.3 mg/dL Final     GFR Estimate 09/28/2022 >90  >60 mL/min/1.73m2 Final    Effective December 21, 2021 eGFRcr in adults is calculated using the 2021 CKD-EPI creatinine equation which includes age and gender (Gwendolyn et al., NEJM, DOI: 10.1056/TDGQsj7813961)     Magnesium 09/28/2022 2.0  1.6 - 2.3 mg/dL Final     Lactic Acid 09/28/2022 1.7  0.7 - 2.0 mmol/L Final     Culture 09/28/2022 No Growth   Final     No results found for any visits on 10/06/22.  No results found for this or any previous visit (from the past 24 hour(s)).

## 2022-10-07 DIAGNOSIS — M54.2 CERVICAL PAIN: ICD-10-CM

## 2022-10-07 DIAGNOSIS — M25.562 ARTHRALGIA OF BOTH KNEES: ICD-10-CM

## 2022-10-07 DIAGNOSIS — M25.561 ARTHRALGIA OF BOTH KNEES: ICD-10-CM

## 2022-10-07 RX ORDER — HYDROCODONE BITARTRATE AND ACETAMINOPHEN 7.5; 325 MG/1; MG/1
1 TABLET ORAL EVERY 6 HOURS PRN
Qty: 60 TABLET | Refills: 0 | Status: SHIPPED | OUTPATIENT
Start: 2022-10-07 | End: 2022-11-03

## 2022-10-07 NOTE — TELEPHONE ENCOUNTER
norco 7.5-325 mg      Last Written Prescription Date:  9/14/22  Last Fill Quantity: 60,   # refills: 0  Last Office Visit: 10/6/22  Future Office visit:       Routing refill request to provider for review/approval because:  Drug not on the [facility], [facility] or [facility] refill protocol or controlled substance      pcp is out   pended to covering provider to review

## 2022-10-07 NOTE — TELEPHONE ENCOUNTER
Patient calling back and requesting if medication can be refilled today due to it being a weekend. Patient not wanting to be in pain for the weekend.

## 2022-10-12 ENCOUNTER — NURSE TRIAGE (OUTPATIENT)
Dept: INTERNAL MEDICINE | Facility: OTHER | Age: 72
End: 2022-10-12

## 2022-10-12 NOTE — TELEPHONE ENCOUNTER
It won't take a week to get results.    She already is on Norco 7.5/325 mg, sorry but cannot increase medication as already on fairly strong dosing    Patient sent from [facility] for evaluation of altered mental status. patient reports she was driving and felt altered. She reports she got shaky while driving and didn't remember where she was going and didn't recognize which roads she was taking. . She reports she ended up near the [employer]  he also reports numbness and tingling in her arm Pt reports that she sees Mental Health for anxiety.

## 2022-10-12 NOTE — TELEPHONE ENCOUNTER
Pt called and updated on below. She states the medication is not helping and still has another week. Discussed using ice, heat in addition to pain medication. She states she is frustrated. Advised if worsening go to ED for eval or if she feel she needs to be seen today. She verbalized understanding.    Please note.    Wen Baker RN

## 2022-10-12 NOTE — TELEPHONE ENCOUNTER
Pt calling and has is having severe back pain.The Norco is not working.Pain is 9/10.sides,mid back.Unbearable. Asking for something else for pain. She is able to walk.Did sound like she started to cry when stating she has MRI this Friday and was told it could take up to a week to get the results.She is asking for the MRI to be high priority.    Per care advice should be seen in 4 hours or PCP to triage.    Please advise.    Call back [phone]      Wen Baker RN      Reason for Disposition    [1] SEVERE back pain (e.g., excruciating, unable to do any normal activities) AND [2] not improved 2 hours after pain medicine    Additional Information    Negative: Passed out (i.e., lost consciousness, collapsed and was not responding)    Negative: Shock suspected (e.g., cold/pale/clammy skin, too weak to stand, low BP, rapid pulse)    Negative: Sounds like a life-threatening emergency to the triager    Negative: Major injury to the back (e.g., MVA, fall > 10 feet or 3 meters, penetrating injury, etc.)    Negative: Followed a tailbone injury    Negative: [1] Pain in the upper back over the ribs (rib cage) AND [2] radiates (travels, goes) into chest    Negative: [1] Pain in the upper back over the ribs (rib cage) AND [2] worsened by coughing (or clearly increases with breathing)    Negative: Back pain during pregnancy    Negative: Pain mainly in flank (i.e., in the side, over the lower ribs or just below the ribs)    Negative: [1] SEVERE back pain (e.g., excruciating) AND [2] sudden onset AND [3] age > 60 years    Negative: [1] Unable to urinate (or only a few drops) > 4 hours AND [2] bladder feels very full (e.g., palpable bladder or strong urge to urinate)    Negative: [1] Loss of bladder or bowel control (urine or bowel incontinence; wetting self, leaking stool) AND [2] new-onset    Negative: Numbness in groin or rectal area (i.e., loss of sensation)    Negative: [1] SEVERE abdominal pain AND [2] present > 1 hour     "Negative: [1] Abdominal pain AND [2] age > 60 years    Negative: Weakness of a leg or foot (e.g., unable to bear weight, dragging foot)    Negative: Unable to walk    Negative: Patient sounds very sick or weak to the triager    Answer Assessment - Initial Assessment Questions  1. ONSET: \"When did the pain begin?\"       MRI this Friday-over two weeks ago but is getting worse  2. LOCATION: \"Where does it hurt?\" (upper, mid or lower back)      Sides and mid back all over  3. SEVERITY: \"How bad is the pain?\"  (e.g., Scale 1-10; mild, moderate, or severe)    - MILD (1-3): doesn't interfere with normal activities     - MODERATE (4-7): interferes with normal activities or awakens from sleep     - SEVERE (8-10): excruciating pain, unable to do any normal activities       9/10  4. PATTERN: \"Is the pain constant?\" (e.g., yes, no; constant, intermittent)       constant  5. RADIATION: \"Does the pain shoot into your legs or elsewhere?\"      no  6. CAUSE:  \"What do you think is causing the back pain?\"       7. BACK OVERUSE:  \"Any recent lifting of heavy objects, strenuous work or exercise?\"        8. MEDICATIONS: \"What have you taken so far for the pain?\" (e.g., nothing, acetaminophen, NSAIDS)     Norco  9. NEUROLOGIC SYMPTOMS: \"Do you have any weakness, numbness, or problems with bowel/bladder control?\"      no  10. OTHER SYMPTOMS: \"Do you have any other symptoms?\" (e.g., fever, abdominal pain, burning with urination, blood in urine)        no  11. PREGNANCY: \"Is there any chance you are pregnant?\" (e.g., yes, no; LMP)        na    Protocols used: BACK PAIN-A-AH      "

## 2022-10-14 ENCOUNTER — HOSPITAL ENCOUNTER (OUTPATIENT)
Dept: MRI IMAGING | Facility: HOSPITAL | Age: 72
Discharge: HOME | End: 2022-10-14
Attending: INTERNAL MEDICINE | Admitting: INTERNAL MEDICINE
Payer: COMMERCIAL

## 2022-10-14 DIAGNOSIS — M54.6 BILATERAL THORACIC BACK PAIN, UNSPECIFIED CHRONICITY: ICD-10-CM

## 2022-10-14 PROCEDURE — 72146 MRI CHEST SPINE W/O DYE: CPT

## 2022-10-18 DIAGNOSIS — M54.50 LUMBAR BACK PAIN: Primary | ICD-10-CM

## 2022-10-24 DIAGNOSIS — M54.2 CERVICALGIA: ICD-10-CM

## 2022-10-24 DIAGNOSIS — G47.00 PERSISTENT INSOMNIA: ICD-10-CM

## 2022-10-26 NOTE — TELEPHONE ENCOUNTER
Zolpidem 10 mg      Last Written Prescription Date:  9/29/22  Last Fill Quantity: 30,   # refills: 0  Last Office Visit: 10/6/22  Future Office visit:       Routing refill request to provider for review/approval because:  Drug not on the [facility], [facility] or [facility] refill protocol or controlled substance    Cyclobenzaprine 10 mg      Last Written Prescription Date:  9/29/22  Last Fill Quantity: 90,   # refills: 0  Last Office Visit: 10/6/22  Future Office visit:       Routing refill request to provider for review/approval because:  Drug not on the [facility], [facility] or [facility] refill protocol or controlled substance      PCP is out of the office  Patient states she will run out of medication tomorrow  Pended to covering Provider to review

## 2022-10-27 DIAGNOSIS — G47.00 PERSISTENT INSOMNIA: ICD-10-CM

## 2022-10-27 RX ORDER — ZOLPIDEM TARTRATE 10 MG/1
TABLET ORAL
Qty: 30 TABLET | Refills: 0 | Status: SHIPPED | OUTPATIENT
Start: 2022-10-27 | End: 2022-11-21

## 2022-10-27 RX ORDER — CYCLOBENZAPRINE HCL 10 MG
TABLET ORAL
Qty: 90 TABLET | Refills: 0 | Status: SHIPPED | OUTPATIENT
Start: 2022-10-27 | End: 2022-11-21

## 2022-11-02 DIAGNOSIS — M25.562 ARTHRALGIA OF BOTH KNEES: ICD-10-CM

## 2022-11-02 DIAGNOSIS — M25.561 ARTHRALGIA OF BOTH KNEES: ICD-10-CM

## 2022-11-02 DIAGNOSIS — Z01.818 PREOP GENERAL PHYSICAL EXAM: ICD-10-CM

## 2022-11-02 DIAGNOSIS — M54.2 CERVICAL PAIN: ICD-10-CM

## 2022-11-03 RX ORDER — HYDROCODONE BITARTRATE AND ACETAMINOPHEN 7.5; 325 MG/1; MG/1
TABLET ORAL
Qty: 60 TABLET | Refills: 0 | Status: SHIPPED | OUTPATIENT
Start: 2022-11-03 | End: 2022-11-30

## 2022-11-03 RX ORDER — PROMETHAZINE HYDROCHLORIDE 25 MG/1
TABLET ORAL
Qty: 90 TABLET | Refills: 1 | Status: SHIPPED | OUTPATIENT
Start: 2022-11-03 | End: 2022-12-27

## 2022-11-03 NOTE — TELEPHONE ENCOUNTER
HYDROcodone-acetaminophen (NORCO) 7.5-325 MG per tablet      Last Written Prescription Date:  10-7-22  Last Fill Quantity: 60,   # refills: 0  Last Office Visit: 10-6-22  Future Office visit:       Routing refill request to provider for review/approval because:  Drug not on the [facility], P or [facility] refill protocol or controlled substance     Hospital to SNF [facility] Handoff - Roselia Irwin                                                                        70 y.o.   female    [address]   Room: 2/    [address]  Unit Phone# :  [phone]      [address]. [address] [place] [address]  Dept: [address]: [phone]                    SITUATION     Admitted:  9/7/2020         Attending Provider:  No att. providers found       Consultations:  IP CONSULT TO [address]    PCP:  None     Treatment Team: Consulting Provider: Argelia Hernandez MD; Utilization Review: Shyann Jenkins RN; Care Manager: Juanito Taylor; Staff Nurse: Holli Vivar    Admitting Dx:  Closed fracture of multiple pubic rami, right, initial encounter [facility]) Shanice Isatu       Principal Problem: Closed fracture of multiple pubic rami, right, initial encounter ([facility] [address]    * No surgery found *                  Code Status: Full Code                Advance Directives: No flowsheet data found. (Send w/patient)   No Doesnt Have       Isolation:  There are currently no Active Isolations       MDRO: No current active infections      Special Equipment needed: yes  Type of equipment: gait belt; wheelchair     BACKGROUND     Allergies:  No Known Allergies    Past Medical History:   Diagnosis Date    Epilepsy ([facility] [address]     Gait instability     Hypertension     Neurological disorder     Seizures ([facility] [address]     UTI (urinary tract infection)     Vitamin D deficiency        History reviewed. No pertinent surgical history. No medications prior to admission. Hard scripts included in transfer packet: yes    Vaccinations: There is no immunization history on file for this patient. Readmission Risks: Risk of falls        The Charlson CoMorbitiy Index tool is an evidenced based tool that has more automatic generated information.  The tool looks at many different items such as the age of the patient, how many times they were admitted in the last calendar year, current length of stay in the hospital and their diagnosis. All of these items are pulled automatically from information documented in the chart from various places and will generate a score that predicts whether a patient is at low (less than 13), medium (13-20) or high (21 or greater) risk of being readmitted.         ASSESSMENT                Temp: 98.4 °F (36.9 °C) (09/10/20 1328) Pulse (Heart Rate): 73 (09/10/20 1328)     Resp Rate: 20 (09/10/20 1328)           BP: 125/78 (09/10/20 1328)     O2 Sat (%): 98 % (09/10/20 1328)     Weight: 52 kg (114 lb 10.2 oz)    Height: (not recorded)      Active Orders   Diet    DIET CARDIAC Regular         Orientation: Patient is unable to verbalize    Active Behaviors: None                                   Active Lines/Drains:  (Peg Tube / [place] / CL or S/L?): no    Urinary Status: Voiding, Incontinent briefs, External catheter     Last BM: Last Bowel Movement Date: 09/07/20     Skin Integrity: Scars (comment)(Forehead)             Mobility: Very limited   Weight Bearing Status: NWB (Non Weight Bearing)            Lab Results   Component Value Date/Time    Glucose 95 09/08/2020 04:43 AM    INR 1.0 01/23/2020 04:31 PM    HGB 11.1 (L) 09/08/2020 04:43 AM    HGB 11.6 09/07/2020 10:53 PM        RECOMMENDATION     See After Visit Summary (AVS) for:  · Discharge instructions  · After [address]   · Special equipment needed (entered pre-discharge by Care Management)  · Medication Reconciliation    · Follow up Appointment(s)         Report given/sent by:  Justin Blanc                    Verbal report given to: Emiliana Avelar RN         Estimated discharge time:  9/10/2020 at 1400

## 2022-11-08 ENCOUNTER — TELEPHONE (OUTPATIENT)
Dept: INTERNAL MEDICINE | Facility: OTHER | Age: 72
End: 2022-11-08

## 2022-11-08 DIAGNOSIS — M54.6 ACUTE BILATERAL THORACIC BACK PAIN: Primary | ICD-10-CM

## 2022-11-08 NOTE — TELEPHONE ENCOUNTER
12:00 PM    Reason for Call: Phone Call    Description: Patient asks that Dr Milton's nurse call her back as their phone call got cut off. Please call her back.    Was an appointment offered for this call? No  If yes : Appointment type              Date    Preferred method for responding to this message: Telephone Call  What is your phone number ? [phone]    If we cannot reach you directly, may we leave a detailed response at the number you provided? Yes    Can this message wait until your PCP/provider returns, if available today? Not applicable, provider in clinic    Charlene Griffith

## 2022-11-08 NOTE — TELEPHONE ENCOUNTER
Patient had physical therapy today and they are recommending for patient to get a cortizone shot in her spine. Please contact patient to let her know this could be done. Patient can be reached at [phone]

## 2022-11-10 ENCOUNTER — MEDICAL CORRESPONDENCE (OUTPATIENT)
Dept: INTERVENTIONAL RADIOLOGY/VASCULAR | Facility: HOSPITAL | Age: 72
End: 2022-11-10

## 2022-11-10 ENCOUNTER — TELEPHONE (OUTPATIENT)
Dept: GENERAL RADIOLOGY | Facility: HOSPITAL | Age: 72
End: 2022-11-10

## 2022-11-10 ENCOUNTER — HOSPITAL ENCOUNTER (OUTPATIENT)
Dept: INTERVENTIONAL RADIOLOGY/VASCULAR | Facility: HOSPITAL | Age: 72
Discharge: HOME | End: 2022-11-10
Attending: INTERNAL MEDICINE | Admitting: RADIOLOGY
Payer: COMMERCIAL

## 2022-11-10 ENCOUNTER — TELEPHONE (OUTPATIENT)
Dept: INTERNAL MEDICINE | Facility: OTHER | Age: 72
End: 2022-11-10

## 2022-11-10 DIAGNOSIS — M54.6 ACUTE BILATERAL THORACIC BACK PAIN: ICD-10-CM

## 2022-11-10 DIAGNOSIS — J90 PLEURAL EFFUSION: Primary | ICD-10-CM

## 2022-11-10 PROCEDURE — G0463 HOSPITAL OUTPT CLINIC VISIT: HCPCS

## 2022-11-10 NOTE — TELEPHONE ENCOUNTER
Giovany Milton,      Dr. Tejada did the IR consult on [facility] and below is his recommedation.  Please place the order for her imaging so you can follow her.  Thanks.      Recommendation:  Consider CT scan of the chest with IV contrast to evaluate the lungs  and pleural spaces. Small bilateral pleural effusions are seen on  recent thoracic spine MR which are new dating back to an old CT from  2019 suggesting a cardiopulmonary etiology.

## 2022-11-10 NOTE — TELEPHONE ENCOUNTER
Call placed to patient, notified CTA recommended based upon MRI that was completed. Radiology recommended due to some fluid in lungs.     Patient verbalized understanding, reporting the CTA is scheduled for 11/15/22.

## 2022-11-15 ENCOUNTER — HOSPITAL ENCOUNTER (OUTPATIENT)
Dept: CT IMAGING | Facility: HOSPITAL | Age: 72
Discharge: HOME | End: 2022-11-15
Attending: INTERNAL MEDICINE | Admitting: INTERNAL MEDICINE
Payer: COMMERCIAL

## 2022-11-15 DIAGNOSIS — J90 PLEURAL EFFUSION: ICD-10-CM

## 2022-11-15 PROCEDURE — 250N000011 HC RX IP 250 OP 636: Performed by: RADIOLOGY

## 2022-11-15 PROCEDURE — 71260 CT THORAX DX C+: CPT

## 2022-11-15 RX ORDER — IOPAMIDOL 755 MG/ML
63 INJECTION, SOLUTION INTRAVASCULAR ONCE
Status: COMPLETED | OUTPATIENT
Start: 2022-11-15 | End: 2022-11-15

## 2022-11-15 RX ADMIN — IOPAMIDOL 63 ML: 755 INJECTION, SOLUTION INTRAVENOUS at 13:31

## 2022-11-18 DIAGNOSIS — B02.23 POSTHERPETIC POLYNEUROPATHY: ICD-10-CM

## 2022-11-18 NOTE — TELEPHONE ENCOUNTER
gabapentin      Last Written Prescription Date:  10/3/22  Last Fill Quantity: 90,   # refills: 0  Last Office Visit: 10/6/22  Future Office visit:    Next 5 appointments (look out 90 days)    Nov 21, 2022  1:30 PM  (Arrive by 1:15 PM)  SHORT with Balta Milton DO  [facility] ([facility] ) [address]  [place] [id number] [phone]           Routing refill request to provider for review/approval because:

## 2022-11-20 DIAGNOSIS — G47.00 PERSISTENT INSOMNIA: ICD-10-CM

## 2022-11-20 DIAGNOSIS — M54.2 CERVICALGIA: ICD-10-CM

## 2022-11-21 ENCOUNTER — OFFICE VISIT (OUTPATIENT)
Dept: INTERNAL MEDICINE | Facility: OTHER | Age: 72
End: 2022-11-21
Attending: INTERNAL MEDICINE
Payer: COMMERCIAL

## 2022-11-21 VITALS
DIASTOLIC BLOOD PRESSURE: 78 MMHG | BODY MASS INDEX: 33.28 KG/M2 | TEMPERATURE: 97.9 F | SYSTOLIC BLOOD PRESSURE: 148 MMHG | OXYGEN SATURATION: 98 % | WEIGHT: 182 LBS | RESPIRATION RATE: 24 BRPM | HEART RATE: 94 BPM

## 2022-11-21 DIAGNOSIS — R06.09 DOE (DYSPNEA ON EXERTION): Primary | ICD-10-CM

## 2022-11-21 DIAGNOSIS — M54.2 CERVICALGIA: ICD-10-CM

## 2022-11-21 DIAGNOSIS — G47.00 PERSISTENT INSOMNIA: ICD-10-CM

## 2022-11-21 DIAGNOSIS — B02.23 POSTHERPETIC POLYNEUROPATHY: ICD-10-CM

## 2022-11-21 PROCEDURE — G0463 HOSPITAL OUTPT CLINIC VISIT: HCPCS | Performed by: INTERNAL MEDICINE

## 2022-11-21 PROCEDURE — 99214 OFFICE O/P EST MOD 30 MIN: CPT | Performed by: INTERNAL MEDICINE

## 2022-11-21 RX ORDER — ZOLPIDEM TARTRATE 10 MG/1
TABLET ORAL
Qty: 30 TABLET | Refills: 0 | Status: SHIPPED | OUTPATIENT
Start: 2022-11-21 | End: 2022-12-20

## 2022-11-21 RX ORDER — GABAPENTIN 300 MG/1
CAPSULE ORAL
Qty: 90 CAPSULE | Refills: 0 | Status: SHIPPED | OUTPATIENT
Start: 2022-11-21 | End: 2022-11-21

## 2022-11-21 RX ORDER — GABAPENTIN 300 MG/1
300 CAPSULE ORAL 3 TIMES DAILY
Qty: 90 CAPSULE | Refills: 0 | Status: SHIPPED | OUTPATIENT
Start: 2022-11-21 | End: 2023-01-16

## 2022-11-21 RX ORDER — CYCLOBENZAPRINE HCL 10 MG
TABLET ORAL
Qty: 90 TABLET | Refills: 0 | Status: SHIPPED | OUTPATIENT
Start: 2022-11-21 | End: 2022-12-20

## 2022-11-21 ASSESSMENT — ANXIETY QUESTIONNAIRES
3. WORRYING TOO MUCH ABOUT DIFFERENT THINGS: NOT AT ALL
1. FEELING NERVOUS, ANXIOUS, OR ON EDGE: NOT AT ALL
GAD7 TOTAL SCORE: 0
4. TROUBLE RELAXING: NOT AT ALL
6. BECOMING EASILY ANNOYED OR IRRITABLE: NOT AT ALL
5. BEING SO RESTLESS THAT IT IS HARD TO SIT STILL: NOT AT ALL
2. NOT BEING ABLE TO STOP OR CONTROL WORRYING: NOT AT ALL
7. FEELING AFRAID AS IF SOMETHING AWFUL MIGHT HAPPEN: NOT AT ALL
IF YOU CHECKED OFF ANY PROBLEMS ON THIS QUESTIONNAIRE, HOW DIFFICULT HAVE THESE PROBLEMS MADE IT FOR YOU TO DO YOUR WORK, TAKE CARE OF THINGS AT HOME, OR GET ALONG WITH OTHER PEOPLE: NOT DIFFICULT AT ALL
GAD7 TOTAL SCORE: 0

## 2022-11-21 ASSESSMENT — PAIN SCALES - GENERAL: PAINLEVEL: MILD PAIN (2)

## 2022-11-21 ASSESSMENT — PATIENT HEALTH QUESTIONNAIRE - PHQ9: SUM OF ALL RESPONSES TO PHQ QUESTIONS 1-9: 0

## 2022-11-22 RX ORDER — ZOLPIDEM TARTRATE 10 MG/1
TABLET ORAL
Qty: 30 TABLET | Refills: 0 | OUTPATIENT
Start: 2022-11-22

## 2022-11-22 RX ORDER — CYCLOBENZAPRINE HCL 10 MG
TABLET ORAL
Qty: 90 TABLET | Refills: 0 | OUTPATIENT
Start: 2022-11-22

## 2022-11-29 DIAGNOSIS — M25.561 ARTHRALGIA OF BOTH KNEES: ICD-10-CM

## 2022-11-29 DIAGNOSIS — M54.2 CERVICAL PAIN: ICD-10-CM

## 2022-11-29 DIAGNOSIS — M25.562 ARTHRALGIA OF BOTH KNEES: ICD-10-CM

## 2022-11-30 RX ORDER — HYDROCODONE BITARTRATE AND ACETAMINOPHEN 7.5; 325 MG/1; MG/1
TABLET ORAL
Qty: 60 TABLET | Refills: 0 | Status: SHIPPED | OUTPATIENT
Start: 2022-11-30 | End: 2022-12-27

## 2022-11-30 NOTE — TELEPHONE ENCOUNTER
HYDROCODONE/APAP 7.5-325MG TAB          Last Written Prescription Date:  11/3/22  Last Fill Quantity: 60,   # refills: 0  Last Office Visit: 11/21/22  Future Office visit:       Routing refill request to provider for review/approval because:    Drug not on the [facility], [facility] or [facility] refill protocol or controlled substance

## 2022-12-01 DIAGNOSIS — K21.00 GASTROESOPHAGEAL REFLUX DISEASE WITH ESOPHAGITIS, UNSPECIFIED WHETHER HEMORRHAGE: ICD-10-CM

## 2022-12-05 RX ORDER — PANTOPRAZOLE SODIUM 40 MG/1
TABLET, DELAYED RELEASE ORAL
Qty: 30 TABLET | Refills: 4 | Status: SHIPPED | OUTPATIENT
Start: 2022-12-05 | End: 2023-05-08

## 2022-12-05 NOTE — TELEPHONE ENCOUNTER
PANTOPRAZOLE 40MG DR TABLET        Last Written Prescription Date:  6/22/22  Last Fill Quantity: 30,   # refills: 4  Last Office Visit: 11/21/22  Future Office visit:       Routing refill request to provider for review/approval because:    PPI Protocol Failed 12/01/2022 11:54 AM   Protocol Details  No diagnosis of osteoporosis on record

## 2022-12-08 DIAGNOSIS — G43.009 MIGRAINE WITHOUT AURA AND WITHOUT STATUS MIGRAINOSUS, NOT INTRACTABLE: ICD-10-CM

## 2022-12-08 RX ORDER — TOPIRAMATE 50 MG/1
TABLET, FILM COATED ORAL
Qty: 90 TABLET | Refills: 2 | Status: SHIPPED | OUTPATIENT
Start: 2022-12-08 | End: 2023-05-08

## 2022-12-08 NOTE — TELEPHONE ENCOUNTER
topiramate      Last Written Prescription Date:  6/24/22  Last Fill Quantity: 90,   # refills: 2  Last Office Visit: 11/21/22  Future Office visit:       Routing refill request to provider for review/approval because:

## 2022-12-09 ENCOUNTER — TELEPHONE (OUTPATIENT)
Dept: FAMILY MEDICINE | Facility: OTHER | Age: 72
End: 2022-12-09

## 2022-12-09 NOTE — TELEPHONE ENCOUNTER
Call placed to patient to inquire on fax received from [facility] pharmacy.     Fax received reflecting patient is currently prescribed Zocor 40 mg and patient reports taking 1/2 tablet (20 mg) daily. Pharmacy requesting an update on order.     This writer has placed a call to the patient, received confirmation from patient taking Zocor 40 mg 1/2 (20 mg) tablet daily for many years.    Notified an update will be provided to the pharmacy, along with an update to patients medication list will be sent to Dr. Milton.    Patient verbalized understanding.

## 2022-12-18 DIAGNOSIS — G47.00 PERSISTENT INSOMNIA: ICD-10-CM

## 2022-12-18 DIAGNOSIS — M54.2 CERVICALGIA: ICD-10-CM

## 2022-12-19 RX ORDER — TRAZODONE HYDROCHLORIDE 150 MG/1
150 TABLET ORAL AT BEDTIME
Qty: 30 TABLET | Refills: 9 | Status: SHIPPED | OUTPATIENT
Start: 2022-12-19 | End: 2023-08-09

## 2022-12-19 NOTE — TELEPHONE ENCOUNTER
Ambien, Flexeril      Last Written Prescription Date:  11.21.22  Last Fill Quantity: #30, #90,   # refills: 0  Last Office Visit: 11.21.22  Future Office visit:       Routing refill request to provider for review/approval because:  Drug not on the [facility], [facility] or [facility] refill protocol or controlled substance

## 2022-12-20 RX ORDER — CYCLOBENZAPRINE HCL 10 MG
TABLET ORAL
Qty: 90 TABLET | Refills: 0 | Status: SHIPPED | OUTPATIENT
Start: 2022-12-20 | End: 2023-01-16

## 2022-12-20 RX ORDER — ZOLPIDEM TARTRATE 10 MG/1
TABLET ORAL
Qty: 30 TABLET | Refills: 0 | Status: SHIPPED | OUTPATIENT
Start: 2022-12-20 | End: 2023-01-16

## 2022-12-23 DIAGNOSIS — M25.562 ARTHRALGIA OF BOTH KNEES: ICD-10-CM

## 2022-12-23 DIAGNOSIS — M54.2 CERVICAL PAIN: ICD-10-CM

## 2022-12-23 DIAGNOSIS — M25.561 ARTHRALGIA OF BOTH KNEES: ICD-10-CM

## 2022-12-23 DIAGNOSIS — Z01.818 PREOP GENERAL PHYSICAL EXAM: ICD-10-CM

## 2022-12-27 RX ORDER — PROMETHAZINE HYDROCHLORIDE 25 MG/1
TABLET ORAL
Qty: 90 TABLET | Refills: 1 | Status: SHIPPED | OUTPATIENT
Start: 2022-12-27 | End: 2023-02-20

## 2022-12-27 RX ORDER — HYDROCODONE BITARTRATE AND ACETAMINOPHEN 7.5; 325 MG/1; MG/1
TABLET ORAL
Qty: 60 TABLET | Refills: 0 | Status: SHIPPED | OUTPATIENT
Start: 2022-12-27 | End: 2023-01-23

## 2022-12-27 NOTE — TELEPHONE ENCOUNTER
HYDROcodone-acetaminophen 7.5-325mg     Last Written Prescription Date:  11/30/22  Last Fill Quantity: 60,   # refills: 0  Last Office Visit: 11/21/22  Future Office visit:       Routing refill request to provider for review/approval because:    Promethazine 25mg      Last Written Prescription Date:  11/3/22  Last Fill Quantity: 90,   # refills: 1  Last Office Visit: 11/21/22  Future Office visit:       Routing refill request to provider for review/approval because:

## 2023-01-03 DIAGNOSIS — Z00.00 HEALTH CARE MAINTENANCE: ICD-10-CM

## 2023-01-05 NOTE — TELEPHONE ENCOUNTER
Folic acid (Folvite) 1MG tablet  Last Written Prescription Date:  4/20/22  Last Fill Quantity: 90,   # refills: 1  Last Office Visit: 11/21/22  Future Office visit:

## 2023-01-06 RX ORDER — FOLIC ACID 1 MG/1
TABLET ORAL
Qty: 90 TABLET | Refills: 1 | Status: SHIPPED | OUTPATIENT
Start: 2023-01-06 | End: 2023-10-12

## 2023-01-12 DIAGNOSIS — R21 RASH: ICD-10-CM

## 2023-01-12 RX ORDER — NYSTATIN 100000 [USP'U]/G
POWDER TOPICAL
Qty: 60 G | Refills: 3 | Status: SHIPPED | OUTPATIENT
Start: 2023-01-12 | End: 2023-10-09

## 2023-01-12 NOTE — TELEPHONE ENCOUNTER
Nystatin (Mycostatin) 950487 UNIT/GM external Powder    Last Written Prescription Date:  3/4/22  Last Fill Quantity: 60g,   # refills: 3  Last Office Visit: 11/21/22  Future Office visit:

## 2023-01-15 DIAGNOSIS — M54.2 CERVICALGIA: ICD-10-CM

## 2023-01-15 DIAGNOSIS — B02.23 POSTHERPETIC POLYNEUROPATHY: ICD-10-CM

## 2023-01-15 DIAGNOSIS — G47.00 PERSISTENT INSOMNIA: ICD-10-CM

## 2023-01-16 ENCOUNTER — TELEPHONE (OUTPATIENT)
Dept: INTERNAL MEDICINE | Facility: OTHER | Age: 73
End: 2023-01-16

## 2023-01-16 RX ORDER — ZOLPIDEM TARTRATE 10 MG/1
TABLET ORAL
Qty: 30 TABLET | Refills: 0 | Status: SHIPPED | OUTPATIENT
Start: 2023-01-16 | End: 2023-02-14

## 2023-01-16 RX ORDER — CYCLOBENZAPRINE HCL 10 MG
TABLET ORAL
Qty: 90 TABLET | Refills: 0 | Status: SHIPPED | OUTPATIENT
Start: 2023-01-16 | End: 2023-02-14

## 2023-01-16 RX ORDER — GABAPENTIN 300 MG/1
300 CAPSULE ORAL 3 TIMES DAILY
Qty: 90 CAPSULE | Refills: 0 | Status: SHIPPED | OUTPATIENT
Start: 2023-01-16 | End: 2023-02-14

## 2023-01-16 NOTE — TELEPHONE ENCOUNTER
Cyclobenzaprine (Flexeril) 10 MG tablet    Last Written Prescription Date:  12/20/2022  Last Fill Quantity: 90,   # refills: 0  Last Office Visit: 10/06/2022  Future Office visit:         Gabapentin (Neurontin) 300 MG capsule  Last Written Prescription Date:  11/21/2022  Last Fill Quantity: 90,   # refills: 0  Last Office Visit: 10/06/2022  Future Office visit:         Zolpidem (Ambien) 10 MG tablet    Last Written Prescription Date:  12/20/2022  Last Fill Quantity: 30,   # refills: 0  Last Office Visit: 10/06/2022  Future Office visit:

## 2023-01-18 NOTE — TELEPHONE ENCOUNTER
Received APPROVAL from [facility] for Cyclobenzaprine HCl 10MG tablets. Effective 01/01/2023 - 04/16/2023. Forms scanned to [facility].

## 2023-01-23 DIAGNOSIS — M25.562 ARTHRALGIA OF BOTH KNEES: ICD-10-CM

## 2023-01-23 DIAGNOSIS — M54.2 CERVICAL PAIN: ICD-10-CM

## 2023-01-23 DIAGNOSIS — M25.561 ARTHRALGIA OF BOTH KNEES: ICD-10-CM

## 2023-01-23 RX ORDER — HYDROCODONE BITARTRATE AND ACETAMINOPHEN 7.5; 325 MG/1; MG/1
TABLET ORAL
Qty: 60 TABLET | Refills: 0 | Status: SHIPPED | OUTPATIENT
Start: 2023-01-23 | End: 2023-02-20

## 2023-01-23 NOTE — TELEPHONE ENCOUNTER
Sheridanco      Last Written Prescription Date:  12.27.22  Last Fill Quantity: #60,   # refills: 0  Last Office Visit: 11.21.22  Future Office visit:       Routing refill request to provider for review/approval because:  Drug not on the [facility], P or [facility] refill protocol or controlled substance

## 2023-01-24 ENCOUNTER — TELEPHONE (OUTPATIENT)
Dept: INTERNAL MEDICINE | Facility: OTHER | Age: 73
End: 2023-01-24

## 2023-01-24 NOTE — TELEPHONE ENCOUNTER
Received PA from [facility] for Topiramate 50MG tablets. Submitted on CMM. Waiting for a response.

## 2023-01-25 NOTE — TELEPHONE ENCOUNTER
Received NO PA NEEDED from [facility] for Topiramate 50MG tablets. 01/24/2023. Forms scanned to Epic.

## 2023-02-01 DIAGNOSIS — F34.1 DYSTHYMIA: ICD-10-CM

## 2023-02-01 RX ORDER — ESCITALOPRAM OXALATE 20 MG/1
TABLET ORAL
Qty: 60 TABLET | Refills: 0 | Status: SHIPPED | OUTPATIENT
Start: 2023-02-01 | End: 2023-04-12

## 2023-02-01 NOTE — TELEPHONE ENCOUNTER
escitalopram 20mg      Last Written Prescription Date:  10/3/22  Last Fill Quantity: 60,   # refills: 0  Last Office Visit: 11/21/22  Future Office visit:       Routing refill request to provider for review/approval because:

## 2023-02-12 DIAGNOSIS — B02.23 POSTHERPETIC POLYNEUROPATHY: ICD-10-CM

## 2023-02-12 DIAGNOSIS — M54.2 CERVICALGIA: ICD-10-CM

## 2023-02-12 DIAGNOSIS — G47.00 PERSISTENT INSOMNIA: ICD-10-CM

## 2023-02-14 RX ORDER — ZOLPIDEM TARTRATE 10 MG/1
TABLET ORAL
Qty: 30 TABLET | Refills: 0 | Status: SHIPPED | OUTPATIENT
Start: 2023-02-14 | End: 2023-03-14

## 2023-02-14 RX ORDER — GABAPENTIN 300 MG/1
300 CAPSULE ORAL 3 TIMES DAILY
Qty: 90 CAPSULE | Refills: 0 | Status: SHIPPED | OUTPATIENT
Start: 2023-02-14 | End: 2023-03-14

## 2023-02-14 RX ORDER — CYCLOBENZAPRINE HCL 10 MG
TABLET ORAL
Qty: 90 TABLET | Refills: 0 | Status: SHIPPED | OUTPATIENT
Start: 2023-02-14 | End: 2023-03-14

## 2023-02-14 NOTE — TELEPHONE ENCOUNTER
Cyclobenzaprine (Flexeril) 10 MG tablet   Last Written Prescription Date:  01/16/2023  Last Fill Quantity: 90,   # refills: 0  Last Office Visit: 11/21/2022  Future Office visit:         Gabapentin (Neurontin) 300 MG capsule    Last Written Prescription Date:  01/16/2023  Last Fill Quantity: 90,   # refills: 0  Last Office Visit: 11/21/2022  Future Office visit:        Zolpidem (Ambien) 10 MG tablet     Last Written Prescription Date:  01/16/2023  Last Fill Quantity: 30,   # refills: 0  Last Office Visit: 11/21/2022  Future Office visit:

## 2023-02-19 DIAGNOSIS — M25.561 ARTHRALGIA OF BOTH KNEES: ICD-10-CM

## 2023-02-19 DIAGNOSIS — Z01.818 PREOP GENERAL PHYSICAL EXAM: ICD-10-CM

## 2023-02-19 DIAGNOSIS — M25.562 ARTHRALGIA OF BOTH KNEES: ICD-10-CM

## 2023-02-19 DIAGNOSIS — M54.2 CERVICAL PAIN: ICD-10-CM

## 2023-02-20 RX ORDER — HYDROCODONE BITARTRATE AND ACETAMINOPHEN 7.5; 325 MG/1; MG/1
TABLET ORAL
Qty: 60 TABLET | Refills: 0 | Status: SHIPPED | OUTPATIENT
Start: 2023-02-20 | End: 2023-03-20

## 2023-02-20 RX ORDER — PROMETHAZINE HYDROCHLORIDE 25 MG/1
TABLET ORAL
Qty: 90 TABLET | Refills: 1 | Status: SHIPPED | OUTPATIENT
Start: 2023-02-20 | End: 2023-04-11

## 2023-02-20 NOTE — TELEPHONE ENCOUNTER
HYDROcodone-acetaminophen (NORCO) 7.5-325 MG per tablet        Last Written Prescription Date:  1/23/23  Last Fill Quantity: 60,   # refills: 0  Last Office Visit: 11/21/22  Future Office visit:       Routing refill request to provider for review/approval because:    Drug not on the [facility], [facility] or [facility] refill protocol or controlled substance    Due for CSA renewal, this writer will coordinate follow up appointment.

## 2023-03-11 DIAGNOSIS — G47.00 PERSISTENT INSOMNIA: ICD-10-CM

## 2023-03-11 DIAGNOSIS — M54.2 CERVICALGIA: ICD-10-CM

## 2023-03-11 DIAGNOSIS — B02.23 POSTHERPETIC POLYNEUROPATHY: ICD-10-CM

## 2023-03-13 NOTE — TELEPHONE ENCOUNTER
Gabapentin       Last Written Prescription Date:  2/14/23  Last Fill Quantity: 90,   # refills: 0    Ambien       Last Written Prescription Date:  2/14/23  Last Fill Quantity: 30,   # refills: 0    Flexeril       Last Written Prescription Date:  2/14/23  Last Fill Quantity: 90,   # refills: 0  Last Office Visit: 11/21/22  Future Office visit:

## 2023-03-14 RX ORDER — GABAPENTIN 300 MG/1
300 CAPSULE ORAL 3 TIMES DAILY
Qty: 90 CAPSULE | Refills: 0 | Status: SHIPPED | OUTPATIENT
Start: 2023-03-14 | End: 2023-04-11

## 2023-03-14 RX ORDER — ZOLPIDEM TARTRATE 10 MG/1
TABLET ORAL
Qty: 30 TABLET | Refills: 0 | Status: SHIPPED | OUTPATIENT
Start: 2023-03-14 | End: 2023-04-11

## 2023-03-14 RX ORDER — CYCLOBENZAPRINE HCL 10 MG
TABLET ORAL
Qty: 90 TABLET | Refills: 0 | Status: SHIPPED | OUTPATIENT
Start: 2023-03-14 | End: 2023-04-11

## 2023-03-17 DIAGNOSIS — M54.2 CERVICAL PAIN: ICD-10-CM

## 2023-03-17 DIAGNOSIS — M25.562 ARTHRALGIA OF BOTH KNEES: ICD-10-CM

## 2023-03-17 DIAGNOSIS — M25.561 ARTHRALGIA OF BOTH KNEES: ICD-10-CM

## 2023-03-20 RX ORDER — HYDROCODONE BITARTRATE AND ACETAMINOPHEN 7.5; 325 MG/1; MG/1
TABLET ORAL
Qty: 60 TABLET | Refills: 0 | Status: SHIPPED | OUTPATIENT
Start: 2023-03-20 | End: 2023-04-17

## 2023-03-20 NOTE — TELEPHONE ENCOUNTER
HYDROCODONE/APAP 7.5-325MG TAB     Last Written Prescription Date:  2/20/23  Last Fill Quantity: 60,   # refills: 0  Last Office Visit: 11/21/22  Future Office visit:       Routing refill request to provider for review/approval because:    Drug not on the [facility], [facility] or [facility] refill protocol or controlled substance

## 2023-04-10 DIAGNOSIS — G47.00 PERSISTENT INSOMNIA: ICD-10-CM

## 2023-04-10 DIAGNOSIS — M54.2 CERVICALGIA: ICD-10-CM

## 2023-04-10 DIAGNOSIS — Z01.818 PREOP GENERAL PHYSICAL EXAM: ICD-10-CM

## 2023-04-10 DIAGNOSIS — B02.23 POSTHERPETIC POLYNEUROPATHY: ICD-10-CM

## 2023-04-10 NOTE — TELEPHONE ENCOUNTER
Flexeril       Last Written Prescription Date:  3/14/23  Last Fill Quantity: 90,   # refills: 0    Gabapentin       Last Written Prescription Date:  3/14/23  Last Fill Quantity: 90,   # refills: 0    Ambien       Last Written Prescription Date:  3/14/23  Last Fill Quantity: 30,   # refills: 0    Phenergan       Last Written Prescription Date:  2/20/23  Last Fill Quantity: 90,   # refills: 1  Last Office Visit: 11/21/22  Future Office visit:

## 2023-04-11 DIAGNOSIS — F34.1 DYSTHYMIA: ICD-10-CM

## 2023-04-11 RX ORDER — CYCLOBENZAPRINE HCL 10 MG
TABLET ORAL
Qty: 90 TABLET | Refills: 0 | Status: SHIPPED | OUTPATIENT
Start: 2023-04-11 | End: 2023-05-08

## 2023-04-11 RX ORDER — GABAPENTIN 300 MG/1
300 CAPSULE ORAL 3 TIMES DAILY
Qty: 90 CAPSULE | Refills: 0 | Status: SHIPPED | OUTPATIENT
Start: 2023-04-11 | End: 2023-05-08

## 2023-04-11 RX ORDER — ZOLPIDEM TARTRATE 10 MG/1
TABLET ORAL
Qty: 30 TABLET | Refills: 0 | Status: SHIPPED | OUTPATIENT
Start: 2023-04-11 | End: 2023-05-08

## 2023-04-11 RX ORDER — PROMETHAZINE HYDROCHLORIDE 25 MG/1
TABLET ORAL
Qty: 90 TABLET | Refills: 1 | Status: SHIPPED | OUTPATIENT
Start: 2023-04-11 | End: 2023-06-06

## 2023-04-12 RX ORDER — ESCITALOPRAM OXALATE 20 MG/1
TABLET ORAL
Qty: 60 TABLET | Refills: 0 | Status: SHIPPED | OUTPATIENT
Start: 2023-04-12 | End: 2023-06-12

## 2023-04-12 NOTE — TELEPHONE ENCOUNTER
lexapro      Last Written Prescription Date:  2/1/23  Last Fill Quantity: 60,   # refills: 0  Last Office Visit: 11/21/22  Future Office visit:

## 2023-04-16 DIAGNOSIS — M54.2 CERVICAL PAIN: ICD-10-CM

## 2023-04-16 DIAGNOSIS — M25.562 ARTHRALGIA OF BOTH KNEES: ICD-10-CM

## 2023-04-16 DIAGNOSIS — M25.561 ARTHRALGIA OF BOTH KNEES: ICD-10-CM

## 2023-04-17 RX ORDER — HYDROCODONE BITARTRATE AND ACETAMINOPHEN 7.5; 325 MG/1; MG/1
TABLET ORAL
Qty: 60 TABLET | Refills: 0 | Status: SHIPPED | OUTPATIENT
Start: 2023-04-17 | End: 2023-05-15

## 2023-04-17 NOTE — TELEPHONE ENCOUNTER
Hydrocodone-Acetaminophen (Norco) 7.5-325 MG tablets     Last Written Prescription Date:  03/20/2023  Last Fill Quantity: 60,   # refills: 0  Last Office Visit: 11/21/2022

## 2023-04-18 ENCOUNTER — TELEPHONE (OUTPATIENT)
Dept: INTERNAL MEDICINE | Facility: OTHER | Age: 73
End: 2023-04-18

## 2023-04-18 NOTE — TELEPHONE ENCOUNTER
Patient arrived 4-18-23 for her 0630 appointment but had chocolate last night around 10 pm which is a contraindication to the exam.  New order placed as the original will automatically be discontinued by Epic.    I spent about 15 minutes with the patient explaining the exam.   She has a printed exam guide to take home with her.   The patient stated she would contact scheduling to get a new appointment.

## 2023-05-07 DIAGNOSIS — G43.009 MIGRAINE WITHOUT AURA AND WITHOUT STATUS MIGRAINOSUS, NOT INTRACTABLE: ICD-10-CM

## 2023-05-07 DIAGNOSIS — M54.2 CERVICALGIA: ICD-10-CM

## 2023-05-07 DIAGNOSIS — B02.23 POSTHERPETIC POLYNEUROPATHY: ICD-10-CM

## 2023-05-07 DIAGNOSIS — K21.00 GASTROESOPHAGEAL REFLUX DISEASE WITH ESOPHAGITIS, UNSPECIFIED WHETHER HEMORRHAGE: ICD-10-CM

## 2023-05-07 DIAGNOSIS — G47.00 PERSISTENT INSOMNIA: ICD-10-CM

## 2023-05-08 RX ORDER — PANTOPRAZOLE SODIUM 40 MG/1
TABLET, DELAYED RELEASE ORAL
Qty: 30 TABLET | Refills: 4 | Status: SHIPPED | OUTPATIENT
Start: 2023-05-08 | End: 2023-08-28

## 2023-05-08 RX ORDER — CYCLOBENZAPRINE HCL 10 MG
TABLET ORAL
Qty: 90 TABLET | Refills: 0 | Status: SHIPPED | OUTPATIENT
Start: 2023-05-08 | End: 2023-06-07

## 2023-05-08 RX ORDER — GABAPENTIN 300 MG/1
CAPSULE ORAL
Qty: 90 CAPSULE | Refills: 0 | Status: SHIPPED | OUTPATIENT
Start: 2023-05-08 | End: 2023-06-07

## 2023-05-08 RX ORDER — TOPIRAMATE 50 MG/1
TABLET, FILM COATED ORAL
Qty: 90 TABLET | Refills: 2 | Status: SHIPPED | OUTPATIENT
Start: 2023-05-08 | End: 2023-10-20

## 2023-05-08 RX ORDER — ZOLPIDEM TARTRATE 10 MG/1
TABLET ORAL
Qty: 30 TABLET | Refills: 0 | Status: SHIPPED | OUTPATIENT
Start: 2023-05-08 | End: 2023-06-07

## 2023-05-08 NOTE — TELEPHONE ENCOUNTER
topiramate (TOPAMAX) 50 MG tablet     Last Written Prescription Date:  13/08/2022  Last Fill Quantity: 90,   # refills: 2  Last Office Visit: 11/21/2022    pantoprazole (PROTONIX) 40 MG EC tablet      Last Written Prescription Date:  12/05/2022  Last Fill Quantity: 30,   # refills: 4      cyclobenzaprine (FLEXERIL) 10 MG tablet      Last Written Prescription Date:  4/11/2023  Last Fill Quantity: 90,   # refills: 0      zolpidem (AMBIEN) 10 MG tablet      Last Written Prescription Date:  4/11/2023  Last Fill Quantity: 30,   # refills: 0      gabapentin (NEURONTIN) 300 MG capsule      Last Written Prescription Date:  4/11/2023  Last Fill Quantity: 90,   # refills: 0

## 2023-05-13 DIAGNOSIS — M25.562 ARTHRALGIA OF BOTH KNEES: ICD-10-CM

## 2023-05-13 DIAGNOSIS — M25.561 ARTHRALGIA OF BOTH KNEES: ICD-10-CM

## 2023-05-13 DIAGNOSIS — M54.2 CERVICAL PAIN: ICD-10-CM

## 2023-05-15 RX ORDER — HYDROCODONE BITARTRATE AND ACETAMINOPHEN 7.5; 325 MG/1; MG/1
TABLET ORAL
Qty: 60 TABLET | Refills: 0 | Status: SHIPPED | OUTPATIENT
Start: 2023-05-15 | End: 2023-06-12

## 2023-05-15 NOTE — TELEPHONE ENCOUNTER
Norco      Last Written Prescription Date:  4.17.23  Last Fill Quantity: #60,   # refills: 0  Last Office Visit: 11.21.22  Future Office visit:       Routing refill request to provider for review/approval because:  Drug not on the [facility], [facility] or [facility] refill protocol or controlled substance

## 2023-06-03 DIAGNOSIS — M54.2 CERVICALGIA: ICD-10-CM

## 2023-06-03 DIAGNOSIS — B02.23 POSTHERPETIC POLYNEUROPATHY: ICD-10-CM

## 2023-06-03 DIAGNOSIS — G47.00 PERSISTENT INSOMNIA: ICD-10-CM

## 2023-06-03 DIAGNOSIS — Z01.818 PREOP GENERAL PHYSICAL EXAM: ICD-10-CM

## 2023-06-06 ENCOUNTER — HOSPITAL ENCOUNTER (OUTPATIENT)
Dept: NUCLEAR MEDICINE | Facility: HOSPITAL | Age: 73
Setting detail: NUCLEAR MEDICINE
Discharge: HOME | End: 2023-06-06
Attending: INTERNAL MEDICINE
Payer: COMMERCIAL

## 2023-06-06 ENCOUNTER — HOSPITAL ENCOUNTER (OUTPATIENT)
Dept: CARDIOLOGY | Facility: HOSPITAL | Age: 73
Setting detail: NUCLEAR MEDICINE
Discharge: HOME | End: 2023-06-06
Attending: INTERNAL MEDICINE | Admitting: INTERNAL MEDICINE
Payer: COMMERCIAL

## 2023-06-06 DIAGNOSIS — R06.09 DOE (DYSPNEA ON EXERTION): ICD-10-CM

## 2023-06-06 PROCEDURE — 78452 HT MUSCLE IMAGE SPECT MULT: CPT

## 2023-06-06 PROCEDURE — 93017 CV STRESS TEST TRACING ONLY: CPT

## 2023-06-06 PROCEDURE — A9500 TC99M SESTAMIBI: HCPCS | Performed by: RADIOLOGY

## 2023-06-06 PROCEDURE — 93016 CV STRESS TEST SUPVJ ONLY: CPT | Performed by: INTERNAL MEDICINE

## 2023-06-06 PROCEDURE — 93018 CV STRESS TEST I&R ONLY: CPT | Performed by: INTERNAL MEDICINE

## 2023-06-06 PROCEDURE — 250N000011 HC RX IP 250 OP 636: Performed by: INTERNAL MEDICINE

## 2023-06-06 PROCEDURE — 343N000001 HC RX 343: Performed by: RADIOLOGY

## 2023-06-06 RX ORDER — AMINOPHYLLINE 25 MG/ML
INJECTION, SOLUTION INTRAVENOUS
Status: DISCONTINUED
Start: 2023-06-06 | End: 2023-06-06 | Stop reason: WASHOUT

## 2023-06-06 RX ORDER — REGADENOSON 0.08 MG/ML
0.4 INJECTION, SOLUTION INTRAVENOUS ONCE
Status: COMPLETED | OUTPATIENT
Start: 2023-06-06 | End: 2023-06-06

## 2023-06-06 RX ADMIN — REGADENOSON 0.4 MG: 0.08 INJECTION, SOLUTION INTRAVENOUS at 08:36

## 2023-06-06 RX ADMIN — Medication 10.5 MILLICURIE: at 06:48

## 2023-06-06 RX ADMIN — Medication 32.4 MILLICURIE: at 08:40

## 2023-06-06 NOTE — TELEPHONE ENCOUNTER
cyclobenzaprine (FLEXERIL) 10 MG tablet     Last Written Prescription Date:  5/08/2023  Last Fill Quantity: 90,   # refills: 0  Last Office Visit: 11/21/2022    promethazine (PHENERGAN) 25 MG tablet      Last Written Prescription Date:  4/11/2023  Last Fill Quantity: 90,   # refills: 1    zolpidem (AMBIEN) 10 MG tablet      Last Written Prescription Date:  5/08/2023  Last Fill Quantity: 30,   # refills: 0    gabapentin (NEURONTIN) 300 MG capsule      Last Written Prescription Date:  5/08/2023  Last Fill Quantity: 90,   # refills: 0

## 2023-06-07 LAB
CV BLOOD PRESSURE: 90 MMHG
CV STRESS MAX HR HE: 95
NUC STRESS EJECTION FRACTION: 85 %
RATE PRESSURE PRODUCT: NORMAL
STRESS ECHO BASELINE DIASTOLIC HE: 80
STRESS ECHO BASELINE HR: 84 BPM
STRESS ECHO BASELINE SYSTOLIC BP: 146
STRESS ECHO CALCULATED PERCENT HR: 64 %
STRESS ECHO LAST STRESS DIASTOLIC BP: 74
STRESS ECHO LAST STRESS SYSTOLIC BP: 132
STRESS ECHO TARGET HR: 149

## 2023-06-07 RX ORDER — PROMETHAZINE HYDROCHLORIDE 25 MG/1
TABLET ORAL
Qty: 90 TABLET | Refills: 1 | Status: SHIPPED | OUTPATIENT
Start: 2023-06-07 | End: 2023-07-27

## 2023-06-07 RX ORDER — CYCLOBENZAPRINE HCL 10 MG
TABLET ORAL
Qty: 90 TABLET | Refills: 0 | Status: SHIPPED | OUTPATIENT
Start: 2023-06-07 | End: 2023-06-28

## 2023-06-07 RX ORDER — GABAPENTIN 300 MG/1
CAPSULE ORAL
Qty: 90 CAPSULE | Refills: 0 | Status: SHIPPED | OUTPATIENT
Start: 2023-06-07 | End: 2023-06-28

## 2023-06-07 RX ORDER — ZOLPIDEM TARTRATE 10 MG/1
TABLET ORAL
Qty: 30 TABLET | Refills: 0 | Status: SHIPPED | OUTPATIENT
Start: 2023-06-07 | End: 2023-06-28

## 2023-06-08 DIAGNOSIS — F34.1 DYSTHYMIA: ICD-10-CM

## 2023-06-12 RX ORDER — ESCITALOPRAM OXALATE 20 MG/1
TABLET ORAL
Qty: 60 TABLET | Refills: 0 | Status: SHIPPED | OUTPATIENT
Start: 2023-06-12 | End: 2023-08-10

## 2023-06-12 NOTE — TELEPHONE ENCOUNTER
norco      Last Written Prescription Date: 1/16/17  Last Fill Quantity: 120,  # refills: 0   Last Office Visit with G, P or [facility] prescribing provider: 11/14/16                                                cHTN in pregnancy

## 2023-06-12 NOTE — TELEPHONE ENCOUNTER
Lexapro       Last Written Prescription Date:  4/12/2023  Last Fill Quantity: 60,   # refills: 0  Last Office Visit: 11/21/2022  Future Office visit:

## 2023-06-28 DIAGNOSIS — G47.00 PERSISTENT INSOMNIA: ICD-10-CM

## 2023-06-28 DIAGNOSIS — M54.2 CERVICALGIA: ICD-10-CM

## 2023-06-28 DIAGNOSIS — B02.23 POSTHERPETIC POLYNEUROPATHY: ICD-10-CM

## 2023-06-28 RX ORDER — ZOLPIDEM TARTRATE 10 MG/1
TABLET ORAL
Qty: 30 TABLET | Refills: 0 | Status: SHIPPED | OUTPATIENT
Start: 2023-06-28 | End: 2023-08-01

## 2023-06-28 RX ORDER — GABAPENTIN 300 MG/1
CAPSULE ORAL
Qty: 90 CAPSULE | Refills: 0 | Status: SHIPPED | OUTPATIENT
Start: 2023-06-28 | End: 2023-08-01

## 2023-06-28 RX ORDER — CYCLOBENZAPRINE HCL 10 MG
TABLET ORAL
Qty: 90 TABLET | Refills: 0 | Status: SHIPPED | OUTPATIENT
Start: 2023-06-28 | End: 2023-07-27

## 2023-06-28 NOTE — TELEPHONE ENCOUNTER
Patient contacted and stated that she never received the Lortab on 4/30/18. And does not have any the LORTAB medication left. Patient is requesting the medication.  Nunu Corcoran, CMA    - - -

## 2023-06-28 NOTE — TELEPHONE ENCOUNTER
ZOLPIDEM 10MG TABLET      Last Written Prescription Date:  6/7/2023  Last Fill Quantity: 30,   # refills: 0  Last Office Visit: 11/21/2022  Future Office visit:       Routing refill request to provider for review/approval because:      GABAPENTIN 300MG CAPSULE      Last Written Prescription Date:  6/7/2023  Last Fill Quantity: 90,   # refills: 0  Last Office Visit: 11/21/2022  Future Office visit:       Routing refill request to provider for review/approval because:      CYCLOBENZAPRINE 10MG TABLET      Last Written Prescription Date:  6/7/2023  Last Fill Quantity: 90,   # refills: 0  Last Office Visit: 11/21/2022  Future Office visit:       Routing refill request to provider for review/approval because:    Kimberly Boecker, RN      
unable to assess

## 2023-07-06 DIAGNOSIS — M54.2 CERVICAL PAIN: ICD-10-CM

## 2023-07-06 DIAGNOSIS — M25.561 ARTHRALGIA OF BOTH KNEES: ICD-10-CM

## 2023-07-06 DIAGNOSIS — M25.562 ARTHRALGIA OF BOTH KNEES: ICD-10-CM

## 2023-07-06 NOTE — TELEPHONE ENCOUNTER
Hydrocodone-Acetaminophen (Norco) 7.5/325 MG per tablet     Last Written Prescription Date:  06/14/2023  Last Fill Quantity: 60,   # refills: 0  Last Office Visit: 11/21/2022  Future Office visit:       Routing refill request to provider for review/approval because:  Drug not on the [facility], P or [facility] refill protocol or controlled substance

## 2023-07-07 RX ORDER — HYDROCODONE BITARTRATE AND ACETAMINOPHEN 7.5; 325 MG/1; MG/1
TABLET ORAL
Qty: 60 TABLET | Refills: 0 | Status: SHIPPED | OUTPATIENT
Start: 2023-07-07 | End: 2023-08-07

## 2023-07-14 NOTE — PLAN OF CARE
Operative Note    Preoperative Diagnosis:  Right Carpal Tunnel  Postoperative Diagnosis: same  Procedure: Right open carpal tunnel release  Surgeon: Wilfrido Hua DO  Estimated Blood loss: minimal  Complications: none  Anesthesia: local  Assistant:Jada Darling  Findings: normal appearing carpal canal        Indications:  the patient was seen in an outpatient setting.  Per history, physical examination, and electrodiagnostic studies, the patient was found to have symptomatic right carpal tunnel syndrome.  The patient had failed nonoperative treatment, and was requesting surgical intervention.  The risks and benefits of operative and nonoperative treatments were thoroughly discussed, and the patient desired to proceed with surgery.     The patient was marked in the preoperative area.  The patient was brought back to the operating room where a surgical timeout was performed.  Under sterile techniques, an equal mixture of 1% lidocaine mixed with epinephrine 1:100,000 was injected into the subcutaneous tissues of the right palm.  The right upper extremity was then prepped and drapped in the usual fashion.     A 2 cm longitudinal incision was made at the right distal volar wrist crease and extended distally along the third web space.  The palmar fascia was sharply incised.  The transverse carpal ligament was visualized.  A small incision was made into the ligament.  A freer elevator was placed into the carpal canal protecting the underlying contents.  The ligament was then released distally until the fat overlying the superficial palmar arch was visualized.  The ligament as well as 3 mm of antebrachial fascia was also released in line with the skin incision, also under direct vision.     The median nerve was grossly normal in appearance, as was the remainder of the contents of the carpal canal.   The wound was thoroughly irrigated, and hemostasis obtained with pressure.  The skin was closed with interupted 4-0 nylon, and a  Face to face report given with opportunity to observe patient.    Report given to Earnestine Wagner   4/19/2018  11:01 PM       soft bulky bandage was applied.  The fingers and thumb were pink, warm and well perfused following the surgery.  No complications were noted.

## 2023-07-24 DIAGNOSIS — M54.2 CERVICALGIA: ICD-10-CM

## 2023-07-24 DIAGNOSIS — Z01.818 PREOP GENERAL PHYSICAL EXAM: ICD-10-CM

## 2023-07-27 DIAGNOSIS — M54.2 CERVICALGIA: ICD-10-CM

## 2023-07-27 RX ORDER — PROMETHAZINE HYDROCHLORIDE 25 MG/1
TABLET ORAL
Qty: 90 TABLET | Refills: 0 | Status: SHIPPED | OUTPATIENT
Start: 2023-07-27 | End: 2023-08-25

## 2023-07-27 RX ORDER — CYCLOBENZAPRINE HCL 10 MG
TABLET ORAL
Qty: 90 TABLET | Refills: 0 | OUTPATIENT
Start: 2023-07-27

## 2023-07-27 NOTE — TELEPHONE ENCOUNTER
Patient was told by pharmacy the med was refused.  She would like to know why?  She takes 3 tabs a day.        cyclobenzaprine (FLEXERIL) 10 MG tablet       Last Written Prescription Date:  6/28/23  Last Fill Quantity: 90,   # refills: 0  Last Office Visit: 11/21/22  Future Office visit:       Routing refill request to provider for review/approval because:

## 2023-07-28 RX ORDER — CYCLOBENZAPRINE HCL 10 MG
TABLET ORAL
Qty: 90 TABLET | Refills: 0 | Status: SHIPPED | OUTPATIENT
Start: 2023-07-28 | End: 2023-08-28

## 2023-07-28 NOTE — TELEPHONE ENCOUNTER
I'm not sure why refill was declined - reasoning was listed that it was filled on 6/28/23 #90, which if she is taking it TID every day that would be a one month supply.  I will say that at her age the safety of this medication isn't great, and she should talk to Dr. Milton about safer alternatives.  Otherwise, I have approved one refill, she is due for appointment with Dr. Milton for general check-up, and she can discuss medication then.

## 2023-07-29 DIAGNOSIS — G47.00 PERSISTENT INSOMNIA: ICD-10-CM

## 2023-07-29 DIAGNOSIS — B02.23 POSTHERPETIC POLYNEUROPATHY: ICD-10-CM

## 2023-08-01 RX ORDER — GABAPENTIN 300 MG/1
CAPSULE ORAL
Qty: 90 CAPSULE | Refills: 0 | Status: SHIPPED | OUTPATIENT
Start: 2023-08-01 | End: 2023-08-28

## 2023-08-01 RX ORDER — ZOLPIDEM TARTRATE 10 MG/1
TABLET ORAL
Qty: 30 TABLET | Refills: 0 | Status: SHIPPED | OUTPATIENT
Start: 2023-08-01 | End: 2023-08-28

## 2023-08-01 NOTE — TELEPHONE ENCOUNTER
Ambien, Gabapentin      Last Written Prescription Date:  7.3.23  Last Fill Quantity: #30, #90,   # refills: 0  Last Office Visit: 11.21.22  Future Office visit:       Routing refill request to provider for review/approval because:  Drug not on the [facility], [facility] or [facility] refill protocol or controlled substance

## 2023-08-03 DIAGNOSIS — M25.562 ARTHRALGIA OF BOTH KNEES: ICD-10-CM

## 2023-08-03 DIAGNOSIS — M54.2 CERVICAL PAIN: ICD-10-CM

## 2023-08-03 DIAGNOSIS — M25.561 ARTHRALGIA OF BOTH KNEES: ICD-10-CM

## 2023-08-03 NOTE — TELEPHONE ENCOUNTER
Hydrocodone/Acetaminophen (Norco) 7.5/325 MG per tablet    Last Written Prescription Date:  07/07/2023  Last Fill Quantity: 60,   # refills: 0  Last Office Visit: 11/21/2022  Future Office visit:       Routing refill request to provider for review/approval because:  Drug not on the [facility], [facility] or [facility] refill protocol or controlled substance

## 2023-08-04 DIAGNOSIS — F34.1 DYSTHYMIA: ICD-10-CM

## 2023-08-04 DIAGNOSIS — G47.00 PERSISTENT INSOMNIA: ICD-10-CM

## 2023-08-07 RX ORDER — HYDROCODONE BITARTRATE AND ACETAMINOPHEN 7.5; 325 MG/1; MG/1
1 TABLET ORAL EVERY 6 HOURS PRN
Qty: 60 TABLET | Refills: 0 | Status: SHIPPED | OUTPATIENT
Start: 2023-08-07 | End: 2023-08-28

## 2023-08-09 RX ORDER — TRAZODONE HYDROCHLORIDE 150 MG/1
150 TABLET ORAL AT BEDTIME
Qty: 30 TABLET | Refills: 2 | Status: SHIPPED | OUTPATIENT
Start: 2023-08-09 | End: 2023-10-20

## 2023-08-09 NOTE — TELEPHONE ENCOUNTER
Failed protocol        4/13/2021    11:00 AM 12/7/2021     2:00 PM 11/21/2022     1:37 PM   PHQ   PHQ-9 Total Score 15 5 0   Q9: Thoughts of better off dead/self-harm past 2 weeks Not at all Not at all Not at all        escitalopram (LEXAPRO) 20 MG table       Last Written Prescription Date:  6/12/23  Last Fill Quantity: 60,   # refills: 0  Last Office Visit: 11/21/23  Future Office visit:    Next 5 appointments (look out 90 days)      Aug 28, 2023 12:00 PM  (Arrive by 11:45 AM)  [facility]YS[facility] with Balta Milton,   [facility] [address]  [place] [address] [phone]             Routing refill request to provider for review/approval because:

## 2023-08-10 RX ORDER — ESCITALOPRAM OXALATE 20 MG/1
TABLET ORAL
Qty: 60 TABLET | Refills: 0 | Status: SHIPPED | OUTPATIENT
Start: 2023-08-10 | End: 2023-10-09

## 2023-08-24 DIAGNOSIS — M54.2 CERVICALGIA: ICD-10-CM

## 2023-08-24 DIAGNOSIS — Z01.818 PREOP GENERAL PHYSICAL EXAM: ICD-10-CM

## 2023-08-25 RX ORDER — PROMETHAZINE HYDROCHLORIDE 25 MG/1
TABLET ORAL
Qty: 90 TABLET | Refills: 1 | Status: SHIPPED | OUTPATIENT
Start: 2023-08-25 | End: 2023-10-20

## 2023-08-25 NOTE — TELEPHONE ENCOUNTER
Flexeril      Last Written Prescription Date:  7.28.23  Last Fill Quantity: #90,   # refills: 0  Last Office Visit: 11.21.22  Future Office visit:    Next 5 appointments (look out 90 days)      Aug 28, 2023 12:00 PM  (Arrive by 11:45 AM)  PHYSICAL with Balta Milton DO  [facility] ([facility] ) [address]  [facility] [id number] [phone]             Routing refill request to provider for review/approval because:  Drug not on the [facility], [facility] or [facility] refill protocol or controlled substance

## 2023-08-25 NOTE — TELEPHONE ENCOUNTER
Promethazine (Phenergan) 25 MG tablet    Last Written Prescription Date:  07/27/2023  Last Fill Quantity: 90,   # refills: 0  Last Office Visit: 11/21/2022

## 2023-08-27 DIAGNOSIS — E03.9 HYPOTHYROIDISM, UNSPECIFIED TYPE: ICD-10-CM

## 2023-08-28 ENCOUNTER — TELEPHONE (OUTPATIENT)
Dept: INTERNAL MEDICINE | Facility: OTHER | Age: 73
End: 2023-08-28

## 2023-08-28 ENCOUNTER — OFFICE VISIT (OUTPATIENT)
Dept: INTERNAL MEDICINE | Facility: OTHER | Age: 73
End: 2023-08-28
Attending: INTERNAL MEDICINE
Payer: COMMERCIAL

## 2023-08-28 ENCOUNTER — LAB (OUTPATIENT)
Dept: INTERNAL MEDICINE | Facility: OTHER | Age: 73
End: 2023-08-28

## 2023-08-28 VITALS
OXYGEN SATURATION: 99 % | RESPIRATION RATE: 20 BRPM | HEART RATE: 90 BPM | WEIGHT: 161.3 LBS | BODY MASS INDEX: 29.49 KG/M2 | DIASTOLIC BLOOD PRESSURE: 76 MMHG | TEMPERATURE: 97.3 F | SYSTOLIC BLOOD PRESSURE: 138 MMHG

## 2023-08-28 DIAGNOSIS — M25.562 ARTHRALGIA OF BOTH KNEES: ICD-10-CM

## 2023-08-28 DIAGNOSIS — E53.8 VITAMIN B12 DEFICIENCY (NON ANEMIC): ICD-10-CM

## 2023-08-28 DIAGNOSIS — G44.001 INTRACTABLE CLUSTER HEADACHE SYNDROME, UNSPECIFIED CHRONICITY PATTERN: ICD-10-CM

## 2023-08-28 DIAGNOSIS — Z12.11 COLON CANCER SCREENING: ICD-10-CM

## 2023-08-28 DIAGNOSIS — E03.9 ACQUIRED HYPOTHYROIDISM: Primary | ICD-10-CM

## 2023-08-28 DIAGNOSIS — Z00.00 ROUTINE HISTORY AND PHYSICAL EXAMINATION OF ADULT: ICD-10-CM

## 2023-08-28 DIAGNOSIS — B02.23 POSTHERPETIC POLYNEUROPATHY: ICD-10-CM

## 2023-08-28 DIAGNOSIS — E78.5 HYPERLIPIDEMIA LDL GOAL <130: ICD-10-CM

## 2023-08-28 DIAGNOSIS — G43.009 MIGRAINE WITHOUT AURA AND WITHOUT STATUS MIGRAINOSUS, NOT INTRACTABLE: ICD-10-CM

## 2023-08-28 DIAGNOSIS — M54.2 CERVICAL PAIN: ICD-10-CM

## 2023-08-28 DIAGNOSIS — G47.00 PERSISTENT INSOMNIA: ICD-10-CM

## 2023-08-28 DIAGNOSIS — M54.2 CERVICALGIA: ICD-10-CM

## 2023-08-28 DIAGNOSIS — E55.9 VITAMIN D DEFICIENCY: ICD-10-CM

## 2023-08-28 DIAGNOSIS — K21.00 GASTROESOPHAGEAL REFLUX DISEASE WITH ESOPHAGITIS, UNSPECIFIED WHETHER HEMORRHAGE: ICD-10-CM

## 2023-08-28 DIAGNOSIS — M25.561 ARTHRALGIA OF BOTH KNEES: ICD-10-CM

## 2023-08-28 LAB
ALBUMIN SERPL BCG-MCNC: 4.7 G/DL (ref 3.5–5.2)
ALP SERPL-CCNC: 88 U/L (ref 35–104)
ALT SERPL W P-5'-P-CCNC: 32 U/L (ref 0–50)
ANION GAP SERPL CALCULATED.3IONS-SCNC: 14 MMOL/L (ref 7–15)
AST SERPL W P-5'-P-CCNC: 27 U/L (ref 0–45)
BASOPHILS # BLD AUTO: 0.1 10E3/UL (ref 0–0.2)
BASOPHILS NFR BLD AUTO: 1 %
BILIRUB SERPL-MCNC: 0.3 MG/DL
BUN SERPL-MCNC: 9.4 MG/DL (ref 8–23)
CALCIUM SERPL-MCNC: 9.3 MG/DL (ref 8.8–10.2)
CHLORIDE SERPL-SCNC: 105 MMOL/L (ref 98–107)
CHOLEST SERPL-MCNC: 184 MG/DL
CREAT SERPL-MCNC: 0.73 MG/DL (ref 0.51–0.95)
DEPRECATED HCO3 PLAS-SCNC: 22 MMOL/L (ref 22–29)
EOSINOPHIL # BLD AUTO: 0.5 10E3/UL (ref 0–0.7)
EOSINOPHIL NFR BLD AUTO: 7 %
ERYTHROCYTE [DISTWIDTH] IN BLOOD BY AUTOMATED COUNT: 14.1 % (ref 10–15)
GFR SERPL CREATININE-BSD FRML MDRD: 87 ML/MIN/1.73M2
GLUCOSE SERPL-MCNC: 104 MG/DL (ref 70–99)
HCT VFR BLD AUTO: 42.3 % (ref 35–47)
HDLC SERPL-MCNC: 58 MG/DL
HGB BLD-MCNC: 13.8 G/DL (ref 11.7–15.7)
IMM GRANULOCYTES # BLD: 0 10E3/UL
IMM GRANULOCYTES NFR BLD: 0 %
LDLC SERPL CALC-MCNC: 87 MG/DL
LYMPHOCYTES # BLD AUTO: 2.2 10E3/UL (ref 0.8–5.3)
LYMPHOCYTES NFR BLD AUTO: 33 %
MCH RBC QN AUTO: 29.5 PG (ref 26.5–33)
MCHC RBC AUTO-ENTMCNC: 32.6 G/DL (ref 31.5–36.5)
MCV RBC AUTO: 90 FL (ref 78–100)
MONOCYTES # BLD AUTO: 0.6 10E3/UL (ref 0–1.3)
MONOCYTES NFR BLD AUTO: 9 %
NEUTROPHILS # BLD AUTO: 3.3 10E3/UL (ref 1.6–8.3)
NEUTROPHILS NFR BLD AUTO: 50 %
NONHDLC SERPL-MCNC: 126 MG/DL
PLATELET # BLD AUTO: 308 10E3/UL (ref 150–450)
POTASSIUM SERPL-SCNC: 4.5 MMOL/L (ref 3.4–5.3)
PROT SERPL-MCNC: 7.4 G/DL (ref 6.4–8.3)
RBC # BLD AUTO: 4.68 10E6/UL (ref 3.8–5.2)
SODIUM SERPL-SCNC: 141 MMOL/L (ref 136–145)
T4 FREE SERPL-MCNC: 1.17 NG/DL (ref 0.9–1.7)
TRIGL SERPL-MCNC: 195 MG/DL
TSH SERPL DL<=0.005 MIU/L-ACNC: 5.12 UIU/ML (ref 0.3–4.2)
WBC # BLD AUTO: 6.7 10E3/UL (ref 4–11)

## 2023-08-28 PROCEDURE — 99215 OFFICE O/P EST HI 40 MIN: CPT | Performed by: INTERNAL MEDICINE

## 2023-08-28 PROCEDURE — 85025 COMPLETE CBC W/AUTO DIFF WBC: CPT | Mod: ZL | Performed by: INTERNAL MEDICINE

## 2023-08-28 PROCEDURE — 80061 LIPID PANEL: CPT | Mod: ZL | Performed by: INTERNAL MEDICINE

## 2023-08-28 PROCEDURE — 36415 COLL VENOUS BLD VENIPUNCTURE: CPT | Mod: ZL | Performed by: INTERNAL MEDICINE

## 2023-08-28 PROCEDURE — G0463 HOSPITAL OUTPT CLINIC VISIT: HCPCS

## 2023-08-28 PROCEDURE — 82607 VITAMIN B-12: CPT | Mod: ZL | Performed by: INTERNAL MEDICINE

## 2023-08-28 PROCEDURE — 82306 VITAMIN D 25 HYDROXY: CPT | Mod: ZL | Performed by: INTERNAL MEDICINE

## 2023-08-28 PROCEDURE — 84443 ASSAY THYROID STIM HORMONE: CPT | Mod: ZL | Performed by: INTERNAL MEDICINE

## 2023-08-28 PROCEDURE — 80053 COMPREHEN METABOLIC PANEL: CPT | Mod: ZL | Performed by: INTERNAL MEDICINE

## 2023-08-28 PROCEDURE — 84439 ASSAY OF FREE THYROXINE: CPT | Mod: ZL | Performed by: INTERNAL MEDICINE

## 2023-08-28 RX ORDER — CYCLOBENZAPRINE HCL 10 MG
TABLET ORAL
Qty: 90 TABLET | Refills: 0 | Status: SHIPPED | OUTPATIENT
Start: 2023-08-28 | End: 2023-08-28

## 2023-08-28 RX ORDER — LEVOTHYROXINE SODIUM 75 UG/1
75 TABLET ORAL DAILY
Qty: 90 TABLET | Refills: 3 | Status: SHIPPED | OUTPATIENT
Start: 2023-08-28

## 2023-08-28 RX ORDER — GABAPENTIN 300 MG/1
300 CAPSULE ORAL 3 TIMES DAILY
Qty: 90 CAPSULE | Refills: 0 | Status: SHIPPED | OUTPATIENT
Start: 2023-08-28 | End: 2023-09-25

## 2023-08-28 RX ORDER — HYDROCODONE BITARTRATE AND ACETAMINOPHEN 7.5; 325 MG/1; MG/1
1 TABLET ORAL EVERY 6 HOURS PRN
Qty: 60 TABLET | Refills: 0 | Status: SHIPPED | OUTPATIENT
Start: 2023-09-05 | End: 2023-10-02

## 2023-08-28 RX ORDER — PANTOPRAZOLE SODIUM 40 MG/1
TABLET, DELAYED RELEASE ORAL
Qty: 90 TABLET | Refills: 3 | Status: SHIPPED | OUTPATIENT
Start: 2023-08-28 | End: 2024-08-08

## 2023-08-28 RX ORDER — ZOLPIDEM TARTRATE 10 MG/1
10 TABLET ORAL
Qty: 30 TABLET | Refills: 0 | Status: SHIPPED | OUTPATIENT
Start: 2023-08-28 | End: 2023-09-25

## 2023-08-28 RX ORDER — CYCLOBENZAPRINE HCL 10 MG
TABLET ORAL
Qty: 90 TABLET | Refills: 0 | Status: SHIPPED | OUTPATIENT
Start: 2023-08-28 | End: 2023-09-25

## 2023-08-28 ASSESSMENT — PAIN SCALES - GENERAL: PAINLEVEL: MODERATE PAIN (4)

## 2023-08-28 ASSESSMENT — ANXIETY QUESTIONNAIRES
6. BECOMING EASILY ANNOYED OR IRRITABLE: NOT AT ALL
4. TROUBLE RELAXING: NOT AT ALL
GAD7 TOTAL SCORE: 0
1. FEELING NERVOUS, ANXIOUS, OR ON EDGE: NOT AT ALL
3. WORRYING TOO MUCH ABOUT DIFFERENT THINGS: NOT AT ALL
7. FEELING AFRAID AS IF SOMETHING AWFUL MIGHT HAPPEN: NOT AT ALL
GAD7 TOTAL SCORE: 0
IF YOU CHECKED OFF ANY PROBLEMS ON THIS QUESTIONNAIRE, HOW DIFFICULT HAVE THESE PROBLEMS MADE IT FOR YOU TO DO YOUR WORK, TAKE CARE OF THINGS AT HOME, OR GET ALONG WITH OTHER PEOPLE: NOT DIFFICULT AT ALL
5. BEING SO RESTLESS THAT IT IS HARD TO SIT STILL: NOT AT ALL
2. NOT BEING ABLE TO STOP OR CONTROL WORRYING: NOT AT ALL

## 2023-08-28 ASSESSMENT — PATIENT HEALTH QUESTIONNAIRE - PHQ9
SUM OF ALL RESPONSES TO PHQ QUESTIONS 1-9: 4
10. IF YOU CHECKED OFF ANY PROBLEMS, HOW DIFFICULT HAVE THESE PROBLEMS MADE IT FOR YOU TO DO YOUR WORK, TAKE CARE OF THINGS AT HOME, OR GET ALONG WITH OTHER PEOPLE: NOT DIFFICULT AT ALL
SUM OF ALL RESPONSES TO PHQ QUESTIONS 1-9: 4

## 2023-08-28 NOTE — TELEPHONE ENCOUNTER
Received PA request from Dipesh Islas for Nurtec 75MG dispersible tablets.  PA Submitted on CMM, waiting for response.

## 2023-08-28 NOTE — LETTER
Opioid / Opioid Plus Controlled Substance Agreement    This is an agreement between you and your provider about the safe and appropriate use of controlled substance/opioids prescribed by your care team. Controlled substances are medicines that can cause physical and mental dependence (abuse).    There are strict laws about having and using these medicines. We here at [facility] are committing to working with you in your efforts to get better. To support you in this work, we ll help you schedule regular office appointments for medicine refills. If we must cancel or change your appointment for any reason, we ll make sure you have enough medicine to last until your next appointment.     As a Provider, I will:  Listen carefully to your concerns and treat you with respect.   Recommend a treatment plan that I believe is in your best interest. This plan may involve therapies other than opioid pain medication.   Talk with you often about the possible benefits, and the risk of harm of any medicine that we prescribe for you.   Provide a plan on how to taper (discontinue or go off) using this medicine if the decision is made to stop its use.    As a Patient, I understand that opioid(s):   Are a controlled substance prescribed by my care team to help me function or work and manage my condition(s).   Are strong medicines and can cause serious side effects such as:  Drowsiness, which can seriously affect my driving ability  A lower breathing rate, enough to cause death  Harm to my thinking ability   Depression   Abuse of and addiction to this medicine  Need to be taken exactly as prescribed. Combining opioids with certain medicines or chemicals (such as illegal drugs, sedatives, sleeping pills, and benzodiazepines) can be dangerous or even fatal. If I stop opioids suddenly, I may have severe withdrawal symptoms.  Do not work for all types of pain nor for all patients. If they re not helpful, I may be asked to stop  them.        The risks, benefits and side effects of these medicine(s) were explained to me. I agree that:  I will take part in other treatments as advised by my care team. This may be psychiatry or counseling, physical therapy, behavioral therapy, group treatment or a referral to a specialist.     I will keep all my appointments. I understand that this is part of the monitoring of opioids. My care team may require an office visit for EVERY opioid/controlled substance refill. If I miss appointments or don t follow instructions, my care team may stop my medicine.    I will take my medicines as prescribed. I will not change the dose or schedule unless my care team tells me to. There will be no refills if I run out early.     I may be asked to come to the clinic and complete a urine drug test or complete a pill count at any time. If I don t give a urine sample or participate in a pill count, the care team may stop my medicine.    I will only receive prescriptions from this clinic for chronic pain. If I am treated by another provider for acute pain issues, I will tell them that I am taking opioid pain medication for chronic pain and that I have a treatment agreement with this provider. I will inform my [facility] care team within one business day if I am given a prescription for any pain medication by another healthcare provider. My [facility] care team can contact other providers and pharmacists about my use of any medicines.    It is up to me to make sure that I don t run out of my medicines on weekends or holidays. If my care team is willing to refill my opioid prescription without a visit, I must request refills only during office hours. Refills may take up to 3 business days to process. I will use one pharmacy to fill all my opioid and other controlled substance prescriptions. I will notify the clinic about any changes to my insurance or medication availability.    I am responsible for my  prescriptions. If the medicine/prescription is lost, stolen or destroyed, it will not be replaced. I also agree not to share controlled substance medicines with anyone.    I am aware I should not use any illegal or recreational drugs. I agree not to drink alcohol unless my care team says I can.       If I enroll in the [place] [facility] program, I will tell my care team prior to my next refill.     I will tell my care team right away if I become pregnant, have a new medical problem treated outside of my regular clinic, or have a change in my medications.    I understand that this medicine can affect my thinking, judgment and reaction time. Alcohol and drugs affect the brain and body, which can affect the safety of my driving. Being under the influence of alcohol or drugs can affect my decision-making, behaviors, personal safety, and the safety of others. Driving while impaired (DWI) can occur if a person is driving, operating, or in physical control of a car, motorcycle, boat, snowmobile, ATV, motorbike, off-road vehicle, or any other motor vehicle ([place] Statute 169A.20). I understand the risk if I choose to drive or operate any vehicle or machinery.    I understand that if I do not follow any of the conditions above, my prescriptions or treatment may be stopped or changed.          Opioids  What You Need to Know    What are opioids?   Opioids are pain medicines that must be prescribed by a doctor. They are also known as narcotics.     Examples are:   morphine (MS Contin, Yanet)  oxycodone (Oxycontin)  oxycodone and acetaminophen (Percocet)  hydrocodone and acetaminophen (Vicodin, Norco)   fentanyl patch (Duragesic)   hydromorphone (Dilaudid)   methadone  codeine (Tylenol #3)     What do opioids do well?   Opioids are best for severe short-term pain such as after a surgery or injury. They may work well for cancer pain. They may help some people with long-lasting (chronic) pain.     What do opioids NOT do  well?   Opioids never get rid of pain entirely, and they don t work well for most patients with chronic pain. Opioids don t reduce swelling, one of the causes of pain.                                    Other ways to manage chronic pain and improve function include:     Treat the health problem that may be causing pain  Anti-inflammation medicines, which reduce swelling and tenderness, such as ibuprofen (Advil, Motrin) or naproxen (Aleve)  Acetaminophen (Tylenol)  Antidepressants and anti-seizure medicines, especially for nerve pain  Topical treatments such as patches or creams  Injections or nerve blocks  Chiropractic or osteopathic treatment  Acupuncture, massage, deep breathing, meditation, visual imagery, aromatherapy  Use heat or ice at the pain site  Physical therapy   Exercise  Stop smoking  Take part in therapy       Risks and side effects     Talk to your doctor before you start or decide to keep taking opioids. Possible side effects include:    Lowering your breathing rate enough to cause death  Overdose, including death, especially if taking higher than prescribed doses  Worse depression symptoms; less pleasure in things you usually enjoy  Feeling tired or sluggish  Slower thoughts or cloudy thinking  Being more sensitive to pain over time; pain is harder to control  Trouble sleeping or restless sleep  Changes in hormone levels (for example, less testosterone)  Changes in sex drive or ability to have sex  Constipation  Unsafe driving  Itching and sweating  Dizziness  Nausea, throwing up and dry mouth    What else should I know about opioids?    Opioids may lead to dependence, tolerance, or addiction.    Dependence means that if you stop or reduce the medicine too quickly, you will have withdrawal symptoms. These include loose poop (diarrhea), jitters, flu-like symptoms, nervousness and tremors. Dependence is not the same as addiction.                     Tolerance means needing higher doses over time to  get the same effect. This may increase the chance of serious side effects.    Addiction is when people improperly use a substance that harms their body, their mind or their relations with others. Use of opiates can cause a relapse of addiction if you have a history of drug or alcohol abuse.    People who have used opioids for a long time may have a lower quality of life, worse depression, higher levels of pain and more visits to doctors.    You can overdose on opioids. Take these steps to lower your risk of overdose:    Recognize the signs:  Signs of overdose include decrease or loss of consciousness (blackout), slowed breathing, trouble waking up and blue lips. If someone is worried about overdose, they should call 911.    Talk to your doctor about Narcan (naloxone).   If you are at risk for overdose, you may be given a prescription for Narcan. This medicine very quickly reverses the effects of opioids.   If you overdose, a friend or family member can give you Narcan while waiting for the ambulance. They need to know the signs of overdose and how to give Narcan.     Don't use alcohol or street drugs.   Taking them with opioids can cause death.    Do not take any of these medicines unless your doctor says it s OK. Taking these with opioids can cause death:  Benzodiazepines, such as lorazepam (Ativan), alprazolam (Xanax) or diazepam (Valium)  Muscle relaxers, such as cyclobenzaprine (Flexeril)  Sleeping pills like zolpidem (Ambien)   Other opioids      How to keep you and other people safe while taking opioids:    Never share your opioids with others.  Opioid medicines are regulated by the Drug Enforcement Agency (SON[place]). Selling or sharing medications is a criminal act.    2. Be sure to store opioids in a secure place, locked up if possible. Young children can easily swallow them and overdose.    3. When you are traveling with your medicines, keep them in the original bottles. If you use a pill box, be sure you also  carry a copy of your medicine list from your clinic or pharmacy.    4. Safe disposal of opioids    Most pharmacies have places to get rid of medicine, called disposal kiosks. Medicine disposal options are also available in every [facility]. Search your county and  medication disposal  to find more options. You can find more details at:  https://www.pca.[facility].[place]./living-green/managing-unwanted-medications     I agree that my provider, clinic care team, and pharmacy may work with any city, state or federal law enforcement agency that investigates the misuse, sale, or other diversion of my controlled medicine. I will allow my provider to discuss my care with, or share a copy of, this agreement with any other treating provider, pharmacy or emergency room where I receive care.    I have read this agreement and have asked questions about anything I did not understand.    _______________________________________________________  Patient Signature - Aure Lanier _____________________                   Date     _______________________________________________________  Provider Signature - Balta Milton,    _____________________                   Date     _______________________________________________________  Witness Signature (required if provider not present while patient signing)   _____________________                   Date

## 2023-08-28 NOTE — PROGRESS NOTES
SUBJECTIVE:   CC: Aure is an 73 year old who presents for preventive health visit.        No data to display                HPI    Aure is a 73-year-old female with a history of chronic pain due to migraines, degenerative joint disease of the knees and cervical neck pain.  She does receive Norco 7.5/325 mg 60/month.  Today we did discuss controlled medication agreement and agreement was signed by both myself and the patient.  Patient was given a copy at the end of our visit.    In addition Aure does need routine follow-up and labs.  She has a history of hypothyroidism and will need repeat thyroid testing along with lipids CBC and CMP.  She also request various refills today.    Aure is due for colon cancer screening however states that she would be unable to tolerate the prep and she is agreeable to complete the Cologuard.    In addition Aure reports ongoing headaches/migraines and states that even with the use of Norco her headaches are sometimes quite severe.  She questions what other options may be available for these migraines.  Topamax is also provided only minimal benefit for her.      Today's PHQ-9 Score:       8/28/2023    11:56 AM   PHQ-9 SCORE   PHQ-9 Total Score MyChart 4 (Minimal depression)   PHQ-9 Total Score 4           Have you ever done Advance Care Planning? (For example, a Health Directive, POLST, or a discussion with a medical provider or your loved ones about your wishes): No Advance Care Planning Completed. Patient requesting information.     Social History     Tobacco Use     Smoking status: Never     Smokeless tobacco: Never     Tobacco comments:     no passive exposure   Substance Use Topics     Alcohol use: Yes     Comment: rarely, maybe yearly              No data to display              Reviewed orders with patient.  Reviewed health maintenance and updated orders accordingly - Yes  Lab work is in process  BP Readings from Last 3 Encounters:   08/28/23 138/76   11/21/22 (!)  148/78   10/06/22 (!) 156/84    Wt Readings from Last 3 Encounters:   08/28/23 73.2 kg (161 lb 4.8 oz)   11/21/22 82.6 kg (182 lb)   10/06/22 83.9 kg (185 lb)                  Patient Active Problem List   Diagnosis     Allergic arthritis involving hand     Hypothyroidism     Insomnia     Headache     Postherpetic polyneuropathy     Dysthymia     Anxiety state     Hyperlipidemia     Migraine     Osteoporosis     GERD     Low back pain     Hyperlipidemia with target LDL less than 100     Bilateral chronic knee pain     Back muscle spasm     Chronic, continuous use of opioids     Status post total left knee replacement     S/P total knee arthroplasty, right     Status post total right knee replacement     Family history of other musculoskeletal diseases(V17.89)     Hypokalemia     Anemia, iron deficiency     History of [place] I operation     Phytobezoar     Gastric outlet obstruction     Angular cheilitis     Status post bypass gastrojejunostomy     Epigastric abdominal pain     Past Surgical History:   Procedure Laterality Date     ARTHROPLASTY KNEE Left 4/17/2018    Procedure: ARTHROPLASTY KNEE;  LEFT TOTAL KNEE ARTHROPLASTY S/N JOURNEY II;  Surgeon: Ty Hernandez MD;  [facility]     ARTHROPLASTY KNEE Right 9/4/2018    Procedure: ARTHROPLASTY KNEE;  RIGHT TOTAL KNEE ARTHROPLASTY ;  Surgeon: Ty Hernandez MD;  [facility]     CHOLECYSTECTOMY  03/27/2019    and lysis adhesions.       D & C       ENDOSCOPY       ESOPHAGOSCOPY, GASTROSCOPY, DUODENOSCOPY (EGD), COMBINED N/A 9/11/2017    Procedure: COMBINED ESOPHAGOSCOPY, GASTROSCOPY, DUODENOSCOPY (EGD);  Upper Endoscopy: Removal of Food Impaction;  Surgeon: Lino Zhu DO;  [facility]: [facility]     ESOPHAGOSCOPY, GASTROSCOPY, DUODENOSCOPY (EGD), COMBINED N/A 11/5/2018    Procedure: UPPER ENDOSCOPY WITH BIOPSY;  Surgeon: Dieudonne Jackson MD;  [facility]     ESOPHAGOSCOPY, GASTROSCOPY, DUODENOSCOPY (EGD), COMBINED N/A 12/21/2018    Procedure:  UPPER ENDOSCOPY WITH BALLOON DILATION, BIOPSY;  Surgeon: Dieudonne Jackson MD;  [facility]     ESOPHAGOSCOPY, GASTROSCOPY, DUODENOSCOPY (EGD), COMBINED N/A 1/14/2019    Procedure: UPPER ENDOSCOPY WITH ENDOSCOPIC PLACEMENT OF OG AND GASTRIC LAVAGE;  Surgeon: Dieudonne Jackson MD;  [facility]     IR CONSULTATION FOR IR EXAM  11/10/2022     partial gastrectomy  03/2019     right total knee replacement  01/2019     MARAH EN Y BOWEL  1980    Due to severely ulcerated stomach body.     STOMACH SURGERY  03/27/2019    Restructuring of stomach and Colecuystectomy.     stomach surgery peritinitis         Social History     Tobacco Use     Smoking status: Never     Smokeless tobacco: Never     Tobacco comments:     no passive exposure   Substance Use Topics     Alcohol use: Yes     Comment: rarely, maybe yearly     Family History   Problem Relation Age of Onset     Cerebrovascular Disease Mother         CVA     Hypertension Mother      Other - See Comments Father 40        mining accident; cause of death     Other - See Comments Brother         muscle dystrophy     Cancer Daughter 34        skin cancer     Melanoma Daughter          Current Outpatient Medications   Medication Sig Dispense Refill     acetaminophen (TYLENOL) 325 MG tablet Take 2-3 tablets (650-975 mg) by mouth every 4 hours as needed for other (mild to moderate pain) Limit total to 3800mg in 24 hours. 100 tablet 0     acyclovir (ZOVIRAX) 5 % external cream APPLY TOPICALLY 3 TIMES DAILY AS NEEDED (ORAL HERPES LIKE LESION) 5 g 0     calcium carb-cholecalciferol (CALCIUM PLUS VITAMIN D3) 600-500 MG-UNIT CAPS Take 1 capsule by mouth 2 times daily 180 capsule 3     cyanocolbalamin (VITAMIN  B-12) 1000 MCG tablet Take 1 tablet by mouth daily as needed        cyclobenzaprine (FLEXERIL) 10 MG tablet TAKE 1 TABLET BY MOUTH THREE TIMES DAILY AS NEEDEDFOR MUSCLE SPASMS 90 tablet 0     escitalopram (LEXAPRO) 20 MG tablet TAKE ONE-HALF TABLET (10MG) BY MOUTH  TWICE DAILY 60 tablet 0     folic acid (FOLVITE) 1 MG tablet TAKE ONE TABLET DAILY BY MOUTH 90 tablet 1     gabapentin (NEURONTIN) 300 MG capsule Take 1 capsule (300 mg) by mouth 3 times daily 90 capsule 0     [START ON 9/5/2023] HYDROcodone-acetaminophen (NORCO) 7.5-325 MG per tablet Take 1 tablet by mouth every 6 hours as needed for severe pain 60 tablet 0     levothyroxine (SYNTHROID/LEVOTHROID) 75 MCG tablet TAKE 1 TABLET (75 MCG) BY MOUTH DAILY 90 tablet 3     metoclopramide (REGLAN) 5 MG tablet TAKE 1 TABLET (5 MG) BY MOUTH 3 TIMES DAILY (BEFOREMEALS) 270 tablet 3     Multiple Vitamins-Minerals (PRESERVISION AREDS 2+MULTI VIT PO)        nystatin (MYCOSTATIN) 124701 UNIT/GM external powder APPLY TOPICALLY TO AFFECTEDAREA 2 TIMES DAILY AS NEEDED 60 g 3     pantoprazole (PROTONIX) 40 MG EC tablet TAKE 1 TABLET (40 MG) BY MOUTH DAILY. TAKE 30 TO 60 MINUTES BEFORE A MEAL. 90 tablet 3     predniSONE (DELTASONE) 20 MG tablet Take two tablets (= 40mg) each day for 5 (five) days, then one tablet (20 mg) each day x 5 days until finished 15 tablet 0     promethazine (PHENERGAN) 25 MG tablet TAKE 1 TABLET BY MOUTH THREE TIMES DAILY AS NEEDED FOR NAUSEA/VOMITING 90 tablet 1     rimegepant (NURTEC) 75 MG ODT tablet Place 1 tablet (75 mg) under the tongue every 48 hours 16 tablet 0     simvastatin (ZOCOR) 40 MG tablet TAKE 1 TABLET BY MOUTH EVERY EVENING 90 tablet 0     topiramate (TOPAMAX) 50 MG tablet TAKE 1 AND 1/2 TABLETS BY MOUTH TWICE A DAY 90 tablet 2     traZODone (DESYREL) 150 MG tablet TAKE 1 TABLET (150 MG) BY MOUTH AT BEDTIME 30 tablet 2     VITAMIN D3 50 MCG (2000 UT) tablet TAKE 2 TABLETS BY MOUTH EVERY  tablet 3     zolpidem (AMBIEN) 10 MG tablet Take 1 tablet (10 mg) by mouth nightly as needed for sleep 30 tablet 0     zoledronic Acid (RECLAST) 5 MG/100ML SOLN infusion Inject 100 mLs (5 mg) into the vein once for 1 dose 100 mL 0     Allergies   Allergen Reactions     Erythromycin Base [Erythromycin  Base] Nausea and Vomiting     Erythromycin Nausea and Vomiting     Penicillins Rash       Breast Cancer Screening:       Reviewed and updated as needed this visit by clinical staff   Tobacco  Allergies  Meds              Reviewed and updated as needed this visit by Provider                 Past Medical History:   Diagnosis Date     Allergic arthritis involving hand 01/01/2011     Anemia, Iron Deficiency 01/01/2011     Anxiety 01/01/2011     Contact dermatitis and other eczema, unspecified c 01/01/2011     Depression 01/01/2011     Edema 01/01/2011     Gastrointestinal mucositis (ulcerative) 01/01/2011     GERD 01/01/2011     Headache(784.0) 01/01/2011     Hypothyroidism 01/01/2011     Insomnia, unspecified 01/01/2011     Migraine 01/01/2011     Nausea and vomiting     since was a teen     Nonallopathic lesion of cervical region, not elsewhere classified 10/16/2000     Osteoporosis 01/01/2011     Other and unspecified hyperlipidemia 01/01/2011     Other malaise and fatigue 08/27/2001     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      Past Surgical History:   Procedure Laterality Date     ARTHROPLASTY KNEE Left 4/17/2018    Procedure: ARTHROPLASTY KNEE;  LEFT TOTAL KNEE ARTHROPLASTY S/N JOURNEY II;  Surgeon: Ty Hernandez MD;  [facility]     ARTHROPLASTY KNEE Right 9/4/2018    Procedure: ARTHROPLASTY KNEE;  RIGHT TOTAL KNEE ARTHROPLASTY ;  Surgeon: Ty Hernandez MD;  [facility]     CHOLECYSTECTOMY  03/27/2019    and lysis adhesions.       D & C       ENDOSCOPY       ESOPHAGOSCOPY, GASTROSCOPY, DUODENOSCOPY (EGD), COMBINED N/A 9/11/2017    Procedure: COMBINED ESOPHAGOSCOPY, GASTROSCOPY, DUODENOSCOPY (EGD);  Upper Endoscopy: Removal of Food Impaction;  Surgeon: Lino Zhu DO;  [facility]     ESOPHAGOSCOPY, GASTROSCOPY, DUODENOSCOPY (EGD), COMBINED N/A 11/5/2018    Procedure: UPPER ENDOSCOPY WITH BIOPSY;  Surgeon: Dieudonne Jackson MD;  [facility]     ESOPHAGOSCOPY, GASTROSCOPY,  DUODENOSCOPY (EGD), COMBINED N/A 12/21/2018    Procedure: UPPER ENDOSCOPY WITH BALLOON DILATION, BIOPSY;  Surgeon: Dieudonne Jackson MD;  [facility]     ESOPHAGOSCOPY, GASTROSCOPY, DUODENOSCOPY (EGD), COMBINED N/A 1/14/2019    Procedure: UPPER ENDOSCOPY WITH ENDOSCOPIC PLACEMENT OF OG AND GASTRIC LAVAGE;  Surgeon: Dieudonne Jackson MD;  [facility]     IR CONSULTATION FOR IR EXAM  11/10/2022     partial gastrectomy  03/2019     right total knee replacement  01/2019     MARAH EN Y BOWEL  1980    Due to severely ulcerated stomach body.     STOMACH SURGERY  03/27/2019    Restructuring of stomach and Colecuystectomy.     stomach surgery peritinitis         Review of Systems  CONSTITUTIONAL: NEGATIVE for fever, chills, change in weight  INTEGUMENTARY/SKIN: NEGATIVE for worrisome rashes, moles or lesions  EYES: NEGATIVE for vision changes or irritation  ENT: NEGATIVE for ear, mouth and throat problems  RESP: NEGATIVE for significant cough or SOB  BREAST: NEGATIVE for masses, tenderness or discharge  CV: NEGATIVE for chest pain, palpitations or peripheral edema  GI: NEGATIVE for nausea, abdominal pain, heartburn, or change in bowel habits  : NEGATIVE for unusual urinary or vaginal symptoms. No vaginal bleeding.  MUSCULOSKELETAL: NEGATIVE for significant arthralgias or myalgia  NEURO: NEGATIVE for weakness, dizziness or paresthesias  PSYCHIATRIC: NEGATIVE for changes in mood or affect      OBJECTIVE:   There were no vitals taken for this visit.  Physical Exam  GENERAL: healthy, alert and no distress  EYES: Eyes grossly normal to inspection, PERRL and conjunctivae and sclerae normal  HENT: ear canals and TM's normal, nose and mouth without ulcers or lesions  NECK: no adenopathy, no asymmetry, masses, or scars and thyroid normal to palpation  RESP: lungs clear to auscultation - no rales, rhonchi or wheezes  CV: regular rate and rhythm, normal S1 S2, no S3 or S4, no murmur, click or rub, no peripheral edema and  peripheral pulses strong  MS: no gross musculoskeletal defects noted, no edema  NEURO: Normal strength and tone, mentation intact and speech normal  PSYCH: mentation appears normal, affect normal/bright    Diagnostic Test Results:  Labs reviewed in Epic    ASSESSMENT/PLAN:       ICD-10-CM    1. Acquired hypothyroidism  E03.9 TSH     T4, free     TSH     T4, free      2. Cervical pain  M54.2 Drug Confirmation Panel Urine with Creat     HYDROcodone-acetaminophen (NORCO) 7.5-325 MG per tablet      3. Arthralgia of both knees  M25.561 Drug Confirmation Panel Urine with Creat    M25.562 HYDROcodone-acetaminophen (NORCO) 7.5-325 MG per tablet      4. Routine history and physical examination of adult  Z00.00 CBC with platelets and differential     Comprehensive metabolic panel (BMP + Alb, Alk Phos, ALT, AST, Total. Bili, TP)     CBC with platelets and differential     Comprehensive metabolic panel (BMP + Alb, Alk Phos, ALT, AST, Total. Bili, TP)      5. Vitamin B12 deficiency (non anemic)  E53.8 Vitamin B12     Vitamin B12      6. Vitamin D deficiency  E55.9 Vitamin D Deficiency     Vitamin D Deficiency      7. Hyperlipidemia LDL goal <130  E78.5 Lipid Profile (Chol, Trig, HDL, LDL calc)     Lipid Profile (Chol, Trig, HDL, LDL calc)      8. Colon cancer screening  Z12.11 PenanaUARD([employer])      9. Gastroesophageal reflux disease with esophagitis, unspecified whether hemorrhage  K21.00 pantoprazole (PROTONIX) 40 MG EC tablet      10. Cervicalgia  M54.2 cyclobenzaprine (FLEXERIL) 10 MG tablet      11. Postherpetic polyneuropathy  B02.23 gabapentin (NEURONTIN) 300 MG capsule      12. Persistent insomnia  G47.00 zolpidem (AMBIEN) 10 MG tablet      13. Intractable cluster headache syndrome, unspecified chronicity pattern  G44.001 rimegepant (NURTEC) 75 MG ODT tablet      14. Migraine without aura and without status migrainosus, not intractable  G43.009 rimegepant (NURTEC) 75 MG ODT tablet          Patient has been  advised of split billing requirements and indicates understanding: Yes      COUNSELING:  Reviewed preventive health counseling, as reflected in patient instructions       Regular exercise       Healthy diet/nutrition       Vision screening        She reports that she has never smoked. She has never used smokeless tobacco.          Balta Milton, DO  [facility] submitted by the patient for this visit:  Patient Health Questionnaire (Submitted on 8/28/2023)  If you checked off any problems, how difficult have these problems made it for you to do your work, take care of things at home, or get along with other people?: Not difficult at all  PHQ9 TOTAL SCORE: 4  AURELIA-7 (Submitted on 8/28/2023)  AURELIA 7 TOTAL SCORE: 0

## 2023-08-28 NOTE — TELEPHONE ENCOUNTER
Disp Refills Start End ALLAN   levothyroxine (SYNTHROID/LEVOTHROID) 75 MCG tablet 90 tablet 3 8/2/2022     Appt today

## 2023-08-28 NOTE — TELEPHONE ENCOUNTER
Received Approval from [facility] for Nurtec 75MG dispersible tablets.   Valid Dates: 01/01/2023-12/31/2023  Letter scanned to [facility].

## 2023-08-29 ENCOUNTER — LAB (OUTPATIENT)
Dept: LAB | Facility: OTHER | Age: 73
End: 2023-08-29
Payer: COMMERCIAL

## 2023-08-29 ENCOUNTER — TELEPHONE (OUTPATIENT)
Dept: INTERNAL MEDICINE | Facility: OTHER | Age: 73
End: 2023-08-29

## 2023-08-29 DIAGNOSIS — M54.2 CERVICAL PAIN: ICD-10-CM

## 2023-08-29 DIAGNOSIS — M25.562 ARTHRALGIA OF BOTH KNEES: ICD-10-CM

## 2023-08-29 DIAGNOSIS — M25.561 ARTHRALGIA OF BOTH KNEES: ICD-10-CM

## 2023-08-29 LAB
CANNABINOIDS UR QL SCN: NORMAL
CREAT UR-MCNC: 38 MG/DL
VIT B12 SERPL-MCNC: 1266 PG/ML (ref 232–1245)

## 2023-08-29 PROCEDURE — 80346 BENZODIAZEPINES1-12: CPT | Mod: ZL,XU

## 2023-08-29 PROCEDURE — 80353 DRUG SCREENING COCAINE: CPT | Mod: ZL,XU

## 2023-08-29 PROCEDURE — 80354 DRUG SCREENING FENTANYL: CPT | Mod: ZL,XU

## 2023-08-29 PROCEDURE — 80307 DRUG TEST PRSMV CHEM ANLYZR: CPT | Mod: ZL

## 2023-08-29 NOTE — TELEPHONE ENCOUNTER
9:51 AM    Reason for Call: Phone Call    Description: Patient returned Nasra's call. Please call patient back.    Was an appointment offered for this call? No  If yes : Appointment type              Date    Preferred method for responding to this message: Telephone Call  What is your phone number ? [phone]     If we cannot reach you directly, may we leave a detailed response at the number you provided? Yes    Can this message wait until your PCP/provider returns, if available today? Not applicable, PROVIDER IN CLINIC    Charlene Griffith

## 2023-08-30 LAB — DEPRECATED CALCIDIOL+CALCIFEROL SERPL-MC: 62 UG/L (ref 20–75)

## 2023-08-31 LAB — GABAPENTIN UR QL CFM: PRESENT

## 2023-09-23 DIAGNOSIS — G47.00 PERSISTENT INSOMNIA: ICD-10-CM

## 2023-09-23 DIAGNOSIS — B02.23 POSTHERPETIC POLYNEUROPATHY: ICD-10-CM

## 2023-09-23 DIAGNOSIS — M54.2 CERVICALGIA: ICD-10-CM

## 2023-09-25 RX ORDER — CYCLOBENZAPRINE HCL 10 MG
TABLET ORAL
Qty: 90 TABLET | Refills: 0 | Status: SHIPPED | OUTPATIENT
Start: 2023-09-25 | End: 2023-10-20

## 2023-09-25 RX ORDER — ZOLPIDEM TARTRATE 10 MG/1
TABLET ORAL
Qty: 30 TABLET | Refills: 0 | Status: SHIPPED | OUTPATIENT
Start: 2023-09-25 | End: 2023-10-20

## 2023-09-25 RX ORDER — GABAPENTIN 300 MG/1
300 CAPSULE ORAL 3 TIMES DAILY
Qty: 90 CAPSULE | Refills: 0 | Status: SHIPPED | OUTPATIENT
Start: 2023-09-25 | End: 2023-10-20

## 2023-09-25 NOTE — TELEPHONE ENCOUNTER
Ambien, Flexeril, Gabapentin      Last Written Prescription Date:  8.28.23  Last Fill Quantity: #30, #90, #90,   # refills: 0  Last Office Visit: 8.28.23  Future Office visit:       Routing refill request to provider for review/approval because:  Drug not on the [facility], P or [facility] refill protocol or controlled substance

## 2023-09-29 DIAGNOSIS — M25.562 ARTHRALGIA OF BOTH KNEES: ICD-10-CM

## 2023-09-29 DIAGNOSIS — M54.2 CERVICAL PAIN: ICD-10-CM

## 2023-09-29 DIAGNOSIS — M25.561 ARTHRALGIA OF BOTH KNEES: ICD-10-CM

## 2023-10-02 RX ORDER — HYDROCODONE BITARTRATE AND ACETAMINOPHEN 7.5; 325 MG/1; MG/1
TABLET ORAL
Qty: 60 TABLET | Refills: 0 | Status: SHIPPED | OUTPATIENT
Start: 2023-10-02 | End: 2023-10-30

## 2023-10-02 NOTE — TELEPHONE ENCOUNTER
Sheridanco      Last Written Prescription Date:  9.5.23  Last Fill Quantity: #60,   # refills: 0  Last Office Visit: 8.28.23  Future Office visit:       Routing refill request to provider for review/approval because:  Drug not on the [facility], P or [facility] refill protocol or controlled substance

## 2023-10-03 LAB — NONINV COLON CA DNA+OCC BLD SCRN STL QL: NEGATIVE

## 2023-10-04 NOTE — RESULT ENCOUNTER NOTE
Negative cologuard  Repeat 3 years, sooner with concerns    [facility]  [phone]

## 2023-10-06 DIAGNOSIS — F34.1 DYSTHYMIA: ICD-10-CM

## 2023-10-06 DIAGNOSIS — R21 RASH: ICD-10-CM

## 2023-10-09 RX ORDER — NYSTATIN 100000 [USP'U]/G
POWDER TOPICAL
Qty: 60 G | Refills: 3 | Status: SHIPPED | OUTPATIENT
Start: 2023-10-09 | End: 2024-09-03

## 2023-10-09 RX ORDER — ESCITALOPRAM OXALATE 20 MG/1
TABLET ORAL
Qty: 60 TABLET | Refills: 1 | Status: SHIPPED | OUTPATIENT
Start: 2023-10-09 | End: 2024-01-26

## 2023-10-09 NOTE — TELEPHONE ENCOUNTER
Nystatin      Last Written Prescription Date:  1/12/23  Last Fill Quantity: 60g,   # refills: 3  Last Office Visit: 8/28/23  Future Office visit:       Routing refill request to provider for review/approval because:    Lexapro      Last Written Prescription Date:  8/10/23  Last Fill Quantity: 60,   # refills: 0  Last Office Visit: 8/28/23  Future Office visit:       Routing refill request to provider for review/approval because:

## 2023-10-10 DIAGNOSIS — E55.9 VITAMIN D DEFICIENCY: ICD-10-CM

## 2023-10-10 DIAGNOSIS — Z00.00 HEALTH CARE MAINTENANCE: ICD-10-CM

## 2023-10-10 NOTE — TELEPHONE ENCOUNTER
Folic Acid (Folvite) 1 MG tablet    Last Written Prescription Date:  01/06/2023  Last Fill Quantity: 90,   # refills: 1  Last Office Visit: 08/28/2023      Vitamin D3 50 MCG (2000 UT) tablet    Last Written Prescription Date:  04/25/2022  Last Fill Quantity: 180,   # refills: 3  Last Office Visit:

## 2023-10-12 RX ORDER — FOLIC ACID 1 MG/1
TABLET ORAL
Qty: 90 TABLET | Refills: 3 | Status: SHIPPED | OUTPATIENT
Start: 2023-10-12

## 2023-10-12 RX ORDER — CHOLECALCIFEROL (VITAMIN D3) 50 MCG
TABLET ORAL
Qty: 180 TABLET | Refills: 3 | Status: SHIPPED | OUTPATIENT
Start: 2023-10-12

## 2023-10-18 DIAGNOSIS — G47.00 PERSISTENT INSOMNIA: ICD-10-CM

## 2023-10-18 DIAGNOSIS — G43.009 MIGRAINE WITHOUT AURA AND WITHOUT STATUS MIGRAINOSUS, NOT INTRACTABLE: ICD-10-CM

## 2023-10-18 DIAGNOSIS — Z01.818 PREOP GENERAL PHYSICAL EXAM: ICD-10-CM

## 2023-10-19 DIAGNOSIS — M54.2 CERVICALGIA: ICD-10-CM

## 2023-10-19 DIAGNOSIS — G47.00 PERSISTENT INSOMNIA: ICD-10-CM

## 2023-10-19 DIAGNOSIS — B02.23 POSTHERPETIC POLYNEUROPATHY: ICD-10-CM

## 2023-10-20 RX ORDER — CYCLOBENZAPRINE HCL 10 MG
TABLET ORAL
Qty: 90 TABLET | Refills: 0 | Status: SHIPPED | OUTPATIENT
Start: 2023-10-20 | End: 2023-11-20

## 2023-10-20 RX ORDER — PROMETHAZINE HYDROCHLORIDE 25 MG/1
TABLET ORAL
Qty: 90 TABLET | Refills: 4 | Status: SHIPPED | OUTPATIENT
Start: 2023-10-20 | End: 2024-03-11

## 2023-10-20 RX ORDER — GABAPENTIN 300 MG/1
300 CAPSULE ORAL 3 TIMES DAILY
Qty: 90 CAPSULE | Refills: 0 | Status: SHIPPED | OUTPATIENT
Start: 2023-10-20 | End: 2023-11-20

## 2023-10-20 RX ORDER — TOPIRAMATE 50 MG/1
TABLET, FILM COATED ORAL
Qty: 90 TABLET | Refills: 0 | Status: SHIPPED | OUTPATIENT
Start: 2023-10-20 | End: 2023-12-17

## 2023-10-20 RX ORDER — TRAZODONE HYDROCHLORIDE 150 MG/1
150 TABLET ORAL AT BEDTIME
Qty: 90 TABLET | Refills: 2 | Status: SHIPPED | OUTPATIENT
Start: 2023-10-20 | End: 2024-06-24

## 2023-10-20 RX ORDER — ZOLPIDEM TARTRATE 10 MG/1
TABLET ORAL
Qty: 30 TABLET | Refills: 0 | Status: SHIPPED | OUTPATIENT
Start: 2023-10-20 | End: 2023-11-20

## 2023-10-20 NOTE — TELEPHONE ENCOUNTER
Trazodone      Last Written Prescription Date:  8/9/23  Last Fill Quantity: 30,   # refills: 2  Last Office Visit: 8/28/23  Future Office visit:       Routing refill request to provider for review/approval because:    Topamax      Last Written Prescription Date:  5/8/23  Last Fill Quantity: 90,   # refills: 2  Last Office Visit: 8/28/23  Future Office visit:       Routing refill request to provider for review/approval because:    Phenergan      Last Written Prescription Date:  8/25/23  Last Fill Quantity: 90,   # refills: 1  Last Office Visit: 8/28/23  Future Office visit:       Routing refill request to provider for review/approval because:

## 2023-10-20 NOTE — TELEPHONE ENCOUNTER
Flexeril      Last Written Prescription Date:  9/25/2023  Last Fill Quantity: 90,   # refills: 0  Last Office Visit: 8/28/2023  Future Office visit:       Ambien       Last Written Prescription Date:  9/25/2023  Last Fill Quantity: 30,   # refills: 0  Last Office Visit: 8/28/2023  Future Office visit:         Gabapentin       Last Written Prescription Date:  9/25/2023  Last Fill Quantity: 90,   # refills: 0  Last Office Visit: 8/28/2023  Future Office visit:

## 2023-10-28 DIAGNOSIS — M25.562 ARTHRALGIA OF BOTH KNEES: ICD-10-CM

## 2023-10-28 DIAGNOSIS — M25.561 ARTHRALGIA OF BOTH KNEES: ICD-10-CM

## 2023-10-28 DIAGNOSIS — M54.2 CERVICAL PAIN: ICD-10-CM

## 2023-10-30 RX ORDER — HYDROCODONE BITARTRATE AND ACETAMINOPHEN 7.5; 325 MG/1; MG/1
1 TABLET ORAL EVERY 6 HOURS PRN
Qty: 60 TABLET | Refills: 0 | Status: SHIPPED | OUTPATIENT
Start: 2023-10-30 | End: 2023-11-28

## 2023-11-20 DIAGNOSIS — B02.23 POSTHERPETIC POLYNEUROPATHY: ICD-10-CM

## 2023-11-20 DIAGNOSIS — M54.2 CERVICALGIA: ICD-10-CM

## 2023-11-20 DIAGNOSIS — G47.00 PERSISTENT INSOMNIA: ICD-10-CM

## 2023-11-20 NOTE — TELEPHONE ENCOUNTER
Ambien      Last Written Prescription Date:  10/20/2023  Last Fill Quantity: 30,   # refills: 0  Last Office Visit: 8/28/2023    Flexeril       Last Written Prescription Date:  10/20/2023  Last Fill Quantity: 90,   # refills: 0  Last Office Visit: 8/28/2023    Gabapentin       Last Written Prescription Date:  10/20/2023  Last Fill Quantity: 90,   # refills: 0  Last Office Visit: 8/28/2023  Future Office visit:

## 2023-11-20 NOTE — TELEPHONE ENCOUNTER
Patient leaving [facility] tomorrow 11/21/23. Needs Rx sent through as soon as possible.

## 2023-11-21 RX ORDER — CYCLOBENZAPRINE HCL 10 MG
TABLET ORAL
Qty: 90 TABLET | Refills: 0 | Status: SHIPPED | OUTPATIENT
Start: 2023-11-21 | End: 2023-12-19

## 2023-11-21 RX ORDER — ZOLPIDEM TARTRATE 10 MG/1
TABLET ORAL
Qty: 30 TABLET | Refills: 0 | Status: SHIPPED | OUTPATIENT
Start: 2023-11-21 | End: 2023-12-19

## 2023-11-21 RX ORDER — GABAPENTIN 300 MG/1
300 CAPSULE ORAL 3 TIMES DAILY
Qty: 90 CAPSULE | Refills: 0 | Status: SHIPPED | OUTPATIENT
Start: 2023-11-21 | End: 2023-12-19

## 2023-11-28 DIAGNOSIS — M54.2 CERVICAL PAIN: ICD-10-CM

## 2023-11-28 DIAGNOSIS — M25.561 ARTHRALGIA OF BOTH KNEES: ICD-10-CM

## 2023-11-28 DIAGNOSIS — M25.562 ARTHRALGIA OF BOTH KNEES: ICD-10-CM

## 2023-11-28 RX ORDER — HYDROCODONE BITARTRATE AND ACETAMINOPHEN 7.5; 325 MG/1; MG/1
1 TABLET ORAL EVERY 6 HOURS PRN
Qty: 60 TABLET | Refills: 0 | Status: SHIPPED | OUTPATIENT
Start: 2023-11-28 | End: 2023-12-19

## 2023-11-28 NOTE — TELEPHONE ENCOUNTER
HYDROcodone-acetaminophen (NORCO) 7.5-325 MG per tablet       Last Written Prescription Date:  10/30/23  Last Fill Quantity: 60,   # refills: 0  Last Office Visit: 8/28/23  Future Office visit:       Routing refill request to provider for review/approval because:  Drug not on the [facility], [facility] or [facility] refill protocol or controlled substance

## 2023-12-14 DIAGNOSIS — G43.009 MIGRAINE WITHOUT AURA AND WITHOUT STATUS MIGRAINOSUS, NOT INTRACTABLE: ICD-10-CM

## 2023-12-14 NOTE — TELEPHONE ENCOUNTER
Topiramate (Topamax) 50 mg tablet  Take 1 and 1/2 tablets by mouth twice a day    Last Written Prescription Date:  10-  Last Fill Quantity: 90 tablet,   # refills: 0  Last Office Visit: 8-  Future Office visit:

## 2023-12-15 DIAGNOSIS — B02.23 POSTHERPETIC POLYNEUROPATHY: ICD-10-CM

## 2023-12-15 DIAGNOSIS — M54.2 CERVICALGIA: ICD-10-CM

## 2023-12-15 DIAGNOSIS — G47.00 PERSISTENT INSOMNIA: ICD-10-CM

## 2023-12-15 NOTE — TELEPHONE ENCOUNTER
Gabapentin  Last Written Prescription Date: 11/21/23  Last Fill Quantity: 90 # of Refills: 0  Last Office Visit: 8/28/23    Flexeril  Last Written Prescription Date: 11/21/23  Last Fill Quantity: 90 # of Refills: 0  Last Office Visit: 8/28/23    Ambien  Last Written Prescription Date: 11/21/23  Last Fill Quantity: 30 # of Refills: 0  Last Office Visit: 8/28/23

## 2023-12-17 RX ORDER — TOPIRAMATE 50 MG/1
TABLET, FILM COATED ORAL
Qty: 90 TABLET | Refills: 0 | Status: SHIPPED | OUTPATIENT
Start: 2023-12-17 | End: 2024-02-13

## 2023-12-19 ENCOUNTER — TELEPHONE (OUTPATIENT)
Dept: INTERNAL MEDICINE | Facility: OTHER | Age: 73
End: 2023-12-19

## 2023-12-19 DIAGNOSIS — M25.561 ARTHRALGIA OF BOTH KNEES: ICD-10-CM

## 2023-12-19 DIAGNOSIS — B02.23 POSTHERPETIC POLYNEUROPATHY: ICD-10-CM

## 2023-12-19 DIAGNOSIS — M54.2 CERVICAL PAIN: ICD-10-CM

## 2023-12-19 DIAGNOSIS — M25.562 ARTHRALGIA OF BOTH KNEES: ICD-10-CM

## 2023-12-19 DIAGNOSIS — G47.00 PERSISTENT INSOMNIA: ICD-10-CM

## 2023-12-19 RX ORDER — CYCLOBENZAPRINE HCL 10 MG
TABLET ORAL
Qty: 90 TABLET | Refills: 0 | Status: SHIPPED | OUTPATIENT
Start: 2023-12-19 | End: 2024-01-16

## 2023-12-19 RX ORDER — HYDROCODONE BITARTRATE AND ACETAMINOPHEN 7.5; 325 MG/1; MG/1
1 TABLET ORAL EVERY 6 HOURS PRN
Qty: 60 TABLET | Refills: 0 | Status: SHIPPED | OUTPATIENT
Start: 2023-12-19 | End: 2024-01-16

## 2023-12-19 RX ORDER — ZOLPIDEM TARTRATE 10 MG/1
TABLET ORAL
Qty: 30 TABLET | Refills: 0 | Status: SHIPPED | OUTPATIENT
Start: 2023-12-19 | End: 2024-01-16

## 2023-12-19 RX ORDER — GABAPENTIN 300 MG/1
300 CAPSULE ORAL 3 TIMES DAILY
Qty: 90 CAPSULE | Refills: 0 | Status: SHIPPED | OUTPATIENT
Start: 2023-12-19 | End: 2024-01-16

## 2023-12-19 NOTE — TELEPHONE ENCOUNTER
Patient requesting refill on hydrocodone to be sent to pharmacy dated for 12/26/23, see telephone encounter below.     HYDROcodone-acetaminophen (NORCO) 7.5-325 MG per tablet         Last Written Prescription Date:  11/28/23  Last Fill Quantity: 60,   # refills: 0  Last Office Visit: 8/28/23  Future Office visit:       Routing refill request to provider for review/approval because:  Drug not on the [facility], [facility] or [facility] refill protocol or controlled substance        Gabapentin and zolpidem filled 12/19/23 by Dr. Milton.

## 2023-12-19 NOTE — TELEPHONE ENCOUNTER
NORCO      Last Written Prescription Date:  12-19-23  Last Fill Quantity: 60,   # refills: 0  Last Office Visit:   Future Office visit:       Routing refill request to provider for review/approval because:  FILLED TODAY

## 2023-12-20 RX ORDER — HYDROCODONE BITARTRATE AND ACETAMINOPHEN 7.5; 325 MG/1; MG/1
1 TABLET ORAL EVERY 6 HOURS PRN
Qty: 60 TABLET | Refills: 0 | OUTPATIENT
Start: 2023-12-20

## 2024-01-14 DIAGNOSIS — M54.2 CERVICALGIA: ICD-10-CM

## 2024-01-14 DIAGNOSIS — B02.23 POSTHERPETIC POLYNEUROPATHY: ICD-10-CM

## 2024-01-14 DIAGNOSIS — M25.561 ARTHRALGIA OF BOTH KNEES: ICD-10-CM

## 2024-01-14 DIAGNOSIS — M54.2 CERVICAL PAIN: ICD-10-CM

## 2024-01-14 DIAGNOSIS — G47.00 PERSISTENT INSOMNIA: ICD-10-CM

## 2024-01-14 DIAGNOSIS — M25.562 ARTHRALGIA OF BOTH KNEES: ICD-10-CM

## 2024-01-15 NOTE — TELEPHONE ENCOUNTER
Ambien      Last Written Prescription Date:  12/19/23  Last Fill Quantity: 30,   # refills: 0  Last Office Visit: 8/28/23  Future Office visit:       Routing refill request to provider for review/approval because:      Gabapentin      Last Written Prescription Date:  12/19/23  Last Fill Quantity: 90,   # refills: 0  Last Office Visit: 8/28/23  Future Office visit:       Routing refill request to provider for review/approval because:      Flexeril      Last Written Prescription Date:  12/19/23  Last Fill Quantity: 90,   # refills: 0  Last Office Visit: 8/28/23  Future Office visit:       Routing refill request to provider for review/approval because:      Norco      Last Written Prescription Date:  12/19/23  Last Fill Quantity: 60,   # refills: 0  Last Office Visit: 8/28/23  Future Office visit:       Routing refill request to provider for review/approval because:

## 2024-01-16 RX ORDER — GABAPENTIN 300 MG/1
300 CAPSULE ORAL 3 TIMES DAILY
Qty: 90 CAPSULE | Refills: 0 | Status: SHIPPED | OUTPATIENT
Start: 2024-01-16 | End: 2024-02-13

## 2024-01-16 RX ORDER — ZOLPIDEM TARTRATE 10 MG/1
TABLET ORAL
Qty: 30 TABLET | Refills: 0 | Status: SHIPPED | OUTPATIENT
Start: 2024-01-16 | End: 2024-02-13

## 2024-01-16 RX ORDER — CYCLOBENZAPRINE HCL 10 MG
TABLET ORAL
Qty: 90 TABLET | Refills: 0 | Status: SHIPPED | OUTPATIENT
Start: 2024-01-16 | End: 2024-02-13

## 2024-01-16 RX ORDER — HYDROCODONE BITARTRATE AND ACETAMINOPHEN 7.5; 325 MG/1; MG/1
1 TABLET ORAL EVERY 6 HOURS PRN
Qty: 60 TABLET | Refills: 0 | Status: SHIPPED | OUTPATIENT
Start: 2024-01-16 | End: 2024-02-13

## 2024-01-26 DIAGNOSIS — F34.1 DYSTHYMIA: ICD-10-CM

## 2024-01-26 RX ORDER — ESCITALOPRAM OXALATE 20 MG/1
TABLET ORAL
Qty: 60 TABLET | Refills: 1 | Status: SHIPPED | OUTPATIENT
Start: 2024-01-26 | End: 2024-05-20

## 2024-02-10 DIAGNOSIS — M54.2 CERVICALGIA: ICD-10-CM

## 2024-02-10 DIAGNOSIS — M25.561 ARTHRALGIA OF BOTH KNEES: ICD-10-CM

## 2024-02-10 DIAGNOSIS — G43.009 MIGRAINE WITHOUT AURA AND WITHOUT STATUS MIGRAINOSUS, NOT INTRACTABLE: ICD-10-CM

## 2024-02-10 DIAGNOSIS — B02.23 POSTHERPETIC POLYNEUROPATHY: ICD-10-CM

## 2024-02-10 DIAGNOSIS — G47.00 PERSISTENT INSOMNIA: ICD-10-CM

## 2024-02-10 DIAGNOSIS — M25.562 ARTHRALGIA OF BOTH KNEES: ICD-10-CM

## 2024-02-10 DIAGNOSIS — M54.2 CERVICAL PAIN: ICD-10-CM

## 2024-02-12 NOTE — TELEPHONE ENCOUNTER
HYDROCODONE/APAP 7.5-325MG TAB         Last Written Prescription Date:  1/16/24  Last Fill Quantity: 60   # refills: 0  Last Office Visit: 8/8/23  Future Office visit:       Routing refill request to provider for review/approval because:  Drug not on the [facility], [facility] or [facility] refill protocol or controlled substance      GABAPENTIN 300MG CAPSULE         Last Written Prescription Date:  1/16/24  Last Fill Quantity: 90,   # refills: 0  Last Office Visit: 8/8/23  Future Office visit:       Routing refill request to provider for review/approval because:  Drug not on the [facility], [facility] or [facility] refill protocol or controlled substance      CYCLOBENZAPRINE 10MG TABLET         Last Written Prescription Date:  1/16/24  Last Fill Quantity: 90,   # refills: 0  Last Office Visit: 8/8/23  Future Office visit:       Routing refill request to provider for review/approval because:  Drug not on the [facility], [facility] or [facility] refill protocol or controlled substance    ZOLPIDEM 10MG TABLET         Last Written Prescription Date:  1/16/24  Last Fill Quantity: 30,   # refills: 0  Last Office Visit: 8/8/23  Future Office visit:       Routing refill request to provider for review/approval because:  Drug not on the [facility], [facility] or [facility] refill protocol or controlled substance      TOPIRAMATE 50MG TABLET         Last Written Prescription Date:  12/17/23  Last Fill Quantity: 90   # refills: 0  Last Office Visit: 8/8/23  Future Office visit:       Routing refill request to provider for review/approval because:    Anti-Seizure Meds Protocol  Tlspsb89/10/2024 03:28 AM   Protocol Details Review Authorizing provider's last note.

## 2024-02-13 ENCOUNTER — TELEPHONE (OUTPATIENT)
Dept: INTERNAL MEDICINE | Facility: OTHER | Age: 74
End: 2024-02-13

## 2024-02-13 RX ORDER — HYDROCODONE BITARTRATE AND ACETAMINOPHEN 7.5; 325 MG/1; MG/1
1 TABLET ORAL EVERY 6 HOURS PRN
Qty: 60 TABLET | Refills: 0 | Status: SHIPPED | OUTPATIENT
Start: 2024-02-13 | End: 2024-03-11

## 2024-02-13 RX ORDER — GABAPENTIN 300 MG/1
300 CAPSULE ORAL 3 TIMES DAILY
Qty: 90 CAPSULE | Refills: 0 | Status: SHIPPED | OUTPATIENT
Start: 2024-02-13 | End: 2024-03-11

## 2024-02-13 RX ORDER — ZOLPIDEM TARTRATE 10 MG/1
TABLET ORAL
Qty: 30 TABLET | Refills: 0 | Status: SHIPPED | OUTPATIENT
Start: 2024-02-13 | End: 2024-03-11

## 2024-02-13 RX ORDER — TOPIRAMATE 50 MG/1
TABLET, FILM COATED ORAL
Qty: 90 TABLET | Refills: 0 | Status: SHIPPED | OUTPATIENT
Start: 2024-02-13 | End: 2024-03-18

## 2024-02-13 RX ORDER — CYCLOBENZAPRINE HCL 10 MG
TABLET ORAL
Qty: 90 TABLET | Refills: 0 | Status: SHIPPED | OUTPATIENT
Start: 2024-02-13 | End: 2024-03-11

## 2024-02-15 DIAGNOSIS — R10.13 EPIGASTRIC PAIN: ICD-10-CM

## 2024-02-15 DIAGNOSIS — K31.84 NONDIABETIC GASTROPARESIS: ICD-10-CM

## 2024-02-15 DIAGNOSIS — E78.2 MIXED HYPERLIPIDEMIA: Primary | ICD-10-CM

## 2024-02-15 RX ORDER — SIMVASTATIN 20 MG
20 TABLET ORAL DAILY
Qty: 90 TABLET | Refills: 1 | Status: SHIPPED | OUTPATIENT
Start: 2024-02-15 | End: 2024-08-19

## 2024-02-15 RX ORDER — METOCLOPRAMIDE 5 MG/1
TABLET ORAL
Qty: 270 TABLET | Refills: 1 | Status: SHIPPED | OUTPATIENT
Start: 2024-02-15

## 2024-02-15 NOTE — TELEPHONE ENCOUNTER
Reglan      Last Written Prescription Date:  9/19/22  Last Fill Quantity: 270,   # refills: 3  Last Office Visit: 8/28/23  Future Office visit:       Routing refill request to provider for review/approval because:    Simvastatin      Last Written Prescription Date:  8/2/22  Last Fill Quantity: 90,   # refills: 0  Last Office Visit: 8/28/23  Future Office visit:       Routing refill request to provider for review/approval because:

## 2024-02-20 DIAGNOSIS — H10.023 OTHER MUCOPURULENT CONJUNCTIVITIS OF BOTH EYES: Primary | ICD-10-CM

## 2024-02-20 RX ORDER — CIPROFLOXACIN HYDROCHLORIDE 3.5 MG/ML
2 SOLUTION/ DROPS TOPICAL 3 TIMES DAILY PRN
Qty: 10 ML | Refills: 0 | Status: SHIPPED | OUTPATIENT
Start: 2024-02-20

## 2024-02-20 NOTE — TELEPHONE ENCOUNTER
Routing refill request to provider for review/approval because:    Drug not on the [facility], [facility] or [facility] refill protocol or controlled substance

## 2024-02-20 NOTE — TELEPHONE ENCOUNTER
Ciprofloxacin      Last Written Prescription Date:  02/03/21  Last Fill Quantity: 10ml,   # refills: 2  Last Office Visit: 08/28/23  Future Office visit:

## 2024-03-08 DIAGNOSIS — M25.561 ARTHRALGIA OF BOTH KNEES: ICD-10-CM

## 2024-03-08 DIAGNOSIS — M54.2 CERVICAL PAIN: ICD-10-CM

## 2024-03-08 DIAGNOSIS — M25.562 ARTHRALGIA OF BOTH KNEES: ICD-10-CM

## 2024-03-08 DIAGNOSIS — M54.2 CERVICALGIA: ICD-10-CM

## 2024-03-08 DIAGNOSIS — B02.23 POSTHERPETIC POLYNEUROPATHY: ICD-10-CM

## 2024-03-08 DIAGNOSIS — G47.00 PERSISTENT INSOMNIA: ICD-10-CM

## 2024-03-08 DIAGNOSIS — Z01.818 PREOP GENERAL PHYSICAL EXAM: ICD-10-CM

## 2024-03-11 RX ORDER — GABAPENTIN 300 MG/1
300 CAPSULE ORAL 3 TIMES DAILY
Qty: 90 CAPSULE | Refills: 0 | Status: SHIPPED | OUTPATIENT
Start: 2024-03-11 | End: 2024-04-05

## 2024-03-11 RX ORDER — CYCLOBENZAPRINE HCL 10 MG
TABLET ORAL
Qty: 90 TABLET | Refills: 0 | Status: SHIPPED | OUTPATIENT
Start: 2024-03-11 | End: 2024-04-05

## 2024-03-11 RX ORDER — PROMETHAZINE HYDROCHLORIDE 25 MG/1
TABLET ORAL
Qty: 90 TABLET | Refills: 4 | Status: SHIPPED | OUTPATIENT
Start: 2024-03-11 | End: 2024-07-11

## 2024-03-11 RX ORDER — ZOLPIDEM TARTRATE 10 MG/1
TABLET ORAL
Qty: 30 TABLET | Refills: 0 | Status: SHIPPED | OUTPATIENT
Start: 2024-03-11 | End: 2024-04-05

## 2024-03-11 RX ORDER — HYDROCODONE BITARTRATE AND ACETAMINOPHEN 7.5; 325 MG/1; MG/1
1 TABLET ORAL EVERY 6 HOURS PRN
Qty: 60 TABLET | Refills: 0 | Status: SHIPPED | OUTPATIENT
Start: 2024-03-11 | End: 2024-04-05

## 2024-03-11 NOTE — TELEPHONE ENCOUNTER
GABAPENTIN 300MG CAPSULE         Last Written Prescription Date:  2/13/24  Last Fill Quantity: 90,   # refills: 0  Last Office Visit: 8/28/23  Future Office visit:       Routing refill request to provider for review/approval because:  Drug not on the [facility], [facility] or [facility] refill protocol or controlled substance    ZOLPIDEM 10MG TABLET         Last Written Prescription Date:  2/13/24  Last Fill Quantity: 30,   # refills: 0  Last Office Visit: 8/28/23  Future Office visit:       Routing refill request to provider for review/approval because:  Drug not on the [facility], [facility] or [facility] refill protocol or controlled substance    HYDROCODONE/APAP 7.5-325MG TAB         Last Written Prescription Date:  2/13/24  Last Fill Quantity: 60,   # refills: 0  Last Office Visit: 8/28/23  Future Office visit:       Routing refill request to provider for review/approval because:  Drug not on the [facility], [facility] or [facility] refill protocol or controlled substance    CYCLOBENZAPRINE 10MG TABLET         Last Written Prescription Date:  2/13/24  Last Fill Quantity: 90,   # refills: 0  Last Office Visit: 8/28/23  Future Office visit:       Routing refill request to provider for review/approval because:  Drug not on the [facility], [facility] or [facility] refill protocol or controlled substance

## 2024-03-17 DIAGNOSIS — G43.009 MIGRAINE WITHOUT AURA AND WITHOUT STATUS MIGRAINOSUS, NOT INTRACTABLE: ICD-10-CM

## 2024-03-18 RX ORDER — TOPIRAMATE 50 MG/1
TABLET, FILM COATED ORAL
Qty: 90 TABLET | Refills: 0 | Status: SHIPPED | OUTPATIENT
Start: 2024-03-18 | End: 2024-04-30

## 2024-03-18 NOTE — TELEPHONE ENCOUNTER
TOPIRAMATE 50MG TABLET           Last Written Prescription Date:  2/13/24  Last Fill Quantity: 90,   # refills: 0  Last Office Visit: 8/28/23  Future Office visit:       Routing refill request to provider for review/approval because:  Anti-Seizure Meds Protocol  [id number]/17/2024 11:56 AM   Protocol Details Review Authorizing provider's last note.

## 2024-04-05 DIAGNOSIS — M25.562 ARTHRALGIA OF BOTH KNEES: ICD-10-CM

## 2024-04-05 DIAGNOSIS — M54.2 CERVICALGIA: ICD-10-CM

## 2024-04-05 DIAGNOSIS — M54.2 CERVICAL PAIN: ICD-10-CM

## 2024-04-05 DIAGNOSIS — G47.00 PERSISTENT INSOMNIA: ICD-10-CM

## 2024-04-05 DIAGNOSIS — B02.23 POSTHERPETIC POLYNEUROPATHY: ICD-10-CM

## 2024-04-05 DIAGNOSIS — M25.561 ARTHRALGIA OF BOTH KNEES: ICD-10-CM

## 2024-04-05 RX ORDER — GABAPENTIN 300 MG/1
300 CAPSULE ORAL 3 TIMES DAILY
Qty: 90 CAPSULE | Refills: 0 | Status: SHIPPED | OUTPATIENT
Start: 2024-04-05 | End: 2024-04-30

## 2024-04-05 RX ORDER — CYCLOBENZAPRINE HCL 10 MG
TABLET ORAL
Qty: 90 TABLET | Refills: 0 | Status: SHIPPED | OUTPATIENT
Start: 2024-04-05 | End: 2024-04-30

## 2024-04-05 RX ORDER — HYDROCODONE BITARTRATE AND ACETAMINOPHEN 7.5; 325 MG/1; MG/1
1 TABLET ORAL EVERY 6 HOURS PRN
Qty: 60 TABLET | Refills: 0 | Status: SHIPPED | OUTPATIENT
Start: 2024-04-05 | End: 2024-04-30

## 2024-04-05 RX ORDER — ZOLPIDEM TARTRATE 10 MG/1
TABLET ORAL
Qty: 30 TABLET | Refills: 0 | Status: SHIPPED | OUTPATIENT
Start: 2024-04-05 | End: 2024-04-30

## 2024-04-05 NOTE — TELEPHONE ENCOUNTER
ZOLPIDEM 10MG TABLET         Last Written Prescription Date:  3/11/24  Last Fill Quantity: 30,   # refills: 0  Last Office Visit: 8/28/23  Future Office visit:       Routing refill request to provider for review/approval because:    Drug not on the [facility], [facility] or [facility] refill protocol or controlled substance      CYCLOBENZAPRINE 10MG TABLET         Last Written Prescription Date:  3/11/24  Last Fill Quantity: 90,   # refills: 0  Last Office Visit: 8/28/23  Future Office visit:       Routing refill request to provider for review/approval because:    Drug not on the [facility], [facility] or [facility] refill protocol or controlled substance      HYDROCODONE/APAP 7.5-325MG TAB         Last Written Prescription Date:  3/11/24  Last Fill Quantity: 60,   # refills: 0  Last Office Visit: 8/28/23  Future Office visit:       Routing refill request to provider for review/approval because:    Drug not on the [facility], [facility] or [facility] refill protocol or controlled substance      GABAPENTIN 300MG CAPSULE         Last Written Prescription Date:  3/11/24  Last Fill Quantity: 90,   # refills: 0  Last Office Visit: 8/28/23  Future Office visit:       Routing refill request to provider for review/approval because:    Drug not on the [facility], [facility] or [facility] refill protocol or controlled substance

## 2024-04-28 DIAGNOSIS — G47.00 PERSISTENT INSOMNIA: ICD-10-CM

## 2024-04-28 DIAGNOSIS — M54.2 CERVICALGIA: ICD-10-CM

## 2024-04-28 DIAGNOSIS — B02.23 POSTHERPETIC POLYNEUROPATHY: ICD-10-CM

## 2024-04-28 DIAGNOSIS — M25.562 ARTHRALGIA OF BOTH KNEES: ICD-10-CM

## 2024-04-28 DIAGNOSIS — M25.561 ARTHRALGIA OF BOTH KNEES: ICD-10-CM

## 2024-04-28 DIAGNOSIS — M54.2 CERVICAL PAIN: ICD-10-CM

## 2024-04-28 DIAGNOSIS — G43.009 MIGRAINE WITHOUT AURA AND WITHOUT STATUS MIGRAINOSUS, NOT INTRACTABLE: ICD-10-CM

## 2024-04-29 NOTE — TELEPHONE ENCOUNTER
Disp Refills Start End ALLAN   cyclobenzaprine (FLEXERIL) 10 MG tablet 90 tablet 0 4/5/2024 -- No      Disp Refills Start End ALLAN   gabapentin (NEURONTIN) 300 MG capsule 90 capsule 0 4/5/2024 -- No      Disp Refills Start End ALLAN   HYDROcodone-acetaminophen (NORCO) 7.5-325 MG per tablet 60 tablet 0 4/5/2024 -- No      Disp Refills Start End ALLAN   zolpidem (AMBIEN) 10 MG tablet 30 tablet 0 4/5/2024 -- No     Last Office Visit: 8/28/2023  Future Office visit:       Routing refill request to provider for review/approval because:

## 2024-04-29 NOTE — TELEPHONE ENCOUNTER
Disp Refills Start End ALLAN   topiramate (TOPAMAX) 50 MG tablet 90 tablet 0 3/18/2024 -- No     Last Office Visit: 08/23/2023  Future Office visit:       Routing refill request to provider for review/approval because:

## 2024-04-30 RX ORDER — ZOLPIDEM TARTRATE 10 MG/1
TABLET ORAL
Qty: 30 TABLET | Refills: 0 | Status: SHIPPED | OUTPATIENT
Start: 2024-04-30 | End: 2024-05-29

## 2024-04-30 RX ORDER — TOPIRAMATE 50 MG/1
TABLET, FILM COATED ORAL
Qty: 90 TABLET | Refills: 0 | Status: SHIPPED | OUTPATIENT
Start: 2024-04-30 | End: 2024-06-17

## 2024-04-30 RX ORDER — GABAPENTIN 300 MG/1
300 CAPSULE ORAL 3 TIMES DAILY
Qty: 90 CAPSULE | Refills: 0 | Status: SHIPPED | OUTPATIENT
Start: 2024-04-30 | End: 2024-05-29

## 2024-04-30 RX ORDER — HYDROCODONE BITARTRATE AND ACETAMINOPHEN 7.5; 325 MG/1; MG/1
1 TABLET ORAL EVERY 6 HOURS PRN
Qty: 60 TABLET | Refills: 0 | Status: SHIPPED | OUTPATIENT
Start: 2024-04-30 | End: 2024-05-23

## 2024-04-30 RX ORDER — CYCLOBENZAPRINE HCL 10 MG
TABLET ORAL
Qty: 90 TABLET | Refills: 0 | Status: SHIPPED | OUTPATIENT
Start: 2024-04-30 | End: 2024-05-23

## 2024-04-30 NOTE — TELEPHONE ENCOUNTER
Routing refill request to provider for review/approval because:    Drug not on the [facility], [facility] or [facility] refill protocol or controlled substance

## 2024-05-15 DIAGNOSIS — F34.1 DYSTHYMIA: ICD-10-CM

## 2024-05-15 NOTE — TELEPHONE ENCOUNTER
Lexapro      Last Written Prescription Date:  1/26/24  Last Fill Quantity: 60,   # refills: 1  Last Office Visit: 8/28/23  Future Office visit:       Routing refill request to provider for review/approval because:

## 2024-05-20 RX ORDER — ESCITALOPRAM OXALATE 20 MG/1
TABLET ORAL
Qty: 60 TABLET | Refills: 0 | Status: SHIPPED | OUTPATIENT
Start: 2024-05-20 | End: 2024-07-23

## 2024-05-22 ENCOUNTER — TELEPHONE (OUTPATIENT)
Dept: INTERNAL MEDICINE | Facility: OTHER | Age: 74
End: 2024-05-22

## 2024-05-23 DIAGNOSIS — M54.2 CERVICALGIA: ICD-10-CM

## 2024-05-23 DIAGNOSIS — M25.562 ARTHRALGIA OF BOTH KNEES: ICD-10-CM

## 2024-05-23 DIAGNOSIS — M54.2 CERVICAL PAIN: ICD-10-CM

## 2024-05-23 DIAGNOSIS — M25.561 ARTHRALGIA OF BOTH KNEES: ICD-10-CM

## 2024-05-23 RX ORDER — CYCLOBENZAPRINE HCL 10 MG
TABLET ORAL
Qty: 90 TABLET | Refills: 0 | Status: SHIPPED | OUTPATIENT
Start: 2024-05-23 | End: 2024-06-17

## 2024-05-23 RX ORDER — HYDROCODONE BITARTRATE AND ACETAMINOPHEN 7.5; 325 MG/1; MG/1
1 TABLET ORAL EVERY 6 HOURS PRN
Qty: 60 TABLET | Refills: 0 | Status: SHIPPED | OUTPATIENT
Start: 2024-05-23 | End: 2024-06-17

## 2024-05-23 NOTE — TELEPHONE ENCOUNTER
Routing refill request to provider for review/approval because:  Drug not on the [facility], [facility] or [facility] refill protocol or controlled substance    
Sheridanco      Last Written Prescription Date:  4/30/24  Last Fill Quantity: 60,   # refills: 0  Last Office Visit: 8/28/23  Future Office visit:       Routing refill request to provider for review/approval because:      Flexeril      Last Written Prescription Date:  4/30/24  Last Fill Quantity: 90,   # refills: 0  Last Office Visit: 8/28/23  Future Office visit:       Routing refill request to provider for review/approval because:        
No

## 2024-05-28 DIAGNOSIS — B02.23 POSTHERPETIC POLYNEUROPATHY: ICD-10-CM

## 2024-05-28 DIAGNOSIS — G47.00 PERSISTENT INSOMNIA: ICD-10-CM

## 2024-05-28 NOTE — TELEPHONE ENCOUNTER
GABAPENTIN 300MG CAPSULE         Last Written Prescription Date:  4/30/24  Last Fill Quantity: 90,   # refills: 0  Last Office Visit: 8/28/23  Future Office visit:       Routing refill request to provider for review/approval because:  Drug not on the [facility], [facility] or [facility] refill protocol or controlled substance      ZOLPIDEM 10MG TABLET         Last Written Prescription Date:  4/30/24  Last Fill Quantity: 30,   # refills: 0  Last Office Visit: 8/28/23  Future Office visit:       Routing refill request to provider for review/approval because:  Drug not on the [facility], [facility] or [facility] refill protocol or controlled substance

## 2024-05-29 RX ORDER — GABAPENTIN 300 MG/1
300 CAPSULE ORAL 3 TIMES DAILY
Qty: 90 CAPSULE | Refills: 0 | Status: SHIPPED | OUTPATIENT
Start: 2024-05-29 | End: 2024-06-24

## 2024-05-29 RX ORDER — ZOLPIDEM TARTRATE 10 MG/1
TABLET ORAL
Qty: 30 TABLET | Refills: 0 | Status: SHIPPED | OUTPATIENT
Start: 2024-05-29 | End: 2024-06-24

## 2024-05-30 ENCOUNTER — TRANSFERRED RECORDS (OUTPATIENT)
Dept: HEALTH INFORMATION MANAGEMENT | Facility: CLINIC | Age: 74
End: 2024-05-30

## 2024-06-04 NOTE — TELEPHONE ENCOUNTER
EMERGENCY DEPARTMENT ENCOUNTER    Pt Name: Serg Vazquez  MRN: [id number]  Birthdate 2001  Date of evaluation: 6/4/24  CHIEF COMPLAINT       Chief Complaint   Patient presents with    Mental Health Problem     HISTORY OF PRESENT ILLNESS   Presenting for mental health problem.  Patient has moved from [place].  He said that he is on medications for depression.  He is denying any suicidal or homicidal ideations.  Patient is requesting help to cope with different things in his life including moving to a new state.      The history is provided by the patient.         REVIEW OF SYSTEMS     Review of Systems   Constitutional:  Negative for chills and fever.   HENT:  Negative for congestion.    Eyes:  Negative for visual disturbance.   Respiratory:  Negative for cough and shortness of breath.    Cardiovascular:  Negative for chest pain.   Gastrointestinal:  Negative for abdominal pain, nausea and vomiting.   Genitourinary:  Negative for flank pain.   Musculoskeletal:  Negative for myalgias.   Neurological:  Negative for dizziness, light-headedness and headaches.   Psychiatric/Behavioral:  Positive for dysphoric mood and sleep disturbance. Negative for agitation, hallucinations, self-injury and suicidal ideas. The patient is not nervous/anxious.      PASTMEDICAL HISTORY     Past Medical History:   Diagnosis Date    Asthma      Past Problem List  There is no problem list on file for this patient.    SURGICAL HISTORY       Past Surgical History:   Procedure Laterality Date    TYMPANOSTOMY TUBE PLACEMENT       CURRENT MEDICATIONS       Discharge Medication List as of 6/2/2024  3:12 PM        CONTINUE these medications which have NOT CHANGED    Details   traZODone (DESYREL) 100 MG tablet Take 1.5 tablets by mouth nightlyHistorical Med           ALLERGIES     has No Known Allergies.  FAMILY HISTORY     has no family status information on file.      SOCIAL HISTORY       Social History     Tobacco Use    Smoking status: Every Day  Reason for call:  Medication      Have you contacted your pharmacy? Yes   If patient has contacted Pharmacy and it has been over 72hrs, continue to #2  Medication GABAPENTIN 300 MG, ZOLPIDEM 10 MG , (hydrocodone 325mg,(please call this in on December 26)  What Pharmacy do you use? [facility] [place]      (Please note that the turn-around-time for prescriptions is 72 business hours; I am sending your request at this time. SEND TO  [facility]  )

## 2024-06-16 DIAGNOSIS — M54.2 CERVICALGIA: ICD-10-CM

## 2024-06-16 DIAGNOSIS — M25.561 ARTHRALGIA OF BOTH KNEES: ICD-10-CM

## 2024-06-16 DIAGNOSIS — M25.562 ARTHRALGIA OF BOTH KNEES: ICD-10-CM

## 2024-06-16 DIAGNOSIS — M54.2 CERVICAL PAIN: ICD-10-CM

## 2024-06-16 DIAGNOSIS — G43.009 MIGRAINE WITHOUT AURA AND WITHOUT STATUS MIGRAINOSUS, NOT INTRACTABLE: ICD-10-CM

## 2024-06-17 RX ORDER — HYDROCODONE BITARTRATE AND ACETAMINOPHEN 7.5; 325 MG/1; MG/1
1 TABLET ORAL EVERY 6 HOURS PRN
Qty: 60 TABLET | Refills: 0 | Status: SHIPPED | OUTPATIENT
Start: 2024-06-17 | End: 2024-07-11

## 2024-06-17 RX ORDER — CYCLOBENZAPRINE HCL 10 MG
TABLET ORAL
Qty: 90 TABLET | Refills: 0 | Status: SHIPPED | OUTPATIENT
Start: 2024-06-17 | End: 2024-07-11

## 2024-06-17 RX ORDER — TOPIRAMATE 50 MG/1
TABLET, FILM COATED ORAL
Qty: 90 TABLET | Refills: 0 | Status: SHIPPED | OUTPATIENT
Start: 2024-06-17 | End: 2024-08-08

## 2024-06-17 NOTE — TELEPHONE ENCOUNTER
HYDROCODONE/APAP 7.5-325MG TAB         Last Written Prescription Date:  5/23/24  Last Fill Quantity: 60,   # refills: 0  Last Office Visit: 8/28/23  Future Office visit:    Next 5 appointments (look out 90 days)      Aug 29, 2024 11:30 AM  (Arrive by 11:15 AM)  Adult Preventative Visit with Balta Milton DO  [facility] ([facility] ) [address]  [place] [address]  [phone]             Routing refill request to provider for review/approval because:  Drug not on the [facility], [facility] or [facility] refill protocol or controlled substance        CYCLOBENZAPRINE 10MG TABLET         Last Written Prescription Date:  5/23/24  Last Fill Quantity: 90,   # refills: 0  Last Office Visit: 8/28/23  Future Office visit:    Next 5 appointments (look out 90 days)      Aug 29, 2024 11:30 AM  (Arrive by 11:15 AM)  Adult Preventative Visit with Balta Milton DO  [facility] ([facility] ) [address]  [place] [address]  [phone]             Routing refill request to provider for review/approval because:  Drug not on the [facility], [facility] or [facility] refill protocol or controlled substance      TOPIRAMATE 50MG TABLET         Last Written Prescription Date:  4/30/24  Last Fill Quantity: 90,   # refills: 0  Last Office Visit: 8/28/23  Future Office visit:    Next 5 appointments (look out 90 days)      Aug 29, 2024 11:30 AM  (Arrive by 11:15 AM)  Adult Preventative Visit with Balta Milton DO  [facility] ([facility] ) [address]  [place] [address]  [phone]             Routing refill request to provider for review/approval because:      Anti-Seizure Meds Protocol  [id number]/16/2024 12:36 PM   Protocol Details Review Authorizing provider's last note.

## 2024-06-23 DIAGNOSIS — B02.23 POSTHERPETIC POLYNEUROPATHY: ICD-10-CM

## 2024-06-23 DIAGNOSIS — G47.00 PERSISTENT INSOMNIA: ICD-10-CM

## 2024-06-24 RX ORDER — GABAPENTIN 300 MG/1
300 CAPSULE ORAL 3 TIMES DAILY
Qty: 90 CAPSULE | Refills: 0 | Status: SHIPPED | OUTPATIENT
Start: 2024-06-24 | End: 2024-07-17

## 2024-06-24 RX ORDER — TRAZODONE HYDROCHLORIDE 150 MG/1
150 TABLET ORAL AT BEDTIME
Qty: 90 TABLET | Refills: 0 | Status: SHIPPED | OUTPATIENT
Start: 2024-06-24 | End: 2024-09-19

## 2024-06-24 RX ORDER — ZOLPIDEM TARTRATE 10 MG/1
TABLET ORAL
Qty: 30 TABLET | Refills: 0 | Status: SHIPPED | OUTPATIENT
Start: 2024-06-24 | End: 2024-07-23

## 2024-06-24 NOTE — TELEPHONE ENCOUNTER
Routing refill request to provider for review/approval because:  Drug not on the [facility], [facility] or [facility] refill protocol or controlled substance

## 2024-06-24 NOTE — TELEPHONE ENCOUNTER
Gabapentin      Last Written Prescription Date:  5/29/24  Last Fill Quantity: 90,   # refills: 0  Last Office Visit: 11/21/22  Future Office visit:    Next 5 appointments (look out 90 days)      Aug 29, 2024 11:30 AM  (Arrive by 11:15 AM)  Adult Preventative Visit with Balta Milton DO  [facility] ([facility] ) [address]  [place] [address]  [phone]             Routing refill request to provider for review/approval because:      Ambien      Last Written Prescription Date:  5/29/24  Last Fill Quantity: 30,   # refills: 0  Last Office Visit: 11/21/22  Future Office visit:    Next 5 appointments (look out 90 days)      Aug 29, 2024 11:30 AM  (Arrive by 11:15 AM)  Adult Preventative Visit with Balta Milton DO  [facility] ([facility] ) [address]  [place] [address]  [phone]             Routing refill request to provider for review/approval because:      Trazodone      Last Written Prescription Date:  10/20/23  Last Fill Quantity: 90,   # refills: 2  Last Office Visit: 11/21/22  Future Office visit:    Next 5 appointments (look out 90 days)      Aug 29, 2024 11:30 AM  (Arrive by 11:15 AM)  Adult Preventative Visit with Balta Milton DO  [facility] ([facility] ) [address]  [place] [address]  [phone]             Routing refill request to provider for review/approval because:

## 2024-07-10 DIAGNOSIS — M54.2 CERVICAL PAIN: ICD-10-CM

## 2024-07-10 DIAGNOSIS — M25.561 ARTHRALGIA OF BOTH KNEES: ICD-10-CM

## 2024-07-10 DIAGNOSIS — M54.2 CERVICALGIA: ICD-10-CM

## 2024-07-10 DIAGNOSIS — M25.562 ARTHRALGIA OF BOTH KNEES: ICD-10-CM

## 2024-07-10 DIAGNOSIS — Z01.818 PREOP GENERAL PHYSICAL EXAM: ICD-10-CM

## 2024-07-11 RX ORDER — HYDROCODONE BITARTRATE AND ACETAMINOPHEN 7.5; 325 MG/1; MG/1
1 TABLET ORAL EVERY 6 HOURS PRN
Qty: 60 TABLET | Refills: 0 | Status: SHIPPED | OUTPATIENT
Start: 2024-07-11 | End: 2024-08-02

## 2024-07-11 RX ORDER — PROMETHAZINE HYDROCHLORIDE 25 MG/1
TABLET ORAL
Qty: 90 TABLET | Refills: 1 | Status: SHIPPED | OUTPATIENT
Start: 2024-07-11 | End: 2024-09-06

## 2024-07-11 RX ORDER — CYCLOBENZAPRINE HCL 10 MG
TABLET ORAL
Qty: 90 TABLET | Refills: 0 | Status: SHIPPED | OUTPATIENT
Start: 2024-07-11 | End: 2024-08-02

## 2024-07-11 NOTE — TELEPHONE ENCOUNTER
HYDROCODONE/APAP 7.5-325MG TAB         Last Written Prescription Date:  6/17/24  Last Fill Quantity: 60,   # refills: 0  Last Office Visit: 8/28/23  Future Office visit:    Next 5 appointments (look out 90 days)      Aug 29, 2024 11:30 AM  (Arrive by 11:15 AM)  Adult Preventative Visit with Balta Milton DO  [facility] ([facility] ) [address]  [place] [address]  [phone]             Routing refill request to provider for review/approval because:  Drug not on the [facility], [facility] or [facility] refill protocol or controlled substance    CYCLOBENZAPRINE 10MG TABLET         Last Written Prescription Date:  6/17/24  Last Fill Quantity: 90,   # refills: 0  Last Office Visit: 8/28/23  Future Office visit:    Next 5 appointments (look out 90 days)      Aug 29, 2024 11:30 AM  (Arrive by 11:15 AM)  Adult Preventative Visit with Balta Milton DO  [facility] ([facility] ) [address]  [place] [address]  [phone]             Routing refill request to provider for review/approval because:  Drug not on the [facility], [facility] or [facility] refill protocol or controlled substance

## 2024-07-17 DIAGNOSIS — B02.23 POSTHERPETIC POLYNEUROPATHY: ICD-10-CM

## 2024-07-17 RX ORDER — GABAPENTIN 300 MG/1
300 CAPSULE ORAL 3 TIMES DAILY
Qty: 90 CAPSULE | Refills: 0 | Status: SHIPPED | OUTPATIENT
Start: 2024-07-17 | End: 2024-08-16

## 2024-07-17 NOTE — TELEPHONE ENCOUNTER
GABAPENTIN 300MG CAPSULE         Last Written Prescription Date:  6/24/24  Last Fill Quantity: 90,   # refills: 0  Last Office Visit: 8/28/23  Future Office visit:    Next 5 appointments (look out 90 days)      Aug 29, 2024 11:30 AM  (Arrive by 11:15 AM)  Adult Preventative Visit with Balta Milton DO  [facility] ([facility] ) [address]  [place] [address] [phone]             Routing refill request to provider for review/approval because:  Drug not on the [facility], [facility] or [facility] refill protocol or controlled substance

## 2024-07-19 ENCOUNTER — TELEPHONE (OUTPATIENT)
Dept: INTERNAL MEDICINE | Facility: OTHER | Age: 74
End: 2024-07-19

## 2024-07-19 DIAGNOSIS — G47.00 PERSISTENT INSOMNIA: ICD-10-CM

## 2024-07-19 RX ORDER — ZOLPIDEM TARTRATE 10 MG/1
TABLET ORAL
Qty: 30 TABLET | Refills: 0 | OUTPATIENT
Start: 2024-07-19

## 2024-07-19 NOTE — TELEPHONE ENCOUNTER
ZOLPIDEM 10MG TABLET         Last Written Prescription Date:  6/24/24  Last Fill Quantity: 30,   # refills: 0  Last Office Visit: 8/28/23  Future Office visit:    Next 5 appointments (look out 90 days)      Aug 29, 2024 11:30 AM  (Arrive by 11:15 AM)  Adult Preventative Visit with Balta Milton DO  [facility] ([facility] ) [address]  [facility] [address] [phone]             Routing refill request to provider for review/approval because:  Drug not on the [facility], [facility] or [facility] refill protocol or controlled substance

## 2024-07-22 ENCOUNTER — TELEPHONE (OUTPATIENT)
Dept: INTERNAL MEDICINE | Facility: OTHER | Age: 74
End: 2024-07-22

## 2024-07-22 NOTE — TELEPHONE ENCOUNTER
Reason for call:  Medication      Have you contacted your pharmacy? Yes   If patient has contacted Pharmacy and it has been over 72hrs, continue to #2  Medication GABAPENTIN  What Pharmacy do you use? [facility] [place]  Patient stated that it had been DENIED ??      (Please note that the turn-around-time for prescriptions is 72 business hours; I am sending your request at this time. SEND TO appropriate Care Team Pool )

## 2024-07-22 NOTE — TELEPHONE ENCOUNTER
Reason for call:  Medication    Pt  states pharmacy received a denial. Recalling to see if prescription can be resent.   Have you contacted your pharmacy? Yes   If patient has contacted Pharmacy and it has been over 72hrs, continue to #2  Medication zolpidem (AMBIEN) 10 MG tablet   What Pharmacy do you use? [facility] [place]       (Please note that the turn-around-time for prescriptions is 72 business hours; I am sending your request at this time. SEND TO appropriate Care Team Pool )

## 2024-07-23 DIAGNOSIS — F34.1 DYSTHYMIA: ICD-10-CM

## 2024-07-23 RX ORDER — ZOLPIDEM TARTRATE 10 MG/1
TABLET ORAL
Qty: 30 TABLET | Refills: 0 | Status: SHIPPED | OUTPATIENT
Start: 2024-07-23 | End: 2024-08-19

## 2024-07-23 RX ORDER — ESCITALOPRAM OXALATE 20 MG/1
TABLET ORAL
Qty: 60 TABLET | Refills: 0 | Status: SHIPPED | OUTPATIENT
Start: 2024-07-23 | End: 2024-09-19

## 2024-07-23 NOTE — TELEPHONE ENCOUNTER
zolpidem (AMBIEN) 10 MG tablet         Last Written Prescription Date:  6/24/24  Last Fill Quantity: 30,   # refills: 0  Last Office Visit: 8/28/23  Future Office visit:    Next 5 appointments (look out 90 days)      Aug 29, 2024 11:30 AM  (Arrive by 11:15 AM)  Adult Preventative Visit with Balta Milton DO  [facility] ([facility] ) [address]  [facility] [id number] [phone]             Routing refill request to provider for review/approval because:  Drug not on the [facility], [facility] or  [facility] refill protocol or controlled substance

## 2024-07-23 NOTE — TELEPHONE ENCOUNTER
Failed protocol        12/7/2021     2:00 PM 11/21/2022     1:37 PM 8/28/2023    11:56 AM   PHQ   PHQ-9 Total Score 5 0 4   Q9: Thoughts of better off dead/self-harm past 2 weeks Not at all Not at all Not at all        escitalopram (LEXAPRO) 20 MG tablet       Last Written Prescription Date:  5/20/24  Last Fill Quantity: 60,   # refills: 0  Last Office Visit: 8/28/24  Future Office visit:    Next 5 appointments (look out 90 days)      Aug 29, 2024 11:30 AM  (Arrive by 11:15 AM)  Adult Preventative Visit with Balta Milton DO  [facility] ([facility] ) [address]  [place] [address] [phone]             Routing refill request to provider for review/approval because:

## 2024-08-02 DIAGNOSIS — M25.561 ARTHRALGIA OF BOTH KNEES: ICD-10-CM

## 2024-08-02 DIAGNOSIS — M54.2 CERVICALGIA: ICD-10-CM

## 2024-08-02 DIAGNOSIS — M54.2 CERVICAL PAIN: ICD-10-CM

## 2024-08-02 DIAGNOSIS — M25.562 ARTHRALGIA OF BOTH KNEES: ICD-10-CM

## 2024-08-02 RX ORDER — CYCLOBENZAPRINE HCL 10 MG
TABLET ORAL
Qty: 90 TABLET | Refills: 0 | Status: SHIPPED | OUTPATIENT
Start: 2024-08-09 | End: 2024-09-03

## 2024-08-02 RX ORDER — HYDROCODONE BITARTRATE AND ACETAMINOPHEN 7.5; 325 MG/1; MG/1
1 TABLET ORAL EVERY 6 HOURS PRN
Qty: 60 TABLET | Refills: 0 | Status: SHIPPED | OUTPATIENT
Start: 2024-08-09 | End: 2024-09-03

## 2024-08-02 NOTE — TELEPHONE ENCOUNTER
HYDROCODONE/APAP 7.5-325MG TAB         Last Written Prescription Date:  7/11/24  Last Fill Quantity: 60,   # refills: 0  Last Office Visit: 8/28/23  Future Office visit:    Next 5 appointments (look out 90 days)      Aug 29, 2024 11:30 AM  (Arrive by 11:15 AM)  Adult Preventative Visit with Balta Milton DO  [facility] ([facility] ) [address]  [place] [address]  [phone]             Routing refill request to provider for review/approval because:  Drug not on the [facility], [facility] or [facility] refill protocol or controlled substance      CYCLOBENZAPRINE 10MG TABLET         Last Written Prescription Date:  7/11/24  Last Fill Quantity: 90,   # refills: 0  Last Office Visit: 8/28/23  Future Office visit:    Next 5 appointments (look out 90 days)      Aug 29, 2024 11:30 AM  (Arrive by 11:15 AM)  Adult Preventative Visit with Balta Milton DO  [facility] ([facility] ) [address]  [place] [address]  [phone]             Routing refill request to provider for review/approval because:  Drug not on the [facility], [facility] or [facility] refill protocol or controlled substance

## 2024-08-07 DIAGNOSIS — G43.009 MIGRAINE WITHOUT AURA AND WITHOUT STATUS MIGRAINOSUS, NOT INTRACTABLE: ICD-10-CM

## 2024-08-07 DIAGNOSIS — K21.00 GASTROESOPHAGEAL REFLUX DISEASE WITH ESOPHAGITIS, UNSPECIFIED WHETHER HEMORRHAGE: ICD-10-CM

## 2024-08-08 RX ORDER — PANTOPRAZOLE SODIUM 40 MG/1
TABLET, DELAYED RELEASE ORAL
Qty: 90 TABLET | Refills: 0 | Status: SHIPPED | OUTPATIENT
Start: 2024-08-08

## 2024-08-08 RX ORDER — TOPIRAMATE 50 MG/1
TABLET, FILM COATED ORAL
Qty: 90 TABLET | Refills: 0 | Status: SHIPPED | OUTPATIENT
Start: 2024-08-08 | End: 2024-09-26

## 2024-08-08 NOTE — TELEPHONE ENCOUNTER
TOPIRAMATE 50MG TABLET         Last Written Prescription Date:  6/17/24  Last Fill Quantity: 90,   # refills: 0  Last Office Visit: 8/28/23  Future Office visit:    Next 5 appointments (look out 90 days)      Aug 29, 2024 11:30 AM  (Arrive by 11:15 AM)  Adult Preventative Visit with Balta Milton DO  [facility] ([facility] ) [address]  [facility] [address]  [phone]           Routing refill request to provider for review/approval because:    Anti-Seizure Meds Protocol  [id number]/07/2024 04:39 PM   Protocol Details Review Authorizing provider's last note.

## 2024-08-16 DIAGNOSIS — B02.23 POSTHERPETIC POLYNEUROPATHY: ICD-10-CM

## 2024-08-16 RX ORDER — GABAPENTIN 300 MG/1
300 CAPSULE ORAL 3 TIMES DAILY
Qty: 90 CAPSULE | Refills: 0 | Status: SHIPPED | OUTPATIENT
Start: 2024-08-16 | End: 2024-09-03

## 2024-08-16 NOTE — TELEPHONE ENCOUNTER
Gabapentin       Last Written Prescription Date:  7/17/2024  Last Fill Quantity: 90,   # refills: 0  Last Office Visit: 8/28/2023  Future Office visit:    Next 5 appointments (look out 90 days)      Sep 03, 2024 11:30 AM  (Arrive by 11:15 AM)  Adult Preventative Visit with Balta Milton DO  [facility] ([facility] ) [address]  [place] [address] [phone]

## 2024-08-18 DIAGNOSIS — G47.00 PERSISTENT INSOMNIA: ICD-10-CM

## 2024-08-18 DIAGNOSIS — E78.2 MIXED HYPERLIPIDEMIA: ICD-10-CM

## 2024-08-19 RX ORDER — ZOLPIDEM TARTRATE 10 MG/1
TABLET ORAL
Qty: 30 TABLET | Refills: 0 | Status: SHIPPED | OUTPATIENT
Start: 2024-08-19 | End: 2024-09-16

## 2024-08-19 RX ORDER — SIMVASTATIN 20 MG
20 TABLET ORAL DAILY
Qty: 90 TABLET | Refills: 0 | Status: SHIPPED | OUTPATIENT
Start: 2024-08-19

## 2024-08-19 NOTE — TELEPHONE ENCOUNTER
ZOLPIDEM 10MG TABLET         Last Written Prescription Date:  7/23/24  Last Fill Quantity: 30,   # refills: 0  Last Office Visit: 8/28/23  Future Office visit:    Next 5 appointments (look out 90 days)      Sep 03, 2024 11:30 AM  (Arrive by 11:15 AM)  Adult Preventative Visit with Balta Milton DO  [facility] ([facility] ) [address]  [facility] [id number] [phone]             Routing refill request to provider for review/approval because:  Drug not on the [facility], [facility] or [facility] refill protocol or controlled substance

## 2024-08-30 NOTE — PROGRESS NOTES
Preventive Care Visit  [facility]  Balta Milton DO, Internal Medicine  Sep 3, 2024      Assessment & Plan   Problem List Items Addressed This Visit          Nervous and Auditory    Postherpetic polyneuropathy    Relevant Medications    cyclobenzaprine (FLEXERIL) 10 MG tablet    gabapentin (NEURONTIN) 300 MG capsule       Endocrine    Hypothyroidism    Relevant Orders    TSH with free T4 reflex    Hyperlipidemia    Relevant Orders    Lipid Profile (Chol, Trig, HDL, LDL calc)       Hematologic    Anemia, iron deficiency       Other    Chronic, continuous use of opioids    Relevant Orders    Drug Confirmation Panel Urine with Creat     Other Visit Diagnoses       Screening for osteoporosis    -  Primary    Relevant Orders    DX Bone Density    Encounter for screening mammogram for breast cancer        Relevant Orders    MA Screen Bilateral w/Jozef    Vitamin B12 deficiency (non anemic)        Relevant Orders    Vitamin B12    Vitamin D deficiency        Relevant Orders    Vitamin D Deficiency    Rash        Relevant Medications    nystatin (MYCOSTATIN) 270154 UNIT/GM external powder    Cervicalgia        Relevant Medications    cyclobenzaprine (FLEXERIL) 10 MG tablet    Cervical pain        Relevant Medications    HYDROcodone-acetaminophen (NORCO) 7.5-325 MG per tablet    Arthralgia of both knees        Relevant Medications    cyclobenzaprine (FLEXERIL) 10 MG tablet    HYDROcodone-acetaminophen (NORCO) 7.5-325 MG per tablet    Routine history and physical examination of adult        Relevant Orders    Comprehensive metabolic panel (BMP + Alb, Alk Phos, ALT, AST, Total. Bili, TP)    CBC with platelets and differential    Senile osteoporosis        Relevant Medications    cyclobenzaprine (FLEXERIL) 10 MG tablet    HYDROcodone-acetaminophen (NORCO) 7.5-325 MG per tablet    Other Relevant Orders    DX Bone Density    Pain in other specified joint        Relevant Medications    cyclobenzaprine (FLEXERIL) 10 MG  "tablet    HYDROcodone-acetaminophen (NORCO) 7.5-325 MG per tablet    Other Relevant Orders    Drug Confirmation Panel Urine with Creat             Patient has been advised of split billing requirements and indicates understanding: Yes       Remains on a controlled substance agreement.  Agreement was reviewed and signed by both parties today.  Patient was given a copy.    BMI  Estimated body mass index is 27.92 kg/m  as calculated from the following:    Height as of this encounter: 1.54 m (5' 0.63\").    Weight as of this encounter: 66.2 kg (146 lb).       Counseling  Appropriate preventive services were addressed with this patient via screening, questionnaire, or discussion as appropriate for fall prevention, nutrition, physical activity, Tobacco-use cessation, social engagement, weight loss and cognition.  Checklist reviewing preventive services available has been given to the patient.  Reviewed patient's diet, addressing concerns and/or questions.   The patient was instructed to see the dentist every 6 months.   I have reviewed Opioid Use Disorder and Substance Use Disorder risk factors and made any needed referrals.         No follow-ups on file.      Enzo Looney is a 74 year old, presenting for the following:  Physical        Health Care Directive  Patient does not have a Health Care Directive or Living Will: Discussed advance care planning with patient; however, patient declined at this time.    CHAI Looney turns to clinic today for routine annual physical.  She does report having a sister with COPD, coronary artery disease and a history of osteoporosis.  She states that she was supposed to be scheduled for DEXA scan however she never completed that nor has she completed her mammogram.  She is fasting today.  She does need various refills.  She denies any chest pain or shortness of breath.  She is not a smoker.  Vitals are stable.                9/3/2024   General Health   How would you rate your overall " physical health? Good   Feel stress (tense, anxious, or unable to sleep) Not at all            9/3/2024   Nutrition   Diet: Regular (no restrictions)            9/3/2024   Exercise   Days per week of moderate/strenous exercise 0 days      (!) EXERCISE CONCERN      9/3/2024   Social Factors   Frequency of gathering with friends or relatives Three times a week   Worry food won't last until get money to buy more No   Food not last or not have enough money for food? No   Do you have housing? (Housing is defined as stable permanent housing and does not include staying ouside in a car, in a tent, in an abandoned building, in an overnight shelter, or couch-surfing.) Yes   Are you worried about losing your housing? No   Lack of transportation? No   Unable to get utilities (heat,electricity)? No            9/3/2024   Fall Risk   Fallen 2 or more times in the past year? No   Trouble with walking or balance? No             9/3/2024   Activities of Daily Living- Home Safety   Needs help with the following daily activites None of the above   Safety concerns in the home None of the above            9/3/2024   Dental   Dentist two times every year? (!) NO            9/3/2024   Hearing Screening   Hearing concerns? None of the above            9/3/2024   Driving Risk Screening   Patient/family members have concerns about driving No            9/3/2024   General Alertness/Fatigue Screening   Have you been more tired than usual lately? No            9/3/2024   Urinary Incontinence Screening   Bothered by leaking urine in past 6 months No            9/3/2024   TB Screening   Were you born outside of the [place]? No          Today's PHQ-9 Score:       9/3/2024    11:28 AM   PHQ-9 SCORE   PHQ-9 Total Score MyChart 1 (Minimal depression)   PHQ-9 Total Score 1         9/3/2024   Substance Use   Alcohol more than 3/day or more than 7/wk No   Do you have a current opioid prescription? (!) YES   How severe/bad is pain from 1 to 10? 2/10   Do you  use any other substances recreationally? No          Social History     Tobacco Use    Smoking status: Never    Smokeless tobacco: Never    Tobacco comments:     no passive exposure   Vaping Use    Vaping status: Never Used   Substance Use Topics    Alcohol use: Yes     Comment: rarely, maybe yearly    Drug use: No           2022   LAST FHS-7 RESULTS   1st degree relative breast or ovarian cancer No   Any relative bilateral breast cancer No   Any male have breast cancer No   Any ONE woman have BOTH breast AND ovarian cancer No   Any woman with breast cancer before 50yrs Unknown   2 or more relatives with breast AND/OR ovarian cancer No   2 or more relatives with breast AND/OR bowel cancer No               ASCVD Risk   The 10-year ASCVD risk score (Pallavi CAMPOS, et al., 2019) is: 9.2%    Values used to calculate the score:      Age: 74 years      Sex: Female      Is Non- : No      Diabetic: No      Tobacco smoker: No      Systolic Blood Pressure: 114 mmHg      Is BP treated: No      HDL Cholesterol: 58 mg/dL      Total Cholesterol: 184 mg/dL    Fracture Risk Assessment Tool  Link to Frax Calculator  Use the information below to complete the Frax calculator  : 1950  Sex: female  Weight (kg): 66.2 kg (actual weight)  Height (cm): 154 cm  Previous Fragility Fracture:  No  History of parent with fractured hip:  No  Current Smoking:  No  Patient has been on glucocorticoids for more than 3 months (5mg/day or more): No  Rheumatoid Arthritis on Problem List:  No  Secondary Osteoporosis on Problem List:  No  Consumes 3 or more units of alcohol per day: No  Femoral Neck BMD (g/cm2)            Reviewed and updated as needed this visit by Provider                    Past Medical History:   Diagnosis Date    Allergic arthritis involving hand 2011    Anemia, Iron Deficiency 2011    Anxiety 2011    Contact dermatitis and other eczema, unspecified c 2011    Depression  01/01/2011    Edema 01/01/2011    Gastrointestinal mucositis (ulcerative) 01/01/2011    GERD 01/01/2011    Headache(784.0) 01/01/2011    Hypothyroidism 01/01/2011    Insomnia, unspecified 01/01/2011    Migraine 01/01/2011    Nausea and vomiting     since was a teen    Nonallopathic lesion of cervical region, not elsewhere classified 10/16/2000    Osteoporosis 01/01/2011    Other and unspecified hyperlipidemia 01/01/2011    Other malaise and fatigue 08/27/2001    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     Past Surgical History:   Procedure Laterality Date    ARTHROPLASTY KNEE Left 4/17/2018    Procedure: ARTHROPLASTY KNEE;  LEFT TOTAL KNEE ARTHROPLASTY S/N JOURNEY II;  Surgeon: Ty Hernandez MD;  [facility]    ARTHROPLASTY KNEE Right 9/4/2018    Procedure: ARTHROPLASTY KNEE;  RIGHT TOTAL KNEE ARTHROPLASTY ;  Surgeon: Ty Hernandez MD;  [facility]: [facility]    CHOLECYSTECTOMY  03/27/2019    and lysis adhesions.      D & C      ENDOSCOPY      ESOPHAGOSCOPY, GASTROSCOPY, DUODENOSCOPY (EGD), COMBINED N/A 9/11/2017    Procedure: COMBINED ESOPHAGOSCOPY, GASTROSCOPY, DUODENOSCOPY (EGD);  Upper Endoscopy: Removal of Food Impaction;  Surgeon: Lino Zhu DO;  [facility]: [facility]    ESOPHAGOSCOPY, GASTROSCOPY, DUODENOSCOPY (EGD), COMBINED N/A 11/5/2018    Procedure: UPPER ENDOSCOPY WITH BIOPSY;  Surgeon: Dieudonne Jackson MD;  [facility]    ESOPHAGOSCOPY, GASTROSCOPY, DUODENOSCOPY (EGD), COMBINED N/A 12/21/2018    Procedure: UPPER ENDOSCOPY WITH BALLOON DILATION, BIOPSY;  Surgeon: Dieudonne Jackson MD;  [facility]    ESOPHAGOSCOPY, GASTROSCOPY, DUODENOSCOPY (EGD), COMBINED N/A 1/14/2019    Procedure: UPPER ENDOSCOPY WITH ENDOSCOPIC PLACEMENT OF OG AND GASTRIC LAVAGE;  Surgeon: Dieudonne Jackson MD;  [facility]    IR CONSULTATION FOR IR EXAM  11/10/2022    partial gastrectomy  03/2019    right total knee replacement  01/2019    MARAH EN Y BOWEL  1980    Due to severely ulcerated stomach body.     STOMACH SURGERY  2019    Restructuring of stomach and Colecuystectomy.    stomach surgery peritinitis       OB History    Para Term  AB Living   3 3 3 0 0 0   SAB IAB Ectopic Multiple Live Births   0 0 0 0 0      # Outcome Date GA Lbr Gabriel/2nd Weight Sex Type Anes PTL Lv   3 Term            2 Term            1 Term              Lab work is in process  Labs reviewed in EPIC  BP Readings from Last 3 Encounters:   24 114/77   23 138/76   22 (!) 148/78    Wt Readings from Last 3 Encounters:   24 66.2 kg (146 lb)   23 73.2 kg (161 lb 4.8 oz)   22 82.6 kg (182 lb)                  Patient Active Problem List   Diagnosis    Allergic arthritis involving hand    Hypothyroidism    Insomnia    Headache    Postherpetic polyneuropathy    Dysthymia    Anxiety state    Hyperlipidemia    Migraine    Osteoporosis    GERD    Low back pain    Hyperlipidemia with target LDL less than 100    Bilateral chronic knee pain    Back muscle spasm    Chronic, continuous use of opioids    Status post total left knee replacement    S/P total knee arthroplasty, right    Status post total right knee replacement    Family history of other musculoskeletal diseases(V17.89)    Hypokalemia    Anemia, iron deficiency    History of Billroth I operation    Phytobezoar    Gastric outlet obstruction    Angular cheilitis    Status post bypass gastrojejunostomy    Epigastric abdominal pain     Past Surgical History:   Procedure Laterality Date    ARTHROPLASTY KNEE Left 2018    Procedure: ARTHROPLASTY KNEE;  LEFT TOTAL KNEE ARTHROPLASTY S/N JOURNEY II;  Surgeon: Ty Hernandez MD;  [facility]    ARTHROPLASTY KNEE Right 2018    Procedure: ARTHROPLASTY KNEE;  RIGHT TOTAL KNEE ARTHROPLASTY ;  Surgeon: Ty Hernandez MD;  [facility]    CHOLECYSTECTOMY  2019    and lysis adhesions.      D & C      ENDOSCOPY      ESOPHAGOSCOPY, GASTROSCOPY, DUODENOSCOPY (EGD), COMBINED N/A 2017     Procedure: COMBINED ESOPHAGOSCOPY, GASTROSCOPY, DUODENOSCOPY (EGD);  Upper Endoscopy: Removal of Food Impaction;  Surgeon: Lino Zhu DO;  [facility]    ESOPHAGOSCOPY, GASTROSCOPY, DUODENOSCOPY (EGD), COMBINED N/A 11/5/2018    Procedure: UPPER ENDOSCOPY WITH BIOPSY;  Surgeon: Dieudonne Jackson MD;  [facility]    ESOPHAGOSCOPY, GASTROSCOPY, DUODENOSCOPY (EGD), COMBINED N/A 12/21/2018    Procedure: UPPER ENDOSCOPY WITH BALLOON DILATION, BIOPSY;  Surgeon: Dieudonne Jackson MD;  [facility]    ESOPHAGOSCOPY, GASTROSCOPY, DUODENOSCOPY (EGD), COMBINED N/A 1/14/2019    Procedure: UPPER ENDOSCOPY WITH ENDOSCOPIC PLACEMENT OF OG AND GASTRIC LAVAGE;  Surgeon: Dieudonne Jackson MD;  [facility]    IR CONSULTATION FOR IR EXAM  11/10/2022    partial gastrectomy  03/2019    right total knee replacement  01/2019    MARAH EN Y BOWEL  1980    Due to severely ulcerated stomach body.    STOMACH SURGERY  03/27/2019    Restructuring of stomach and Colecuystectomy.    stomach surgery peritinitis         Social History     Tobacco Use    Smoking status: Never    Smokeless tobacco: Never    Tobacco comments:     no passive exposure   Substance Use Topics    Alcohol use: Yes     Comment: rarely, maybe yearly     Family History   Problem Relation Age of Onset    Cerebrovascular Disease Mother         CVA    Hypertension Mother     Other - See Comments Father 40        mining accident; cause of death    Other - See Comments Brother         muscle dystrophy    Cancer Daughter 34        skin cancer    Melanoma Daughter          Current Outpatient Medications   Medication Sig Dispense Refill    cyclobenzaprine (FLEXERIL) 10 MG tablet TAKE 1 TABLET BY MOUTH THREE TIMES DAILY AS NEEDED FOR MUSCLE SPASMS 90 tablet 0    gabapentin (NEURONTIN) 300 MG capsule Take 1 capsule (300 mg) by mouth 3 times daily. 90 capsule 0    HYDROcodone-acetaminophen (NORCO) 7.5-325 MG per tablet Take 1 tablet by mouth every 6 hours as  needed for severe pain. DO NOT FILL PRIOR TO AUGUST 9, 2024 60 tablet 0    nystatin (MYCOSTATIN) 303637 UNIT/GM external powder Apply topically daily for 30 doses. 60 g 3    acetaminophen (TYLENOL) 325 MG tablet Take 2-3 tablets (650-975 mg) by mouth every 4 hours as needed for other (mild to moderate pain) Limit total to 3800mg in 24 hours. 100 tablet 0    acyclovir (ZOVIRAX) 5 % external cream APPLY TOPICALLY 3 TIMES DAILY AS NEEDED (ORAL HERPES LIKE LESION) 5 g 0    calcium carb-cholecalciferol (CALCIUM PLUS VITAMIN D3) 600-500 MG-UNIT CAPS Take 1 capsule by mouth 2 times daily 180 capsule 3    ciprofloxacin (CILOXAN) 0.3 % ophthalmic solution Place 2 drops into both eyes 3 times daily as needed 10 mL 0    cyanocolbalamin (VITAMIN  B-12) 1000 MCG tablet Take 1 tablet by mouth daily as needed       escitalopram (LEXAPRO) 20 MG tablet TAKE ONE-HALF TABLET (10MG) BY MOUTH TWICE DAILY 60 tablet 0    folic acid (FOLVITE) 1 MG tablet TAKE ONE TABLET DAILY BY MOUTH 90 tablet 3    levothyroxine (SYNTHROID/LEVOTHROID) 75 MCG tablet TAKE 1 TABLET (75 MCG) BY MOUTH DAILY 90 tablet 3    metoclopramide (REGLAN) 5 MG tablet TAKE 1 TABLET (5 MG) BY MOUTH 3 TIMES DAILY (BEFORE MEALS) 270 tablet 1    Multiple Vitamins-Minerals (PRESERVISION AREDS 2+MULTI VIT PO)       pantoprazole (PROTONIX) 40 MG EC tablet TAKE 1 TABLET (40 MG) BY MOUTH DAILY. TAKE 30 TO 60 MINUTES BEFORE A MEAL. 90 tablet 0    predniSONE (DELTASONE) 20 MG tablet Take two tablets (= 40mg) each day for 5 (five) days, then one tablet (20 mg) each day x 5 days until finished 15 tablet 0    promethazine (PHENERGAN) 25 MG tablet TAKE 1 TABLET BY MOUTH THREE TIMES DAILY AS NEEDEDFOR NAUSEA/VOMITING 90 tablet 1    rimegepant (NURTEC) 75 MG ODT tablet Place 1 tablet (75 mg) under the tongue every 48 hours 16 tablet 0    simvastatin (ZOCOR) 20 MG tablet TAKE 1 TABLET BY MOUTH DAILY 90 tablet 0    simvastatin (ZOCOR) 40 MG tablet TAKE 1 TABLET BY MOUTH EVERY EVENING 90  tablet 0    topiramate (TOPAMAX) 50 MG tablet TAKE 1 AND 1/2 TABLETS BY MOUTH TWICE A DAY 90 tablet 0    traZODone (DESYREL) 150 MG tablet TAKE ONE TABLET BY MOUTH DAILY AT BEDTIME 90 tablet 0    VITAMIN D3 50 MCG (2000 UT) tablet TAKE 2 TABLETS BY MOUTH EVERY  tablet 3    zoledronic Acid (RECLAST) 5 MG/100ML SOLN infusion Inject 100 mLs (5 mg) into the vein once for 1 dose 100 mL 0    zolpidem (AMBIEN) 10 MG tablet TAKE 1 TABLET (10 MG) BY MOUTH NIGHTLY AS NEEDED FORSLEEP 30 tablet 0     Allergies   Allergen Reactions    Erythromycin Base [Erythromycin Base] Nausea and Vomiting    Erythromycin Nausea and Vomiting    Penicillins Rash     Current providers sharing in care for this patient include:  Patient Care Team:  Balta Milton DO as PCP - General (Internal Medicine)  Cindy Birmingham, RN as Registered Nurse  Balta Milton DO as Assigned PCP  Balta Milton DO as Assigned Pain Medication Provider    The following health maintenance items are reviewed in Epic and correct as of today:  Health Maintenance   Topic Date Due    HEPATITIS C SCREENING  Never done    RSV VACCINE (1 - 1-dose 60+ series) Never done    Pneumococcal Vaccine: 65+ Years (1 of 1 - PCV) Never done    ADVANCE CARE PLANNING  09/05/2023    DEXA  05/26/2024    MAMMO SCREENING  05/26/2024    LIPID  08/28/2024    VITAMIN B12  08/28/2024    URINE DRUG SCREEN  08/29/2024    CONTROLLED SUBSTANCE AGREEMENT FOR CHRONIC PAIN MANAGEMENT  08/29/2024    INFLUENZA VACCINE (1) 09/01/2024    COVID-19 Vaccine (6 - 2023-24 season) 09/01/2024    TSH W/FREE T4 REFLEX  08/28/2024    VITAMIN D  08/28/2024    AURELIA ASSESSMENT  12/03/2024    PHQ-9  12/03/2024    MEDICARE ANNUAL WELLNESS VISIT  09/03/2025    FALL RISK ASSESSMENT  09/03/2025    DTAP/TDAP/TD IMMUNIZATION (3 - Td or Tdap) 10/05/2025    GLUCOSE  08/28/2026    COLORECTAL CANCER SCREENING  09/27/2026    DEPRESSION ACTION PLAN  Completed    ZOSTER IMMUNIZATION  Completed    HPV  "IMMUNIZATION  Aged Out    MENINGITIS IMMUNIZATION  Aged Out    RSV MONOCLONAL ANTIBODY  Aged Out         Review of Systems  Constitutional, HEENT, cardiovascular, pulmonary, gi and gu systems are negative, except as otherwise noted.     Objective    Exam  /77 (BP Location: Right arm, Patient Position: Sitting, Cuff Size: Adult Regular)   Pulse 97   Temp 97.3  F (36.3  C) (Tympanic)   Ht 1.54 m (5' 0.63\")   Wt 66.2 kg (146 lb)   SpO2 97%   Breastfeeding No   BMI 27.92 kg/m     Estimated body mass index is 27.92 kg/m  as calculated from the following:    Height as of this encounter: 1.54 m (5' 0.63\").    Weight as of this encounter: 66.2 kg (146 lb).    Physical Exam  GENERAL: alert and no distress  EYES: Eyes grossly normal to inspection, PERRL and conjunctivae and sclerae normal  HENT oropharynx clear.  RESP: lungs clear to auscultation - no rales, rhonchi or wheezes  CV: regular rate and rhythm, normal S1 S2, no S3 or S4, no murmur, click or rub, no peripheral edema  MS: no gross musculoskeletal defects noted, no edema  SKIN: no suspicious lesions or rashes  Neuro: No clear lateralizing deficits noted.  PSYCH: mentation appears normal, affect normal/bright        9/3/2024   Mini Cog   Clock Draw Score 2 Normal   3 Item Recall 3 objects recalled   Mini Cog Total Score 5                 Signed Electronically by: Balta Milton, DO    Answers submitted by the patient for this visit:  Patient Health Questionnaire (Submitted on 9/3/2024)  If you checked off any problems, how difficult have these problems made it for you to do your work, take care of things at home, or get along with other people?: Not difficult at all  PHQ9 TOTAL SCORE: 1  Patient Health Questionnaire (G7) (Submitted on 9/3/2024)  AURELIA 7 TOTAL SCORE: 1    "

## 2024-09-03 ENCOUNTER — OFFICE VISIT (OUTPATIENT)
Dept: INTERNAL MEDICINE | Facility: OTHER | Age: 74
End: 2024-09-03
Attending: INTERNAL MEDICINE
Payer: COMMERCIAL

## 2024-09-03 VITALS
WEIGHT: 146 LBS | TEMPERATURE: 97.3 F | OXYGEN SATURATION: 97 % | HEART RATE: 97 BPM | SYSTOLIC BLOOD PRESSURE: 114 MMHG | DIASTOLIC BLOOD PRESSURE: 77 MMHG | BODY MASS INDEX: 27.56 KG/M2 | HEIGHT: 61 IN

## 2024-09-03 DIAGNOSIS — Z13.820 SCREENING FOR OSTEOPOROSIS: Primary | ICD-10-CM

## 2024-09-03 DIAGNOSIS — M81.0 SENILE OSTEOPOROSIS: ICD-10-CM

## 2024-09-03 DIAGNOSIS — M54.2 CERVICAL PAIN: ICD-10-CM

## 2024-09-03 DIAGNOSIS — M25.562 ARTHRALGIA OF BOTH KNEES: ICD-10-CM

## 2024-09-03 DIAGNOSIS — M25.561 ARTHRALGIA OF BOTH KNEES: ICD-10-CM

## 2024-09-03 DIAGNOSIS — E78.2 MIXED HYPERLIPIDEMIA: ICD-10-CM

## 2024-09-03 DIAGNOSIS — Z12.31 ENCOUNTER FOR SCREENING MAMMOGRAM FOR BREAST CANCER: ICD-10-CM

## 2024-09-03 DIAGNOSIS — Z00.00 ROUTINE HISTORY AND PHYSICAL EXAMINATION OF ADULT: ICD-10-CM

## 2024-09-03 DIAGNOSIS — B02.23 POSTHERPETIC POLYNEUROPATHY: ICD-10-CM

## 2024-09-03 DIAGNOSIS — F11.90 CHRONIC, CONTINUOUS USE OF OPIOIDS: ICD-10-CM

## 2024-09-03 DIAGNOSIS — M25.59 PAIN IN OTHER SPECIFIED JOINT: ICD-10-CM

## 2024-09-03 DIAGNOSIS — R21 RASH: ICD-10-CM

## 2024-09-03 DIAGNOSIS — E55.9 VITAMIN D DEFICIENCY: ICD-10-CM

## 2024-09-03 DIAGNOSIS — M54.2 CERVICALGIA: ICD-10-CM

## 2024-09-03 DIAGNOSIS — B00.1 COLD SORE: ICD-10-CM

## 2024-09-03 DIAGNOSIS — D50.9 IRON DEFICIENCY ANEMIA, UNSPECIFIED IRON DEFICIENCY ANEMIA TYPE: ICD-10-CM

## 2024-09-03 DIAGNOSIS — E03.9 ACQUIRED HYPOTHYROIDISM: ICD-10-CM

## 2024-09-03 DIAGNOSIS — E53.8 VITAMIN B12 DEFICIENCY (NON ANEMIC): ICD-10-CM

## 2024-09-03 LAB
ALBUMIN SERPL BCG-MCNC: 4.2 G/DL (ref 3.5–5.2)
ALP SERPL-CCNC: 92 U/L (ref 40–150)
ALT SERPL W P-5'-P-CCNC: 16 U/L (ref 0–50)
ANION GAP SERPL CALCULATED.3IONS-SCNC: 15 MMOL/L (ref 7–15)
AST SERPL W P-5'-P-CCNC: 17 U/L (ref 0–45)
BASOPHILS # BLD AUTO: 0.1 10E3/UL (ref 0–0.2)
BASOPHILS NFR BLD AUTO: 1 %
BILIRUB SERPL-MCNC: 0.4 MG/DL
BUN SERPL-MCNC: 12.9 MG/DL (ref 8–23)
CALCIUM SERPL-MCNC: 9.1 MG/DL (ref 8.8–10.4)
CANNABINOIDS UR QL SCN: NORMAL
CHLORIDE SERPL-SCNC: 103 MMOL/L (ref 98–107)
CHOLEST SERPL-MCNC: 139 MG/DL
CREAT SERPL-MCNC: 0.71 MG/DL (ref 0.51–0.95)
CREAT UR-MCNC: 215 MG/DL
EGFRCR SERPLBLD CKD-EPI 2021: 90 ML/MIN/1.73M2
EOSINOPHIL # BLD AUTO: 0.3 10E3/UL (ref 0–0.7)
EOSINOPHIL NFR BLD AUTO: 4 %
ERYTHROCYTE [DISTWIDTH] IN BLOOD BY AUTOMATED COUNT: 13.8 % (ref 10–15)
FASTING STATUS PATIENT QL REPORTED: YES
FASTING STATUS PATIENT QL REPORTED: YES
GLUCOSE SERPL-MCNC: 96 MG/DL (ref 70–99)
HCO3 SERPL-SCNC: 22 MMOL/L (ref 22–29)
HCT VFR BLD AUTO: 40.2 % (ref 35–47)
HDLC SERPL-MCNC: 63 MG/DL
HGB BLD-MCNC: 13.2 G/DL (ref 11.7–15.7)
IMM GRANULOCYTES # BLD: 0 10E3/UL
IMM GRANULOCYTES NFR BLD: 0 %
LDLC SERPL CALC-MCNC: 55 MG/DL
LYMPHOCYTES # BLD AUTO: 2.3 10E3/UL (ref 0.8–5.3)
LYMPHOCYTES NFR BLD AUTO: 30 %
MCH RBC QN AUTO: 29.8 PG (ref 26.5–33)
MCHC RBC AUTO-ENTMCNC: 32.8 G/DL (ref 31.5–36.5)
MCV RBC AUTO: 91 FL (ref 78–100)
MONOCYTES # BLD AUTO: 0.6 10E3/UL (ref 0–1.3)
MONOCYTES NFR BLD AUTO: 9 %
NEUTROPHILS # BLD AUTO: 4.2 10E3/UL (ref 1.6–8.3)
NEUTROPHILS NFR BLD AUTO: 56 %
NONHDLC SERPL-MCNC: 76 MG/DL
PLATELET # BLD AUTO: 348 10E3/UL (ref 150–450)
POTASSIUM SERPL-SCNC: 3.7 MMOL/L (ref 3.4–5.3)
PROT SERPL-MCNC: 6.7 G/DL (ref 6.4–8.3)
RBC # BLD AUTO: 4.43 10E6/UL (ref 3.8–5.2)
SODIUM SERPL-SCNC: 140 MMOL/L (ref 135–145)
TRIGL SERPL-MCNC: 103 MG/DL
TSH SERPL DL<=0.005 MIU/L-ACNC: 2.15 UIU/ML (ref 0.3–4.2)
WBC # BLD AUTO: 7.4 10E3/UL (ref 4–11)

## 2024-09-03 PROCEDURE — G0439 PPPS, SUBSEQ VISIT: HCPCS | Performed by: INTERNAL MEDICINE

## 2024-09-03 PROCEDURE — 84443 ASSAY THYROID STIM HORMONE: CPT | Mod: ZL | Performed by: INTERNAL MEDICINE

## 2024-09-03 PROCEDURE — 82306 VITAMIN D 25 HYDROXY: CPT | Mod: ZL | Performed by: INTERNAL MEDICINE

## 2024-09-03 PROCEDURE — 80359 METHYLENEDIOXYAMPHETAMINES: CPT | Mod: ZL | Performed by: INTERNAL MEDICINE

## 2024-09-03 PROCEDURE — 82607 VITAMIN B-12: CPT | Mod: ZL | Performed by: INTERNAL MEDICINE

## 2024-09-03 PROCEDURE — 80307 DRUG TEST PRSMV CHEM ANLYZR: CPT | Mod: ZL | Performed by: INTERNAL MEDICINE

## 2024-09-03 PROCEDURE — 80061 LIPID PANEL: CPT | Mod: ZL | Performed by: INTERNAL MEDICINE

## 2024-09-03 PROCEDURE — 80367 DRUG SCREENING PROPOXYPHENE: CPT | Mod: ZL | Performed by: INTERNAL MEDICINE

## 2024-09-03 PROCEDURE — 99213 OFFICE O/P EST LOW 20 MIN: CPT | Mod: 25 | Performed by: INTERNAL MEDICINE

## 2024-09-03 PROCEDURE — 36415 COLL VENOUS BLD VENIPUNCTURE: CPT | Mod: ZL | Performed by: INTERNAL MEDICINE

## 2024-09-03 PROCEDURE — 85025 COMPLETE CBC W/AUTO DIFF WBC: CPT | Mod: ZL | Performed by: INTERNAL MEDICINE

## 2024-09-03 PROCEDURE — 80053 COMPREHEN METABOLIC PANEL: CPT | Mod: ZL | Performed by: INTERNAL MEDICINE

## 2024-09-03 PROCEDURE — G0463 HOSPITAL OUTPT CLINIC VISIT: HCPCS

## 2024-09-03 RX ORDER — NYSTATIN 100000 [USP'U]/G
POWDER TOPICAL DAILY
Qty: 60 G | Refills: 3 | Status: SHIPPED | OUTPATIENT
Start: 2024-09-03 | End: 2024-09-04

## 2024-09-03 RX ORDER — ACYCLOVIR 50 MG/G
CREAM TOPICAL 3 TIMES DAILY PRN
Qty: 5 G | Refills: 0 | Status: SHIPPED | OUTPATIENT
Start: 2024-09-03

## 2024-09-03 RX ORDER — CYCLOBENZAPRINE HCL 10 MG
TABLET ORAL
Qty: 90 TABLET | Refills: 0 | Status: SHIPPED | OUTPATIENT
Start: 2024-09-03 | End: 2024-09-26

## 2024-09-03 RX ORDER — HYDROCODONE BITARTRATE AND ACETAMINOPHEN 7.5; 325 MG/1; MG/1
1 TABLET ORAL EVERY 6 HOURS PRN
Qty: 60 TABLET | Refills: 0 | Status: SHIPPED | OUTPATIENT
Start: 2024-09-03 | End: 2024-09-26

## 2024-09-03 RX ORDER — GABAPENTIN 300 MG/1
300 CAPSULE ORAL 3 TIMES DAILY
Qty: 90 CAPSULE | Refills: 0 | Status: SHIPPED | OUTPATIENT
Start: 2024-09-03

## 2024-09-03 ASSESSMENT — ANXIETY QUESTIONNAIRES
7. FEELING AFRAID AS IF SOMETHING AWFUL MIGHT HAPPEN: NOT AT ALL
GAD7 TOTAL SCORE: 1
8. IF YOU CHECKED OFF ANY PROBLEMS, HOW DIFFICULT HAVE THESE MADE IT FOR YOU TO DO YOUR WORK, TAKE CARE OF THINGS AT HOME, OR GET ALONG WITH OTHER PEOPLE?: NOT DIFFICULT AT ALL
GAD7 TOTAL SCORE: 1
GAD7 TOTAL SCORE: 1

## 2024-09-03 ASSESSMENT — PATIENT HEALTH QUESTIONNAIRE - PHQ9
SUM OF ALL RESPONSES TO PHQ QUESTIONS 1-9: 1
10. IF YOU CHECKED OFF ANY PROBLEMS, HOW DIFFICULT HAVE THESE PROBLEMS MADE IT FOR YOU TO DO YOUR WORK, TAKE CARE OF THINGS AT HOME, OR GET ALONG WITH OTHER PEOPLE: NOT DIFFICULT AT ALL
SUM OF ALL RESPONSES TO PHQ QUESTIONS 1-9: 1

## 2024-09-03 ASSESSMENT — PAIN SCALES - GENERAL: PAINLEVEL: MILD PAIN (2)

## 2024-09-03 NOTE — LETTER

## 2024-09-03 NOTE — TELEPHONE ENCOUNTER
acyclovir (ZOVIRAX) 5 % external cream       Last Written Prescription Date:  12/28/21  Last Fill Quantity: 5 g ,   # refills: 0  Last Office Visit: 9/3/24  Future Office visit:       Routing refill request to provider for review/approval because:  Antivirals [id number]/03/2024 12:16 PM   Protocol Details   Recent (12 mo) or future (90 days) visit within the authorizing provider's specialty

## 2024-09-04 DIAGNOSIS — R21 RASH: ICD-10-CM

## 2024-09-04 RX ORDER — NYSTATIN 100000 [USP'U]/G
POWDER TOPICAL DAILY
Qty: 60 G | Refills: 3 | Status: SHIPPED | OUTPATIENT
Start: 2024-09-04

## 2024-09-05 LAB
VIT B12 SERPL-MCNC: 1021 PG/ML (ref 232–1245)
VIT D+METAB SERPL-MCNC: 48 NG/ML (ref 20–50)

## 2024-09-06 DIAGNOSIS — Z01.818 PREOP GENERAL PHYSICAL EXAM: ICD-10-CM

## 2024-09-06 LAB — GABAPENTIN UR QL CFM: PRESENT

## 2024-09-06 RX ORDER — PROMETHAZINE HYDROCHLORIDE 25 MG/1
TABLET ORAL
Qty: 90 TABLET | Refills: 3 | Status: SHIPPED | OUTPATIENT
Start: 2024-09-06

## 2024-09-15 DIAGNOSIS — G47.00 PERSISTENT INSOMNIA: ICD-10-CM

## 2024-09-16 RX ORDER — ZOLPIDEM TARTRATE 10 MG/1
TABLET ORAL
Qty: 30 TABLET | Refills: 0 | Status: SHIPPED | OUTPATIENT
Start: 2024-09-16

## 2024-09-16 NOTE — TELEPHONE ENCOUNTER
ZOLPIDEM 10MG TABLET         Last Written Prescription Date:  8/19/24  Last Fill Quantity: 30,   # refills: 0  Last Office Visit: 9/3/24  Future Office visit:       Routing refill request to provider for review/approval because:  Drug not on the [facility], [facility] or [facility] refill protocol or controlled substance

## 2024-09-19 DIAGNOSIS — G47.00 PERSISTENT INSOMNIA: ICD-10-CM

## 2024-09-19 DIAGNOSIS — F34.1 DYSTHYMIA: ICD-10-CM

## 2024-09-19 RX ORDER — TRAZODONE HYDROCHLORIDE 150 MG/1
150 TABLET ORAL AT BEDTIME
Qty: 90 TABLET | Refills: 3 | Status: SHIPPED | OUTPATIENT
Start: 2024-09-19

## 2024-09-19 RX ORDER — ESCITALOPRAM OXALATE 20 MG/1
TABLET ORAL
Qty: 90 TABLET | Refills: 3 | Status: SHIPPED | OUTPATIENT
Start: 2024-09-19

## 2024-09-19 NOTE — TELEPHONE ENCOUNTER
Trazodone      Last Written Prescription Date:  6/24/24  Last Fill Quantity: 90,   # refills: 0  Last Office Visit: 9/3/24  Future Office visit:       Routing refill request to provider for review/approval because:      Lexapro      Last Written Prescription Date:  7/23/24  Last Fill Quantity: 60,   # refills: 0  Last Office Visit: 9/3/24  Future Office visit:       Routing refill request to provider for review/approval because:

## 2024-09-26 DIAGNOSIS — M25.561 ARTHRALGIA OF BOTH KNEES: ICD-10-CM

## 2024-09-26 DIAGNOSIS — M25.562 ARTHRALGIA OF BOTH KNEES: ICD-10-CM

## 2024-09-26 DIAGNOSIS — M54.2 CERVICAL PAIN: ICD-10-CM

## 2024-09-26 DIAGNOSIS — M54.2 CERVICALGIA: ICD-10-CM

## 2024-09-26 DIAGNOSIS — G43.009 MIGRAINE WITHOUT AURA AND WITHOUT STATUS MIGRAINOSUS, NOT INTRACTABLE: ICD-10-CM

## 2024-09-26 RX ORDER — TOPIRAMATE 50 MG/1
TABLET, FILM COATED ORAL
Qty: 90 TABLET | Refills: 0 | Status: SHIPPED | OUTPATIENT
Start: 2024-09-26

## 2024-09-26 RX ORDER — CYCLOBENZAPRINE HCL 10 MG
TABLET ORAL
Qty: 90 TABLET | Refills: 0 | Status: SHIPPED | OUTPATIENT
Start: 2024-09-26

## 2024-09-26 RX ORDER — HYDROCODONE BITARTRATE AND ACETAMINOPHEN 7.5; 325 MG/1; MG/1
1 TABLET ORAL EVERY 6 HOURS PRN
Qty: 60 TABLET | Refills: 0 | Status: SHIPPED | OUTPATIENT
Start: 2024-09-26

## 2024-09-26 NOTE — TELEPHONE ENCOUNTER
CYCLOBENZAPRINE 10MG TABLET         Last Written Prescription Date:  9/3/24  Last Fill Quantity: 90,   # refills: 0  Last Office Visit: 9/3/24  Future Office visit:       Routing refill request to provider for review/approval because:  Drug not on the [facility], [facility] or [facility] refill protocol or controlled substance       HYDROCODONE/APAP 7.5-325MG TAB         Last Written Prescription Date:  9/3/24  Last Fill Quantity: 60,   # refills: 0  Last Office Visit: 9/3/24  Future Office visit:       Routing refill request to provider for review/approval because:  Drug not on the [facility], [facility] or [facility] refill protocol or controlled substance       TOPIRAMATE 50MG TABLET         Last Written Prescription Date:  8/8/24  Last Fill Quantity: 90,   # refills: 0  Last Office Visit: 9/3/24  Future Office visit:       Routing refill request to provider for review/approval because:    Anti-Seizure Meds Protocol  [id number]/26/2024 04:44 AM   Protocol Details Review Authorizing provider's last note.

## 2024-10-09 DIAGNOSIS — B02.23 POSTHERPETIC POLYNEUROPATHY: ICD-10-CM

## 2024-10-09 DIAGNOSIS — G47.00 PERSISTENT INSOMNIA: ICD-10-CM

## 2024-10-09 NOTE — TELEPHONE ENCOUNTER
gabapentin (NEURONTIN) 300 MG capsule  0 ordered      300 mg, 3 TIMES DAILY        Last Written Prescription Date:  9-3-24  Last Fill Quantity: 90,   # refills: 0  Last Office Visit: 9-3-24  Future Office visit:       Routing refill request to provider for review/approval because:  Drug not on the [facility], [facility] or [facility] refill protocol or controlled substance     zolpidem (AMBIEN) 10 MG tablet   Last Written Prescription Date:  9-16-24  Last Fill Quantity: 30,   # refills: 0  Last Office Visit: 9-3-24  Future Office visit:       Routing refill request to provider for review/approval because:  Drug not on the [facility], [facility] or [facility] refill protocol or controlled substance

## 2024-10-11 RX ORDER — ZOLPIDEM TARTRATE 10 MG/1
TABLET ORAL
Qty: 30 TABLET | Refills: 0 | Status: SHIPPED | OUTPATIENT
Start: 2024-10-11 | End: 2024-11-08

## 2024-10-11 RX ORDER — GABAPENTIN 300 MG/1
300 CAPSULE ORAL 3 TIMES DAILY
Qty: 90 CAPSULE | Refills: 0 | Status: SHIPPED | OUTPATIENT
Start: 2024-10-11 | End: 2024-11-04

## 2024-10-17 DIAGNOSIS — M25.561 ARTHRALGIA OF BOTH KNEES: ICD-10-CM

## 2024-10-17 DIAGNOSIS — G43.009 MIGRAINE WITHOUT AURA AND WITHOUT STATUS MIGRAINOSUS, NOT INTRACTABLE: ICD-10-CM

## 2024-10-17 DIAGNOSIS — E03.9 HYPOTHYROIDISM, UNSPECIFIED TYPE: ICD-10-CM

## 2024-10-17 DIAGNOSIS — M25.562 ARTHRALGIA OF BOTH KNEES: ICD-10-CM

## 2024-10-17 DIAGNOSIS — M54.2 CERVICALGIA: ICD-10-CM

## 2024-10-17 DIAGNOSIS — M54.2 CERVICAL PAIN: ICD-10-CM

## 2024-10-17 NOTE — TELEPHONE ENCOUNTER
Levothyroxine (Synthroid / Levothroid) 75 mcg tablet  Take 1 tablet (75 mcg) by mouth daily     Last Written Prescription Date:  8-28-23  Last Fill Quantity: 90 tablet,   # refills: 3  Last Office Visit: 9-3-24  Future Office visit:       Topiramate (Topamax) 50 mg tablet  Take 1 and 1/2 tablets by mouth twice a day     Last Written Prescription Date:  9-26-24  Last Fill Quantity: 90 tablet,   # refills: 0  Last Office Visit: 9-3-24  Future Office visit:

## 2024-10-18 RX ORDER — LEVOTHYROXINE SODIUM 75 UG/1
75 TABLET ORAL DAILY
Qty: 90 TABLET | Refills: 3 | Status: SHIPPED | OUTPATIENT
Start: 2024-10-18

## 2024-10-18 NOTE — TELEPHONE ENCOUNTER
Flexeril, Norco      Last Written Prescription Date:  9.26.24  Last Fill Quantity: #90, #60,   # refills: 0  Last Office Visit: 9.3.24  Future Office visit:       Routing refill request to provider for review/approval because:  Drug not on the [facility], [facility] or [facility] refill protocol or controlled substance

## 2024-10-19 RX ORDER — TOPIRAMATE 50 MG/1
TABLET, FILM COATED ORAL
Qty: 90 TABLET | Refills: 0 | Status: SHIPPED | OUTPATIENT
Start: 2024-10-19

## 2024-10-20 DIAGNOSIS — K21.00 GASTROESOPHAGEAL REFLUX DISEASE WITH ESOPHAGITIS, UNSPECIFIED WHETHER HEMORRHAGE: ICD-10-CM

## 2024-10-20 RX ORDER — HYDROCODONE BITARTRATE AND ACETAMINOPHEN 7.5; 325 MG/1; MG/1
1 TABLET ORAL EVERY 6 HOURS PRN
Qty: 60 TABLET | Refills: 0 | Status: SHIPPED | OUTPATIENT
Start: 2024-10-20

## 2024-10-20 RX ORDER — CYCLOBENZAPRINE HCL 10 MG
TABLET ORAL
Qty: 90 TABLET | Refills: 0 | Status: SHIPPED | OUTPATIENT
Start: 2024-10-20

## 2024-10-21 RX ORDER — PANTOPRAZOLE SODIUM 40 MG/1
TABLET, DELAYED RELEASE ORAL
Qty: 90 TABLET | Refills: 1 | Status: SHIPPED | OUTPATIENT
Start: 2024-10-21

## 2024-10-28 NOTE — TELEPHONE ENCOUNTER
Detail Level: Detailed Yes, I am aware this is a very low dose and will be short term.  She will remains monitored.   Detail Level: Generalized Detail Level: Simple

## 2024-10-30 DIAGNOSIS — Z00.00 HEALTH CARE MAINTENANCE: ICD-10-CM

## 2024-10-31 RX ORDER — FOLIC ACID 1 MG/1
TABLET ORAL
Qty: 90 TABLET | Refills: 1 | Status: SHIPPED | OUTPATIENT
Start: 2024-10-31

## 2024-11-03 DIAGNOSIS — B02.23 POSTHERPETIC POLYNEUROPATHY: ICD-10-CM

## 2024-11-04 RX ORDER — GABAPENTIN 300 MG/1
300 CAPSULE ORAL 3 TIMES DAILY
Qty: 90 CAPSULE | Refills: 0 | Status: SHIPPED | OUTPATIENT
Start: 2024-11-04

## 2024-11-04 NOTE — TELEPHONE ENCOUNTER
GABAPENTIN 300MG CAPSULE         Last Written Prescription Date:  10/11/24  Last Fill Quantity: 90,   # refills: 0  Last Office Visit: 9/3/24  Future Office visit:       Routing refill request to provider for review/approval because:  Drug not on the [facility], [facility] or [facility] refill protocol or controlled substance

## 2024-11-06 DIAGNOSIS — G47.00 PERSISTENT INSOMNIA: ICD-10-CM

## 2024-11-07 NOTE — TELEPHONE ENCOUNTER
ZOLPIDEM 10MG TABLET         Last Written Prescription Date:  10/11/24  Last Fill Quantity: 30,   # refills: 0  Last Office Visit: 9/3/24  Future Office visit:       Routing refill request to provider for review/approval because:  Drug not on the [facility], [facility] or [facility] refill protocol or controlled substance

## 2024-11-08 RX ORDER — ZOLPIDEM TARTRATE 10 MG/1
TABLET ORAL
Qty: 30 TABLET | Refills: 0 | Status: SHIPPED | OUTPATIENT
Start: 2024-11-08

## 2024-11-14 DIAGNOSIS — M54.2 CERVICAL PAIN: ICD-10-CM

## 2024-11-14 DIAGNOSIS — M54.2 CERVICALGIA: ICD-10-CM

## 2024-11-14 DIAGNOSIS — M25.561 ARTHRALGIA OF BOTH KNEES: ICD-10-CM

## 2024-11-14 DIAGNOSIS — M25.562 ARTHRALGIA OF BOTH KNEES: ICD-10-CM

## 2024-11-14 RX ORDER — HYDROCODONE BITARTRATE AND ACETAMINOPHEN 7.5; 325 MG/1; MG/1
1 TABLET ORAL EVERY 6 HOURS PRN
Qty: 60 TABLET | Refills: 0 | Status: SHIPPED | OUTPATIENT
Start: 2024-11-14

## 2024-11-14 RX ORDER — CYCLOBENZAPRINE HCL 10 MG
TABLET ORAL
Qty: 90 TABLET | Refills: 0 | Status: SHIPPED | OUTPATIENT
Start: 2024-11-14

## 2024-11-14 NOTE — TELEPHONE ENCOUNTER
Flexeril      Last Written Prescription Date:  10/20/24  Last Fill Quantity: 90,   # refills: 0  Last Office Visit: 9/3/24  Future Office visit:       Routing refill request to provider for review/approval because:      Norco      Last Written Prescription Date:  10/20/24  Last Fill Quantity: 60,   # refills: 0  Last Office Visit: 9/3/24  Future Office visit:       Routing refill request to provider for review/approval because:

## 2024-11-24 DIAGNOSIS — G43.009 MIGRAINE WITHOUT AURA AND WITHOUT STATUS MIGRAINOSUS, NOT INTRACTABLE: ICD-10-CM

## 2024-11-25 RX ORDER — TOPIRAMATE 50 MG/1
TABLET, FILM COATED ORAL
Qty: 90 TABLET | Refills: 0 | Status: SHIPPED | OUTPATIENT
Start: 2024-11-25

## 2024-11-25 NOTE — TELEPHONE ENCOUNTER
TOPIRAMATE 50MG TABLET         Last Written Prescription Date:  10/19/24  Last Fill Quantity: 90,   # refills: 0  Last Office Visit: 9/3/24  Future Office visit:       Routing refill request to provider for review/approval because:    Anti-Seizure Meds Protocol  [id number]/24/2024 07:55 AM   Protocol Details Review Authorizing provider's last note.

## 2024-11-26 DIAGNOSIS — B02.23 POSTHERPETIC POLYNEUROPATHY: ICD-10-CM

## 2024-11-27 RX ORDER — GABAPENTIN 300 MG/1
300 CAPSULE ORAL 3 TIMES DAILY
Qty: 90 CAPSULE | Refills: 0 | Status: SHIPPED | OUTPATIENT
Start: 2024-11-27

## 2024-11-27 NOTE — TELEPHONE ENCOUNTER
GABAPENTIN 300MG CAPSULE         Last Written Prescription Date:  11/4/24  Last Fill Quantity: 90,   # refills: 0  Last Office Visit: 9/3/24  Future Office visit:       Routing refill request to provider for review/approval because:  Drug not on the [facility], [facility] or  [facility] refill protocol or controlled substance      Establish Care Appt Scheduled with Julisa Fuentes CNP on 1/2/24

## 2024-12-05 DIAGNOSIS — G47.00 PERSISTENT INSOMNIA: ICD-10-CM

## 2024-12-05 RX ORDER — ZOLPIDEM TARTRATE 10 MG/1
TABLET ORAL
Qty: 30 TABLET | Refills: 0 | Status: SHIPPED | OUTPATIENT
Start: 2024-12-05

## 2024-12-05 NOTE — TELEPHONE ENCOUNTER
ZOLPIDEM 10MG TABLET         Last Written Prescription Date:  11/8/24  Last Fill Quantity: 30,   # refills: 0  Last Office Visit: 0  Future Office visit:       Routing refill request to provider for review/approval because:  Drug not on the [facility], [facility] or [facility] refill protocol or controlled substance

## 2024-12-06 DIAGNOSIS — M25.562 ARTHRALGIA OF BOTH KNEES: ICD-10-CM

## 2024-12-06 DIAGNOSIS — M54.2 CERVICALGIA: ICD-10-CM

## 2024-12-06 DIAGNOSIS — M54.2 CERVICAL PAIN: ICD-10-CM

## 2024-12-06 DIAGNOSIS — M25.561 ARTHRALGIA OF BOTH KNEES: ICD-10-CM

## 2024-12-06 NOTE — TELEPHONE ENCOUNTER
HYDROCODONE/APAP 7.5-325MG TAB         Last Written Prescription Date:  11/14/24  Last Fill Quantity: 60,   # refills: 0  Last Office Visit: 9/3/24  Future Office visit:       Routing refill request to provider for review/approval because:  Drug not on the [facility], [facility] or [facility] refill protocol or controlled substance      CYCLOBENZAPRINE 10MG TABLET         Last Written Prescription Date:  11/14/24  Last Fill Quantity: 90,   # refills: 0  Last Office Visit: 9/3/24  Future Office visit:       Routing refill request to provider for review/approval because:  Drug not on the [facility], [facility] or [facility] refill protocol or controlled substance

## 2024-12-09 RX ORDER — CYCLOBENZAPRINE HCL 10 MG
TABLET ORAL
Qty: 90 TABLET | Refills: 0 | Status: SHIPPED | OUTPATIENT
Start: 2024-12-09

## 2024-12-09 RX ORDER — HYDROCODONE BITARTRATE AND ACETAMINOPHEN 7.5; 325 MG/1; MG/1
1 TABLET ORAL EVERY 6 HOURS PRN
Qty: 60 TABLET | Refills: 0 | Status: SHIPPED | OUTPATIENT
Start: 2024-12-09

## 2024-12-12 ENCOUNTER — HOSPITAL ENCOUNTER (OUTPATIENT)
Dept: BONE DENSITY | Facility: HOSPITAL | Age: 74
Discharge: HOME | End: 2024-12-12
Attending: INTERNAL MEDICINE
Payer: COMMERCIAL

## 2024-12-12 ENCOUNTER — TELEPHONE (OUTPATIENT)
Dept: MAMMOGRAPHY | Facility: OTHER | Age: 74
End: 2024-12-12

## 2024-12-12 ENCOUNTER — ANCILLARY PROCEDURE (OUTPATIENT)
Dept: MAMMOGRAPHY | Facility: OTHER | Age: 74
End: 2024-12-12
Attending: INTERNAL MEDICINE
Payer: COMMERCIAL

## 2024-12-12 DIAGNOSIS — Z12.31 ENCOUNTER FOR SCREENING MAMMOGRAM FOR BREAST CANCER: ICD-10-CM

## 2024-12-12 DIAGNOSIS — M81.0 SENILE OSTEOPOROSIS: ICD-10-CM

## 2024-12-12 DIAGNOSIS — Z13.820 SCREENING FOR OSTEOPOROSIS: ICD-10-CM

## 2024-12-12 PROCEDURE — 77080 DXA BONE DENSITY AXIAL: CPT

## 2024-12-12 PROCEDURE — 77067 SCR MAMMO BI INCL CAD: CPT | Mod: TC

## 2024-12-12 PROCEDURE — 77063 BREAST TOMOSYNTHESIS BI: CPT | Mod: TC

## 2024-12-13 NOTE — TELEPHONE ENCOUNTER
zolpidem (AMBIEN) 10 MG tablet  Last Written Prescription Date:  10/12/18  Last Fill Quantity: 60,   # refills: 0  Last Office Visit: 11/8/18  Future Office visit:       Routing refill request to provider for review/approval because:  Drug not on the [facility], [facility] or [facility] refill protocol or controlled substance  
[[id number]]

## 2024-12-23 DIAGNOSIS — B02.23 POSTHERPETIC POLYNEUROPATHY: ICD-10-CM

## 2024-12-23 DIAGNOSIS — G43.009 MIGRAINE WITHOUT AURA AND WITHOUT STATUS MIGRAINOSUS, NOT INTRACTABLE: ICD-10-CM

## 2024-12-23 DIAGNOSIS — Z01.818 PREOP GENERAL PHYSICAL EXAM: ICD-10-CM

## 2024-12-23 RX ORDER — PROMETHAZINE HYDROCHLORIDE 25 MG/1
TABLET ORAL
Qty: 90 TABLET | Refills: 0 | Status: SHIPPED | OUTPATIENT
Start: 2024-12-23

## 2024-12-23 RX ORDER — GABAPENTIN 300 MG/1
300 CAPSULE ORAL 3 TIMES DAILY
Qty: 90 CAPSULE | Refills: 0 | Status: SHIPPED | OUTPATIENT
Start: 2024-12-23

## 2024-12-23 RX ORDER — TOPIRAMATE 50 MG/1
TABLET, FILM COATED ORAL
Qty: 90 TABLET | Refills: 0 | Status: SHIPPED | OUTPATIENT
Start: 2024-12-23

## 2024-12-23 NOTE — TELEPHONE ENCOUNTER
Topamax      Last Written Prescription Date:  11/25/24  Last Fill Quantity: 90,   # refills: 0  Last Office Visit: 9/3/24  Future Office visit:       Routing refill request to provider for review/approval because:

## 2024-12-23 NOTE — TELEPHONE ENCOUNTER
gabapentin (NEURONTIN) 300 MG capsule         Last Written Prescription Date:  11/27/24  Last Fill Quantity: 90,   # refills: 0  Last Office Visit: 9/3/24  Future Office visit:       Routing refill request to provider for review/approval because:  Drug not on the [facility], [facility] or [facility] refill protocol or controlled substance      Establish Care on 1/2/25 with Julisa Fuentes CNP.

## 2024-12-23 NOTE — TELEPHONE ENCOUNTER
Anti-Seizure Meds Protocol  [id number]/23/2024 10:47 AM   Protocol Details Review Authorizing provider's last note.

## 2024-12-30 NOTE — PROGRESS NOTES
"  Assessment & Plan     1. Hyperlipidemia with target LDL less than 100 (Primary)  - simvastatin (ZOCOR) 20 MG tablet; Take 1 tablet (20 mg) by mouth daily.  Dispense: 90 tablet; Refill: 0    2. Other specified hypothyroidism  Continue levothyroxine.     3. Gastroesophageal reflux disease without esophagitis  Continue pantoprazole    4. Dysthymia  Continue escitalopram    5. Migraine without aura and without status migrainosus, not intractable  - topiramate (TOPAMAX) 50 MG tablet; Take 1.5 tablets (75 mg) by mouth 2 times daily.  Dispense: 90 tablet; Refill: 4    6. Persistent insomnia  - zolpidem (AMBIEN) 10 MG tablet; TAKE 1 TABLET (10 MG) BY MOUTH NIGHTLY AS NEEDED FORSLEEP  Dispense: 30 tablet; Refill: 0    7. Cervical pain  - HYDROcodone-acetaminophen (NORCO) 7.5-325 MG per tablet; Take 1 tablet by mouth every 6 hours as needed for severe pain.  Dispense: 60 tablet; Refill: 0    8. Arthralgia of both knees  - HYDROcodone-acetaminophen (NORCO) 7.5-325 MG per tablet; Take 1 tablet by mouth every 6 hours as needed for severe pain.  Dispense: 60 tablet; Refill: 0        BMI  Estimated body mass index is 27.35 kg/m  as calculated from the following:    Height as of 9/3/24: 1.54 m (5' 0.63\").    Weight as of this encounter: 64.9 kg (143 lb).       Return in about 3 months (around 4/2/2025) for hypertension and lipids, thyroid, chronic pain.    The longitudinal plan of care for the diagnosis(es)/condition(s) as documented were addressed during this visit. Due to the added complexity in care, I will continue to support Aure in the subsequent management and with ongoing continuity of care.    Julisa Pérez,   Certified Adult Nurse Practitioner  [phone]      Subjective   Aure is a 74 year old, presenting for the following health issues:  Establish Care    HPI   Establish Care     Previous notes reviewed.  Last office visit was 9/3/2024.  Labs completed at that time and reviewed.  Medications reviewed.  " She needs refill of zocor and norco.      Pain agreement due, updated today.      She is otherwise doing well, no other new concerns.      Recent Labs   Lab Test 09/03/24  1202 08/28/23  1237 09/28/22  1742 06/08/22  1323 08/13/21  1150 10/21/20  1540 09/17/20  1353   0000   LDL 55 87  --   --  58 87  --   --    HDL 63 58  --   --  61 74  --   --    TRIG 103 195*  --   --  141 157*  --   --    ALT 16 32 44  --  29 40  --    < >   CR 0.71 0.73 0.49*   < > 0.70 0.60 0.52  --    GFRESTIMATED 90 87 >90   < > 89 >90 >90  --    GFRESTBLACK  --   --   --   --   --  >90 >90  --    POTASSIUM 3.7 4.5 3.8  --  3.7 4.2  --    < >   TSH 2.15 5.12*  --   --  0.46 2.57  --   --     < > = values in this interval not displayed.      BP Readings from Last 3 Encounters:   01/02/25 133/88   09/03/24 114/77   08/28/23 138/76    Wt Readings from Last 3 Encounters:   01/02/25 64.9 kg (143 lb)   09/03/24 66.2 kg (146 lb)   08/28/23 73.2 kg (161 lb 4.8 oz)               Review of Systems  Constitutional, neuro, ENT, endocrine, pulmonary, cardiac, gastrointestinal, genitourinary, musculoskeletal, integument and psychiatric systems are negative, except as otherwise noted.      Objective    /88 (BP Location: Left arm, Patient Position: Sitting, Cuff Size: Adult Regular)   Pulse 91   Temp 97.2  F (36.2  C) (Tympanic)   Resp 20   Wt 64.9 kg (143 lb)   SpO2 98%   Breastfeeding No   BMI 27.35 kg/m    Body mass index is 27.35 kg/m .  Physical Exam   GENERAL: alert and no distress  NECK: no adenopathy, no asymmetry, masses, or scars, thyroid normal to palpation, and no carotid bruits  RESP: lungs clear to auscultation - no rales, rhonchi or wheezes  CV: regular rate and rhythm, normal S1 S2, no S3 or S4, no murmur  MS: no gross musculoskeletal defects noted, no edema  PSYCH: mentation appears normal, affect normal/bright    Office Visit on 09/03/2024   Component Date Value Ref Range Status    Sodium 09/03/2024 140  135 - 145 mmol/L  Final    Potassium 09/03/2024 3.7  3.4 - 5.3 mmol/L Final    Carbon Dioxide (CO2) 09/03/2024 22  22 - 29 mmol/L Final    Anion Gap 09/03/2024 15  7 - 15 mmol/L Final    Urea Nitrogen 09/03/2024 12.9  8.0 - 23.0 mg/dL Final    Creatinine 09/03/2024 0.71  0.51 - 0.95 mg/dL Final    GFR Estimate 09/03/2024 90  >60 mL/min/1.73m2 Final    eGFR calculated using 2021 CKD-EPI equation.    Calcium 09/03/2024 9.1  8.8 - 10.4 mg/dL Final    Reference intervals for this test were updated on 7/16/2024 to reflect our healthy population more accurately. There may be differences in the flagging of prior results with similar values performed with this method. Those prior results can be interpreted in the context of the updated reference intervals.    Chloride 09/03/2024 103  98 - 107 mmol/L Final    Glucose 09/03/2024 96  70 - 99 mg/dL Final    Alkaline Phosphatase 09/03/2024 92  40 - 150 U/L Final    AST 09/03/2024 17  0 - 45 U/L Final    ALT 09/03/2024 16  0 - 50 U/L Final    Protein Total 09/03/2024 6.7  6.4 - 8.3 g/dL Final    Albumin 09/03/2024 4.2  3.5 - 5.2 g/dL Final    Bilirubin Total 09/03/2024 0.4  <=1.2 mg/dL Final    Patient Fasting > 8hrs? 09/03/2024 Yes   Final    TSH 09/03/2024 2.15  0.30 - 4.20 uIU/mL Final    Cholesterol 09/03/2024 139  <200 mg/dL Final    Triglycerides 09/03/2024 103  <150 mg/dL Final    Direct Measure HDL 09/03/2024 63  >=50 mg/dL Final    LDL Cholesterol Calculated 09/03/2024 55  <=100 mg/dL Final    Non HDL Cholesterol 09/03/2024 76  <130 mg/dL Final    Patient Fasting > 8hrs? 09/03/2024 Yes   Final    Vitamin D, Total (25-Hydroxy) 09/03/2024 48  20 - 50 ng/mL Final    optimum levels    Vitamin B12 09/03/2024 1,021  232 - 1,245 pg/mL Final    Gabapentin (Neurontin) 09/03/2024 Present (A)  Absent Final    Sources of gabapentin are prescription medications.    Creatinine Urine for Drug Screen 09/03/2024 215  mg/dL Final    The reference range has not been established for creatinine in random  urines. The results should be integrated into the clinical context for interpretation.    Cannabinoids Urine 09/03/2024 Screen Negative  Screen Negative Final    Cutoff for a negative cannabinoid is less than 50 ng/mL.    WBC Count 09/03/2024 7.4  4.0 - 11.0 10e3/uL Final    RBC Count 09/03/2024 4.43  3.80 - 5.20 10e6/uL Final    Hemoglobin 09/03/2024 13.2  11.7 - 15.7 g/dL Final    Hematocrit 09/03/2024 40.2  35.0 - 47.0 % Final    MCV 09/03/2024 91  78 - 100 fL Final    MCH 09/03/2024 29.8  26.5 - 33.0 pg Final    MCHC 09/03/2024 32.8  31.5 - 36.5 g/dL Final    RDW 09/03/2024 13.8  10.0 - 15.0 % Final    Platelet Count 09/03/2024 348  150 - 450 10e3/uL Final    % Neutrophils 09/03/2024 56  % Final    % Lymphocytes 09/03/2024 30  % Final    % Monocytes 09/03/2024 9  % Final    % Eosinophils 09/03/2024 4  % Final    % Basophils 09/03/2024 1  % Final    % Immature Granulocytes 09/03/2024 0  % Final    Absolute Neutrophils 09/03/2024 4.2  1.6 - 8.3 10e3/uL Final    Absolute Lymphocytes 09/03/2024 2.3  0.8 - 5.3 10e3/uL Final    Absolute Monocytes 09/03/2024 0.6  0.0 - 1.3 10e3/uL Final    Absolute Eosinophils 09/03/2024 0.3  0.0 - 0.7 10e3/uL Final    Absolute Basophils 09/03/2024 0.1  0.0 - 0.2 10e3/uL Final    Absolute Immature Granulocytes 09/03/2024 0.0  <=0.4 10e3/uL Final       The 10-year ASCVD risk score (Pallavi CAMPOS, et al., 2019) is: 11.5%    Values used to calculate the score:      Age: 74 years      Sex: Female      Is Non- : No      Diabetic: No      Tobacco smoker: No      Systolic Blood Pressure: 133 mmHg      Is BP treated: No      HDL Cholesterol: 63 mg/dL      Total Cholesterol: 139 mg/dL      Signed Electronically by: Julisa Pérez, NP

## 2025-01-02 ENCOUNTER — OFFICE VISIT (OUTPATIENT)
Dept: FAMILY MEDICINE | Facility: OTHER | Age: 75
End: 2025-01-02
Attending: NURSE PRACTITIONER
Payer: COMMERCIAL

## 2025-01-02 VITALS
WEIGHT: 143 LBS | HEART RATE: 91 BPM | SYSTOLIC BLOOD PRESSURE: 133 MMHG | BODY MASS INDEX: 27.35 KG/M2 | RESPIRATION RATE: 20 BRPM | TEMPERATURE: 97.2 F | OXYGEN SATURATION: 98 % | DIASTOLIC BLOOD PRESSURE: 88 MMHG

## 2025-01-02 DIAGNOSIS — G43.009 MIGRAINE WITHOUT AURA AND WITHOUT STATUS MIGRAINOSUS, NOT INTRACTABLE: ICD-10-CM

## 2025-01-02 DIAGNOSIS — E78.5 HYPERLIPIDEMIA WITH TARGET LDL LESS THAN 100: Primary | ICD-10-CM

## 2025-01-02 DIAGNOSIS — M54.2 CERVICAL PAIN: ICD-10-CM

## 2025-01-02 DIAGNOSIS — M25.562 ARTHRALGIA OF BOTH KNEES: ICD-10-CM

## 2025-01-02 DIAGNOSIS — E03.8 OTHER SPECIFIED HYPOTHYROIDISM: ICD-10-CM

## 2025-01-02 DIAGNOSIS — G47.00 PERSISTENT INSOMNIA: ICD-10-CM

## 2025-01-02 DIAGNOSIS — F34.1 DYSTHYMIA: ICD-10-CM

## 2025-01-02 DIAGNOSIS — M25.561 ARTHRALGIA OF BOTH KNEES: ICD-10-CM

## 2025-01-02 DIAGNOSIS — K21.9 GASTROESOPHAGEAL REFLUX DISEASE WITHOUT ESOPHAGITIS: ICD-10-CM

## 2025-01-02 PROCEDURE — G2211 COMPLEX E/M VISIT ADD ON: HCPCS | Performed by: NURSE PRACTITIONER

## 2025-01-02 PROCEDURE — G0463 HOSPITAL OUTPT CLINIC VISIT: HCPCS

## 2025-01-02 PROCEDURE — 99214 OFFICE O/P EST MOD 30 MIN: CPT | Performed by: NURSE PRACTITIONER

## 2025-01-02 RX ORDER — HYDROCODONE BITARTRATE AND ACETAMINOPHEN 7.5; 325 MG/1; MG/1
1 TABLET ORAL EVERY 6 HOURS PRN
Qty: 60 TABLET | Refills: 0 | Status: SHIPPED | OUTPATIENT
Start: 2025-01-08

## 2025-01-02 RX ORDER — TOPIRAMATE 50 MG/1
75 TABLET, FILM COATED ORAL 2 TIMES DAILY
Qty: 90 TABLET | Refills: 4 | Status: SHIPPED | OUTPATIENT
Start: 2025-01-02

## 2025-01-02 RX ORDER — ZOLPIDEM TARTRATE 10 MG/1
TABLET ORAL
Qty: 30 TABLET | Refills: 0 | Status: SHIPPED | OUTPATIENT
Start: 2025-01-02

## 2025-01-02 RX ORDER — SIMVASTATIN 20 MG
20 TABLET ORAL DAILY
Qty: 90 TABLET | Refills: 0 | Status: SHIPPED | OUTPATIENT
Start: 2025-01-02

## 2025-01-02 ASSESSMENT — PAIN SCALES - GENERAL: PAINLEVEL_OUTOF10: MILD PAIN (3)

## 2025-01-02 NOTE — PATIENT INSTRUCTIONS
"  Assessment & Plan     1. Hyperlipidemia with target LDL less than 100 (Primary)  - simvastatin (ZOCOR) 20 MG tablet; Take 1 tablet (20 mg) by mouth daily.  Dispense: 90 tablet; Refill: 0    2. Other specified hypothyroidism  Continue levothyroxine.     3. Gastroesophageal reflux disease without esophagitis  Continue pantoprazole    4. Dysthymia  Continue escitalopram    5. Migraine without aura and without status migrainosus, not intractable  - topiramate (TOPAMAX) 50 MG tablet; Take 1.5 tablets (75 mg) by mouth 2 times daily.  Dispense: 90 tablet; Refill: 4    6. Persistent insomnia  - zolpidem (AMBIEN) 10 MG tablet; TAKE 1 TABLET (10 MG) BY MOUTH NIGHTLY AS NEEDED FORSLEEP  Dispense: 30 tablet; Refill: 0    7. Cervical pain  - HYDROcodone-acetaminophen (NORCO) 7.5-325 MG per tablet; Take 1 tablet by mouth every 6 hours as needed for severe pain.  Dispense: 60 tablet; Refill: 0    8. Arthralgia of both knees  - HYDROcodone-acetaminophen (NORCO) 7.5-325 MG per tablet; Take 1 tablet by mouth every 6 hours as needed for severe pain.  Dispense: 60 tablet; Refill: 0        BMI  Estimated body mass index is 27.35 kg/m  as calculated from the following:    Height as of 9/3/24: 1.54 m (5' 0.63\").    Weight as of this encounter: 64.9 kg (143 lb).       Return in about 3 months (around 4/2/2025) for hypertension and lipids, thyroid, chronic pain.    Julisa Pérez,   Certified Adult Nurse Practitioner  [phone]    "

## 2025-01-02 NOTE — LETTER
Opioid / Opioid Plus Controlled Substance Agreement    This is an agreement between you and your provider about the safe and appropriate use of controlled substance/opioids prescribed by your care team. Controlled substances are medicines that can cause physical and mental dependence (abuse).    There are strict laws about having and using these medicines. We here at [facility] are committing to working with you in your efforts to get better. To support you in this work, we ll help you schedule regular office appointments for medicine refills. If we must cancel or change your appointment for any reason, we ll make sure you have enough medicine to last until your next appointment.     As a Provider, I will:  Listen carefully to your concerns and treat you with respect.   Recommend a treatment plan that I believe is in your best interest. This plan may involve therapies other than opioid pain medication.   Talk with you often about the possible benefits, and the risk of harm of any medicine that we prescribe for you.   Provide a plan on how to taper (discontinue or go off) using this medicine if the decision is made to stop its use.    As a Patient, I understand that opioid(s):   Are a controlled substance prescribed by my care team to help me function or work and manage my condition(s).   Are strong medicines and can cause serious side effects such as:  Drowsiness, which can seriously affect my driving ability  A lower breathing rate, enough to cause death  Harm to my thinking ability   Depression   Abuse of and addiction to this medicine  Need to be taken exactly as prescribed. Combining opioids with certain medicines or chemicals (such as illegal drugs, sedatives, sleeping pills, and benzodiazepines) can be dangerous or even fatal. If I stop opioids suddenly, I may have severe withdrawal symptoms.  Do not work for all types of pain nor for all patients. If they re not helpful, I may be asked to stop  them.        The risks, benefits and side effects of these medicine(s) were explained to me. I agree that:  I will take part in other treatments as advised by my care team. This may be psychiatry or counseling, physical therapy, behavioral therapy, group treatment or a referral to a specialist.     I will keep all my appointments. I understand that this is part of the monitoring of opioids. My care team may require an office visit for EVERY opioid/controlled substance refill. If I miss appointments or don t follow instructions, my care team may stop my medicine.    I will take my medicines as prescribed. I will not change the dose or schedule unless my care team tells me to. There will be no refills if I run out early.     I may be asked to come to the clinic and complete a urine drug test or complete a pill count at any time. If I don t give a urine sample or participate in a pill count, the care team may stop my medicine.    I will only receive prescriptions from this clinic for chronic pain. If I am treated by another provider for acute pain issues, I will tell them that I am taking opioid pain medication for chronic pain and that I have a treatment agreement with this provider. I will inform my [facility] care team within one business day if I am given a prescription for any pain medication by another healthcare provider. My [facility] care team can contact other providers and pharmacists about my use of any medicines.    It is up to me to make sure that I don t run out of my medicines on weekends or holidays. If my care team is willing to refill my opioid prescription without a visit, I must request refills only during office hours. Refills may take up to 3 business days to process. I will use one pharmacy to fill all my opioid and other controlled substance prescriptions. I will notify the clinic about any changes to my insurance or medication availability.    I am responsible for my  prescriptions. If the medicine/prescription is lost, stolen or destroyed, it will not be replaced. I also agree not to share controlled substance medicines with anyone.    I am aware I should not use any illegal or recreational drugs. I agree not to drink alcohol unless my care team says I can.       If I enroll in the [place] [facility] Cann[facility] program, I will tell my care team prior to my next refill.     I will tell my care team right away if I become pregnant, have a new medical problem treated outside of my regular clinic, or have a change in my medications.    I understand that this medicine can affect my thinking, judgment and reaction time. Alcohol and drugs affect the brain and body, which can affect the safety of my driving. Being under the influence of alcohol or drugs can affect my decision-making, behaviors, personal safety, and the safety of others. Driving while impaired (DWI) can occur if a person is driving, operating, or in physical control of a car, motorcycle, boat, snowmobile, ATV, motorbike, off-road vehicle, or any other motor vehicle ([place] Statute 169A.20). I understand the risk if I choose to drive or operate any vehicle or machinery.    I understand that if I do not follow any of the conditions above, my prescriptions or treatment may be stopped or changed.          Opioids  What You Need to Know    What are opioids?   Opioids are pain medicines that must be prescribed by a doctor. They are also known as narcotics.     Examples are:   morphine (MS Contin, Yanet)  oxycodone (Oxycontin)  oxycodone and acetaminophen (Percocet)  hydrocodone and acetaminophen (Vicodin, Norco)   fentanyl patch (Duragesic)   hydromorphone (Dilaudid)   methadone  codeine (Tylenol #3)     What do opioids do well?   Opioids are best for severe short-term pain such as after a surgery or injury. They may work well for cancer pain. They may help some people with long-lasting (chronic) pain.     What do opioids NOT do  well?   Opioids never get rid of pain entirely, and they don t work well for most patients with chronic pain. Opioids don t reduce swelling, one of the causes of pain.                                    Other ways to manage chronic pain and improve function include:     Treat the health problem that may be causing pain  Anti-inflammation medicines, which reduce swelling and tenderness, such as ibuprofen (Advil, Motrin) or naproxen (Aleve)  Acetaminophen (Tylenol)  Antidepressants and anti-seizure medicines, especially for nerve pain  Topical treatments such as patches or creams  Injections or nerve blocks  Chiropractic or osteopathic treatment  Acupuncture, massage, deep breathing, meditation, visual imagery, aromatherapy  Use heat or ice at the pain site  Physical therapy   Exercise  Stop smoking  Take part in therapy       Risks and side effects     Talk to your doctor before you start or decide to keep taking opioids. Possible side effects include:    Lowering your breathing rate enough to cause death  Overdose, including death, especially if taking higher than prescribed doses  Worse depression symptoms; less pleasure in things you usually enjoy  Feeling tired or sluggish  Slower thoughts or cloudy thinking  Being more sensitive to pain over time; pain is harder to control  Trouble sleeping or restless sleep  Changes in hormone levels (for example, less testosterone)  Changes in sex drive or ability to have sex  Constipation  Unsafe driving  Itching and sweating  Dizziness  Nausea, throwing up and dry mouth    What else should I know about opioids?    Opioids may lead to dependence, tolerance, or addiction.    Dependence means that if you stop or reduce the medicine too quickly, you will have withdrawal symptoms. These include loose poop (diarrhea), jitters, flu-like symptoms, nervousness and tremors. Dependence is not the same as addiction.                     Tolerance means needing higher doses over time to  get the same effect. This may increase the chance of serious side effects.    Addiction is when people improperly use a substance that harms their body, their mind or their relations with others. Use of opiates can cause a relapse of addiction if you have a history of drug or alcohol abuse.    People who have used opioids for a long time may have a lower quality of life, worse depression, higher levels of pain and more visits to doctors.    You can overdose on opioids. Take these steps to lower your risk of overdose:    Recognize the signs:  Signs of overdose include decrease or loss of consciousness (blackout), slowed breathing, trouble waking up and blue lips. If someone is worried about overdose, they should call 911.    Talk to your doctor about Narcan (naloxone).   If you are at risk for overdose, you may be given a prescription for Narcan. This medicine very quickly reverses the effects of opioids.   If you overdose, a friend or family member can give you Narcan while waiting for the ambulance. They need to know the signs of overdose and how to give Narcan.     Don't use alcohol or street drugs.   Taking them with opioids can cause death.    Do not take any of these medicines unless your doctor says it s OK. Taking these with opioids can cause death:  Benzodiazepines, such as lorazepam (Ativan), alprazolam (Xanax) or diazepam (Valium)  Muscle relaxers, such as cyclobenzaprine (Flexeril)  Sleeping pills like zolpidem (Ambien)   Other opioids      How to keep you and other people safe while taking opioids:    Never share your opioids with others.  Opioid medicines are regulated by the Drug Enforcement Agency ([place]). Selling or sharing medications is a criminal act.    2. Be sure to store opioids in a secure place, locked up if possible. Young children can easily swallow them and overdose.    3. When you are traveling with your medicines, keep them in the original bottles. If you use a pill box, be sure you also  carry a copy of your medicine list from your clinic or pharmacy.    4. Safe disposal of opioids    Most pharmacies have places to get rid of medicine, called disposal kiosks. Medicine disposal options are also available in every [facility]. Search your county and  medication disposal  to find more options. You can find more details at:  [email or web address]://[email or web address]-medications     I agree that my provider, clinic care team, and pharmacy may work with any city, state or federal law enforcement agency that investigates the misuse, sale, or other diversion of my controlled medicine. I will allow my provider to discuss my care with, or share a copy of, this agreement with any other treating provider, pharmacy or emergency room where I receive care.    I have read this agreement and have asked questions about anything I did not understand.    _______________________________________________________  Patient Signature - Aure Lanier _____________________                   Date     _______________________________________________________  Provider Signature - Julisa Pérez NP   _____________________                   Date     _______________________________________________________  Witness Signature (required if provider not present while patient signing)   _____________________                   Date

## 2025-01-07 DIAGNOSIS — M54.2 CERVICALGIA: ICD-10-CM

## 2025-01-09 RX ORDER — CYCLOBENZAPRINE HCL 10 MG
TABLET ORAL
Qty: 90 TABLET | Refills: 0 | Status: SHIPPED | OUTPATIENT
Start: 2025-01-09

## 2025-01-09 NOTE — TELEPHONE ENCOUNTER
Flexeril      Last Written Prescription Date:  12.12.24  Last Fill Quantity: #90,   # refills: 0  Last Office Visit: 1.2.25  Future Office visit:    Next 5 appointments (look out 90 days)      Apr 07, 2025 1:30 PM  (Arrive by 1:15 PM)  Provider Visit with Julisa Pérez NP  [facility] ([facility] ) [address]  [facility] [address]  [phone]             Routing refill request to provider for review/approval because:  Drug not on the [facility], [facility] or [facility] refill protocol or controlled substance

## 2025-01-22 DIAGNOSIS — B02.23 POSTHERPETIC POLYNEUROPATHY: ICD-10-CM

## 2025-01-22 RX ORDER — GABAPENTIN 300 MG/1
300 CAPSULE ORAL 3 TIMES DAILY
Qty: 90 CAPSULE | Refills: 0 | Status: SHIPPED | OUTPATIENT
Start: 2025-01-22

## 2025-01-22 NOTE — TELEPHONE ENCOUNTER
Routing refill request to provider for review/approval because:  Drug not on the [facility], [facility] or [facility] refill protocol or controlled substance

## 2025-01-22 NOTE — TELEPHONE ENCOUNTER
Gabapentin      Last Written Prescription Date:  12/23/24  Last Fill Quantity: 90,   # refills: 0  Last Office Visit: 1/2/25  Future Office visit:    Next 5 appointments (look out 90 days)      Apr 07, 2025 1:30 PM  (Arrive by 1:15 PM)  Provider Visit with Julisa Pérez NP  [facility] ([facility] ) [address]  [facility] [address]  [phone]             Routing refill request to provider for review/approval because:

## 2025-01-26 NOTE — TELEPHONE ENCOUNTER
Ambien      Last Written Prescription Date:  11/18/21  Last Fill Quantity: 30,   # refills: 0  Last Office Visit: 12/7/21  Future Office visit:       Routing refill request to provider for review/approval because:       yes...

## 2025-02-03 DIAGNOSIS — M54.2 CERVICAL PAIN: ICD-10-CM

## 2025-02-03 DIAGNOSIS — M54.2 CERVICALGIA: ICD-10-CM

## 2025-02-03 DIAGNOSIS — G47.00 PERSISTENT INSOMNIA: ICD-10-CM

## 2025-02-03 DIAGNOSIS — M25.561 ARTHRALGIA OF BOTH KNEES: ICD-10-CM

## 2025-02-03 DIAGNOSIS — M25.562 ARTHRALGIA OF BOTH KNEES: ICD-10-CM

## 2025-02-03 NOTE — TELEPHONE ENCOUNTER
Norco      Last Written Prescription Date:  1/8/25  Last Fill Quantity: 60,   # refills: 0  Last Office Visit: 1/2/25  Future Office visit:    Next 5 appointments (look out 90 days)      Apr 07, 2025 1:30 PM  (Arrive by 1:15 PM)  Provider Visit with Julisa Pérez NP  [facility] ([facility] ) [address]  [place] [address]  [phone]             Routing refill request to provider for review/approval because:      Flexeril      Last Written Prescription Date:  1/9/25  Last Fill Quantity: 90,   # refills: 0  Last Office Visit: 1/2/25  Future Office visit:    Next 5 appointments (look out 90 days)      Apr 07, 2025 1:30 PM  (Arrive by 1:15 PM)  Provider Visit with Julisa Pérez NP  [facility] ([facility] ) [address]  [place] [address]  [phone]             Routing refill request to provider for review/approval because:

## 2025-02-04 NOTE — TELEPHONE ENCOUNTER
zolpidem (AMBIEN) 10 MG tablet 30 tablet 0 1/2/2025 --   Last Office Visit: 1.2.25     Future Office visit:    Next 5 appointments (look out 90 days)      Apr 07, 2025 1:30 PM  (Arrive by 1:15 PM)  Provider Visit with Julisa Pérez NP  [facility] ([facility] ) [address]  [facility] [address]  [phone]             Routing refill request to provider for review/approval because:

## 2025-02-04 NOTE — TELEPHONE ENCOUNTER
HYDROCODONE/APAP 7.5-325MG TAB       Routing refill request to provider for review/approval because:  Drug not on the [facility], [facility] or [facility] refill protocol or controlled substance    CYCLOBENZAPRINE 10MG TABLET       Routing refill request to provider for review/approval because:  Drug not on the [facility], [facility] or [facility] refill protocol or controlled substance

## 2025-02-05 RX ORDER — ZOLPIDEM TARTRATE 10 MG/1
TABLET ORAL
Qty: 30 TABLET | Refills: 0 | Status: SHIPPED | OUTPATIENT
Start: 2025-02-05

## 2025-02-05 RX ORDER — CYCLOBENZAPRINE HCL 10 MG
TABLET ORAL
Qty: 90 TABLET | Refills: 0 | Status: SHIPPED | OUTPATIENT
Start: 2025-02-05

## 2025-02-05 RX ORDER — HYDROCODONE BITARTRATE AND ACETAMINOPHEN 7.5; 325 MG/1; MG/1
1 TABLET ORAL EVERY 6 HOURS PRN
Qty: 60 TABLET | Refills: 0 | Status: SHIPPED | OUTPATIENT
Start: 2025-02-05

## 2025-02-05 NOTE — TELEPHONE ENCOUNTER
Routing refill request to provider for review/approval because:  Drug not on the [facility], [facility] or [facility] refill protocol or controlled substance

## 2025-02-16 DIAGNOSIS — B02.23 POSTHERPETIC POLYNEUROPATHY: ICD-10-CM

## 2025-02-17 RX ORDER — GABAPENTIN 300 MG/1
300 CAPSULE ORAL 3 TIMES DAILY
Qty: 90 CAPSULE | Refills: 0 | Status: SHIPPED | OUTPATIENT
Start: 2025-02-17

## 2025-02-17 NOTE — TELEPHONE ENCOUNTER
Gabapentin  Last Written Prescription Date: 1/22/25  Last Fill Quantity: 90 # of Refills: 0  Last Office Visit: 1/2/25

## 2025-02-17 NOTE — TELEPHONE ENCOUNTER
Routing refill request to provider for review/approval because:  Drug not on the [facility], [facility] or [facility] refill protocol or controlled substance

## 2025-02-27 DIAGNOSIS — M54.2 CERVICALGIA: ICD-10-CM

## 2025-02-27 DIAGNOSIS — M25.562 ARTHRALGIA OF BOTH KNEES: ICD-10-CM

## 2025-02-27 DIAGNOSIS — G47.00 PERSISTENT INSOMNIA: ICD-10-CM

## 2025-02-27 DIAGNOSIS — M54.2 CERVICAL PAIN: ICD-10-CM

## 2025-02-27 DIAGNOSIS — M25.561 ARTHRALGIA OF BOTH KNEES: ICD-10-CM

## 2025-02-27 NOTE — TELEPHONE ENCOUNTER
Flexeril  Last Written Prescription Date: 2/5/25  Last Fill Quantity: 90 # of Refills: 0  Last Office Visit: 1/2/25    Norco  Last Written Prescription Date: 2/5/25  Last Fill Quantity: 60 # of Refills: 0  Last Office Visit: 1/2/25    Ambien  Last Written Prescription Date: 2/5/25  Last Fill Quantity: 30 # of Refills: 0  Last Office Visit: 1/2/25

## 2025-02-28 NOTE — TELEPHONE ENCOUNTER
CYCLOBENZAPRINE 10MG TABLET   ZOLPIDEM 10MG TABLET   HYDROCODONE/APAP 7.5-325MG TAB         Routing refill request to provider for review/approval because:  Drug not on the [facility], [facility] or [facility] refill protocol or controlled substance

## 2025-03-03 RX ORDER — CYCLOBENZAPRINE HCL 10 MG
TABLET ORAL
Qty: 90 TABLET | Refills: 0 | Status: SHIPPED | OUTPATIENT
Start: 2025-03-03

## 2025-03-03 RX ORDER — ZOLPIDEM TARTRATE 10 MG/1
TABLET ORAL
Qty: 30 TABLET | Refills: 0 | Status: SHIPPED | OUTPATIENT
Start: 2025-03-03

## 2025-03-03 RX ORDER — HYDROCODONE BITARTRATE AND ACETAMINOPHEN 7.5; 325 MG/1; MG/1
1 TABLET ORAL EVERY 6 HOURS PRN
Qty: 60 TABLET | Refills: 0 | Status: SHIPPED | OUTPATIENT
Start: 2025-03-03

## 2025-03-10 ENCOUNTER — TELEPHONE (OUTPATIENT)
Dept: FAMILY MEDICINE | Facility: OTHER | Age: 75
End: 2025-03-10

## 2025-03-10 NOTE — TELEPHONE ENCOUNTER
Received a PA request from Dipesh Islas for promethazine (PHENERGAN) 25 MG tablet. Submitted on CMM. Waiting for a response.

## 2025-03-28 DIAGNOSIS — M54.2 CERVICALGIA: ICD-10-CM

## 2025-03-28 DIAGNOSIS — G47.00 PERSISTENT INSOMNIA: ICD-10-CM

## 2025-03-28 DIAGNOSIS — M25.562 ARTHRALGIA OF BOTH KNEES: ICD-10-CM

## 2025-03-28 DIAGNOSIS — M54.2 CERVICAL PAIN: ICD-10-CM

## 2025-03-28 DIAGNOSIS — M25.561 ARTHRALGIA OF BOTH KNEES: ICD-10-CM

## 2025-03-28 NOTE — TELEPHONE ENCOUNTER
ZOLPIDEM 10MG TABLET     Rx Protocol Controlled Substance [id number]/28/2025 01:31 PM   Protocol Details Medication not refilled in past 28 days    Pain Agreement on file in last 12 months    Auto Fail - Please forward to Provider    Naloxone on active med list    AURELIA-7 done in past year     Pain Agreement signed on 1/2/25 11/21/2022     1:38 PM 8/28/2023    11:58 AM 9/3/2024    11:29 AM   AURELIA-7 SCORE   Total Score  0 (minimal anxiety) 1 (minimal anxiety)   Total Score 0 0 1          HYDROCODONE/APAP 7.5-325MG TAB       Routing refill request to provider for review/approval because:  Drug not on the [facility], [facility] or [facility] refill protocol or controlled substance      CYCLOBENZAPRINE 10MG TABLET     Routing refill request to provider for review/approval because:  Drug not on the [facility], [facility] or [facility] refill protocol or controlled substance

## 2025-03-28 NOTE — TELEPHONE ENCOUNTER
Zolpidem (Ambien) 10 mg tablet  Take 1 tablet (10 mg) by mouth nightly as needed for sleep   Last Written Prescription Date:  3-3-25  Last Fill Quantity: 30 tablet,   # refills: 0  Last Office Visit: 1-2-25  Future Office visit:    Next 5 appointments (look out 90 days)      Apr 07, 2025 1:30 PM  (Arrive by 1:15 PM)  Provider Visit with Julisa Pérez NP  [facility] ([facility] ) [address]  [place] [address]  [phone]             Hydrocodone-acetaminophen (Norco) 7.5 - 325 mg per tablet  Take 1 tablet by mouth every 6 hours as needed for severe pain   Last Written Prescription Date:  3-3-25  Last Fill Quantity: 60 tablet,   # refills: 0  Last Office Visit: 1-2-25  Future Office visit:    Next 5 appointments (look out 90 days)      Apr 07, 2025 1:30 PM  (Arrive by 1:15 PM)  Provider Visit with Julisa Pérez NP  [facility] ([facility] ) [address]  [place] [address]  [phone]             Cyclobenzaprine (Flexeril) 10 mg tablet  Take 1 tablet by mouth three times daily as needed for muscle spasms  Last Written Prescription Date:  3-3-25  Last Fill Quantity: 90 tablet,   # refills: 0  Last Office Visit: 1-2-25  Future Office visit:    Next 5 appointments (look out 90 days)      Apr 07, 2025 1:30 PM  (Arrive by 1:15 PM)  Provider Visit with Julisa Pérez NP  [facility] ([facility] ) [address]  [place] [address]  [phone]

## 2025-03-31 RX ORDER — ZOLPIDEM TARTRATE 10 MG/1
TABLET ORAL
Qty: 30 TABLET | Refills: 0 | Status: SHIPPED | OUTPATIENT
Start: 2025-03-31

## 2025-03-31 RX ORDER — HYDROCODONE BITARTRATE AND ACETAMINOPHEN 7.5; 325 MG/1; MG/1
1 TABLET ORAL EVERY 6 HOURS PRN
Qty: 60 TABLET | Refills: 0 | Status: SHIPPED | OUTPATIENT
Start: 2025-03-31

## 2025-03-31 RX ORDER — CYCLOBENZAPRINE HCL 10 MG
TABLET ORAL
Qty: 90 TABLET | Refills: 0 | Status: SHIPPED | OUTPATIENT
Start: 2025-03-31

## 2025-03-31 NOTE — PROGRESS NOTES
"  Assessment & Plan     1. Hyperlipidemia with target LDL less than 100 (Primary)  Continue zocor  - Lipid Profile; Future  - Comprehensive metabolic panel; Future  - TSH with free T4 reflex; Future    2. Cervical pain  Continue norco as prescribed  Opioid agreement updated today    3. Arthralgia of both knees  As above    4. Rash  - nystatin (MYCOSTATIN) 749683 UNIT/GM external powder; Apply topically daily.  Dispense: 60 g; Refill: 3    5. Dysthymia  Stable, continue lexapro    6. Anxiety state  Continue lexapro    7. Chronic, continuous use of opioids  As above    8. Migraine without aura and without status migrainosus, not intractable  Continue current plan     9. Persistent insomnia  Continue ambien and trazodone.     10. On statin therapy  - Comprehensive metabolic panel; Future      BMI  Estimated body mass index is 27.03 kg/m  as calculated from the following:    Height as of this encounter: 1.56 m (5' 1.42\").    Weight as of this encounter: 65.8 kg (145 lb).       Follow-up in 3 months or as needed     The longitudinal plan of care for the diagnosis(es)/condition(s) as documented were addressed during this visit. Due to the added complexity in care, I will continue to support Aure in the subsequent management and with ongoing continuity of care.    Julisa Pérze,   Certified Adult Nurse Practitioner  [phone]      Enzo Looney is a 75 year old, presenting for the following health issues:  Chronic Disease Management        4/7/2025     1:10 PM   Additional Questions   Roomed by Sherri ROWE   Accompanied by none     HPI      Hyperlipidemia Follow-Up    Are you regularly taking any medication or supplement to lower your cholesterol?   Yes- Zocor 20 mg  Are you having muscle aches or other side effects that you think could be caused by your cholesterol lowering medication?  No    Anxiety   How are you doing with your anxiety since your last visit? No change  Are you having other symptoms that " might be associated with anxiety? No  Have you had a significant life event? OTHER: Sister's health    Are you feeling depressed? No  Do you have any concerns with your use of alcohol or other drugs? No    Social History     Tobacco Use    Smoking status: Never    Smokeless tobacco: Never    Tobacco comments:     no passive exposure   Vaping Use    Vaping status: Never Used   Substance Use Topics    Alcohol use: Yes     Comment: rarely, maybe yearly    Drug use: No         8/28/2023    11:58 AM 9/3/2024    11:29 AM 4/7/2025     1:16 PM   AURELIA-7 SCORE   Total Score 0 (minimal anxiety) 1 (minimal anxiety) 2 (minimal anxiety)   Total Score 0 1 2        Patient-reported         8/28/2023    11:56 AM 9/3/2024    11:28 AM 4/7/2025     1:12 PM   PHQ   PHQ-9 Total Score 4 1 4    Q9: Thoughts of better off dead/self-harm past 2 weeks Not at all  Not at all Not at all       Patient-reported    Proxy-reported         4/7/2025     1:12 PM   Last PHQ-9   1.  Little interest or pleasure in doing things 0   2.  Feeling down, depressed, or hopeless 0   3.  Trouble falling or staying asleep, or sleeping too much 1   4.  Feeling tired or having little energy 1   5.  Poor appetite or overeating 1   6.  Feeling bad about yourself 1   7.  Trouble concentrating 0   8.  Moving slowly or restless 0   Q9: Thoughts of better off dead/self-harm past 2 weeks 0   PHQ-9 Total Score 4        Patient-reported         4/7/2025     1:16 PM   AURELIA-7    1. Feeling nervous, anxious, or on edge 0   2. Not being able to stop or control worrying 1   3. Worrying too much about different things 1   4. Trouble relaxing 0   5. Being so restless that it is hard to sit still 0   6. Becoming easily annoyed or irritable 0   7. Feeling afraid, as if something awful might happen 0   AURELIA-7 Total Score 2    If you checked any problems, how difficult have they made it for you to do your work, take care of things at home, or get along with other people? Not difficult at  all       Patient-reported     Migraine   Since your last clinic visit, how have your headaches changed?  No change  How often are you getting headaches or migraines? Every day   Are you able to do normal daily activities when you have a migraine? Yes  Are you taking rescue/relief medications? (Select all that apply) Tylenol  How helpful is your rescue/relief medication?  I get some relief  Are you taking any medications to prevent migraines? (Select all that apply)  Topamax  In the past 4 weeks, how often have you gone to urgent care or the emergency room because of your headaches?  0    Hypothyroidism Follow-up    Since last visit, patient describes the following symptoms: Weight stable, no hair loss, no skin changes, no constipation, no loose stools  Chronic/Recurring Back Pain Follow Up    Where is your back pain located? (Select all that apply) Right and Left Knees, headache  How would you describe your pain?  dull ache  Where does your pain spread? nowhere  Since your last clinic visit for back pain, how has your pain changed? unchanged  Does your back pain interfere with your job? Not applicable  Since your last visit, have you tried any new treatment? No    She is feeling well, no new concerns.     Recent Labs   Lab Test 09/03/24  1202 08/28/23  1237 09/28/22  1742 06/08/22  1323 08/13/21  1150 10/21/20  1540 09/17/20  1353   0000   LDL 55 87  --   --  58 87  --   --    HDL 63 58  --   --  61 74  --   --    TRIG 103 195*  --   --  141 157*  --   --    ALT 16 32 44  --  29 40  --    < >   CR 0.71 0.73 0.49*   < > 0.70 0.60 0.52  --    GFRESTIMATED 90 87 >90   < > 89 >90 >90  --    GFRESTBLACK  --   --   --   --   --  >90 >90  --    POTASSIUM 3.7 4.5 3.8  --  3.7 4.2  --    < >   TSH 2.15 5.12*  --   --  0.46 2.57  --   --     < > = values in this interval not displayed.      BP Readings from Last 3 Encounters:   04/07/25 135/82   01/02/25 133/88   09/03/24 114/77    Wt Readings from Last 3 Encounters:  "  04/07/25 65.8 kg (145 lb)   01/02/25 64.9 kg (143 lb)   09/03/24 66.2 kg (146 lb)          The 10-year ASCVD risk score (Pallavi CAMPOS, et al., 2019) is: 13.4%    Values used to calculate the score:      Age: 75 years      Sex: Female      Is Non- : No      Diabetic: No      Tobacco smoker: No      Systolic Blood Pressure: 135 mmHg      Is BP treated: No      HDL Cholesterol: 63 mg/dL      Total Cholesterol: 139 mg/dL        Review of Systems  Constitutional, HEENT, cardiovascular, pulmonary, GI, , musculoskeletal, neuro, skin, endocrine and psych systems are negative, except as otherwise noted.      Objective    /82 (BP Location: Right arm, Patient Position: Sitting, Cuff Size: Adult Regular)   Pulse 86   Temp 97.8  F (36.6  C) (Tympanic)   Resp 20   Ht 1.56 m (5' 1.42\")   Wt 65.8 kg (145 lb)   SpO2 98%   Breastfeeding No   BMI 27.03 kg/m    Body mass index is 27.03 kg/m .  Physical Exam   GENERAL: alert and no distress  NECK: no adenopathy, no asymmetry, masses, or scars, thyroid normal to palpation, and no carotid bruits  RESP: lungs clear to auscultation - no rales, rhonchi or wheezes  CV: regular rate and rhythm, normal S1 S2, no S3 or S4, no murmur  MS: no gross musculoskeletal defects noted, no edema  PSYCH: mentation appears normal, affect normal/bright            Signed Electronically by: Julisa Pérez NP    "

## 2025-04-01 ENCOUNTER — TELEPHONE (OUTPATIENT)
Dept: FAMILY MEDICINE | Facility: OTHER | Age: 75
End: 2025-04-01

## 2025-04-01 DIAGNOSIS — H10.023 OTHER MUCOPURULENT CONJUNCTIVITIS OF BOTH EYES: ICD-10-CM

## 2025-04-01 RX ORDER — CIPROFLOXACIN HYDROCHLORIDE 3.5 MG/ML
2 SOLUTION/ DROPS TOPICAL 3 TIMES DAILY PRN
Qty: 10 ML | Refills: 3 | Status: SHIPPED | OUTPATIENT
Start: 2025-04-01

## 2025-04-01 NOTE — TELEPHONE ENCOUNTER
Received PAS request from [facility] for zolpidem (AMBIEN) 10 MG tablet. Submitted on CMM, waiting for response.

## 2025-04-01 NOTE — TELEPHONE ENCOUNTER
CIPROFLOXACIN 0.3% OPHTH SOLN         Last Written Prescription Date:  2/20/24  Last Fill Quantity: 10 mL,   # refills: 0  Last Office Visit: 1/2/25  Future Office visit:    Next 5 appointments (look out 90 days)      Apr 07, 2025 1:30 PM  (Arrive by 1:15 PM)  Provider Visit with Julisa Pérez NP  [facility] ([facility] ) [address]  [facility] [address]  [phone]             Routing refill request to provider for review/approval because:  Drug not on the [facility], [facility] or  Health refill protocol or controlled substance

## 2025-04-02 NOTE — TELEPHONE ENCOUNTER
Received PA APPROVAL from [facility] for zolpidem (AMBIEN) 10 MG tablet.    Dates 01/01/25 - 12/31/2025

## 2025-04-07 ENCOUNTER — OFFICE VISIT (OUTPATIENT)
Dept: FAMILY MEDICINE | Facility: OTHER | Age: 75
End: 2025-04-07
Attending: NURSE PRACTITIONER
Payer: COMMERCIAL

## 2025-04-07 VITALS
OXYGEN SATURATION: 98 % | TEMPERATURE: 97.8 F | DIASTOLIC BLOOD PRESSURE: 82 MMHG | HEIGHT: 61 IN | WEIGHT: 145 LBS | HEART RATE: 86 BPM | RESPIRATION RATE: 20 BRPM | BODY MASS INDEX: 27.38 KG/M2 | SYSTOLIC BLOOD PRESSURE: 135 MMHG

## 2025-04-07 DIAGNOSIS — M54.2 CERVICAL PAIN: ICD-10-CM

## 2025-04-07 DIAGNOSIS — F11.90 CHRONIC, CONTINUOUS USE OF OPIOIDS: ICD-10-CM

## 2025-04-07 DIAGNOSIS — Z01.818 PREOP GENERAL PHYSICAL EXAM: ICD-10-CM

## 2025-04-07 DIAGNOSIS — M25.562 ARTHRALGIA OF BOTH KNEES: ICD-10-CM

## 2025-04-07 DIAGNOSIS — Z79.899 ON STATIN THERAPY: ICD-10-CM

## 2025-04-07 DIAGNOSIS — M25.561 ARTHRALGIA OF BOTH KNEES: ICD-10-CM

## 2025-04-07 DIAGNOSIS — E78.5 HYPERLIPIDEMIA WITH TARGET LDL LESS THAN 100: Primary | ICD-10-CM

## 2025-04-07 DIAGNOSIS — G43.009 MIGRAINE WITHOUT AURA AND WITHOUT STATUS MIGRAINOSUS, NOT INTRACTABLE: ICD-10-CM

## 2025-04-07 DIAGNOSIS — F41.1 ANXIETY STATE: ICD-10-CM

## 2025-04-07 DIAGNOSIS — R21 RASH: ICD-10-CM

## 2025-04-07 DIAGNOSIS — G47.00 PERSISTENT INSOMNIA: ICD-10-CM

## 2025-04-07 DIAGNOSIS — F34.1 DYSTHYMIA: ICD-10-CM

## 2025-04-07 LAB
ALBUMIN SERPL BCG-MCNC: 4.2 G/DL (ref 3.5–5.2)
ALP SERPL-CCNC: 79 U/L (ref 40–150)
ALT SERPL W P-5'-P-CCNC: 33 U/L (ref 0–50)
ANION GAP SERPL CALCULATED.3IONS-SCNC: 8 MMOL/L (ref 7–15)
AST SERPL W P-5'-P-CCNC: 30 U/L (ref 0–45)
BILIRUB SERPL-MCNC: 0.2 MG/DL
BUN SERPL-MCNC: 11.2 MG/DL (ref 8–23)
CALCIUM SERPL-MCNC: 8.5 MG/DL (ref 8.8–10.4)
CHLORIDE SERPL-SCNC: 108 MMOL/L (ref 98–107)
CHOLEST SERPL-MCNC: 181 MG/DL
CREAT SERPL-MCNC: 0.63 MG/DL (ref 0.51–0.95)
EGFRCR SERPLBLD CKD-EPI 2021: >90 ML/MIN/1.73M2
FASTING STATUS PATIENT QL REPORTED: NO
FASTING STATUS PATIENT QL REPORTED: NO
GLUCOSE SERPL-MCNC: 96 MG/DL (ref 70–99)
HCO3 SERPL-SCNC: 25 MMOL/L (ref 22–29)
HDLC SERPL-MCNC: 75 MG/DL
HOLD SPECIMEN: NORMAL
LDLC SERPL CALC-MCNC: 86 MG/DL
NONHDLC SERPL-MCNC: 106 MG/DL
POTASSIUM SERPL-SCNC: 3.8 MMOL/L (ref 3.4–5.3)
PROT SERPL-MCNC: 6.8 G/DL (ref 6.4–8.3)
SODIUM SERPL-SCNC: 141 MMOL/L (ref 135–145)
T4 FREE SERPL-MCNC: 0.72 NG/DL (ref 0.9–1.7)
TRIGL SERPL-MCNC: 102 MG/DL
TSH SERPL DL<=0.005 MIU/L-ACNC: 4.58 UIU/ML (ref 0.3–4.2)

## 2025-04-07 PROCEDURE — 84155 ASSAY OF PROTEIN SERUM: CPT | Mod: ZL | Performed by: NURSE PRACTITIONER

## 2025-04-07 PROCEDURE — 36415 COLL VENOUS BLD VENIPUNCTURE: CPT | Mod: ZL | Performed by: NURSE PRACTITIONER

## 2025-04-07 PROCEDURE — 80053 COMPREHEN METABOLIC PANEL: CPT | Mod: ZL | Performed by: NURSE PRACTITIONER

## 2025-04-07 PROCEDURE — 99214 OFFICE O/P EST MOD 30 MIN: CPT | Performed by: NURSE PRACTITIONER

## 2025-04-07 PROCEDURE — 82465 ASSAY BLD/SERUM CHOLESTEROL: CPT | Mod: ZL | Performed by: NURSE PRACTITIONER

## 2025-04-07 PROCEDURE — 80061 LIPID PANEL: CPT | Mod: ZL | Performed by: NURSE PRACTITIONER

## 2025-04-07 PROCEDURE — 84443 ASSAY THYROID STIM HORMONE: CPT | Mod: ZL | Performed by: NURSE PRACTITIONER

## 2025-04-07 PROCEDURE — 82310 ASSAY OF CALCIUM: CPT | Mod: ZL | Performed by: NURSE PRACTITIONER

## 2025-04-07 PROCEDURE — G2211 COMPLEX E/M VISIT ADD ON: HCPCS | Performed by: NURSE PRACTITIONER

## 2025-04-07 PROCEDURE — G0463 HOSPITAL OUTPT CLINIC VISIT: HCPCS

## 2025-04-07 PROCEDURE — 84439 ASSAY OF FREE THYROXINE: CPT | Mod: ZL | Performed by: NURSE PRACTITIONER

## 2025-04-07 RX ORDER — NYSTATIN 100000 [USP'U]/G
POWDER TOPICAL DAILY
Qty: 60 G | Refills: 3 | Status: SHIPPED | OUTPATIENT
Start: 2025-04-07

## 2025-04-07 ASSESSMENT — ANXIETY QUESTIONNAIRES
2. NOT BEING ABLE TO STOP OR CONTROL WORRYING: SEVERAL DAYS
6. BECOMING EASILY ANNOYED OR IRRITABLE: NOT AT ALL
GAD7 TOTAL SCORE: 2
8. IF YOU CHECKED OFF ANY PROBLEMS, HOW DIFFICULT HAVE THESE MADE IT FOR YOU TO DO YOUR WORK, TAKE CARE OF THINGS AT HOME, OR GET ALONG WITH OTHER PEOPLE?: NOT DIFFICULT AT ALL
IF YOU CHECKED OFF ANY PROBLEMS ON THIS QUESTIONNAIRE, HOW DIFFICULT HAVE THESE PROBLEMS MADE IT FOR YOU TO DO YOUR WORK, TAKE CARE OF THINGS AT HOME, OR GET ALONG WITH OTHER PEOPLE: NOT DIFFICULT AT ALL
4. TROUBLE RELAXING: NOT AT ALL
GAD7 TOTAL SCORE: 2
GAD7 TOTAL SCORE: 2
1. FEELING NERVOUS, ANXIOUS, OR ON EDGE: NOT AT ALL
3. WORRYING TOO MUCH ABOUT DIFFERENT THINGS: SEVERAL DAYS
5. BEING SO RESTLESS THAT IT IS HARD TO SIT STILL: NOT AT ALL
7. FEELING AFRAID AS IF SOMETHING AWFUL MIGHT HAPPEN: NOT AT ALL
7. FEELING AFRAID AS IF SOMETHING AWFUL MIGHT HAPPEN: NOT AT ALL

## 2025-04-07 ASSESSMENT — PATIENT HEALTH QUESTIONNAIRE - PHQ9
10. IF YOU CHECKED OFF ANY PROBLEMS, HOW DIFFICULT HAVE THESE PROBLEMS MADE IT FOR YOU TO DO YOUR WORK, TAKE CARE OF THINGS AT HOME, OR GET ALONG WITH OTHER PEOPLE: NOT DIFFICULT AT ALL
SUM OF ALL RESPONSES TO PHQ QUESTIONS 1-9: 4
SUM OF ALL RESPONSES TO PHQ QUESTIONS 1-9: 4

## 2025-04-07 ASSESSMENT — PAIN SCALES - GENERAL: PAINLEVEL_OUTOF10: MILD PAIN (2)

## 2025-04-07 NOTE — PATIENT INSTRUCTIONS
"  Assessment & Plan     1. Hyperlipidemia with target LDL less than 100 (Primary)  Continue zocor  - Lipid Profile; Future  - Comprehensive metabolic panel; Future  - TSH with free T4 reflex; Future    2. Cervical pain  Continue norco as prescribed  Opioid agreement updated today    3. Arthralgia of both knees  As above    4. Rash  - nystatin (MYCOSTATIN) 781698 UNIT/GM external powder; Apply topically daily.  Dispense: 60 g; Refill: 3    5. Dysthymia  Stable, continue lexapro    6. Anxiety state  Continue lexapro    7. Chronic, continuous use of opioids  As above    8. Migraine without aura and without status migrainosus, not intractable  Continue current plan     9. Persistent insomnia  Continue ambien and trazodone.     10. On statin therapy  - Comprehensive metabolic panel; Future      BMI  Estimated body mass index is 27.03 kg/m  as calculated from the following:    Height as of this encounter: 1.56 m (5' 1.42\").    Weight as of this encounter: 65.8 kg (145 lb).       Follow-up in 3 months or as needed     Julisa Pérez,   Certified Adult Nurse Practitioner  [phone]    "

## 2025-04-07 NOTE — LETTER

## 2025-04-08 DIAGNOSIS — E03.9 HYPOTHYROIDISM, UNSPECIFIED TYPE: ICD-10-CM

## 2025-04-08 RX ORDER — PROMETHAZINE HYDROCHLORIDE 25 MG/1
TABLET ORAL
Qty: 90 TABLET | Refills: 1 | Status: SHIPPED | OUTPATIENT
Start: 2025-04-08

## 2025-04-08 RX ORDER — LEVOTHYROXINE SODIUM 88 UG/1
88 TABLET ORAL DAILY
Qty: 90 TABLET | Refills: 1 | Status: SHIPPED | OUTPATIENT
Start: 2025-04-08

## 2025-04-22 DIAGNOSIS — K21.00 GASTROESOPHAGEAL REFLUX DISEASE WITH ESOPHAGITIS, UNSPECIFIED WHETHER HEMORRHAGE: ICD-10-CM

## 2025-04-22 RX ORDER — PANTOPRAZOLE SODIUM 40 MG/1
TABLET, DELAYED RELEASE ORAL
Qty: 90 TABLET | Refills: 3 | Status: SHIPPED | OUTPATIENT
Start: 2025-04-22

## 2025-05-11 DIAGNOSIS — B02.23 POSTHERPETIC POLYNEUROPATHY: ICD-10-CM

## 2025-05-12 RX ORDER — GABAPENTIN 300 MG/1
300 CAPSULE ORAL 3 TIMES DAILY
Qty: 90 CAPSULE | Refills: 0 | Status: SHIPPED | OUTPATIENT
Start: 2025-05-12

## 2025-05-12 NOTE — TELEPHONE ENCOUNTER
GABAPENTIN 300MG CAPSULE         Last Written Prescription Date:  4/11/25  Last Fill Quantity: 90,   # refills: 0  Last Office Visit: 4/7/25  Future Office visit:    Next 5 appointments (look out 90 days)      Jun 09, 2025 2:00 PM  (Arrive by 1:45 PM)  Provider Visit with Julisa Pérez NP  [facility] ([facility] ) [address]  [facility] [address]  [phone]             Routing refill request to provider for review/approval because:  Drug not on the [facility], [facility] or [facility] refill protocol or controlled substance

## 2025-05-18 DIAGNOSIS — G47.00 PERSISTENT INSOMNIA: ICD-10-CM

## 2025-05-18 DIAGNOSIS — M25.562 ARTHRALGIA OF BOTH KNEES: ICD-10-CM

## 2025-05-18 DIAGNOSIS — M54.2 CERVICAL PAIN: ICD-10-CM

## 2025-05-18 DIAGNOSIS — M25.561 ARTHRALGIA OF BOTH KNEES: ICD-10-CM

## 2025-05-18 DIAGNOSIS — M54.2 CERVICALGIA: ICD-10-CM

## 2025-05-19 NOTE — TELEPHONE ENCOUNTER
Norco      Last Written Prescription Date:  4/25/25  Last Fill Quantity: 60,   # refills: 0  Last Office Visit: 4/7/25  Future Office visit:    Next 5 appointments (look out 90 days)      Jun 09, 2025 2:00 PM  (Arrive by 1:45 PM)  Provider Visit with Julisa Pérez NP  [facility] ([facility] ) [address]  [place] [address]  [phone]             Routing refill request to provider for review/approval because:      Ambien      Last Written Prescription Date:  4/25/25  Last Fill Quantity: 30,   # refills: 0  Last Office Visit: 4/7/25  Future Office visit:    Next 5 appointments (look out 90 days)      Jun 09, 2025 2:00 PM  (Arrive by 1:45 PM)  Provider Visit with Julisa Pérez NP  [facility] ([facility] ) [address]  [place] [address]  [phone]             Routing refill request to provider for review/approval because:      Flexeril      Last Written Prescription Date:  4/25/25  Last Fill Quantity: 90,   # refills: 0  Last Office Visit: 4/7/25  Future Office visit:    Next 5 appointments (look out 90 days)      Jun 09, 2025 2:00 PM  (Arrive by 1:45 PM)  Provider Visit with Julisa Pérez NP  [facility] ([facility] ) [address]  [place] [address]  [phone]             Routing refill request to provider for review/approval because:

## 2025-05-20 NOTE — TELEPHONE ENCOUNTER
HYDROCODONE/APAP 7.5-325MG TAB       Routing refill request to provider for review/approval because:    Rx Protocol Controlled Substance [id number]/18/2025 12:09 PM   Protocol Details   Medication not refilled in past 28 days    Auto Fail - Please forward to Provider    Naloxone on active med list    AURELIA-7 done in past year            8/28/2023    11:58 AM 9/3/2024    11:29 AM 4/7/2025     1:16 PM   AURELIA-7 SCORE   Total Score 0 (minimal anxiety) 1 (minimal anxiety) 2 (minimal anxiety)   Total Score 0 1 2        Patient-reported       CYCLOBENZAPRINE 10MG TABLET       Routing refill request to provider for review/approval because:  Drug not on the [facility] or [facility] refill protocol or controlled substance    ZOLPIDEM 10MG TABLET       Routing refill request to provider for review/approval because:    Rx Protocol Controlled Substance [id number]/18/2025 12:09 PM   Protocol Details   Medication not refilled in past 28 days    Auto Fail - Please forward to Provider    Naloxone on active med list    AURELIA-7 done in past year

## 2025-05-21 ENCOUNTER — TELEPHONE (OUTPATIENT)
Dept: FAMILY MEDICINE | Facility: OTHER | Age: 75
End: 2025-05-21

## 2025-05-21 DIAGNOSIS — M25.561 ARTHRALGIA OF BOTH KNEES: ICD-10-CM

## 2025-05-21 DIAGNOSIS — M25.562 ARTHRALGIA OF BOTH KNEES: ICD-10-CM

## 2025-05-21 DIAGNOSIS — M54.2 CERVICAL PAIN: ICD-10-CM

## 2025-05-21 RX ORDER — HYDROCODONE BITARTRATE AND ACETAMINOPHEN 7.5; 325 MG/1; MG/1
TABLET ORAL
Qty: 60 TABLET | Refills: 0 | OUTPATIENT
Start: 2025-05-21

## 2025-05-21 RX ORDER — CYCLOBENZAPRINE HCL 10 MG
10 TABLET ORAL 3 TIMES DAILY PRN
Qty: 90 TABLET | Refills: 0 | Status: SHIPPED | OUTPATIENT
Start: 2025-05-21

## 2025-05-21 RX ORDER — HYDROCODONE BITARTRATE AND ACETAMINOPHEN 7.5; 325 MG/1; MG/1
1 TABLET ORAL EVERY 6 HOURS PRN
Qty: 60 TABLET | Refills: 0 | Status: SHIPPED | OUTPATIENT
Start: 2025-05-21 | End: 2025-06-18

## 2025-05-21 RX ORDER — ZOLPIDEM TARTRATE 10 MG/1
10 TABLET ORAL
Qty: 30 TABLET | Refills: 0 | Status: SHIPPED | OUTPATIENT
Start: 2025-05-21

## 2025-05-21 NOTE — TELEPHONE ENCOUNTER
9:08 AM    Reason for Call: Phone Call    Description: Pt called about the current refill request that is pending. She said that she is leaving out of town tomorrow and is hoping to have them refilled by then. Please call Pt back if there is any issues.     Was an appointment offered for this call? N/A  If yes : Appointment type              Date    Preferred method for responding to this message: Telephone Call  What is your phone number ? [phone]     If we cannot reach you directly, may we leave a detailed response at the number you provided? Yes      Quinn Claros

## 2025-06-03 DIAGNOSIS — Z01.818 PREOP GENERAL PHYSICAL EXAM: ICD-10-CM

## 2025-06-03 RX ORDER — PROMETHAZINE HYDROCHLORIDE 25 MG/1
TABLET ORAL
Qty: 90 TABLET | Refills: 1 | Status: SHIPPED | OUTPATIENT
Start: 2025-06-03

## 2025-06-07 DIAGNOSIS — E55.9 VITAMIN D DEFICIENCY: ICD-10-CM

## 2025-06-07 DIAGNOSIS — B02.23 POSTHERPETIC POLYNEUROPATHY: ICD-10-CM

## 2025-06-09 ENCOUNTER — OFFICE VISIT (OUTPATIENT)
Dept: FAMILY MEDICINE | Facility: OTHER | Age: 75
End: 2025-06-09
Attending: NURSE PRACTITIONER
Payer: COMMERCIAL

## 2025-06-09 VITALS
SYSTOLIC BLOOD PRESSURE: 130 MMHG | WEIGHT: 144.6 LBS | DIASTOLIC BLOOD PRESSURE: 70 MMHG | OXYGEN SATURATION: 98 % | BODY MASS INDEX: 26.95 KG/M2 | RESPIRATION RATE: 18 BRPM | HEART RATE: 101 BPM | TEMPERATURE: 97.5 F

## 2025-06-09 DIAGNOSIS — G43.009 MIGRAINE WITHOUT AURA AND WITHOUT STATUS MIGRAINOSUS, NOT INTRACTABLE: ICD-10-CM

## 2025-06-09 DIAGNOSIS — K13.0 ANGULAR CHEILITIS: ICD-10-CM

## 2025-06-09 DIAGNOSIS — E03.9 HYPOTHYROIDISM, UNSPECIFIED TYPE: ICD-10-CM

## 2025-06-09 DIAGNOSIS — M25.561 ARTHRALGIA OF BOTH KNEES: ICD-10-CM

## 2025-06-09 DIAGNOSIS — E78.5 HYPERLIPIDEMIA WITH TARGET LDL LESS THAN 100: Primary | ICD-10-CM

## 2025-06-09 DIAGNOSIS — F11.90 CHRONIC, CONTINUOUS USE OF OPIOIDS: ICD-10-CM

## 2025-06-09 DIAGNOSIS — F34.1 DYSTHYMIA: ICD-10-CM

## 2025-06-09 DIAGNOSIS — F41.1 ANXIETY STATE: ICD-10-CM

## 2025-06-09 DIAGNOSIS — M54.2 CERVICALGIA: ICD-10-CM

## 2025-06-09 DIAGNOSIS — M25.562 ARTHRALGIA OF BOTH KNEES: ICD-10-CM

## 2025-06-09 DIAGNOSIS — G47.00 PERSISTENT INSOMNIA: ICD-10-CM

## 2025-06-09 LAB
HOLD SPECIMEN: NORMAL
T4 FREE SERPL-MCNC: 1.1 NG/DL (ref 0.9–1.7)
TSH SERPL DL<=0.005 MIU/L-ACNC: 0.2 UIU/ML (ref 0.3–4.2)

## 2025-06-09 PROCEDURE — 84439 ASSAY OF FREE THYROXINE: CPT | Mod: ZL | Performed by: NURSE PRACTITIONER

## 2025-06-09 PROCEDURE — 36415 COLL VENOUS BLD VENIPUNCTURE: CPT | Mod: ZL | Performed by: NURSE PRACTITIONER

## 2025-06-09 PROCEDURE — G0463 HOSPITAL OUTPT CLINIC VISIT: HCPCS

## 2025-06-09 PROCEDURE — 84443 ASSAY THYROID STIM HORMONE: CPT | Mod: ZL | Performed by: NURSE PRACTITIONER

## 2025-06-09 RX ORDER — GABAPENTIN 300 MG/1
300 CAPSULE ORAL 3 TIMES DAILY
Qty: 90 CAPSULE | Refills: 2 | Status: SHIPPED | OUTPATIENT
Start: 2025-06-09

## 2025-06-09 RX ORDER — CHOLECALCIFEROL (VITAMIN D3) 50 MCG
2 TABLET ORAL
Qty: 180 TABLET | Refills: 2 | Status: SHIPPED | OUTPATIENT
Start: 2025-06-09

## 2025-06-09 ASSESSMENT — PATIENT HEALTH QUESTIONNAIRE - PHQ9
SUM OF ALL RESPONSES TO PHQ QUESTIONS 1-9: 4
SUM OF ALL RESPONSES TO PHQ QUESTIONS 1-9: 4
10. IF YOU CHECKED OFF ANY PROBLEMS, HOW DIFFICULT HAVE THESE PROBLEMS MADE IT FOR YOU TO DO YOUR WORK, TAKE CARE OF THINGS AT HOME, OR GET ALONG WITH OTHER PEOPLE: NOT DIFFICULT AT ALL

## 2025-06-09 ASSESSMENT — ANXIETY QUESTIONNAIRES
7. FEELING AFRAID AS IF SOMETHING AWFUL MIGHT HAPPEN: NOT AT ALL
1. FEELING NERVOUS, ANXIOUS, OR ON EDGE: SEVERAL DAYS
GAD7 TOTAL SCORE: 2
4. TROUBLE RELAXING: NOT AT ALL
IF YOU CHECKED OFF ANY PROBLEMS ON THIS QUESTIONNAIRE, HOW DIFFICULT HAVE THESE PROBLEMS MADE IT FOR YOU TO DO YOUR WORK, TAKE CARE OF THINGS AT HOME, OR GET ALONG WITH OTHER PEOPLE: NOT DIFFICULT AT ALL
7. FEELING AFRAID AS IF SOMETHING AWFUL MIGHT HAPPEN: NOT AT ALL
2. NOT BEING ABLE TO STOP OR CONTROL WORRYING: NOT AT ALL
8. IF YOU CHECKED OFF ANY PROBLEMS, HOW DIFFICULT HAVE THESE MADE IT FOR YOU TO DO YOUR WORK, TAKE CARE OF THINGS AT HOME, OR GET ALONG WITH OTHER PEOPLE?: NOT DIFFICULT AT ALL
5. BEING SO RESTLESS THAT IT IS HARD TO SIT STILL: NOT AT ALL
GAD7 TOTAL SCORE: 2
GAD7 TOTAL SCORE: 2
3. WORRYING TOO MUCH ABOUT DIFFERENT THINGS: SEVERAL DAYS
6. BECOMING EASILY ANNOYED OR IRRITABLE: NOT AT ALL

## 2025-06-09 ASSESSMENT — PAIN SCALES - GENERAL: PAINLEVEL_OUTOF10: MILD PAIN (3)

## 2025-06-09 NOTE — TELEPHONE ENCOUNTER
GABAPENTIN 300MG CAPSULE       Last Written Prescription Date:  5/12/25  Last Fill Quantity: 90,   # refills: 0  Last Office Visit: 4/7/25  Future Office visit:    Next 5 appointments (look out 90 days)      Jun 09, 2025 2:00 PM  (Arrive by 1:45 PM)  Provider Visit with Julisa Pérez NP  [facility] ([facility] ) [address]  [facility] [address]  [phone]             Routing refill request to provider for review/approval because:  Drug not on the [facility], [facility] or [facility] refill protocol or controlled substance

## 2025-06-09 NOTE — PATIENT INSTRUCTIONS
Assessment & Plan     1. Hyperlipidemia with target LDL less than 100 (Primary)  Continue zocor    2. Hypothyroidism, unspecified type  Due to recheck TSH today.  She is taking 88mcg.    - TSH with free T4 reflex; Future    3. Dysthymia  Stable     4. Anxiety state  Stable     5. Persistent insomnia  Uses ambien nightly     6. Cervicalgia  Continue Norco as ordered     7. Arthralgia of both knees  As above    8. Migraine without aura and without status migrainosus, not intractable  Stable     9. Chronic, continuous use of opioids  PDMP reviewed     10. Angular cheilitis  Aquaphor tube (like chap stick tube) or cream (like a lotion).      Follow-up in 3 months or as needed    Julisa Pérez,   Certified Adult Nurse Practitioner  [phone]

## 2025-06-09 NOTE — PROGRESS NOTES
"  Assessment & Plan     1. Hyperlipidemia with target LDL less than 100 (Primary)  Continue zocor    2. Hypothyroidism, unspecified type  Due to recheck TSH today.  She is taking 88mcg.    - TSH with free T4 reflex; Future    3. Dysthymia  Stable     4. Anxiety state  Stable     5. Persistent insomnia  Uses ambien nightly     6. Cervicalgia  Continue Norco as ordered     7. Arthralgia of both knees  As above    8. Migraine without aura and without status migrainosus, not intractable  Stable     9. Chronic, continuous use of opioids  PDMP reviewed     10. Angular cheilitis  Aquaphor tube (like chap stick tube) or cream (like a lotion).          BMI  Estimated body mass index is 26.95 kg/m  as calculated from the following:    Height as of 4/7/25: 1.56 m (5' 1.42\").    Weight as of this encounter: 65.6 kg (144 lb 9.6 oz).   Weight management plan: Discussed healthy diet and exercise guidelines      Follow-up   Follow-up in October as scheduled. for annual physical and CDM.      The longitudinal plan of care for the diagnosis(es)/condition(s) as documented were addressed during this visit. Due to the added complexity in care, I will continue to support Aure in the subsequent management and with ongoing continuity of care.    Julisa Pérez,   Certified Adult Nurse Practitioner  [phone]     Subjective   Aure is a 75 year old, presenting for the following health issues:  Hypertension, Lipids, Depression, and Anxiety    HPI      Hyperlipidemia Follow-Up  Are you regularly taking any medication or supplement to lower your cholesterol?   Yes- simvastatin 20 mg daily   Are you having muscle aches or other side effects that you think could be caused by your cholesterol lowering medication?  No        BP Readings from Last 3 Encounters:   06/09/25 130/70   04/07/25 135/82   01/02/25 133/88    Wt Readings from Last 3 Encounters:   06/09/25 65.6 kg (144 lb 9.6 oz)   04/07/25 65.8 kg (145 lb)   01/02/25 64.9 kg " (143 lb)          Hypothyroidism Follow-up    Since last visit, patient describes the following symptoms: Weight stable, no hair loss, no skin changes, no constipation, no loose stools  Last visit, levothyroxine was increased to 88mcg - due for repeat TSH/T4 today.      Depression and Anxiety   How are you doing with your depression since your last visit? No change  How are you doing with your anxiety since your last visit?  No change  Are you having other symptoms that might be associated with depression or anxiety? No  Have you had a significant life event? No   Do you have any concerns with your use of alcohol or other drugs? No    Social History     Tobacco Use    Smoking status: Never    Smokeless tobacco: Never    Tobacco comments:     no passive exposure   Vaping Use    Vaping status: Never Used   Substance Use Topics    Alcohol use: Yes     Comment: rarely, maybe yearly    Drug use: No         9/3/2024    11:28 AM 4/7/2025     1:12 PM 6/9/2025     1:53 PM   PHQ   PHQ-9 Total Score 1 4  4    Q9: Thoughts of better off dead/self-harm past 2 weeks Not at all Not at all Not at all       Patient-reported         9/3/2024    11:29 AM 4/7/2025     1:16 PM 6/9/2025     1:55 PM   AURELIA-7 SCORE   Total Score 1 (minimal anxiety) 2 (minimal anxiety) 2 (minimal anxiety)   Total Score 1 2  2        Patient-reported         6/9/2025     1:53 PM   Last PHQ-9   1.  Little interest or pleasure in doing things 1   2.  Feeling down, depressed, or hopeless 0   3.  Trouble falling or staying asleep, or sleeping too much 1   4.  Feeling tired or having little energy 1   5.  Poor appetite or overeating 0   6.  Feeling bad about yourself 1   7.  Trouble concentrating 0   8.  Moving slowly or restless 0   Q9: Thoughts of better off dead/self-harm past 2 weeks 0   PHQ-9 Total Score 4        Patient-reported         6/9/2025     1:55 PM   AURELIA-7    1. Feeling nervous, anxious, or on edge 1   2. Not being able to stop or control worrying 0    3. Worrying too much about different things 1   4. Trouble relaxing 0   5. Being so restless that it is hard to sit still 0   6. Becoming easily annoyed or irritable 0   7. Feeling afraid, as if something awful might happen 0   AURELIA-7 Total Score 2    If you checked any problems, how difficult have they made it for you to do your work, take care of things at home, or get along with other people? Not difficult at all       Patient-reported       Suicide Assessment Five-step Evaluation and Treatment (SAFE-T)    Review of Systems  Constitutional, HEENT, cardiovascular, pulmonary, GI, , musculoskeletal, neuro, skin, endocrine and psych systems are negative, except as otherwise noted.    She notes extremely chapped lips, especially in the corners of her mouth.  She does wear dentures when out but usually not at home as they are uncomfortable.       Objective    /70 (BP Location: Right arm, Patient Position: Sitting, Cuff Size: Adult Regular)   Pulse 101   Temp 97.5  F (36.4  C) (Tympanic)   Resp 18   Wt 65.6 kg (144 lb 9.6 oz)   SpO2 98%   BMI 26.95 kg/m    Body mass index is 26.95 kg/m .  Physical Exam   GENERAL: alert and no distress  NECK: no adenopathy, no asymmetry, masses, or scars, thyroid normal to palpation, and no carotid bruits  RESP: lungs clear to auscultation - no rales, rhonchi or wheezes  CV: regular rate and rhythm, normal S1 S2, no S3 or S4, no murmur, click or rub, no peripheral edema  MS: no gross musculoskeletal defects noted, no edema  PSYCH: mentation appears normal, affect normal/bright    Office Visit on 04/07/2025   Component Date Value Ref Range Status    Cholesterol 04/07/2025 181  <200 mg/dL Final    Triglycerides 04/07/2025 102  <150 mg/dL Final    Direct Measure HDL 04/07/2025 75  >=50 mg/dL Final    LDL Cholesterol Calculated 04/07/2025 86  <100 mg/dL Final    Non HDL Cholesterol 04/07/2025 106  <130 mg/dL Final    Patient Fasting > 8hrs? 04/07/2025 No   Final    Sodium  04/07/2025 141  135 - 145 mmol/L Final    Potassium 04/07/2025 3.8  3.4 - 5.3 mmol/L Final    Carbon Dioxide (CO2) 04/07/2025 25  22 - 29 mmol/L Final    Anion Gap 04/07/2025 8  7 - 15 mmol/L Final    Urea Nitrogen 04/07/2025 11.2  8.0 - 23.0 mg/dL Final    Creatinine 04/07/2025 0.63  0.51 - 0.95 mg/dL Final    GFR Estimate 04/07/2025 >90  >60 mL/min/1.73m2 Final    eGFR calculated using 2021 CKD-EPI equation.    Calcium 04/07/2025 8.5 (L)  8.8 - 10.4 mg/dL Final    Chloride 04/07/2025 108 (H)  98 - 107 mmol/L Final    Glucose 04/07/2025 96  70 - 99 mg/dL Final    Alkaline Phosphatase 04/07/2025 79  40 - 150 U/L Final    AST 04/07/2025 30  0 - 45 U/L Final    ALT 04/07/2025 33  0 - 50 U/L Final    Protein Total 04/07/2025 6.8  6.4 - 8.3 g/dL Final    Albumin 04/07/2025 4.2  3.5 - 5.2 g/dL Final    Bilirubin Total 04/07/2025 0.2  <=1.2 mg/dL Final    Patient Fasting > 8hrs? 04/07/2025 No   Final    TSH 04/07/2025 4.58 (H)  0.30 - 4.20 uIU/mL Final    Hold Specimen 04/07/2025 JIC   Final    Free T4 04/07/2025 0.72 (L)  0.90 - 1.70 ng/dL Final           Signed Electronically by: Julisa Pérez, ARMAAN

## 2025-06-10 ENCOUNTER — RESULTS FOLLOW-UP (OUTPATIENT)
Dept: FAMILY MEDICINE | Facility: OTHER | Age: 75
End: 2025-06-10

## 2025-06-17 DIAGNOSIS — M25.561 ARTHRALGIA OF BOTH KNEES: ICD-10-CM

## 2025-06-17 DIAGNOSIS — M54.2 CERVICALGIA: ICD-10-CM

## 2025-06-17 DIAGNOSIS — G47.00 PERSISTENT INSOMNIA: ICD-10-CM

## 2025-06-17 DIAGNOSIS — M54.2 CERVICAL PAIN: ICD-10-CM

## 2025-06-17 DIAGNOSIS — M25.562 ARTHRALGIA OF BOTH KNEES: ICD-10-CM

## 2025-06-17 NOTE — TELEPHONE ENCOUNTER
HYDROCODONE/APAP 7.5-325MG TAB       Last Written Prescription Date:  5/821/25  Last Fill Quantity: 60,   # refills: 0  Last Office Visit: 6/9/25  Future Office visit:       Routing refill request to provider for review/approval because:    Rx Protocol Controlled Substance [id number]/17/2025 12:31 AM   Protocol Details   Medication is active on med list and the sig matches    Medication not refilled in past 28 days    Auto Fail - Please forward to Provider    Naloxone on active med list    AURELIA-7 done in past year            9/3/2024    11:29 AM 4/7/2025     1:16 PM 6/9/2025     1:55 PM   AURELIA-7 SCORE   Total Score 1 (minimal anxiety) 2 (minimal anxiety) 2 (minimal anxiety)   Total Score 1 2  2        Patient-reported           CYCLOBENZAPRINE 10MG TABLET       Last Written Prescription Date:  5/21/25  Last Fill Quantity: 90,   # refills: 0  Last Office Visit: 6/9/25  Future Office visit:       Routing refill request to provider for review/approval because:  Drug not on the [facility] or [facility] refill protocol or controlled substance     ZOLPIDEM 10MG TABLET       Last Written Prescription Date:  5/21/25  Last Fill Quantity: 30,   # refills: 0  Last Office Visit: 6/9/25  Future Office visit:       Routing refill request to provider for review/approval because:    Rx Protocol Controlled Substance [id number]/17/2025 12:31 AM   Protocol Details   Medication not refilled in past 28 days    Auto Fail - Please forward to Provider    Naloxone on active med list    AURELIA-7 done in past year

## 2025-06-18 RX ORDER — ZOLPIDEM TARTRATE 10 MG/1
10 TABLET ORAL
Qty: 30 TABLET | Refills: 0 | Status: SHIPPED | OUTPATIENT
Start: 2025-06-18

## 2025-06-18 RX ORDER — CYCLOBENZAPRINE HCL 10 MG
10 TABLET ORAL 3 TIMES DAILY PRN
Qty: 90 TABLET | Refills: 0 | Status: SHIPPED | OUTPATIENT
Start: 2025-06-18

## 2025-06-18 RX ORDER — HYDROCODONE BITARTRATE AND ACETAMINOPHEN 7.5; 325 MG/1; MG/1
1 TABLET ORAL 2 TIMES DAILY PRN
Qty: 60 TABLET | Refills: 0 | Status: SHIPPED | OUTPATIENT
Start: 2025-06-18

## 2025-06-25 ENCOUNTER — TELEPHONE (OUTPATIENT)
Dept: FAMILY MEDICINE | Facility: OTHER | Age: 75
End: 2025-06-25

## 2025-07-14 DIAGNOSIS — M54.2 CERVICAL PAIN: ICD-10-CM

## 2025-07-14 DIAGNOSIS — M25.562 ARTHRALGIA OF BOTH KNEES: ICD-10-CM

## 2025-07-14 DIAGNOSIS — G47.00 PERSISTENT INSOMNIA: ICD-10-CM

## 2025-07-14 DIAGNOSIS — M25.561 ARTHRALGIA OF BOTH KNEES: ICD-10-CM

## 2025-07-14 RX ORDER — HYDROCODONE BITARTRATE AND ACETAMINOPHEN 7.5; 325 MG/1; MG/1
1 TABLET ORAL 2 TIMES DAILY PRN
Qty: 60 TABLET | Refills: 0 | Status: SHIPPED | OUTPATIENT
Start: 2025-07-14

## 2025-07-14 RX ORDER — ZOLPIDEM TARTRATE 10 MG/1
10 TABLET ORAL
Qty: 30 TABLET | Refills: 0 | Status: SHIPPED | OUTPATIENT
Start: 2025-07-14

## 2025-07-14 NOTE — TELEPHONE ENCOUNTER
HYDROCODONE/APAP 7.5-325MG TAB         Last Written Prescription Date:  6/18/25  Last Fill Quantity: 60,   # refills: 0  Last Office Visit: 6/9/25  Future Office visit:    Next 5 appointments (look out 90 days)      Oct 08, 2025 2:30 PM  (Arrive by 2:15 PM)  Adult Preventative Visit with Julisa Pérez NP  [facility] ([facility] ) [address]  [place] [address]  [phone]             Routing refill request to provider for review/approval because:  Drug not on the [facility], [facility] or [facility] refill protocol or controlled substance  Rx Protocol Controlled Substance Neubnx3807/14/2025 03:38 AM   Protocol Details Medication not refilled in past 28 days    Auto Fail - Please forward to Provider    Naloxone on active med list    AURELIA-7 done in past year         9/3/2024    11:29 AM 4/7/2025     1:16 PM 6/9/2025     1:55 PM   AURELIA-7 SCORE   Total Score 1 (minimal anxiety) 2 (minimal anxiety) 2 (minimal anxiety)   Total Score 1 2  2        Patient-reported         ZOLPIDEM 10MG TABLET         Last Written Prescription Date:  6/18/25  Last Fill Quantity: 30,   # refills: 0  Last Office Visit: 6/9/25  Future Office visit:    Next 5 appointments (look out 90 days)      Oct 08, 2025 2:30 PM  (Arrive by 2:15 PM)  Adult Preventative Visit with Julisa Pérez NP  [facility] ([facility] ) [address]  [place] [address]  [phone]             Routing refill request to provider for review/approval because:  Drug not on the [facility], [facility] or [facility] refill protocol or controlled substance  Rx Protocol Controlled Substance Ettrnh0207/14/2025 03:38 AM   Protocol Details Medication not refilled in past 28 days    Auto Fail - Please forward to Provider    Naloxone on active med list    AURELIA-7 done in past year    AURELIA-7 above

## 2025-07-21 DIAGNOSIS — M54.2 CERVICALGIA: ICD-10-CM

## 2025-07-21 RX ORDER — CYCLOBENZAPRINE HCL 10 MG
TABLET ORAL
Qty: 90 TABLET | Refills: 2 | Status: SHIPPED | OUTPATIENT
Start: 2025-07-21

## 2025-07-21 NOTE — TELEPHONE ENCOUNTER
cyclobenzaprine (Flexeril) 10 MG tablet    Last Written Prescription Date:  06/18/2025  Last Fill Quantity: 90,   # refills: 0  Last Office Visit: 06/09/2025  Future Office visit:    Next 5 appointments (look out 90 days)      Oct 08, 2025 2:30 PM  (Arrive by 2:15 PM)  Adult Preventative Visit with Julisa Pérez NP  [facility] ([facility] ) [address]  [facility] [address]  [phone]             Routing refill request to provider for review/approval because:

## 2025-07-21 NOTE — TELEPHONE ENCOUNTER
Routing refill request to provider for review/approval because:  Drug not on the [facility], [facility] or [facility] refill protocol or controlled substance

## 2025-08-03 DIAGNOSIS — B00.1 COLD SORE: ICD-10-CM

## 2025-08-04 RX ORDER — ACYCLOVIR 50 MG/G
CREAM TOPICAL 3 TIMES DAILY PRN
Qty: 5 G | Refills: 1 | Status: SHIPPED | OUTPATIENT
Start: 2025-08-04

## 2025-08-05 DIAGNOSIS — Z01.818 PREOP GENERAL PHYSICAL EXAM: ICD-10-CM

## 2025-08-05 RX ORDER — PROMETHAZINE HYDROCHLORIDE 25 MG/1
TABLET ORAL
Qty: 90 TABLET | Refills: 1 | Status: SHIPPED | OUTPATIENT
Start: 2025-08-05

## 2025-08-10 DIAGNOSIS — G47.00 PERSISTENT INSOMNIA: ICD-10-CM

## 2025-08-10 DIAGNOSIS — M25.561 ARTHRALGIA OF BOTH KNEES: ICD-10-CM

## 2025-08-10 DIAGNOSIS — M54.2 CERVICAL PAIN: ICD-10-CM

## 2025-08-10 DIAGNOSIS — M25.562 ARTHRALGIA OF BOTH KNEES: ICD-10-CM

## 2025-08-11 RX ORDER — HYDROCODONE BITARTRATE AND ACETAMINOPHEN 7.5; 325 MG/1; MG/1
1 TABLET ORAL 2 TIMES DAILY PRN
Qty: 60 TABLET | Refills: 0 | Status: SHIPPED | OUTPATIENT
Start: 2025-08-11

## 2025-08-11 RX ORDER — ZOLPIDEM TARTRATE 10 MG/1
10 TABLET ORAL
Qty: 30 TABLET | Refills: 0 | Status: SHIPPED | OUTPATIENT
Start: 2025-08-11

## 2025-08-25 DIAGNOSIS — B02.23 POSTHERPETIC POLYNEUROPATHY: ICD-10-CM

## 2025-08-25 RX ORDER — GABAPENTIN 300 MG/1
300 CAPSULE ORAL 3 TIMES DAILY
Qty: 90 CAPSULE | Refills: 5 | Status: SHIPPED | OUTPATIENT
Start: 2025-08-25

## 2025-08-27 DIAGNOSIS — G47.00 PERSISTENT INSOMNIA: ICD-10-CM

## 2025-08-27 RX ORDER — TRAZODONE HYDROCHLORIDE 150 MG/1
150 TABLET ORAL AT BEDTIME
Qty: 90 TABLET | Refills: 2 | Status: SHIPPED | OUTPATIENT
Start: 2025-08-27

## (undated) DEVICE — SUTURE-VICRYL 1 CT-1 POP-OFF J741D

## (undated) DEVICE — POLYGRAB TRIPOD FOR POLYP RETRIEVAL

## (undated) DEVICE — BETADINE 5% STERILE OPHTHALMIC SOLUTION 1 OZ.

## (undated) DEVICE — SUTURE-VICRYL 0 CP-1 J467H

## (undated) DEVICE — CANISTER-SUCTION 2000CC

## (undated) DEVICE — CONNECTOR-ERBEFLO 2 PORT

## (undated) DEVICE — LUBRICANT JELLY 2OZ. TUBE

## (undated) DEVICE — POUCH-INSTRUMENT 2 COMP. 7 X 11IN

## (undated) DEVICE — MOUTHPIECE W/GUARD FOR ENDOSCOPY

## (undated) DEVICE — SENSOR-OXISENSOR II ADULT

## (undated) DEVICE — DRSG-AQUACEL AG 3.5" X 14" SURGICAL

## (undated) DEVICE — Device

## (undated) DEVICE — PACK-KNEE-CUSTOM

## (undated) DEVICE — CAST PADDING-STERILE 6"

## (undated) DEVICE — TUBING-SUCTION 20FT

## (undated) DEVICE — IRRIGATION-H2O 1000ML

## (undated) DEVICE — SYRINGE-ASEPTO IRRIGATION

## (undated) DEVICE — SYRINGE-30CC SLIP TIP

## (undated) DEVICE — SUTURE-VICRYL 2-0 CP-1 VCP266H

## (undated) DEVICE — DRAPE-THREE QUARTER (LARGE) SHEET

## (undated) DEVICE — IRRIGATION-H2O 1000ML BAG

## (undated) DEVICE — NDL-18G 1 1/2" NON-SAFETY

## (undated) DEVICE — APPLICATOR-CHLORAPREP 26ML TINTED CHG 2%+ 70% IPA-SURGICAL

## (undated) DEVICE — LIGHT HANDLE COVER

## (undated) DEVICE — BLADE-SAGITTAL DUAL CUT 25MM X 100MM X 1.27MM

## (undated) DEVICE — IRRIGATION-NACL 1000ML

## (undated) DEVICE — CEMENT MIXER-MIXEVAC III

## (undated) DEVICE — FORCEP-COLON BIOPSY STD W/NEEDLE 160CM

## (undated) DEVICE — SYRINGE-60CC W/GAUGE

## (undated) DEVICE — BLADE-SAGITTAL 18MM X 90MM X 1.27MM

## (undated) DEVICE — FOOT PADS-TOTAL KNEE POSITIONER

## (undated) DEVICE — CAUTERY PAD-POLYHESIVE II ADULT

## (undated) DEVICE — PULSE LAVAGE IRRIGATION SYSTEM

## (undated) DEVICE — SYRINGE-20CC LUER LOCK

## (undated) DEVICE — LABEL-STERILE PREPRINTED FOR OR

## (undated) DEVICE — FORCEP-COLON BIOPSY LARGE W/NEEDLE 240CM

## (undated) DEVICE — CAUTERY PENCIL-W/SUCTION 10FR FOOTSWITCHING

## (undated) DEVICE — IRRIGATION-NACL 3000ML (BAG)

## (undated) DEVICE — BDG-COBAN 6 INCH

## (undated) DEVICE — CUFF-DISP STERILE 30IN SINGLE BLADDER

## (undated) DEVICE — ICEMAN W/UNIVERSAL WRAP-ON PAD

## (undated) DEVICE — SCD SLEEVE-KNEE REG.

## (undated) DEVICE — TUBE-SALEM SUMP 18FR STOMACH SUCTION

## (undated) DEVICE — MARKER-SKIN REG

## (undated) DEVICE — GOWN-SURG XXL LVL 3 REINFORCED

## (undated) DEVICE — CATH-ESOPHAGEAL C.R.E. 12-13.5-15

## (undated) DEVICE — GLV-8.0 ORTHO PROTEXIS PI LF/PF

## (undated) RX ORDER — FENTANYL CITRATE 50 UG/ML
INJECTION, SOLUTION INTRAMUSCULAR; INTRAVENOUS
Status: DISPENSED
Start: 2018-04-17

## (undated) RX ORDER — PROPOFOL 10 MG/ML
INJECTION, EMULSION INTRAVENOUS
Status: DISPENSED
Start: 2018-09-04

## (undated) RX ORDER — DEXAMETHASONE SODIUM PHOSPHATE 10 MG/ML
INJECTION, SOLUTION INTRAMUSCULAR; INTRAVENOUS
Status: DISPENSED
Start: 2018-04-17

## (undated) RX ORDER — LABETALOL HYDROCHLORIDE 5 MG/ML
INJECTION, SOLUTION INTRAVENOUS
Status: DISPENSED
Start: 2018-04-17

## (undated) RX ORDER — DEXAMETHASONE SODIUM PHOSPHATE 10 MG/ML
INJECTION, SOLUTION INTRAMUSCULAR; INTRAVENOUS
Status: DISPENSED
Start: 2017-09-11

## (undated) RX ORDER — PROPOFOL 10 MG/ML
INJECTION, EMULSION INTRAVENOUS
Status: DISPENSED
Start: 2019-01-14

## (undated) RX ORDER — ONDANSETRON 2 MG/ML
INJECTION INTRAMUSCULAR; INTRAVENOUS
Status: DISPENSED
Start: 2017-09-11

## (undated) RX ORDER — PHENYLEPHRINE HCL IN 0.9% NACL 1 MG/10 ML
SYRINGE (ML) INTRAVENOUS
Status: DISPENSED
Start: 2018-09-04

## (undated) RX ORDER — PROPOFOL 10 MG/ML
INJECTION, EMULSION INTRAVENOUS
Status: DISPENSED
Start: 2017-09-11

## (undated) RX ORDER — LIDOCAINE HYDROCHLORIDE 20 MG/ML
INJECTION, SOLUTION EPIDURAL; INFILTRATION; INTRACAUDAL; PERINEURAL
Status: DISPENSED
Start: 2017-09-11

## (undated) RX ORDER — LIDOCAINE HYDROCHLORIDE 20 MG/ML
INJECTION, SOLUTION EPIDURAL; INFILTRATION; INTRACAUDAL; PERINEURAL
Status: DISPENSED
Start: 2018-11-05

## (undated) RX ORDER — PROPOFOL 10 MG/ML
INJECTION, EMULSION INTRAVENOUS
Status: DISPENSED
Start: 2018-04-17

## (undated) RX ORDER — SUFENTANIL CITRATE 50 UG/ML
INJECTION EPIDURAL; INTRAVENOUS
Status: DISPENSED
Start: 2018-09-04

## (undated) RX ORDER — SODIUM CHLORIDE 9 MG/ML
INJECTION, SOLUTION INTRAVENOUS
Status: DISPENSED
Start: 2018-09-04

## (undated) RX ORDER — METOPROLOL TARTRATE 1 MG/ML
INJECTION, SOLUTION INTRAVENOUS
Status: DISPENSED
Start: 2018-04-17

## (undated) RX ORDER — HYDRALAZINE HYDROCHLORIDE 20 MG/ML
INJECTION INTRAMUSCULAR; INTRAVENOUS
Status: DISPENSED
Start: 2018-04-17

## (undated) RX ORDER — PROPOFOL 10 MG/ML
INJECTION, EMULSION INTRAVENOUS
Status: DISPENSED
Start: 2018-12-21

## (undated) RX ORDER — KETOROLAC TROMETHAMINE 30 MG/ML
INJECTION, SOLUTION INTRAMUSCULAR; INTRAVENOUS
Status: DISPENSED
Start: 2018-09-04

## (undated) RX ORDER — PROPOFOL 10 MG/ML
INJECTION, EMULSION INTRAVENOUS
Status: DISPENSED
Start: 2018-11-05

## (undated) RX ORDER — ONDANSETRON 2 MG/ML
INJECTION INTRAMUSCULAR; INTRAVENOUS
Status: DISPENSED
Start: 2018-04-17

## (undated) RX ORDER — TRANEXAMIC ACID 100 MG/ML
INJECTION, SOLUTION INTRAVENOUS
Status: DISPENSED
Start: 2018-09-04

## (undated) RX ORDER — FENTANYL CITRATE 50 UG/ML
INJECTION, SOLUTION INTRAMUSCULAR; INTRAVENOUS
Status: DISPENSED
Start: 2017-09-11

## (undated) RX ORDER — ESMOLOL HYDROCHLORIDE 10 MG/ML
INJECTION INTRAVENOUS
Status: DISPENSED
Start: 2019-01-14

## (undated) RX ORDER — LIDOCAINE HYDROCHLORIDE 20 MG/ML
INJECTION, SOLUTION EPIDURAL; INFILTRATION; INTRACAUDAL; PERINEURAL
Status: DISPENSED
Start: 2018-04-17